# Patient Record
Sex: MALE | Race: WHITE | NOT HISPANIC OR LATINO | Employment: OTHER
[De-identification: names, ages, dates, MRNs, and addresses within clinical notes are randomized per-mention and may not be internally consistent; named-entity substitution may affect disease eponyms.]

---

## 2017-03-20 ENCOUNTER — PRIOR ORIGINAL RECORDS (OUTPATIENT)
Dept: OTHER | Age: 82
End: 2017-03-20

## 2017-03-27 ENCOUNTER — PRIOR ORIGINAL RECORDS (OUTPATIENT)
Dept: OTHER | Age: 82
End: 2017-03-27

## 2017-03-27 ENCOUNTER — HOSPITAL ENCOUNTER (OUTPATIENT)
Dept: LAB | Facility: HOSPITAL | Age: 82
Discharge: HOME | End: 2017-03-27
Attending: INTERNAL MEDICINE
Payer: MEDICARE

## 2017-03-27 LAB
CHOLEST SMN-MCNC: 117 MG/DL (ref ?–200)
HDLC SERPL-MCNC: 50 MG/DL (ref 45–?)
HDLC SERPL: 2.34 {RATIO} (ref ?–4.97)
LDLC SERPL CALC-MCNC: 53 MG/DL (ref ?–130)
NONHDLC SERPL-MCNC: 67 MG/DL (ref ?–130)
TRIGLYCERIDES: 69 MG/DL (ref ?–150)
VLDL: 14 MG/DL (ref 5–40)

## 2017-03-27 PROCEDURE — 80061 LIPID PANEL: CPT | Performed by: INTERNAL MEDICINE

## 2017-03-27 PROCEDURE — 36415 COLL VENOUS BLD VENIPUNCTURE: CPT | Performed by: INTERNAL MEDICINE

## 2017-03-31 LAB
CHOLESTEROL, TOTAL: 117 MG/DL
HDL CHOLESTEROL: 50 MG/DL
LDL CHOLESTEROL: 53 MG/DL
TRIGLYCERIDES: 69 MG/DL

## 2017-09-13 ENCOUNTER — HOSPITAL ENCOUNTER (OUTPATIENT)
Dept: CV DIAGNOSTICS | Facility: HOSPITAL | Age: 82
Discharge: HOME | End: 2017-09-13
Attending: INTERNAL MEDICINE
Payer: MEDICARE

## 2017-09-13 DIAGNOSIS — I25.10 CAD (CORONARY ARTERY DISEASE): ICD-10-CM

## 2017-09-13 PROCEDURE — 78452 HT MUSCLE IMAGE SPECT MULT: CPT | Performed by: INTERNAL MEDICINE

## 2017-09-13 PROCEDURE — 93018 CV STRESS TEST I&R ONLY: CPT | Performed by: INTERNAL MEDICINE

## 2017-09-13 PROCEDURE — 93017 CV STRESS TEST TRACING ONLY: CPT | Performed by: INTERNAL MEDICINE

## 2017-09-18 ENCOUNTER — PRIOR ORIGINAL RECORDS (OUTPATIENT)
Dept: OTHER | Age: 82
End: 2017-09-18

## 2017-10-02 ENCOUNTER — HOSPITAL ENCOUNTER (OUTPATIENT)
Dept: CARDIOLOGY CLINIC | Facility: HOSPITAL | Age: 82
Discharge: HOME | End: 2017-10-02
Attending: INTERNAL MEDICINE

## 2017-10-02 DIAGNOSIS — I71.4 ABDOMINAL AORTIC ANEURYSM WITHOUT RUPTURE (HCC): ICD-10-CM

## 2017-10-02 DIAGNOSIS — I65.23 BILATERAL CAROTID ARTERY STENOSIS: ICD-10-CM

## 2017-10-11 ENCOUNTER — PRIOR ORIGINAL RECORDS (OUTPATIENT)
Dept: OTHER | Age: 82
End: 2017-10-11

## 2018-03-19 ENCOUNTER — PRIOR ORIGINAL RECORDS (OUTPATIENT)
Dept: OTHER | Age: 83
End: 2018-03-19

## 2018-03-19 ENCOUNTER — LAB ENCOUNTER (OUTPATIENT)
Dept: LAB | Facility: HOSPITAL | Age: 83
End: 2018-03-19
Attending: INTERNAL MEDICINE
Payer: MEDICARE

## 2018-03-19 DIAGNOSIS — E78.00 HYPERCHOLESTEROLEMIA: Primary | ICD-10-CM

## 2018-03-19 DIAGNOSIS — I25.10 CAD (CORONARY ARTERY DISEASE): ICD-10-CM

## 2018-03-19 LAB
ALT SERPL-CCNC: 22 U/L (ref 17–63)
AST SERPL-CCNC: 20 U/L (ref 15–41)
BUN BLD-MCNC: 25 MG/DL (ref 8–20)
CALCIUM BLD-MCNC: 9.2 MG/DL (ref 8.3–10.3)
CHLORIDE: 109 MMOL/L (ref 101–111)
CHOLEST SMN-MCNC: 105 MG/DL (ref ?–200)
CO2: 27 MMOL/L (ref 22–32)
CREAT BLD-MCNC: 1.1 MG/DL (ref 0.7–1.3)
GLUCOSE BLD-MCNC: 100 MG/DL (ref 70–99)
HDLC SERPL-MCNC: 40 MG/DL (ref 45–?)
HDLC SERPL: 2.63 {RATIO} (ref ?–4.97)
LDLC SERPL CALC-MCNC: 49 MG/DL (ref ?–130)
NONHDLC SERPL-MCNC: 65 MG/DL (ref ?–130)
POTASSIUM SERPL-SCNC: 4.2 MMOL/L (ref 3.6–5.1)
SODIUM SERPL-SCNC: 141 MMOL/L (ref 136–144)
TRIGL SERPL-MCNC: 81 MG/DL (ref ?–150)
VLDLC SERPL CALC-MCNC: 16 MG/DL (ref 5–40)

## 2018-03-19 PROCEDURE — 84460 ALANINE AMINO (ALT) (SGPT): CPT

## 2018-03-19 PROCEDURE — 84450 TRANSFERASE (AST) (SGOT): CPT

## 2018-03-19 PROCEDURE — 80061 LIPID PANEL: CPT

## 2018-03-19 PROCEDURE — 36415 COLL VENOUS BLD VENIPUNCTURE: CPT

## 2018-03-19 PROCEDURE — 80048 BASIC METABOLIC PNL TOTAL CA: CPT

## 2018-03-20 LAB
BUN: 25 MG/DL
CALCIUM: 9.2 MG/DL
CHLORIDE: 109 MEQ/L
CHOLESTEROL, TOTAL: 105 MG/DL
CREATININE, SERUM: 1.1 MG/DL
GLUCOSE: 100 MG/DL
HDL CHOLESTEROL: 40 MG/DL
LDL CHOLESTEROL: 49 MG/DL
POTASSIUM, SERUM: 4.2 MEQ/L
SGOT (AST): 20 IU/L
SGPT (ALT): 22 IU/L
SODIUM: 141 MEQ/L
TRIGLYCERIDES: 81 MG/DL

## 2018-03-26 ENCOUNTER — PRIOR ORIGINAL RECORDS (OUTPATIENT)
Dept: OTHER | Age: 83
End: 2018-03-26

## 2018-09-24 ENCOUNTER — PRIOR ORIGINAL RECORDS (OUTPATIENT)
Dept: OTHER | Age: 83
End: 2018-09-24

## 2018-09-24 ENCOUNTER — MYAURORA ACCOUNT LINK (OUTPATIENT)
Dept: OTHER | Age: 83
End: 2018-09-24

## 2018-09-28 ENCOUNTER — HOSPITAL ENCOUNTER (OUTPATIENT)
Dept: CARDIOLOGY CLINIC | Facility: HOSPITAL | Age: 83
Discharge: HOME | End: 2018-09-28
Attending: INTERNAL MEDICINE

## 2018-09-28 ENCOUNTER — MYAURORA ACCOUNT LINK (OUTPATIENT)
Dept: OTHER | Age: 83
End: 2018-09-28

## 2018-09-28 DIAGNOSIS — Z95.1 HX OF CABG: ICD-10-CM

## 2018-09-28 DIAGNOSIS — I25.10 CORONARY ARTERIOSCLEROSIS: ICD-10-CM

## 2018-09-28 DIAGNOSIS — I65.23 BILATERAL CAROTID ARTERY STENOSIS: ICD-10-CM

## 2018-09-28 DIAGNOSIS — I71.4 ABDOMINAL ANEURYSM (HCC): ICD-10-CM

## 2018-10-17 ENCOUNTER — PRIOR ORIGINAL RECORDS (OUTPATIENT)
Dept: OTHER | Age: 83
End: 2018-10-17

## 2019-01-31 ENCOUNTER — HOSPITAL ENCOUNTER (OUTPATIENT)
Dept: LAB | Facility: HOSPITAL | Age: 84
Discharge: HOME | End: 2019-01-31
Attending: INTERNAL MEDICINE
Payer: MEDICARE

## 2019-01-31 ENCOUNTER — PRIOR ORIGINAL RECORDS (OUTPATIENT)
Dept: OTHER | Age: 84
End: 2019-01-31

## 2019-01-31 LAB
ALT SERPL-CCNC: 80 U/L (ref 17–63)
ANION GAP SERPL CALC-SCNC: 7 MMOL/L (ref 0–18)
AST SERPL-CCNC: 59 U/L (ref 15–41)
BUN BLD-MCNC: 27 MG/DL (ref 8–20)
BUN/CREAT SERPL: 20.6 (ref 10–20)
CALCIUM BLD-MCNC: 9.2 MG/DL (ref 8.3–10.3)
CHLORIDE SERPL-SCNC: 111 MMOL/L (ref 101–111)
CHOLEST SMN-MCNC: 118 MG/DL (ref ?–200)
CO2 SERPL-SCNC: 27 MMOL/L (ref 22–32)
CREAT BLD-MCNC: 1.31 MG/DL (ref 0.7–1.3)
GLUCOSE BLD-MCNC: 96 MG/DL (ref 70–99)
HDLC SERPL-MCNC: 44 MG/DL (ref 40–59)
LDLC SERPL CALC-MCNC: 56 MG/DL (ref ?–100)
NONHDLC SERPL-MCNC: 74 MG/DL (ref ?–130)
OSMOLALITY SERPL CALC.SUM OF ELEC: 305 MOSM/KG (ref 275–295)
POTASSIUM SERPL-SCNC: 4.2 MMOL/L (ref 3.6–5.1)
SODIUM SERPL-SCNC: 145 MMOL/L (ref 136–144)
TRIGL SERPL-MCNC: 88 MG/DL (ref 30–149)
VLDLC SERPL CALC-MCNC: 18 MG/DL (ref 0–30)

## 2019-01-31 PROCEDURE — 84450 TRANSFERASE (AST) (SGOT): CPT | Performed by: INTERNAL MEDICINE

## 2019-01-31 PROCEDURE — 84460 ALANINE AMINO (ALT) (SGPT): CPT | Performed by: INTERNAL MEDICINE

## 2019-01-31 PROCEDURE — 80048 BASIC METABOLIC PNL TOTAL CA: CPT | Performed by: INTERNAL MEDICINE

## 2019-01-31 PROCEDURE — 80061 LIPID PANEL: CPT | Performed by: INTERNAL MEDICINE

## 2019-01-31 PROCEDURE — 36415 COLL VENOUS BLD VENIPUNCTURE: CPT | Performed by: INTERNAL MEDICINE

## 2019-02-01 LAB
BUN: 27 MG/DL
CALCIUM: 9.2 MG/DL
CHLORIDE: 111 MEQ/L
CHOLESTEROL, TOTAL: 118 MG/DL
CREATININE, SERUM: 1.31 MG/DL
GLUCOSE: 96 MG/DL
HDL CHOLESTEROL: 44 MG/DL
LDL CHOLESTEROL: 56 MG/DL
POTASSIUM, SERUM: 4.2 MEQ/L
SGOT (AST): 59 IU/L
SGPT (ALT): 80 IU/L
SODIUM: 145 MEQ/L
TRIGLYCERIDES: 88 MG/DL

## 2019-02-04 ENCOUNTER — PRIOR ORIGINAL RECORDS (OUTPATIENT)
Dept: OTHER | Age: 84
End: 2019-02-04

## 2019-02-28 VITALS
DIASTOLIC BLOOD PRESSURE: 50 MMHG | BODY MASS INDEX: 28.56 KG/M2 | HEART RATE: 77 BPM | WEIGHT: 204 LBS | SYSTOLIC BLOOD PRESSURE: 122 MMHG | HEIGHT: 71 IN

## 2019-02-28 VITALS
WEIGHT: 201 LBS | SYSTOLIC BLOOD PRESSURE: 98 MMHG | BODY MASS INDEX: 28.14 KG/M2 | HEIGHT: 71 IN | HEART RATE: 64 BPM | DIASTOLIC BLOOD PRESSURE: 58 MMHG

## 2019-02-28 VITALS
HEART RATE: 60 BPM | BODY MASS INDEX: 27.86 KG/M2 | WEIGHT: 199 LBS | DIASTOLIC BLOOD PRESSURE: 64 MMHG | HEIGHT: 71 IN | SYSTOLIC BLOOD PRESSURE: 124 MMHG

## 2019-03-01 VITALS
SYSTOLIC BLOOD PRESSURE: 120 MMHG | WEIGHT: 195 LBS | DIASTOLIC BLOOD PRESSURE: 62 MMHG | HEIGHT: 71 IN | BODY MASS INDEX: 27.3 KG/M2 | HEART RATE: 68 BPM

## 2019-03-08 ENCOUNTER — HOSPITAL ENCOUNTER (OUTPATIENT)
Dept: LAB | Facility: HOSPITAL | Age: 84
Discharge: HOME | End: 2019-03-08
Attending: INTERNAL MEDICINE
Payer: MEDICARE

## 2019-03-08 LAB
ALBUMIN SERPL-MCNC: 4 G/DL (ref 3.4–5)
ALBUMIN/GLOB SERPL: 1 {RATIO} (ref 1–2)
ALP LIVER SERPL-CCNC: 130 U/L (ref 45–117)
ALT SERPL-CCNC: 73 U/L (ref 16–61)
ANION GAP SERPL CALC-SCNC: 5 MMOL/L (ref 0–18)
AST SERPL-CCNC: 44 U/L (ref 15–37)
BILIRUB SERPL-MCNC: 1 MG/DL (ref 0.1–2)
BUN BLD-MCNC: 29 MG/DL (ref 7–18)
BUN/CREAT SERPL: 23.6 (ref 10–20)
CALCIUM BLD-MCNC: 9.5 MG/DL (ref 8.5–10.1)
CHLORIDE SERPL-SCNC: 106 MMOL/L (ref 98–107)
CO2 SERPL-SCNC: 27 MMOL/L (ref 21–32)
CREAT BLD-MCNC: 1.23 MG/DL (ref 0.7–1.3)
GLOBULIN PLAS-MCNC: 4.2 G/DL (ref 2.8–4.4)
GLUCOSE BLD-MCNC: 107 MG/DL (ref 70–99)
M PROTEIN MFR SERPL ELPH: 8.2 G/DL (ref 6.4–8.2)
OSMOLALITY SERPL CALC.SUM OF ELEC: 292 MOSM/KG (ref 275–295)
POTASSIUM SERPL-SCNC: 4.5 MMOL/L (ref 3.5–5.1)
SODIUM SERPL-SCNC: 138 MMOL/L (ref 136–145)

## 2019-03-08 PROCEDURE — 80053 COMPREHEN METABOLIC PANEL: CPT | Performed by: INTERNAL MEDICINE

## 2019-03-08 PROCEDURE — 36415 COLL VENOUS BLD VENIPUNCTURE: CPT | Performed by: INTERNAL MEDICINE

## 2019-03-14 RX ORDER — METOPROLOL SUCCINATE 100 MG/1
TABLET, EXTENDED RELEASE ORAL
COMMUNITY
Start: 2018-09-04 | End: 2019-04-08 | Stop reason: SDUPTHER

## 2019-03-14 RX ORDER — OMEPRAZOLE 40 MG/1
1 CAPSULE, DELAYED RELEASE ORAL DAILY
COMMUNITY
Start: 2017-02-10

## 2019-03-14 RX ORDER — LISINOPRIL 10 MG/1
1 TABLET ORAL DAILY
COMMUNITY
Start: 2018-07-23 | End: 2019-04-08 | Stop reason: SDUPTHER

## 2019-03-14 RX ORDER — ATORVASTATIN CALCIUM 20 MG/1
1 TABLET, FILM COATED ORAL AT BEDTIME
COMMUNITY
Start: 2018-05-01 | End: 2019-04-08 | Stop reason: SDUPTHER

## 2019-03-22 ENCOUNTER — TELEPHONE (OUTPATIENT)
Dept: CARDIOLOGY | Age: 84
End: 2019-03-22

## 2019-03-22 DIAGNOSIS — R94.5 NONSPECIFIC ABNORMAL RESULTS OF LIVER FUNCTION STUDY: Primary | ICD-10-CM

## 2019-03-22 DIAGNOSIS — I71.40 ABDOMINAL AORTIC ANEURYSM WITHOUT RUPTURE (CMD): ICD-10-CM

## 2019-03-26 ENCOUNTER — TELEPHONE (OUTPATIENT)
Dept: CARDIOLOGY | Age: 84
End: 2019-03-26

## 2019-03-26 ENCOUNTER — EXTERNAL RECORD (OUTPATIENT)
Dept: HEALTH INFORMATION MANAGEMENT | Facility: OTHER | Age: 84
End: 2019-03-26

## 2019-03-26 DIAGNOSIS — R94.5 NONSPECIFIC ABNORMAL RESULTS OF LIVER FUNCTION STUDY: Primary | ICD-10-CM

## 2019-03-26 DIAGNOSIS — I71.40 ABDOMINAL AORTIC ANEURYSM WITHOUT RUPTURE (CMD): ICD-10-CM

## 2019-03-27 ENCOUNTER — HOSPITAL ENCOUNTER (OUTPATIENT)
Dept: CT IMAGING | Facility: HOSPITAL | Age: 84
Discharge: HOME | End: 2019-03-27
Attending: INTERNAL MEDICINE
Payer: MEDICARE

## 2019-03-27 DIAGNOSIS — I71.4 ABDOMINAL AORTIC ANEURYSM WITHOUT RUPTURE (HCC): ICD-10-CM

## 2019-03-27 DIAGNOSIS — R79.89 ABNORMAL LFTS (LIVER FUNCTION TESTS): ICD-10-CM

## 2019-03-27 LAB — CREAT SERPL-MCNC: 1.1 MG/DL (ref 0.7–1.3)

## 2019-03-27 PROCEDURE — 82565 ASSAY OF CREATININE: CPT

## 2019-03-27 PROCEDURE — 74178 CT ABD&PLV WO CNTR FLWD CNTR: CPT | Performed by: INTERNAL MEDICINE

## 2019-04-08 ENCOUNTER — OFFICE VISIT (OUTPATIENT)
Dept: CARDIOLOGY | Age: 84
End: 2019-04-08

## 2019-04-08 VITALS
DIASTOLIC BLOOD PRESSURE: 60 MMHG | SYSTOLIC BLOOD PRESSURE: 108 MMHG | BODY MASS INDEX: 30.31 KG/M2 | WEIGHT: 200 LBS | HEART RATE: 62 BPM | HEIGHT: 68 IN

## 2019-04-08 DIAGNOSIS — I25.5 ISCHEMIC CARDIOMYOPATHY: ICD-10-CM

## 2019-04-08 DIAGNOSIS — Z95.1 HX OF CABG: ICD-10-CM

## 2019-04-08 DIAGNOSIS — E78.00 PURE HYPERCHOLESTEROLEMIA: ICD-10-CM

## 2019-04-08 DIAGNOSIS — I49.3 PVCS (PREMATURE VENTRICULAR CONTRACTIONS): ICD-10-CM

## 2019-04-08 DIAGNOSIS — I65.23 ASYMPTOMATIC CAROTID ARTERY STENOSIS, BILATERAL: ICD-10-CM

## 2019-04-08 DIAGNOSIS — I77.811 ECTATIC ABDOMINAL AORTA (CMD): Primary | ICD-10-CM

## 2019-04-08 DIAGNOSIS — I25.10 CORONARY ARTERY DISEASE INVOLVING NATIVE CORONARY ARTERY OF NATIVE HEART WITHOUT ANGINA PECTORIS: ICD-10-CM

## 2019-04-08 PROCEDURE — 99214 OFFICE O/P EST MOD 30 MIN: CPT | Performed by: INTERNAL MEDICINE

## 2019-04-08 RX ORDER — ATORVASTATIN CALCIUM 20 MG/1
20 TABLET, FILM COATED ORAL AT BEDTIME
Qty: 90 TABLET | Refills: 3 | Status: SHIPPED | OUTPATIENT
Start: 2019-04-08 | End: 2020-02-01 | Stop reason: DRUGHIGH

## 2019-04-08 RX ORDER — LISINOPRIL 10 MG/1
10 TABLET ORAL DAILY
Qty: 90 TABLET | Refills: 3 | Status: SHIPPED | OUTPATIENT
Start: 2019-04-08 | End: 2020-02-01 | Stop reason: DRUGHIGH

## 2019-04-08 RX ORDER — METOPROLOL SUCCINATE 100 MG/1
150 TABLET, EXTENDED RELEASE ORAL DAILY
Qty: 145 TABLET | Refills: 3 | Status: SHIPPED | OUTPATIENT
Start: 2019-04-08 | End: 2020-02-01 | Stop reason: DRUGHIGH

## 2019-05-08 ENCOUNTER — HOSPITAL ENCOUNTER (OUTPATIENT)
Dept: LAB | Facility: HOSPITAL | Age: 84
Discharge: HOME | End: 2019-05-08
Attending: INTERNAL MEDICINE
Payer: MEDICARE

## 2019-05-08 PROCEDURE — 36415 COLL VENOUS BLD VENIPUNCTURE: CPT | Performed by: INTERNAL MEDICINE

## 2019-05-08 PROCEDURE — 80053 COMPREHEN METABOLIC PANEL: CPT | Performed by: INTERNAL MEDICINE

## 2019-05-15 ENCOUNTER — TELEPHONE (OUTPATIENT)
Dept: CARDIOLOGY | Age: 84
End: 2019-05-15

## 2019-07-11 ENCOUNTER — APPOINTMENT (OUTPATIENT)
Dept: GENERAL RADIOLOGY | Age: 84
End: 2019-07-11
Attending: FAMILY MEDICINE
Payer: MEDICARE

## 2019-07-11 ENCOUNTER — HOSPITAL ENCOUNTER (OUTPATIENT)
Age: 84
Discharge: HOME | End: 2019-07-11
Attending: FAMILY MEDICINE
Payer: MEDICARE

## 2019-07-11 VITALS
DIASTOLIC BLOOD PRESSURE: 50 MMHG | HEART RATE: 62 BPM | TEMPERATURE: 98 F | BODY MASS INDEX: 30 KG/M2 | SYSTOLIC BLOOD PRESSURE: 110 MMHG | HEIGHT: 68 IN | RESPIRATION RATE: 18 BRPM | OXYGEN SATURATION: 96 %

## 2019-07-11 DIAGNOSIS — M10.9 ACUTE GOUT INVOLVING TOE OF LEFT FOOT, UNSPECIFIED CAUSE: ICD-10-CM

## 2019-07-11 DIAGNOSIS — S93.402A SPRAIN OF LEFT ANKLE, UNSPECIFIED LIGAMENT, INITIAL ENCOUNTER: Primary | ICD-10-CM

## 2019-07-11 PROCEDURE — 73630 X-RAY EXAM OF FOOT: CPT | Performed by: FAMILY MEDICINE

## 2019-07-11 PROCEDURE — 99204 OFFICE O/P NEW MOD 45 MIN: CPT

## 2019-07-11 PROCEDURE — 99213 OFFICE O/P EST LOW 20 MIN: CPT

## 2019-07-11 PROCEDURE — 73610 X-RAY EXAM OF ANKLE: CPT | Performed by: FAMILY MEDICINE

## 2019-07-11 RX ORDER — METOPROLOL SUCCINATE 25 MG/1
25 TABLET, EXTENDED RELEASE ORAL DAILY
COMMUNITY

## 2019-07-11 RX ORDER — PREDNISONE 50 MG/1
50 TABLET ORAL DAILY
Qty: 5 TABLET | Refills: 0 | Status: SHIPPED | OUTPATIENT
Start: 2019-07-11 | End: 2019-07-16

## 2019-07-11 NOTE — ED INITIAL ASSESSMENT (HPI)
Pt had a fall x 4 days ago after walking fell onto sidewalk and landed on his hands and knees. Did not hit head or have LOC. Pt states his left ankle pain and left foot pain since with swelling also. Pt has been using walker since he fell.

## 2019-07-11 NOTE — ED PROVIDER NOTES
Patient Seen in: [facility]    History   Patient presents with:  Fall (musculoskeletal, neurologic)    Stated Complaint: left foot pain x3 days    HPI    77-year-old male presents for left foot pain and swelling.   Patient w 97.8 °F (36.6 °C) (Temporal)   Resp 16   Ht 172.7 cm (5' 8\")   SpO2 98%   BMI 29.80 kg/m²         Physical Exam   Constitutional: He is oriented to person, place, and time. He appears well-developed and well-nourished.    Cardiovascular: Normal rate, regul

## 2019-09-09 ENCOUNTER — OFFICE VISIT (OUTPATIENT)
Dept: CARDIOLOGY | Age: 84
End: 2019-09-09

## 2019-09-09 VITALS
HEIGHT: 68 IN | WEIGHT: 200 LBS | BODY MASS INDEX: 30.31 KG/M2 | HEART RATE: 64 BPM | SYSTOLIC BLOOD PRESSURE: 138 MMHG | DIASTOLIC BLOOD PRESSURE: 70 MMHG

## 2019-09-09 DIAGNOSIS — I77.811 ECTATIC ABDOMINAL AORTA (CMD): ICD-10-CM

## 2019-09-09 DIAGNOSIS — I65.23 ASYMPTOMATIC CAROTID ARTERY STENOSIS, BILATERAL: Primary | ICD-10-CM

## 2019-09-09 DIAGNOSIS — I25.10 CORONARY ARTERY DISEASE INVOLVING NATIVE CORONARY ARTERY OF NATIVE HEART WITHOUT ANGINA PECTORIS: ICD-10-CM

## 2019-09-09 DIAGNOSIS — Z95.1 HX OF CABG: ICD-10-CM

## 2019-09-09 DIAGNOSIS — I25.5 ISCHEMIC CARDIOMYOPATHY: ICD-10-CM

## 2019-09-09 DIAGNOSIS — I49.3 PVCS (PREMATURE VENTRICULAR CONTRACTIONS): ICD-10-CM

## 2019-09-09 DIAGNOSIS — E78.00 PURE HYPERCHOLESTEROLEMIA: ICD-10-CM

## 2019-09-09 PROCEDURE — 99214 OFFICE O/P EST MOD 30 MIN: CPT | Performed by: INTERNAL MEDICINE

## 2019-09-20 ENCOUNTER — HOSPITAL ENCOUNTER (OUTPATIENT)
Dept: CARDIOLOGY CLINIC | Facility: HOSPITAL | Age: 84
Discharge: HOME | End: 2019-09-20
Attending: INTERNAL MEDICINE
Payer: MEDICARE

## 2019-09-20 ENCOUNTER — DOCUMENTATION (OUTPATIENT)
Dept: CARDIOLOGY | Age: 84
End: 2019-09-20

## 2019-09-20 DIAGNOSIS — I65.23 ASYMPTOMATIC CAROTID ARTERY STENOSIS, BILATERAL: ICD-10-CM

## 2019-09-20 PROCEDURE — 93880 EXTRACRANIAL BILAT STUDY: CPT | Performed by: INTERNAL MEDICINE

## 2019-09-23 ENCOUNTER — TELEPHONE (OUTPATIENT)
Dept: CARDIOLOGY | Age: 84
End: 2019-09-23

## 2019-09-23 DIAGNOSIS — I65.23 ASYMPTOMATIC CAROTID ARTERY STENOSIS, BILATERAL: Primary | ICD-10-CM

## 2019-09-23 DIAGNOSIS — I65.23 ASYMPTOMATIC CAROTID ARTERY STENOSIS, BILATERAL: ICD-10-CM

## 2019-09-27 ENCOUNTER — HOSPITAL ENCOUNTER (OUTPATIENT)
Dept: CV DIAGNOSTICS | Facility: HOSPITAL | Age: 84
Discharge: HOME | End: 2019-09-27
Attending: INTERNAL MEDICINE
Payer: MEDICARE

## 2019-09-27 DIAGNOSIS — I25.10 CORONARY ARTERY DISEASE INVOLVING NATIVE CORONARY ARTERY OF NATIVE HEART WITHOUT ANGINA PECTORIS: ICD-10-CM

## 2019-09-27 PROCEDURE — 93017 CV STRESS TEST TRACING ONLY: CPT | Performed by: INTERNAL MEDICINE

## 2019-09-27 PROCEDURE — 93018 CV STRESS TEST I&R ONLY: CPT | Performed by: INTERNAL MEDICINE

## 2019-09-27 PROCEDURE — 78452 HT MUSCLE IMAGE SPECT MULT: CPT | Performed by: INTERNAL MEDICINE

## 2019-10-02 ENCOUNTER — TELEPHONE (OUTPATIENT)
Dept: CARDIOLOGY | Age: 84
End: 2019-10-02

## 2019-10-03 DIAGNOSIS — Z95.1 HX OF CABG: ICD-10-CM

## 2019-10-03 DIAGNOSIS — I25.10 CORONARY ARTERY DISEASE INVOLVING NATIVE CORONARY ARTERY OF NATIVE HEART WITHOUT ANGINA PECTORIS: ICD-10-CM

## 2019-11-04 ENCOUNTER — DOCUMENTATION (OUTPATIENT)
Dept: CARDIOLOGY | Age: 84
End: 2019-11-04

## 2019-11-05 ENCOUNTER — DOCUMENTATION (OUTPATIENT)
Dept: CARDIOLOGY | Age: 84
End: 2019-11-05

## 2020-03-09 ENCOUNTER — OFFICE VISIT (OUTPATIENT)
Dept: CARDIOLOGY | Age: 85
End: 2020-03-09

## 2020-03-09 VITALS
BODY MASS INDEX: 31.07 KG/M2 | WEIGHT: 205 LBS | DIASTOLIC BLOOD PRESSURE: 76 MMHG | HEART RATE: 76 BPM | SYSTOLIC BLOOD PRESSURE: 132 MMHG | HEIGHT: 68 IN

## 2020-03-09 DIAGNOSIS — E78.00 PURE HYPERCHOLESTEROLEMIA: ICD-10-CM

## 2020-03-09 DIAGNOSIS — Z95.1 HX OF CABG: ICD-10-CM

## 2020-03-09 DIAGNOSIS — I65.23 ASYMPTOMATIC CAROTID ARTERY STENOSIS, BILATERAL: ICD-10-CM

## 2020-03-09 DIAGNOSIS — I77.811 ECTATIC ABDOMINAL AORTA (CMD): ICD-10-CM

## 2020-03-09 DIAGNOSIS — I25.10 CORONARY ARTERY DISEASE INVOLVING NATIVE CORONARY ARTERY OF NATIVE HEART WITHOUT ANGINA PECTORIS: Primary | ICD-10-CM

## 2020-03-09 DIAGNOSIS — I25.5 ISCHEMIC CARDIOMYOPATHY: ICD-10-CM

## 2020-03-09 PROCEDURE — 99214 OFFICE O/P EST MOD 30 MIN: CPT | Performed by: INTERNAL MEDICINE

## 2020-03-09 RX ORDER — ATORVASTATIN CALCIUM 10 MG/1
10 TABLET, FILM COATED ORAL DAILY
COMMUNITY
End: 2020-09-10 | Stop reason: SDUPTHER

## 2020-03-09 RX ORDER — LISINOPRIL 5 MG/1
5 TABLET ORAL DAILY
COMMUNITY
End: 2020-09-10 | Stop reason: DRUGHIGH

## 2020-03-09 RX ORDER — METOPROLOL SUCCINATE 25 MG/1
25 TABLET, EXTENDED RELEASE ORAL DAILY
COMMUNITY
End: 2020-09-10 | Stop reason: SDUPTHER

## 2020-04-02 RX ORDER — LISINOPRIL 10 MG/1
TABLET ORAL
Qty: 90 TABLET | Refills: 3 | OUTPATIENT
Start: 2020-04-02

## 2020-04-03 ENCOUNTER — DOCUMENTATION (OUTPATIENT)
Dept: CARDIOLOGY | Age: 85
End: 2020-04-03

## 2020-05-06 RX ORDER — METOPROLOL SUCCINATE 100 MG/1
TABLET, EXTENDED RELEASE ORAL
Qty: 145 TABLET | Refills: 3 | OUTPATIENT
Start: 2020-05-06

## 2020-06-12 ENCOUNTER — TELEPHONE (OUTPATIENT)
Dept: CARDIOLOGY | Age: 85
End: 2020-06-12

## 2020-06-16 ENCOUNTER — APPOINTMENT (OUTPATIENT)
Dept: LAB | Facility: HOSPITAL | Age: 85
End: 2020-06-16
Attending: ORTHOPAEDIC SURGERY
Payer: MEDICARE

## 2020-06-16 ENCOUNTER — ANESTHESIA EVENT (OUTPATIENT)
Dept: SURGERY | Facility: HOSPITAL | Age: 85
End: 2020-06-16
Payer: MEDICARE

## 2020-06-16 DIAGNOSIS — M25.512 LEFT SHOULDER PAIN, UNSPECIFIED CHRONICITY: ICD-10-CM

## 2020-06-16 DIAGNOSIS — M67.40 GANGLION CYST: ICD-10-CM

## 2020-06-16 PROCEDURE — 93005 ELECTROCARDIOGRAM TRACING: CPT

## 2020-06-16 PROCEDURE — 80048 BASIC METABOLIC PNL TOTAL CA: CPT

## 2020-06-16 PROCEDURE — 93010 ELECTROCARDIOGRAM REPORT: CPT | Performed by: INTERNAL MEDICINE

## 2020-06-16 PROCEDURE — 36415 COLL VENOUS BLD VENIPUNCTURE: CPT

## 2020-06-18 NOTE — ANESTHESIA PREPROCEDURE EVALUATION
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 Pulmonary    Negative pulmonary ROS. Neuro/Psych    Negative neuro/psych ROS.                                 Past Surgical History:   Procedure Laterality Date   • ANGIOGRAM     • Plan/risks discussed with: patient and child/children                Present on Admission:  **None**

## 2020-06-19 ENCOUNTER — HOSPITAL ENCOUNTER (OUTPATIENT)
Facility: HOSPITAL | Age: 85
Setting detail: HOSPITAL OUTPATIENT SURGERY
Discharge: HOME | End: 2020-06-19
Attending: ORTHOPAEDIC SURGERY | Admitting: ORTHOPAEDIC SURGERY
Payer: MEDICARE

## 2020-06-19 ENCOUNTER — ANESTHESIA (OUTPATIENT)
Dept: SURGERY | Facility: HOSPITAL | Age: 85
End: 2020-06-19
Payer: MEDICARE

## 2020-06-19 VITALS
DIASTOLIC BLOOD PRESSURE: 74 MMHG | HEIGHT: 68 IN | WEIGHT: 194.88 LBS | HEART RATE: 83 BPM | TEMPERATURE: 97 F | BODY MASS INDEX: 29.54 KG/M2 | RESPIRATION RATE: 16 BRPM | SYSTOLIC BLOOD PRESSURE: 132 MMHG | OXYGEN SATURATION: 96 %

## 2020-06-19 DIAGNOSIS — M25.512 LEFT SHOULDER PAIN, UNSPECIFIED CHRONICITY: Primary | ICD-10-CM

## 2020-06-19 DIAGNOSIS — M19.012 ARTHRITIS OF LEFT ACROMIOCLAVICULAR JOINT: ICD-10-CM

## 2020-06-19 DIAGNOSIS — M67.40 GANGLION CYST: ICD-10-CM

## 2020-06-19 PROCEDURE — 0PBB0ZZ EXCISION OF LEFT CLAVICLE, OPEN APPROACH: ICD-10-PCS | Performed by: ORTHOPAEDIC SURGERY

## 2020-06-19 PROCEDURE — 76942 ECHO GUIDE FOR BIOPSY: CPT | Performed by: ANESTHESIOLOGY

## 2020-06-19 PROCEDURE — 88305 TISSUE EXAM BY PATHOLOGIST: CPT | Performed by: ORTHOPAEDIC SURGERY

## 2020-06-19 PROCEDURE — 0LB20ZZ EXCISION OF LEFT SHOULDER TENDON, OPEN APPROACH: ICD-10-PCS | Performed by: ORTHOPAEDIC SURGERY

## 2020-06-19 PROCEDURE — 88311 DECALCIFY TISSUE: CPT | Performed by: ORTHOPAEDIC SURGERY

## 2020-06-19 PROCEDURE — 88304 TISSUE EXAM BY PATHOLOGIST: CPT | Performed by: ORTHOPAEDIC SURGERY

## 2020-06-19 RX ORDER — EPHEDRINE SULFATE 50 MG/ML
INJECTION, SOLUTION INTRAVENOUS AS NEEDED
Status: DISCONTINUED | OUTPATIENT
Start: 2020-06-19 | End: 2020-06-19 | Stop reason: SURG

## 2020-06-19 RX ORDER — HYDROCODONE BITARTRATE AND ACETAMINOPHEN 5; 325 MG/1; MG/1
1 TABLET ORAL AS NEEDED
Status: DISCONTINUED | OUTPATIENT
Start: 2020-06-19 | End: 2020-06-19

## 2020-06-19 RX ORDER — ACETAMINOPHEN 500 MG
1000 TABLET ORAL ONCE
Status: DISCONTINUED | OUTPATIENT
Start: 2020-06-19 | End: 2020-06-19 | Stop reason: HOSPADM

## 2020-06-19 RX ORDER — MIDAZOLAM HYDROCHLORIDE 1 MG/ML
INJECTION INTRAMUSCULAR; INTRAVENOUS AS NEEDED
Status: DISCONTINUED | OUTPATIENT
Start: 2020-06-19 | End: 2020-06-19 | Stop reason: SURG

## 2020-06-19 RX ORDER — SODIUM CHLORIDE, SODIUM LACTATE, POTASSIUM CHLORIDE, CALCIUM CHLORIDE 600; 310; 30; 20 MG/100ML; MG/100ML; MG/100ML; MG/100ML
INJECTION, SOLUTION INTRAVENOUS CONTINUOUS
Status: DISCONTINUED | OUTPATIENT
Start: 2020-06-19 | End: 2020-06-19

## 2020-06-19 RX ORDER — ACETAMINOPHEN 500 MG
1000 TABLET ORAL EVERY 8 HOURS
Qty: 21 TABLET | Refills: 0 | Status: SHIPPED | OUTPATIENT
Start: 2020-06-19

## 2020-06-19 RX ORDER — ROCURONIUM BROMIDE 10 MG/ML
INJECTION, SOLUTION INTRAVENOUS AS NEEDED
Status: DISCONTINUED | OUTPATIENT
Start: 2020-06-19 | End: 2020-06-19 | Stop reason: SURG

## 2020-06-19 RX ORDER — NALOXONE HYDROCHLORIDE 0.4 MG/ML
80 INJECTION, SOLUTION INTRAMUSCULAR; INTRAVENOUS; SUBCUTANEOUS AS NEEDED
Status: DISCONTINUED | OUTPATIENT
Start: 2020-06-19 | End: 2020-06-19

## 2020-06-19 RX ORDER — TRAMADOL HYDROCHLORIDE 50 MG/1
TABLET ORAL
Qty: 15 TABLET | Refills: 0 | Status: SHIPPED | OUTPATIENT
Start: 2020-06-19

## 2020-06-19 RX ORDER — HYDROMORPHONE HYDROCHLORIDE 1 MG/ML
0.4 INJECTION, SOLUTION INTRAMUSCULAR; INTRAVENOUS; SUBCUTANEOUS EVERY 5 MIN PRN
Status: DISCONTINUED | OUTPATIENT
Start: 2020-06-19 | End: 2020-06-19

## 2020-06-19 RX ORDER — DEXAMETHASONE SODIUM PHOSPHATE 4 MG/ML
VIAL (ML) INJECTION AS NEEDED
Status: DISCONTINUED | OUTPATIENT
Start: 2020-06-19 | End: 2020-06-19 | Stop reason: SURG

## 2020-06-19 RX ORDER — METOPROLOL SUCCINATE 25 MG/1
25 TABLET, EXTENDED RELEASE ORAL ONCE
Status: COMPLETED | OUTPATIENT
Start: 2020-06-19 | End: 2020-06-19

## 2020-06-19 RX ORDER — CEFAZOLIN SODIUM/WATER 2 G/20 ML
2 SYRINGE (ML) INTRAVENOUS ONCE
Status: COMPLETED | OUTPATIENT
Start: 2020-06-19 | End: 2020-06-19

## 2020-06-19 RX ORDER — BUPIVACAINE HYDROCHLORIDE 2.5 MG/ML
INJECTION, SOLUTION EPIDURAL; INFILTRATION; INTRACAUDAL AS NEEDED
Status: DISCONTINUED | OUTPATIENT
Start: 2020-06-19 | End: 2020-06-19 | Stop reason: SURG

## 2020-06-19 RX ORDER — ACETAMINOPHEN 500 MG
1000 TABLET ORAL EVERY 6 HOURS PRN
Status: ON HOLD | COMMUNITY
End: 2020-06-19

## 2020-06-19 RX ORDER — HYDROCODONE BITARTRATE AND ACETAMINOPHEN 5; 325 MG/1; MG/1
2 TABLET ORAL AS NEEDED
Status: DISCONTINUED | OUTPATIENT
Start: 2020-06-19 | End: 2020-06-19

## 2020-06-19 RX ORDER — METOPROLOL SUCCINATE 25 MG/1
TABLET, EXTENDED RELEASE ORAL
Status: DISCONTINUED
Start: 2020-06-19 | End: 2020-06-19

## 2020-06-19 RX ORDER — DEXAMETHASONE SODIUM PHOSPHATE 10 MG/ML
INJECTION, SOLUTION INTRAMUSCULAR; INTRAVENOUS AS NEEDED
Status: DISCONTINUED | OUTPATIENT
Start: 2020-06-19 | End: 2020-06-19 | Stop reason: SURG

## 2020-06-19 RX ORDER — ONDANSETRON 2 MG/ML
4 INJECTION INTRAMUSCULAR; INTRAVENOUS AS NEEDED
Status: DISCONTINUED | OUTPATIENT
Start: 2020-06-19 | End: 2020-06-19

## 2020-06-19 RX ORDER — ONDANSETRON 2 MG/ML
INJECTION INTRAMUSCULAR; INTRAVENOUS AS NEEDED
Status: DISCONTINUED | OUTPATIENT
Start: 2020-06-19 | End: 2020-06-19 | Stop reason: SURG

## 2020-06-19 RX ADMIN — CEFAZOLIN SODIUM/WATER 2 G: 2 G/20 ML SYRINGE (ML) INTRAVENOUS at 14:18:00

## 2020-06-19 RX ADMIN — ONDANSETRON 4 MG: 2 INJECTION INTRAMUSCULAR; INTRAVENOUS at 15:18:00

## 2020-06-19 RX ADMIN — MIDAZOLAM HYDROCHLORIDE 1 MG: 1 INJECTION INTRAMUSCULAR; INTRAVENOUS at 14:08:00

## 2020-06-19 RX ADMIN — BUPIVACAINE HYDROCHLORIDE 20 ML: 2.5 INJECTION, SOLUTION EPIDURAL; INFILTRATION; INTRACAUDAL at 14:14:00

## 2020-06-19 RX ADMIN — ROCURONIUM BROMIDE 30 MG: 10 INJECTION, SOLUTION INTRAVENOUS at 14:18:00

## 2020-06-19 RX ADMIN — DEXAMETHASONE SODIUM PHOSPHATE 2 MG: 10 INJECTION, SOLUTION INTRAMUSCULAR; INTRAVENOUS at 14:14:00

## 2020-06-19 RX ADMIN — SODIUM CHLORIDE, SODIUM LACTATE, POTASSIUM CHLORIDE, CALCIUM CHLORIDE: 600; 310; 30; 20 INJECTION, SOLUTION INTRAVENOUS at 15:30:00

## 2020-06-19 RX ADMIN — DEXAMETHASONE SODIUM PHOSPHATE 4 MG: 4 MG/ML VIAL (ML) INJECTION at 14:31:00

## 2020-06-19 RX ADMIN — EPHEDRINE SULFATE 20 MG: 50 INJECTION, SOLUTION INTRAVENOUS at 14:27:00

## 2020-06-19 NOTE — H&P
[facility]    PATIENT'S NAME: Johanna Ruiz   ATTENDING PHYSICIAN: Jorge Mayes M.D.    PATIENT ACCOUNT#:   [de-identified]    LOCATION:    MEDICAL RECORD #:   [id number]       YOB: 1935  ADMISSION DATE:       06/19/2020    HISTORY AND PH mother and father. Father had cancer. Brother is alive. SOCIAL HISTORY:  Patient lives in [address] [place] [address]. He has never smoked. He does not use alcohol or recreational drugs. REVIEW OF SYSTEMS:  Negative.       PHYSICAL EXAMINATION:    VITAL SIGNS:  Pa

## 2020-06-19 NOTE — ANESTHESIA PROCEDURE NOTES
[facility]  Performed by: Dhruv Saleem MD  Authorized by: Dhruv Saleem MD       General Information and Staff    Start Time:  6/19/2020 2:10 PM  End Time:  6/19/2020 2:14 PM  Anesthesiologist:  Dhruv Saleem MD  Performed by:   Anesthesiologi

## 2020-06-19 NOTE — BRIEF OP NOTE
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

## 2020-06-19 NOTE — ANESTHESIA PROCEDURE NOTES
Airway  Date/Time: 6/19/2020 2:20 PM  Urgency: elective    Airway not difficult    General Information and Staff    Patient location during procedure: OR  Anesthesiologist: Shefali Szymanski MD  Performed: anesthesiologist     Indications and Patient Jose Hall

## 2020-06-19 NOTE — INTERVAL H&P NOTE
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

## 2020-06-20 NOTE — OPERATIVE REPORT
[address]    PATIENT'S NAME: Vanita Gutierrez   ATTENDING PHYSICIAN: Elvis Jeter M.D. OPERATING PHYSICIAN: Elvis Jeter M.D.    PATIENT ACCOUNT#:   [de-identified]    LOCATION:  [address] [place] [address] 10  MEDICAL RECORD #:   [id number]       DATE overlying dermis and circumferential dissection was performed. There was cystic fluid present within the mass that leaked out on several occasions. The entire mass was removed and submitted to Pathology.   The distal clavicle was unstable and severely art

## 2020-09-10 ENCOUNTER — OFFICE VISIT (OUTPATIENT)
Dept: CARDIOLOGY | Age: 85
End: 2020-09-10

## 2020-09-10 VITALS
HEART RATE: 86 BPM | SYSTOLIC BLOOD PRESSURE: 134 MMHG | BODY MASS INDEX: 30.31 KG/M2 | HEIGHT: 68 IN | WEIGHT: 200 LBS | DIASTOLIC BLOOD PRESSURE: 78 MMHG

## 2020-09-10 DIAGNOSIS — I25.5 ISCHEMIC CARDIOMYOPATHY: ICD-10-CM

## 2020-09-10 DIAGNOSIS — I77.811 ECTATIC ABDOMINAL AORTA (CMD): Primary | ICD-10-CM

## 2020-09-10 DIAGNOSIS — I49.3 PVCS (PREMATURE VENTRICULAR CONTRACTIONS): ICD-10-CM

## 2020-09-10 DIAGNOSIS — I65.23 ASYMPTOMATIC CAROTID ARTERY STENOSIS, BILATERAL: ICD-10-CM

## 2020-09-10 DIAGNOSIS — E78.00 PURE HYPERCHOLESTEROLEMIA: ICD-10-CM

## 2020-09-10 DIAGNOSIS — I25.10 CORONARY ARTERY DISEASE INVOLVING NATIVE CORONARY ARTERY OF NATIVE HEART WITHOUT ANGINA PECTORIS: ICD-10-CM

## 2020-09-10 DIAGNOSIS — Z95.1 HX OF CABG: ICD-10-CM

## 2020-09-10 PROCEDURE — 99214 OFFICE O/P EST MOD 30 MIN: CPT | Performed by: INTERNAL MEDICINE

## 2020-09-10 RX ORDER — METOPROLOL SUCCINATE 25 MG/1
25 TABLET, EXTENDED RELEASE ORAL DAILY
Qty: 90 TABLET | Refills: 3 | Status: SHIPPED | OUTPATIENT
Start: 2020-09-10 | End: 2021-08-18

## 2020-09-10 RX ORDER — ATORVASTATIN CALCIUM 10 MG/1
10 TABLET, FILM COATED ORAL DAILY
Qty: 90 TABLET | Refills: 3 | Status: SHIPPED | OUTPATIENT
Start: 2020-09-10 | End: 2021-08-18

## 2020-09-10 RX ORDER — LISINOPRIL 2.5 MG/1
2.5 TABLET ORAL DAILY
COMMUNITY
End: 2020-09-10 | Stop reason: SDUPTHER

## 2020-09-10 RX ORDER — LISINOPRIL 2.5 MG/1
2.5 TABLET ORAL DAILY
Qty: 90 TABLET | Refills: 3 | Status: SHIPPED | OUTPATIENT
Start: 2020-09-10 | End: 2021-10-14 | Stop reason: DRUGHIGH

## 2020-09-10 SDOH — HEALTH STABILITY: PHYSICAL HEALTH: ON AVERAGE, HOW MANY DAYS PER WEEK DO YOU ENGAGE IN MODERATE TO STRENUOUS EXERCISE (LIKE A BRISK WALK)?: 0 DAYS

## 2020-09-10 SDOH — HEALTH STABILITY: PHYSICAL HEALTH: ON AVERAGE, HOW MANY MINUTES DO YOU ENGAGE IN EXERCISE AT THIS LEVEL?: 0 MIN

## 2020-09-10 ASSESSMENT — PATIENT HEALTH QUESTIONNAIRE - PHQ9
SUM OF ALL RESPONSES TO PHQ9 QUESTIONS 1 AND 2: 0
SUM OF ALL RESPONSES TO PHQ9 QUESTIONS 1 AND 2: 0
CLINICAL INTERPRETATION OF PHQ9 SCORE: NO FURTHER SCREENING NEEDED
CLINICAL INTERPRETATION OF PHQ2 SCORE: NO FURTHER SCREENING NEEDED
2. FEELING DOWN, DEPRESSED OR HOPELESS: NOT AT ALL
1. LITTLE INTEREST OR PLEASURE IN DOING THINGS: NOT AT ALL

## 2020-09-10 ASSESSMENT — ENCOUNTER SYMPTOMS
CHILLS: 0
SUSPICIOUS LESIONS: 0
WEIGHT LOSS: 0
COUGH: 0
FEVER: 0
ALLERGIC/IMMUNOLOGIC COMMENTS: NO NEW FOOD ALLERGIES
HEMOPTYSIS: 0
HEMATOCHEZIA: 0
WEIGHT GAIN: 0
BRUISES/BLEEDS EASILY: 0

## 2020-09-21 ENCOUNTER — TELEPHONE (OUTPATIENT)
Dept: CARDIOLOGY | Age: 85
End: 2020-09-21

## 2020-09-21 ENCOUNTER — HOSPITAL ENCOUNTER (OUTPATIENT)
Dept: LAB | Facility: HOSPITAL | Age: 85
Discharge: HOME | End: 2020-09-21
Attending: INTERNAL MEDICINE
Payer: MEDICARE

## 2020-09-21 ENCOUNTER — HOSPITAL ENCOUNTER (OUTPATIENT)
Dept: CARDIOLOGY CLINIC | Facility: HOSPITAL | Age: 85
Discharge: HOME | End: 2020-09-21
Attending: INTERNAL MEDICINE
Payer: MEDICARE

## 2020-09-21 ENCOUNTER — HOSPITAL ENCOUNTER (OUTPATIENT)
Dept: CV DIAGNOSTICS | Facility: HOSPITAL | Age: 85
Discharge: HOME | End: 2020-09-21
Attending: INTERNAL MEDICINE
Payer: MEDICARE

## 2020-09-21 DIAGNOSIS — I25.5 ISCHEMIC CARDIOMYOPATHY: ICD-10-CM

## 2020-09-21 DIAGNOSIS — I65.23 ASYMPTOMATIC CAROTID ARTERY STENOSIS, BILATERAL: ICD-10-CM

## 2020-09-21 DIAGNOSIS — I49.3 PVC'S (PREMATURE VENTRICULAR CONTRACTIONS): ICD-10-CM

## 2020-09-21 DIAGNOSIS — I65.23 ASYMPTOMATIC CAROTID ARTERY STENOSIS, BILATERAL: Primary | ICD-10-CM

## 2020-09-21 DIAGNOSIS — E78.00 PURE HYPERCHOLESTEROLEMIA: ICD-10-CM

## 2020-09-21 DIAGNOSIS — Z95.1 HX OF CABG: ICD-10-CM

## 2020-09-21 DIAGNOSIS — I77.811 ECTATIC ABDOMINAL AORTA (HCC): ICD-10-CM

## 2020-09-21 DIAGNOSIS — I25.10 CORONARY ARTERY DISEASE INVOLVING NATIVE CORONARY ARTERY OF NATIVE HEART WITHOUT ANGINA PECTORIS: ICD-10-CM

## 2020-09-21 LAB
ALBUMIN SERPL-MCNC: 3.4 G/DL (ref 3.4–5)
ALBUMIN/GLOB SERPL: 0.7 {RATIO} (ref 1–2)
ALP LIVER SERPL-CCNC: 97 U/L
ALT SERPL-CCNC: 29 U/L
ANION GAP SERPL CALC-SCNC: 4 MMOL/L (ref 0–18)
AST SERPL-CCNC: 24 U/L (ref 15–37)
BILIRUB SERPL-MCNC: 0.5 MG/DL (ref 0.1–2)
BUN BLD-MCNC: 25 MG/DL (ref 7–18)
BUN/CREAT SERPL: 23.1 (ref 10–20)
CALCIUM BLD-MCNC: 9.6 MG/DL (ref 8.5–10.1)
CHLORIDE SERPL-SCNC: 107 MMOL/L (ref 98–112)
CHOLEST SMN-MCNC: 135 MG/DL (ref ?–200)
CO2 SERPL-SCNC: 28 MMOL/L (ref 21–32)
CREAT BLD-MCNC: 1.08 MG/DL
GLOBULIN PLAS-MCNC: 4.7 G/DL (ref 2.8–4.4)
GLUCOSE BLD-MCNC: 94 MG/DL (ref 70–99)
HDLC SERPL-MCNC: 49 MG/DL (ref 40–59)
LDLC SERPL CALC-MCNC: 68 MG/DL (ref ?–100)
M PROTEIN MFR SERPL ELPH: 8.1 G/DL (ref 6.4–8.2)
NONHDLC SERPL-MCNC: 86 MG/DL (ref ?–130)
OSMOLALITY SERPL CALC.SUM OF ELEC: 292 MOSM/KG (ref 275–295)
PATIENT FASTING Y/N/NP: YES
PATIENT FASTING Y/N/NP: YES
POTASSIUM SERPL-SCNC: 5.1 MMOL/L (ref 3.5–5.1)
SODIUM SERPL-SCNC: 139 MMOL/L (ref 136–145)
TRIGL SERPL-MCNC: 91 MG/DL (ref 30–149)
VLDLC SERPL CALC-MCNC: 18 MG/DL (ref 0–30)

## 2020-09-21 PROCEDURE — 93880 EXTRACRANIAL BILAT STUDY: CPT | Performed by: INTERNAL MEDICINE

## 2020-09-21 PROCEDURE — 93306 TTE W/DOPPLER COMPLETE: CPT | Performed by: INTERNAL MEDICINE

## 2020-09-21 PROCEDURE — 36415 COLL VENOUS BLD VENIPUNCTURE: CPT | Performed by: INTERNAL MEDICINE

## 2020-09-21 PROCEDURE — 80053 COMPREHEN METABOLIC PANEL: CPT | Performed by: INTERNAL MEDICINE

## 2020-09-21 PROCEDURE — 80061 LIPID PANEL: CPT | Performed by: INTERNAL MEDICINE

## 2020-09-22 ENCOUNTER — TELEPHONE (OUTPATIENT)
Dept: CARDIOLOGY | Age: 85
End: 2020-09-22

## 2020-09-22 DIAGNOSIS — I65.23 ASYMPTOMATIC CAROTID ARTERY STENOSIS, BILATERAL: Primary | ICD-10-CM

## 2021-03-01 ENCOUNTER — IMMUNIZATION (OUTPATIENT)
Dept: LAB | Age: 86
End: 2021-03-01

## 2021-03-01 DIAGNOSIS — Z23 NEED FOR VACCINATION: Primary | ICD-10-CM

## 2021-03-01 PROCEDURE — 0001A COVID 19 PFIZER-BIONTECH: CPT | Performed by: NURSE PRACTITIONER

## 2021-03-01 PROCEDURE — 91300 COVID 19 PFIZER-BIONTECH: CPT | Performed by: NURSE PRACTITIONER

## 2021-03-23 ENCOUNTER — IMMUNIZATION (OUTPATIENT)
Dept: LAB | Age: 86
End: 2021-03-23
Attending: NURSE PRACTITIONER

## 2021-03-23 DIAGNOSIS — Z23 NEED FOR VACCINATION: Primary | ICD-10-CM

## 2021-03-23 PROCEDURE — 91300 COVID 19 PFIZER-BIONTECH: CPT

## 2021-03-23 PROCEDURE — 0002A COVID 19 PFIZER-BIONTECH: CPT

## 2021-04-08 ENCOUNTER — OFFICE VISIT (OUTPATIENT)
Dept: CARDIOLOGY | Age: 86
End: 2021-04-08

## 2021-04-08 VITALS
BODY MASS INDEX: 31.63 KG/M2 | WEIGHT: 208 LBS | DIASTOLIC BLOOD PRESSURE: 90 MMHG | SYSTOLIC BLOOD PRESSURE: 165 MMHG | HEART RATE: 78 BPM

## 2021-04-08 DIAGNOSIS — I25.10 CORONARY ARTERY DISEASE INVOLVING NATIVE CORONARY ARTERY OF NATIVE HEART WITHOUT ANGINA PECTORIS: ICD-10-CM

## 2021-04-08 DIAGNOSIS — E78.00 PURE HYPERCHOLESTEROLEMIA: ICD-10-CM

## 2021-04-08 DIAGNOSIS — Z95.1 HX OF CABG: Primary | ICD-10-CM

## 2021-04-08 DIAGNOSIS — I65.23 ASYMPTOMATIC CAROTID ARTERY STENOSIS, BILATERAL: ICD-10-CM

## 2021-04-08 DIAGNOSIS — I77.811 ECTATIC ABDOMINAL AORTA (CMD): ICD-10-CM

## 2021-04-08 DIAGNOSIS — I25.5 ISCHEMIC CARDIOMYOPATHY: ICD-10-CM

## 2021-04-08 DIAGNOSIS — I49.3 PVCS (PREMATURE VENTRICULAR CONTRACTIONS): ICD-10-CM

## 2021-04-08 PROCEDURE — 99214 OFFICE O/P EST MOD 30 MIN: CPT | Performed by: INTERNAL MEDICINE

## 2021-04-08 RX ORDER — MIRTAZAPINE 15 MG/1
TABLET, FILM COATED ORAL
COMMUNITY
Start: 2021-03-03 | End: 2022-05-28 | Stop reason: ALTCHOICE

## 2021-04-08 ASSESSMENT — PATIENT HEALTH QUESTIONNAIRE - PHQ9
2. FEELING DOWN, DEPRESSED OR HOPELESS: NOT AT ALL
CLINICAL INTERPRETATION OF PHQ2 SCORE: NO FURTHER SCREENING NEEDED
SUM OF ALL RESPONSES TO PHQ9 QUESTIONS 1 AND 2: 0
CLINICAL INTERPRETATION OF PHQ9 SCORE: NO FURTHER SCREENING NEEDED
SUM OF ALL RESPONSES TO PHQ9 QUESTIONS 1 AND 2: 0
1. LITTLE INTEREST OR PLEASURE IN DOING THINGS: NOT AT ALL

## 2021-08-03 ENCOUNTER — ANCILLARY PROCEDURE (OUTPATIENT)
Dept: CARDIOLOGY | Age: 86
End: 2021-08-03
Attending: INTERNAL MEDICINE

## 2021-08-03 DIAGNOSIS — I65.23 ASYMPTOMATIC CAROTID ARTERY STENOSIS, BILATERAL: ICD-10-CM

## 2021-08-03 PROCEDURE — 93880 EXTRACRANIAL BILAT STUDY: CPT | Performed by: INTERNAL MEDICINE

## 2021-08-04 ENCOUNTER — TELEPHONE (OUTPATIENT)
Dept: CARDIOLOGY | Age: 86
End: 2021-08-04

## 2021-08-18 ENCOUNTER — TELEPHONE (OUTPATIENT)
Dept: CARDIOLOGY | Age: 86
End: 2021-08-18

## 2021-08-18 DIAGNOSIS — I65.23 ASYMPTOMATIC CAROTID ARTERY STENOSIS, BILATERAL: Primary | ICD-10-CM

## 2021-08-18 DIAGNOSIS — I25.10 CORONARY ARTERY DISEASE INVOLVING NATIVE CORONARY ARTERY OF NATIVE HEART WITHOUT ANGINA PECTORIS: ICD-10-CM

## 2021-08-18 DIAGNOSIS — E78.00 PURE HYPERCHOLESTEROLEMIA: ICD-10-CM

## 2021-08-18 RX ORDER — ATORVASTATIN CALCIUM 10 MG/1
10 TABLET, FILM COATED ORAL DAILY
Qty: 90 TABLET | Refills: 0 | Status: SHIPPED | OUTPATIENT
Start: 2021-08-18 | End: 2021-08-26 | Stop reason: SDUPTHER

## 2021-08-18 RX ORDER — METOPROLOL SUCCINATE 25 MG/1
25 TABLET, EXTENDED RELEASE ORAL DAILY
Qty: 90 TABLET | Refills: 0 | Status: SHIPPED | OUTPATIENT
Start: 2021-08-18 | End: 2021-08-26 | Stop reason: SDUPTHER

## 2021-08-25 ENCOUNTER — LAB SERVICES (OUTPATIENT)
Dept: LAB | Age: 86
End: 2021-08-25

## 2021-08-25 DIAGNOSIS — I65.23 ASYMPTOMATIC CAROTID ARTERY STENOSIS, BILATERAL: ICD-10-CM

## 2021-08-25 DIAGNOSIS — E78.00 PURE HYPERCHOLESTEROLEMIA: ICD-10-CM

## 2021-08-25 DIAGNOSIS — I25.10 CORONARY ARTERY DISEASE INVOLVING NATIVE CORONARY ARTERY OF NATIVE HEART WITHOUT ANGINA PECTORIS: ICD-10-CM

## 2021-08-25 PROCEDURE — 80053 COMPREHEN METABOLIC PANEL: CPT | Performed by: CLINICAL MEDICAL LABORATORY

## 2021-08-25 PROCEDURE — 80061 LIPID PANEL: CPT | Performed by: CLINICAL MEDICAL LABORATORY

## 2021-08-25 PROCEDURE — 36415 COLL VENOUS BLD VENIPUNCTURE: CPT

## 2021-08-26 LAB
ALBUMIN SERPL-MCNC: 3.7 G/DL (ref 3.6–5.1)
ALBUMIN/GLOB SERPL: 0.9 {RATIO} (ref 1–2.4)
ALP SERPL-CCNC: 97 UNITS/L (ref 45–117)
ALT SERPL-CCNC: 25 UNITS/L
ANION GAP SERPL CALC-SCNC: 8 MMOL/L (ref 10–20)
AST SERPL-CCNC: 19 UNITS/L
BILIRUB SERPL-MCNC: 0.5 MG/DL (ref 0.2–1)
BUN SERPL-MCNC: 19 MG/DL (ref 6–20)
BUN/CREAT SERPL: 15 (ref 7–25)
CALCIUM SERPL-MCNC: 9.1 MG/DL (ref 8.4–10.2)
CHLORIDE SERPL-SCNC: 108 MMOL/L (ref 98–107)
CHOLEST SERPL-MCNC: 126 MG/DL
CHOLEST/HDLC SERPL: 2.6 {RATIO}
CO2 SERPL-SCNC: 28 MMOL/L (ref 21–32)
CREAT SERPL-MCNC: 1.26 MG/DL (ref 0.67–1.17)
FASTING DURATION TIME PATIENT: 12 HOURS (ref 0–999)
FASTING DURATION TIME PATIENT: 12 HOURS (ref 0–999)
GFR SERPLBLD BASED ON 1.73 SQ M-ARVRAT: 51 ML/MIN
GLOBULIN SER-MCNC: 4 G/DL (ref 2–4)
GLUCOSE SERPL-MCNC: 97 MG/DL (ref 65–99)
HDLC SERPL-MCNC: 48 MG/DL
LDLC SERPL CALC-MCNC: 57 MG/DL
NONHDLC SERPL-MCNC: 78 MG/DL
POTASSIUM SERPL-SCNC: 4.4 MMOL/L (ref 3.4–5.1)
PROT SERPL-MCNC: 7.7 G/DL (ref 6.4–8.2)
SODIUM SERPL-SCNC: 140 MMOL/L (ref 135–145)
TRIGL SERPL-MCNC: 107 MG/DL

## 2021-08-26 RX ORDER — ATORVASTATIN CALCIUM 10 MG/1
10 TABLET, FILM COATED ORAL DAILY
Qty: 90 TABLET | Refills: 3 | Status: SHIPPED | OUTPATIENT
Start: 2021-08-26 | End: 2022-10-21

## 2021-08-26 RX ORDER — METOPROLOL SUCCINATE 25 MG/1
25 TABLET, EXTENDED RELEASE ORAL DAILY
Qty: 90 TABLET | Refills: 3 | Status: SHIPPED | OUTPATIENT
Start: 2021-08-26 | End: 2022-10-21

## 2021-10-14 ENCOUNTER — OFFICE VISIT (OUTPATIENT)
Dept: CARDIOLOGY | Age: 86
End: 2021-10-14

## 2021-10-14 VITALS
DIASTOLIC BLOOD PRESSURE: 81 MMHG | SYSTOLIC BLOOD PRESSURE: 144 MMHG | HEIGHT: 68 IN | WEIGHT: 198 LBS | HEART RATE: 68 BPM | BODY MASS INDEX: 30.01 KG/M2

## 2021-10-14 DIAGNOSIS — Z95.1 HX OF CABG: ICD-10-CM

## 2021-10-14 DIAGNOSIS — I25.10 CORONARY ARTERY DISEASE INVOLVING NATIVE CORONARY ARTERY OF NATIVE HEART WITHOUT ANGINA PECTORIS: ICD-10-CM

## 2021-10-14 DIAGNOSIS — I25.5 ISCHEMIC CARDIOMYOPATHY: ICD-10-CM

## 2021-10-14 DIAGNOSIS — I77.811 ECTATIC ABDOMINAL AORTA (CMD): Primary | ICD-10-CM

## 2021-10-14 DIAGNOSIS — E78.00 PURE HYPERCHOLESTEROLEMIA: ICD-10-CM

## 2021-10-14 DIAGNOSIS — I65.23 ASYMPTOMATIC CAROTID ARTERY STENOSIS, BILATERAL: ICD-10-CM

## 2021-10-14 DIAGNOSIS — I49.3 PVCS (PREMATURE VENTRICULAR CONTRACTIONS): ICD-10-CM

## 2021-10-14 PROCEDURE — 99214 OFFICE O/P EST MOD 30 MIN: CPT | Performed by: INTERNAL MEDICINE

## 2021-10-14 RX ORDER — LISINOPRIL 5 MG/1
5 TABLET ORAL DAILY
Qty: 30 TABLET | Refills: 11 | Status: SHIPPED | OUTPATIENT
Start: 2021-10-14 | End: 2022-05-28 | Stop reason: ALTCHOICE

## 2022-04-13 ENCOUNTER — OFFICE VISIT (OUTPATIENT)
Dept: CARDIOLOGY | Age: 87
End: 2022-04-13

## 2022-04-13 VITALS
SYSTOLIC BLOOD PRESSURE: 134 MMHG | DIASTOLIC BLOOD PRESSURE: 79 MMHG | BODY MASS INDEX: 28.74 KG/M2 | WEIGHT: 189 LBS | HEART RATE: 73 BPM

## 2022-04-13 DIAGNOSIS — E78.00 PURE HYPERCHOLESTEROLEMIA: ICD-10-CM

## 2022-04-13 DIAGNOSIS — Z95.1 HX OF CABG: ICD-10-CM

## 2022-04-13 DIAGNOSIS — I25.10 CORONARY ARTERY DISEASE INVOLVING NATIVE CORONARY ARTERY OF NATIVE HEART WITHOUT ANGINA PECTORIS: ICD-10-CM

## 2022-04-13 DIAGNOSIS — I65.23 ASYMPTOMATIC CAROTID ARTERY STENOSIS, BILATERAL: ICD-10-CM

## 2022-04-13 DIAGNOSIS — I77.811 ECTATIC ABDOMINAL AORTA (CMD): Primary | ICD-10-CM

## 2022-04-13 DIAGNOSIS — I25.5 ISCHEMIC CARDIOMYOPATHY: ICD-10-CM

## 2022-04-13 DIAGNOSIS — I49.3 PVCS (PREMATURE VENTRICULAR CONTRACTIONS): ICD-10-CM

## 2022-04-13 PROCEDURE — 99214 OFFICE O/P EST MOD 30 MIN: CPT | Performed by: INTERNAL MEDICINE

## 2022-04-13 SDOH — HEALTH STABILITY: PHYSICAL HEALTH: ON AVERAGE, HOW MANY DAYS PER WEEK DO YOU ENGAGE IN MODERATE TO STRENUOUS EXERCISE (LIKE A BRISK WALK)?: 0 DAYS

## 2022-04-13 ASSESSMENT — PATIENT HEALTH QUESTIONNAIRE - PHQ9
SUM OF ALL RESPONSES TO PHQ9 QUESTIONS 1 AND 2: 0
CLINICAL INTERPRETATION OF PHQ2 SCORE: NO FURTHER SCREENING NEEDED
SUM OF ALL RESPONSES TO PHQ9 QUESTIONS 1 AND 2: 0
2. FEELING DOWN, DEPRESSED OR HOPELESS: NOT AT ALL
1. LITTLE INTEREST OR PLEASURE IN DOING THINGS: NOT AT ALL

## 2022-06-16 ENCOUNTER — HOSPITAL ENCOUNTER (EMERGENCY)
Facility: HOSPITAL | Age: 87
Discharge: HOME | End: 2022-06-16
Attending: EMERGENCY MEDICINE
Payer: MEDICARE

## 2022-06-16 VITALS
RESPIRATION RATE: 18 BRPM | BODY MASS INDEX: 28.79 KG/M2 | HEIGHT: 68 IN | DIASTOLIC BLOOD PRESSURE: 68 MMHG | HEART RATE: 85 BPM | WEIGHT: 190 LBS | OXYGEN SATURATION: 98 % | TEMPERATURE: 99 F | SYSTOLIC BLOOD PRESSURE: 113 MMHG

## 2022-06-16 DIAGNOSIS — R53.1 WEAKNESS GENERALIZED: Primary | ICD-10-CM

## 2022-06-16 DIAGNOSIS — I50.813 ACUTE ON CHRONIC RIGHT-SIDED CONGESTIVE HEART FAILURE (HCC): ICD-10-CM

## 2022-06-16 LAB
ALBUMIN SERPL-MCNC: 2.6 G/DL (ref 3.4–5)
ALBUMIN/GLOB SERPL: 0.5 {RATIO} (ref 1–2)
ALP LIVER SERPL-CCNC: 99 U/L
ALT SERPL-CCNC: 23 U/L
ANION GAP SERPL CALC-SCNC: 3 MMOL/L (ref 0–18)
AST SERPL-CCNC: 24 U/L (ref 15–37)
ATRIAL RATE: 89 BPM
BASOPHILS # BLD AUTO: 0.06 X10(3) UL (ref 0–0.2)
BASOPHILS NFR BLD AUTO: 0.7 %
BILIRUB SERPL-MCNC: 0.8 MG/DL (ref 0.1–2)
BILIRUB UR QL CFM: NEGATIVE
BUN BLD-MCNC: 27 MG/DL (ref 7–18)
CALCIUM BLD-MCNC: 9.2 MG/DL (ref 8.5–10.1)
CHLORIDE SERPL-SCNC: 104 MMOL/L (ref 98–112)
CLARITY UR REFRACT.AUTO: CLEAR
CO2 SERPL-SCNC: 26 MMOL/L (ref 21–32)
COLOR UR AUTO: YELLOW
CREAT BLD-MCNC: 1.14 MG/DL
EOSINOPHIL # BLD AUTO: 0.13 X10(3) UL (ref 0–0.7)
EOSINOPHIL NFR BLD AUTO: 1.4 %
ERYTHROCYTE [DISTWIDTH] IN BLOOD BY AUTOMATED COUNT: 14.7 %
GLOBULIN PLAS-MCNC: 5.6 G/DL (ref 2.8–4.4)
GLUCOSE BLD-MCNC: 102 MG/DL (ref 70–99)
GLUCOSE UR STRIP.AUTO-MCNC: NEGATIVE MG/DL
HCT VFR BLD AUTO: 31.7 %
HGB BLD-MCNC: 9.8 G/DL
IMM GRANULOCYTES # BLD AUTO: 0.04 X10(3) UL (ref 0–1)
IMM GRANULOCYTES NFR BLD: 0.4 %
KETONES UR STRIP.AUTO-MCNC: NEGATIVE MG/DL
LEUKOCYTE ESTERASE UR QL STRIP.AUTO: NEGATIVE
LYMPHOCYTES # BLD AUTO: 1.02 X10(3) UL (ref 1–4)
LYMPHOCYTES NFR BLD AUTO: 11.1 %
MAGNESIUM SERPL-MCNC: 2.4 MG/DL (ref 1.6–2.6)
MCH RBC QN AUTO: 25.1 PG (ref 26–34)
MCHC RBC AUTO-ENTMCNC: 30.9 G/DL (ref 31–37)
MCV RBC AUTO: 81.1 FL
MONOCYTES # BLD AUTO: 1.16 X10(3) UL (ref 0.1–1)
MONOCYTES NFR BLD AUTO: 12.6 %
NEUTROPHILS # BLD AUTO: 6.76 X10 (3) UL (ref 1.5–7.7)
NEUTROPHILS # BLD AUTO: 6.76 X10(3) UL (ref 1.5–7.7)
NEUTROPHILS NFR BLD AUTO: 73.8 %
NITRITE UR QL STRIP.AUTO: NEGATIVE
OSMOLALITY SERPL CALC.SUM OF ELEC: 281 MOSM/KG (ref 275–295)
P AXIS: 75 DEGREES
P-R INTERVAL: 202 MS
PH UR STRIP.AUTO: 6 [PH] (ref 5–8)
PHOSPHATE SERPL-MCNC: 3.7 MG/DL (ref 2.5–4.9)
PLATELET # BLD AUTO: 521 10(3)UL (ref 150–450)
POTASSIUM SERPL-SCNC: 4.3 MMOL/L (ref 3.5–5.1)
PROT SERPL-MCNC: 8.2 G/DL (ref 6.4–8.2)
Q-T INTERVAL: 366 MS
QRS DURATION: 108 MS
QTC CALCULATION (BEZET): 445 MS
R AXIS: -17 DEGREES
RBC # BLD AUTO: 3.91 X10(6)UL
SARS-COV-2 RNA RESP QL NAA+PROBE: NOT DETECTED
SODIUM SERPL-SCNC: 133 MMOL/L (ref 136–145)
SP GR UR STRIP.AUTO: 1.02 (ref 1–1.03)
T AXIS: 16 DEGREES
TROPONIN I HIGH SENSITIVITY: 7 NG/L
TSI SER-ACNC: 1.59 MIU/ML (ref 0.36–3.74)
UROBILINOGEN UR STRIP.AUTO-MCNC: 2 MG/DL
VENTRICULAR RATE: 89 BPM
WBC # BLD AUTO: 9.2 X10(3) UL (ref 4–11)

## 2022-06-16 PROCEDURE — 96374 THER/PROPH/DIAG INJ IV PUSH: CPT

## 2022-06-16 PROCEDURE — 80053 COMPREHEN METABOLIC PANEL: CPT | Performed by: EMERGENCY MEDICINE

## 2022-06-16 PROCEDURE — 99285 EMERGENCY DEPT VISIT HI MDM: CPT

## 2022-06-16 PROCEDURE — 93005 ELECTROCARDIOGRAM TRACING: CPT

## 2022-06-16 PROCEDURE — 84443 ASSAY THYROID STIM HORMONE: CPT | Performed by: EMERGENCY MEDICINE

## 2022-06-16 PROCEDURE — 93010 ELECTROCARDIOGRAM REPORT: CPT

## 2022-06-16 PROCEDURE — 85025 COMPLETE CBC W/AUTO DIFF WBC: CPT | Performed by: EMERGENCY MEDICINE

## 2022-06-16 PROCEDURE — 83735 ASSAY OF MAGNESIUM: CPT | Performed by: EMERGENCY MEDICINE

## 2022-06-16 PROCEDURE — 96361 HYDRATE IV INFUSION ADD-ON: CPT

## 2022-06-16 PROCEDURE — 84100 ASSAY OF PHOSPHORUS: CPT | Performed by: EMERGENCY MEDICINE

## 2022-06-16 PROCEDURE — 81001 URINALYSIS AUTO W/SCOPE: CPT | Performed by: EMERGENCY MEDICINE

## 2022-06-16 PROCEDURE — 84484 ASSAY OF TROPONIN QUANT: CPT | Performed by: EMERGENCY MEDICINE

## 2022-06-16 RX ORDER — FUROSEMIDE 20 MG/1
20 TABLET ORAL 2 TIMES DAILY
Qty: 14 TABLET | Refills: 0 | Status: SHIPPED | OUTPATIENT
Start: 2022-06-16 | End: 2022-06-16

## 2022-06-16 RX ORDER — FUROSEMIDE 10 MG/ML
20 INJECTION INTRAMUSCULAR; INTRAVENOUS ONCE
Status: COMPLETED | OUTPATIENT
Start: 2022-06-16 | End: 2022-06-16

## 2022-06-16 RX ORDER — POTASSIUM CHLORIDE 20 MEQ/1
20 TABLET, EXTENDED RELEASE ORAL DAILY
Qty: 14 TABLET | Refills: 0 | Status: SHIPPED | OUTPATIENT
Start: 2022-06-16 | End: 2022-06-16

## 2022-06-16 RX ORDER — POTASSIUM CHLORIDE 20 MEQ/1
20 TABLET, EXTENDED RELEASE ORAL DAILY
Qty: 14 TABLET | Refills: 0 | Status: SHIPPED | OUTPATIENT
Start: 2022-06-16 | End: 2022-06-30

## 2022-06-16 RX ORDER — FUROSEMIDE 20 MG/1
20 TABLET ORAL 2 TIMES DAILY
Qty: 14 TABLET | Refills: 0 | Status: SHIPPED | OUTPATIENT
Start: 2022-06-16

## 2022-06-16 RX ORDER — FOLIC ACID 1 MG/1
1 TABLET ORAL DAILY
COMMUNITY

## 2022-06-16 RX ORDER — SODIUM CHLORIDE 9 MG/ML
1000 INJECTION, SOLUTION INTRAVENOUS CONTINUOUS
Status: DISCONTINUED | OUTPATIENT
Start: 2022-06-16 | End: 2022-06-16

## 2022-06-16 NOTE — ED INITIAL ASSESSMENT (HPI)
Pt to er with adult family  They report increase in weakness past weeks  Switched from cane to walker 3 wks ago   Luther Goldberg while getting onto bed 3 wks ago    Family reports weakness and swelling to extremities   Always cold   Always tired   Daughter Sergio Salazar at   Seen by Nirav Saul md on Friday for doppler/bilat legs & labs drawn

## 2022-06-17 NOTE — ED QUICK NOTES
Attempted to call report to [facility], unable to reach RN receiving pt.  On hold for 15 min being transferred to different floors, cpod number given to aparna at the [facility] to give to rn receiving pt to call me back

## 2022-06-17 NOTE — CM/SW NOTE
Spoke to family at the bedside that initially wanted to take the patient home and not place him into a rehab. Pt has been progressively weaker and suffered a fall. Family at the bedside state that the patient has not been at a rehab in years. Federica Resendez at the bedside is the POA. I then told them about potentially going into the rehab from the ER due to the waiver offered due to the pandemic and family and patient were agreeable. I mentioned that the [facility] does rehab 7 days a week and they are pleased to hear that. They would like for him to get the most rehab possible. Pt has been to [facility] in the past but would be willing to go to the [facility] if they accept him tonight. Pt would benefit from rehab given his recent falls and progressive weakness. Informed MD and RN.

## 2022-06-17 NOTE — CM/SW NOTE
[facility]. Informed daughter Garth Hopes and granddaughter at the bedside. Informed MD and RN as well.

## 2022-06-28 RX ORDER — LISINOPRIL 2.5 MG/1
TABLET ORAL
COMMUNITY
Start: 2022-06-26

## 2022-07-01 ENCOUNTER — OFFICE VISIT (OUTPATIENT)
Dept: CARDIOLOGY | Age: 87
End: 2022-07-01

## 2022-07-01 ENCOUNTER — LAB SERVICES (OUTPATIENT)
Dept: LAB | Age: 87
End: 2022-07-01

## 2022-07-01 VITALS
SYSTOLIC BLOOD PRESSURE: 114 MMHG | HEART RATE: 86 BPM | DIASTOLIC BLOOD PRESSURE: 69 MMHG | HEIGHT: 68 IN | WEIGHT: 180.5 LBS | BODY MASS INDEX: 27.35 KG/M2

## 2022-07-01 DIAGNOSIS — E78.00 PURE HYPERCHOLESTEROLEMIA: ICD-10-CM

## 2022-07-01 DIAGNOSIS — I25.10 CORONARY ARTERY DISEASE INVOLVING NATIVE CORONARY ARTERY OF NATIVE HEART WITHOUT ANGINA PECTORIS: ICD-10-CM

## 2022-07-01 DIAGNOSIS — Z95.1 HX OF CABG: ICD-10-CM

## 2022-07-01 DIAGNOSIS — I49.3 PVCS (PREMATURE VENTRICULAR CONTRACTIONS): ICD-10-CM

## 2022-07-01 DIAGNOSIS — I65.23 ASYMPTOMATIC CAROTID ARTERY STENOSIS, BILATERAL: ICD-10-CM

## 2022-07-01 DIAGNOSIS — I25.5 ISCHEMIC CARDIOMYOPATHY: ICD-10-CM

## 2022-07-01 DIAGNOSIS — I77.811 ECTATIC ABDOMINAL AORTA (CMD): ICD-10-CM

## 2022-07-01 DIAGNOSIS — I77.811 ECTATIC ABDOMINAL AORTA (CMD): Primary | ICD-10-CM

## 2022-07-01 PROCEDURE — 99215 OFFICE O/P EST HI 40 MIN: CPT | Performed by: INTERNAL MEDICINE

## 2022-07-01 PROCEDURE — 36415 COLL VENOUS BLD VENIPUNCTURE: CPT | Performed by: INTERNAL MEDICINE

## 2022-07-01 PROCEDURE — 80048 BASIC METABOLIC PNL TOTAL CA: CPT | Performed by: CLINICAL MEDICAL LABORATORY

## 2022-07-01 RX ORDER — FOLIC ACID 1 MG/1
1000 TABLET ORAL DAILY
COMMUNITY
Start: 2022-06-14

## 2022-07-01 RX ORDER — FUROSEMIDE 20 MG/1
TABLET ORAL 2 TIMES DAILY
COMMUNITY
Start: 2022-06-27 | End: 2022-08-12 | Stop reason: SDUPTHER

## 2022-07-01 RX ORDER — POTASSIUM CHLORIDE 20 MEQ/1
TABLET, EXTENDED RELEASE ORAL
COMMUNITY
Start: 2022-06-19 | End: 2022-07-09 | Stop reason: ALTCHOICE

## 2022-07-02 LAB
ANION GAP SERPL CALC-SCNC: 14 MMOL/L (ref 7–19)
BUN SERPL-MCNC: 29 MG/DL (ref 6–20)
BUN/CREAT SERPL: 32 (ref 7–25)
CALCIUM SERPL-MCNC: 8.9 MG/DL (ref 8.4–10.2)
CHLORIDE SERPL-SCNC: 103 MMOL/L (ref 97–110)
CO2 SERPL-SCNC: 25 MMOL/L (ref 21–32)
CREAT SERPL-MCNC: 0.92 MG/DL (ref 0.67–1.17)
FASTING DURATION TIME PATIENT: ABNORMAL H
GFR SERPLBLD BASED ON 1.73 SQ M-ARVRAT: 81 ML/MIN
GLUCOSE SERPL-MCNC: 109 MG/DL (ref 70–99)
POTASSIUM SERPL-SCNC: 4.7 MMOL/L (ref 3.4–5.1)
SODIUM SERPL-SCNC: 137 MMOL/L (ref 135–145)

## 2022-07-07 ENCOUNTER — ANCILLARY PROCEDURE (OUTPATIENT)
Dept: CARDIOLOGY | Age: 87
End: 2022-07-07
Attending: INTERNAL MEDICINE

## 2022-07-07 DIAGNOSIS — I77.811 ECTATIC ABDOMINAL AORTA (CMD): ICD-10-CM

## 2022-07-07 DIAGNOSIS — Z95.1 HX OF CABG: ICD-10-CM

## 2022-07-07 DIAGNOSIS — I49.3 PVCS (PREMATURE VENTRICULAR CONTRACTIONS): ICD-10-CM

## 2022-07-07 DIAGNOSIS — I25.5 ISCHEMIC CARDIOMYOPATHY: ICD-10-CM

## 2022-07-07 DIAGNOSIS — I65.23 ASYMPTOMATIC CAROTID ARTERY STENOSIS, BILATERAL: ICD-10-CM

## 2022-07-07 DIAGNOSIS — I25.10 CORONARY ARTERY DISEASE INVOLVING NATIVE CORONARY ARTERY OF NATIVE HEART WITHOUT ANGINA PECTORIS: ICD-10-CM

## 2022-07-07 DIAGNOSIS — E78.00 PURE HYPERCHOLESTEROLEMIA: ICD-10-CM

## 2022-07-07 LAB
AORTIC VALVE AREA: 1.58
AV MEAN GRADIENT (AVMG): 8
AV MEAN VELOCITY (AVMV): 1.28
AV PEAK GRADIENT (AVPG): 19
AV PEAK VELOCITY (AVPV): 2.15
AV STENOSIS SEVERITY TEXT: NORMAL
DOP CALC LVOT PEAK VEL (LVOTPV): 0.69
E WAVE DECELARATION TIME (MDT): 114
EST RIGHT VENT SYSTOLIC PRESSURE BY TRICUSPID REGURGITATION JET (RVSP): 26.81
LEFT INTERNAL DIMENSION IN SYSTOLE (LVSD): 4.18
LEFT VENTRICULAR INTERNAL DIMENSION IN DIASTOLE (LVDD): 5.22
LV EF: NORMAL %
LVOT 2D (LVOTD): 2.5
LVOT VTI (LVOTVTI): 16.2
MV E TISSUE VEL LAT (MELV): 8.76
MV E TISSUE VEL MED (MESV): 7.89
MV E WAVE VEL/E TISSUE VEL MED(MSR): 8.58
PV PEAK VELOCITY (PVPV): 1.15
TRICUSPID VALVE ANNULAR PEAK VELOCITY (TVAPV): 7.71

## 2022-07-07 PROCEDURE — 93306 TTE W/DOPPLER COMPLETE: CPT | Performed by: INTERNAL MEDICINE

## 2022-08-12 ENCOUNTER — OFFICE VISIT (OUTPATIENT)
Dept: CARDIOLOGY | Age: 87
End: 2022-08-12

## 2022-08-12 VITALS
SYSTOLIC BLOOD PRESSURE: 131 MMHG | WEIGHT: 186 LBS | HEART RATE: 106 BPM | BODY MASS INDEX: 28.19 KG/M2 | DIASTOLIC BLOOD PRESSURE: 81 MMHG | HEIGHT: 68 IN

## 2022-08-12 DIAGNOSIS — E78.00 PURE HYPERCHOLESTEROLEMIA: ICD-10-CM

## 2022-08-12 DIAGNOSIS — I77.811 ECTATIC ABDOMINAL AORTA (CMD): Primary | ICD-10-CM

## 2022-08-12 DIAGNOSIS — I25.10 CORONARY ARTERY DISEASE INVOLVING NATIVE CORONARY ARTERY OF NATIVE HEART WITHOUT ANGINA PECTORIS: ICD-10-CM

## 2022-08-12 DIAGNOSIS — I65.23 ASYMPTOMATIC CAROTID ARTERY STENOSIS, BILATERAL: ICD-10-CM

## 2022-08-12 DIAGNOSIS — Z95.1 HX OF CABG: ICD-10-CM

## 2022-08-12 DIAGNOSIS — I25.5 ISCHEMIC CARDIOMYOPATHY: ICD-10-CM

## 2022-08-12 DIAGNOSIS — I49.3 PVCS (PREMATURE VENTRICULAR CONTRACTIONS): ICD-10-CM

## 2022-08-12 PROCEDURE — 93000 ELECTROCARDIOGRAM COMPLETE: CPT | Performed by: INTERNAL MEDICINE

## 2022-08-12 PROCEDURE — 99215 OFFICE O/P EST HI 40 MIN: CPT | Performed by: INTERNAL MEDICINE

## 2022-08-12 RX ORDER — MIRTAZAPINE 15 MG/1
TABLET, FILM COATED ORAL DAILY
COMMUNITY
Start: 2022-07-13 | End: 2022-12-16 | Stop reason: ALTCHOICE

## 2022-08-12 RX ORDER — FUROSEMIDE 40 MG/1
40 TABLET ORAL 2 TIMES DAILY
Qty: 60 TABLET | Refills: 3 | Status: SHIPPED | OUTPATIENT
Start: 2022-08-12 | End: 2022-09-22 | Stop reason: SDUPTHER

## 2022-08-19 ENCOUNTER — LAB SERVICES (OUTPATIENT)
Dept: LAB | Age: 87
End: 2022-08-19

## 2022-08-19 DIAGNOSIS — I65.23 ASYMPTOMATIC CAROTID ARTERY STENOSIS, BILATERAL: ICD-10-CM

## 2022-08-19 DIAGNOSIS — I77.811 ECTATIC ABDOMINAL AORTA (CMD): ICD-10-CM

## 2022-08-19 DIAGNOSIS — E78.00 PURE HYPERCHOLESTEROLEMIA: ICD-10-CM

## 2022-08-19 DIAGNOSIS — I49.3 PVCS (PREMATURE VENTRICULAR CONTRACTIONS): ICD-10-CM

## 2022-08-19 DIAGNOSIS — I25.10 CORONARY ARTERY DISEASE INVOLVING NATIVE CORONARY ARTERY OF NATIVE HEART WITHOUT ANGINA PECTORIS: ICD-10-CM

## 2022-08-19 DIAGNOSIS — Z95.1 HX OF CABG: ICD-10-CM

## 2022-08-19 DIAGNOSIS — I25.5 ISCHEMIC CARDIOMYOPATHY: ICD-10-CM

## 2022-08-19 PROCEDURE — 36415 COLL VENOUS BLD VENIPUNCTURE: CPT | Performed by: INTERNAL MEDICINE

## 2022-08-19 PROCEDURE — 80048 BASIC METABOLIC PNL TOTAL CA: CPT | Performed by: CLINICAL MEDICAL LABORATORY

## 2022-08-20 LAB
ANION GAP SERPL CALC-SCNC: 15 MMOL/L (ref 7–19)
BUN SERPL-MCNC: 25 MG/DL (ref 6–20)
BUN/CREAT SERPL: 24 (ref 7–25)
CALCIUM SERPL-MCNC: 9.1 MG/DL (ref 8.4–10.2)
CHLORIDE SERPL-SCNC: 103 MMOL/L (ref 97–110)
CO2 SERPL-SCNC: 26 MMOL/L (ref 21–32)
CREAT SERPL-MCNC: 1.03 MG/DL (ref 0.67–1.17)
FASTING DURATION TIME PATIENT: ABNORMAL H
GFR SERPLBLD BASED ON 1.73 SQ M-ARVRAT: 70 ML/MIN
GLUCOSE SERPL-MCNC: 72 MG/DL (ref 70–99)
POTASSIUM SERPL-SCNC: 4.9 MMOL/L (ref 3.4–5.1)
SODIUM SERPL-SCNC: 139 MMOL/L (ref 135–145)

## 2022-08-22 ENCOUNTER — TELEPHONE (OUTPATIENT)
Dept: CARDIOLOGY | Age: 87
End: 2022-08-22

## 2022-08-24 ENCOUNTER — TELEPHONE (OUTPATIENT)
Dept: CARDIOLOGY | Age: 87
End: 2022-08-24

## 2022-08-25 ENCOUNTER — TELEPHONE (OUTPATIENT)
Dept: CARDIOLOGY | Age: 87
End: 2022-08-25

## 2022-08-25 DIAGNOSIS — I65.23 ASYMPTOMATIC CAROTID ARTERY STENOSIS, BILATERAL: Primary | ICD-10-CM

## 2022-08-25 DIAGNOSIS — I25.10 CORONARY ARTERY DISEASE INVOLVING NATIVE CORONARY ARTERY OF NATIVE HEART WITHOUT ANGINA PECTORIS: ICD-10-CM

## 2022-08-25 DIAGNOSIS — I25.5 ISCHEMIC CARDIOMYOPATHY: ICD-10-CM

## 2022-09-13 ENCOUNTER — APPOINTMENT (OUTPATIENT)
Dept: CARDIOLOGY | Age: 87
End: 2022-09-13

## 2022-09-22 ENCOUNTER — TELEPHONE (OUTPATIENT)
Dept: CARDIOLOGY | Age: 87
End: 2022-09-22

## 2022-09-22 RX ORDER — FUROSEMIDE 40 MG/1
40 TABLET ORAL 2 TIMES DAILY
Qty: 60 TABLET | Refills: 0 | Status: SHIPPED | OUTPATIENT
Start: 2022-09-22 | End: 2022-10-14 | Stop reason: ALTCHOICE

## 2022-09-23 ENCOUNTER — LAB SERVICES (OUTPATIENT)
Dept: LAB | Age: 87
End: 2022-09-23

## 2022-09-23 DIAGNOSIS — I25.10 CORONARY ARTERY DISEASE INVOLVING NATIVE CORONARY ARTERY OF NATIVE HEART WITHOUT ANGINA PECTORIS: ICD-10-CM

## 2022-09-23 DIAGNOSIS — I65.23 ASYMPTOMATIC CAROTID ARTERY STENOSIS, BILATERAL: ICD-10-CM

## 2022-09-23 DIAGNOSIS — I25.5 ISCHEMIC CARDIOMYOPATHY: ICD-10-CM

## 2022-09-23 PROCEDURE — 36415 COLL VENOUS BLD VENIPUNCTURE: CPT | Performed by: INTERNAL MEDICINE

## 2022-09-23 PROCEDURE — 80048 BASIC METABOLIC PNL TOTAL CA: CPT | Performed by: CLINICAL MEDICAL LABORATORY

## 2022-09-24 LAB
ANION GAP SERPL CALC-SCNC: 13 MMOL/L (ref 7–19)
BUN SERPL-MCNC: 38 MG/DL (ref 6–20)
BUN/CREAT SERPL: 36 (ref 7–25)
CALCIUM SERPL-MCNC: 9.4 MG/DL (ref 8.4–10.2)
CHLORIDE SERPL-SCNC: 99 MMOL/L (ref 97–110)
CO2 SERPL-SCNC: 30 MMOL/L (ref 21–32)
CREAT SERPL-MCNC: 1.06 MG/DL (ref 0.67–1.17)
FASTING DURATION TIME PATIENT: ABNORMAL H
GFR SERPLBLD BASED ON 1.73 SQ M-ARVRAT: 68 ML/MIN
GLUCOSE SERPL-MCNC: 92 MG/DL (ref 70–99)
POTASSIUM SERPL-SCNC: 4.5 MMOL/L (ref 3.4–5.1)
SODIUM SERPL-SCNC: 137 MMOL/L (ref 135–145)

## 2022-09-30 ENCOUNTER — OFFICE VISIT (OUTPATIENT)
Dept: CARDIOLOGY | Age: 87
End: 2022-09-30

## 2022-09-30 VITALS
WEIGHT: 175.31 LBS | BODY MASS INDEX: 26.57 KG/M2 | HEART RATE: 75 BPM | DIASTOLIC BLOOD PRESSURE: 77 MMHG | HEIGHT: 68 IN | SYSTOLIC BLOOD PRESSURE: 131 MMHG

## 2022-09-30 DIAGNOSIS — I77.811 ECTATIC ABDOMINAL AORTA (CMD): Primary | ICD-10-CM

## 2022-09-30 DIAGNOSIS — I49.3 PVCS (PREMATURE VENTRICULAR CONTRACTIONS): ICD-10-CM

## 2022-09-30 DIAGNOSIS — I25.5 ISCHEMIC CARDIOMYOPATHY: ICD-10-CM

## 2022-09-30 DIAGNOSIS — Z95.1 HX OF CABG: ICD-10-CM

## 2022-09-30 DIAGNOSIS — I65.23 ASYMPTOMATIC CAROTID ARTERY STENOSIS, BILATERAL: ICD-10-CM

## 2022-09-30 DIAGNOSIS — I25.10 CORONARY ARTERY DISEASE INVOLVING NATIVE CORONARY ARTERY OF NATIVE HEART WITHOUT ANGINA PECTORIS: ICD-10-CM

## 2022-09-30 DIAGNOSIS — E78.00 PURE HYPERCHOLESTEROLEMIA: ICD-10-CM

## 2022-09-30 PROCEDURE — 99214 OFFICE O/P EST MOD 30 MIN: CPT | Performed by: INTERNAL MEDICINE

## 2022-09-30 RX ORDER — PREDNISONE 20 MG/1
20 TABLET ORAL DAILY
COMMUNITY
Start: 2022-09-09 | End: 2022-11-16 | Stop reason: DRUGHIGH

## 2022-10-04 ENCOUNTER — ANCILLARY PROCEDURE (OUTPATIENT)
Dept: CARDIOLOGY | Age: 87
End: 2022-10-04
Attending: INTERNAL MEDICINE

## 2022-10-04 DIAGNOSIS — I65.23 ASYMPTOMATIC CAROTID ARTERY STENOSIS, BILATERAL: ICD-10-CM

## 2022-10-04 DIAGNOSIS — I25.10 CORONARY ARTERY DISEASE INVOLVING NATIVE CORONARY ARTERY OF NATIVE HEART WITHOUT ANGINA PECTORIS: ICD-10-CM

## 2022-10-04 DIAGNOSIS — I25.5 ISCHEMIC CARDIOMYOPATHY: ICD-10-CM

## 2022-10-04 DIAGNOSIS — E78.00 PURE HYPERCHOLESTEROLEMIA: ICD-10-CM

## 2022-10-04 DIAGNOSIS — Z95.1 HX OF CABG: ICD-10-CM

## 2022-10-04 DIAGNOSIS — I77.811 ECTATIC ABDOMINAL AORTA (CMD): ICD-10-CM

## 2022-10-04 DIAGNOSIS — I49.3 PVCS (PREMATURE VENTRICULAR CONTRACTIONS): ICD-10-CM

## 2022-10-04 PROCEDURE — 93880 EXTRACRANIAL BILAT STUDY: CPT | Performed by: INTERNAL MEDICINE

## 2022-10-14 ENCOUNTER — TELEPHONE (OUTPATIENT)
Dept: CARDIOLOGY | Age: 87
End: 2022-10-14

## 2022-10-14 RX ORDER — FUROSEMIDE 20 MG/1
20 TABLET ORAL EVERY OTHER DAY
COMMUNITY
Start: 2022-10-14 | End: 2022-10-21 | Stop reason: DRUGHIGH

## 2022-10-18 DIAGNOSIS — E78.00 PURE HYPERCHOLESTEROLEMIA: ICD-10-CM

## 2022-10-18 DIAGNOSIS — I25.10 CORONARY ARTERY DISEASE INVOLVING NATIVE CORONARY ARTERY OF NATIVE HEART WITHOUT ANGINA PECTORIS: ICD-10-CM

## 2022-10-18 DIAGNOSIS — I65.23 ASYMPTOMATIC CAROTID ARTERY STENOSIS, BILATERAL: ICD-10-CM

## 2022-10-20 ENCOUNTER — TELEPHONE (OUTPATIENT)
Dept: CARDIOLOGY | Age: 87
End: 2022-10-20

## 2022-10-21 RX ORDER — ATORVASTATIN CALCIUM 10 MG/1
TABLET, FILM COATED ORAL
Qty: 90 TABLET | Refills: 3 | Status: SHIPPED | OUTPATIENT
Start: 2022-10-21 | End: 2023-04-19 | Stop reason: DRUGHIGH

## 2022-10-21 RX ORDER — FUROSEMIDE 20 MG/1
20 TABLET ORAL DAILY
Qty: 90 TABLET | Refills: 2 | Status: SHIPPED | OUTPATIENT
Start: 2022-10-21 | End: 2022-11-10 | Stop reason: DRUGHIGH

## 2022-10-21 RX ORDER — METOPROLOL SUCCINATE 25 MG/1
TABLET, EXTENDED RELEASE ORAL
Qty: 90 TABLET | Refills: 3 | Status: SHIPPED | OUTPATIENT
Start: 2022-10-21 | End: 2023-10-23

## 2022-11-10 ENCOUNTER — TELEPHONE (OUTPATIENT)
Dept: CARDIOLOGY | Age: 87
End: 2022-11-10

## 2022-11-10 DIAGNOSIS — I25.5 ISCHEMIC CARDIOMYOPATHY: Primary | ICD-10-CM

## 2022-11-10 RX ORDER — FUROSEMIDE 40 MG/1
40 TABLET ORAL DAILY
Qty: 90 TABLET | Refills: 0 | Status: SHIPPED | OUTPATIENT
Start: 2022-11-10 | End: 2022-12-20 | Stop reason: SDUPTHER

## 2022-11-21 ENCOUNTER — TELEPHONE (OUTPATIENT)
Dept: OTHER | Age: 87
End: 2022-11-21

## 2022-12-12 ENCOUNTER — LAB SERVICES (OUTPATIENT)
Dept: LAB | Age: 87
End: 2022-12-12

## 2022-12-12 DIAGNOSIS — I25.5 ISCHEMIC CARDIOMYOPATHY: ICD-10-CM

## 2022-12-12 PROCEDURE — 36415 COLL VENOUS BLD VENIPUNCTURE: CPT | Performed by: NURSE PRACTITIONER

## 2022-12-12 PROCEDURE — 83880 ASSAY OF NATRIURETIC PEPTIDE: CPT | Performed by: CLINICAL MEDICAL LABORATORY

## 2022-12-12 PROCEDURE — 80048 BASIC METABOLIC PNL TOTAL CA: CPT | Performed by: CLINICAL MEDICAL LABORATORY

## 2022-12-13 LAB
ANION GAP SERPL CALC-SCNC: 11 MMOL/L (ref 7–19)
BUN SERPL-MCNC: 28 MG/DL (ref 6–20)
BUN/CREAT SERPL: 27 (ref 7–25)
CALCIUM SERPL-MCNC: 9.5 MG/DL (ref 8.4–10.2)
CHLORIDE SERPL-SCNC: 104 MMOL/L (ref 97–110)
CO2 SERPL-SCNC: 30 MMOL/L (ref 21–32)
CREAT SERPL-MCNC: 1.05 MG/DL (ref 0.67–1.17)
FASTING DURATION TIME PATIENT: ABNORMAL H
GFR SERPLBLD BASED ON 1.73 SQ M-ARVRAT: 69 ML/MIN
GLUCOSE SERPL-MCNC: 90 MG/DL (ref 70–99)
NT-PROBNP SERPL-MCNC: 1016 PG/ML
POTASSIUM SERPL-SCNC: 4.3 MMOL/L (ref 3.4–5.1)
SODIUM SERPL-SCNC: 141 MMOL/L (ref 135–145)

## 2022-12-16 ENCOUNTER — OFFICE VISIT (OUTPATIENT)
Dept: CARDIOLOGY | Age: 87
End: 2022-12-16

## 2022-12-16 VITALS
HEART RATE: 80 BPM | HEIGHT: 68 IN | BODY MASS INDEX: 27.52 KG/M2 | SYSTOLIC BLOOD PRESSURE: 124 MMHG | DIASTOLIC BLOOD PRESSURE: 73 MMHG | WEIGHT: 181.56 LBS

## 2022-12-16 DIAGNOSIS — I77.811 ECTATIC ABDOMINAL AORTA (CMD): Primary | ICD-10-CM

## 2022-12-16 DIAGNOSIS — I25.5 ISCHEMIC CARDIOMYOPATHY: ICD-10-CM

## 2022-12-16 DIAGNOSIS — I65.23 ASYMPTOMATIC CAROTID ARTERY STENOSIS, BILATERAL: ICD-10-CM

## 2022-12-16 DIAGNOSIS — E78.00 PURE HYPERCHOLESTEROLEMIA: ICD-10-CM

## 2022-12-16 DIAGNOSIS — Z95.1 HX OF CABG: ICD-10-CM

## 2022-12-16 DIAGNOSIS — I49.3 PVCS (PREMATURE VENTRICULAR CONTRACTIONS): ICD-10-CM

## 2022-12-16 DIAGNOSIS — I25.10 CORONARY ARTERY DISEASE INVOLVING NATIVE CORONARY ARTERY OF NATIVE HEART WITHOUT ANGINA PECTORIS: ICD-10-CM

## 2022-12-16 PROCEDURE — 99214 OFFICE O/P EST MOD 30 MIN: CPT | Performed by: INTERNAL MEDICINE

## 2022-12-16 RX ORDER — PREDNISONE 5 MG/1
15 TABLET ORAL DAILY
COMMUNITY
Start: 2022-11-21

## 2022-12-21 RX ORDER — FUROSEMIDE 40 MG/1
40 TABLET ORAL DAILY
Qty: 90 TABLET | Refills: 3 | Status: SHIPPED | OUTPATIENT
Start: 2022-12-21 | End: 2023-05-23 | Stop reason: SDUPTHER

## 2023-01-23 ENCOUNTER — CASE MANAGEMENT (OUTPATIENT)
Dept: CARE COORDINATION | Age: 88
End: 2023-01-23

## 2023-01-24 ENCOUNTER — CASE MANAGEMENT (OUTPATIENT)
Dept: CARE COORDINATION | Age: 88
End: 2023-01-24

## 2023-01-31 ENCOUNTER — CASE MANAGEMENT (OUTPATIENT)
Dept: CARE COORDINATION | Age: 88
End: 2023-01-31

## 2023-04-26 ENCOUNTER — APPOINTMENT (OUTPATIENT)
Dept: ULTRASOUND IMAGING | Facility: HOSPITAL | Age: 88
End: 2023-04-26
Attending: EMERGENCY MEDICINE
Payer: MEDICARE

## 2023-04-26 ENCOUNTER — HOSPITAL ENCOUNTER (INPATIENT)
Facility: HOSPITAL | Age: 88
LOS: 6 days | Discharge: HOME | End: 2023-05-02
Attending: EMERGENCY MEDICINE | Admitting: HOSPITALIST
Payer: MEDICARE

## 2023-04-26 ENCOUNTER — APPOINTMENT (OUTPATIENT)
Dept: CT IMAGING | Facility: HOSPITAL | Age: 88
End: 2023-04-26
Attending: EMERGENCY MEDICINE
Payer: MEDICARE

## 2023-04-26 DIAGNOSIS — I96 GANGRENE (HCC): Primary | ICD-10-CM

## 2023-04-26 DIAGNOSIS — I73.9 PVD (PERIPHERAL VASCULAR DISEASE) (HCC): ICD-10-CM

## 2023-04-26 LAB
ALBUMIN SERPL-MCNC: 3.5 G/DL (ref 3.4–5)
ALBUMIN/GLOB SERPL: 0.9 {RATIO} (ref 1–2)
ALP LIVER SERPL-CCNC: 79 U/L
ALT SERPL-CCNC: 33 U/L
ANION GAP SERPL CALC-SCNC: 5 MMOL/L (ref 0–18)
APTT PPP: 28 SECONDS (ref 23.3–35.6)
AST SERPL-CCNC: 21 U/L (ref 15–37)
BASOPHILS # BLD AUTO: 0.07 X10(3) UL (ref 0–0.2)
BASOPHILS NFR BLD AUTO: 0.6 %
BILIRUB SERPL-MCNC: 0.4 MG/DL (ref 0.1–2)
BILIRUB UR QL STRIP.AUTO: NEGATIVE
BUN BLD-MCNC: 28 MG/DL (ref 7–18)
CALCIUM BLD-MCNC: 9.2 MG/DL (ref 8.5–10.1)
CHLORIDE SERPL-SCNC: 104 MMOL/L (ref 98–112)
CLARITY UR REFRACT.AUTO: CLEAR
CO2 SERPL-SCNC: 30 MMOL/L (ref 21–32)
COLOR UR AUTO: YELLOW
COMPLEXED PSA SERPL-MCNC: 9.49 NG/ML (ref ?–4)
CREAT BLD-MCNC: 1.4 MG/DL
CRP SERPL-MCNC: 1.37 MG/DL (ref ?–0.3)
EOSINOPHIL # BLD AUTO: 0.06 X10(3) UL (ref 0–0.7)
EOSINOPHIL NFR BLD AUTO: 0.5 %
ERYTHROCYTE [DISTWIDTH] IN BLOOD BY AUTOMATED COUNT: 13.9 %
ERYTHROCYTE [SEDIMENTATION RATE] IN BLOOD: 27 MM/HR
EST. AVERAGE GLUCOSE BLD GHB EST-MCNC: 137 MG/DL (ref 68–126)
GFR SERPLBLD BASED ON 1.73 SQ M-ARVRAT: 48 ML/MIN/1.73M2 (ref 60–?)
GLOBULIN PLAS-MCNC: 4.1 G/DL (ref 2.8–4.4)
GLUCOSE BLD-MCNC: 136 MG/DL (ref 70–99)
GLUCOSE UR STRIP.AUTO-MCNC: NEGATIVE MG/DL
HBA1C MFR BLD: 6.4 % (ref ?–5.7)
HCT VFR BLD AUTO: 43.6 %
HGB BLD-MCNC: 14 G/DL
IMM GRANULOCYTES # BLD AUTO: 0.12 X10(3) UL (ref 0–1)
IMM GRANULOCYTES NFR BLD: 1 %
INR BLD: 1.03 (ref 0.85–1.16)
KETONES UR STRIP.AUTO-MCNC: NEGATIVE MG/DL
LEUKOCYTE ESTERASE UR QL STRIP.AUTO: NEGATIVE
LYMPHOCYTES # BLD AUTO: 0.85 X10(3) UL (ref 1–4)
LYMPHOCYTES NFR BLD AUTO: 7 %
MCH RBC QN AUTO: 28.7 PG (ref 26–34)
MCHC RBC AUTO-ENTMCNC: 32.1 G/DL (ref 31–37)
MCV RBC AUTO: 89.5 FL
MONOCYTES # BLD AUTO: 0.64 X10(3) UL (ref 0.1–1)
MONOCYTES NFR BLD AUTO: 5.3 %
NEUTROPHILS # BLD AUTO: 10.35 X10 (3) UL (ref 1.5–7.7)
NEUTROPHILS # BLD AUTO: 10.35 X10(3) UL (ref 1.5–7.7)
NEUTROPHILS NFR BLD AUTO: 85.6 %
NITRITE UR QL STRIP.AUTO: NEGATIVE
OSMOLALITY SERPL CALC.SUM OF ELEC: 296 MOSM/KG (ref 275–295)
PH UR STRIP.AUTO: 6 [PH] (ref 5–8)
PLATELET # BLD AUTO: 366 10(3)UL (ref 150–450)
POTASSIUM SERPL-SCNC: 4 MMOL/L (ref 3.5–5.1)
PROT SERPL-MCNC: 7.6 G/DL (ref 6.4–8.2)
PROT UR STRIP.AUTO-MCNC: NEGATIVE MG/DL
PROTHROMBIN TIME: 13.5 SECONDS (ref 11.6–14.8)
RBC # BLD AUTO: 4.87 X10(6)UL
SODIUM SERPL-SCNC: 139 MMOL/L (ref 136–145)
SP GR UR STRIP.AUTO: 1.03 (ref 1–1.03)
UROBILINOGEN UR STRIP.AUTO-MCNC: 4 MG/DL
WBC # BLD AUTO: 12.1 X10(3) UL (ref 4–11)

## 2023-04-26 PROCEDURE — 75635 CT ANGIO ABDOMINAL ARTERIES: CPT | Performed by: EMERGENCY MEDICINE

## 2023-04-26 PROCEDURE — 99222 1ST HOSP IP/OBS MODERATE 55: CPT | Performed by: STUDENT IN AN ORGANIZED HEALTH CARE EDUCATION/TRAINING PROGRAM

## 2023-04-26 PROCEDURE — 99223 1ST HOSP IP/OBS HIGH 75: CPT | Performed by: INTERNAL MEDICINE

## 2023-04-26 PROCEDURE — 93971 EXTREMITY STUDY: CPT | Performed by: EMERGENCY MEDICINE

## 2023-04-26 RX ORDER — LISINOPRIL 2.5 MG/1
2.5 TABLET ORAL DAILY
Status: DISCONTINUED | OUTPATIENT
Start: 2023-04-27 | End: 2023-05-02

## 2023-04-26 RX ORDER — SODIUM CHLORIDE 9 MG/ML
INJECTION, SOLUTION INTRAVENOUS CONTINUOUS
Status: ACTIVE | OUTPATIENT
Start: 2023-04-26 | End: 2023-04-27

## 2023-04-26 RX ORDER — MELATONIN
3 NIGHTLY PRN
Status: DISCONTINUED | OUTPATIENT
Start: 2023-04-26 | End: 2023-05-02

## 2023-04-26 RX ORDER — ONDANSETRON 2 MG/ML
4 INJECTION INTRAMUSCULAR; INTRAVENOUS EVERY 6 HOURS PRN
Status: DISCONTINUED | OUTPATIENT
Start: 2023-04-26 | End: 2023-05-02

## 2023-04-26 RX ORDER — ACETAMINOPHEN 325 MG/1
650 TABLET ORAL EVERY 4 HOURS PRN
Status: DISCONTINUED | OUTPATIENT
Start: 2023-04-26 | End: 2023-05-02

## 2023-04-26 RX ORDER — SODIUM PHOSPHATE, DIBASIC AND SODIUM PHOSPHATE, MONOBASIC 7; 19 G/133ML; G/133ML
1 ENEMA RECTAL ONCE AS NEEDED
Status: DISCONTINUED | OUTPATIENT
Start: 2023-04-26 | End: 2023-05-02

## 2023-04-26 RX ORDER — PANTOPRAZOLE SODIUM 40 MG/1
40 TABLET, DELAYED RELEASE ORAL
Status: DISCONTINUED | OUTPATIENT
Start: 2023-04-27 | End: 2023-05-02

## 2023-04-26 RX ORDER — FOLIC ACID 1 MG/1
1 TABLET ORAL DAILY
Status: DISCONTINUED | OUTPATIENT
Start: 2023-04-27 | End: 2023-05-02

## 2023-04-26 RX ORDER — ATORVASTATIN CALCIUM 10 MG/1
10 TABLET, FILM COATED ORAL DAILY
Status: DISCONTINUED | OUTPATIENT
Start: 2023-04-27 | End: 2023-04-27

## 2023-04-26 RX ORDER — HYDRALAZINE HYDROCHLORIDE 20 MG/ML
10 INJECTION INTRAMUSCULAR; INTRAVENOUS EVERY 6 HOURS PRN
Status: DISCONTINUED | OUTPATIENT
Start: 2023-04-26 | End: 2023-05-02

## 2023-04-26 RX ORDER — DEXTROSE MONOHYDRATE 100 MG/ML
1000 INJECTION, SOLUTION INTRAVENOUS ONCE
Status: DISCONTINUED | OUTPATIENT
Start: 2023-04-26 | End: 2023-04-26

## 2023-04-26 RX ORDER — HYDROCODONE BITARTRATE AND ACETAMINOPHEN 5; 325 MG/1; MG/1
2 TABLET ORAL EVERY 4 HOURS PRN
Status: DISCONTINUED | OUTPATIENT
Start: 2023-04-26 | End: 2023-05-02

## 2023-04-26 RX ORDER — BISACODYL 10 MG
10 SUPPOSITORY, RECTAL RECTAL
Status: DISCONTINUED | OUTPATIENT
Start: 2023-04-26 | End: 2023-05-02

## 2023-04-26 RX ORDER — POLYETHYLENE GLYCOL 3350 17 G/17G
17 POWDER, FOR SOLUTION ORAL DAILY PRN
Status: DISCONTINUED | OUTPATIENT
Start: 2023-04-26 | End: 2023-05-02

## 2023-04-26 RX ORDER — METOPROLOL SUCCINATE 25 MG/1
25 TABLET, EXTENDED RELEASE ORAL
Status: DISCONTINUED | OUTPATIENT
Start: 2023-04-27 | End: 2023-05-02

## 2023-04-26 RX ORDER — SENNOSIDES 8.6 MG
17.2 TABLET ORAL NIGHTLY PRN
Status: DISCONTINUED | OUTPATIENT
Start: 2023-04-26 | End: 2023-05-02

## 2023-04-26 RX ORDER — ECHINACEA PURPUREA EXTRACT 125 MG
1 TABLET ORAL
Status: DISCONTINUED | OUTPATIENT
Start: 2023-04-26 | End: 2023-05-02

## 2023-04-26 RX ORDER — HYDROCODONE BITARTRATE AND ACETAMINOPHEN 5; 325 MG/1; MG/1
1 TABLET ORAL EVERY 4 HOURS PRN
Status: DISCONTINUED | OUTPATIENT
Start: 2023-04-26 | End: 2023-05-02

## 2023-04-26 RX ORDER — METOCLOPRAMIDE HYDROCHLORIDE 5 MG/ML
10 INJECTION INTRAMUSCULAR; INTRAVENOUS EVERY 8 HOURS PRN
Status: DISCONTINUED | OUTPATIENT
Start: 2023-04-26 | End: 2023-05-02

## 2023-04-26 RX ORDER — PREDNISONE 5 MG/1
15 TABLET ORAL DAILY
COMMUNITY

## 2023-04-26 RX ORDER — CEFAZOLIN SODIUM/WATER 2 G/20 ML
2 SYRINGE (ML) INTRAVENOUS EVERY 8 HOURS
Status: DISCONTINUED | OUTPATIENT
Start: 2023-04-26 | End: 2023-04-30

## 2023-04-26 RX ORDER — BENZONATATE 100 MG/1
200 CAPSULE ORAL 3 TIMES DAILY PRN
Status: DISCONTINUED | OUTPATIENT
Start: 2023-04-26 | End: 2023-05-02

## 2023-04-26 NOTE — ED QUICK NOTES
Orders for admission, patient is aware of plan and ready to go upstairs. Any questions, please call ED RN Jabier Gauthier at extension #[phone].      Patient Covid vaccination status: Fully vaccinated     COVID Test Ordered in ED: None    COVID Suspicion at Admission: N/A    Running Infusions:  None    Mental Status/LOC at time of transport: A&OX4    Other pertinent information:   CIWA score: N/A   NIH score:  N/A

## 2023-04-26 NOTE — ED INITIAL ASSESSMENT (HPI)
Patient presents with right foot swelling, redness, discoloration (black) 3rd digit and open wound to great toe. Patient seen by PCP today and advised to come into ER for assessment, per patient they were unable to find pulse in foot. Patient pulse found with doppler.

## 2023-04-26 NOTE — PROGRESS NOTES
Vascular surgery note    Consult received. Full note to follow. Patient with gangrenous changes in the right foot. Will be admitted to hospitalist with IV antibiotics and podiatry on consult. Will get CTA with runoff. Patient to be evaluated for angiogram tentatively Friday.

## 2023-04-26 NOTE — PROGRESS NOTES
[facility] Pharmacy Note:  Renal Adjustment for cefazolin (ANCEF)    Alan Christensen is a 80year old patient who has been prescribed cefazolin (ANCEF) 1000 mg every 8 hrs. The CrCl cannot be calculated (Unknown ideal weight.). The dose has been adjusted to cefazolin (ANCEF) 2000 mg every 8 hrs per hospital renal dose adjustment protocol for treatment of cellulitis. Pharmacy will follow and adjust dose as warranted for additional renal function changes.     Thank you,    Adela Looney, PharmD  4/26/2023  6:51 PM

## 2023-04-27 ENCOUNTER — APPOINTMENT (OUTPATIENT)
Dept: GENERAL RADIOLOGY | Facility: HOSPITAL | Age: 88
End: 2023-04-27
Attending: STUDENT IN AN ORGANIZED HEALTH CARE EDUCATION/TRAINING PROGRAM
Payer: MEDICARE

## 2023-04-27 LAB
ANION GAP SERPL CALC-SCNC: 1 MMOL/L (ref 0–18)
BASOPHILS # BLD AUTO: 0.07 X10(3) UL (ref 0–0.2)
BASOPHILS NFR BLD AUTO: 0.8 %
BUN BLD-MCNC: 24 MG/DL (ref 7–18)
CALCIUM BLD-MCNC: 8.7 MG/DL (ref 8.5–10.1)
CHLORIDE SERPL-SCNC: 108 MMOL/L (ref 98–112)
CO2 SERPL-SCNC: 29 MMOL/L (ref 21–32)
CREAT BLD-MCNC: 1.19 MG/DL
EOSINOPHIL # BLD AUTO: 0.17 X10(3) UL (ref 0–0.7)
EOSINOPHIL NFR BLD AUTO: 1.8 %
ERYTHROCYTE [DISTWIDTH] IN BLOOD BY AUTOMATED COUNT: 14 %
GFR SERPLBLD BASED ON 1.73 SQ M-ARVRAT: 59 ML/MIN/1.73M2 (ref 60–?)
GLUCOSE BLD-MCNC: 87 MG/DL (ref 70–99)
HCT VFR BLD AUTO: 40.9 %
HGB BLD-MCNC: 13.3 G/DL
IMM GRANULOCYTES # BLD AUTO: 0.15 X10(3) UL (ref 0–1)
IMM GRANULOCYTES NFR BLD: 1.6 %
LYMPHOCYTES # BLD AUTO: 1.7 X10(3) UL (ref 1–4)
LYMPHOCYTES NFR BLD AUTO: 18.3 %
MCH RBC QN AUTO: 29 PG (ref 26–34)
MCHC RBC AUTO-ENTMCNC: 32.5 G/DL (ref 31–37)
MCV RBC AUTO: 89.3 FL
MONOCYTES # BLD AUTO: 1.05 X10(3) UL (ref 0.1–1)
MONOCYTES NFR BLD AUTO: 11.3 %
NEUTROPHILS # BLD AUTO: 6.17 X10 (3) UL (ref 1.5–7.7)
NEUTROPHILS # BLD AUTO: 6.17 X10(3) UL (ref 1.5–7.7)
NEUTROPHILS NFR BLD AUTO: 66.2 %
OSMOLALITY SERPL CALC.SUM OF ELEC: 289 MOSM/KG (ref 275–295)
PLATELET # BLD AUTO: 312 10(3)UL (ref 150–450)
POTASSIUM SERPL-SCNC: 4.3 MMOL/L (ref 3.5–5.1)
RBC # BLD AUTO: 4.58 X10(6)UL
SODIUM SERPL-SCNC: 138 MMOL/L (ref 136–145)
WBC # BLD AUTO: 9.3 X10(3) UL (ref 4–11)

## 2023-04-27 PROCEDURE — 99232 SBSQ HOSP IP/OBS MODERATE 35: CPT | Performed by: STUDENT IN AN ORGANIZED HEALTH CARE EDUCATION/TRAINING PROGRAM

## 2023-04-27 PROCEDURE — 73630 X-RAY EXAM OF FOOT: CPT | Performed by: STUDENT IN AN ORGANIZED HEALTH CARE EDUCATION/TRAINING PROGRAM

## 2023-04-27 RX ORDER — ATORVASTATIN CALCIUM 40 MG/1
40 TABLET, FILM COATED ORAL DAILY
Status: DISCONTINUED | OUTPATIENT
Start: 2023-04-28 | End: 2023-05-02

## 2023-04-27 RX ORDER — ASPIRIN 81 MG/1
81 TABLET ORAL DAILY
Status: DISCONTINUED | OUTPATIENT
Start: 2023-04-27 | End: 2023-05-02

## 2023-04-27 RX ORDER — FINASTERIDE 5 MG/1
5 TABLET, FILM COATED ORAL DAILY
Status: DISCONTINUED | OUTPATIENT
Start: 2023-04-27 | End: 2023-05-02

## 2023-04-27 RX ORDER — TAMSULOSIN HYDROCHLORIDE 0.4 MG/1
0.4 CAPSULE ORAL
Status: DISCONTINUED | OUTPATIENT
Start: 2023-04-27 | End: 2023-05-02

## 2023-04-27 NOTE — PROGRESS NOTES
Pt A&Ox4 Room Air  Resting in bed  Denies pain at this time  Meds Given per Mar  NSR on Tele, BP elevated  IV Abx  Right Great Toe. 3rd toe some eschar Noted. Podiatry following Order right foot Xray. Safety precaution maintained  Will continue to Monitor    Block in place, with good output.

## 2023-04-27 NOTE — PROGRESS NOTES
Assume care @ 0730  AO X4, Verbally responsive, RA  NSR on Tele. Denies pain  Continent. Block intact urine output documented. Up with walker X1 assist.Good appetite meals. X-ray of foot done. Vascular surgery and urology to see today. Pt /OT to see. Fall and safety precautions in place

## 2023-04-27 NOTE — PROGRESS NOTES
Vascular surgery progress note    Consult received. Patient seen and examined. Full note to follow. Patient with rest pain and ulceration on the great toe as well as the discoloration on the fourth toe. I reviewed the CTA. He is ambulatory albeit limited with a walker. Given his wound burden, age and functional status I would recommend right leg angiogram with plan for endovascular recanalization. I do not think he is a very  good candidate for open bypass surgery. I discussed the risk and benefits not limited to the risk of bleeding, infection, limb loss. They affirmed understanding. We will plan for tentative intervention tomorrow. Please make n.p.o. at midnight.

## 2023-04-28 ENCOUNTER — APPOINTMENT (OUTPATIENT)
Dept: INTERVENTIONAL RADIOLOGY/VASCULAR | Facility: HOSPITAL | Age: 88
End: 2023-04-28
Attending: SURGERY
Payer: MEDICARE

## 2023-04-28 LAB
ANION GAP SERPL CALC-SCNC: 2 MMOL/L (ref 0–18)
ANTIBODY SCREEN: NEGATIVE
BASOPHILS # BLD AUTO: 0.11 X10(3) UL (ref 0–0.2)
BASOPHILS NFR BLD AUTO: 1 %
BUN BLD-MCNC: 21 MG/DL (ref 7–18)
CALCIUM BLD-MCNC: 8.9 MG/DL (ref 8.5–10.1)
CHLORIDE SERPL-SCNC: 105 MMOL/L (ref 98–112)
CO2 SERPL-SCNC: 32 MMOL/L (ref 21–32)
CREAT BLD-MCNC: 1.16 MG/DL
EOSINOPHIL # BLD AUTO: 0.15 X10(3) UL (ref 0–0.7)
EOSINOPHIL NFR BLD AUTO: 1.4 %
ERYTHROCYTE [DISTWIDTH] IN BLOOD BY AUTOMATED COUNT: 13.9 %
GFR SERPLBLD BASED ON 1.73 SQ M-ARVRAT: 61 ML/MIN/1.73M2 (ref 60–?)
GLUCOSE BLD-MCNC: 93 MG/DL (ref 70–99)
HBV SURFACE AG SER-ACNC: <0.1 [IU]/L
HBV SURFACE AG SERPL QL IA: NONREACTIVE
HCT VFR BLD AUTO: 40.1 %
HCV AB SERPL QL IA: NONREACTIVE
HGB BLD-MCNC: 12.9 G/DL
HIV 1+2 AB+HIV1 P24 AG SERPL QL IA: NONREACTIVE
IMM GRANULOCYTES # BLD AUTO: 0.11 X10(3) UL (ref 0–1)
IMM GRANULOCYTES NFR BLD: 1 %
LYMPHOCYTES # BLD AUTO: 1.6 X10(3) UL (ref 1–4)
LYMPHOCYTES NFR BLD AUTO: 14.7 %
MAGNESIUM SERPL-MCNC: 2.1 MG/DL (ref 1.6–2.6)
MCH RBC QN AUTO: 28.9 PG (ref 26–34)
MCHC RBC AUTO-ENTMCNC: 32.2 G/DL (ref 31–37)
MCV RBC AUTO: 89.9 FL
MONOCYTES # BLD AUTO: 1.35 X10(3) UL (ref 0.1–1)
MONOCYTES NFR BLD AUTO: 12.4 %
NEUTROPHILS # BLD AUTO: 7.57 X10 (3) UL (ref 1.5–7.7)
NEUTROPHILS # BLD AUTO: 7.57 X10(3) UL (ref 1.5–7.7)
NEUTROPHILS NFR BLD AUTO: 69.5 %
OSMOLALITY SERPL CALC.SUM OF ELEC: 291 MOSM/KG (ref 275–295)
PHOSPHATE SERPL-MCNC: 2.7 MG/DL (ref 2.5–4.9)
PLATELET # BLD AUTO: 300 10(3)UL (ref 150–450)
POTASSIUM SERPL-SCNC: 4.3 MMOL/L (ref 3.5–5.1)
RBC # BLD AUTO: 4.46 X10(6)UL
RH BLOOD TYPE: POSITIVE
SODIUM SERPL-SCNC: 139 MMOL/L (ref 136–145)
WBC # BLD AUTO: 10.9 X10(3) UL (ref 4–11)

## 2023-04-28 PROCEDURE — 047R3ZZ DILATION OF RIGHT POSTERIOR TIBIAL ARTERY, PERCUTANEOUS APPROACH: ICD-10-PCS | Performed by: SURGERY

## 2023-04-28 PROCEDURE — 047K34Z DILATION OF RIGHT FEMORAL ARTERY WITH DRUG-ELUTING INTRALUMINAL DEVICE, PERCUTANEOUS APPROACH: ICD-10-PCS | Performed by: SURGERY

## 2023-04-28 PROCEDURE — 047V3ZZ DILATION OF RIGHT FOOT ARTERY, PERCUTANEOUS APPROACH: ICD-10-PCS | Performed by: SURGERY

## 2023-04-28 PROCEDURE — B41FYZZ FLUOROSCOPY OF RIGHT LOWER EXTREMITY ARTERIES USING OTHER CONTRAST: ICD-10-PCS | Performed by: SURGERY

## 2023-04-28 PROCEDURE — 047T3ZZ DILATION OF RIGHT PERONEAL ARTERY, PERCUTANEOUS APPROACH: ICD-10-PCS | Performed by: SURGERY

## 2023-04-28 PROCEDURE — 04CK3ZZ EXTIRPATION OF MATTER FROM RIGHT FEMORAL ARTERY, PERCUTANEOUS APPROACH: ICD-10-PCS | Performed by: SURGERY

## 2023-04-28 PROCEDURE — 047P3ZZ DILATION OF RIGHT ANTERIOR TIBIAL ARTERY, PERCUTANEOUS APPROACH: ICD-10-PCS | Performed by: SURGERY

## 2023-04-28 PROCEDURE — 99232 SBSQ HOSP IP/OBS MODERATE 35: CPT | Performed by: STUDENT IN AN ORGANIZED HEALTH CARE EDUCATION/TRAINING PROGRAM

## 2023-04-28 PROCEDURE — 047H3DZ DILATION OF RIGHT EXTERNAL ILIAC ARTERY WITH INTRALUMINAL DEVICE, PERCUTANEOUS APPROACH: ICD-10-PCS | Performed by: SURGERY

## 2023-04-28 PROCEDURE — 04CM3ZZ EXTIRPATION OF MATTER FROM RIGHT POPLITEAL ARTERY, PERCUTANEOUS APPROACH: ICD-10-PCS | Performed by: SURGERY

## 2023-04-28 RX ORDER — MIDAZOLAM HYDROCHLORIDE 1 MG/ML
INJECTION INTRAMUSCULAR; INTRAVENOUS
Status: COMPLETED
Start: 2023-04-28 | End: 2023-04-28

## 2023-04-28 RX ORDER — CLOPIDOGREL BISULFATE 75 MG/1
75 TABLET ORAL DAILY
Status: DISCONTINUED | OUTPATIENT
Start: 2023-04-29 | End: 2023-05-02

## 2023-04-28 RX ORDER — CLOPIDOGREL BISULFATE 75 MG/1
TABLET ORAL
Status: COMPLETED
Start: 2023-04-28 | End: 2023-04-28

## 2023-04-28 RX ORDER — LIDOCAINE HYDROCHLORIDE 10 MG/ML
INJECTION, SOLUTION EPIDURAL; INFILTRATION; INTRACAUDAL; PERINEURAL
Status: COMPLETED
Start: 2023-04-28 | End: 2023-04-28

## 2023-04-28 RX ORDER — IODIXANOL 320 MG/ML
100 INJECTION, SOLUTION INTRAVASCULAR
Status: COMPLETED | OUTPATIENT
Start: 2023-04-28 | End: 2023-04-28

## 2023-04-28 RX ORDER — BIVALIRUDIN 250 MG/5ML
INJECTION, POWDER, LYOPHILIZED, FOR SOLUTION INTRAVENOUS
Status: COMPLETED
Start: 2023-04-28 | End: 2023-04-28

## 2023-04-28 RX ORDER — NITROGLYCERIN 20 MG/100ML
INJECTION INTRAVENOUS
Status: COMPLETED
Start: 2023-04-28 | End: 2023-04-28

## 2023-04-28 NOTE — PLAN OF CARE
Assumed care at 0730  NPO for procedure  Pt sent for procedure at 1100  Left unit in stable condition

## 2023-04-28 NOTE — OPERATIVE REPORT
Vascular Surgery Op Note  Patients Name:  Florentino Munoz    Operating Physician: Yanick Diaz MD                                                     Location:  OR                                               YOB: 1935     Operation Date:  04/28/2023         Pre-Operative Diagnosis:   1. 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 Ultrasound-guided access of the left common femoral artery  2. Selective catheterization third order branch of the right lower extremity  3. Right leg angiogram with radiologic supervision and interpretation  4. Ultrasound-guided access of the right posterior tibial artery  5. Atherectomy of the right SFA, popliteal and TP trunk with 6 [place] Delbert catheter  6. Angioplasty and stenting of the right SFA and popliteal with placement of 6 mm Zilver PTX stent postdilated with 5 mm balloon  7. Angioplasty of the right posterior tibial artery with 3 mm balloon  8. Angioplasty of the right medial plantar with 3 mm balloon  9. Angioplasty of the right peroneal with 3 mm balloon  10. Ultrasound-guided access of the right anterior tibial artery  11. Angioplasty of the right anterior tibial artery with 3 mm balloon  12. Angioplasty of the right dorsalis pedis with 3 mm balloon  13. Angioplasty and stenting of the right external iliac artery with placement of 9 mm EV 3 stent postdilated with 7 mm balloon  14. Closure of  left groin with Pro-glide Perclose suture (6Fr). Surgeon:  Yanick Diaz MD      Anesthesia:   An independent observer was present for the physiological monitoring and administration medication throughout the duration of the procedure under my direct supervision.     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 Occlusion of the right SFA and popliteal with reconstitution via collaterals of the below-knee popliteal.  There is occlusion of the AT throughout its entirety. There is occlusion of the peroneal with reconstitution of the distal segment. There is reconstitution of the PT in the mid to distal segment with flow to the foot intervention performed as described above. At completion triphasic PT and biphasic DP. Patient maximally revascularized. He will continue aspirin, Plavix, and low-dose Xarelto indefinitely. Remains at high risk for limb loss. Indications for procedure: This is a 69-year-old male who presented with atherosclerosis with gangrene of the right lower extremity as well as severe rest pain. On evaluation of his CTA, his aortoiliac segment was patent except for high-grade right external iliac stenosis and then significant disease distally. As such, he had inadequate flow for wound healing and as such I recommended him for a right leg angiogram with the possibility for intervention. I discussed the risk and benefits of the procedure. Informed consent was directly obtained. On the morning of 04/28 the patient was brought to the Cath Lab and placed in supine position. Conscious sedation was initiated without any issues. The patient was subsequently prepped and draped in the usual sterile fashion. Timeout was performed. With the use the ultrasound the left common femoral was identified proximal to its bifurcation at the level of the femoral head. The skin and soft tissues were anesthetized and then a micropuncture needle was used to access the left common femoral with advancement of a microwire and exchanged for a micro sheath. Glidewire advantage was then put in place followed by placement of a 5 [place] sheath. Sheath shot angiogram deemed appropriate location of the access. The patient was systemically heparinized.   A rim catheter was advanced up and over the bifurcation into the right  common femoral.  A Right leg angiogram was performed with the findings as listed above. As such I opted to proceed with intervention. A Glidewire advantage was then put in place and then exchanged for a 6 [place] sheath that was advanced up and over the bifurcation into the SFA. I then utilized a [place] cross catheter and Glidewire advantage to traverse the the occlusion and enter into the distal popliteal which was reconstituted. However I was unable to select the tibial vasculature and due to fear for entering subintimal and dissection I opted to proceed with retrograde access. I utilized the ultrasound and micropuncture to access the distal right PT with advancement of a V18 wire and exchanged for a 5 [place] slender sheath. A Noel cross catheter and Glidewire advantage 018 was used to traverse the occluded PT and with 7 epilation through and through access was obtained. I then exchanged for a 3 mm balloon for protection from below and then performed atherectomy of the distal SFA and popliteal with numerous passes with significant debris being removed. A channel was established. A 6 mm Zilver PTX stent was then used to perform angioplasty and stenting of the entirety of the diseased proximal popliteal and SFA and postdilated with 5 mm balloon proximally and distally. Repeat angiogram confirmed significant improvement of this. A 3 mm balloon was then used to perform angioplasty of the PT throughout its entirety and then extending to the medial plantar as well. Intra-arterial nitroglycerin was administered. Upon completion the sheath was removed and angioplasty was performed passes in the medial plantar. Angiogram revealed dramatic improvement of flow within this. There was a dissection in the proximal segment that resolved with prolonged balloon insufflation.   I then redirected the wire and catheter into the peroneal and then performed angioplasty of this throughout with a 3 mm balloon. There was reconstitution of the dorsalis pedis with collaterals and as such I opted to proceed with retrograde access as I was unable to engage the proximal segment of this. I utilized a micropuncture and ultrasound to access the right dorsalis pedis and advanced a V18 wire and exchanged for a 5 [place] slender sheath. A Noel cross catheter and 018 glide advantage were used to traverse the occlusion and thereby obtaining through and through access once again. Angioplasty of the anterior tibial was performed throughout with a 3 mm balloon and then the access was removed and angioplasty was performed past this with a 2.5 mm balloon. Repeat angiogram revealed some spasm however the AT was patent. The entirety of the SFA popliteal with three-vessel runoff was performed. The PT was the dominant flow to the foot with improvement of collateral flow in the foot. The sheath was removed and there was high-grade stenosis of the external iliac and thus angioplasty and stenting was performed with a 9 mm balloon which was then postdilated with a 7 mm balloon. Repeat angiogram revealed complete resolution and wide patency. At this point I was satisfied and opted to terminate the procedure. All catheters were withdrawn. I then deployed a Pro-glide Perclose suture which was then cinched locked and cut thereby closing the arteriotomy site. Manual pressure was held. Upon release there was evidence of excellent hemostasis and a sterile dressing was applied. The patient awoke from sedation and was taken to recovery in stable condition. All counts were correct at the end of the case.     Selvin Arroyo MD

## 2023-04-28 NOTE — PROGRESS NOTES
Assumed care at [address]  A& O x 3  On R/A  NSR on tele   Denies pain  Kept NPO at midnight for Angio. R-foot. On IV Abx Ancef and tolerating well  Indwelling pena in place-draining clear, yellow. Urine.    Up with 1-person assist-walker/GB  No acute events overnight/

## 2023-04-28 NOTE — PHYSICAL THERAPY NOTE
Chart reviewed. Pt JAYNE for right leg angiogram. Will hold and follow as appropriate.      Jefferson Rodney, PT  04/28/23

## 2023-04-29 PROBLEM — I73.9 PAD (PERIPHERAL ARTERY DISEASE) (HCC): Status: ACTIVE | Noted: 2023-04-26

## 2023-04-29 PROBLEM — I73.9 PAD (PERIPHERAL ARTERY DISEASE): Status: ACTIVE | Noted: 2023-04-26

## 2023-04-29 LAB
CK SERPL-CCNC: 45 U/L
CK SERPL-CCNC: 830 U/L

## 2023-04-29 PROCEDURE — 99232 SBSQ HOSP IP/OBS MODERATE 35: CPT | Performed by: STUDENT IN AN ORGANIZED HEALTH CARE EDUCATION/TRAINING PROGRAM

## 2023-04-29 RX ORDER — GABAPENTIN 300 MG/1
300 CAPSULE ORAL 3 TIMES DAILY
Status: DISCONTINUED | OUTPATIENT
Start: 2023-04-29 | End: 2023-05-02

## 2023-04-29 NOTE — PLAN OF CARE
Received report on, and this Pt from cath lab to room 2606 at 1600. Pt awake, A&Ox4, pleasant and cooperative, [place], Pt's family at bedside. Pt is in SR on Tele monitor, sats greater than 90% on RA. PRN Norco administered per Pt request for right foot pain, effective. Bridgett hugger on right foot per order. Pt's right foot was very pale when he first arrived, warmed up and a little more pink after about an hour. Right posterior tibial pulse per doppler, Dr. Yosvany Singleton came up and saw Pt and checked pulses in the room. Left groin is soft, dressing is C/D/I, no s/s of bleeding or hematoma noted, CMS to LLE intact. POC discussed with Pt and his questions answered. Call light is in reach. Bed alarm is on for safety. Bedside report given to night nurse at this time.

## 2023-04-29 NOTE — PLAN OF CARE
Patient received in bed with daughter at bedside. Alert and oriented. complains of pain to right foot. Norco given as needed with relieve. Bridgett hugger to right foot set at 38 per Dr Eulalia Alvarez. Patient able to lift right foot, minimal wiggle of toe (history of polio), feet pink and warm to touch. 2 puncture site to right foot with minimal serosanguinous drainage. Dressing intact. Pulses by doppler via posterior tibia. Right groin incision soft, no bleeding, no hematoma. Bedrest for tonight. Kept clean and dry. Block draining to gravity. No complains from patient. Will monitor. 0730 doppler pulses to posterior tibia verified with AM nurse via bedside report    Problem: CARDIOVASCULAR - ADULT  Goal: Maintains optimal cardiac output and hemodynamic stability  Description: INTERVENTIONS:  - Monitor vital signs, rhythm, and trends  - Monitor for bleeding, hypotension and signs of decreased cardiac output  - Evaluate effectiveness of vasoactive medications to optimize hemodynamic stability  - Monitor arterial and/or venous puncture sites for bleeding and/or hematoma  - Assess quality of pulses, skin color and temperature  - Assess for signs of decreased coronary artery perfusion - ex. Angina  - Evaluate fluid balance, assess for edema, trend weights  Outcome: Progressing  Goal: Absence of cardiac arrhythmias or at baseline  Description: INTERVENTIONS:  - Continuous cardiac monitoring, monitor vital signs, obtain 12 lead EKG if indicated  - Evaluate effectiveness of antiarrhythmic and heart rate control medications as ordered  - Initiate emergency measures for life threatening arrhythmias  - Monitor electrolytes and administer replacement therapy as ordered  Outcome: Progressing     Problem: SAFETY ADULT - FALL  Goal: Free from fall injury  Description: INTERVENTIONS:  - Assess pt frequently for physical needs  - Identify cognitive and physical deficits and behaviors that affect risk of falls.   - [place] fall precautions as indicated by assessment.  - Educate pt/family on patient safety including physical limitations  - Instruct pt to call for assistance with activity based on assessment  - Modify environment to reduce risk of injury  - Provide assistive devices as appropriate  - Consider OT/PT consult to assist with strengthening/mobility  - Encourage toileting schedule  Outcome: Progressing     Problem: Discharge Barriers  Goal: Patient's discharge needs are met  Description: Collaborate with interdisciplinary team and initiate plans and interventions as needed.    Outcome: Progressing     Problem: PAIN - ADULT  Goal: Verbalizes/displays adequate comfort level or patient's stated pain goal  Description: INTERVENTIONS:  - Encourage pt to monitor pain and request assistance  - Assess pain using appropriate pain scale  - Administer analgesics based on type and severity of pain and evaluate response  - Implement non-pharmacological measures as appropriate and evaluate response  - Consider cultural and social influences on pain and pain management  - Manage/alleviate anxiety  - Utilize distraction and/or relaxation techniques  - Monitor for opioid side effects  - Notify MD/LIP if interventions unsuccessful or patient reports new pain  - Anticipate increased pain with activity and pre-medicate as appropriate  Outcome: Progressing

## 2023-04-29 NOTE — PLAN OF CARE
NURSING NOTES:  0800: Pt received up in the chair. R foot post. Tibial pulse per doppler. L groin dsg D/I, soft to touch. SR w/ PVC. Room air. LAC saline lock. Block. 1000: Dr. Rajiv Tyson at bedside. L toe betadine. Pt had BM. 1130: Pt back to bed, resting. Norco for R foot pain. Bridgett hugger(warmer) to R foot, Pink color of R leg but pale color of the toes. Pt's daughter at bedside. 1600: Notified Dr. Eileen Ellison with CK result. Pt's family at bedside visiting. Problem: CARDIOVASCULAR - ADULT  Goal: Maintains optimal cardiac output and hemodynamic stability  Description: INTERVENTIONS:  - Monitor vital signs, rhythm, and trends  - Monitor for bleeding, hypotension and signs of decreased cardiac output  - Evaluate effectiveness of vasoactive medications to optimize hemodynamic stability  - Monitor arterial and/or venous puncture sites for bleeding and/or hematoma  - Assess quality of pulses, skin color and temperature  - Assess for signs of decreased coronary artery perfusion - ex.  Angina  - Evaluate fluid balance, assess for edema, trend weights  Outcome: Progressing  Goal: Absence of cardiac arrhythmias or at baseline  Description: INTERVENTIONS:  - Continuous cardiac monitoring, monitor vital signs, obtain 12 lead EKG if indicated  - Evaluate effectiveness of antiarrhythmic and heart rate control medications as ordered  - Initiate emergency measures for life threatening arrhythmias  - Monitor electrolytes and administer replacement therapy as ordered  Outcome: Progressing     Problem: PAIN - ADULT  Goal: Verbalizes/displays adequate comfort level or patient's stated pain goal  Description: INTERVENTIONS:  - Encourage pt to monitor pain and request assistance  - Assess pain using appropriate pain scale  - Administer analgesics based on type and severity of pain and evaluate response  - Implement non-pharmacological measures as appropriate and evaluate response  - Consider cultural and social influences on pain and pain management  - Manage/alleviate anxiety  - Utilize distraction and/or relaxation techniques  - Monitor for opioid side effects  - Notify MD/LIP if interventions unsuccessful or patient reports new pain  - Anticipate increased pain with activity and pre-medicate as appropriate  Outcome: Progressing

## 2023-04-29 NOTE — CM/SW NOTE
Order noted for Pol[facility]. MSW spoke to [facility] who has a copy on file. MSW asked it be sent. MSW will send to RN when it arrives via email.

## 2023-04-30 LAB
CK SERPL-CCNC: 354 U/L
MAGNESIUM SERPL-MCNC: 2.1 MG/DL (ref 1.6–2.6)

## 2023-04-30 PROCEDURE — 99232 SBSQ HOSP IP/OBS MODERATE 35: CPT | Performed by: STUDENT IN AN ORGANIZED HEALTH CARE EDUCATION/TRAINING PROGRAM

## 2023-04-30 NOTE — PLAN OF CARE
Patient alert and oriented. Pain at goal. BP on soft side. 1L IVF given with positive effects. Right leg unchanged. appears pink on foot, pale to mottled on toes, black on 3rd and 1st toe. Pulses via posterior tibia via doppler. Decrease sensation, pt baseline. desats with sleep, ?? Sleep apnea. 2L oxygen overnight. Kept clean and dry. Assisted with ADL's. Problem: CARDIOVASCULAR - ADULT  Goal: Maintains optimal cardiac output and hemodynamic stability  Description: INTERVENTIONS:  - Monitor vital signs, rhythm, and trends  - Monitor for bleeding, hypotension and signs of decreased cardiac output  - Evaluate effectiveness of vasoactive medications to optimize hemodynamic stability  - Monitor arterial and/or venous puncture sites for bleeding and/or hematoma  - Assess quality of pulses, skin color and temperature  - Assess for signs of decreased coronary artery perfusion - ex. Angina  - Evaluate fluid balance, assess for edema, trend weights  Outcome: Progressing  Goal: Absence of cardiac arrhythmias or at baseline  Description: INTERVENTIONS:  - Continuous cardiac monitoring, monitor vital signs, obtain 12 lead EKG if indicated  - Evaluate effectiveness of antiarrhythmic and heart rate control medications as ordered  - Initiate emergency measures for life threatening arrhythmias  - Monitor electrolytes and administer replacement therapy as ordered  Outcome: Progressing     Problem: SAFETY ADULT - FALL  Goal: Free from fall injury  Description: INTERVENTIONS:  - Assess pt frequently for physical needs  - Identify cognitive and physical deficits and behaviors that affect risk of falls.   - [place] fall precautions as indicated by assessment.  - Educate pt/family on patient safety including physical limitations  - Instruct pt to call for assistance with activity based on assessment  - Modify environment to reduce risk of injury  - Provide assistive devices as appropriate  - Consider OT/PT consult to assist with strengthening/mobility  - Encourage toileting schedule  Outcome: Progressing     Problem: Discharge Barriers  Goal: Patient's discharge needs are met  Description: Collaborate with interdisciplinary team and initiate plans and interventions as needed.    Outcome: Progressing     Problem: PAIN - ADULT  Goal: Verbalizes/displays adequate comfort level or patient's stated pain goal  Description: INTERVENTIONS:  - Encourage pt to monitor pain and request assistance  - Assess pain using appropriate pain scale  - Administer analgesics based on type and severity of pain and evaluate response  - Implement non-pharmacological measures as appropriate and evaluate response  - Consider cultural and social influences on pain and pain management  - Manage/alleviate anxiety  - Utilize distraction and/or relaxation techniques  - Monitor for opioid side effects  - Notify MD/LIP if interventions unsuccessful or patient reports new pain  - Anticipate increased pain with activity and pre-medicate as appropriate  Outcome: Progressing

## 2023-04-30 NOTE — PLAN OF CARE
Pt is A+Ox4. VSS on RA- required 2L at noc. NSR on tele. No cardiac complaints. RLE edema. Absent Pedal pulse still, but with doppler post tib pulse intact. Block, draining clear/yellow urine. BM yesterday. Pt c.o slight R leg pain-PRN medications given per MAR. Pt and family updated on current plan. All needs met at this time. Problem: CARDIOVASCULAR - ADULT  Goal: Maintains optimal cardiac output and hemodynamic stability  Description: INTERVENTIONS:  - Monitor vital signs, rhythm, and trends  - Monitor for bleeding, hypotension and signs of decreased cardiac output  - Evaluate effectiveness of vasoactive medications to optimize hemodynamic stability  - Monitor arterial and/or venous puncture sites for bleeding and/or hematoma  - Assess quality of pulses, skin color and temperature  - Assess for signs of decreased coronary artery perfusion - ex. Angina  - Evaluate fluid balance, assess for edema, trend weights  Outcome: Progressing  Goal: Absence of cardiac arrhythmias or at baseline  Description: INTERVENTIONS:  - Continuous cardiac monitoring, monitor vital signs, obtain 12 lead EKG if indicated  - Evaluate effectiveness of antiarrhythmic and heart rate control medications as ordered  - Initiate emergency measures for life threatening arrhythmias  - Monitor electrolytes and administer replacement therapy as ordered  Outcome: Progressing     Problem: SAFETY ADULT - FALL  Goal: Free from fall injury  Description: INTERVENTIONS:  - Assess pt frequently for physical needs  - Identify cognitive and physical deficits and behaviors that affect risk of falls.   - [place] fall precautions as indicated by assessment.  - Educate pt/family on patient safety including physical limitations  - Instruct pt to call for assistance with activity based on assessment  - Modify environment to reduce risk of injury  - Provide assistive devices as appropriate  - Consider OT/PT consult to assist with strengthening/mobility  - Encourage toileting schedule  Outcome: Progressing     Problem: Discharge Barriers  Goal: Patient's discharge needs are met  Description: Collaborate with interdisciplinary team and initiate plans and interventions as needed.    Outcome: Progressing     Problem: PAIN - ADULT  Goal: Verbalizes/displays adequate comfort level or patient's stated pain goal  Description: INTERVENTIONS:  - Encourage pt to monitor pain and request assistance  - Assess pain using appropriate pain scale  - Administer analgesics based on type and severity of pain and evaluate response  - Implement non-pharmacological measures as appropriate and evaluate response  - Consider cultural and social influences on pain and pain management  - Manage/alleviate anxiety  - Utilize distraction and/or relaxation techniques  - Monitor for opioid side effects  - Notify MD/LIP if interventions unsuccessful or patient reports new pain  - Anticipate increased pain with activity and pre-medicate as appropriate  Outcome: Progressing

## 2023-05-01 ENCOUNTER — APPOINTMENT (OUTPATIENT)
Dept: CT IMAGING | Facility: HOSPITAL | Age: 88
End: 2023-05-01
Payer: MEDICARE

## 2023-05-01 LAB
ANION GAP SERPL CALC-SCNC: 1 MMOL/L (ref 0–18)
BASOPHILS # BLD AUTO: 0.03 X10(3) UL (ref 0–0.2)
BASOPHILS NFR BLD AUTO: 0.2 %
BUN BLD-MCNC: 27 MG/DL (ref 7–18)
CALCIUM BLD-MCNC: 8.5 MG/DL (ref 8.5–10.1)
CHLORIDE SERPL-SCNC: 106 MMOL/L (ref 98–112)
CO2 SERPL-SCNC: 27 MMOL/L (ref 21–32)
CREAT BLD-MCNC: 1.12 MG/DL
EOSINOPHIL # BLD AUTO: 0.15 X10(3) UL (ref 0–0.7)
EOSINOPHIL NFR BLD AUTO: 1.2 %
ERYTHROCYTE [DISTWIDTH] IN BLOOD BY AUTOMATED COUNT: 13.6 %
GFR SERPLBLD BASED ON 1.73 SQ M-ARVRAT: 63 ML/MIN/1.73M2 (ref 60–?)
GLUCOSE BLD-MCNC: 121 MG/DL (ref 70–99)
HCT VFR BLD AUTO: 33.2 %
HGB BLD-MCNC: 10.8 G/DL
IMM GRANULOCYTES # BLD AUTO: 0.13 X10(3) UL (ref 0–1)
IMM GRANULOCYTES NFR BLD: 1 %
LYMPHOCYTES # BLD AUTO: 0.98 X10(3) UL (ref 1–4)
LYMPHOCYTES NFR BLD AUTO: 7.9 %
MCH RBC QN AUTO: 28.8 PG (ref 26–34)
MCHC RBC AUTO-ENTMCNC: 32.5 G/DL (ref 31–37)
MCV RBC AUTO: 88.5 FL
MONOCYTES # BLD AUTO: 1.36 X10(3) UL (ref 0.1–1)
MONOCYTES NFR BLD AUTO: 11 %
NEUTROPHILS # BLD AUTO: 9.74 X10 (3) UL (ref 1.5–7.7)
NEUTROPHILS # BLD AUTO: 9.74 X10(3) UL (ref 1.5–7.7)
NEUTROPHILS NFR BLD AUTO: 78.7 %
OSMOLALITY SERPL CALC.SUM OF ELEC: 284 MOSM/KG (ref 275–295)
PLATELET # BLD AUTO: 230 10(3)UL (ref 150–450)
POTASSIUM SERPL-SCNC: 4.1 MMOL/L (ref 3.5–5.1)
RBC # BLD AUTO: 3.75 X10(6)UL
SODIUM SERPL-SCNC: 134 MMOL/L (ref 136–145)
WBC # BLD AUTO: 12.4 X10(3) UL (ref 4–11)

## 2023-05-01 PROCEDURE — 99232 SBSQ HOSP IP/OBS MODERATE 35: CPT | Performed by: STUDENT IN AN ORGANIZED HEALTH CARE EDUCATION/TRAINING PROGRAM

## 2023-05-01 PROCEDURE — 74178 CT ABD&PLV WO CNTR FLWD CNTR: CPT

## 2023-05-01 PROCEDURE — 99231 SBSQ HOSP IP/OBS SF/LOW 25: CPT | Performed by: STUDENT IN AN ORGANIZED HEALTH CARE EDUCATION/TRAINING PROGRAM

## 2023-05-01 PROCEDURE — 76377 3D RENDER W/INTRP POSTPROCES: CPT

## 2023-05-01 NOTE — PLAN OF CARE
Assumed care of patient 0730 sitting up in bed. AO x4. NSR on tele monitor. O2 sat adequate on RA. Denies chest pain and shortness of breath. Plan of care updated. Bed locked, in lowest position, with bed alarm on. Call light and personal items in reach. All needs met. 1600: post op shoe delivered to room. Instructed patient and family that he is to wear it when ambulating. All acknowledged understanding. Problem: CARDIOVASCULAR - ADULT  Goal: Maintains optimal cardiac output and hemodynamic stability  Description: INTERVENTIONS:  - Monitor vital signs, rhythm, and trends  - Monitor for bleeding, hypotension and signs of decreased cardiac output  - Evaluate effectiveness of vasoactive medications to optimize hemodynamic stability  - Monitor arterial and/or venous puncture sites for bleeding and/or hematoma  - Assess quality of pulses, skin color and temperature  - Assess for signs of decreased coronary artery perfusion - ex. Angina  - Evaluate fluid balance, assess for edema, trend weights  Outcome: Progressing  Goal: Absence of cardiac arrhythmias or at baseline  Description: INTERVENTIONS:  - Continuous cardiac monitoring, monitor vital signs, obtain 12 lead EKG if indicated  - Evaluate effectiveness of antiarrhythmic and heart rate control medications as ordered  - Initiate emergency measures for life threatening arrhythmias  - Monitor electrolytes and administer replacement therapy as ordered  Outcome: Progressing     Problem: SAFETY ADULT - FALL  Goal: Free from fall injury  Description: INTERVENTIONS:  - Assess pt frequently for physical needs  - Identify cognitive and physical deficits and behaviors that affect risk of falls.   - [place] fall precautions as indicated by assessment.  - Educate pt/family on patient safety including physical limitations  - Instruct pt to call for assistance with activity based on assessment  - Modify environment to reduce risk of injury  - Provide assistive devices as appropriate  - Consider OT/PT consult to assist with strengthening/mobility  - Encourage toileting schedule  Outcome: Progressing     Problem: Discharge Barriers  Goal: Patient's discharge needs are met  Description: Collaborate with interdisciplinary team and initiate plans and interventions as needed.    Outcome: Progressing     Problem: PAIN - ADULT  Goal: Verbalizes/displays adequate comfort level or patient's stated pain goal  Description: INTERVENTIONS:  - Encourage pt to monitor pain and request assistance  - Assess pain using appropriate pain scale  - Administer analgesics based on type and severity of pain and evaluate response  - Implement non-pharmacological measures as appropriate and evaluate response  - Consider cultural and social influences on pain and pain management  - Manage/alleviate anxiety  - Utilize distraction and/or relaxation techniques  - Monitor for opioid side effects  - Notify MD/LIP if interventions unsuccessful or patient reports new pain  - Anticipate increased pain with activity and pre-medicate as appropriate  Outcome: Progressing

## 2023-05-01 NOTE — PLAN OF CARE
Assumed care of patient at [address]. Alert and oriented x4. On room air-2L at night. C/o dry cough, PRN tessalon given. NSR on tele. Block in place draining clear, yellow urine. Prn norco given for foot pain. Needs met at this time. Bed locked and in lowest position, call light within reach. Bed alarm on. Plan: CT urogram    Problem: CARDIOVASCULAR - ADULT  Goal: Maintains optimal cardiac output and hemodynamic stability  Description: INTERVENTIONS:  - Monitor vital signs, rhythm, and trends  - Monitor for bleeding, hypotension and signs of decreased cardiac output  - Evaluate effectiveness of vasoactive medications to optimize hemodynamic stability  - Monitor arterial and/or venous puncture sites for bleeding and/or hematoma  - Assess quality of pulses, skin color and temperature  - Assess for signs of decreased coronary artery perfusion - ex. Angina  - Evaluate fluid balance, assess for edema, trend weights  Outcome: Progressing  Goal: Absence of cardiac arrhythmias or at baseline  Description: INTERVENTIONS:  - Continuous cardiac monitoring, monitor vital signs, obtain 12 lead EKG if indicated  - Evaluate effectiveness of antiarrhythmic and heart rate control medications as ordered  - Initiate emergency measures for life threatening arrhythmias  - Monitor electrolytes and administer replacement therapy as ordered  Outcome: Progressing     Problem: SAFETY ADULT - FALL  Goal: Free from fall injury  Description: INTERVENTIONS:  - Assess pt frequently for physical needs  - Identify cognitive and physical deficits and behaviors that affect risk of falls.   - [place] fall precautions as indicated by assessment.  - Educate pt/family on patient safety including physical limitations  - Instruct pt to call for assistance with activity based on assessment  - Modify environment to reduce risk of injury  - Provide assistive devices as appropriate  - Consider OT/PT consult to assist with strengthening/mobility  - Encourage toileting schedule  Outcome: Progressing     Problem: Discharge Barriers  Goal: Patient's discharge needs are met  Description: Collaborate with interdisciplinary team and initiate plans and interventions as needed.    Outcome: Progressing     Problem: PAIN - ADULT  Goal: Verbalizes/displays adequate comfort level or patient's stated pain goal  Description: INTERVENTIONS:  - Encourage pt to monitor pain and request assistance  - Assess pain using appropriate pain scale  - Administer analgesics based on type and severity of pain and evaluate response  - Implement non-pharmacological measures as appropriate and evaluate response  - Consider cultural and social influences on pain and pain management  - Manage/alleviate anxiety  - Utilize distraction and/or relaxation techniques  - Monitor for opioid side effects  - Notify MD/LIP if interventions unsuccessful or patient reports new pain  - Anticipate increased pain with activity and pre-medicate as appropriate  Outcome: Progressing

## 2023-05-02 VITALS
DIASTOLIC BLOOD PRESSURE: 66 MMHG | HEART RATE: 91 BPM | SYSTOLIC BLOOD PRESSURE: 113 MMHG | TEMPERATURE: 98 F | BODY MASS INDEX: 29 KG/M2 | OXYGEN SATURATION: 96 % | RESPIRATION RATE: 16 BRPM | WEIGHT: 193 LBS

## 2023-05-02 LAB
BASOPHILS # BLD AUTO: 0.05 X10(3) UL (ref 0–0.2)
BASOPHILS NFR BLD AUTO: 0.4 %
EOSINOPHIL # BLD AUTO: 0.2 X10(3) UL (ref 0–0.7)
EOSINOPHIL NFR BLD AUTO: 1.4 %
ERYTHROCYTE [DISTWIDTH] IN BLOOD BY AUTOMATED COUNT: 13.7 %
HCT VFR BLD AUTO: 32.7 %
HGB BLD-MCNC: 10.4 G/DL
IMM GRANULOCYTES # BLD AUTO: 0.15 X10(3) UL (ref 0–1)
IMM GRANULOCYTES NFR BLD: 1.1 %
LYMPHOCYTES # BLD AUTO: 1.28 X10(3) UL (ref 1–4)
LYMPHOCYTES NFR BLD AUTO: 9.2 %
MCH RBC QN AUTO: 28.6 PG (ref 26–34)
MCHC RBC AUTO-ENTMCNC: 31.8 G/DL (ref 31–37)
MCV RBC AUTO: 89.8 FL
MONOCYTES # BLD AUTO: 1.59 X10(3) UL (ref 0.1–1)
MONOCYTES NFR BLD AUTO: 11.5 %
NEUTROPHILS # BLD AUTO: 10.57 X10 (3) UL (ref 1.5–7.7)
NEUTROPHILS # BLD AUTO: 10.57 X10(3) UL (ref 1.5–7.7)
NEUTROPHILS NFR BLD AUTO: 76.4 %
PLATELET # BLD AUTO: 264 10(3)UL (ref 150–450)
RBC # BLD AUTO: 3.64 X10(6)UL
WBC # BLD AUTO: 13.8 X10(3) UL (ref 4–11)

## 2023-05-02 PROCEDURE — 99239 HOSP IP/OBS DSCHRG MGMT >30: CPT | Performed by: STUDENT IN AN ORGANIZED HEALTH CARE EDUCATION/TRAINING PROGRAM

## 2023-05-02 RX ORDER — TAMSULOSIN HYDROCHLORIDE 0.4 MG/1
0.4 CAPSULE ORAL DAILY
Qty: 30 CAPSULE | Refills: 0 | Status: SHIPPED | OUTPATIENT
Start: 2023-05-02 | End: 2023-06-01

## 2023-05-02 RX ORDER — FUROSEMIDE 20 MG/1
20 TABLET ORAL DAILY
Status: SHIPPED | COMMUNITY
Start: 2023-05-02

## 2023-05-02 RX ORDER — GABAPENTIN 300 MG/1
300 CAPSULE ORAL 3 TIMES DAILY
Qty: 90 CAPSULE | Refills: 0 | Status: SHIPPED | OUTPATIENT
Start: 2023-05-02

## 2023-05-02 RX ORDER — ATORVASTATIN CALCIUM 40 MG/1
40 TABLET, FILM COATED ORAL DAILY
Qty: 30 TABLET | Refills: 0 | Status: SHIPPED | OUTPATIENT
Start: 2023-05-02

## 2023-05-02 RX ORDER — CLOPIDOGREL BISULFATE 75 MG/1
75 TABLET ORAL DAILY
Qty: 30 TABLET | Refills: 0 | Status: SHIPPED | OUTPATIENT
Start: 2023-05-02

## 2023-05-02 RX ORDER — ACETAMINOPHEN 325 MG/1
650 TABLET ORAL EVERY 4 HOURS PRN
Refills: 0 | Status: SHIPPED | COMMUNITY
Start: 2023-05-02

## 2023-05-02 RX ORDER — FINASTERIDE 5 MG/1
5 TABLET, FILM COATED ORAL DAILY
Qty: 30 TABLET | Refills: 0 | Status: SHIPPED | OUTPATIENT
Start: 2023-05-02

## 2023-05-02 NOTE — PLAN OF CARE
Assumed care of patient 0730 sitting in chair. AO x4. NSR on tele monitor. O2 sat adequate on RA. Denies chest pain and shortness of breath. Ambulates with post op shoe and rolling walker, with standby assist. Plan of care updated. Chair locked, with alarm in place. Call light and personal items in reach. All needs met.

## 2023-05-02 NOTE — DISCHARGE PLANNING
Discharge instructions reviewed with patient and family in great detail. Family educated on pena management. New pena bag and securement device placed. All acknowledged understanding. Leg bag and urinal provided to patient. IV and tele box removed. Urology called for follow up appointment, which was added to AVS. Patient transported to [facility], via wheelchair, with all paperwork, pena supplies and belongings.

## 2023-05-02 NOTE — PLAN OF CARE
Pt. Alert. NSR on tele. On RA. Pt. Denies pain or shortness of breath. [place]        Problem: CARDIOVASCULAR - ADULT  Goal: Maintains optimal cardiac output and hemodynamic stability  Description: INTERVENTIONS:  - Monitor vital signs, rhythm, and trends  - Monitor for bleeding, hypotension and signs of decreased cardiac output  - Evaluate effectiveness of vasoactive medications to optimize hemodynamic stability  - Monitor arterial and/or venous puncture sites for bleeding and/or hematoma  - Assess quality of pulses, skin color and temperature  - Assess for signs of decreased coronary artery perfusion - ex. Angina  - Evaluate fluid balance, assess for edema, trend weights  Outcome: Progressing  Goal: Absence of cardiac arrhythmias or at baseline  Description: INTERVENTIONS:  - Continuous cardiac monitoring, monitor vital signs, obtain 12 lead EKG if indicated  - Evaluate effectiveness of antiarrhythmic and heart rate control medications as ordered  - Initiate emergency measures for life threatening arrhythmias  - Monitor electrolytes and administer replacement therapy as ordered  Outcome: Progressing     Problem: SAFETY ADULT - FALL  Goal: Free from fall injury  Description: INTERVENTIONS:  - Assess pt frequently for physical needs  - Identify cognitive and physical deficits and behaviors that affect risk of falls. - [place] fall precautions as indicated by assessment.  - Educate pt/family on patient safety including physical limitations  - Instruct pt to call for assistance with activity based on assessment  - Modify environment to reduce risk of injury  - Provide assistive devices as appropriate  - Consider OT/PT consult to assist with strengthening/mobility  - Encourage toileting schedule  Outcome: Progressing     Problem: Discharge Barriers  Goal: Patient's discharge needs are met  Description: Collaborate with interdisciplinary team and initiate plans and interventions as needed.    Outcome: Progressing Problem: PAIN - ADULT  Goal: Verbalizes/displays adequate comfort level or patient's stated pain goal  Description: INTERVENTIONS:  - Encourage pt to monitor pain and request assistance  - Assess pain using appropriate pain scale  - Administer analgesics based on type and severity of pain and evaluate response  - Implement non-pharmacological measures as appropriate and evaluate response  - Consider cultural and social influences on pain and pain management  - Manage/alleviate anxiety  - Utilize distraction and/or relaxation techniques  - Monitor for opioid side effects  - Notify MD/LIP if interventions unsuccessful or patient reports new pain  - Anticipate increased pain with activity and pre-medicate as appropriate  Outcome: Progressing

## 2023-05-03 ENCOUNTER — TELEPHONE (OUTPATIENT)
Dept: CARDIOLOGY | Age: 88
End: 2023-05-03

## 2023-05-03 ENCOUNTER — TELEPHONE (OUTPATIENT)
Dept: PODIATRY CLINIC | Facility: CLINIC | Age: 88
End: 2023-05-03

## 2023-05-03 NOTE — TELEPHONE ENCOUNTER
Patient admitted to hospital 4/26/23 discharged 5/2/23 for gangrenous changes to right foot. Per AVS patient to follow up in 1-2 weeks with Dr. Matilda Adames. Dr. Matilda Adames please advise on follow up appt next available 6/1/23. Please advise on when you would like patient added on to schedule?

## 2023-05-03 NOTE — TELEPHONE ENCOUNTER
Per daughter pt needs to be seen within 1-2 weeks per discharge paperwork nothing available until 6/1 possible toes amputations please advise

## 2023-05-04 ENCOUNTER — TELEPHONE (OUTPATIENT)
Dept: CARDIOLOGY | Age: 88
End: 2023-05-04

## 2023-05-04 DIAGNOSIS — Z95.1 HX OF CABG: Primary | ICD-10-CM

## 2023-05-04 DIAGNOSIS — I77.811 ECTATIC ABDOMINAL AORTA (CMD): ICD-10-CM

## 2023-05-04 DIAGNOSIS — I25.10 CORONARY ARTERY DISEASE INVOLVING NATIVE CORONARY ARTERY OF NATIVE HEART WITHOUT ANGINA PECTORIS: ICD-10-CM

## 2023-05-04 DIAGNOSIS — I65.23 ASYMPTOMATIC CAROTID ARTERY STENOSIS, BILATERAL: ICD-10-CM

## 2023-05-05 ENCOUNTER — PATIENT MESSAGE (OUTPATIENT)
Dept: PODIATRY CLINIC | Facility: CLINIC | Age: 88
End: 2023-05-05

## 2023-05-05 ENCOUNTER — TELEPHONE (OUTPATIENT)
Dept: CARDIOLOGY | Age: 88
End: 2023-05-05

## 2023-05-05 DIAGNOSIS — E78.00 PURE HYPERCHOLESTEROLEMIA: ICD-10-CM

## 2023-05-05 DIAGNOSIS — I25.5 ISCHEMIC CARDIOMYOPATHY: ICD-10-CM

## 2023-05-05 DIAGNOSIS — I65.23 ASYMPTOMATIC CAROTID ARTERY STENOSIS, BILATERAL: ICD-10-CM

## 2023-05-05 DIAGNOSIS — Z95.1 HX OF CABG: ICD-10-CM

## 2023-05-05 DIAGNOSIS — I49.3 PVCS (PREMATURE VENTRICULAR CONTRACTIONS): ICD-10-CM

## 2023-05-05 DIAGNOSIS — I25.10 CORONARY ARTERY DISEASE INVOLVING NATIVE CORONARY ARTERY OF NATIVE HEART WITHOUT ANGINA PECTORIS: ICD-10-CM

## 2023-05-05 DIAGNOSIS — I77.811 ECTATIC ABDOMINAL AORTA (CMD): Primary | ICD-10-CM

## 2023-05-05 NOTE — TELEPHONE ENCOUNTER
From: Antonella Horner  To: Millicent Lo DPM  Sent: 5/5/2023 11:27 AM CDT  Subject: Pictures    Attached are the photos of the four from today, 5/5. There is significantly more blackened areas on the bottoms of several toes.

## 2023-05-07 ENCOUNTER — PATIENT MESSAGE (OUTPATIENT)
Dept: PODIATRY CLINIC | Facility: CLINIC | Age: 88
End: 2023-05-07

## 2023-05-09 NOTE — TELEPHONE ENCOUNTER
Noted, thank you. No, as long as site is dry and stable, we are letting gangrene demarcate on outpatient basis prior to eventual TMA (hopefully). May require higher up amputation if gangrene continues to progress.

## 2023-05-14 ENCOUNTER — PATIENT MESSAGE (OUTPATIENT)
Dept: PODIATRY CLINIC | Facility: CLINIC | Age: 88
End: 2023-05-14

## 2023-05-16 ENCOUNTER — OFFICE VISIT (OUTPATIENT)
Dept: PODIATRY CLINIC | Facility: CLINIC | Age: 88
End: 2023-05-16

## 2023-05-16 ENCOUNTER — TELEPHONE (OUTPATIENT)
Dept: PODIATRY CLINIC | Facility: CLINIC | Age: 88
End: 2023-05-16

## 2023-05-16 DIAGNOSIS — I96 GANGRENE OF RIGHT FOOT (HCC): Primary | ICD-10-CM

## 2023-05-16 DIAGNOSIS — I73.9 PAD (PERIPHERAL ARTERY DISEASE) (HCC): ICD-10-CM

## 2023-05-18 ENCOUNTER — TELEPHONE (OUTPATIENT)
Dept: PODIATRY CLINIC | Facility: CLINIC | Age: 88
End: 2023-05-18

## 2023-05-18 DIAGNOSIS — I96 GANGRENE (HCC): Primary | ICD-10-CM

## 2023-05-18 NOTE — TELEPHONE ENCOUNTER
Called patient's son Ariel Calabrese this date at [phone]. He states his father wants to have surgery 6/1/23. Discussed various dates with son and Dr. Gema Walter and at this time, [facility] had no openings that fit our schedule. Blocks for 6/1/23 will drop on 5/25/23 and I will call first thing in the morning and see if we can get scheduled for 6/1/23. Dr. Gema Walter in agreement with plan. Patient's son was warned that if any changes occurred or his condition worsened, they will take him to the ER. He also relates that he is seeing his cardiologist, Dr. Charly Santoyo tomorrow and requests that I send information to their office for clearance which was done this date. Message also sent to patient's PCP, Letty Fernandez, for clearance who he is seeing on 5/23/23. Patient's son told I would call him on 5/25/23 to proceed with scheduling.

## 2023-05-18 NOTE — TELEPHONE ENCOUNTER
Procedure: Right foot TMA  CPT code: 72930  Length of Surgery: 60 minutes  Any Instruments: podiatry tray, small power, mini fluoro  Call patient: ASAP  Anesthesia: Gen  Location: [address] [place] [address] Road: none  Pacemaker: No  Anticoagulants: Yes  Ok to continue plavix  [place] Allergy: No  Latex Allergy: No  Diagnosis/ICD Code:   (K42) Gangrene ([address]  (primary encounter diagnosis)      Plan to admit after surgery for 2-3 days

## 2023-05-19 ENCOUNTER — EXTERNAL RECORD (OUTPATIENT)
Dept: HEALTH INFORMATION MANAGEMENT | Facility: OTHER | Age: 88
End: 2023-05-19

## 2023-05-19 ENCOUNTER — OFFICE VISIT (OUTPATIENT)
Dept: CARDIOLOGY | Age: 88
End: 2023-05-19

## 2023-05-19 VITALS
HEART RATE: 99 BPM | BODY MASS INDEX: 30.31 KG/M2 | WEIGHT: 200 LBS | DIASTOLIC BLOOD PRESSURE: 65 MMHG | HEIGHT: 68 IN | SYSTOLIC BLOOD PRESSURE: 100 MMHG

## 2023-05-19 DIAGNOSIS — I25.10 CORONARY ARTERY DISEASE INVOLVING NATIVE CORONARY ARTERY OF NATIVE HEART WITHOUT ANGINA PECTORIS: ICD-10-CM

## 2023-05-19 DIAGNOSIS — E78.00 PURE HYPERCHOLESTEROLEMIA: ICD-10-CM

## 2023-05-19 DIAGNOSIS — Z95.1 HX OF CABG: Primary | ICD-10-CM

## 2023-05-19 DIAGNOSIS — I25.5 ISCHEMIC CARDIOMYOPATHY: ICD-10-CM

## 2023-05-19 DIAGNOSIS — I77.811 ECTATIC ABDOMINAL AORTA (CMD): ICD-10-CM

## 2023-05-19 PROCEDURE — 99214 OFFICE O/P EST MOD 30 MIN: CPT | Performed by: INTERNAL MEDICINE

## 2023-05-19 RX ORDER — ATORVASTATIN CALCIUM 40 MG/1
40 TABLET, FILM COATED ORAL DAILY
COMMUNITY
Start: 2023-05-02

## 2023-05-19 RX ORDER — GABAPENTIN 300 MG/1
300 CAPSULE ORAL 3 TIMES DAILY
COMMUNITY
Start: 2023-05-02

## 2023-05-19 RX ORDER — CLOPIDOGREL BISULFATE 75 MG/1
75 TABLET ORAL DAILY
COMMUNITY
Start: 2023-05-02

## 2023-05-19 RX ORDER — TAMSULOSIN HYDROCHLORIDE 0.4 MG/1
CAPSULE ORAL
COMMUNITY
Start: 2023-05-04

## 2023-05-19 RX ORDER — FINASTERIDE 5 MG/1
TABLET, FILM COATED ORAL DAILY
COMMUNITY
Start: 2023-05-04

## 2023-05-22 ENCOUNTER — TELEPHONE (OUTPATIENT)
Dept: CARDIOLOGY | Age: 88
End: 2023-05-22

## 2023-05-23 ENCOUNTER — PATIENT MESSAGE (OUTPATIENT)
Dept: PODIATRY CLINIC | Facility: CLINIC | Age: 88
End: 2023-05-23

## 2023-05-23 ENCOUNTER — TELEPHONE (OUTPATIENT)
Dept: CARDIOLOGY | Age: 88
End: 2023-05-23

## 2023-05-23 RX ORDER — FUROSEMIDE 40 MG/1
40 TABLET ORAL DAILY
Qty: 90 TABLET | Refills: 3 | Status: SHIPPED | OUTPATIENT
Start: 2023-05-23

## 2023-05-23 NOTE — TELEPHONE ENCOUNTER
From: Rosemary Landin  To: Dell Conway DPM  Sent: 5/23/2023 1:24 PM CDT  Subject: Updated Photos    These are pictures from today, 5/23. One is of the blister that has popped and is drying on the heel; the others are off the toes. Thank you.

## 2023-05-25 ENCOUNTER — TELEPHONE (OUTPATIENT)
Dept: PODIATRY CLINIC | Facility: CLINIC | Age: 88
End: 2023-05-25

## 2023-05-25 NOTE — TELEPHONE ENCOUNTER
Per son pt's pcp Dr. Ernestine Laura needs pre-op clearance forms/orders.  Please advise    Fax: [phone]

## 2023-05-25 NOTE — TELEPHONE ENCOUNTER
This was previously faxed on 5/18/23. Called Dr. Miley Garcia office this date and left a message requesting that the doctor just sends his H&P note of clearance to [facility] at [phone] and to our office at [phone].   If they have questions, they should contact me at [phone]

## 2023-05-26 ENCOUNTER — OFFICE VISIT (OUTPATIENT)
Dept: CARDIOLOGY | Age: 88
End: 2023-05-26
Attending: INTERNAL MEDICINE

## 2023-05-26 ENCOUNTER — TELEPHONE (OUTPATIENT)
Dept: CARDIOLOGY | Age: 88
End: 2023-05-26

## 2023-05-26 DIAGNOSIS — Z01.818 PREOP TESTING: Primary | ICD-10-CM

## 2023-05-26 DIAGNOSIS — Z01.818 PREOPERATIVE CLEARANCE: ICD-10-CM

## 2023-05-26 DIAGNOSIS — Z01.818 PREOP TESTING: ICD-10-CM

## 2023-05-26 PROCEDURE — 93000 ELECTROCARDIOGRAM COMPLETE: CPT | Performed by: INTERNAL MEDICINE

## 2023-05-30 ENCOUNTER — E-ADVICE (OUTPATIENT)
Dept: CARDIOLOGY | Age: 88
End: 2023-05-30

## 2023-05-31 NOTE — PAT NURSING NOTE
Called Maria Guadalupe Gilmore ,pcp office and requested 24 hour update for procedure 6/1/2023 I spoke to Kaycee and she transferred me to direct office line which went to voicemail. left message on nurse line.

## 2023-06-01 ENCOUNTER — HOSPITAL ENCOUNTER (INPATIENT)
Facility: HOSPITAL | Age: 88
LOS: 4 days | Discharge: HOME HEALTH | End: 2023-06-05
Attending: STUDENT IN AN ORGANIZED HEALTH CARE EDUCATION/TRAINING PROGRAM | Admitting: STUDENT IN AN ORGANIZED HEALTH CARE EDUCATION/TRAINING PROGRAM
Payer: MEDICARE

## 2023-06-01 DIAGNOSIS — I96 GANGRENE (HCC): ICD-10-CM

## 2023-06-01 PROCEDURE — 99223 1ST HOSP IP/OBS HIGH 75: CPT | Performed by: INTERNAL MEDICINE

## 2023-06-01 RX ORDER — BISACODYL 10 MG
10 SUPPOSITORY, RECTAL RECTAL
Status: DISCONTINUED | OUTPATIENT
Start: 2023-06-01 | End: 2023-06-05

## 2023-06-01 RX ORDER — ONDANSETRON 2 MG/ML
4 INJECTION INTRAMUSCULAR; INTRAVENOUS EVERY 6 HOURS PRN
Status: DISCONTINUED | OUTPATIENT
Start: 2023-06-01 | End: 2023-06-05

## 2023-06-01 RX ORDER — TAMSULOSIN HYDROCHLORIDE 0.4 MG/1
0.4 CAPSULE ORAL DAILY
Status: DISCONTINUED | OUTPATIENT
Start: 2023-06-02 | End: 2023-06-05

## 2023-06-01 RX ORDER — ASPIRIN 81 MG/1
81 TABLET ORAL DAILY
Status: DISCONTINUED | OUTPATIENT
Start: 2023-06-01 | End: 2023-06-05

## 2023-06-01 RX ORDER — FOLIC ACID 1 MG/1
1 TABLET ORAL DAILY
Status: DISCONTINUED | OUTPATIENT
Start: 2023-06-02 | End: 2023-06-05

## 2023-06-01 RX ORDER — MELATONIN
3 NIGHTLY PRN
Status: DISCONTINUED | OUTPATIENT
Start: 2023-06-01 | End: 2023-06-05

## 2023-06-01 RX ORDER — FUROSEMIDE 40 MG/1
40 TABLET ORAL DAILY
Status: DISCONTINUED | OUTPATIENT
Start: 2023-06-01 | End: 2023-06-05

## 2023-06-01 RX ORDER — ATORVASTATIN CALCIUM 40 MG/1
40 TABLET, FILM COATED ORAL DAILY
Status: DISCONTINUED | OUTPATIENT
Start: 2023-06-02 | End: 2023-06-05

## 2023-06-01 RX ORDER — ACETAMINOPHEN 500 MG
1000 TABLET ORAL EVERY 8 HOURS
Status: DISCONTINUED | OUTPATIENT
Start: 2023-06-01 | End: 2023-06-05

## 2023-06-01 RX ORDER — GABAPENTIN 300 MG/1
300 CAPSULE ORAL 3 TIMES DAILY
Status: DISCONTINUED | OUTPATIENT
Start: 2023-06-01 | End: 2023-06-05

## 2023-06-01 RX ORDER — ACETAMINOPHEN 500 MG
500 TABLET ORAL EVERY 4 HOURS PRN
Status: DISCONTINUED | OUTPATIENT
Start: 2023-06-01 | End: 2023-06-05

## 2023-06-01 RX ORDER — LISINOPRIL 2.5 MG/1
2.5 TABLET ORAL DAILY
Status: DISCONTINUED | OUTPATIENT
Start: 2023-06-01 | End: 2023-06-05

## 2023-06-01 RX ORDER — ACETAMINOPHEN 500 MG
1000 TABLET ORAL ONCE
Status: DISCONTINUED | OUTPATIENT
Start: 2023-06-01 | End: 2023-06-01

## 2023-06-01 RX ORDER — FINASTERIDE 5 MG/1
5 TABLET, FILM COATED ORAL DAILY
Status: DISCONTINUED | OUTPATIENT
Start: 2023-06-02 | End: 2023-06-05

## 2023-06-01 RX ORDER — CEFAZOLIN SODIUM/WATER 2 G/20 ML
2 SYRINGE (ML) INTRAVENOUS ONCE
Status: DISCONTINUED | OUTPATIENT
Start: 2023-06-01 | End: 2023-06-02 | Stop reason: ALTCHOICE

## 2023-06-01 RX ORDER — CLOPIDOGREL BISULFATE 75 MG/1
75 TABLET ORAL DAILY
Status: DISCONTINUED | OUTPATIENT
Start: 2023-06-02 | End: 2023-06-05

## 2023-06-01 RX ORDER — ENEMA 19; 7 G/133ML; G/133ML
1 ENEMA RECTAL ONCE AS NEEDED
Status: DISCONTINUED | OUTPATIENT
Start: 2023-06-01 | End: 2023-06-05

## 2023-06-01 RX ORDER — PREDNISONE 5 MG/1
15 TABLET ORAL DAILY
Status: DISCONTINUED | OUTPATIENT
Start: 2023-06-02 | End: 2023-06-05

## 2023-06-01 RX ORDER — METOPROLOL SUCCINATE 25 MG/1
25 TABLET, EXTENDED RELEASE ORAL DAILY
Status: DISCONTINUED | OUTPATIENT
Start: 2023-06-02 | End: 2023-06-05

## 2023-06-01 RX ORDER — SODIUM CHLORIDE, SODIUM LACTATE, POTASSIUM CHLORIDE, CALCIUM CHLORIDE 600; 310; 30; 20 MG/100ML; MG/100ML; MG/100ML; MG/100ML
INJECTION, SOLUTION INTRAVENOUS CONTINUOUS
Status: DISCONTINUED | OUTPATIENT
Start: 2023-06-01 | End: 2023-06-05

## 2023-06-01 RX ORDER — PANTOPRAZOLE SODIUM 40 MG/1
40 TABLET, DELAYED RELEASE ORAL
Status: DISCONTINUED | OUTPATIENT
Start: 2023-06-02 | End: 2023-06-05

## 2023-06-01 RX ORDER — POLYETHYLENE GLYCOL 3350 17 G/17G
17 POWDER, FOR SOLUTION ORAL DAILY PRN
Status: DISCONTINUED | OUTPATIENT
Start: 2023-06-01 | End: 2023-06-05

## 2023-06-01 RX ORDER — SENNOSIDES 8.6 MG
17.2 TABLET ORAL NIGHTLY PRN
Status: DISCONTINUED | OUTPATIENT
Start: 2023-06-01 | End: 2023-06-05

## 2023-06-01 NOTE — PROGRESS NOTES
Patient was supposed to undergo TMA for gangrene to right foot today, but procedure had to be cancelled as he took his xarelto and plavix this morning. I am OK with patient taking plavix but not xarelto. Was planning to admit patient after surgery. Instead, will admit patient today (given worsening gangrene, do not feel comfortable with patient going home/rescheduling) and plan for surgery on Saturday AM. Xarelto should be held for surgery. OK to continue plavix. Discussed case with Dr. Binh Marley. Will admit as planned and plan for Surgery Saturday.  Surgery scheduled for Saturday AM.

## 2023-06-01 NOTE — PLAN OF CARE
Pt A&Ox4, VSS on RA, . Patient denies pain at this time. Patient reminded to inform RN if any pain or discomfort arises. Wounds to right foot covered in dried betadine. MD paged for dressing and wound care orders if needed; no new orders at this time. RLE elevated on pillows. Pt tolerating diet, voiding freely, LBM 5/31. Plan for surgery on Saturday. Plan of care reviewed with patient. Patient demonstrates understanding.

## 2023-06-02 ENCOUNTER — ANESTHESIA EVENT (OUTPATIENT)
Dept: SURGERY | Facility: HOSPITAL | Age: 88
End: 2023-06-02
Payer: MEDICARE

## 2023-06-02 LAB
ANION GAP SERPL CALC-SCNC: 4 MMOL/L (ref 0–18)
BASOPHILS # BLD AUTO: 0.07 X10(3) UL (ref 0–0.2)
BASOPHILS NFR BLD AUTO: 0.9 %
BUN BLD-MCNC: 28 MG/DL (ref 7–18)
CALCIUM BLD-MCNC: 8.7 MG/DL (ref 8.5–10.1)
CHLORIDE SERPL-SCNC: 108 MMOL/L (ref 98–112)
CO2 SERPL-SCNC: 28 MMOL/L (ref 21–32)
CREAT BLD-MCNC: 1.09 MG/DL
EOSINOPHIL # BLD AUTO: 0.27 X10(3) UL (ref 0–0.7)
EOSINOPHIL NFR BLD AUTO: 3.3 %
ERYTHROCYTE [DISTWIDTH] IN BLOOD BY AUTOMATED COUNT: 13.9 %
GFR SERPLBLD BASED ON 1.73 SQ M-ARVRAT: 65 ML/MIN/1.73M2 (ref 60–?)
GLUCOSE BLD-MCNC: 103 MG/DL (ref 70–99)
HCT VFR BLD AUTO: 36.6 %
HGB BLD-MCNC: 11.3 G/DL
IMM GRANULOCYTES # BLD AUTO: 0.11 X10(3) UL (ref 0–1)
IMM GRANULOCYTES NFR BLD: 1.3 %
LYMPHOCYTES # BLD AUTO: 1.35 X10(3) UL (ref 1–4)
LYMPHOCYTES NFR BLD AUTO: 16.5 %
MCH RBC QN AUTO: 28 PG (ref 26–34)
MCHC RBC AUTO-ENTMCNC: 30.9 G/DL (ref 31–37)
MCV RBC AUTO: 90.8 FL
MONOCYTES # BLD AUTO: 0.85 X10(3) UL (ref 0.1–1)
MONOCYTES NFR BLD AUTO: 10.4 %
NEUTROPHILS # BLD AUTO: 5.53 X10 (3) UL (ref 1.5–7.7)
NEUTROPHILS # BLD AUTO: 5.53 X10(3) UL (ref 1.5–7.7)
NEUTROPHILS NFR BLD AUTO: 67.6 %
OSMOLALITY SERPL CALC.SUM OF ELEC: 296 MOSM/KG (ref 275–295)
PLATELET # BLD AUTO: 267 10(3)UL (ref 150–450)
POTASSIUM SERPL-SCNC: 4 MMOL/L (ref 3.5–5.1)
RBC # BLD AUTO: 4.03 X10(6)UL
SODIUM SERPL-SCNC: 140 MMOL/L (ref 136–145)
WBC # BLD AUTO: 8.2 X10(3) UL (ref 4–11)

## 2023-06-02 PROCEDURE — 99232 SBSQ HOSP IP/OBS MODERATE 35: CPT | Performed by: INTERNAL MEDICINE

## 2023-06-02 NOTE — PLAN OF CARE
A&Ox4. VSS. On room air. . Tolerating diet. Last BM 5/31. Voiding. Denies pain at this time. Right foot wound open to air, with dried betadine. Ambulating with assist. Plan for surgery on Saturday. Patient updated on plan of care. Safety precautions maintained. Call light within reach.

## 2023-06-02 NOTE — DISCHARGE INSTRUCTIONS
Sometimes managing your health at home requires assistance. The [facility] team has recognized your preference to use [facility]. They can be reached by phone at [phone]. The fax number for your reference is [phone]. A representative from the home health agency will contact you or your family to schedule your first visit.     -Can leave dressing in place until podiatry follow up visit next week. -Elevate RLE. -Patient to be NWB to RLE.

## 2023-06-02 NOTE — OCCUPATIONAL THERAPY NOTE
Pt will need new OT orders s/p surgery.      Thank you for allowing me to care for this patient,   Daniel Mesa, OTR/L   Occupational Therapist   [facility]

## 2023-06-02 NOTE — HOME CARE LIAISON
Received referral via [facility] for Home Health services. Spoke w/ patient and daughter at the bedside and provided with list of [facility] providers from [facility], choice is [facility] . Agency reserved in [facility] and contact information placed on AVS. Financial interest disclosure provided.  Notified Jeet Lopez

## 2023-06-02 NOTE — PHYSICAL THERAPY NOTE
PT order received and patient chart reviewed. Please re-consult with surgeon with updated weight bearing and activity restrictions required post surgery.

## 2023-06-02 NOTE — PLAN OF CARE
Patient alert and oriented x4. [place]. VSS on RA. Denies pain at this time. Wounds to right foot covered in dried betadine. SCDs in place. Pt up x1 with the walker. Surgical shoe at bedside. Voiding freely in bathroom. Tolerating diet, no c/o n/v. Pt updated on plan of care.      Plan: plan for surgery on Saturday 6/3

## 2023-06-02 NOTE — PROGRESS NOTES
Patient seen at bedside this evening. H+P unchanged. Plan for right foot TMA tomorrow AM. All questions answered to satisfaction. Please have NPO at midnight and hold [facility] other than plavix for tomorrow.

## 2023-06-03 ENCOUNTER — ANESTHESIA (OUTPATIENT)
Dept: SURGERY | Facility: HOSPITAL | Age: 88
End: 2023-06-03
Payer: MEDICARE

## 2023-06-03 ENCOUNTER — APPOINTMENT (OUTPATIENT)
Dept: GENERAL RADIOLOGY | Facility: HOSPITAL | Age: 88
End: 2023-06-03
Attending: STUDENT IN AN ORGANIZED HEALTH CARE EDUCATION/TRAINING PROGRAM
Payer: MEDICARE

## 2023-06-03 PROCEDURE — 99232 SBSQ HOSP IP/OBS MODERATE 35: CPT | Performed by: INTERNAL MEDICINE

## 2023-06-03 PROCEDURE — 0Y6M0ZF DETACHMENT AT RIGHT FOOT, PARTIAL 5TH RAY, OPEN APPROACH: ICD-10-PCS | Performed by: STUDENT IN AN ORGANIZED HEALTH CARE EDUCATION/TRAINING PROGRAM

## 2023-06-03 PROCEDURE — 28805 AMPUTATION THRU METATARSAL: CPT | Performed by: STUDENT IN AN ORGANIZED HEALTH CARE EDUCATION/TRAINING PROGRAM

## 2023-06-03 PROCEDURE — 0Y6M0ZB DETACHMENT AT RIGHT FOOT, PARTIAL 2ND RAY, OPEN APPROACH: ICD-10-PCS | Performed by: STUDENT IN AN ORGANIZED HEALTH CARE EDUCATION/TRAINING PROGRAM

## 2023-06-03 PROCEDURE — 0Y6M0ZC DETACHMENT AT RIGHT FOOT, PARTIAL 3RD RAY, OPEN APPROACH: ICD-10-PCS | Performed by: STUDENT IN AN ORGANIZED HEALTH CARE EDUCATION/TRAINING PROGRAM

## 2023-06-03 PROCEDURE — 0Y6M0Z9 DETACHMENT AT RIGHT FOOT, PARTIAL 1ST RAY, OPEN APPROACH: ICD-10-PCS | Performed by: STUDENT IN AN ORGANIZED HEALTH CARE EDUCATION/TRAINING PROGRAM

## 2023-06-03 PROCEDURE — 0Y6M0ZD DETACHMENT AT RIGHT FOOT, PARTIAL 4TH RAY, OPEN APPROACH: ICD-10-PCS | Performed by: STUDENT IN AN ORGANIZED HEALTH CARE EDUCATION/TRAINING PROGRAM

## 2023-06-03 RX ORDER — NALOXONE HYDROCHLORIDE 0.4 MG/ML
80 INJECTION, SOLUTION INTRAMUSCULAR; INTRAVENOUS; SUBCUTANEOUS AS NEEDED
Status: DISCONTINUED | OUTPATIENT
Start: 2023-06-03 | End: 2023-06-03 | Stop reason: HOSPADM

## 2023-06-03 RX ORDER — PHENYLEPHRINE HCL 10 MG/ML
VIAL (ML) INJECTION AS NEEDED
Status: DISCONTINUED | OUTPATIENT
Start: 2023-06-03 | End: 2023-06-03 | Stop reason: SURG

## 2023-06-03 RX ORDER — ACETAMINOPHEN 500 MG
1000 TABLET ORAL ONCE AS NEEDED
Status: DISCONTINUED | OUTPATIENT
Start: 2023-06-03 | End: 2023-06-03 | Stop reason: HOSPADM

## 2023-06-03 RX ORDER — HYDROCODONE BITARTRATE AND ACETAMINOPHEN 10; 325 MG/1; MG/1
1 TABLET ORAL ONCE AS NEEDED
Status: DISCONTINUED | OUTPATIENT
Start: 2023-06-03 | End: 2023-06-03 | Stop reason: HOSPADM

## 2023-06-03 RX ORDER — BUPIVACAINE HYDROCHLORIDE 5 MG/ML
INJECTION, SOLUTION EPIDURAL; INTRACAUDAL AS NEEDED
Status: DISCONTINUED | OUTPATIENT
Start: 2023-06-03 | End: 2023-06-03 | Stop reason: HOSPADM

## 2023-06-03 RX ORDER — CEFAZOLIN SODIUM/WATER 2 G/20 ML
SYRINGE (ML) INTRAVENOUS AS NEEDED
Status: DISCONTINUED | OUTPATIENT
Start: 2023-06-03 | End: 2023-06-03 | Stop reason: SURG

## 2023-06-03 RX ORDER — DEXAMETHASONE SODIUM PHOSPHATE 4 MG/ML
VIAL (ML) INJECTION AS NEEDED
Status: DISCONTINUED | OUTPATIENT
Start: 2023-06-03 | End: 2023-06-03 | Stop reason: SURG

## 2023-06-03 RX ORDER — MEPERIDINE HYDROCHLORIDE 25 MG/ML
12.5 INJECTION INTRAMUSCULAR; INTRAVENOUS; SUBCUTANEOUS AS NEEDED
Status: DISCONTINUED | OUTPATIENT
Start: 2023-06-03 | End: 2023-06-03 | Stop reason: HOSPADM

## 2023-06-03 RX ORDER — LABETALOL HYDROCHLORIDE 5 MG/ML
5 INJECTION, SOLUTION INTRAVENOUS EVERY 5 MIN PRN
Status: DISCONTINUED | OUTPATIENT
Start: 2023-06-03 | End: 2023-06-03 | Stop reason: HOSPADM

## 2023-06-03 RX ORDER — MIDAZOLAM HYDROCHLORIDE 1 MG/ML
1 INJECTION INTRAMUSCULAR; INTRAVENOUS EVERY 5 MIN PRN
Status: DISCONTINUED | OUTPATIENT
Start: 2023-06-03 | End: 2023-06-03 | Stop reason: HOSPADM

## 2023-06-03 RX ORDER — LIDOCAINE HYDROCHLORIDE 10 MG/ML
INJECTION, SOLUTION INFILTRATION; PERINEURAL AS NEEDED
Status: DISCONTINUED | OUTPATIENT
Start: 2023-06-03 | End: 2023-06-03 | Stop reason: HOSPADM

## 2023-06-03 RX ORDER — SODIUM CHLORIDE, SODIUM LACTATE, POTASSIUM CHLORIDE, CALCIUM CHLORIDE 600; 310; 30; 20 MG/100ML; MG/100ML; MG/100ML; MG/100ML
INJECTION, SOLUTION INTRAVENOUS CONTINUOUS
Status: DISCONTINUED | OUTPATIENT
Start: 2023-06-03 | End: 2023-06-03 | Stop reason: HOSPADM

## 2023-06-03 RX ORDER — HYDROCODONE BITARTRATE AND ACETAMINOPHEN 10; 325 MG/1; MG/1
2 TABLET ORAL ONCE AS NEEDED
Status: DISCONTINUED | OUTPATIENT
Start: 2023-06-03 | End: 2023-06-03 | Stop reason: HOSPADM

## 2023-06-03 RX ORDER — ONDANSETRON 2 MG/ML
4 INJECTION INTRAMUSCULAR; INTRAVENOUS EVERY 6 HOURS PRN
Status: DISCONTINUED | OUTPATIENT
Start: 2023-06-03 | End: 2023-06-03 | Stop reason: HOSPADM

## 2023-06-03 RX ORDER — METOCLOPRAMIDE HYDROCHLORIDE 5 MG/ML
10 INJECTION INTRAMUSCULAR; INTRAVENOUS EVERY 8 HOURS PRN
Status: DISCONTINUED | OUTPATIENT
Start: 2023-06-03 | End: 2023-06-03 | Stop reason: HOSPADM

## 2023-06-03 RX ADMIN — PHENYLEPHRINE HCL 100 MCG: 10 MG/ML VIAL (ML) INJECTION at 12:58:00

## 2023-06-03 RX ADMIN — DEXAMETHASONE SODIUM PHOSPHATE 8 MG: 4 MG/ML VIAL (ML) INJECTION at 12:51:00

## 2023-06-03 RX ADMIN — SODIUM CHLORIDE: 9 INJECTION, SOLUTION INTRAVENOUS at 13:49:00

## 2023-06-03 RX ADMIN — CEFAZOLIN SODIUM/WATER 2 G: 2 G/20 ML SYRINGE (ML) INTRAVENOUS at 12:46:00

## 2023-06-03 NOTE — ANESTHESIA PROCEDURE NOTES
Airway  Date/Time: 6/3/2023 12:51 PM  Urgency: elective      General Information and Staff    Patient location during procedure: OR  Anesthesiologist: Rafal Colon MD  Performed: anesthesiologist   Performed by: Rafal Colon MD  Authorized by: Rafal Colon MD      Indications and Patient Condition  Indications for airway management: anesthesia  Sedation level: deep  Preoxygenated: yes  Patient position: sniffing  Mask difficulty assessment: 1 - vent by mask    Final Airway Details  Final airway type: supraglottic airway      Successful airway: classic  Size 4       Number of attempts at approach: 1

## 2023-06-03 NOTE — BRIEF OP NOTE
Pre-Operative Diagnosis:   Gangrene, right forefoot     Post-Operative Diagnosis:   Same     Procedure Performed:   Right foot transmetatarsal amputation    Surgeon(s) and Role:     * Teagan Moreno DPM - Primary    Assistant(s):   none     Surgical Findings: consistent with pre op diagnosis, appeared to have adequate blood flow for healing     Specimen:   Right forefoot--shared to micro and path  Metatarsal heads--pathology     Estimated Blood Loss: Blood Output: 100 mL (6/3/2023  1:37 PM)      Dictation Number:  See Sha López DPM  6/3/2023  2:04 PM

## 2023-06-03 NOTE — PLAN OF CARE
Pt a/ox4, very [place], can be impulsive at times. S/p transmetatarsal amputation of right foot. Denies pain. 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 NWB to RLE per Dr Adela Acuña. RLE elevated off bed to aid with swelling. O2 2L as needed to keep sats >90%. Encourage IS. Monitoring urinary output. PT/OT to eval in am.   Plan pending patient progress.

## 2023-06-03 NOTE — PLAN OF CARE
A&Ox 4. VSS. On room. . IS encouraged. Telemetry monitoring. SCDs on BLE. Ankle pumps encouraged. NPO diet. Last BM 6/2. Voiding. Denies pain. Rt foot wound open to air, covered in betadine. Ambulating with SBA. Surgery Saturday 6/3. Patient updated on plan of care. Safety precautions maintained. Call light within reach.

## 2023-06-04 PROCEDURE — 99232 SBSQ HOSP IP/OBS MODERATE 35: CPT | Performed by: INTERNAL MEDICINE

## 2023-06-04 RX ORDER — HYDROCODONE BITARTRATE AND ACETAMINOPHEN 5; 325 MG/1; MG/1
1 TABLET ORAL EVERY 4 HOURS PRN
Status: DISCONTINUED | OUTPATIENT
Start: 2023-06-04 | End: 2023-06-05

## 2023-06-04 RX ORDER — HYDROCODONE BITARTRATE AND ACETAMINOPHEN 5; 325 MG/1; MG/1
2 TABLET ORAL EVERY 4 HOURS PRN
Status: DISCONTINUED | OUTPATIENT
Start: 2023-06-04 | End: 2023-06-05

## 2023-06-04 NOTE — PLAN OF CARE
A&Ox4 Vital signs stable on RA. POD# 0. IS encouraged, tolerating regular diet, SCD to LLE. Last BM 6/2/23. Voided adequate amount using rolling commode. C/o mild pain to right foot, managed with PO medication. PT/OT to see. POC discussed, patient verbalized understanding. No further questions at this time. Call light within reach.

## 2023-06-04 NOTE — PLAN OF CARE
POD 1 Rt foot transmetatarsal amputation, Pt is AAOX4, VSS, room air, voiding to rolling commode, up NWB to Rt foot w/ RW, can stand pivot, BM today, monitor urinary output and retention, recent cysto 5/30, dressing changed by DPM, remains CDI, PO meds for pain control, Pt doing well, all needs met, all safety measures in place, call light within reach, will CTM.

## 2023-06-05 ENCOUNTER — TELEPHONE (OUTPATIENT)
Dept: PODIATRY CLINIC | Facility: CLINIC | Age: 88
End: 2023-06-05

## 2023-06-05 VITALS
WEIGHT: 193 LBS | SYSTOLIC BLOOD PRESSURE: 116 MMHG | OXYGEN SATURATION: 96 % | HEART RATE: 82 BPM | BODY MASS INDEX: 29.25 KG/M2 | DIASTOLIC BLOOD PRESSURE: 73 MMHG | HEIGHT: 68 IN | TEMPERATURE: 98 F | RESPIRATION RATE: 16 BRPM

## 2023-06-05 PROCEDURE — 99232 SBSQ HOSP IP/OBS MODERATE 35: CPT | Performed by: INTERNAL MEDICINE

## 2023-06-05 RX ORDER — TAMSULOSIN HYDROCHLORIDE 0.4 MG/1
0.4 CAPSULE ORAL DAILY
Qty: 30 CAPSULE | Refills: 0 | Status: SHIPPED | OUTPATIENT
Start: 2023-06-05 | End: 2023-07-05

## 2023-06-05 NOTE — PLAN OF CARE
NURSING DISCHARGE NOTE    Discharged Home via Wheelchair. Accompanied by Family member  Belongings Taken by patient/family. AVS printed and discussed, IV removed, Rx  E-scribed. Pt ready to discharge home with [facility].

## 2023-06-05 NOTE — PHYSICAL THERAPY NOTE
PHYSICAL THERAPY TREATMENT NOTE - INPATIENT    Room Number: 365/365-A     Session: 1     Number of Visits to Meet Established Goals: 3    Operative report dated 6/3/23:   Right foot transmetatarsal amputation    Presenting Problem: gangrene of foot  Co-Morbidities : B carotid disease, PAD, chronic HFrEF, HTN, CAD s/p CABG, BPH  ASSESSMENT     Pt presents with impaired strength, impaired balance and decreased endurance below PLOF and will continue to benefit from ongoing skilled therapy to maximize functional independence. Pt demonstrates compliance c NWB RLE and has supportive family for support at d/c. Pt and family educated on safe mobility, t/f , activity recs , fall prevention and NWB status of RLE . DISCHARGE RECOMMENDATIONS  PT Discharge Recommendations: Home with home health PT;24 hour care/supervision     PLAN  PT Treatment Plan: Bed mobility; Body mechanics; Endurance; Energy conservation;Patient education; Family education;Gait training;Strengthening;Stair training;Transfer training;Balance training  Rehab Potential : Good  Frequency (Obs): Daily    CURRENT GOALS        Goal #1 Patient is able to demonstrate supine - sit EOB @ level: modified independent      Goal #2 Patient is able to demonstrate transfers Sit to/from Stand at assistance level: modified independent      Goal #3 Patient is able to ambulate 25 feet with assist device: walker - rolling at assistance level: supervision      Goal #4     Goal #5     Goal #6     Goal Comments: Goals established on 2023 all goals progressing        SUBJECTIVE  \"I'll be driving my golf cart by the end of the month\"   Pt pleasant and motivated to participate     OBJECTIVE  Precautions: Limb alert - right;Bed/chair alarm;Hard of hearing    WEIGHT BEARING RESTRICTION  Weight Bearing Restriction: R lower extremity        R Lower Extremity: Non-Weight Bearing       PAIN ASSESSMENT   Ratin  Location: pt denies pain  Management Techniques: Relaxation;Repositioning    BALANCE                                                                                                                       Static Sitting: Good  Dynamic Sitting: Good           Static Standing: Fair -  Dynamic Standing: Poor +    ACTIVITY TOLERANCE                         O2 WALK         AM-PAC '6-Clicks' INPATIENT SHORT FORM - BASIC MOBILITY  How much difficulty does the patient currently have. .. Patient Difficulty: Turning over in bed (including adjusting bedclothes, sheets and blankets)?: None   Patient Difficulty: Sitting down on and standing up from a chair with arms (e.g., wheelchair, bedside commode, etc.): A Little   Patient Difficulty: Moving from lying on back to sitting on the side of the bed?: None   How much help from another person does the patient currently need. .. Help from Another: Moving to and from a bed to a chair (including a wheelchair)?: A Little   Help from Another: Need to walk in hospital room?: A Little   Help from Another: Climbing 3-5 steps with a railing?: Total       AM-PAC Score:  Raw Score: 18   Approx Degree of Impairment: 46.58%   Standardized Score (AM-PAC Scale): 43.63   CMS Modifier (G-Code): CK    FUNCTIONAL ABILITY STATUS  Gait Assessment   Functional Mobility/Gait Assessment  Gait Assistance: Contact guard assist;Minimum assistance; Moderate assistance  Distance (ft): 210,2,5,3  Assistive Device: Rolling walker  Pattern:  (NWB R LE)  Stairs: Car transfer  Car transfer: supervision    Skilled Therapy Provided  Pt presents in semi sup, son and DIL present. Pt educated on NWB RLE. Max A to don R sx shoe. Pt and family encouraged to have shoe on L foot for mobility. Pt gait trained in room c RW , toilet t/f CGA to elevated toilet. W/c t/f c RW min A, pt demos good compliance c NWB RLE. Pt ind c mio care s/p BM. Pt requires mod A for lower body clothing management in stance c RW while maintaining NWB R foot.    Pt's family states set up for pt to remain on main level of home and has grab bars in BR for pt. Pt wheeled to gym . Pt educated on car/tub t/f. Pt required increasing assist as pt fatigues from min to mod A . Pt t/f w/c to BS chair . All questions and concerns addressed . Bed Mobility:  Rolling: NT   Supine<>Sit: mod I    Sit<>Supine: NT      Transfer Mobility:  Sit<>Stand: min to mod A    Stand<>Sit: min A for eccentric control and cues to reach back    Gait: min A progressed to mod A by end of session    Therapist's Comments: gait belt issued to pt and family           Patient End of Session: Up in chair;Needs met;Call light within reach;RN aware of session/findings; All patient questions and concerns addressed; Family present    PT Session Time: 55 minutes  Gait Trainin minutes  Therapeutic Activity: 30 minutes  Therapeutic Exercise:  minutes   Neuromuscular Re-education:  minutes

## 2023-06-05 NOTE — PLAN OF CARE
A&Ox 4. VSS. On room air. . IS encouraged. SCDs on LLE. Tolerating diet. Last BM 6/4. Voiding, stand pivot to commode. Pain controlled with PO meds. Dressing to RLE C/D/I. NWB to right foot. Safety precautions maintained. Call light within reach.

## 2023-06-05 NOTE — CM/SW NOTE
06/05/23 1100   Discharge disposition   Expected discharge disposition Home-Health   Post Acute Care Provider Residential   Discharge transportation Private car     Noted discharge order entered. Plan is home with [facility]. Notified Dalton alvarez, that discharge order entered    / to remain available for support and/or discharge planning.      Rosalia Barker MBA MSN, RN CTL/  [id number]

## 2023-06-05 NOTE — PLAN OF CARE
Phone call made to Urology office of Dr. Maria E Nam that Pt had seen on 5/30 regarding pena placement. Office staff to isaias MONTALVO     10:00 phone call received from urology team. Elba Coy to place indwelling pena.  Plan for pena to stay in place for 4 weeks until Pt follows up with Urologist.

## 2023-06-05 NOTE — PLAN OF CARE
POD 2 Rt foot transmetatarsal amputation, Pt is AAOX4, VSS, room air, voiding to rolling commode, up NWB to Rt foot w/ RW, can stand pivot, BM today, pena to be placed prior to discharge per urology and will FLUP as outpt in 4 weeks, dressing changed by DPM, remains CDI, PO meds for pain control, Pt doing well, all needs met, all safety measures in place, call light within reach, will CTM.

## 2023-06-06 ENCOUNTER — TELEPHONE (OUTPATIENT)
Dept: PODIATRY CLINIC | Facility: CLINIC | Age: 88
End: 2023-06-06

## 2023-06-06 DIAGNOSIS — Z47.89 ORTHOPEDIC AFTERCARE: ICD-10-CM

## 2023-06-06 DIAGNOSIS — I96 GANGRENE (HCC): Primary | ICD-10-CM

## 2023-06-06 RX ORDER — HYDROCODONE BITARTRATE AND ACETAMINOPHEN 5; 325 MG/1; MG/1
1 TABLET ORAL EVERY 4 HOURS PRN
Qty: 20 TABLET | Refills: 0 | Status: SHIPPED | OUTPATIENT
Start: 2023-06-06

## 2023-06-06 NOTE — TELEPHONE ENCOUNTER
Please advise on norco request. Patient is s/p right foot transmetatarsal amputation on 6/3/23. For other concerns should be defer to PCP?

## 2023-06-06 NOTE — TELEPHONE ENCOUNTER
[facility] and informed her that we order norco to pharmacy. I also informed her that all of the questions regarding medication should be directed to PCP. She verbalized understanding. I then spoke with Jose Ricky, son of patient, and I informed him that we sent in rx. He said that he was going to pick it up.

## 2023-06-06 NOTE — DISCHARGE SUMMARY
Patient is a pleasant 80-year-old male with PAD who was admitted to the hospital for worsening gangrene on 6/1/2023. He was supposed to have transmetatarsal amputation on this day but it was unable to be performed as he took his Xarelto the morning of surgery. Instead, he was admitted because gangrene was worsening. Surgery was rescheduled and performed on 6/3/2023 where transmetatarsal amputation was performed. During admission he was evaluated by infectious disease and primary team services. He was discharged home on 6/5/2023 and noted to be in good condition.

## 2023-06-06 NOTE — TELEPHONE ENCOUNTER
All medications about blood pressure should be addressed by primary care physician. I will provide refill for [place].  Thank you.

## 2023-06-06 NOTE — TELEPHONE ENCOUNTER
Nurse Jakob Hines wants Dr Bi Rogers to know that she has seen the patient and the patient is having a lot of pain between 8-9 pain score, and she also spoke to the patients son, and they would like to know if the doctor can prescribe Norco since the Tylenol is wearing within 5 hours. Nurse also states that the patient blood pressure is 112/62 and is currently on Metoprolol and Lisinopril was held, so she wants to know if the patient should continue to be off of the Lisinopril, until patient is seen for follow up with Dr Bi Rogers? Nurse wants to know if she should add Dr Ryley Hester, since the patient has a catheter? Patients son wants to know if the patient should take Lasix?

## 2023-06-08 NOTE — PHYSICAL THERAPY NOTE
IP PT attempted, per RN, pt is getting washed up currently. Will re-attempt as schedule permits. Hydroxyzine Pregnancy And Lactation Text: This medication is not safe during pregnancy and should not be taken. It is also excreted in breast milk and breast feeding isn't recommended.

## 2023-06-15 ENCOUNTER — OFFICE VISIT (OUTPATIENT)
Dept: PODIATRY CLINIC | Facility: CLINIC | Age: 88
End: 2023-06-15

## 2023-06-15 DIAGNOSIS — I73.9 PAD (PERIPHERAL ARTERY DISEASE) (HCC): ICD-10-CM

## 2023-06-15 DIAGNOSIS — Z89.439 HISTORY OF TRANSMETATARSAL AMPUTATION OF FOOT (HCC): ICD-10-CM

## 2023-06-15 DIAGNOSIS — Z47.89 ORTHOPEDIC AFTERCARE: ICD-10-CM

## 2023-06-15 DIAGNOSIS — I96 GANGRENE (HCC): Primary | ICD-10-CM

## 2023-06-15 PROCEDURE — 99024 POSTOP FOLLOW-UP VISIT: CPT | Performed by: STUDENT IN AN ORGANIZED HEALTH CARE EDUCATION/TRAINING PROGRAM

## 2023-06-15 RX ORDER — AMOXICILLIN AND CLAVULANATE POTASSIUM 875; 125 MG/1; MG/1
1 TABLET, FILM COATED ORAL 2 TIMES DAILY
Qty: 14 TABLET | Refills: 0 | Status: SHIPPED | OUTPATIENT
Start: 2023-06-15

## 2023-06-15 NOTE — PATIENT INSTRUCTIONS
Take Augmentin as prescribed. Leave dressings clean, dry, intact. Remain nonweightbearing to right lower extremity. Follow-up in 1 week or sooner if other concerns arise.

## 2023-06-23 ENCOUNTER — OFFICE VISIT (OUTPATIENT)
Dept: PODIATRY CLINIC | Facility: CLINIC | Age: 88
End: 2023-06-23

## 2023-06-23 DIAGNOSIS — L97.512 ULCER OF RIGHT FOOT WITH FAT LAYER EXPOSED (HCC): ICD-10-CM

## 2023-06-23 DIAGNOSIS — I96 GANGRENE (HCC): ICD-10-CM

## 2023-06-23 DIAGNOSIS — I73.9 PAD (PERIPHERAL ARTERY DISEASE) (HCC): ICD-10-CM

## 2023-06-23 DIAGNOSIS — Z47.89 ORTHOPEDIC AFTERCARE: Primary | ICD-10-CM

## 2023-06-23 DIAGNOSIS — Z89.439 HISTORY OF TRANSMETATARSAL AMPUTATION OF FOOT (HCC): ICD-10-CM

## 2023-06-23 RX ORDER — AMOXICILLIN AND CLAVULANATE POTASSIUM 875; 125 MG/1; MG/1
1 TABLET, FILM COATED ORAL 2 TIMES DAILY
Qty: 14 TABLET | Refills: 0 | Status: SHIPPED | OUTPATIENT
Start: 2023-06-23

## 2023-06-25 ENCOUNTER — PATIENT MESSAGE (OUTPATIENT)
Dept: PODIATRY CLINIC | Facility: CLINIC | Age: 88
End: 2023-06-25

## 2023-06-25 NOTE — PATIENT INSTRUCTIONS
Remain nonweightbearing to right lower extremity. Elevate foot regularly. Take Augmentin as prescribed. Follow-up in 1 week or sooner if other concerns arise.

## 2023-06-26 NOTE — TELEPHONE ENCOUNTER
From: Danette Marroquin  To: Elvis Vaughn DPM  Sent: 6/25/2023 5:15 PM CDT  Subject: Culture Results    Hi Dr. Narayan Justice,   We were wondering if there were any results from the culture of the foot you did last week? You can call James Newman at [phone] if you need to speak to someone about them. Thank you.
Please advise on culture results
Spoke with son on telephone. Culture still pending.
n/a

## 2023-06-28 ENCOUNTER — PATIENT MESSAGE (OUTPATIENT)
Dept: PODIATRY CLINIC | Facility: CLINIC | Age: 88
End: 2023-06-28

## 2023-06-28 ENCOUNTER — OFFICE VISIT (OUTPATIENT)
Dept: PODIATRY CLINIC | Facility: CLINIC | Age: 88
End: 2023-06-28

## 2023-06-28 DIAGNOSIS — L97.512 ULCER OF RIGHT FOOT WITH FAT LAYER EXPOSED (HCC): ICD-10-CM

## 2023-06-28 DIAGNOSIS — Z47.89 ORTHOPEDIC AFTERCARE: Primary | ICD-10-CM

## 2023-06-28 DIAGNOSIS — I96 GANGRENE (HCC): ICD-10-CM

## 2023-06-28 DIAGNOSIS — Z47.89 ORTHOPEDIC AFTERCARE: ICD-10-CM

## 2023-06-28 DIAGNOSIS — I73.9 PAD (PERIPHERAL ARTERY DISEASE) (HCC): ICD-10-CM

## 2023-06-28 DIAGNOSIS — Z89.439 HISTORY OF TRANSMETATARSAL AMPUTATION OF FOOT (HCC): Primary | ICD-10-CM

## 2023-06-28 PROCEDURE — 99024 POSTOP FOLLOW-UP VISIT: CPT | Performed by: STUDENT IN AN ORGANIZED HEALTH CARE EDUCATION/TRAINING PROGRAM

## 2023-06-30 RX ORDER — HYDROCODONE BITARTRATE AND ACETAMINOPHEN 5; 325 MG/1; MG/1
1 TABLET ORAL EVERY 6 HOURS PRN
Qty: 20 TABLET | Refills: 0 | Status: SHIPPED | OUTPATIENT
Start: 2023-06-30

## 2023-07-01 ENCOUNTER — PATIENT MESSAGE (OUTPATIENT)
Dept: PODIATRY CLINIC | Facility: CLINIC | Age: 88
End: 2023-07-01

## 2023-07-03 ENCOUNTER — PATIENT MESSAGE (OUTPATIENT)
Dept: PODIATRY CLINIC | Facility: CLINIC | Age: 88
End: 2023-07-03

## 2023-07-07 ENCOUNTER — OFFICE VISIT (OUTPATIENT)
Dept: PODIATRY CLINIC | Facility: CLINIC | Age: 88
End: 2023-07-07

## 2023-07-07 DIAGNOSIS — I96 GANGRENE (HCC): ICD-10-CM

## 2023-07-07 DIAGNOSIS — Z89.439 HISTORY OF TRANSMETATARSAL AMPUTATION OF FOOT (HCC): ICD-10-CM

## 2023-07-07 DIAGNOSIS — I73.9 PAD (PERIPHERAL ARTERY DISEASE) (HCC): ICD-10-CM

## 2023-07-07 DIAGNOSIS — Z47.89 ORTHOPEDIC AFTERCARE: Primary | ICD-10-CM

## 2023-07-07 PROCEDURE — 99024 POSTOP FOLLOW-UP VISIT: CPT | Performed by: STUDENT IN AN ORGANIZED HEALTH CARE EDUCATION/TRAINING PROGRAM

## 2023-07-08 NOTE — PATIENT INSTRUCTIONS
Dress site with Betadine and dry dressing every 1 to 2 days. Elevate regularly and remain nonweightbearing to right lower extremity. Follow-up with Dr. Will Espinoza as scheduled.

## 2023-07-08 NOTE — PROGRESS NOTES
[facility] Podiatry  Progress Note    Triny Leigh is a 80year old male. Patient presents with:  Post-Op: S/p right foot sx 06/03/23- patient denies pain at this time- no fever- no chills. HPI:     Patient is a pleasant 80-year-old male with past medical history of PAD who presents to clinic for postop visit status post right foot TMA. He has dressings clean, dry, intact. He has been trying to stay off foot more than last visit but does admit to putting some weight on it at times. His family has been performing dressing changes with Betadine and dry dressing every 1 to 2 days and site is greatly improving. He has finished Augmentin as prescribed. He is scheduled to follow-up with Dr. Gabriel Peterson next week. No other complaints are mentioned. Denies nausea, vomiting, fever, chills. Allergies: Heparin and Hydromorphone   Current Outpatient Medications   Medication Sig Dispense Refill    HYDROcodone-acetaminophen (NORCO) 5-325 MG Oral Tab Take 1 tablet by mouth every 6 (six) hours as needed for Pain. 20 tablet 0    amoxicillin clavulanate 875-125 MG Oral Tab Take 1 tablet by mouth 2 (two) times daily. 14 tablet 0    amoxicillin clavulanate 875-125 MG Oral Tab Take 1 tablet by mouth 2 (two) times daily. 14 tablet 0    HYDROcodone-acetaminophen (NORCO) 5-325 MG Oral Tab Take 1 tablet by mouth every 4 (four) hours as needed for Pain. 20 tablet 0    atorvastatin 40 MG Oral Tab Take 1 tablet (40 mg total) by mouth daily. 30 tablet 0    finasteride 5 MG Oral Tab Take 1 tablet (5 mg total) by mouth daily. 30 tablet 0    clopidogrel 75 MG Oral Tab Take 1 tablet (75 mg total) by mouth daily. 30 tablet 0    rivaroxaban 2.5 MG Oral Tab Take 1 tablet (2.5 mg total) by mouth 2 (two) times daily with meals. 60 tablet 0    furosemide 40 MG Oral Tab Take 1 tablet (40 mg total) by mouth daily. predniSONE 5 MG Oral Tab Take 3 tablets (15 mg total) by mouth daily.       folic acid 1 MG Oral Tab Take 1 tablet (1 mg total) by mouth daily. acetaminophen 500 MG Oral Tab Take 2 tablets (1,000 mg total) by mouth every 8 (eight) hours. 21 tablet 0    metoprolol succinate ER 25 MG Oral Tablet 24 Hr Take 1 tablet (25 mg total) by mouth daily. aspirin EC 81 MG Oral Tab EC Take 1 tablet (81 mg total) by mouth daily. Omeprazole 40 MG Oral Capsule Delayed Release Take 1 capsule (40 mg total) by mouth daily. gabapentin 300 MG Oral Cap Take 1 capsule (300 mg total) by mouth 3 (three) times daily. 90 capsule 0    lisinopril 2.5 MG Oral Tab Take 1 tablet (2.5 mg total) by mouth daily. Past Medical History:   Diagnosis Date    Arrhythmia     Cardiomy[facility] ([facility] [address]     Esophageal reflux     Hearing impairment     Heart attack ([facility] [address]     High blood pressure     High cholesterol     History of MI (myocardial infarction)     Other and unspecified hyperlipidemia     Unspecified essential hypertension     Visual impairment       Past Surgical History:   Procedure Laterality Date    ANGIOGRAM      ANGIOPLASTY (CORONARY)      CABG      FRACTURE SURGERY Left     left hip pinning    OTHER SURGICAL HISTORY Left 01/01/1977    knee surgery    OTHER SURGICAL HISTORY  03/25/2016    Left hip ORIF pinning      Family History   Problem Relation Age of Onset    Cancer Father       Social History    Socioeconomic History      Marital status:      Tobacco Use      Smoking status: Never      Smokeless tobacco: Never    Vaping Use      Vaping Use: Never used    Substance and Sexual Activity      Alcohol use: No      Drug use: No          REVIEW OF SYSTEMS:     Today reviewed systems as documented below  GENERAL HEALTH: feels well otherwise  SKIN: denies any unusual skin lesions or rashes  RESPIRATORY: denies shortness of breath with exertion  CARDIOVASCULAR: denies chest pain on exertion  GI: denies abdominal pain and denies heartburn  NEURO: denies headaches  MUSCULO: History of arthritis      EXAM:   There were no vitals taken for this visit. GENERAL: well developed, well nourished, in no apparent distress  EXTREMITIES:    Photos from family:        Incision has once again stabilized but does have some gangrene present. There are some ischemic changes noted a portion of the flap but it does appear fairly dry and stable at this time. Minimal sanguinous drainage noted. No purulence, erythema, or other concerns appreciated. ASSESSMENT AND PLAN:   Diagnoses and all orders for this visit:    Orthopedic aftercare    Gangrene ([facility] [address]    History of transmetatarsal amputation of foot ([facility] [address]    PAD (peripheral artery disease) ([facility]        Plan:     -Status post right lower extremity TMA. -Site inspected--noted to have slightly improved appearance from last week but still has concerns of ischemia/gangrene along incision.  -Prior cultures show 1 colony of staph species, not aureus. No need for further antibiotics at this time.  -Once again discussed with patient and family that I believe site deteriorated from patient walking around and not elevating his foot regularly. He does seem to be improving now that he is staying off of it more. He is still at risk for dehiscence and need for proximal amputation.  -Site was dressed with Betadine and dry dressing. Family to perform similar dressing changes every 1 to 2 days.  -The most important thing for patient to heal is elevating foot and staying off of it at this point.  -Status post vascular intervention with Dr. Angelo Gonzalez. Continue following with Dr. Angelo Gonzalez.  -Educated patient acute signs of infection advised patient to seek immediate medical attention if any other concerns arise. The patient indicates understanding of these issues and agrees to the plan. Return in about 2 weeks (around 7/21/2023) for post op.     Sammuel Heimlich, DPM

## 2023-07-11 ENCOUNTER — PATIENT MESSAGE (OUTPATIENT)
Dept: PODIATRY CLINIC | Facility: CLINIC | Age: 88
End: 2023-07-11

## 2023-07-13 ENCOUNTER — PATIENT MESSAGE (OUTPATIENT)
Dept: PODIATRY CLINIC | Facility: CLINIC | Age: 88
End: 2023-07-13

## 2023-07-15 ENCOUNTER — PATIENT MESSAGE (OUTPATIENT)
Dept: PODIATRY CLINIC | Facility: CLINIC | Age: 88
End: 2023-07-15

## 2023-07-17 ENCOUNTER — PATIENT MESSAGE (OUTPATIENT)
Dept: PODIATRY CLINIC | Facility: CLINIC | Age: 88
End: 2023-07-17

## 2023-07-17 NOTE — TELEPHONE ENCOUNTER
From: Karina Schroeder  To: Penelope Gowers, DPM  Sent: 7/15/2023 9:07 PM CDT  Subject: Photos    Attached are photos from today, Saturday 7/15. Thank you.    1/2    Nathaniel Peralta

## 2023-07-18 ENCOUNTER — OFFICE VISIT (OUTPATIENT)
Dept: PODIATRY CLINIC | Facility: CLINIC | Age: 88
End: 2023-07-18

## 2023-07-18 DIAGNOSIS — I73.9 PAD (PERIPHERAL ARTERY DISEASE) (HCC): ICD-10-CM

## 2023-07-18 DIAGNOSIS — I96 GANGRENE (HCC): ICD-10-CM

## 2023-07-18 DIAGNOSIS — Z89.439 HISTORY OF TRANSMETATARSAL AMPUTATION OF FOOT (HCC): ICD-10-CM

## 2023-07-18 DIAGNOSIS — Z47.89 ORTHOPEDIC AFTERCARE: Primary | ICD-10-CM

## 2023-07-18 PROCEDURE — L4387 NON-PNEUM WALK BOOT PRE OTS: HCPCS | Performed by: STUDENT IN AN ORGANIZED HEALTH CARE EDUCATION/TRAINING PROGRAM

## 2023-07-18 NOTE — PROGRESS NOTES
[facility] Podiatry  Progress Note    Debbie Joseph is a 80year old male. Patient presents with:  Post-Op: Post op right foot. Patient denies any pain. HPI:     Patient is a pleasant 51-year-old male with past medical history of PAD who presents to clinic for postop visit status post right foot TMA. He has dressings clean, dry, intact. He has been trying to stay off foot. His family has been performing dressing changes with Betadine and dry dressing every 1 to 2 days and site is greatly improving. He continues to follow with Dr. Annabelle Orellana. No other complaints are mentioned. Denies nausea, vomiting, fever, chills. Allergies: Heparin and Hydromorphone   Current Outpatient Medications   Medication Sig Dispense Refill    HYDROcodone-acetaminophen (NORCO) 5-325 MG Oral Tab Take 1 tablet by mouth every 6 (six) hours as needed for Pain. 20 tablet 0    amoxicillin clavulanate 875-125 MG Oral Tab Take 1 tablet by mouth 2 (two) times daily. 14 tablet 0    amoxicillin clavulanate 875-125 MG Oral Tab Take 1 tablet by mouth 2 (two) times daily. 14 tablet 0    HYDROcodone-acetaminophen (NORCO) 5-325 MG Oral Tab Take 1 tablet by mouth every 4 (four) hours as needed for Pain. 20 tablet 0    atorvastatin 40 MG Oral Tab Take 1 tablet (40 mg total) by mouth daily. 30 tablet 0    finasteride 5 MG Oral Tab Take 1 tablet (5 mg total) by mouth daily. 30 tablet 0    clopidogrel 75 MG Oral Tab Take 1 tablet (75 mg total) by mouth daily. 30 tablet 0    rivaroxaban 2.5 MG Oral Tab Take 1 tablet (2.5 mg total) by mouth 2 (two) times daily with meals. 60 tablet 0    gabapentin 300 MG Oral Cap Take 1 capsule (300 mg total) by mouth 3 (three) times daily. 90 capsule 0    furosemide 40 MG Oral Tab Take 1 tablet (40 mg total) by mouth daily. predniSONE 5 MG Oral Tab Take 3 tablets (15 mg total) by mouth daily. folic acid 1 MG Oral Tab Take 1 tablet (1 mg total) by mouth daily.       acetaminophen 500 MG Oral Tab Take 2 tablets (1,000 mg total) by mouth every 8 (eight) hours. 21 tablet 0    metoprolol succinate ER 25 MG Oral Tablet 24 Hr Take 1 tablet (25 mg total) by mouth daily. aspirin EC 81 MG Oral Tab EC Take 1 tablet (81 mg total) by mouth daily. Omeprazole 40 MG Oral Capsule Delayed Release Take 1 capsule (40 mg total) by mouth daily. lisinopril 2.5 MG Oral Tab Take 1 tablet (2.5 mg total) by mouth daily. Past Medical History:   Diagnosis Date    Arrhythmia     Cardiomyopathy ([facility] [address]     Esophageal reflux     Hearing impairment     Heart attack ([facility] [address]     High blood pressure     High cholesterol     History of MI (myocardial infarction)     Other and unspecified hyperlipidemia     Unspecified essential hypertension     Visual impairment       Past Surgical History:   Procedure Laterality Date    ANGIOGRAM      ANGIOPLASTY (CORONARY)      CABG      FRACTURE SURGERY Left     left hip pinning    OTHER SURGICAL HISTORY Left 01/01/1977    knee surgery    OTHER SURGICAL HISTORY  03/25/2016    Left hip ORIF pinning      Family History   Problem Relation Age of Onset    Cancer Father       Social History    Socioeconomic History      Marital status:     Tobacco Use      Smoking status: Never      Smokeless tobacco: Never    Vaping Use      Vaping Use: Never used    Substance and Sexual Activity      Alcohol use: No      Drug use: No          REVIEW OF SYSTEMS:     Today reviewed systems as documented below  GENERAL HEALTH: feels well otherwise  SKIN: denies any unusual skin lesions or rashes  RESPIRATORY: denies shortness of breath with exertion  CARDIOVASCULAR: denies chest pain on exertion  GI: denies abdominal pain and denies heartburn  NEURO: denies headaches  MUSCULO: History of arthritis      EXAM:   There were no vitals taken for this visit.   GENERAL: well developed, well nourished, in no apparent distress  EXTREMITIES:    Photos from family:        Dry gangrene present to the large portion of incision. There are some ischemic changes noted a portion of the flap but it does appear fairly dry and stable at this time. Minimal sanguinous drainage noted. No purulence, erythema, or other concerns appreciated. See images above. ASSESSMENT AND PLAN:   Diagnoses and all orders for this visit:    Orthopedic aftercare    Gangrene ([facility] [address]    History of transmetatarsal amputation of foot ([facility] [address]    PAD (peripheral artery disease) ([facility]        Plan:     -Status post right lower extremity TMA. -Site inspected--noted to have improved appearance from last visit but still has concerns of ischemia/gangrene along incision. -A portion of loose and gangrene removed to reveal healthy underlying skin. Remainder of gangrene left intact to flake off on its own when ready.  -Site dressed with Betadine and dry dressing. Patient should receive similar dressing changes every other day. -Okay for patient to start weightbearing in cam boot for no more than 10 steps at a time. He should otherwise be offloaded and elevate it regularly.  -Patient to continue following with Dr. Koffi Rodriguez. -Patient and family understand he remains at risk for approximately potation.  -Educated patient acute signs of infection advised patient to seek immediate medical attention if any other concerns arise. The patient indicates understanding of these issues and agrees to the plan. Return in about 2 weeks (around 8/1/2023) for Postop.     Piotr Iniguez DPM

## 2023-07-20 ENCOUNTER — PATIENT MESSAGE (OUTPATIENT)
Dept: PODIATRY CLINIC | Facility: CLINIC | Age: 88
End: 2023-07-20

## 2023-07-21 NOTE — TELEPHONE ENCOUNTER
From: Alma Gonzalez  To: Tiesha Stiles DPM  Sent: 7/20/2023 7:43 PM CDT  Subject: Photos    Pictures from 7/20. 2 of 2

## 2023-07-22 NOTE — PATIENT INSTRUCTIONS
Dress site with Betadine and dry dressing every other day. Okay to use cam boot for short trips, no more than 10 steps. Should otherwise have foot elevated and stay off foot is much as possible. Follow-up in 2 weeks for reevaluation or sooner if other concerns arise.

## 2023-07-23 ENCOUNTER — PATIENT MESSAGE (OUTPATIENT)
Dept: PODIATRY CLINIC | Facility: CLINIC | Age: 88
End: 2023-07-23

## 2023-07-25 ENCOUNTER — PATIENT MESSAGE (OUTPATIENT)
Dept: PODIATRY CLINIC | Facility: CLINIC | Age: 88
End: 2023-07-25

## 2023-07-25 NOTE — TELEPHONE ENCOUNTER
From: Siddharth Adames  To: Barbara Clarke DPM  Sent: 7/25/2023 2:45 PM CDT  Subject: Photos    Attached are images from today, Tuesday 7/25. Thank you.

## 2023-07-26 ENCOUNTER — PATIENT MESSAGE (OUTPATIENT)
Dept: PODIATRY CLINIC | Facility: CLINIC | Age: 88
End: 2023-07-26

## 2023-07-27 NOTE — TELEPHONE ENCOUNTER
From: France Olmos  To: Reese Frost DPM  Sent: 7/26/2023 8:37 PM CDT  Subject: Photos    Images from 7/26.   2 of 2

## 2023-07-28 ENCOUNTER — PATIENT MESSAGE (OUTPATIENT)
Dept: PODIATRY CLINIC | Facility: CLINIC | Age: 88
End: 2023-07-28

## 2023-07-30 ENCOUNTER — PATIENT MESSAGE (OUTPATIENT)
Dept: PODIATRY CLINIC | Facility: CLINIC | Age: 88
End: 2023-07-30

## 2023-07-31 NOTE — TELEPHONE ENCOUNTER
From: Den El  To: Clement Denis DPM  Sent: 7/30/2023 7:42 PM CDT  Subject: Photos    Attached are images from Sunday, 7/30. Thank you.

## 2023-08-01 ENCOUNTER — OFFICE VISIT (OUTPATIENT)
Dept: PODIATRY CLINIC | Facility: CLINIC | Age: 88
End: 2023-08-01

## 2023-08-01 DIAGNOSIS — L97.512 ULCER OF RIGHT FOOT WITH FAT LAYER EXPOSED (HCC): ICD-10-CM

## 2023-08-01 DIAGNOSIS — Z89.439 HISTORY OF TRANSMETATARSAL AMPUTATION OF FOOT (HCC): ICD-10-CM

## 2023-08-01 DIAGNOSIS — I73.9 PAD (PERIPHERAL ARTERY DISEASE) (HCC): ICD-10-CM

## 2023-08-01 DIAGNOSIS — I96 GANGRENE OF RIGHT FOOT (HCC): ICD-10-CM

## 2023-08-01 DIAGNOSIS — I96 GANGRENE (HCC): ICD-10-CM

## 2023-08-01 DIAGNOSIS — Z47.89 ORTHOPEDIC AFTERCARE: Primary | ICD-10-CM

## 2023-08-01 PROCEDURE — 99024 POSTOP FOLLOW-UP VISIT: CPT | Performed by: STUDENT IN AN ORGANIZED HEALTH CARE EDUCATION/TRAINING PROGRAM

## 2023-08-01 NOTE — TELEPHONE ENCOUNTER
From: Den El  To: Clement Denis DPM  Sent: 7/28/2023 6:23 PM CDT  Subject: Photos    Attached are images from Friday, 7/28. Thank you.

## 2023-08-02 NOTE — PROGRESS NOTES
[facility] Podiatry  Progress Note    Delbert Acuña is a 80year old male. Patient presents with:  Post-Op: Right foot post op. Patient denies any pain in office. HPI:     Patient is a pleasant 80-year-old male with past medical history of PAD who presents to clinic for postop visit status post right foot TMA. He has dressings clean, dry, intact. He has been trying to stay off foot but has been walking a little bit more. His family has been performing dressing changes with Betadine and dry dressing every 1 to 2 days and site is greatly improving. He continues to follow with Dr. Steph Sanford. No other complaints are mentioned. Denies nausea, vomiting, fever, chills. Allergies: Heparin and Hydromorphone   Current Outpatient Medications   Medication Sig Dispense Refill    HYDROcodone-acetaminophen (NORCO) 5-325 MG Oral Tab Take 1 tablet by mouth every 6 (six) hours as needed for Pain. 20 tablet 0    amoxicillin clavulanate 875-125 MG Oral Tab Take 1 tablet by mouth 2 (two) times daily. 14 tablet 0    amoxicillin clavulanate 875-125 MG Oral Tab Take 1 tablet by mouth 2 (two) times daily. 14 tablet 0    HYDROcodone-acetaminophen (NORCO) 5-325 MG Oral Tab Take 1 tablet by mouth every 4 (four) hours as needed for Pain. 20 tablet 0    atorvastatin 40 MG Oral Tab Take 1 tablet (40 mg total) by mouth daily. 30 tablet 0    finasteride 5 MG Oral Tab Take 1 tablet (5 mg total) by mouth daily. 30 tablet 0    clopidogrel 75 MG Oral Tab Take 1 tablet (75 mg total) by mouth daily. 30 tablet 0    rivaroxaban 2.5 MG Oral Tab Take 1 tablet (2.5 mg total) by mouth 2 (two) times daily with meals. 60 tablet 0    gabapentin 300 MG Oral Cap Take 1 capsule (300 mg total) by mouth 3 (three) times daily. 90 capsule 0    furosemide 40 MG Oral Tab Take 1 tablet (40 mg total) by mouth daily. predniSONE 5 MG Oral Tab Take 3 tablets (15 mg total) by mouth daily.       folic acid 1 MG Oral Tab Take 1 tablet (1 mg total) by mouth daily. acetaminophen 500 MG Oral Tab Take 2 tablets (1,000 mg total) by mouth every 8 (eight) hours. 21 tablet 0    metoprolol succinate ER 25 MG Oral Tablet 24 Hr Take 1 tablet (25 mg total) by mouth daily. aspirin EC 81 MG Oral Tab EC Take 1 tablet (81 mg total) by mouth daily. Omeprazole 40 MG Oral Capsule Delayed Release Take 1 capsule (40 mg total) by mouth daily. lisinopril 2.5 MG Oral Tab Take 1 tablet (2.5 mg total) by mouth daily. Past Medical History:   Diagnosis Date    Arrhythmia     Cardiomyopathy ([facility] [address]     Esophageal reflux     Hearing impairment     Heart attack ([facility] [address]     High blood pressure     High cholesterol     History of MI (myocardial infarction)     Other and unspecified hyperlipidemia     Unspecified essential hypertension     Visual impairment       Past Surgical History:   Procedure Laterality Date    ANGIOGRAM      ANGIOPLASTY (CORONARY)      CABG      FRACTURE SURGERY Left     left hip pinning    OTHER SURGICAL HISTORY Left 01/01/1977    knee surgery    OTHER SURGICAL HISTORY  03/25/2016    Left hip ORIF pinning      Family History   Problem Relation Age of Onset    Cancer Father       Social History    Socioeconomic History      Marital status:     Tobacco Use      Smoking status: Never      Smokeless tobacco: Never    Vaping Use      Vaping Use: Never used    Substance and Sexual Activity      Alcohol use: No      Drug use: No          REVIEW OF SYSTEMS:     Today reviewed systems as documented below  GENERAL HEALTH: feels well otherwise  SKIN: denies any unusual skin lesions or rashes  RESPIRATORY: denies shortness of breath with exertion  CARDIOVASCULAR: denies chest pain on exertion  GI: denies abdominal pain and denies heartburn  NEURO: denies headaches  MUSCULO: History of arthritis      EXAM:   There were no vitals taken for this visit.   GENERAL: well developed, well nourished, in no apparent distress  EXTREMITIES:            Dry gangrene present to the large portion of incision. Several areas lifted off to reveal healthy underlying skin. There is still are 2 areas of fibrotic/necrotic wounds anteriorly as can be seen in images above. There are some ischemic changes noted a portion of the flap but it does appear fairly dry and stable at this time. Minimal sanguinous drainage noted. No purulence, erythema, or other concerns appreciated. 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 -Site inspected--noted to have improved appearance from last visit but still has concerns of ischemia/gangrene along incision. -A portion of loose and gangrene removed to reveal healthy underlying skin. Remainder of gangrene left intact to flake off on its own when ready.  -Site dressed with Betadine and dry dressing. Patient should receive similar dressing changes every other day. -Okay for patient to continue weightbearing in cam boot for no more than 10 steps at a time. He should otherwise be offloaded and elevate it regularly.  -Patient to continue following with Dr. Daniel Flor. -Patient and family understand he remains at risk for proximal amputation.  -Educated patient acute signs of infection advised patient to seek immediate medical attention if any other concerns arise.  -We will plan to follow-up in the wound clinic in 2 weeks or sooner if other concerns arise. The patient indicates understanding of these issues and agrees to the plan. Return in about 2 weeks (around 8/15/2023) for Ulcer follow-up.     Dimitrios Jaramillo DPM

## 2023-08-07 ENCOUNTER — PATIENT MESSAGE (OUTPATIENT)
Dept: WOUND CARE | Facility: HOSPITAL | Age: 88
End: 2023-08-07

## 2023-08-07 NOTE — TELEPHONE ENCOUNTER
From: Rubina Jameson  To: Magy Gaines DPM  Sent: 8/7/2023 5:00 PM CDT  Subject: Photos    Attached are images from Monday, 8/7. Thank you.

## 2023-08-08 ENCOUNTER — PATIENT MESSAGE (OUTPATIENT)
Dept: WOUND CARE | Facility: HOSPITAL | Age: 88
End: 2023-08-08

## 2023-08-08 NOTE — TELEPHONE ENCOUNTER
From: Alma Gonzalez  To: Tiesha Stiles DPM  Sent: 8/8/2023 5:23 PM CDT  Subject: Photos    Attached are images from 8/7. Thank you.

## 2023-08-10 ENCOUNTER — PATIENT MESSAGE (OUTPATIENT)
Dept: WOUND CARE | Facility: HOSPITAL | Age: 88
End: 2023-08-10

## 2023-08-11 ENCOUNTER — PATIENT MESSAGE (OUTPATIENT)
Dept: WOUND CARE | Facility: HOSPITAL | Age: 88
End: 2023-08-11

## 2023-08-11 NOTE — TELEPHONE ENCOUNTER
From: Ilana Parks  To: Georgie Larose DPM  Sent: 8/10/2023 3:30 PM CDT  Subject: Photos    Attached are images from Thursday, 8/10. Thank you.

## 2023-08-14 ENCOUNTER — TELEPHONE (OUTPATIENT)
Dept: PODIATRY CLINIC | Facility: CLINIC | Age: 88
End: 2023-08-14

## 2023-08-14 ENCOUNTER — TELEPHONE (OUTPATIENT)
Dept: WOUND CARE | Facility: HOSPITAL | Age: 88
End: 2023-08-14

## 2023-08-14 ENCOUNTER — OFFICE VISIT (OUTPATIENT)
Dept: WOUND CARE | Facility: HOSPITAL | Age: 88
End: 2023-08-14
Attending: STUDENT IN AN ORGANIZED HEALTH CARE EDUCATION/TRAINING PROGRAM
Payer: MEDICARE

## 2023-08-14 VITALS
TEMPERATURE: 98 F | HEART RATE: 98 BPM | SYSTOLIC BLOOD PRESSURE: 128 MMHG | DIASTOLIC BLOOD PRESSURE: 83 MMHG | WEIGHT: 200 LBS | HEIGHT: 67 IN | RESPIRATION RATE: 17 BRPM | BODY MASS INDEX: 31.39 KG/M2

## 2023-08-14 DIAGNOSIS — Z89.439 HISTORY OF TRANSMETATARSAL AMPUTATION OF FOOT (HCC): ICD-10-CM

## 2023-08-14 DIAGNOSIS — I73.9 PAD (PERIPHERAL ARTERY DISEASE) (HCC): ICD-10-CM

## 2023-08-14 DIAGNOSIS — L97.512 FOOT ULCER, RIGHT, WITH FAT LAYER EXPOSED (HCC): Primary | ICD-10-CM

## 2023-08-14 PROCEDURE — 11042 DBRDMT SUBQ TIS 1ST 20SQCM/<: CPT | Performed by: STUDENT IN AN ORGANIZED HEALTH CARE EDUCATION/TRAINING PROGRAM

## 2023-08-14 PROCEDURE — 99213 OFFICE O/P EST LOW 20 MIN: CPT

## 2023-08-14 RX ORDER — COLLAGENASE SANTYL 250 [ARB'U]/G
OINTMENT TOPICAL
Qty: 30 G | Refills: 2 | Status: SHIPPED | OUTPATIENT
Start: 2023-08-14

## 2023-08-14 NOTE — PROGRESS NOTES
Weekly Wound Education Note    Teaching Provided To: Patient  Training Topics: Discharge instructions;Cleasing and general instructions;Dressing;Off-loading  Training Method: Demonstration;Explain/Verbal;Written  Training Response: Patient responds and understands        Notes: Cleanse right foot wound with saline or wound cleanser, applied Betadine and dry dressing today. Santyl ointment ordered for daily use once received, Apply santyl marifer thick, cover with gauze and dry dressing changing daily. Continue weightbearing in cam boot for no more than 10 steps at a time. Reviewed acute signs of infection advised patient to seek immediate medical attention if any other concerns arise.

## 2023-08-14 NOTE — PROGRESS NOTES
Patient ID: Markus Samuel is a 80year old male. Debridement   Wound 08/14/23 #1- right foot Old surgical Foot Right    Consent obtained? verbal  Consent given by: patient  Risks discussed? procedural risks discussed  Time out called at 8/14/2023 1:28 PM  Immediately prior to the procedure a time out was called  Performed by: provider  Debridement type: surgical  Level of debridement: subcutaneous tissue  Pain control: lidocaine 4%  Pre-debridement measurements  Length (cm): 2.6  Width (cm): 7  Depth (cm): 1.3  Surface Area (cm^2): 18.2  Volume (cm^3): 23.7    Post-debridement measurements  Length (cm): 2.7  Width (cm): 7  Depth (cm): 1.3  Percent debrided: 75%  Surface Area (cm^2): 18.9  Area debrided (cm^2): 14.2  Volume (cm^3): 24.6  Tissue and other material debrided: subcutaneous tissue  Devitalized tissue debrided: biofilm, callus and necrotic debris  Instrument(s) utilized: nippers  Bleeding: medium  Hemostasis obtained with: pressure  Procedural pain (0-10): 0  Post-procedural pain: 0   Response to treatment: procedure was tolerated well    Debridement   Wound 08/14/23 #2- right medial foot Dorsal;Right    Consent obtained? verbal  Consent given by: patient  Risks discussed?  procedural risks discussed  Time out called at 8/14/2023 1:29 PM  Immediately prior to the procedure a time out was called  Performed by: provider  Debridement type: surgical  Level of debridement: subcutaneous tissue    Pre-debridement measurements  Length (cm): 0.9  Width (cm): 1.4  Surface Area (cm^2): 1.3    Post-debridement measurements  Length (cm): 1  Width (cm): 1.4  Depth (cm): 0.1  Percent debrided: 100%  Surface Area (cm^2): 1.4  Area debrided (cm^2): 1.4  Volume (cm^3): 0.1  Tissue and other material debrided: subcutaneous tissue  Devitalized tissue debrided: biofilm, callus, fibrin and necrotic debris  Instrument(s) utilized: nippers  Bleeding: small  Hemostasis obtained with: not applicable  Procedural pain (0-10): 0  Post-procedural pain: 0   Response to treatment: procedure was tolerated well

## 2023-08-14 NOTE — PATIENT INSTRUCTIONS
Dress site with [place] daily. Remain minimally weightbearing to foot with postop shoe. Follow-up in 2 weeks or sooner if other concerns arise.

## 2023-08-14 NOTE — TELEPHONE ENCOUNTER
Patients daughter called stating that the Santyl that was ordered at today's appointment needs pre authorization with the insurance company. Returned her call and obtained the phone number for the insurance which is [phone]. All questions answered at this time. Relayed information to Dr. Ame Mancilla.

## 2023-08-17 ENCOUNTER — PATIENT MESSAGE (OUTPATIENT)
Dept: WOUND CARE | Facility: HOSPITAL | Age: 88
End: 2023-08-17

## 2023-08-18 NOTE — TELEPHONE ENCOUNTER
From: Antonio Jones  To: Eveline Moore DPM  Sent: 8/17/2023 8:43 PM CDT  Subject: Photos    Attached are images from Thursday, 8/17. Thank you.

## 2023-08-20 ENCOUNTER — PATIENT MESSAGE (OUTPATIENT)
Dept: WOUND CARE | Facility: HOSPITAL | Age: 88
End: 2023-08-20

## 2023-08-21 NOTE — TELEPHONE ENCOUNTER
From: Den El  To: Clement Denis DPM  Sent: 8/20/2023 11:30 AM CDT  Subject: Photos    Attached are images from Saturday, 8/19. Thank you.

## 2023-08-22 ENCOUNTER — PATIENT MESSAGE (OUTPATIENT)
Dept: WOUND CARE | Facility: HOSPITAL | Age: 88
End: 2023-08-22

## 2023-08-22 NOTE — TELEPHONE ENCOUNTER
From: Darcus Burkitt  To: Long Euceda DPM  Sent: 8/22/2023 3:45 PM CDT  Subject: Photos    Attached are images from Tuesday, 8/22. Thank you.

## 2023-08-23 ENCOUNTER — PATIENT MESSAGE (OUTPATIENT)
Dept: WOUND CARE | Facility: HOSPITAL | Age: 88
End: 2023-08-23

## 2023-08-23 NOTE — TELEPHONE ENCOUNTER
From: Ilana Parks  To: Georgie Larose DPM  Sent: 8/23/2023 4:08 PM CDT  Subject: Photos    Attached are images from Wednesday, 8/23. Thank you.

## 2023-08-24 ENCOUNTER — PATIENT MESSAGE (OUTPATIENT)
Dept: WOUND CARE | Facility: HOSPITAL | Age: 88
End: 2023-08-24

## 2023-08-28 ENCOUNTER — OFFICE VISIT (OUTPATIENT)
Dept: WOUND CARE | Facility: HOSPITAL | Age: 88
End: 2023-08-28
Attending: STUDENT IN AN ORGANIZED HEALTH CARE EDUCATION/TRAINING PROGRAM
Payer: MEDICARE

## 2023-08-28 VITALS
HEART RATE: 109 BPM | RESPIRATION RATE: 14 BRPM | TEMPERATURE: 98 F | SYSTOLIC BLOOD PRESSURE: 135 MMHG | DIASTOLIC BLOOD PRESSURE: 89 MMHG

## 2023-08-28 DIAGNOSIS — I73.9 PAD (PERIPHERAL ARTERY DISEASE) (HCC): ICD-10-CM

## 2023-08-28 DIAGNOSIS — Z89.439 HISTORY OF TRANSMETATARSAL AMPUTATION OF FOOT (HCC): ICD-10-CM

## 2023-08-28 DIAGNOSIS — L97.512 FOOT ULCER, RIGHT, WITH FAT LAYER EXPOSED (HCC): Primary | ICD-10-CM

## 2023-08-28 PROCEDURE — 11042 DBRDMT SUBQ TIS 1ST 20SQCM/<: CPT | Performed by: STUDENT IN AN ORGANIZED HEALTH CARE EDUCATION/TRAINING PROGRAM

## 2023-08-28 NOTE — PROGRESS NOTES
Weekly Wound Education Note    Teaching Provided To: Patient  Training Topics: Discharge instructions;Cleasing and general instructions;Edema control  Training Method: Demonstration;Explain/Verbal;Written  Training Response: Patient responds and understands        Notes: Right foot: nati and dry dressing applied, leave in place for 2 days then continue to cleanse with saline or wound cleanser and apply santyl ointment a marifer thick with dry dressing changing daily.  Discussed limiting walking and to elevate foot several times daily

## 2023-08-28 NOTE — PATIENT INSTRUCTIONS
Dress sites with [facility] daily. Ambulate with postop shoe but keep weightbearing to minimum. Follow-up in 2 weeks or sooner if other concerns arise.

## 2023-08-28 NOTE — PROGRESS NOTES
Patient ID: Shad Meyers is a 80year old male. Debridement   Wound 08/14/23 #1- right foot Old surgical Foot Right    Consent obtained? verbal  Consent given by: patient  Risks discussed? procedural risks discussed  Time out called at 8/28/2023 1:19 PM  Immediately prior to the procedure a time out was called  Performed by: provider  Debridement type: surgical  Level of debridement: subcutaneous tissue  Pain control: lidocaine 4%  Pre-debridement measurements  Length (cm): 1.5  Width (cm): 6.5  Depth (cm): 1  Surface Area (cm^2): 9.8  Volume (cm^3): 9.8    Post-debridement measurements  Length (cm): 1.6  Width (cm): 6.5  Depth (cm): 0.8  Percent debrided: 100%  Surface Area (cm^2): 10.4  Area debrided (cm^2): 10.4  Volume (cm^3): 8.3  Tissue and other material debrided: subcutaneous tissue  Devitalized tissue debrided: biofilm, callus, necrotic debris and slough  Instrument(s) utilized: nippers  Bleeding: small  Hemostasis obtained with: not applicable  Procedural pain (0-10): 0  Post-procedural pain: 0     Debridement   Wound 08/14/23 #2- right medial foot Dorsal;Right    Consent obtained? verbal  Consent given by: patient  Risks discussed?  procedural risks discussed  Time out called at 8/28/2023 1:21 PM  Immediately prior to the procedure a time out was called  Performed by: provider  Debridement type: surgical  Level of debridement: subcutaneous tissue  Pain control: lidocaine 4%  Pre-debridement measurements  Length (cm): 1.5  Width (cm): 1.5  Depth (cm): 0.1  Surface Area (cm^2): 2.3  Volume (cm^3): 0.2    Post-debridement measurements  Length (cm): 1.6  Width (cm): 1.5  Depth (cm): 0.1  Percent debrided: 100%  Surface Area (cm^2): 2.4  Area debrided (cm^2): 2.4  Volume (cm^3): 0.2  Tissue and other material debrided: subcutaneous tissue  Devitalized tissue debrided: biofilm, fibrin, necrotic debris and slough  Instrument(s) utilized: nippers  Bleeding: small  Hemostasis obtained with: not applicable  Procedural pain (0-10): 0  Post-procedural pain: 0   Response to treatment: procedure was tolerated well

## 2023-08-30 ENCOUNTER — PATIENT MESSAGE (OUTPATIENT)
Dept: WOUND CARE | Facility: HOSPITAL | Age: 88
End: 2023-08-30

## 2023-08-31 ENCOUNTER — PATIENT MESSAGE (OUTPATIENT)
Dept: WOUND CARE | Facility: HOSPITAL | Age: 88
End: 2023-08-31

## 2023-09-03 ENCOUNTER — PATIENT MESSAGE (OUTPATIENT)
Dept: WOUND CARE | Facility: HOSPITAL | Age: 88
End: 2023-09-03

## 2023-09-05 ENCOUNTER — PATIENT MESSAGE (OUTPATIENT)
Dept: WOUND CARE | Facility: HOSPITAL | Age: 88
End: 2023-09-05

## 2023-09-06 ENCOUNTER — PATIENT MESSAGE (OUTPATIENT)
Dept: WOUND CARE | Facility: HOSPITAL | Age: 88
End: 2023-09-06

## 2023-09-07 ENCOUNTER — PATIENT MESSAGE (OUTPATIENT)
Dept: WOUND CARE | Facility: HOSPITAL | Age: 88
End: 2023-09-07

## 2023-09-07 NOTE — TELEPHONE ENCOUNTER
From: Triny Leigh  To: Negin Holcomb DPM  Sent: 9/6/2023 6:11 PM CDT  Subject: Photos    Attached are images from Wednesday, 9/6. Thank you.

## 2023-09-08 ENCOUNTER — PATIENT MESSAGE (OUTPATIENT)
Dept: WOUND CARE | Facility: HOSPITAL | Age: 88
End: 2023-09-08

## 2023-09-08 NOTE — TELEPHONE ENCOUNTER
From: Rubina Jameson  To: Magy Gaines DPM  Sent: 9/7/2023 6:48 PM CDT  Subject: Photos    Attached are images from Thursday, 9/7. Thank you.
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
## 2023-09-10 ENCOUNTER — PATIENT MESSAGE (OUTPATIENT)
Dept: WOUND CARE | Facility: HOSPITAL | Age: 88
End: 2023-09-10

## 2023-09-11 ENCOUNTER — OFFICE VISIT (OUTPATIENT)
Dept: WOUND CARE | Facility: HOSPITAL | Age: 88
End: 2023-09-11
Attending: STUDENT IN AN ORGANIZED HEALTH CARE EDUCATION/TRAINING PROGRAM
Payer: MEDICARE

## 2023-09-11 VITALS
TEMPERATURE: 98 F | HEART RATE: 99 BPM | DIASTOLIC BLOOD PRESSURE: 73 MMHG | RESPIRATION RATE: 16 BRPM | SYSTOLIC BLOOD PRESSURE: 124 MMHG

## 2023-09-11 DIAGNOSIS — I73.9 PAD (PERIPHERAL ARTERY DISEASE) (HCC): ICD-10-CM

## 2023-09-11 DIAGNOSIS — L97.512 FOOT ULCER, RIGHT, WITH FAT LAYER EXPOSED (HCC): Primary | ICD-10-CM

## 2023-09-11 DIAGNOSIS — Z89.439 HISTORY OF TRANSMETATARSAL AMPUTATION OF FOOT (HCC): ICD-10-CM

## 2023-09-11 PROCEDURE — 11042 DBRDMT SUBQ TIS 1ST 20SQCM/<: CPT | Performed by: STUDENT IN AN ORGANIZED HEALTH CARE EDUCATION/TRAINING PROGRAM

## 2023-09-11 NOTE — PROGRESS NOTES
Patient ID: Alan Christensen is a 80year old male. Debridement   Wound 08/14/23 #1- right foot Old surgical Foot Right    Consent obtained? verbal  Consent given by: patient  Risks discussed?  procedural risks discussed  Time out called at 9/11/2023 1:49 PM  Immediately prior to the procedure a time out was called  Performed by: provider  Debridement type: surgical  Level of debridement: subcutaneous tissue    Pre-debridement measurements  Length (cm): 1.8  Width (cm): 6.7  Depth (cm): 1.1  Surface Area (cm^2): 12.1  Volume (cm^3): 13.3    Post-debridement measurements  Length (cm): 1.8  Width (cm): 6.8  Depth (cm): 1.1  Percent debrided: 100%  Surface Area (cm^2): 12.2  Area debrided (cm^2): 12.2  Volume (cm^3): 13.5  Tissue and other material debrided: subcutaneous tissue  Devitalized tissue debrided: biofilm, fibrin and slough  Instrument(s) utilized: nippers  Bleeding: small  Hemostasis obtained with: not applicable  Procedural pain (0-10): 0  Post-procedural pain: 0   Response to treatment: procedure was tolerated well

## 2023-09-11 NOTE — PROGRESS NOTES
Weekly Wound Education Note    Teaching Provided To: Patient; Family  Training Topics: Discharge instructions;Dressing;Cleasing and general instructions  Training Method: Demonstration;Explain/Verbal;Written  Training Response: Patient responds and understands        Notes: Cleanse right foot wounds with saline or wound cleanser, apply Josi and Hydrofera Transfer, abd and kerlix, change every other day.   Continue minimal weightbearing with postop shoe

## 2023-09-12 RX ORDER — HYDROCODONE BITARTRATE AND ACETAMINOPHEN 5; 325 MG/1; MG/1
1 TABLET ORAL EVERY 4 HOURS PRN
Qty: 20 TABLET | Refills: 0 | OUTPATIENT
Start: 2023-09-12

## 2023-09-13 ENCOUNTER — PATIENT MESSAGE (OUTPATIENT)
Dept: WOUND CARE | Facility: HOSPITAL | Age: 88
End: 2023-09-13

## 2023-09-15 ENCOUNTER — PATIENT MESSAGE (OUTPATIENT)
Dept: WOUND CARE | Facility: HOSPITAL | Age: 88
End: 2023-09-15

## 2023-09-18 ENCOUNTER — PATIENT MESSAGE (OUTPATIENT)
Dept: WOUND CARE | Facility: HOSPITAL | Age: 88
End: 2023-09-18

## 2023-09-18 NOTE — TELEPHONE ENCOUNTER
From: Elena Mtz  To: [facility] Roseanna Moreno  Sent: 9/18/2023 4:02 PM CDT  Subject: Photos    Attached are images from Monday, 9/18. Thank you.

## 2023-09-25 ENCOUNTER — OFFICE VISIT (OUTPATIENT)
Dept: WOUND CARE | Facility: HOSPITAL | Age: 88
End: 2023-09-25
Attending: STUDENT IN AN ORGANIZED HEALTH CARE EDUCATION/TRAINING PROGRAM
Payer: MEDICARE

## 2023-09-25 VITALS
HEART RATE: 88 BPM | TEMPERATURE: 99 F | RESPIRATION RATE: 16 BRPM | DIASTOLIC BLOOD PRESSURE: 75 MMHG | SYSTOLIC BLOOD PRESSURE: 115 MMHG

## 2023-09-25 DIAGNOSIS — I73.9 PAD (PERIPHERAL ARTERY DISEASE) (HCC): ICD-10-CM

## 2023-09-25 DIAGNOSIS — Z89.439 HISTORY OF TRANSMETATARSAL AMPUTATION OF FOOT (HCC): ICD-10-CM

## 2023-09-25 DIAGNOSIS — L97.512 FOOT ULCER, RIGHT, WITH FAT LAYER EXPOSED (HCC): Primary | ICD-10-CM

## 2023-09-25 PROCEDURE — 11042 DBRDMT SUBQ TIS 1ST 20SQCM/<: CPT | Performed by: PODIATRIST

## 2023-09-25 NOTE — PROGRESS NOTES
Patient ID: Triny Leigh is a 80year old male. Debridement Dorsal;Right   Wound 08/14/23 #2- right medial foot Dorsal;Right    Performed by: Jorge العلي DPM  Authorized by: Jorge العلي DPM      Consent   Consent obtained? verbal  Consent given by: patient  Risks discussed? procedural risks not discussed  Time out called at 9/25/2023 1:30 PM  Immediately prior to the procedure a time out was called and the performing provider verified the correct patient, procedure, equipment, support staff, and site/side marked as required. Debridement Details  Performed by: physician  Debridement type: surgical  Level of debridement: subcutaneous tissue  Pain control: none    Pre-debridement measurements  Length (cm): 1.5  Width (cm): 0.8  Depth (cm): 0.3  Surface Area (cm^2): 1.2    Post-debridement measurements  Length (cm): 1.6  Width (cm): 0.9  Depth (cm): 0.3  Percent debrided: 100%  Surface Area (cm^2): 1.44  Area Debrided (cm^2): 1.44  Volume (cm^3): 0.43    Tissue and other material debrided: subcutaneous tissue  Devitalized tissue debrided: biofilm, fibrin and slough  Instrument(s) utilized: nippers  Bleeding: none  Hemostasis obtained with: not applicable  Procedural pain (0-10): 0  Post-procedural pain: 0   Response to treatment: procedure was tolerated well    Debridement Old surgical Right Foot   Wound 08/14/23 #1- right foot Old surgical Foot Right    Performed by: Jorge العلي DPM  Authorized by: Jorge العلي DPM      Consent   Consent obtained? verbal  Consent given by: patient  Risks discussed? procedural risks discussed  Time out called at 9/25/2023 1:36 PM  Immediately prior to the procedure a time out was called and the performing provider verified the correct patient, procedure, equipment, support staff, and site/side marked as required.     Debridement Details  Performed by: physician  Debridement type: surgical  Level of debridement: subcutaneous tissue  Pain control: none    Pre-debridement measurements  Length (cm): 1.2  Width (cm): 5.6  Depth (cm): 1.1  Surface Area (cm^2): 6.72    Post-debridement measurements  Length (cm): 1.3  Width (cm): 5.7  Depth (cm): 1.1  Percent debrided: 50%  Surface Area (cm^2): 7.41  Area Debrided (cm^2): 3.71  Volume (cm^3): 8.15    Tissue and other material debrided: subcutaneous tissue  Devitalized tissue debrided: biofilm, fibrin and slough  Instrument(s) utilized: nippers  Bleeding: none  Hemostasis obtained with: not applicable  Procedural pain (0-10): 0  Post-procedural pain: 0   Response to treatment: procedure was tolerated well

## 2023-09-27 ENCOUNTER — HOSPITAL ENCOUNTER (OUTPATIENT)
Dept: INTERVENTIONAL RADIOLOGY/VASCULAR | Facility: HOSPITAL | Age: 88
Setting detail: OBSERVATION
Discharge: HOME | End: 2023-09-27
Attending: SURGERY | Admitting: SURGERY
Payer: MEDICARE

## 2023-09-27 ENCOUNTER — TELEPHONE (OUTPATIENT)
Dept: PODIATRY CLINIC | Facility: CLINIC | Age: 88
End: 2023-09-27

## 2023-09-27 VITALS
HEIGHT: 68 IN | OXYGEN SATURATION: 96 % | BODY MASS INDEX: 30.31 KG/M2 | RESPIRATION RATE: 20 BRPM | DIASTOLIC BLOOD PRESSURE: 86 MMHG | TEMPERATURE: 98 F | SYSTOLIC BLOOD PRESSURE: 112 MMHG | HEART RATE: 89 BPM | WEIGHT: 200 LBS

## 2023-09-27 DIAGNOSIS — I70.269 ATHEROSCLEROSIS OF ARTERY OF EXTREMITY WITH GANGRENE (HCC): ICD-10-CM

## 2023-09-27 PROCEDURE — 99152 MOD SED SAME PHYS/QHP 5/>YRS: CPT | Performed by: SURGERY

## 2023-09-27 PROCEDURE — 37228 HC TRANSLUMINAL ANGIO TIBIAL PERONEAL UNILAT INIT: CPT | Performed by: SURGERY

## 2023-09-27 PROCEDURE — 37224 HC TRANSLUMINAL ANGIOPLASTY FEM POP UNILATERAL: CPT | Performed by: SURGERY

## 2023-09-27 PROCEDURE — 99153 MOD SED SAME PHYS/QHP EA: CPT | Performed by: SURGERY

## 2023-09-27 PROCEDURE — 047R3ZZ DILATION OF RIGHT POSTERIOR TIBIAL ARTERY, PERCUTANEOUS APPROACH: ICD-10-PCS | Performed by: SURGERY

## 2023-09-27 PROCEDURE — B41F1ZZ FLUOROSCOPY OF RIGHT LOWER EXTREMITY ARTERIES USING LOW OSMOLAR CONTRAST: ICD-10-PCS | Performed by: SURGERY

## 2023-09-27 PROCEDURE — 047M3ZZ DILATION OF RIGHT POPLITEAL ARTERY, PERCUTANEOUS APPROACH: ICD-10-PCS | Performed by: SURGERY

## 2023-09-27 PROCEDURE — 37232 HC TRANSL ANGIOPLASTY TIBIAL PERONEAL UNIL EA ADDL: CPT | Performed by: SURGERY

## 2023-09-27 PROCEDURE — 75710 ARTERY X-RAYS ARM/LEG: CPT | Performed by: SURGERY

## 2023-09-27 PROCEDURE — 047T3ZZ DILATION OF RIGHT PERONEAL ARTERY, PERCUTANEOUS APPROACH: ICD-10-PCS | Performed by: SURGERY

## 2023-09-27 RX ORDER — SODIUM CHLORIDE 0.9 % (FLUSH) 0.9 %
10 SYRINGE (ML) INJECTION AS NEEDED
Status: CANCELLED | OUTPATIENT
Start: 2023-09-27

## 2023-09-27 RX ORDER — NITROGLYCERIN 20 MG/100ML
INJECTION INTRAVENOUS
Status: COMPLETED
Start: 2023-09-27 | End: 2023-09-27

## 2023-09-27 RX ORDER — SODIUM CHLORIDE 9 MG/ML
INJECTION, SOLUTION INTRAVENOUS CONTINUOUS
Status: DISCONTINUED | OUTPATIENT
Start: 2023-09-27 | End: 2023-09-27

## 2023-09-27 RX ORDER — LIDOCAINE HYDROCHLORIDE 20 MG/ML
INJECTION, SOLUTION EPIDURAL; INFILTRATION; INTRACAUDAL; PERINEURAL
Status: COMPLETED
Start: 2023-09-27 | End: 2023-09-27

## 2023-09-27 RX ORDER — BIVALIRUDIN 250 MG/5ML
INJECTION, POWDER, LYOPHILIZED, FOR SOLUTION INTRAVENOUS
Status: COMPLETED
Start: 2023-09-27 | End: 2023-09-27

## 2023-09-27 RX ORDER — CLOPIDOGREL BISULFATE 75 MG/1
TABLET ORAL
Status: COMPLETED
Start: 2023-09-27 | End: 2023-09-27

## 2023-09-27 RX ORDER — MIDAZOLAM HYDROCHLORIDE 1 MG/ML
INJECTION INTRAMUSCULAR; INTRAVENOUS
Status: COMPLETED
Start: 2023-09-27 | End: 2023-09-27

## 2023-09-27 RX ADMIN — SODIUM CHLORIDE: 9 INJECTION, SOLUTION INTRAVENOUS at 13:30:00

## 2023-09-27 NOTE — OPERATIVE REPORT
Vascular Surgery Op Note  Patients Name:  Yohan Rosen    Operating Physician: Arpit Powell MD                                                     Location:  OR                                               YOB: 1935      Operation Date:  09/27/2023           Pre-Operative Diagnosis:   1. 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 Ultrasound-guided access of the left common femoral artery  2. Selective catheterization third order branch of the right lower extremity  3. Right leg angiogram with radiologic supervision and interpretation  4. Ultrasound-guided access of the right posterior tibial artery  5. Angioplasty of the right posterior tibial artery with 3 mm balloon  6. Angioplasty of the right medial plantar with 3 mm balloon  7. Angioplasty of the right peroneal with 3 mm balloon  8. Angioplasty of the right popliteal with 3.5mm balloon. 9. Angioplasty of the right TP trunk with 3.5mm balloon. 10. Closure of  left groin with Pro-glide Perclose suture (6Fr). Surgeon:  Arpit Powell MD        Anesthesia:   An independent observer was present for the physiological monitoring and administration medication throughout the duration of the procedure under my direct supervision. Versed 1 mg  Fentanyl 75 mcg  Sedation time 90 minutes        EBL: 37RR     Complications: None     Drains: None     Findings: Widely patent aortoiliac vasculature. Common femoral, SFA, profunda widely patent. Popliteal, TP trunk and peroneal with high-grade stenosis. AT occluded. PT occluded with reconstitution of the medial plantar via collaterals from the peroneal.  Angioplasty performed with wide patency of the peroneal and PT to the foot with minimal residual stenosis in the proximal PT. Triphasic PT and peroneal at completion.   Patient maximally revascularized. Indications for procedure: This is a 60-year-old male who presented with atherosclerosis with gangrene withhe underwent previous intervention. He underwent TMA. Significant improvement of his wound however it had stalled. He underwent repeat ultrasound that revealed inadequate flow for wound healing and as such I recommended him for a right leg angiogram with the possibility for intervention. I discussed the risk and benefits of the procedure. Informed consent was directly obtained. On the morning of 09/27 the patient was brought to the Cath Lab and placed in supine position. Conscious sedation was initiated without any issues. The patient was subsequently prepped and draped in the usual sterile fashion. Timeout was performed. With the use the ultrasound the left common femoral was identified proximal to its bifurcation at the level of the femoral head. The skin and soft tissues were anesthetized and then a micropuncture needle was used to access the left common femoral with advancement of a microwire and exchanged for a micro sheath. Glidewire advantage was then put in place followed by placement of a 5 [place] sheath. Sheath shot angiogram deemed appropriate location of the access. The patient was systemically heparinized. A rim catheter was advanced up and over the bifurcation into the right  common femoral.  A Right leg angiogram was performed with the findings as listed above. As such I opted to proceed with intervention. A Glidewire advantage was then put in place and then exchanged for a 6 [place]ien sheath that was advanced up and over the bifurcation into the SFA. I then utilized a trailblazers catheter and Glidewire advantage to traverse the  distal popliteal into the peroneal.  Angioplasty was performed with 3.5 mm balloon from the popliteal, TP trunk into the peroneal.  Repeat angiogram revealed dramatic improvement with no further stenosis throughout the segment. However the PT was occluded with reconstitution of the medial plantar that was now readily visible. As such I opted to proceed with retrograde access. I utilized the ultrasound and micropuncture to access the distal right PT with advancement of a V18 wire and exchanged for a 5 [place] slender sheath. A trailblazer catheter and Glidewire advantage 018 was used to traverse the occluded PT and entered into the reconstituted segment. I then exchanged for a 3 mm balloon and performed 3 mm balloon angioplasty of the PT. I then redirected the wire from above into the medial plantar and performed angioplasty of the medial plantar and PT throughout its entirety with prolonged insufflation. Repeat angiogram revealed with wide patency except for minimal residual stenosis of the proximal PT. I opted to forego stenting. The flow in the foot was brisk with preferential flow now through the PT. Cici Alvarado At this point I was satisfied and opted to terminate the procedure. All catheters were withdrawn. I then deployed a Pro-glide Perclose suture which was then cinched locked and cut thereby closing the arteriotomy site. Manual pressure was held. Upon release there was evidence of excellent hemostasis and a sterile dressing was applied. The patient awoke from sedation and was taken to recovery in stable condition. All counts were correct at the end of the case.      Rosario Healy MD

## 2023-09-27 NOTE — TELEPHONE ENCOUNTER
Patient daughter stating Dr Clancy Leaks requesting for Patient to f/u with Dr Zoë Mcgrath urgently. No openings available. Please advise

## 2023-09-27 NOTE — PROGRESS NOTES
Pt came up from the cath lab. Site is clean, dry, and intact. Vital signs and neuro assessment checked per protocol. Plan is for discharge after cath recovery.

## 2023-09-27 NOTE — H&P
The above referenced H&P was reviewed by Nathan Mooney MD on 9/27/2023, the patient was examined and no significant changes have occurred in the patient's condition since the H&P was performed. Risks and benefits were discussed, proceed with procedure as planned.       Nathan Mooney MD  [address]  Vascular Surgery

## 2023-09-27 NOTE — TELEPHONE ENCOUNTER
Please see message and advise- It appears that patient is admitted to [facility] this evening around 5:30

## 2023-09-27 NOTE — IVS NOTE
Nathanyady Ruthylenin  [id number]  9/27/2023    Post procedure/ recovery hand-off report given to chandler WOODY. Patient's vital signs stable, access site dry and intact, no signs and symptoms of bleeding/ hematoma.  Discharge instructions given to care giver , his son Ajay Austin and him    Olga Humphreys, RN, RN

## 2023-09-27 NOTE — DISCHARGE INSTRUCTIONS
You may shower with soap and water starting on 09/28/2023 . No soaking in bath or pool for 2 weeks. You should resume all home medications as previously. Take tylenol as needed for pain. You will need to take aspirin, plavix and xarelto. No heavy lifting or straining for 2 weeks. You may then resume to normal activity. Drink plenty of fluids to stay well hydrated. You will need to followup with Dr. Annabelle Orellana in the vascular clinic in 4 weeks with ultrasound. Please make appt to see Dr. Gordon Rutledge as soon as possible and let him know this procedure was performed. If you have any fevers, chills, chest pain, shortness of breath, drainage, swelling or bleeding, please call the office in order to return to clinic sooner for evaluation.

## 2023-09-28 ENCOUNTER — OFFICE VISIT (OUTPATIENT)
Dept: PODIATRY CLINIC | Facility: CLINIC | Age: 88
End: 2023-09-28

## 2023-09-28 DIAGNOSIS — I96 GANGRENE OF RIGHT FOOT (HCC): ICD-10-CM

## 2023-09-28 DIAGNOSIS — Z47.89 ORTHOPEDIC AFTERCARE: ICD-10-CM

## 2023-09-28 DIAGNOSIS — L97.512 ULCER OF RIGHT FOOT WITH FAT LAYER EXPOSED (HCC): Primary | ICD-10-CM

## 2023-09-28 DIAGNOSIS — Z89.439 HISTORY OF TRANSMETATARSAL AMPUTATION OF FOOT (HCC): ICD-10-CM

## 2023-09-28 DIAGNOSIS — I96 GANGRENE (HCC): ICD-10-CM

## 2023-09-28 DIAGNOSIS — I73.9 PAD (PERIPHERAL ARTERY DISEASE) (HCC): ICD-10-CM

## 2023-09-28 DIAGNOSIS — M79.671 RIGHT FOOT PAIN: ICD-10-CM

## 2023-09-28 PROCEDURE — 99213 OFFICE O/P EST LOW 20 MIN: CPT | Performed by: STUDENT IN AN ORGANIZED HEALTH CARE EDUCATION/TRAINING PROGRAM

## 2023-09-28 NOTE — PROGRESS NOTES
Claude Horseman is a 80year old male. No chief complaint on file. HPI    Patient is a pleasant 77-year-old male with past medical history of PAD who presents to clinic for postop visit status post right foot TMA. He has dressings clean, dry, intact. He has been receiving dressing changes every other day with Josi and Hydrofera Blue. Patient is status post angioplasty with Dr. Brittany Carcamo yesterday. He recently started following with my partner  Dr. Angela Pride in the wound care center for wound care. He presents today for reevaluation. Review of Systems  Denies nausea, vomiting, fever, chills, shortness of breath, chest pain, and calf pain. Objective    Physical Exam          Photos from wound care. Full-thickness ulcerations x2 noted to the TMA incision site. Base is fibrotic. Sites to probe slightly but no bone is exposed. No purulence erythema, or other concerns. CFT intact to TMA flap. Vital Signs  There were no vitals filed for this visit.         Allergies    Heparin                 ANAPHYLAXIS  Hydromorphone           HALLUCINATION      Assessment    Encounter Diagnosis  Ulcer of right foot with fat layer exposed ([address]  (primary encounter diagnosis)  Right foot pain  Orthopedic aftercare  Gangrene ([address]  History of transmetatarsal amputation of foot ([address]  PAD (peripheral artery disease) ([address]  Gangrene of right foot ([address]    Problem List  Patient Active Problem List:     Closed minimally displaced zone I fracture of sacrum ([address]     At risk for falling     CAD (coronary artery disease)     Ischemic cardiomyopathy     Azotemia     Arrhythmia     Esophageal reflux     History of MI (myocardial infarction)     Mixed hyperlipidemia     Primary hypertension     Dizziness     Medication side effect     Closed left hip fracture ([address]  Global 6/22/2016     Status post hip surgery     Left shoulder distal clavical resection and excision of ganglion cyst of soft tissue mass   Global 09/17/2020     Orthopedic aftercare for healing traumatic hip fracture, left, closed     Closed left hip fracture, with routine healing, subsequent encounter     Osteoarthrosis, localized, primary, knee, left     Osteoarthrosis, localized, primary, knee, right     Gangrene ([facility]     PAD (peripheral artery disease) ([facility]     Benign prostatic hyperplasia with incomplete bladder emptying     Gangrene of foot ([facility]     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) ([facility]      Plan  Orders  Orders Placed This Encounter      [facility] AMB POD XR - RT FOOT 2 VIEWS(AP, LATERAL)WT BEARING    -Status post right lower extremity TMA. -Site inspected--noted to have improved appearance from last visit but still has concerns of ischemia/gangrene along incision. These areas are getting smaller and wound are without acute signs of infection.  -Patient is now status post an angioplasty with Dr. Will Espinoza performed yesterday. Discussed with patient that extra blood flow will hopefully help wounds heal more quickly. Discussed options including continuing wound care versus revisional transmetatarsal amputation with patient and family. They would like to avoid further surgery if possible at this time. -X-rays were obtained and reviewed. Transmetatarsal amputation site is stable and no erosions or other concerns are noted to remaining metatarsals.  -Recommend continuing dressing changes with Josi and Hydrofera Blue every other day. Patient will continue to follow with the wound care center. If symptoms worsen or fail to resolve we will consider additional surgical intervention.  -Sharply debrided nails x5 to left foot without incident.  -Patient will continue to follow closely with Dr. Samuel Nguyen at wound care and he and I will be in contact with each other regarding patient's plan.         Follow-Up  10/9 with Dr. Leanna Deras, DPM

## 2023-09-30 ENCOUNTER — PATIENT MESSAGE (OUTPATIENT)
Dept: PODIATRY CLINIC | Facility: CLINIC | Age: 88
End: 2023-09-30

## 2023-10-02 ENCOUNTER — PATIENT MESSAGE (OUTPATIENT)
Dept: PODIATRY CLINIC | Facility: CLINIC | Age: 88
End: 2023-10-02

## 2023-10-02 NOTE — TELEPHONE ENCOUNTER
From: Minh Ashton  To: Jenny Thomas Block  Sent: 9/30/2023 6:18 PM CDT  Subject: Photos    Attached are images from Saturday, 9/30. Thank you.

## 2023-10-04 ENCOUNTER — PATIENT MESSAGE (OUTPATIENT)
Dept: PODIATRY CLINIC | Facility: CLINIC | Age: 88
End: 2023-10-04

## 2023-10-09 ENCOUNTER — OFFICE VISIT (OUTPATIENT)
Dept: WOUND CARE | Facility: HOSPITAL | Age: 88
End: 2023-10-09
Attending: STUDENT IN AN ORGANIZED HEALTH CARE EDUCATION/TRAINING PROGRAM
Payer: MEDICARE

## 2023-10-09 VITALS
RESPIRATION RATE: 18 BRPM | HEART RATE: 108 BPM | SYSTOLIC BLOOD PRESSURE: 117 MMHG | TEMPERATURE: 98 F | DIASTOLIC BLOOD PRESSURE: 77 MMHG

## 2023-10-09 DIAGNOSIS — Z89.439 HISTORY OF TRANSMETATARSAL AMPUTATION OF FOOT (HCC): ICD-10-CM

## 2023-10-09 DIAGNOSIS — I73.9 PAD (PERIPHERAL ARTERY DISEASE) (HCC): ICD-10-CM

## 2023-10-09 DIAGNOSIS — L97.512 FOOT ULCER, RIGHT, WITH FAT LAYER EXPOSED (HCC): Primary | ICD-10-CM

## 2023-10-09 PROCEDURE — 11042 DBRDMT SUBQ TIS 1ST 20SQCM/<: CPT | Performed by: PODIATRIST

## 2023-10-09 NOTE — PATIENT INSTRUCTIONS
For Darcus Burkitt, DOB 1935    Date of Service 10/9/2023    -Change dressings every day with Santyl  -Remain nonweightbearing to right lower extremity in offloading shoe  -Contact our office with any questions/concerns  -If wound worsens and there are signs of infection, seek immediate medical attention    Follow up in 1 week with Dr. Sakina Altman

## 2023-10-09 NOTE — PROGRESS NOTES
Weekly Wound Education Note    Teaching Provided To: Patient; Family  Training Topics: Discharge instructions; Off-loading;Cleasing and general instructions;Dressing  Training Method: Demonstration;Explain/Verbal;Written  Training Response: Patient responds and understands        Notes: Cleanse right foot wound with saline or wound cleanser, apply honey gel today (Santyl at home) daily cover with gauze and kerlix, F Spanda . NPWT ordered today, please bring to next visit with canister and dressing.

## 2023-10-09 NOTE — PROGRESS NOTES
Patient ID: Isai Chu is a 80year old male. Debridement Old surgical Right Foot   Wound 08/14/23 #1- right foot Old surgical Foot Right    Performed by: Zulma Sparks DPM  Authorized by: Zulma Sparks DPM      Consent   Consent obtained? verbal  Consent given by: patient  Risks discussed? procedural risks discussed  Time out called at 10/9/2023 1:18 PM  Immediately prior to the procedure a time out was called and the performing provider verified the correct patient, procedure, equipment, support staff, and site/side marked as required. Debridement Details  Performed by: physician  Debridement type: surgical  Level of debridement: subcutaneous tissue  Pain control: lidocaine 4%  Pain control administration type: topical    Pre-debridement measurements  Length (cm): 1.6  Width (cm): 6  Depth (cm): 0.6  Surface Area (cm^2): 9.6    Post-debridement measurements  Length (cm): 1.7  Width (cm): 6.1  Depth (cm): 0.7  Percent debrided: 80%  Surface Area (cm^2): 10.37  Area Debrided (cm^2): 8.3  Volume (cm^3): 7.26    Tissue and other material debrided: subcutaneous tissue  Devitalized tissue debrided: biofilm, fibrin and slough  Instrument(s) utilized: curette and nippers  Bleeding: small  Hemostasis obtained with: not applicable  Procedural pain (0-10): 0  Post-procedural pain: 0   Response to treatment: procedure was tolerated well    Debridement Dorsal;Right   Wound 08/14/23 #2- right medial foot Dorsal;Right    Performed by: Zulma Sparks DPM  Authorized by: Zulma Sparks DPM      Consent   Consent obtained? verbal  Consent given by: patient  Risks discussed?  procedural risks discussed  Time out called at 10/9/2023 1:22 PM    Debridement Details  Performed by: physician  Debridement type: surgical  Level of debridement: subcutaneous tissue  Pain control administration type: topical    Pre-debridement measurements  Length (cm): 1.2  Width (cm): 0.7  Depth (cm): 0.8  Surface Area (cm^2): 0.84    Post-debridement measurements  Length (cm): 1.3  Width (cm): 1  Depth (cm): 1  Percent debrided: 100%  Surface Area (cm^2): 1.3  Area Debrided (cm^2): 1.3  Volume (cm^3): 1.3    Tissue and other material debrided: subcutaneous tissue  Devitalized tissue debrided: biofilm, fibrin and slough  Instrument(s) utilized: nippers and curette  Bleeding: small  Hemostasis obtained with: not applicable  Procedural pain (0-10): 0  Post-procedural pain: 0   Response to treatment: procedure was tolerated well

## 2023-10-11 ENCOUNTER — PATIENT MESSAGE (OUTPATIENT)
Dept: PODIATRY CLINIC | Facility: CLINIC | Age: 88
End: 2023-10-11

## 2023-10-11 DIAGNOSIS — L97.512 FOOT ULCER, RIGHT, WITH FAT LAYER EXPOSED (HCC): ICD-10-CM

## 2023-10-12 ENCOUNTER — PATIENT MESSAGE (OUTPATIENT)
Dept: PODIATRY CLINIC | Facility: CLINIC | Age: 88
End: 2023-10-12

## 2023-10-13 ENCOUNTER — PATIENT MESSAGE (OUTPATIENT)
Dept: PODIATRY CLINIC | Facility: CLINIC | Age: 88
End: 2023-10-13

## 2023-10-15 ENCOUNTER — PATIENT MESSAGE (OUTPATIENT)
Dept: PODIATRY CLINIC | Facility: CLINIC | Age: 88
End: 2023-10-15

## 2023-10-16 ENCOUNTER — OFFICE VISIT (OUTPATIENT)
Dept: WOUND CARE | Facility: HOSPITAL | Age: 88
End: 2023-10-16
Attending: STUDENT IN AN ORGANIZED HEALTH CARE EDUCATION/TRAINING PROGRAM
Payer: MEDICARE

## 2023-10-16 VITALS
TEMPERATURE: 98 F | SYSTOLIC BLOOD PRESSURE: 123 MMHG | DIASTOLIC BLOOD PRESSURE: 86 MMHG | RESPIRATION RATE: 17 BRPM | HEART RATE: 83 BPM

## 2023-10-16 DIAGNOSIS — L97.512 FOOT ULCER, RIGHT, WITH FAT LAYER EXPOSED (HCC): Primary | ICD-10-CM

## 2023-10-16 DIAGNOSIS — I73.9 PAD (PERIPHERAL ARTERY DISEASE) (HCC): ICD-10-CM

## 2023-10-16 DIAGNOSIS — Z89.439 HISTORY OF TRANSMETATARSAL AMPUTATION OF FOOT (HCC): ICD-10-CM

## 2023-10-16 PROCEDURE — 11042 DBRDMT SUBQ TIS 1ST 20SQCM/<: CPT | Performed by: PODIATRIST

## 2023-10-16 NOTE — PROGRESS NOTES
Patient ID: Deonte Harkins is a 80year old male. Debridement Old surgical Right Foot   Wound 08/14/23 #1- right foot Old surgical Foot Right    Performed by: Collette Trejo DPM  Authorized by: Collette Trejo DPM      Consent   Consent obtained? verbal  Consent given by: patient  Risks discussed? procedural risks discussed  Time out called at 10/16/2023 9:09 AM  Immediately prior to the procedure a time out was called and the performing provider verified the correct patient, procedure, equipment, support staff, and site/side marked as required. Debridement Details  Performed by: physician  Debridement type: surgical  Level of debridement: subcutaneous tissue  Pain control: lidocaine 4%    Pre-debridement measurements  Length (cm): 1.5  Width (cm): 5.3  Depth (cm): 1  Surface Area (cm^2): 7.95    Post-debridement measurements  Length (cm): 1.6  Width (cm): 5.4  Depth (cm): 1  Percent debrided: 100%  Surface Area (cm^2): 8.64  Area Debrided (cm^2): 8.64  Volume (cm^3): 8.64    Tissue and other material debrided: subcutaneous tissue  Devitalized tissue debrided: biofilm, necrotic debris and slough  Instrument(s) utilized: nippers  Bleeding: small  Hemostasis obtained with: not applicable  Procedural pain (0-10): 0  Post-procedural pain: 0   Response to treatment: procedure was tolerated well    Debridement Dorsal;Right   Wound 08/14/23 #2- right medial foot Dorsal;Right    Performed by: Collette Trejo DPM  Authorized by: Collette Trejo DPM      Consent   Consent obtained? verbal  Consent given by: patient  Risks discussed? procedural risks discussed  Time out called at 10/16/2023 9:11 AM  Immediately prior to the procedure a time out was called and the performing provider verified the correct patient, procedure, equipment, support staff, and site/side marked as required.     Debridement Details  Performed by: physician  Debridement type: surgical  Level of debridement: subcutaneous tissue  Pain control: lidocaine 4%    Pre-debridement measurements  Length (cm): 1.1 (slight angle)  Width (cm): 0.4  Depth (cm): 0.8  Surface Area (cm^2): 0.44    Post-debridement measurements  Length (cm): 1.2  Width (cm): 0.5  Depth (cm): 0.8  Percent debrided: 100%  Surface Area (cm^2): 0.6  Area Debrided (cm^2): 0.6  Volume (cm^3): 0.48    Tissue and other material debrided: subcutaneous tissue  Devitalized tissue debrided: fibrin and slough  Instrument(s) utilized: nippers  Bleeding: small  Hemostasis obtained with: not applicable  Procedural pain (0-10): 0  Post-procedural pain: 0   Response to treatment: procedure was tolerated well

## 2023-10-16 NOTE — PROGRESS NOTES
Weekly Wound Education Note    Teaching Provided To: Patient; Family  Training Topics: Discharge instructions;Dressing;Negative presssure therapy;Signs and symptoms of infection  Training Method: Demonstration;Explain/Verbal;Written  Training Response: Patient responds and understands; Reinforcement needed        Notes: Endoform to wound bed, protect periwound with vac drape. Negative pressure wound VAC 125mm/hg continuously, changed 3 times per week. Home health RN to change NPWT dressing changes. Reviewed s/s infection with patient/family. Reviewed NPWT /Alarm troubleshooting.

## 2023-10-16 NOTE — PATIENT INSTRUCTIONS
For KAYLA Tong 1935    Date of Service 10/16/2023    -Home care to change wound VAC Wednesday and Friday  -Remain minimally weightbearing in offloading surgical shoe  -Contact our office with any questions/concerns  -If wound worsens and there are signs of infection, seek immediate medical attention    Follow up in 1 week with Dr. Vy Villalobos

## 2023-10-18 ENCOUNTER — APPOINTMENT (OUTPATIENT)
Dept: WOUND CARE | Facility: HOSPITAL | Age: 88
End: 2023-10-18
Attending: PODIATRIST
Payer: MEDICARE

## 2023-10-18 ENCOUNTER — TELEPHONE (OUTPATIENT)
Dept: WOUND CARE | Facility: HOSPITAL | Age: 88
End: 2023-10-18

## 2023-10-18 NOTE — TELEPHONE ENCOUNTER
Spoke to the daughter today to cancel his dad's nurse visit today. Home health nurse will be there at 2:00 to change his dressing. patient schedule with Dr. Maryanne De La Cruz on Monday at 8:30.

## 2023-10-19 NOTE — TELEPHONE ENCOUNTER
Rx request for Santyl, please review and sign off if appropriate. Thank you.      Last seen: 10/16/23  Last refill: 8/14/23 # 30g with 2 refills

## 2023-10-20 RX ORDER — COLLAGENASE SANTYL 250 [ARB'U]/G
OINTMENT TOPICAL
Qty: 30 G | Refills: 0 | Status: SHIPPED | OUTPATIENT
Start: 2023-10-20

## 2023-10-23 ENCOUNTER — OFFICE VISIT (OUTPATIENT)
Dept: WOUND CARE | Facility: HOSPITAL | Age: 88
End: 2023-10-23
Attending: STUDENT IN AN ORGANIZED HEALTH CARE EDUCATION/TRAINING PROGRAM
Payer: MEDICARE

## 2023-10-23 VITALS — HEART RATE: 91 BPM | SYSTOLIC BLOOD PRESSURE: 130 MMHG | DIASTOLIC BLOOD PRESSURE: 88 MMHG | TEMPERATURE: 98 F

## 2023-10-23 DIAGNOSIS — I25.10 CORONARY ARTERY DISEASE INVOLVING NATIVE CORONARY ARTERY OF NATIVE HEART WITHOUT ANGINA PECTORIS: ICD-10-CM

## 2023-10-23 DIAGNOSIS — L97.512 FOOT ULCER, RIGHT, WITH FAT LAYER EXPOSED (HCC): Primary | ICD-10-CM

## 2023-10-23 DIAGNOSIS — I73.9 PAD (PERIPHERAL ARTERY DISEASE) (HCC): ICD-10-CM

## 2023-10-23 DIAGNOSIS — Z89.439 HISTORY OF TRANSMETATARSAL AMPUTATION OF FOOT (HCC): ICD-10-CM

## 2023-10-23 DIAGNOSIS — I65.23 ASYMPTOMATIC CAROTID ARTERY STENOSIS, BILATERAL: ICD-10-CM

## 2023-10-23 DIAGNOSIS — E78.00 PURE HYPERCHOLESTEROLEMIA: ICD-10-CM

## 2023-10-23 PROCEDURE — 11042 DBRDMT SUBQ TIS 1ST 20SQCM/<: CPT | Performed by: PODIATRIST

## 2023-10-23 RX ORDER — METOPROLOL SUCCINATE 25 MG/1
TABLET, EXTENDED RELEASE ORAL
Qty: 90 TABLET | Refills: 0 | Status: SHIPPED | OUTPATIENT
Start: 2023-10-23

## 2023-10-23 NOTE — PATIENT INSTRUCTIONS
For Jg Carbajal, KAYLA 1935    Date of Service 10/23/2023    -Change dressings every other day with Betadine to periwound and honey.   We will plan to restart wound VAC next Monday  -Remain nonweightbearing in offloading surgical shoe  -Contact our office with any questions/concerns  -If wound worsens and there are signs of infection, seek immediate medical attention    Follow up in 1 week with Dr. Rachel Steele

## 2023-10-23 NOTE — PROGRESS NOTES
Patient ID: Jeff Pettit is a 80year old male. Debridement Old surgical Right Foot   Wound 08/14/23 #1- right foot Old surgical Foot Right    Performed by: Alcides Merino DPM  Authorized by: Alcides Merino DPM      Consent   Consent obtained? verbal  Consent given by: patient  Risks discussed? procedural risks not discussed  Time out called at 10/23/2023 8:52 AM  Immediately prior to the procedure a time out was called and the performing provider verified the correct patient, procedure, equipment, support staff, and site/side marked as required. Debridement Details  Performed by: physician  Debridement type: surgical  Level of debridement: subcutaneous tissue    Pre-debridement measurements  Length (cm): 1.4  Width (cm): 4.9  Depth (cm): 1  Surface Area (cm^2): 6.86    Post-debridement measurements  Length (cm): 1.5  Width (cm): 4.9  Depth (cm): 1  Percent debrided: 100%  Surface Area (cm^2): 7.35  Area Debrided (cm^2): 7.35  Volume (cm^3): 7.35    Tissue and other material debrided: subcutaneous tissue  Devitalized tissue debrided: biofilm, fibrin and slough  Instrument(s) utilized: nippers  Bleeding: small  Hemostasis obtained with: not applicable  Procedural pain (0-10): 0  Post-procedural pain: 0   Response to treatment: procedure was tolerated well    Debridement Dorsal;Right   Wound 08/14/23 #2- right medial foot Dorsal;Right    Performed by: Alcides Merino DPM  Authorized by: Alcides Merino DPM      Consent   Consent obtained? verbal  Consent given by: patient  Risks discussed? procedural risks discussed  Time out called at 10/23/2023 8:56 AM  Immediately prior to the procedure a time out was called and the performing provider verified the correct patient, procedure, equipment, support staff, and site/side marked as required.     Debridement Details  Performed by: physician  Debridement type: surgical  Level of debridement: subcutaneous tissue  Pain control: lidocaine 4%    Pre-debridement measurements  Length (cm): 0.6  Width (cm): 0.5  Depth (cm): 0.9  Surface Area (cm^2): 0.3    Post-debridement measurements  Length (cm): 0.7  Width (cm): 0.6  Depth (cm): 1  Percent debrided: 100%  Surface Area (cm^2): 0.42  Area Debrided (cm^2): 0.42  Volume (cm^3): 0.42    Tissue and other material debrided: subcutaneous tissue  Devitalized tissue debrided: biofilm, fibrin and slough  Instrument(s) utilized: nippers  Bleeding: small  Hemostasis obtained with: not applicable  Procedural pain (0-10): 0  Post-procedural pain: 0   Response to treatment: procedure was tolerated well

## 2023-10-30 ENCOUNTER — OFFICE VISIT (OUTPATIENT)
Dept: WOUND CARE | Facility: HOSPITAL | Age: 88
End: 2023-10-30
Attending: PODIATRIST
Payer: MEDICARE

## 2023-10-30 VITALS
HEART RATE: 91 BPM | TEMPERATURE: 98 F | SYSTOLIC BLOOD PRESSURE: 143 MMHG | RESPIRATION RATE: 18 BRPM | DIASTOLIC BLOOD PRESSURE: 93 MMHG

## 2023-10-30 DIAGNOSIS — L97.512 FOOT ULCER, RIGHT, WITH FAT LAYER EXPOSED (HCC): Primary | ICD-10-CM

## 2023-10-30 DIAGNOSIS — Z89.439 HISTORY OF TRANSMETATARSAL AMPUTATION OF FOOT (HCC): ICD-10-CM

## 2023-10-30 DIAGNOSIS — I73.9 PAD (PERIPHERAL ARTERY DISEASE) (HCC): ICD-10-CM

## 2023-10-30 PROCEDURE — 11042 DBRDMT SUBQ TIS 1ST 20SQCM/<: CPT | Performed by: PODIATRIST

## 2023-10-30 RX ORDER — COLLAGENASE SANTYL 250 [ARB'U]/G
1 OINTMENT TOPICAL DAILY
Qty: 30 G | Refills: 2 | Status: SHIPPED | OUTPATIENT
Start: 2023-10-30

## 2023-10-30 NOTE — PROGRESS NOTES
Patient ID: Antonio Jones is a 80year old male. Debridement Old surgical Right Foot   Wound 08/14/23 #1- right foot Old surgical Foot Right    Performed by: Trevon Coates DPM  Authorized by: Trevon Coates DPM      Consent   Consent obtained? verbal  Consent given by: patient  Risks discussed? procedural risks discussed  Time out called at 10/30/2023 9:16 AM  Immediately prior to the procedure a time out was called and the performing provider verified the correct patient, procedure, equipment, support staff, and site/side marked as required. Debridement Details  Performed by: physician  Debridement type: surgical  Level of debridement: subcutaneous tissue  Pain control: lidocaine 4%  Pain control administration type: topical    Pre-debridement measurements  Length (cm): 1.5  Width (cm): 4.9  Depth (cm): 1  Surface Area (cm^2): 7.35    Post-debridement measurements  Length (cm): 1.6  Width (cm): 4.9  Depth (cm): 1  Percent debrided: 100%  Surface Area (cm^2): 7.84  Area Debrided (cm^2): 7.84  Volume (cm^3): 7.84    Tissue and other material debrided: subcutaneous tissue  Devitalized tissue debrided: biofilm, necrotic debris and slough  Instrument(s) utilized: curette  Bleeding: small  Hemostasis obtained with: not applicable  Procedural pain (0-10): 0  Post-procedural pain: 0   Response to treatment: procedure was tolerated well    Debridement Dorsal;Right   Wound 08/14/23 #2- right medial foot Dorsal;Right    Performed by: Trevon Coates DPM  Authorized by: Trevon Coates DPM      Consent   Consent obtained? verbal  Consent given by: patient  Risks discussed? procedural risks discussed  Time out called at 10/30/2023 9:17 AM  Immediately prior to the procedure a time out was called and the performing provider verified the correct patient, procedure, equipment, support staff, and site/side marked as required.     Debridement Details  Performed by: physician  Debridement type: surgical  Level of debridement: subcutaneous tissue  Pain control: lidocaine 4%  Pain control administration type: topical    Pre-debridement measurements  Length (cm): 0.4  Width (cm): 0.8 (angled)  Depth (cm): 1  Surface Area (cm^2): 0.32    Post-debridement measurements  Length (cm): 0.5  Width (cm): 0.8  Depth (cm): 1  Percent debrided: 100%  Surface Area (cm^2): 0.4  Area Debrided (cm^2): 0.4  Volume (cm^3): 0.4    Tissue and other material debrided: subcutaneous tissue  Devitalized tissue debrided: exudate  Instrument(s) utilized: curette and nippers  Bleeding: small  Hemostasis obtained with: not applicable  Procedural pain (0-10): 0  Post-procedural pain: 0   Response to treatment: procedure was tolerated well

## 2023-10-30 NOTE — PROGRESS NOTES
Weekly Wound Education Note    Teaching Provided To: Patient; Family  Training Topics: Negative presssure therapy; Off-loading; Discharge instructions  Training Method: Demonstration;Explain/Verbal;Written  Training Response: Patient responds and understands        Notes: Cleanse right foot wound with saline or wound cleanser, apply endoform to open area. picture frame intact skin with vac drape to protect. NPWT at 125 mmHg continuouslt. Change Vac dressing 3 times weekly. Reviewed s/s acute infection and to seek immediate medical attention if concerns arise.

## 2023-11-03 ENCOUNTER — PATIENT MESSAGE (OUTPATIENT)
Dept: PODIATRY CLINIC | Facility: CLINIC | Age: 88
End: 2023-11-03

## 2023-11-06 ENCOUNTER — OFFICE VISIT (OUTPATIENT)
Dept: WOUND CARE | Facility: HOSPITAL | Age: 88
End: 2023-11-06
Attending: PODIATRIST
Payer: MEDICARE

## 2023-11-06 ENCOUNTER — CASE MANAGEMENT (OUTPATIENT)
Dept: CARE COORDINATION | Age: 88
End: 2023-11-06

## 2023-11-06 VITALS
SYSTOLIC BLOOD PRESSURE: 114 MMHG | RESPIRATION RATE: 19 BRPM | DIASTOLIC BLOOD PRESSURE: 75 MMHG | TEMPERATURE: 98 F | HEART RATE: 88 BPM

## 2023-11-06 DIAGNOSIS — I73.9 PAD (PERIPHERAL ARTERY DISEASE) (HCC): ICD-10-CM

## 2023-11-06 DIAGNOSIS — L97.512 FOOT ULCER, RIGHT, WITH FAT LAYER EXPOSED (HCC): Primary | ICD-10-CM

## 2023-11-06 DIAGNOSIS — Z89.439 HISTORY OF TRANSMETATARSAL AMPUTATION OF FOOT (HCC): ICD-10-CM

## 2023-11-06 PROCEDURE — 11042 DBRDMT SUBQ TIS 1ST 20SQCM/<: CPT | Performed by: PODIATRIST

## 2023-11-06 NOTE — PROGRESS NOTES
Patient ID: Jeff Pettit is a 80year old male. Debridement Old surgical Right Foot   Wound 08/14/23 #1- right foot Old surgical Foot Right    Performed by: Alcides Merino DPM  Authorized by: Alcides Merino DPM      Consent   Consent obtained? verbal  Consent given by: patient  Risks discussed? procedural risks discussed  Time out called at 11/6/2023 3:39 PM  Immediately prior to the procedure a time out was called and the performing provider verified the correct patient, procedure, equipment, support staff, and site/side marked as required. Debridement Details  Performed by: physician  Debridement type: surgical  Level of debridement: subcutaneous tissue  Pain control: lidocaine 4%  Pain control administration type: topical    Pre-debridement measurements  Length (cm): 1.4  Width (cm): 4.5  Depth (cm): 0.9  Surface Area (cm^2): 6.3    Post-debridement measurements  Length (cm): 1.5  Width (cm): 4.5  Depth (cm): 0.9  Percent debrided: 100%  Surface Area (cm^2): 6.75  Area Debrided (cm^2): 6.75  Volume (cm^3): 6.08    Tissue and other material debrided: subcutaneous tissue  Devitalized tissue debrided: biofilm, fibrin and slough  Instrument(s) utilized: nippers  Bleeding: small  Hemostasis obtained with: not applicable  Procedural pain (0-10): 0  Post-procedural pain: 0   Response to treatment: procedure was tolerated well    Debridement Dorsal;Right   Wound 08/14/23 #2- right medial foot Dorsal;Right    Performed by: Alcides Merino DPM  Authorized by: Alcides Merion DPM      Consent   Consent obtained? verbal  Consent given by: patient  Risks discussed? procedural risks discussed  Time out called at 11/6/2023 3:41 PM  Immediately prior to the procedure a time out was called and the performing provider verified the correct patient, procedure, equipment, support staff, and site/side marked as required.     Debridement Details  Performed by: physician  Debridement type: surgical  Level of debridement: subcutaneous tissue  Pain control: lidocaine 4%  Pain control administration type: topical    Pre-debridement measurements  Length (cm): 0.8  Width (cm): 0.3  Depth (cm): 0.9  Surface Area (cm^2): 0.24    Post-debridement measurements  Length (cm): 0.9  Width (cm): 0.4  Depth (cm): 0.9  Percent debrided: 100%  Surface Area (cm^2): 0.36  Area Debrided (cm^2): 0.36  Volume (cm^3): 0.32    Tissue and other material debrided: subcutaneous tissue  Devitalized tissue debrided: biofilm, fibrin and slough  Instrument(s) utilized: nippers  Bleeding: small  Hemostasis obtained with: not applicable  Procedural pain (0-10): 0  Post-procedural pain: 0   Response to treatment: procedure was tolerated well

## 2023-11-07 ENCOUNTER — CASE MANAGEMENT (OUTPATIENT)
Dept: CARE COORDINATION | Age: 88
End: 2023-11-07

## 2023-11-07 ENCOUNTER — E-ADVICE (OUTPATIENT)
Dept: CARDIOLOGY | Age: 88
End: 2023-11-07

## 2023-11-07 SDOH — SOCIAL STABILITY: SOCIAL NETWORK
HOW OFTEN DO YOU SEE OR TALK TO PEOPLE THAT YOU CARE ABOUT AND FEEL CLOSE TO? (FOR EXAMPLE: TALKING TO FRIENDS ON THE PHONE, VISITING FRIENDS OR FAMILY, GOING TO CHURCH OR CLUB MEETINGS): 5 OR MORE TIMES A WEEK

## 2023-11-07 SDOH — ECONOMIC STABILITY: GENERAL

## 2023-11-07 SDOH — ECONOMIC STABILITY: HOUSING INSECURITY: ARE YOU WORRIED ABOUT LOSING YOUR HOUSING?: NO

## 2023-11-07 SDOH — ECONOMIC STABILITY: HOUSING INSECURITY: DO YOU HAVE STABLE HOUSING?: STABLE

## 2023-11-07 ASSESSMENT — PATIENT HEALTH QUESTIONNAIRE - PHQ9
CLINICAL INTERPRETATION OF PHQ2 SCORE: NO FURTHER SCREENING NEEDED
2. FEELING DOWN, DEPRESSED OR HOPELESS: NOT AT ALL
SUM OF ALL RESPONSES TO PHQ9 QUESTIONS 1 AND 2: 0
SUM OF ALL RESPONSES TO PHQ9 QUESTIONS 1 AND 2: 0
1. LITTLE INTEREST OR PLEASURE IN DOING THINGS: NOT AT ALL

## 2023-11-07 ASSESSMENT — ACTIVITIES OF DAILY LIVING (ADL)
EATING: INDEPENDENT
DRESSING: INDEPENDENT
FUNCTIONAL_EVALUATION: PERFORMS ADLS WITH ASSISTANCE
TOILETING: INDEPENDENT
BATHING: INDEPENDENT
MOBILITY: GAIT IMPAIRMENT
GROOMING: INDEPENDENT
AMBULATION: WITH DEVICE

## 2023-11-07 ASSESSMENT — SLEEP AND FATIGUE QUESTIONNAIRES
HOURS OF UNINTERRUPTED SLEEP: -5
SLEEP DESCRIPTORS: WDL (ABSENCE OF SYMPTOMS)

## 2023-11-07 NOTE — PROGRESS NOTES
Weekly Wound Education Note    Teaching Provided To: Patient; Family  Training Topics: Discharge instructions;Signs and symptoms of infection;Cleasing and general instructions;Dressing  Training Method: Demonstration;Explain/Verbal;Written  Training Response: Patient responds and understands        Notes: Cleanse right foot wounds with saline or wound cleanser, apply nati into depth of wounds, wound held together with steri strips  covered with hydrofera transfer, abd, kerlix and surgical shoe. Change every other day and as needed. NPWT on hold this week.

## 2023-11-08 ENCOUNTER — CASE MANAGEMENT (OUTPATIENT)
Dept: CARE COORDINATION | Age: 88
End: 2023-11-08

## 2023-11-08 ENCOUNTER — TELEPHONE (OUTPATIENT)
Dept: PODIATRY CLINIC | Facility: CLINIC | Age: 88
End: 2023-11-08

## 2023-11-08 NOTE — TELEPHONE ENCOUNTER
Pt has an issue with foot healing after amputation - PCP wants him to have x ray done - son asking if pt can come into office soon to have it done instead of going to hospital

## 2023-11-09 ENCOUNTER — OFFICE VISIT (OUTPATIENT)
Dept: PODIATRY CLINIC | Facility: CLINIC | Age: 88
End: 2023-11-09
Payer: MEDICARE

## 2023-11-09 DIAGNOSIS — I73.9 PAD (PERIPHERAL ARTERY DISEASE) (HCC): ICD-10-CM

## 2023-11-09 DIAGNOSIS — L97.512 FOOT ULCER, RIGHT, WITH FAT LAYER EXPOSED (HCC): Primary | ICD-10-CM

## 2023-11-09 DIAGNOSIS — Z89.439 HISTORY OF TRANSMETATARSAL AMPUTATION OF FOOT (HCC): ICD-10-CM

## 2023-11-09 DIAGNOSIS — I96 GANGRENE (HCC): ICD-10-CM

## 2023-11-09 DIAGNOSIS — M79.671 RIGHT FOOT PAIN: ICD-10-CM

## 2023-11-09 DIAGNOSIS — Z47.89 ORTHOPEDIC AFTERCARE: ICD-10-CM

## 2023-11-09 DIAGNOSIS — L97.512 ULCER OF RIGHT FOOT WITH FAT LAYER EXPOSED (HCC): ICD-10-CM

## 2023-11-09 PROCEDURE — 99213 OFFICE O/P EST LOW 20 MIN: CPT | Performed by: STUDENT IN AN ORGANIZED HEALTH CARE EDUCATION/TRAINING PROGRAM

## 2023-11-09 NOTE — PROGRESS NOTES
[address] [facility] Podiatry  Progress Note    Markus Samuel is a 80year old male. Chief Complaint   Patient presents with    Follow - Up     Follow up right foot. Patient denies any pain. HPI:      Markus Samuel is a pleasant 80year old male with past medical history of PAD who is returning to clinic today for recheck of right foot. He is accompanied by his son and daughter-in-law today. Patient was recently using negative pressure wound therapy for his right foot wound. This was discontinued as site became macerated. They are now applying Josi and Hydrofera Blue. He is following with Dr. Sameer Cobb in the wound clinic and making good progress. He presents today for x-rays as he cannot obtain these in the wound clinic. No other complaints are mentioned. Allergies: Heparin and Hydromorphone   Current Outpatient Medications   Medication Sig Dispense Refill    collagenase (SANTYL) 250 UNIT/GM External Ointment Apply 1 g topically daily. 30 g 2    collagenase (SANTYL) 250 UNIT/GM External Ointment Apply topically to ulcer site daily 30 g 0    atorvastatin 40 MG Oral Tab Take 1 tablet (40 mg total) by mouth daily. 30 tablet 0    finasteride 5 MG Oral Tab Take 1 tablet (5 mg total) by mouth daily. 30 tablet 0    clopidogrel 75 MG Oral Tab Take 1 tablet (75 mg total) by mouth daily. 30 tablet 0    rivaroxaban 2.5 MG Oral Tab Take 1 tablet (2.5 mg total) by mouth 2 (two) times daily with meals. 60 tablet 0    gabapentin 300 MG Oral Cap Take 1 capsule (300 mg total) by mouth 3 (three) times daily. 90 capsule 0    furosemide 40 MG Oral Tab Take 1 tablet (40 mg total) by mouth daily. predniSONE 5 MG Oral Tab Take 3 tablets (15 mg total) by mouth daily. folic acid 1 MG Oral Tab Take 1 tablet (1 mg total) by mouth daily. acetaminophen 500 MG Oral Tab Take 2 tablets (1,000 mg total) by mouth every 8 (eight) hours.  21 tablet 0    metoprolol succinate ER 25 MG Oral Tablet 24 Hr Take 1 tablet (25 mg total) by mouth daily. aspirin EC 81 MG Oral Tab EC Take 1 tablet (81 mg total) by mouth daily. Omeprazole 40 MG Oral Capsule Delayed Release Take 1 capsule (40 mg total) by mouth daily. HYDROcodone-acetaminophen (NORCO) 5-325 MG Oral Tab Take 1 tablet by mouth every 6 (six) hours as needed for Pain. 20 tablet 0    HYDROcodone-acetaminophen (NORCO) 5-325 MG Oral Tab Take 1 tablet by mouth every 4 (four) hours as needed for Pain. 20 tablet 0    lisinopril 2.5 MG Oral Tab Take 1 tablet (2.5 mg total) by mouth daily. (Patient not taking: Reported on 9/26/2023)        Past Medical History:   Diagnosis Date    Arrhythmia     Cardiomyopathy ([facility] [address]     Esophageal reflux     Hearing impairment     Heart attack ([facility] [address]     High blood pressure     High cholesterol     History of MI (myocardial infarction)     Other and unspecified hyperlipidemia     Unspecified essential hypertension     Visual impairment       Past Surgical History:   Procedure Laterality Date    ANGIOGRAM      ANGIOPLASTY (CORONARY)      CABG      FRACTURE SURGERY Left     left hip pinning    OTHER SURGICAL HISTORY Left 01/01/1977    knee surgery    OTHER SURGICAL HISTORY  03/25/2016    Left hip ORIF pinning      Family History   Problem Relation Age of Onset    Cancer Father       Social History     Socioeconomic History    Marital status:     Tobacco Use    Smoking status: Never    Smokeless tobacco: Never   Vaping Use    Vaping Use: Never used   Substance and Sexual Activity    Alcohol use: No    Drug use: No           REVIEW OF SYSTEMS:     Today reviewed systems as documented below  GENERAL HEALTH: feels well otherwise  SKIN: denies any unusual skin lesions or rashes  RESPIRATORY: denies shortness of breath with exertion  CARDIOVASCULAR: denies chest pain on exertion  GI: denies abdominal pain and denies heartburn  NEURO: denies headaches  MUSCULO: history of arthritis       EXAM:   There were no vitals taken for this visit.  GENERAL: well developed, well nourished, in no apparent distress  EXTREMITIES:     1. Integument: Normal skin temperature and turgor. Wound measures 4.5 x 1.5 x 0.5 cm. Base is fibrogranular. No exposed bone. Small opening medially that does probe deeply. No purulence or acute signs of infection appreciated. Mild irritation/redness to the skin but no warmth or acute signs of infection appreciated. 2. Vascular: Unable to palpate pedal pulses. 3. Musculoskeletal: Status post midfoot amputation. Strength testing deferred. 4. Neurological: Decreased protective sensation noted to lower extremities. ASSESSMENT AND PLAN:   Diagnoses and all orders for this visit:    Foot ulcer, right, with fat layer exposed ([address]  -     [facility] AMB POD XR - RT FOOT 3 VIEWS(AP,LAT,OBLIQUE)WT BEARING    Right foot pain    Orthopedic aftercare    Gangrene ([address]    History of transmetatarsal amputation of foot ([address]    Ulcer of right foot with fat layer exposed ([address]    PAD (peripheral artery disease) ([address]        Plan:    -Patient examined, chart history reviewed.  -Wound inspected--main surgical wound to right foot remains fibrogranular. There is tunneling noted to the medial foot wound but no definitive probing to bone. Would try not to probe site excessively so it can hopefully heal.  We will need to closely monitor site as he is at risk for developing bone infection. X-rays were obtained and reviewed and no erosions or other concerns were noted.   Will discuss with Dr. Karo Jensen.  -No debridement performed today as there was debridement on Monday.  -Today I dressed with bacitracin and dry dressing but patient should continue dressing changes outlined by Dr. Karo Jensen.  -Dr. Karo Jensen dressed wound sites with Josi and covered with Hydrofera Blue and a dry dressing.  -Home health to change dressings every other day in the same fashion  -Continue following with Dr. Beto Barnes.  -Educated on signs of infection and encouraged patient to seek immediate medical attention if noticing any of these signs. The patient indicates understanding of these issues and agrees to the plan.       Ne Arango DPM

## 2023-11-12 ENCOUNTER — PATIENT MESSAGE (OUTPATIENT)
Dept: PODIATRY CLINIC | Facility: CLINIC | Age: 88
End: 2023-11-12

## 2023-11-13 ENCOUNTER — OFFICE VISIT (OUTPATIENT)
Dept: WOUND CARE | Facility: HOSPITAL | Age: 88
End: 2023-11-13
Attending: PODIATRIST
Payer: MEDICARE

## 2023-11-13 VITALS
RESPIRATION RATE: 16 BRPM | HEART RATE: 84 BPM | DIASTOLIC BLOOD PRESSURE: 74 MMHG | TEMPERATURE: 98 F | SYSTOLIC BLOOD PRESSURE: 118 MMHG

## 2023-11-13 DIAGNOSIS — L97.512 FOOT ULCER, RIGHT, WITH FAT LAYER EXPOSED (HCC): Primary | ICD-10-CM

## 2023-11-13 DIAGNOSIS — Z89.439 HISTORY OF TRANSMETATARSAL AMPUTATION OF FOOT (HCC): ICD-10-CM

## 2023-11-13 DIAGNOSIS — I73.9 PAD (PERIPHERAL ARTERY DISEASE) (HCC): ICD-10-CM

## 2023-11-13 PROCEDURE — 11042 DBRDMT SUBQ TIS 1ST 20SQCM/<: CPT | Performed by: PODIATRIST

## 2023-11-13 NOTE — PROGRESS NOTES
Patient ID: Terrie Ward is a 80year old male. Debridement Old surgical Right Foot   Wound 08/14/23 #1- right foot Old surgical Foot Right    Performed by: Audrey Umaña DPM  Authorized by: Audrey Umaña DPM      Consent   Consent obtained? verbal  Consent given by: patient  Risks discussed? procedural risks discussed  Time out called at 11/13/2023 4:36 PM  Immediately prior to the procedure a time out was called and the performing provider verified the correct patient, procedure, equipment, support staff, and site/side marked as required.     Debridement Details  Performed by: physician  Debridement type: surgical  Level of debridement: subcutaneous tissue  Pain control: lidocaine 4%  Pain control administration type: topical    Pre-debridement measurements  Length (cm): 0.9  Width (cm): 5.4  Depth (cm): 0.9  Surface Area (cm^2): 4.86    Post-debridement measurements  Length (cm): 0.9  Width (cm): 5.4  Depth (cm): 0.9  Percent debrided: 100%  Surface Area (cm^2): 4.86  Area Debrided (cm^2): 4.86  Volume (cm^3): 4.37    Tissue and other material debrided: subcutaneous tissue  Devitalized tissue debrided: biofilm, fibrin and slough  Instrument(s) utilized: curette  Bleeding: small  Hemostasis obtained with: not applicable  Procedural pain (0-10): 0  Post-procedural pain: 0   Response to treatment: procedure was tolerated well

## 2023-11-13 NOTE — PATIENT INSTRUCTIONS
For Ilana Parks,  1935    Date of Service 2023    -Change dressings every 2-3 days with Josi Hydrofera Blue and dry dressings  -Remain minimally weightbearing to right lower extremity in offloading surgical shoe  -Contact our office with any questions/concerns  -If wound worsens and there are signs of infection, seek immediate medical attention    Follow up in 1 week with Dr. Karo Jensen

## 2023-11-14 NOTE — PROGRESS NOTES
Weekly Wound Education Note    Teaching Provided To: Patient  Training Topics: Cleasing and general instructions; Discharge instructions;Signs and symptoms of infection  Training Method: Demonstration;Explain/Verbal;Written  Training Response: Patient responds and understands        Notes: Cleanse right foot wound with saline or wound cleanser apply Josi to wound bed, cover with hydrofera transfer, abd, kerlix . Change three times weekly.  Reviewed s/s acute infection and to seek immediate medical help if concerns arise,

## 2023-11-15 ENCOUNTER — PATIENT MESSAGE (OUTPATIENT)
Dept: PODIATRY CLINIC | Facility: CLINIC | Age: 88
End: 2023-11-15

## 2023-11-20 ENCOUNTER — OFFICE VISIT (OUTPATIENT)
Dept: WOUND CARE | Facility: HOSPITAL | Age: 88
End: 2023-11-20
Attending: PODIATRIST
Payer: MEDICARE

## 2023-11-20 VITALS
DIASTOLIC BLOOD PRESSURE: 68 MMHG | TEMPERATURE: 98 F | RESPIRATION RATE: 18 BRPM | HEART RATE: 104 BPM | SYSTOLIC BLOOD PRESSURE: 118 MMHG

## 2023-11-20 DIAGNOSIS — I73.9 PAD (PERIPHERAL ARTERY DISEASE) (HCC): ICD-10-CM

## 2023-11-20 DIAGNOSIS — L97.512 FOOT ULCER, RIGHT, WITH FAT LAYER EXPOSED (HCC): Primary | ICD-10-CM

## 2023-11-20 DIAGNOSIS — Z89.439 HISTORY OF TRANSMETATARSAL AMPUTATION OF FOOT (HCC): ICD-10-CM

## 2023-11-20 PROCEDURE — 11042 DBRDMT SUBQ TIS 1ST 20SQCM/<: CPT | Performed by: PODIATRIST

## 2023-11-20 NOTE — PROGRESS NOTES
Patient ID: Kandace Gandhi is a 80year old male. Debridement Old surgical Right Foot   Wound 08/14/23 #1- right foot Old surgical Foot Right    Performed by: Tana Ervin DPM  Authorized by: Tana Ervin DPM      Consent   Consent obtained? verbal  Consent given by: patient  Risks discussed? procedural risks discussed    Debridement Details  Performed by: physician  Debridement type: surgical  Level of debridement: subcutaneous tissue    Pre-debridement measurements  Length (cm): 0.8  Width (cm): 5  Depth (cm): 1  Surface Area (cm^2): 4    Post-debridement measurements  Length (cm): 0.8  Width (cm): 5.1  Depth (cm): 1.1  Percent debrided: 100%  Surface Area (cm^2): 4.08  Area Debrided (cm^2): 4.08  Volume (cm^3): 4.49    Tissue and other material debrided: subcutaneous tissue  Devitalized tissue debrided: biofilm, fibrin and slough  Instrument(s) utilized: curette  Bleeding: medium  Hemostasis obtained with: pressure  Procedural pain (0-10): 0  Post-procedural pain: 0   Response to treatment: procedure was tolerated well    Debridement Dorsal;Right   Wound 08/14/23 #2- right medial foot Dorsal;Right    Performed by: Tana Ervin DPM  Authorized by: Tana Ervin DPM      Consent   Consent obtained? verbal  Consent given by: patient  Risks discussed?  procedural risks discussed    Debridement Details  Performed by: physician  Debridement type: surgical  Level of debridement: subcutaneous tissue    Pre-debridement measurements  Length (cm): 0.5  Width (cm): 0.5  Depth (cm): 0.5  Surface Area (cm^2): 0.25    Post-debridement measurements  Length (cm): 0.5  Width (cm): 0.6  Depth (cm): 0.6  Percent debrided: 100%  Surface Area (cm^2): 0.3  Area Debrided (cm^2): 0.3  Volume (cm^3): 0.18    Tissue and other material debrided: subcutaneous tissue  Devitalized tissue debrided: biofilm, fibrin and slough  Instrument(s) utilized: curette  Bleeding: small  Hemostasis obtained with: pressure  Procedural pain (0-10): 0  Post-procedural pain: 0   Response to treatment: procedure was tolerated well

## 2023-11-22 ENCOUNTER — PATIENT MESSAGE (OUTPATIENT)
Dept: PODIATRY CLINIC | Facility: CLINIC | Age: 88
End: 2023-11-22

## 2023-11-22 NOTE — TELEPHONE ENCOUNTER
From: France Olmos  To: Fernando Moreno  Sent: 11/22/2023 3:50 PM CST  Subject: Photos    Attached are images from Wednesday, 11/22. Thank you.

## 2023-11-26 ENCOUNTER — PATIENT MESSAGE (OUTPATIENT)
Dept: PODIATRY CLINIC | Facility: CLINIC | Age: 88
End: 2023-11-26

## 2023-11-27 ENCOUNTER — OFFICE VISIT (OUTPATIENT)
Dept: WOUND CARE | Facility: HOSPITAL | Age: 88
End: 2023-11-27
Attending: PODIATRIST
Payer: MEDICARE

## 2023-11-27 VITALS
DIASTOLIC BLOOD PRESSURE: 74 MMHG | TEMPERATURE: 98 F | RESPIRATION RATE: 18 BRPM | SYSTOLIC BLOOD PRESSURE: 121 MMHG | HEART RATE: 52 BPM

## 2023-11-27 DIAGNOSIS — Z89.439 HISTORY OF TRANSMETATARSAL AMPUTATION OF FOOT (HCC): ICD-10-CM

## 2023-11-27 DIAGNOSIS — L97.512 FOOT ULCER, RIGHT, WITH FAT LAYER EXPOSED (HCC): Primary | ICD-10-CM

## 2023-11-27 DIAGNOSIS — I73.9 PAD (PERIPHERAL ARTERY DISEASE) (HCC): ICD-10-CM

## 2023-11-27 PROCEDURE — 11042 DBRDMT SUBQ TIS 1ST 20SQCM/<: CPT | Performed by: PODIATRIST

## 2023-11-27 PROCEDURE — 99214 OFFICE O/P EST MOD 30 MIN: CPT | Performed by: PODIATRIST

## 2023-11-27 NOTE — PROGRESS NOTES
Patient ID: Siddharth Adames is a 80year old male. Debridement Old surgical Right Foot   Wound 08/14/23 #1- right foot Old surgical Foot Right    Performed by: Natali Child DPM  Authorized by: Natali Child DPM      Consent   Consent obtained? verbal  Consent given by: patient  Risks discussed? procedural risks discussed  Time out called at 11/27/2023 10:19 AM  Immediately prior to the procedure a time out was called and the performing provider verified the correct patient, procedure, equipment, support staff, and site/side marked as required. Debridement Details  Performed by: physician  Debridement type: surgical  Level of debridement: subcutaneous tissue  Pain control: lidocaine 4%  Pain control administration type: topical    Pre-debridement measurements  Length (cm): 0.7  Width (cm): 4.3  Depth (cm): 0.7  Surface Area (cm^2): 3.01    Post-debridement measurements  Length (cm): 0.8  Width (cm): 4.4  Depth (cm): 0.8  Percent debrided: 100%  Surface Area (cm^2): 3.52  Area Debrided (cm^2): 3.52  Volume (cm^3): 2.82    Tissue and other material debrided: subcutaneous tissue  Devitalized tissue debrided: biofilm, fibrin and necrotic debris  Instrument(s) utilized: curette  Bleeding: small  Hemostasis obtained with: not applicable  Procedural pain (0-10): 0  Post-procedural pain: 0   Response to treatment: procedure was tolerated well    Debridement Dorsal;Right   Wound 08/14/23 #2- right medial foot Dorsal;Right    Performed by: Natali Child DPM  Authorized by: Natali Child DPM      Consent   Consent obtained? verbal  Consent given by: patient  Risks discussed? procedural risks discussed  Time out called at 11/27/2023 10:19 AM  Immediately prior to the procedure a time out was called and the performing provider verified the correct patient, procedure, equipment, support staff, and site/side marked as required.     Debridement Details  Performed by: physician  Debridement type: surgical  Level of debridement: subcutaneous tissue  Pain control: lidocaine 4%  Pain control administration type: topical    Pre-debridement measurements  Length (cm): 0.5  Width (cm): 0.5  Depth (cm): 0.7  Surface Area (cm^2): 0.25    Post-debridement measurements  Length (cm): 0.6  Width (cm): 0.6  Depth (cm): 0.7  Percent debrided: 100%  Surface Area (cm^2): 0.36  Area Debrided (cm^2): 0.36  Volume (cm^3): 0.25    Tissue and other material debrided: subcutaneous tissue  Devitalized tissue debrided: biofilm, fibrin and necrotic debris  Instrument(s) utilized: curette  Bleeding: small  Hemostasis obtained with: not applicable  Procedural pain (0-10): 0  Post-procedural pain: 0   Response to treatment: procedure was tolerated well

## 2023-11-27 NOTE — PATIENT INSTRUCTIONS
For Alma Foot,  1935    Date of Service 2023    -Change dressings daily with Betadine soaked gauze and dry dressing  -Remain nonweightbearing to right lower extremity in offloading shoe  -Contact Dr. Efren Villalobos office after discussing surgical options with patient and family.   Dr. Briana Ovalle will call you back and plan to move forward with surgery pending the discussion of you have with your family.  -If wound worsens and there are signs of infection, seek immediate medical attention    Follow up in 1 week with Dr. Briana Ovalle

## 2023-11-27 NOTE — PROGRESS NOTES
Weekly Wound Education Note    Teaching Provided To: Patient; Family  Training Topics: Test/procedures;Dressing;Signs and symptoms of infection; Discharge instructions  Training Method: Demonstration;Explain/Verbal;Written  Training Response: Patient responds and understands; Reinforcement needed        Notes: Betadine soaked gauze and dry dressing , change daily, continue nonweightbearing to right lower extremity in offloading shoe.  Contact Dr. Olinda Oates office after discussing if your wish to continue with surgical procedure

## 2023-11-28 ENCOUNTER — CASE MANAGEMENT (OUTPATIENT)
Dept: CARE COORDINATION | Age: 88
End: 2023-11-28

## 2023-11-30 ENCOUNTER — CASE MANAGEMENT (OUTPATIENT)
Dept: CARE COORDINATION | Age: 88
End: 2023-11-30

## 2023-11-30 ASSESSMENT — PATIENT HEALTH QUESTIONNAIRE - PHQ9
SUM OF ALL RESPONSES TO PHQ9 QUESTIONS 1 AND 2: 0
1. LITTLE INTEREST OR PLEASURE IN DOING THINGS: NOT AT ALL
SUM OF ALL RESPONSES TO PHQ9 QUESTIONS 1 AND 2: 0
2. FEELING DOWN, DEPRESSED OR HOPELESS: NOT AT ALL
CLINICAL INTERPRETATION OF PHQ2 SCORE: NO FURTHER SCREENING NEEDED

## 2023-12-01 ENCOUNTER — APPOINTMENT (OUTPATIENT)
Dept: ULTRASOUND IMAGING | Facility: HOSPITAL | Age: 88
DRG: 853 | End: 2023-12-01
Attending: NURSE PRACTITIONER
Payer: MEDICARE

## 2023-12-01 ENCOUNTER — APPOINTMENT (OUTPATIENT)
Dept: GENERAL RADIOLOGY | Facility: HOSPITAL | Age: 88
DRG: 853 | End: 2023-12-01
Attending: EMERGENCY MEDICINE
Payer: MEDICARE

## 2023-12-01 ENCOUNTER — APPOINTMENT (OUTPATIENT)
Dept: ULTRASOUND IMAGING | Facility: HOSPITAL | Age: 88
End: 2023-12-01
Attending: NURSE PRACTITIONER
Payer: MEDICARE

## 2023-12-01 ENCOUNTER — APPOINTMENT (OUTPATIENT)
Dept: GENERAL RADIOLOGY | Facility: HOSPITAL | Age: 88
End: 2023-12-01
Attending: EMERGENCY MEDICINE
Payer: MEDICARE

## 2023-12-01 ENCOUNTER — HOSPITAL ENCOUNTER (INPATIENT)
Facility: HOSPITAL | Age: 88
LOS: 5 days | Discharge: HOME HEALTH | DRG: 853 | End: 2023-12-06
Attending: EMERGENCY MEDICINE | Admitting: INTERNAL MEDICINE
Payer: MEDICARE

## 2023-12-01 ENCOUNTER — APPOINTMENT (OUTPATIENT)
Dept: GENERAL RADIOLOGY | Facility: HOSPITAL | Age: 88
End: 2023-12-01
Attending: NURSE PRACTITIONER
Payer: MEDICARE

## 2023-12-01 ENCOUNTER — APPOINTMENT (OUTPATIENT)
Dept: GENERAL RADIOLOGY | Facility: HOSPITAL | Age: 88
DRG: 853 | End: 2023-12-01
Attending: NURSE PRACTITIONER
Payer: MEDICARE

## 2023-12-01 ENCOUNTER — HOSPITAL ENCOUNTER (INPATIENT)
Facility: HOSPITAL | Age: 88
LOS: 5 days | Discharge: HOME HEALTH | End: 2023-12-06
Attending: EMERGENCY MEDICINE | Admitting: INTERNAL MEDICINE
Payer: MEDICARE

## 2023-12-01 ENCOUNTER — HOSPITAL ENCOUNTER (INPATIENT)
Facility: HOSPITAL | Age: 88
LOS: 5 days | Discharge: HOME | End: 2023-12-06
Attending: EMERGENCY MEDICINE | Admitting: INTERNAL MEDICINE
Payer: MEDICARE

## 2023-12-01 DIAGNOSIS — A41.9 SEPSIS DUE TO PNEUMONIA (HCC): ICD-10-CM

## 2023-12-01 DIAGNOSIS — J18.9 COMMUNITY ACQUIRED PNEUMONIA OF RIGHT LUNG, UNSPECIFIED PART OF LUNG: ICD-10-CM

## 2023-12-01 DIAGNOSIS — I48.91 ATRIAL FIBRILLATION WITH RVR (HCC): Primary | ICD-10-CM

## 2023-12-01 DIAGNOSIS — J18.9 SEPSIS DUE TO PNEUMONIA (HCC): ICD-10-CM

## 2023-12-01 DIAGNOSIS — J96.01 ACUTE RESPIRATORY FAILURE WITH HYPOXIA (HCC): ICD-10-CM

## 2023-12-01 DIAGNOSIS — E87.6 HYPOKALEMIA: ICD-10-CM

## 2023-12-01 DIAGNOSIS — I95.9 HYPOTENSION, UNSPECIFIED HYPOTENSION TYPE: ICD-10-CM

## 2023-12-01 PROBLEM — N17.9 ACUTE KIDNEY INJURY (HCC): Status: ACTIVE | Noted: 2023-12-01

## 2023-12-01 PROBLEM — R73.9 HYPERGLYCEMIA: Status: ACTIVE | Noted: 2023-12-01

## 2023-12-01 PROBLEM — D72.829 LEUKOCYTOSIS: Status: ACTIVE | Noted: 2023-12-01

## 2023-12-01 PROBLEM — N17.9 ACUTE KIDNEY INJURY: Status: ACTIVE | Noted: 2023-12-01

## 2023-12-01 LAB
ALBUMIN SERPL-MCNC: 2.9 G/DL (ref 3.4–5)
ALBUMIN/GLOB SERPL: 0.8 {RATIO} (ref 1–2)
ALP LIVER SERPL-CCNC: 72 U/L
ALT SERPL-CCNC: 28 U/L
ANION GAP SERPL CALC-SCNC: 8 MMOL/L (ref 0–18)
APTT PPP: 28.4 SECONDS (ref 23.3–35.6)
AST SERPL-CCNC: 15 U/L (ref 15–37)
BASOPHILS # BLD AUTO: 0.05 X10(3) UL (ref 0–0.2)
BASOPHILS NFR BLD AUTO: 0.3 %
BILIRUB SERPL-MCNC: 0.7 MG/DL (ref 0.1–2)
BUN BLD-MCNC: 27 MG/DL (ref 9–23)
CALCIUM BLD-MCNC: 8.2 MG/DL (ref 8.5–10.1)
CHLORIDE SERPL-SCNC: 106 MMOL/L (ref 98–112)
CO2 SERPL-SCNC: 25 MMOL/L (ref 21–32)
CREAT BLD-MCNC: 1.67 MG/DL
EGFRCR SERPLBLD CKD-EPI 2021: 39 ML/MIN/1.73M2 (ref 60–?)
EOSINOPHIL # BLD AUTO: 0.02 X10(3) UL (ref 0–0.7)
EOSINOPHIL NFR BLD AUTO: 0.1 %
ERYTHROCYTE [DISTWIDTH] IN BLOOD BY AUTOMATED COUNT: 18.4 %
FLUAV + FLUBV RNA SPEC NAA+PROBE: NEGATIVE
FLUAV + FLUBV RNA SPEC NAA+PROBE: NEGATIVE
GLOBULIN PLAS-MCNC: 3.7 G/DL (ref 2.8–4.4)
GLUCOSE BLD-MCNC: 103 MG/DL (ref 70–99)
HCT VFR BLD AUTO: 42.8 %
HGB BLD-MCNC: 13.5 G/DL
IMM GRANULOCYTES # BLD AUTO: 0.21 X10(3) UL (ref 0–1)
IMM GRANULOCYTES NFR BLD: 1.3 %
INR BLD: 1.39 (ref 0.8–1.2)
LACTATE SERPL-SCNC: 4.3 MMOL/L (ref 0.4–2)
LACTATE SERPL-SCNC: 4.6 MMOL/L (ref 0.4–2)
LACTATE SERPL-SCNC: 4.8 MMOL/L (ref 0.4–2)
LACTATE SERPL-SCNC: 5.1 MMOL/L (ref 0.4–2)
LACTATE SERPL-SCNC: 5.1 MMOL/L (ref 0.4–2)
LYMPHOCYTES # BLD AUTO: 1.11 X10(3) UL (ref 1–4)
LYMPHOCYTES NFR BLD AUTO: 6.7 %
MAGNESIUM SERPL-MCNC: 1.8 MG/DL (ref 1.6–2.6)
MAGNESIUM SERPL-MCNC: 1.8 MG/DL (ref 1.6–2.6)
MCH RBC QN AUTO: 25.5 PG (ref 26–34)
MCHC RBC AUTO-ENTMCNC: 31.5 G/DL (ref 31–37)
MCV RBC AUTO: 80.8 FL
MONOCYTES # BLD AUTO: 1.1 X10(3) UL (ref 0.1–1)
MONOCYTES NFR BLD AUTO: 6.6 %
MRSA DNA SPEC QL NAA+PROBE: NEGATIVE
NEUTROPHILS # BLD AUTO: 14.12 X10 (3) UL (ref 1.5–7.7)
NEUTROPHILS # BLD AUTO: 14.12 X10(3) UL (ref 1.5–7.7)
NEUTROPHILS NFR BLD AUTO: 85 %
OSMOLALITY SERPL CALC.SUM OF ELEC: 293 MOSM/KG (ref 275–295)
PHOSPHATE SERPL-MCNC: 2.3 MG/DL (ref 2.5–4.9)
PLATELET # BLD AUTO: 301 10(3)UL (ref 150–450)
POTASSIUM SERPL-SCNC: 2.9 MMOL/L (ref 3.5–5.1)
POTASSIUM SERPL-SCNC: 3.5 MMOL/L (ref 3.5–5.1)
PROCALCITONIN SERPL-MCNC: 38.7 NG/ML (ref ?–0.16)
PROT SERPL-MCNC: 6.6 G/DL (ref 6.4–8.2)
PROTHROMBIN TIME: 17.1 SECONDS (ref 11.6–14.8)
Q-T INTERVAL: 326 MS
QRS DURATION: 104 MS
QTC CALCULATION (BEZET): 481 MS
R AXIS: -13 DEGREES
RBC # BLD AUTO: 5.3 X10(6)UL
RSV RNA SPEC NAA+PROBE: NEGATIVE
SARS-COV-2 RNA RESP QL NAA+PROBE: NOT DETECTED
SODIUM SERPL-SCNC: 139 MMOL/L (ref 136–145)
T AXIS: -78 DEGREES
TROPONIN I SERPL HS-MCNC: 47 NG/L
VENTRICULAR RATE: 131 BPM
WBC # BLD AUTO: 16.6 X10(3) UL (ref 4–11)

## 2023-12-01 PROCEDURE — 93971 EXTREMITY STUDY: CPT | Performed by: NURSE PRACTITIONER

## 2023-12-01 PROCEDURE — 3E033XZ INTRODUCTION OF VASOPRESSOR INTO PERIPHERAL VEIN, PERCUTANEOUS APPROACH: ICD-10-PCS | Performed by: STUDENT IN AN ORGANIZED HEALTH CARE EDUCATION/TRAINING PROGRAM

## 2023-12-01 PROCEDURE — 99291 CRITICAL CARE FIRST HOUR: CPT | Performed by: INTERNAL MEDICINE

## 2023-12-01 PROCEDURE — 93926 LOWER EXTREMITY STUDY: CPT | Performed by: NURSE PRACTITIONER

## 2023-12-01 PROCEDURE — 73630 X-RAY EXAM OF FOOT: CPT | Performed by: NURSE PRACTITIONER

## 2023-12-01 PROCEDURE — 99223 1ST HOSP IP/OBS HIGH 75: CPT | Performed by: STUDENT IN AN ORGANIZED HEALTH CARE EDUCATION/TRAINING PROGRAM

## 2023-12-01 PROCEDURE — 71045 X-RAY EXAM CHEST 1 VIEW: CPT | Performed by: EMERGENCY MEDICINE

## 2023-12-01 RX ORDER — POTASSIUM CHLORIDE 20 MEQ/1
40 TABLET, EXTENDED RELEASE ORAL ONCE
Status: COMPLETED | OUTPATIENT
Start: 2023-12-01 | End: 2023-12-01

## 2023-12-01 RX ORDER — METOCLOPRAMIDE HYDROCHLORIDE 5 MG/ML
5 INJECTION INTRAMUSCULAR; INTRAVENOUS EVERY 8 HOURS PRN
Status: DISCONTINUED | OUTPATIENT
Start: 2023-12-01 | End: 2023-12-06

## 2023-12-01 RX ORDER — SENNOSIDES 8.6 MG
17.2 TABLET ORAL NIGHTLY PRN
Status: DISCONTINUED | OUTPATIENT
Start: 2023-12-01 | End: 2023-12-06

## 2023-12-01 RX ORDER — POLYETHYLENE GLYCOL 3350 17 G/17G
17 POWDER, FOR SOLUTION ORAL DAILY PRN
Status: DISCONTINUED | OUTPATIENT
Start: 2023-12-01 | End: 2023-12-06

## 2023-12-01 RX ORDER — ECHINACEA PURPUREA EXTRACT 125 MG
1 TABLET ORAL
Status: DISCONTINUED | OUTPATIENT
Start: 2023-12-01 | End: 2023-12-06

## 2023-12-01 RX ORDER — ATORVASTATIN CALCIUM 40 MG/1
40 TABLET, FILM COATED ORAL NIGHTLY
Status: DISCONTINUED | OUTPATIENT
Start: 2023-12-01 | End: 2023-12-06

## 2023-12-01 RX ORDER — CLOPIDOGREL BISULFATE 75 MG/1
75 TABLET ORAL DAILY
Status: DISCONTINUED | OUTPATIENT
Start: 2023-12-01 | End: 2023-12-06

## 2023-12-01 RX ORDER — MAGNESIUM OXIDE 400 MG/1
400 TABLET ORAL ONCE
Status: COMPLETED | OUTPATIENT
Start: 2023-12-01 | End: 2023-12-01

## 2023-12-01 RX ORDER — SODIUM CHLORIDE 9 MG/ML
1000 INJECTION, SOLUTION INTRAVENOUS ONCE
Status: COMPLETED | OUTPATIENT
Start: 2023-12-01 | End: 2023-12-01

## 2023-12-01 RX ORDER — FINASTERIDE 5 MG/1
5 TABLET, FILM COATED ORAL DAILY
Status: DISCONTINUED | OUTPATIENT
Start: 2023-12-01 | End: 2023-12-06

## 2023-12-01 RX ORDER — SODIUM CHLORIDE, SODIUM LACTATE, POTASSIUM CHLORIDE, CALCIUM CHLORIDE 600; 310; 30; 20 MG/100ML; MG/100ML; MG/100ML; MG/100ML
INJECTION, SOLUTION INTRAVENOUS CONTINUOUS
Status: DISCONTINUED | OUTPATIENT
Start: 2023-12-01 | End: 2023-12-03

## 2023-12-01 RX ORDER — BENZONATATE 200 MG/1
200 CAPSULE ORAL 3 TIMES DAILY PRN
Status: DISCONTINUED | OUTPATIENT
Start: 2023-12-01 | End: 2023-12-06

## 2023-12-01 RX ORDER — PANTOPRAZOLE SODIUM 40 MG/1
40 TABLET, DELAYED RELEASE ORAL
Status: DISCONTINUED | OUTPATIENT
Start: 2023-12-02 | End: 2023-12-06

## 2023-12-01 RX ORDER — VANCOMYCIN HYDROCHLORIDE
15 ONCE
Qty: 250 ML | Refills: 0 | Status: COMPLETED | OUTPATIENT
Start: 2023-12-01 | End: 2023-12-01

## 2023-12-01 RX ORDER — ACETAMINOPHEN 500 MG
500 TABLET ORAL EVERY 4 HOURS PRN
Status: DISCONTINUED | OUTPATIENT
Start: 2023-12-01 | End: 2023-12-06

## 2023-12-01 RX ORDER — VANCOMYCIN HYDROCHLORIDE
15 EVERY 24 HOURS
Status: DISCONTINUED | OUTPATIENT
Start: 2023-12-02 | End: 2023-12-01

## 2023-12-01 RX ORDER — POTASSIUM CHLORIDE 14.9 MG/ML
20 INJECTION INTRAVENOUS ONCE
Status: COMPLETED | OUTPATIENT
Start: 2023-12-01 | End: 2023-12-01

## 2023-12-01 RX ORDER — MELATONIN
3 NIGHTLY PRN
Status: DISCONTINUED | OUTPATIENT
Start: 2023-12-01 | End: 2023-12-06

## 2023-12-01 RX ORDER — GUAIFENESIN 600 MG/1
600 TABLET, EXTENDED RELEASE ORAL 2 TIMES DAILY PRN
Status: DISCONTINUED | OUTPATIENT
Start: 2023-12-01 | End: 2023-12-06

## 2023-12-01 RX ORDER — ENOXAPARIN SODIUM 100 MG/ML
1 INJECTION SUBCUTANEOUS DAILY
Status: DISCONTINUED | OUTPATIENT
Start: 2023-12-02 | End: 2023-12-03

## 2023-12-01 RX ORDER — ASPIRIN 81 MG/1
81 TABLET ORAL DAILY
Status: DISCONTINUED | OUTPATIENT
Start: 2023-12-01 | End: 2023-12-06

## 2023-12-01 RX ORDER — ONDANSETRON 2 MG/ML
4 INJECTION INTRAMUSCULAR; INTRAVENOUS EVERY 6 HOURS PRN
Status: DISCONTINUED | OUTPATIENT
Start: 2023-12-01 | End: 2023-12-06

## 2023-12-01 RX ORDER — DIGOXIN 0.25 MG/ML
250 INJECTION INTRAMUSCULAR; INTRAVENOUS ONCE
Status: COMPLETED | OUTPATIENT
Start: 2023-12-01 | End: 2023-12-01

## 2023-12-01 RX ORDER — ENOXAPARIN SODIUM 100 MG/ML
1 INJECTION SUBCUTANEOUS ONCE
Status: COMPLETED | OUTPATIENT
Start: 2023-12-01 | End: 2023-12-01

## 2023-12-01 RX ORDER — BISACODYL 10 MG
10 SUPPOSITORY, RECTAL RECTAL
Status: DISCONTINUED | OUTPATIENT
Start: 2023-12-01 | End: 2023-12-06

## 2023-12-01 NOTE — PROGRESS NOTES
Pharmacy Dosing Service  Initial Pharmacokinetic Consult for Vancomycin Dosing          Antonella Horner is a 80year old male who is being treated for sepsis & PNA. The initial treatment and monitoring approach will be non-AUC strategy. Pharmacy has been asked to dose vancomycin by Dr. Zandra Lo. Other current anti-infective medication(s):    Zosyn (12/1-  Azithromycin (12/1-12/3)    Est CrCl: ~30 mL/min (ERON - baseline SCr ~1.0)    Renal dosing considerations: ERON/ARF    Pharmacokinetic target: Trough/random 10-15 mg/L        A/P:  1.  Pt received vancomycin 1500mg x1 in ED. Will continue vancomycin 1500mg (~15mg/kg) q24hr based upon actual BW and estimated renal function. 2.  Plan for vancomycin trough to be obtained before the 3rd dose to assess for accumulation with ERON. 3.  Will monitor SCr daily while on vancomycin to assess renal function. Pharmacy will continue to follow him and adjust dosing as appropriate.     ADDENDUM:  MRSA PCR is neg - discontinued vancomycin per Dr Yisel Fragoso, PharmD, BCPS  12/1/2023  12:38 PM  [address] [place] [address]: [phone]

## 2023-12-01 NOTE — ED QUICK NOTES
Bedside patient report given to Kwaku Soto RN in ICU. Needs repeat Lactic at 1200; levo, vanco, 2 bags of NS, and K+ going; 2AC Ivs and 1 18g in wrist; AOX4; DNR; family at bedside; right foot previous amputation-getting wound care outpt; currently just doing bedadine and wrap per family; was scheduled for debridement per family this week.

## 2023-12-01 NOTE — ED QUICK NOTES
Bedside report received from Radha Burk, RN. Patient is hypotensive at this time. Plan of care reviewed with patient.  Dc'd diltiazem; currently on zosyn, needs vanco, zithro, digoxin, lovenex

## 2023-12-01 NOTE — ED QUICK NOTES
Pt on bag #3 (1000ml) of NS 0.9% ; pt has only completed bag #1; bag #2 buffering K+rider on LAC; this one w/antibiotic RAC this is bag #3; pt has only completed bag #1; bag #2 buffering K+rider on LAC; this one w/antibiotic RAC     TOTAL COMPLETE 1000ML 0.9% NS thus far is 1 or 3; 2nd and 3rd bag still going w/other [employer]

## 2023-12-01 NOTE — ED INITIAL ASSESSMENT (HPI)
Family reports chronic cough and shortness of breath at started at 430 AM. Denies chest pain. Hx of CHF.

## 2023-12-01 NOTE — PLAN OF CARE
Assumed care of patient after RN report. Patient alert and oriented x4. On NC, weaned as able. A fib on monitor. Levo titrated see MAR. Abdomen rounded, soft. Nontender. Denies pain. RLE dopplers done. Vascular consulted. R foot partial amputation. Cold extremity. No palpable pulse.

## 2023-12-02 LAB
ALBUMIN SERPL-MCNC: 2.3 G/DL (ref 3.4–5)
ALBUMIN/GLOB SERPL: 0.7 {RATIO} (ref 1–2)
ALP LIVER SERPL-CCNC: 60 U/L
ALT SERPL-CCNC: 21 U/L
ANION GAP SERPL CALC-SCNC: 5 MMOL/L (ref 0–18)
AST SERPL-CCNC: 15 U/L (ref 15–37)
BASOPHILS # BLD AUTO: 0.08 X10(3) UL (ref 0–0.2)
BASOPHILS NFR BLD AUTO: 0.4 %
BILIRUB SERPL-MCNC: 1 MG/DL (ref 0.1–2)
BILIRUB UR QL STRIP.AUTO: NEGATIVE
BUN BLD-MCNC: 21 MG/DL (ref 9–23)
CALCIUM BLD-MCNC: 7.9 MG/DL (ref 8.5–10.1)
CHLORIDE SERPL-SCNC: 112 MMOL/L (ref 98–112)
CLARITY UR REFRACT.AUTO: CLEAR
CO2 SERPL-SCNC: 25 MMOL/L (ref 21–32)
COLOR UR AUTO: YELLOW
CREAT BLD-MCNC: 1.1 MG/DL
EGFRCR SERPLBLD CKD-EPI 2021: 65 ML/MIN/1.73M2 (ref 60–?)
EOSINOPHIL # BLD AUTO: 0.16 X10(3) UL (ref 0–0.7)
EOSINOPHIL NFR BLD AUTO: 0.8 %
ERYTHROCYTE [DISTWIDTH] IN BLOOD BY AUTOMATED COUNT: 18 %
GLOBULIN PLAS-MCNC: 3.4 G/DL (ref 2.8–4.4)
GLUCOSE BLD-MCNC: 104 MG/DL (ref 70–99)
GLUCOSE UR STRIP.AUTO-MCNC: NORMAL MG/DL
HCT VFR BLD AUTO: 36.3 %
HGB BLD-MCNC: 11.3 G/DL
IMM GRANULOCYTES # BLD AUTO: 0.21 X10(3) UL (ref 0–1)
IMM GRANULOCYTES NFR BLD: 1 %
KETONES UR STRIP.AUTO-MCNC: NEGATIVE MG/DL
LACTATE SERPL-SCNC: 1.4 MMOL/L (ref 0.4–2)
LEUKOCYTE ESTERASE UR QL STRIP.AUTO: NEGATIVE
LYMPHOCYTES # BLD AUTO: 1.13 X10(3) UL (ref 1–4)
LYMPHOCYTES NFR BLD AUTO: 5.4 %
MAGNESIUM SERPL-MCNC: 1.7 MG/DL (ref 1.6–2.6)
MCH RBC QN AUTO: 25.6 PG (ref 26–34)
MCHC RBC AUTO-ENTMCNC: 31.1 G/DL (ref 31–37)
MCV RBC AUTO: 82.1 FL
MONOCYTES # BLD AUTO: 1.54 X10(3) UL (ref 0.1–1)
MONOCYTES NFR BLD AUTO: 7.4 %
NEUTROPHILS # BLD AUTO: 17.83 X10 (3) UL (ref 1.5–7.7)
NEUTROPHILS # BLD AUTO: 17.83 X10(3) UL (ref 1.5–7.7)
NEUTROPHILS NFR BLD AUTO: 85 %
NITRITE UR QL STRIP.AUTO: NEGATIVE
OSMOLALITY SERPL CALC.SUM OF ELEC: 297 MOSM/KG (ref 275–295)
PH UR STRIP.AUTO: 5 [PH] (ref 5–8)
PHOSPHATE SERPL-MCNC: 2.6 MG/DL (ref 2.5–4.9)
PLATELET # BLD AUTO: 242 10(3)UL (ref 150–450)
POTASSIUM SERPL-SCNC: 3.6 MMOL/L (ref 3.5–5.1)
POTASSIUM SERPL-SCNC: 3.6 MMOL/L (ref 3.5–5.1)
POTASSIUM SERPL-SCNC: 4.8 MMOL/L (ref 3.5–5.1)
PROT SERPL-MCNC: 5.7 G/DL (ref 6.4–8.2)
PROT UR STRIP.AUTO-MCNC: NEGATIVE MG/DL
RBC # BLD AUTO: 4.42 X10(6)UL
RBC UR QL AUTO: NEGATIVE
SODIUM SERPL-SCNC: 142 MMOL/L (ref 136–145)
SP GR UR STRIP.AUTO: 1.02 (ref 1–1.03)
UROBILINOGEN UR STRIP.AUTO-MCNC: NORMAL MG/DL
WBC # BLD AUTO: 21 X10(3) UL (ref 4–11)

## 2023-12-02 PROCEDURE — 99291 CRITICAL CARE FIRST HOUR: CPT | Performed by: INTERNAL MEDICINE

## 2023-12-02 PROCEDURE — 99232 SBSQ HOSP IP/OBS MODERATE 35: CPT | Performed by: STUDENT IN AN ORGANIZED HEALTH CARE EDUCATION/TRAINING PROGRAM

## 2023-12-02 RX ORDER — POTASSIUM CHLORIDE 1.5 G/1.58G
40 POWDER, FOR SOLUTION ORAL EVERY 4 HOURS
Status: COMPLETED | OUTPATIENT
Start: 2023-12-02 | End: 2023-12-02

## 2023-12-02 RX ORDER — MAGNESIUM OXIDE 400 MG/1
400 TABLET ORAL ONCE
Status: COMPLETED | OUTPATIENT
Start: 2023-12-02 | End: 2023-12-02

## 2023-12-02 NOTE — PLAN OF CARE
Received pt AOX4, on 4L oxygen via nasal cannula. Pt on levophed unable to titrated off. Pt able to void independently, bowel movement x1. Pt denied pain or discomfort at the moment. Appears to be resting comfortably. Afebrile, VSS. Will continue to monitor and assess.          Problem: Diabetes/Glucose Control  Goal: Glucose maintained within prescribed range  Description: INTERVENTIONS:  - Monitor Blood Glucose as ordered  - Assess for signs and symptoms of hyperglycemia and hypoglycemia  - Administer ordered medications to maintain glucose within target range  - Assess barriers to adequate nutritional intake and initiate nutrition consult as needed  - Instruct patient on self management of diabetes  Outcome: Not Progressing     Problem: PAIN - ADULT  Goal: Verbalizes/displays adequate comfort level or patient's stated pain goal  Description: INTERVENTIONS:  - Encourage pt to monitor pain and request assistance  - Assess pain using appropriate pain scale  - Administer analgesics based on type and severity of pain and evaluate response  - Implement non-pharmacological measures as appropriate and evaluate response  - Consider cultural and social influences on pain and pain management  - Manage/alleviate anxiety  - Utilize distraction and/or relaxation techniques  - Monitor for opioid side effects  - Notify MD/LIP if interventions unsuccessful or patient reports new pain  - Anticipate increased pain with activity and pre-medicate as appropriate  Outcome: Not Progressing     Problem: RISK FOR INFECTION - ADULT  Goal: Absence of fever/infection during anticipated neutropenic period  Description: INTERVENTIONS  - Monitor WBC  - Administer growth factors as ordered  - Implement neutropenic guidelines  Outcome: Not Progressing     Problem: SAFETY ADULT - FALL  Goal: Free from fall injury  Description: INTERVENTIONS:  - Assess pt frequently for physical needs  - Identify cognitive and physical deficits and behaviors that affect risk of falls.   - [place] fall precautions as indicated by assessment.  - Educate pt/family on patient safety including physical limitations  - Instruct pt to call for assistance with activity based on assessment  - Modify environment to reduce risk of injury  - Provide assistive devices as appropriate  - Consider OT/PT consult to assist with strengthening/mobility  - Encourage toileting schedule  Outcome: Not Progressing     Problem: DISCHARGE PLANNING  Goal: Discharge to home or other facility with appropriate resources  Description: INTERVENTIONS:  - Identify barriers to discharge w/pt and caregiver  - Include patient/family/discharge partner in discharge planning  - Arrange for needed discharge resources and transportation as appropriate  - Identify discharge learning needs (meds, wound care, etc)  - Arrange for interpreters to assist at discharge as needed  - Consider post-discharge preferences of patient/family/discharge partner  - Complete POLST form as appropriate  - Assess patient's ability to be responsible for managing their own health  - Refer to Case Management Department for coordinating discharge planning if the patient needs post-hospital services based on physician/LIP order or complex needs related to functional status, cognitive ability or social support system  Outcome: Not Progressing

## 2023-12-02 NOTE — PLAN OF CARE
Pt in bed, aox4. Follows commands. Denies pain. Weaning O2. Levo off. See mar. Using urinal at bedside. Up to chair with 2 assist, stand and pivot with walker. Family at bed side.

## 2023-12-02 NOTE — PLAN OF CARE
Pt awake in bed on 4L nc. Denies pain. Aox4, follows commands. Bladder scanned, recommended straight cath. Pt eager to try and urinate on his own. PVR checked. Pt using urinal at bed side. Levo infusing see mar. Afebrile. See flowsheets.

## 2023-12-03 ENCOUNTER — APPOINTMENT (OUTPATIENT)
Dept: GENERAL RADIOLOGY | Facility: HOSPITAL | Age: 88
End: 2023-12-03
Attending: INTERNAL MEDICINE
Payer: MEDICARE

## 2023-12-03 ENCOUNTER — APPOINTMENT (OUTPATIENT)
Dept: GENERAL RADIOLOGY | Facility: HOSPITAL | Age: 88
DRG: 853 | End: 2023-12-03
Attending: INTERNAL MEDICINE
Payer: MEDICARE

## 2023-12-03 LAB
ALBUMIN SERPL-MCNC: 2.4 G/DL (ref 3.4–5)
ALP LIVER SERPL-CCNC: 62 U/L
ALT SERPL-CCNC: 19 U/L
ANION GAP SERPL CALC-SCNC: 5 MMOL/L (ref 0–18)
ANION GAP SERPL CALC-SCNC: 6 MMOL/L (ref 0–18)
APTT PPP: 43.5 SECONDS (ref 23.3–35.6)
APTT PPP: 61.1 SECONDS (ref 23.3–35.6)
APTT PPP: 69.7 SECONDS (ref 23.3–35.6)
AST SERPL-CCNC: 12 U/L (ref 15–37)
BASOPHILS # BLD AUTO: 0.05 X10(3) UL (ref 0–0.2)
BASOPHILS NFR BLD AUTO: 0.4 %
BILIRUB DIRECT SERPL-MCNC: 0.5 MG/DL (ref 0–0.2)
BILIRUB SERPL-MCNC: 1.2 MG/DL (ref 0.1–2)
BUN BLD-MCNC: 13 MG/DL (ref 9–23)
BUN BLD-MCNC: 14 MG/DL (ref 9–23)
CALCIUM BLD-MCNC: 8.4 MG/DL (ref 8.5–10.1)
CALCIUM BLD-MCNC: 8.6 MG/DL (ref 8.5–10.1)
CHLORIDE SERPL-SCNC: 111 MMOL/L (ref 98–112)
CHLORIDE SERPL-SCNC: 112 MMOL/L (ref 98–112)
CO2 SERPL-SCNC: 22 MMOL/L (ref 21–32)
CO2 SERPL-SCNC: 23 MMOL/L (ref 21–32)
CREAT BLD-MCNC: 0.89 MG/DL
CREAT BLD-MCNC: 0.91 MG/DL
CREAT BLD-MCNC: 0.91 MG/DL
EGFRCR SERPLBLD CKD-EPI 2021: 81 ML/MIN/1.73M2 (ref 60–?)
EGFRCR SERPLBLD CKD-EPI 2021: 81 ML/MIN/1.73M2 (ref 60–?)
EGFRCR SERPLBLD CKD-EPI 2021: 82 ML/MIN/1.73M2 (ref 60–?)
EOSINOPHIL # BLD AUTO: 0.13 X10(3) UL (ref 0–0.7)
EOSINOPHIL NFR BLD AUTO: 1.1 %
ERYTHROCYTE [DISTWIDTH] IN BLOOD BY AUTOMATED COUNT: 17.9 %
ERYTHROCYTE [DISTWIDTH] IN BLOOD BY AUTOMATED COUNT: 17.9 %
GLUCOSE BLD-MCNC: 107 MG/DL (ref 70–99)
GLUCOSE BLD-MCNC: 84 MG/DL (ref 70–99)
HCT VFR BLD AUTO: 34.6 %
HCT VFR BLD AUTO: 35.9 %
HGB BLD-MCNC: 11.1 G/DL
HGB BLD-MCNC: 11.2 G/DL
IMM GRANULOCYTES # BLD AUTO: 0.08 X10(3) UL (ref 0–1)
IMM GRANULOCYTES NFR BLD: 0.7 %
LYMPHOCYTES # BLD AUTO: 0.83 X10(3) UL (ref 1–4)
LYMPHOCYTES NFR BLD AUTO: 6.9 %
MAGNESIUM SERPL-MCNC: 1.9 MG/DL (ref 1.6–2.6)
MCH RBC QN AUTO: 25.5 PG (ref 26–34)
MCH RBC QN AUTO: 25.8 PG (ref 26–34)
MCHC RBC AUTO-ENTMCNC: 30.9 G/DL (ref 31–37)
MCHC RBC AUTO-ENTMCNC: 32.4 G/DL (ref 31–37)
MCV RBC AUTO: 79.7 FL
MCV RBC AUTO: 82.3 FL
MONOCYTES # BLD AUTO: 0.72 X10(3) UL (ref 0.1–1)
MONOCYTES NFR BLD AUTO: 6 %
NEUTROPHILS # BLD AUTO: 10.23 X10 (3) UL (ref 1.5–7.7)
NEUTROPHILS # BLD AUTO: 10.23 X10(3) UL (ref 1.5–7.7)
NEUTROPHILS NFR BLD AUTO: 84.9 %
OSMOLALITY SERPL CALC.SUM OF ELEC: 289 MOSM/KG (ref 275–295)
OSMOLALITY SERPL CALC.SUM OF ELEC: 290 MOSM/KG (ref 275–295)
PLATELET # BLD AUTO: 204 10(3)UL (ref 150–450)
PLATELET # BLD AUTO: 221 10(3)UL (ref 150–450)
POTASSIUM SERPL-SCNC: 3.8 MMOL/L (ref 3.5–5.1)
POTASSIUM SERPL-SCNC: 3.9 MMOL/L (ref 3.5–5.1)
PROT SERPL-MCNC: 6.1 G/DL (ref 6.4–8.2)
RBC # BLD AUTO: 4.34 X10(6)UL
RBC # BLD AUTO: 4.36 X10(6)UL
SODIUM SERPL-SCNC: 139 MMOL/L (ref 136–145)
SODIUM SERPL-SCNC: 140 MMOL/L (ref 136–145)
WBC # BLD AUTO: 11 X10(3) UL (ref 4–11)
WBC # BLD AUTO: 12 X10(3) UL (ref 4–11)

## 2023-12-03 PROCEDURE — 71045 X-RAY EXAM CHEST 1 VIEW: CPT | Performed by: INTERNAL MEDICINE

## 2023-12-03 PROCEDURE — 99232 SBSQ HOSP IP/OBS MODERATE 35: CPT | Performed by: STUDENT IN AN ORGANIZED HEALTH CARE EDUCATION/TRAINING PROGRAM

## 2023-12-03 RX ORDER — FOLIC ACID 1 MG/1
1 TABLET ORAL DAILY
Status: DISCONTINUED | OUTPATIENT
Start: 2023-12-03 | End: 2023-12-06

## 2023-12-03 RX ORDER — HEPARIN SODIUM AND DEXTROSE 10000; 5 [USP'U]/100ML; G/100ML
INJECTION INTRAVENOUS CONTINUOUS
Status: DISCONTINUED | OUTPATIENT
Start: 2023-12-03 | End: 2023-12-03

## 2023-12-03 RX ORDER — HEPARIN SODIUM 1000 [USP'U]/ML
5000 INJECTION, SOLUTION INTRAVENOUS; SUBCUTANEOUS ONCE
Status: DISCONTINUED | OUTPATIENT
Start: 2023-12-03 | End: 2023-12-03

## 2023-12-03 RX ORDER — ARGATROBAN 1 MG/ML
0.5 INJECTION, SOLUTION INTRAVENOUS CONTINUOUS
Status: DISCONTINUED | OUTPATIENT
Start: 2023-12-03 | End: 2023-12-04

## 2023-12-03 RX ORDER — SODIUM CHLORIDE 9 MG/ML
INJECTION, SOLUTION INTRAVENOUS CONTINUOUS
Status: DISCONTINUED | OUTPATIENT
Start: 2023-12-03 | End: 2023-12-06

## 2023-12-03 RX ORDER — HEPARIN SODIUM AND DEXTROSE 10000; 5 [USP'U]/100ML; G/100ML
1000 INJECTION INTRAVENOUS ONCE
Status: DISCONTINUED | OUTPATIENT
Start: 2023-12-03 | End: 2023-12-03

## 2023-12-03 NOTE — CONSULTS
[facility] Dosing Service  Argatroban Initial Dosing    Magaly Lozoya is a 80year old patient to be started on argatroban for Dr. Giovani Blandon. Pharmacy to manage per protocol for a goal PTT range of 46 to 93 (1.5-3 times the control PTT). Argatrogan Dosing Criteria  HIPA:   pending  Ascites:  not present  Encephalopathy:  negative  Child Galvan Score: 7, Class B  Heart Failure: History of heart failure  Patient Location: medical/tele bed  Estimated Creatinine Clearance: 53.6 mL/min (based on SCr of 0.89 mg/dL). Multi-organ failure: no    Albumin:   Recent Labs   Lab 12/01/23  0818 12/02/23 0459   ALB 2.9* 2.3*     Total Bilirubin:   Recent Labs   Lab 12/01/23  0818 12/02/23 0459   BILT 0.7 1.0      INR:   Recent Labs   Lab 12/01/23 0818   INR 1.39*     Plan:    1. Initiate argatroban at 0.5 mcg/kg/min based on risk factors of: Child Galvan Score 7-9.    2. Will obtain a PTT 2 hours after the initiation of therapy and adjust based on the result for a goal PTT of 46-93.    3. We will continue to follow and appreciate the opportunity to assist in the care of this patient.     Thank you,  Bright Holley, PharmD  Clinical Pharmacist  12/3/2023 2:03 PM

## 2023-12-03 NOTE — PHYSICAL THERAPY NOTE
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 ASSESSMENT   In this PT evaluation, the patient presents with the following impairments 1) Able to transfer to bedside chair with RW and SBA. Pts baseline is mod I with RW , has been  \"minimally ambulatory\" 2/2 RLE wound per pod rec. These impairments and comorbidities manifest themselves as functional limitations in independent bed mobility, transfers, and gait. Functional outcome measures completed include AMPAC. The AM-PAC '6-Clicks' Inpatient Basic Mobility Short Form was completed and this patient is demonstrating a Approx Degree of Impairment: 20.91%  degree of impairment in mobility. Research supports that patients with this level of impairment may benefit from home. Will need new orders pending vascular work up and possible amputation. DISCHARGE RECOMMENDATIONS  PT Discharge Recommendations: Home (pending vascular work up and WB status)    PLAN  Patient has met all skilled IPPT goals at this time. Patient will be discharged from Physical Therapy services. Please re-order if a new functional limitation presents during this admission. HOME SITUATION  Type of Home: House   Home Layout: One level                Lives With: Alone  Drives: No  Patient Owned Equipment: Rolling walker; Wheelchair;Hospital bed (commode)  Patient Regularly Uses: Reading glasses    Prior Level of [place]: pt has been minimally ambulatory at home 2/2 LE wound, utilizes RW but has additional equipment as noted above    SUBJECTIVE  \" There's so many tubes\" - pt expressing frustration with multiple Ivs and stating he wants to go home       OBJECTIVE  Precautions: Bed/chair alarm;Hard of hearing  Fall Risk: Standard fall risk    WEIGHT BEARING RESTRICTION  Weight Bearing Restriction: None                PAIN ASSESSMENT  Ratin          COGNITION  Overall Cognitive Status:  WFL - within functional limits    RANGE OF MOTION AND STRENGTH ASSESSMENT  Upper extremity ROM and strength are within functional limits     Lower extremity ROM is within functional limits     Lower extremity strength is within functional limits       BALANCE  Static Sitting: Good  Dynamic Sitting: Good  Static Standing: Fair -  Dynamic Standing: Fair -    ADDITIONAL TESTS                                    ACTIVITY TOLERANCE                         O2 WALK       NEUROLOGICAL FINDINGS                        AM-PAC '6-Clicks' INPATIENT SHORT FORM - BASIC MOBILITY  How much difficulty does the patient currently have. .. Patient Difficulty: Turning over in bed (including adjusting bedclothes, sheets and blankets)?: None   Patient Difficulty: Sitting down on and standing up from a chair with arms (e.g., wheelchair, bedside commode, etc.): None   Patient Difficulty: Moving from lying on back to sitting on the side of the bed?: None   How much help from another person does the patient currently need. ..    Help from Another: Moving to and from a bed to a chair (including a wheelchair)?: None   Help from Another: Need to walk in hospital room?: A Little   Help from Another: Climbing 3-5 steps with a railing?: A Little       AM-PAC Score:  Raw Score: 22   Approx Degree of Impairment: 20.91%   Standardized Score (AM-PAC Scale): 53.28   CMS Modifier (G-Code): ALEKSANDR    FUNCTIONAL ABILITY STATUS  Gait Assessment Functional Mobility/Gait Assessment  Gait Assistance: Supervision  Distance (ft): 3  Assistive Device: Rolling walker  Pattern: Shuffle    Skilled Therapy Provided     Bed Mobility:  Rolling: mod I   Supine to sit: mod I       Transfer Mobility:  Sit to stand: SBA    Stand to sit: SBA Pt received verbal and tactile cuing on placement of UE and LEs for optimal force generation and safe STS transfer. Gait = SBA with RW, educated on rec for OOBTC, and cont minimal ambulation       Exercise/Education Provided:  Functional activity tolerated    Patient End of Session: Call light within reach; Needs met; With [address] [place] [address] staff;Up in chair;RN aware of session/findings; All patient questions and concerns addressed; Alarm set; Family present      Patient Evaluation Complexity Level:  History Moderate - 1 or 2 personal factors and/or co-morbidities   Examination of body systems Moderate - addressing a total of 3 or more elements   Clinical Presentation Moderate - Evolving   Clinical Decision Making Moderate - Evolving       PT Session Time: 20 minutes  Gait Training:  minutes  Therapeutic Activity: 10 minutes  Neuromuscular Re-education:  minutes  Therapeutic Exercise:  minutes

## 2023-12-03 NOTE — PROGRESS NOTES
Assumed care of 81 y/o male @5, transferred from ICU  Acx4, Afib-Tele  Cardiac diet  Urianal; bm 12/2  Up x2 assist   Denies  pain  DARWIN-infusing LR @75 ml/hr  R wrist PIV-SL  Safety prec maintained and all needs met

## 2023-12-03 NOTE — PLAN OF CARE
Received pt AOX4, RUSS, on room air with excellent saturations. Wound care performed on right leg, cleaned wound with betadine, apply absorbent, pink mepilex and covered with kerlix. Wound appear dry, not approximated and no exudate present. Pt tolerated wound care well. Denied pain or discomfort. Pt with transfer orders in place, pending room availability. Received request to transfer pt to [place] [address]. Nurse to nurse report given to [facility].         Problem: Diabetes/Glucose Control  Goal: Glucose maintained within prescribed range  Description: INTERVENTIONS:  - Monitor Blood Glucose as ordered  - Assess for signs and symptoms of hyperglycemia and hypoglycemia  - Administer ordered medications to maintain glucose within target range  - Assess barriers to adequate nutritional intake and initiate nutrition consult as needed  - Instruct patient on self management of diabetes  Outcome: Progressing     Problem: PAIN - ADULT  Goal: Verbalizes/displays adequate comfort level or patient's stated pain goal  Description: INTERVENTIONS:  - Encourage pt to monitor pain and request assistance  - Assess pain using appropriate pain scale  - Administer analgesics based on type and severity of pain and evaluate response  - Implement non-pharmacological measures as appropriate and evaluate response  - Consider cultural and social influences on pain and pain management  - Manage/alleviate anxiety  - Utilize distraction and/or relaxation techniques  - Monitor for opioid side effects  - Notify MD/LIP if interventions unsuccessful or patient reports new pain  - Anticipate increased pain with activity and pre-medicate as appropriate  Outcome: Progressing     Problem: RISK FOR INFECTION - ADULT  Goal: Absence of fever/infection during anticipated neutropenic period  Description: INTERVENTIONS  - Monitor WBC  - Administer growth factors as ordered  - Implement neutropenic guidelines  Outcome: Progressing     Problem: SAFETY ADULT - FALL  Goal: Free from fall injury  Description: INTERVENTIONS:  - Assess pt frequently for physical needs  - Identify cognitive and physical deficits and behaviors that affect risk of falls.   - [place] fall precautions as indicated by assessment.  - Educate pt/family on patient safety including physical limitations  - Instruct pt to call for assistance with activity based on assessment  - Modify environment to reduce risk of injury  - Provide assistive devices as appropriate  - Consider OT/PT consult to assist with strengthening/mobility  - Encourage toileting schedule  Outcome: Progressing     Problem: DISCHARGE PLANNING  Goal: Discharge to home or other facility with appropriate resources  Description: INTERVENTIONS:  - Identify barriers to discharge w/pt and caregiver  - Include patient/family/discharge partner in discharge planning  - Arrange for needed discharge resources and transportation as appropriate  - Identify discharge learning needs (meds, wound care, etc)  - Arrange for interpreters to assist at discharge as needed  - Consider post-discharge preferences of patient/family/discharge partner  - Complete POLST form as appropriate  - Assess patient's ability to be responsible for managing their own health  - Refer to Case Management Department for coordinating discharge planning if the patient needs post-hospital services based on physician/LIP order or complex needs related to functional status, cognitive ability or social support system  Outcome: Progressing

## 2023-12-03 NOTE — PLAN OF CARE
Assumed care at . Pt. A&O x4, on RA, afib on tele. VSS. Up with assistance. No c/o pain. Dressing intact. Argatroban started per MAR, PTT draw 2 hours post start. Fall and safety precautions in place. Pt. Will be NPO for angiogram tomorrow. Plan of care ongoing. Pt. With 7 beats of v-tach at 1846, asymptomatic. Cardiology paged. No new orders. MD page regarding fluid, no fluids hanging upon assessment. MD changed fluids to 0.9 at 75.     Problem: Diabetes/Glucose Control  Goal: Glucose maintained within prescribed range  Description: INTERVENTIONS:  - Monitor Blood Glucose as ordered  - Assess for signs and symptoms of hyperglycemia and hypoglycemia  - Administer ordered medications to maintain glucose within target range  - Assess barriers to adequate nutritional intake and initiate nutrition consult as needed  - Instruct patient on self management of diabetes  Outcome: Progressing     Problem: PAIN - ADULT  Goal: Verbalizes/displays adequate comfort level or patient's stated pain goal  Description: INTERVENTIONS:  - Encourage pt to monitor pain and request assistance  - Assess pain using appropriate pain scale  - Administer analgesics based on type and severity of pain and evaluate response  - Implement non-pharmacological measures as appropriate and evaluate response  - Consider cultural and social influences on pain and pain management  - Manage/alleviate anxiety  - Utilize distraction and/or relaxation techniques  - Monitor for opioid side effects  - Notify MD/LIP if interventions unsuccessful or patient reports new pain  - Anticipate increased pain with activity and pre-medicate as appropriate  Outcome: Progressing     Problem: RISK FOR INFECTION - ADULT  Goal: Absence of fever/infection during anticipated neutropenic period  Description: INTERVENTIONS  - Monitor WBC  - Administer growth factors as ordered  - Implement neutropenic guidelines  Outcome: Progressing     Problem: SAFETY ADULT - FALL  Goal: Free from fall injury  Description: INTERVENTIONS:  - Assess pt frequently for physical needs  - Identify cognitive and physical deficits and behaviors that affect risk of falls.   - [place] fall precautions as indicated by assessment.  - Educate pt/family on patient safety including physical limitations  - Instruct pt to call for assistance with activity based on assessment  - Modify environment to reduce risk of injury  - Provide assistive devices as appropriate  - Consider OT/PT consult to assist with strengthening/mobility  - Encourage toileting schedule  Outcome: Progressing     Problem: DISCHARGE PLANNING  Goal: Discharge to home or other facility with appropriate resources  Description: INTERVENTIONS:  - Identify barriers to discharge w/pt and caregiver  - Include patient/family/discharge partner in discharge planning  - Arrange for needed discharge resources and transportation as appropriate  - Identify discharge learning needs (meds, wound care, etc)  - Arrange for interpreters to assist at discharge as needed  - Consider post-discharge preferences of patient/family/discharge partner  - Complete POLST form as appropriate  - Assess patient's ability to be responsible for managing their own health  - Refer to Case Management Department for coordinating discharge planning if the patient needs post-hospital services based on physician/LIP order or complex needs related to functional status, cognitive ability or social support system  Outcome: Progressing

## 2023-12-04 ENCOUNTER — APPOINTMENT (OUTPATIENT)
Dept: INTERVENTIONAL RADIOLOGY/VASCULAR | Facility: HOSPITAL | Age: 88
End: 2023-12-04
Attending: SURGERY
Payer: MEDICARE

## 2023-12-04 ENCOUNTER — APPOINTMENT (OUTPATIENT)
Dept: WOUND CARE | Facility: HOSPITAL | Age: 88
End: 2023-12-04
Attending: PODIATRIST
Payer: MEDICARE

## 2023-12-04 ENCOUNTER — APPOINTMENT (OUTPATIENT)
Dept: INTERVENTIONAL RADIOLOGY/VASCULAR | Facility: HOSPITAL | Age: 88
DRG: 853 | End: 2023-12-04
Attending: SURGERY
Payer: MEDICARE

## 2023-12-04 LAB
APTT PPP: 75 SECONDS (ref 23.3–35.6)
CREAT BLD-MCNC: 0.96 MG/DL
EGFRCR SERPLBLD CKD-EPI 2021: 76 ML/MIN/1.73M2 (ref 60–?)
ERYTHROCYTE [DISTWIDTH] IN BLOOD BY AUTOMATED COUNT: 17.8 %
GLUCOSE BLD-MCNC: 133 MG/DL (ref 70–99)
HCT VFR BLD AUTO: 34.6 %
HEPARIN AB PPP QL PL AGG: NEGATIVE
HGB BLD-MCNC: 11 G/DL
MCH RBC QN AUTO: 25.7 PG (ref 26–34)
MCHC RBC AUTO-ENTMCNC: 31.8 G/DL (ref 31–37)
MCV RBC AUTO: 80.8 FL
PLATELET # BLD AUTO: 217 10(3)UL (ref 150–450)
RBC # BLD AUTO: 4.28 X10(6)UL
WBC # BLD AUTO: 8.7 X10(3) UL (ref 4–11)

## 2023-12-04 PROCEDURE — 047M3ZZ DILATION OF RIGHT POPLITEAL ARTERY, PERCUTANEOUS APPROACH: ICD-10-PCS | Performed by: SURGERY

## 2023-12-04 PROCEDURE — 04CM3ZZ EXTIRPATION OF MATTER FROM RIGHT POPLITEAL ARTERY, PERCUTANEOUS APPROACH: ICD-10-PCS | Performed by: SURGERY

## 2023-12-04 PROCEDURE — 99232 SBSQ HOSP IP/OBS MODERATE 35: CPT | Performed by: STUDENT IN AN ORGANIZED HEALTH CARE EDUCATION/TRAINING PROGRAM

## 2023-12-04 PROCEDURE — 3E053PZ INTRODUCTION OF PLATELET INHIBITOR INTO PERIPHERAL ARTERY, PERCUTANEOUS APPROACH: ICD-10-PCS | Performed by: SURGERY

## 2023-12-04 PROCEDURE — 047R3ZZ DILATION OF RIGHT POSTERIOR TIBIAL ARTERY, PERCUTANEOUS APPROACH: ICD-10-PCS | Performed by: SURGERY

## 2023-12-04 PROCEDURE — 047K3ZZ DILATION OF RIGHT FEMORAL ARTERY, PERCUTANEOUS APPROACH: ICD-10-PCS | Performed by: SURGERY

## 2023-12-04 PROCEDURE — B41FYZZ FLUOROSCOPY OF RIGHT LOWER EXTREMITY ARTERIES USING OTHER CONTRAST: ICD-10-PCS | Performed by: SURGERY

## 2023-12-04 PROCEDURE — 047T3ZZ DILATION OF RIGHT PERONEAL ARTERY, PERCUTANEOUS APPROACH: ICD-10-PCS | Performed by: SURGERY

## 2023-12-04 RX ORDER — BIVALIRUDIN 250 MG/5ML
INJECTION, POWDER, LYOPHILIZED, FOR SOLUTION INTRAVENOUS
Status: COMPLETED
Start: 2023-12-04 | End: 2023-12-04

## 2023-12-04 RX ORDER — ARGATROBAN 1 MG/ML
0.5 INJECTION, SOLUTION INTRAVENOUS CONTINUOUS
Status: DISCONTINUED | OUTPATIENT
Start: 2023-12-04 | End: 2023-12-06

## 2023-12-04 RX ORDER — MIDAZOLAM HYDROCHLORIDE 1 MG/ML
INJECTION INTRAMUSCULAR; INTRAVENOUS
Status: COMPLETED
Start: 2023-12-04 | End: 2023-12-04

## 2023-12-04 RX ORDER — IODIXANOL 320 MG/ML
100 INJECTION, SOLUTION INTRAVASCULAR
Status: COMPLETED | OUTPATIENT
Start: 2023-12-04 | End: 2023-12-04

## 2023-12-04 RX ORDER — NITROGLYCERIN 20 MG/100ML
INJECTION INTRAVENOUS
Status: DISCONTINUED
Start: 2023-12-04 | End: 2023-12-04 | Stop reason: WASHOUT

## 2023-12-04 RX ORDER — LIDOCAINE HYDROCHLORIDE 10 MG/ML
INJECTION, SOLUTION EPIDURAL; INFILTRATION; INTRACAUDAL; PERINEURAL
Status: COMPLETED
Start: 2023-12-04 | End: 2023-12-04

## 2023-12-04 RX ORDER — HEPARIN SODIUM 5000 [USP'U]/ML
INJECTION, SOLUTION INTRAVENOUS; SUBCUTANEOUS
Status: COMPLETED
Start: 2023-12-04 | End: 2023-12-04

## 2023-12-04 NOTE — PROGRESS NOTES
[facility] Dosing Service  Argatroban Subsequent Dosing         Pat Sam is a 80year old patient on argatroban per Dr. Elida Olmos. Goal PTT is 46-93     PTT   Date Value Ref Range Status   12/03/2023 69.7 (H) 23.3 - 35.6 seconds Final     Comment:     The aPTT Heparin Therapeutic Range is approximately 65- 104 seconds. The therapeutic range has been validated against 0.3-0.7 heparin anti-Xa units/mL. Elevations of the aPTT in patients not receiving anticoagulant therapy (Heparin, etc.), may be seen in Factor deficiency, vitamin K deficiency,   factor inhibitors, liver disease, etc.   Clinical correlation is recommended. INR   Date Value Ref Range Status   12/01/2023 1.39 (H) 0.80 - 1.20 Final     Comment:     Only the INR (not the PT value) should be utilized for   the monitoring of oral anticoagulant therapy. Recommended therapeutic ranges for anticoagulant therapy are   as follows:   2.0 - 3.0 All indications except for mechanical prosthetic   cardiac valves. 2.5 - 3.5 Mechanical prosthetic cardiac valves. Current dose is 0.5 mcg/kg/min. Based on PTT no dose change required. Will recheck PTT with AM labs and adjust based on result for goal PTT of 46-93. We will continue to follow and appreciate the opportunity to assist in the care of this patient.     Thank you,    Marianne Jones, PharmD  12/3/2023 10:47 PM

## 2023-12-04 NOTE — CONSULTS
[facility]  Inpatient Wound Care Contact Note    Kandace Gandhi Patient Status:  Inpatient    1935 MRN [id number]   [facility] 3NE-A Attending Gely Crawley, DO   Hosp Day # 3 PCP Robe Richardson MD     Consult received, planned Angiogram and possible amputation per notes from Dr. Sakshi Rodriguez. Will await plans per Vascular. RN aware. Will check status on 23. Thank you,    Zoie Alvarenga.  Cristóbal Chauhan, PT, MPT  [address] [facility]  [address]  [place], [address]  [phone]

## 2023-12-04 NOTE — PLAN OF CARE
Assumed care at 1930  A/Ox4, on room air, Afib on tele  Cardiac diet, NPO at 0500  No complaints of pain   Dressing to foot changed, c/d/I  Left arm IV infusing 0.9 nacl at 75 ml/hr   Left arm IV infusing argatroban at 2.8 ml/hr  IVPB zosyn q8 hours  Patient updated with plan of care  All needs met at this time    Problem: PAIN - ADULT  Goal: Verbalizes/displays adequate comfort level or patient's stated pain goal  Description: INTERVENTIONS:  - Encourage pt to monitor pain and request assistance  - Assess pain using appropriate pain scale  - Administer analgesics based on type and severity of pain and evaluate response  - Implement non-pharmacological measures as appropriate and evaluate response  - Consider cultural and social influences on pain and pain management  - Manage/alleviate anxiety  - Utilize distraction and/or relaxation techniques  - Monitor for opioid side effects  - Notify MD/LIP if interventions unsuccessful or patient reports new pain  - Anticipate increased pain with activity and pre-medicate as appropriate  Outcome: Progressing     Problem: RISK FOR INFECTION - ADULT  Goal: Absence of fever/infection during anticipated neutropenic period  Description: INTERVENTIONS  - Monitor WBC  - Administer growth factors as ordered  - Implement neutropenic guidelines  Outcome: Progressing     Problem: SAFETY ADULT - FALL  Goal: Free from fall injury  Description: INTERVENTIONS:  - Assess pt frequently for physical needs  - Identify cognitive and physical deficits and behaviors that affect risk of falls.   - [place] fall precautions as indicated by assessment.  - Educate pt/family on patient safety including physical limitations  - Instruct pt to call for assistance with activity based on assessment  - Modify environment to reduce risk of injury  - Provide assistive devices as appropriate  - Consider OT/PT consult to assist with strengthening/mobility  - Encourage toileting schedule  Outcome: Progressing Problem: DISCHARGE PLANNING  Goal: Discharge to home or other facility with appropriate resources  Description: INTERVENTIONS:  - Identify barriers to discharge w/pt and caregiver  - Include patient/family/discharge partner in discharge planning  - Arrange for needed discharge resources and transportation as appropriate  - Identify discharge learning needs (meds, wound care, etc)  - Arrange for interpreters to assist at discharge as needed  - Consider post-discharge preferences of patient/family/discharge partner  - Complete POLST form as appropriate  - Assess patient's ability to be responsible for managing their own health  - Refer to Case Management Department for coordinating discharge planning if the patient needs post-hospital services based on physician/LIP order or complex needs related to functional status, cognitive ability or social support system  Outcome: Progressing

## 2023-12-04 NOTE — PROGRESS NOTES
[facility] Dosing Service  Argatroban Subsequent Dosing       Jg Carbajal is a 80year old patient on argatroban per Dr. Cherylene Messier. Goal PTT is 46-93     PTT   Date Value Ref Range Status   12/03/2023 61.1 (H) 23.3 - 35.6 seconds Final     Comment:     The aPTT Heparin Therapeutic Range is approximately 65- 104 seconds. The therapeutic range has been validated against 0.3-0.7 heparin anti-Xa units/mL. Elevations of the aPTT in patients not receiving anticoagulant therapy (Heparin, etc.), may be seen in Factor deficiency, vitamin K deficiency,   factor inhibitors, liver disease, etc.   Clinical correlation is recommended. INR   Date Value Ref Range Status   12/01/2023 1.39 (H) 0.80 - 1.20 Final     Comment:     Only the INR (not the PT value) should be utilized for   the monitoring of oral anticoagulant therapy. Recommended therapeutic ranges for anticoagulant therapy are   as follows:   2.0 - 3.0 All indications except for mechanical prosthetic   cardiac valves. 2.5 - 3.5 Mechanical prosthetic cardiac valves. Current dose is 0.5 mcg/kg/min    Based on PTT no dose change required     Will recheck PTT  in 2 hours  and adjust based on result for goal PTT of 46-93. We will continue to follow and appreciate the opportunity to assist in the care of this patient.     Thank you,    Madeleine Lo, PharmD  12/3/2023 7:23 PM

## 2023-12-04 NOTE — PLAN OF CARE
Assumed care at 0730  A/O x4. [place]. Rambling at times. RA  Afib on tele. On argatroban gtt @ 2.8 ml / hr. Managed by pharmacy. Also received aspirin / plavix. NPO. Meds given this AM.  Zosyn q8  0.9 @ 75 ml/hr  Cardiac EP - no needs  Plan for angiogram today. Pt and pts family updated. Will continue with plan of care. 1348: pt with 2.18 second pause on tele. Cardiology and hospitalist notified. No new orders. Report given to Stephenie Schulz at 6943. Pt to transfer to [address] [place] [address] after cath angio.      Problem: Diabetes/Glucose Control  Goal: Glucose maintained within prescribed range  Description: INTERVENTIONS:  - Monitor Blood Glucose as ordered  - Assess for signs and symptoms of hyperglycemia and hypoglycemia  - Administer ordered medications to maintain glucose within target range  - Assess barriers to adequate nutritional intake and initiate nutrition consult as needed  - Instruct patient on self management of diabetes  Outcome: Progressing     Problem: PAIN - ADULT  Goal: Verbalizes/displays adequate comfort level or patient's stated pain goal  Description: INTERVENTIONS:  - Encourage pt to monitor pain and request assistance  - Assess pain using appropriate pain scale  - Administer analgesics based on type and severity of pain and evaluate response  - Implement non-pharmacological measures as appropriate and evaluate response  - Consider cultural and social influences on pain and pain management  - Manage/alleviate anxiety  - Utilize distraction and/or relaxation techniques  - Monitor for opioid side effects  - Notify MD/LIP if interventions unsuccessful or patient reports new pain  - Anticipate increased pain with activity and pre-medicate as appropriate  Outcome: Progressing     Problem: RISK FOR INFECTION - ADULT  Goal: Absence of fever/infection during anticipated neutropenic period  Description: INTERVENTIONS  - Monitor WBC  - Administer growth factors as ordered  - Implement neutropenic guidelines  Outcome: Progressing     Problem: SAFETY ADULT - FALL  Goal: Free from fall injury  Description: INTERVENTIONS:  - Assess pt frequently for physical needs  - Identify cognitive and physical deficits and behaviors that affect risk of falls.   - [place] fall precautions as indicated by assessment.  - Educate pt/family on patient safety including physical limitations  - Instruct pt to call for assistance with activity based on assessment  - Modify environment to reduce risk of injury  - Provide assistive devices as appropriate  - Consider OT/PT consult to assist with strengthening/mobility  - Encourage toileting schedule  Outcome: Progressing     Problem: DISCHARGE PLANNING  Goal: Discharge to home or other facility with appropriate resources  Description: INTERVENTIONS:  - Identify barriers to discharge w/pt and caregiver  - Include patient/family/discharge partner in discharge planning  - Arrange for needed discharge resources and transportation as appropriate  - Identify discharge learning needs (meds, wound care, etc)  - Arrange for interpreters to assist at discharge as needed  - Consider post-discharge preferences of patient/family/discharge partner  - Complete POLST form as appropriate  - Assess patient's ability to be responsible for managing their own health  - Refer to Case Management Department for coordinating discharge planning if the patient needs post-hospital services based on physician/LIP order or complex needs related to functional status, cognitive ability or social support system  Outcome: Progressing

## 2023-12-04 NOTE — OCCUPATIONAL THERAPY NOTE
OT orders received and patient chart reviewed. Pt with angiogram pending for today. Will continue to follow and re-attempt pending results.

## 2023-12-04 NOTE — BRIEF OP NOTE
Pre-Operative Diagnosis: Atherosclerosis with ulcer, stent occlusion     Post-Operative Diagnosis: same     Procedure Performed:   US percutaneous access left common femoral artery   Selection of right common femoral artery and angiogram  Rotarex thrombectomy/atherectomy for popliteal stent and distal popliteal occlusion  Balloon angioplasty of SFA popliteal stents with 5x220 balloon  Ballon angioplasty of below knee popliteal and TP trunk with 4x80  Balloon angioplasty of posterior tibial artery with 2.5x220    Anes 2 V 48 F start 7955, finish 1741    [facility]     Surgical Findings:   Significant in-stent restenosis mid stents and chronic occlusion of stents   Restoration of flow through posterior tibial artery     Specimen: none     Estimated Blood Loss: 100 mL     Daisy Traore MD  12/4/2023  5:48 PM

## 2023-12-04 NOTE — PROGRESS NOTES
[address] [facility] Dosing Service  Argatroban Subsequent Dosing       Antonio Jones is a 80year old patient on argatroban per Dr. Emil Pardo  . Goal PTT is 46-93     PTT   Date Value Ref Range Status   12/04/2023 75.0 (H) 23.3 - 35.6 seconds Final     Comment:     The aPTT Heparin Therapeutic Range is approximately 65- 104 seconds. The therapeutic range has been validated against 0.3-0.7 heparin anti-Xa units/mL. Elevations of the aPTT in patients not receiving anticoagulant therapy (Heparin, etc.), may be seen in Factor deficiency, vitamin K deficiency,   factor inhibitors, liver disease, etc.   Clinical correlation is recommended. INR   Date Value Ref Range Status   12/01/2023 1.39 (H) 0.80 - 1.20 Final     Comment:     Only the INR (not the PT value) should be utilized for   the monitoring of oral anticoagulant therapy. Recommended therapeutic ranges for anticoagulant therapy are   as follows:   2.0 - 3.0 All indications except for mechanical prosthetic   cardiac valves. 2.5 - 3.5 Mechanical prosthetic cardiac valves. Current dose is 0.5 mcg/kg/min    Based on PTT no dose change required     Will recheck PTT with AM labs and adjust based on result for goal PTT of 46-93. We will continue to follow and appreciate the opportunity to assist in the care of this patient.     Thank you,    Abraham Womack, PharmD  12/4/2023 7:40 AM

## 2023-12-04 NOTE — PLAN OF CARE
[place] Patient Status:  Inpatient    1935 MRN [id number]   [facility] 3NE-A Attending Fausto Carroll, [address] Day [address] 2 PCP Miller Frost MD     Cardiology Nocturnal APN Plan of Care     Page Received: Bedside RN     NSVT on tele, asymptomatic. Review of lytes stable.         Assessment/Plan:   - Continue telemetry, no changes         Laurie Prabhakar, [address]  12/3/2023  7:38 PM

## 2023-12-05 ENCOUNTER — APPOINTMENT (OUTPATIENT)
Dept: CV DIAGNOSTICS | Facility: HOSPITAL | Age: 88
End: 2023-12-05
Attending: SURGERY
Payer: MEDICARE

## 2023-12-05 ENCOUNTER — APPOINTMENT (OUTPATIENT)
Dept: CV DIAGNOSTICS | Facility: HOSPITAL | Age: 88
DRG: 853 | End: 2023-12-05
Attending: SURGERY
Payer: MEDICARE

## 2023-12-05 PROBLEM — L97.512 FOOT ULCER WITH FAT LAYER EXPOSED, RIGHT (HCC): Status: ACTIVE | Noted: 2023-12-05

## 2023-12-05 LAB
APTT PPP: 56.7 SECONDS (ref 23.3–35.6)
CREAT BLD-MCNC: 0.97 MG/DL
EGFRCR SERPLBLD CKD-EPI 2021: 75 ML/MIN/1.73M2 (ref 60–?)
ERYTHROCYTE [DISTWIDTH] IN BLOOD BY AUTOMATED COUNT: 17.7 %
HCT VFR BLD AUTO: 34.4 %
HGB BLD-MCNC: 10.5 G/DL
MCH RBC QN AUTO: 25.3 PG (ref 26–34)
MCHC RBC AUTO-ENTMCNC: 30.5 G/DL (ref 31–37)
MCV RBC AUTO: 82.9 FL
PLATELET # BLD AUTO: 222 10(3)UL (ref 150–450)
RBC # BLD AUTO: 4.15 X10(6)UL
WBC # BLD AUTO: 7.4 X10(3) UL (ref 4–11)

## 2023-12-05 PROCEDURE — 93306 TTE W/DOPPLER COMPLETE: CPT | Performed by: SURGERY

## 2023-12-05 PROCEDURE — 99222 1ST HOSP IP/OBS MODERATE 55: CPT | Performed by: STUDENT IN AN ORGANIZED HEALTH CARE EDUCATION/TRAINING PROGRAM

## 2023-12-05 PROCEDURE — 0JDQ3ZZ EXTRACTION OF RIGHT FOOT SUBCUTANEOUS TISSUE AND FASCIA, PERCUTANEOUS APPROACH: ICD-10-PCS | Performed by: STUDENT IN AN ORGANIZED HEALTH CARE EDUCATION/TRAINING PROGRAM

## 2023-12-05 PROCEDURE — 99232 SBSQ HOSP IP/OBS MODERATE 35: CPT | Performed by: STUDENT IN AN ORGANIZED HEALTH CARE EDUCATION/TRAINING PROGRAM

## 2023-12-05 NOTE — CM/SW NOTE
Received order to price check Elliquis. SW called pt's pharmacy. Cost for Elliquis is $100 per month. Pt can use a free 30 day coupon card. RN updated.      SHREE Pradhan, [facility]  Discharge [address] [facility].

## 2023-12-05 NOTE — PLAN OF CARE
Received patient at 0730. Patient alert and oriented x4. [place]. Tele Rhythm A-Fib. O2 sats on RA. Lungs clear. Bed is locked and low position. Call light & personal belongings within reach. Denies pain at this time. Patient voiding WNL. Patient tolerating ambulation w/ 1 and walker. Skin dry - see wound flowsheet for right foot. Reviewed plan of care with patient and verbalized understanding. POC: Cards and vascular following, had angio of right foot through left groin access- site looks soft, CDI. On Argatroban drip, TBD AC for DC. IVF, Zosyn - PNA.      Problem: Diabetes/Glucose Control  Goal: Glucose maintained within prescribed range  Description: INTERVENTIONS:  - Monitor Blood Glucose as ordered  - Assess for signs and symptoms of hyperglycemia and hypoglycemia  - Administer ordered medications to maintain glucose within target range  - Assess barriers to adequate nutritional intake and initiate nutrition consult as needed  - Instruct patient on self management of diabetes  Outcome: Progressing     Problem: PAIN - ADULT  Goal: Verbalizes/displays adequate comfort level or patient's stated pain goal  Description: INTERVENTIONS:  - Encourage pt to monitor pain and request assistance  - Assess pain using appropriate pain scale  - Administer analgesics based on type and severity of pain and evaluate response  - Implement non-pharmacological measures as appropriate and evaluate response  - Consider cultural and social influences on pain and pain management  - Manage/alleviate anxiety  - Utilize distraction and/or relaxation techniques  - Monitor for opioid side effects  - Notify MD/LIP if interventions unsuccessful or patient reports new pain  - Anticipate increased pain with activity and pre-medicate as appropriate  Outcome: Progressing     Problem: RISK FOR INFECTION - ADULT  Goal: Absence of fever/infection during anticipated neutropenic period  Description: INTERVENTIONS  - Monitor WBC  - Administer growth factors as ordered  - Implement neutropenic guidelines  Outcome: Progressing     Problem: SAFETY ADULT - FALL  Goal: Free from fall injury  Description: INTERVENTIONS:  - Assess pt frequently for physical needs  - Identify cognitive and physical deficits and behaviors that affect risk of falls.   - [place] fall precautions as indicated by assessment.  - Educate pt/family on patient safety including physical limitations  - Instruct pt to call for assistance with activity based on assessment  - Modify environment to reduce risk of injury  - Provide assistive devices as appropriate  - Consider OT/PT consult to assist with strengthening/mobility  - Encourage toileting schedule  Outcome: Progressing     Problem: DISCHARGE PLANNING  Goal: Discharge to home or other facility with appropriate resources  Description: INTERVENTIONS:  - Identify barriers to discharge w/pt and caregiver  - Include patient/family/discharge partner in discharge planning  - Arrange for needed discharge resources and transportation as appropriate  - Identify discharge learning needs (meds, wound care, etc)  - Arrange for interpreters to assist at discharge as needed  - Consider post-discharge preferences of patient/family/discharge partner  - Complete POLST form as appropriate  - Assess patient's ability to be responsible for managing their own health  - Refer to Case Management Department for coordinating discharge planning if the patient needs post-hospital services based on physician/LIP order or complex needs related to functional status, cognitive ability or social support system  Outcome: Progressing

## 2023-12-05 NOTE — PHYSICAL THERAPY NOTE
PHYSICAL THERAPY EVALUATION - INPATIENT     Room Number: 4949  Evaluation Date: 12/5/2023  Type of Evaluation: Re-evaluation  Physician Order: PT Eval and Treat    Presenting Problem: s/p R LE thrombectomy/atherectomy 12/4/23  Co-Morbidities : CAD s/p CABG, HTN, HLD, PAD, s/p R TMA 6/2023  Reason for Therapy: Mobility Dysfunction and Discharge Planning    History related to current admission: Patient is a 80year old male admitted on 12/1/2023: Presented with URI dx Afib RVR, hypotension, acute hypoxic resp failure with PNA - admit to ICU with pressor support, vascular consulted for non healing amp and occlusion of arteries in RLE - plan for angio 12/4. Pt s/p R angiogram with thrombectomy/atherectomy for popliteal stent and distal popliteal occlusion 12/4/23. ASSESSMENT   Pt is a 80 y.o. male admitted 12/1/23 for URI and Afib. Pt now s/p R LE thrombectomy/atherectomy 12/4/23. Pt typically performs bed mobility and transfers with supervision level. Pt to remain minimally weightbearing per podiatrist. Pt currently able to perform transfers to bedside chair with CGA and RW. Reviewed activity recommendations and there-ex. Pt currently present with limited endurance, strength deficits, and decreased activity tolerance. Pt would continue to benefit from skilled therapy. Will plan to assess safety with rollator ambulation next session. Functional outcome measures completed include AMPAC. The AM-PAC '6-Clicks' Inpatient Basic Mobility Short Form was completed and this patient is demonstrating a Approx Degree of Impairment: 50.57%  degree of impairment in mobility. Research supports that patients with this level of impairment may benefit from home. Will need new orders pending vascular work up and possible amputation.      CURRENT GOALS    Goal #1 Patient is able to demonstrate supine - sit EOB @ level: supervision- MET     Goal #2 Patient is able to demonstrate transfers EOB to/from [facility] at assistance level: supervision     Goal #3 Patient is able to ambulate 10 feet with assist device: rollator at assistance level: supervision     Goal #4    Goal #5    Goal #6    Goal Comments: Goals established on 2023        HOME SITUATION  Type of Home: [address]: One level                Lives With: Alone  Drives: No  Patient Owned Equipment: Rolling walker; Wheelchair;Hospital bed (commode)  Patient Regularly Uses: Reading glasses    Prior Level of [place]: Pt lives alone in single story home. Pts family visits daily. Pt minimally ambulatory per Podiatry recommendation. Pt performs transfers and short distance ambulation with RW and supervision. Pt has hospital bed and sit-stand chair. SUBJECTIVE  \"It's so cold in here! \"       OBJECTIVE  Precautions: Bed/chair alarm;Hard of hearing  Fall Risk: Standard fall risk    WEIGHT BEARING RESTRICTION  Weight Bearing Restriction: R lower extremity        R Lower Extremity: Weight Bearing as Tolerated (with post-op shoe, limit ambulation)       PAIN ASSESSMENT  Ratin          COGNITION  Overall Cognitive Status:  WFL - within functional limits    RANGE OF MOTION AND STRENGTH ASSESSMENT  Upper extremity ROM and strength are within functional limits     Lower extremity ROM is within functional limits     Lower extremity strength is within functional limits       BALANCE  Static Sitting: Good  Dynamic Sitting: Good  Static Standing: Fair -  Dynamic Standing: Poor +    ADDITIONAL TESTS                                    ACTIVITY TOLERANCE                         O2 WALK       NEUROLOGICAL FINDINGS                        AM-PAC '6-Clicks' INPATIENT SHORT FORM - BASIC MOBILITY  How much difficulty does the patient currently have. ..   Patient Difficulty: Turning over in bed (including adjusting bedclothes, sheets and blankets)?: A Little   Patient Difficulty: Sitting down on and standing up from a chair with arms (e.g., wheelchair, bedside commode, etc.): A Little Patient Difficulty: Moving from lying on back to sitting on the side of the bed?: A Little   How much help from another person does the patient currently need. .. Help from Another: Moving to and from a bed to a chair (including a wheelchair)?: A Little   Help from Another: Need to walk in hospital room?: A Little   Help from Another: Climbing 3-5 steps with a railing?: A Lot       AM-PAC Score:  Raw Score: 17   Approx Degree of Impairment: 50.57%   Standardized Score (AM-PAC Scale): 42.13   CMS Modifier (G-Code): CK    FUNCTIONAL ABILITY STATUS  Gait Assessment   Functional Mobility/Gait Assessment  Gait Assistance: Contact guard assist  Distance (ft): 3  Assistive Device: Rolling walker  Pattern: Shuffle    Skilled Therapy Provided     Bed Mobility:  Rolling: mod I   Supine to sit: supervision      Transfer Mobility:  Sit to stand: CGA   Stand to sit: CGA- VC for UE placement with transfer  Gait: CGA with RW- VC for posture and sequencing    Therapists comments:   Pt assisted with donning post op shoe. Pt demonstrates good pacing and safety with transfer. Pt tolerated seated there-ex with rest breaks. Pt performed 8 reps-sit stand for practice with set up and LE strengthening. Pt progressing from [id number] to supervision with sit-stand. VC for eccentric control with noted improvement. Reviewed activity recommendations. Exercise/Education Provided:  Energy conservation  Functional activity tolerated  Gait training  Strengthening  Lower therapeutic exercise: Alternating marching  Ankle pumps  LAQ  Transfer training  Sit-stand x 8 reps     Patient End of Session: Up in chair;Needs met;Call light within reach;RN aware of session/findings; All patient questions and concerns addressed; Alarm set; Family present      Patient Evaluation Complexity Level:  History Moderate - 1 or 2 personal factors and/or co-morbidities   Examination of body systems Moderate - addressing a total of 3 or more elements   Clinical Presentation Moderate - Evolving   Clinical Decision Making Moderate - Evolving       PT Session Time: 30 minutes  Therapeutic Activity: 15 minutes  Therapeutic Exercise:  10 minutes

## 2023-12-05 NOTE — CM/SW NOTE
12/05/23 1500   CM/SW Referral Data   Referral Source Social Work (self-referral)   Reason for Referral Discharge planning   Informant Patient       SW met with pt and additional family member at bedside to discuss discharge planning. PT is recommending HHC at discharge. Pt agreeable. Pt has history with [facility]. SW sent [facility] referrals in [place]. Orders entered. Await options to provide choice list.      &  to remain available and supportive for discharge planning needs.     SHREE Jalloh, [facility]  Discharge [address] [facility]

## 2023-12-05 NOTE — PROGRESS NOTES
Received pt from the IVS post peripheral angiogram.  Patient/family oriented to room and post cath precautions    Left groin site: soft, no drainage. Dressing c/d/I. Femstop in place. sensation intact. Left pedal pulse palpable. Patient laying in bed flat, argatroban restarted as ordered, due to have head of bed elevated after voiding.

## 2023-12-05 NOTE — PROGRESS NOTES
[facility] Dosing Service  Argatroban Subsequent Dosing       Darcus Burkitt is a 80year old patient on argatroban for Dr. Harsh Denton . Goal PTT is 46-93     PTT   Date Value Ref Range Status   12/05/2023 56.7 (H) 23.3 - 35.6 seconds Final     Comment:     The aPTT Heparin Therapeutic Range is approximately 65- 104 seconds. The therapeutic range has been validated against 0.3-0.7 heparin anti-Xa units/mL. Elevations of the aPTT in patients not receiving anticoagulant therapy (Heparin, etc.), may be seen in Factor deficiency, vitamin K deficiency,   factor inhibitors, liver disease, etc.   Clinical correlation is recommended. INR   Date Value Ref Range Status   12/01/2023 1.39 (H) 0.80 - 1.20 Final     Comment:     Only the INR (not the PT value) should be utilized for   the monitoring of oral anticoagulant therapy. Recommended therapeutic ranges for anticoagulant therapy are   as follows:   2.0 - 3.0 All indications except for mechanical prosthetic   cardiac valves. 2.5 - 3.5 Mechanical prosthetic cardiac valves. Heparin Induced Plt Antibody   Date Value Ref Range Status   12/03/2023 Negative Negative Final     Current dose is 0.5 mcg/kg/min    Based on PTT no dose change required     Will recheck PTT with AM labs and adjust based on result for goal PTT of 46-93. We will continue to follow and appreciate the opportunity to assist in the care of this patient.     Thank you,  Nicole Sanchez, PharmD  12/5/2023 7:22 AM

## 2023-12-05 NOTE — CM/SW NOTE
SW spoke with pt and family member at bedside. Pt is current with [facility]. Agreeable to resuming care with them at discharge. Reserved in [address]. They expressed appreciation of update.     [facility]  P:[phone]  [facility], [facility], [facility]  Discharge [address] [facility].

## 2023-12-05 NOTE — PLAN OF CARE
Pt. Alert and o x 3 , forgetful. S/P R leg PTCA/thrombectomy /atherectomy. Left groin soft , dressing C/D/I. Denies any pain or discomfort at this time , Pt. voided . Tele shows Afib on the monitor , on Argatroban gtt at 2.8 ml. Cont. monitor per tel/labs/v/s. Meds as ordered. Fall/safety precautions instructed , placed call light w/in reach. Problem: PAIN - ADULT  Goal: Verbalizes/displays adequate comfort level or patient's stated pain goal  Description: INTERVENTIONS:  - Encourage pt to monitor pain and request assistance  - Assess pain using appropriate pain scale  - Administer analgesics based on type and severity of pain and evaluate response  - Implement non-pharmacological measures as appropriate and evaluate response  - Consider cultural and social influences on pain and pain management  - Manage/alleviate anxiety  - Utilize distraction and/or relaxation techniques  - Monitor for opioid side effects  - Notify MD/LIP if interventions unsuccessful or patient reports new pain  - Anticipate increased pain with activity and pre-medicate as appropriate  Outcome: Progressing     Problem: RISK FOR INFECTION - ADULT  Goal: Absence of fever/infection during anticipated neutropenic period  Description: INTERVENTIONS  - Monitor WBC  - Administer growth factors as ordered  - Implement neutropenic guidelines  Outcome: Progressing     Problem: SAFETY ADULT - FALL  Goal: Free from fall injury  Description: INTERVENTIONS:  - Assess pt frequently for physical needs  - Identify cognitive and physical deficits and behaviors that affect risk of falls.   - [place] fall precautions as indicated by assessment.  - Educate pt/family on patient safety including physical limitations  - Instruct pt to call for assistance with activity based on assessment  - Modify environment to reduce risk of injury  - Provide assistive devices as appropriate  - Consider OT/PT consult to assist with strengthening/mobility  - Encourage toileting schedule  Outcome: Progressing

## 2023-12-05 NOTE — PROGRESS NOTES
Doing well  No groin hematoma  Doppler right post tib     Will need long term anticoagulation with axel  Discussed with Dr. Maria Antonia Buchanan from Podiatry and recommend conservative approach / local wound care for now     Follow up in Discharge  Vascular will sign off

## 2023-12-06 VITALS
HEIGHT: 67 IN | RESPIRATION RATE: 19 BRPM | BODY MASS INDEX: 32.6 KG/M2 | SYSTOLIC BLOOD PRESSURE: 113 MMHG | DIASTOLIC BLOOD PRESSURE: 71 MMHG | WEIGHT: 207.69 LBS | HEART RATE: 97 BPM | TEMPERATURE: 97 F | OXYGEN SATURATION: 94 %

## 2023-12-06 LAB
ANION GAP SERPL CALC-SCNC: 6 MMOL/L (ref 0–18)
APTT PPP: 48.7 SECONDS (ref 23.3–35.6)
BUN BLD-MCNC: 8 MG/DL (ref 9–23)
CALCIUM BLD-MCNC: 8.4 MG/DL (ref 8.5–10.1)
CHLORIDE SERPL-SCNC: 114 MMOL/L (ref 98–112)
CO2 SERPL-SCNC: 24 MMOL/L (ref 21–32)
CREAT BLD-MCNC: 0.95 MG/DL
CREAT BLD-MCNC: 0.95 MG/DL
EGFRCR SERPLBLD CKD-EPI 2021: 77 ML/MIN/1.73M2 (ref 60–?)
EGFRCR SERPLBLD CKD-EPI 2021: 77 ML/MIN/1.73M2 (ref 60–?)
ERYTHROCYTE [DISTWIDTH] IN BLOOD BY AUTOMATED COUNT: 17.8 %
GLUCOSE BLD-MCNC: 94 MG/DL (ref 70–99)
HCT VFR BLD AUTO: 33.6 %
HGB BLD-MCNC: 10.6 G/DL
MCH RBC QN AUTO: 25.4 PG (ref 26–34)
MCHC RBC AUTO-ENTMCNC: 31.5 G/DL (ref 31–37)
MCV RBC AUTO: 80.6 FL
OSMOLALITY SERPL CALC.SUM OF ELEC: 296 MOSM/KG (ref 275–295)
PLATELET # BLD AUTO: 259 10(3)UL (ref 150–450)
POTASSIUM SERPL-SCNC: 3.4 MMOL/L (ref 3.5–5.1)
RBC # BLD AUTO: 4.17 X10(6)UL
SODIUM SERPL-SCNC: 144 MMOL/L (ref 136–145)
WBC # BLD AUTO: 5.8 X10(3) UL (ref 4–11)

## 2023-12-06 PROCEDURE — 99239 HOSP IP/OBS DSCHRG MGMT >30: CPT | Performed by: STUDENT IN AN ORGANIZED HEALTH CARE EDUCATION/TRAINING PROGRAM

## 2023-12-06 RX ORDER — AMOXICILLIN AND CLAVULANATE POTASSIUM 875; 125 MG/1; MG/1
1 TABLET, FILM COATED ORAL 2 TIMES DAILY
Qty: 12 TABLET | Refills: 0 | Status: ON HOLD | OUTPATIENT
Start: 2023-12-06 | End: 2023-12-27

## 2023-12-06 RX ORDER — POTASSIUM CHLORIDE 20 MEQ/1
40 TABLET, EXTENDED RELEASE ORAL EVERY 4 HOURS
Status: COMPLETED | OUTPATIENT
Start: 2023-12-06 | End: 2023-12-06

## 2023-12-06 RX ORDER — TAMSULOSIN HYDROCHLORIDE 0.4 MG/1
0.4 CAPSULE ORAL DAILY
COMMUNITY

## 2023-12-06 NOTE — PLAN OF CARE
Received patient at 0730. Patient alert and oriented x4. [place]. Tele Rhythm A-Fib. O2 sats on RA, desats when sleep , MD aware. Lungs clear. Bed is locked and low position. Call light & personal belongings within reach. Denies pain at this time. Patient voiding WNL. Patient tolerating ambulation w/ 1 and walker. Skin dry - see wound flowsheet for right foot- drsg changed today by wound care. Reviewed plan of care with patient and verbalized understanding. POC: Cards and vascular, podiatry  [facility] ist cleared for DC. Argatroban drip stopped , first dose of Elaquis. Potassium replaced.      Problem: Diabetes/Glucose Control  Goal: Glucose maintained within prescribed range  Description: INTERVENTIONS:  - Monitor Blood Glucose as ordered  - Assess for signs and symptoms of hyperglycemia and hypoglycemia  - Administer ordered medications to maintain glucose within target range  - Assess barriers to adequate nutritional intake and initiate nutrition consult as needed  - Instruct patient on self management of diabetes  Outcome: Progressing     Problem: PAIN - ADULT  Goal: Verbalizes/displays adequate comfort level or patient's stated pain goal  Description: INTERVENTIONS:  - Encourage pt to monitor pain and request assistance  - Assess pain using appropriate pain scale  - Administer analgesics based on type and severity of pain and evaluate response  - Implement non-pharmacological measures as appropriate and evaluate response  - Consider cultural and social influences on pain and pain management  - Manage/alleviate anxiety  - Utilize distraction and/or relaxation techniques  - Monitor for opioid side effects  - Notify MD/LIP if interventions unsuccessful or patient reports new pain  - Anticipate increased pain with activity and pre-medicate as appropriate  Outcome: Progressing     Problem: RISK FOR INFECTION - ADULT  Goal: Absence of fever/infection during anticipated neutropenic period  Description: INTERVENTIONS  - Monitor WBC  - Administer growth factors as ordered  - Implement neutropenic guidelines  Outcome: Progressing     Problem: SAFETY ADULT - FALL  Goal: Free from fall injury  Description: INTERVENTIONS:  - Assess pt frequently for physical needs  - Identify cognitive and physical deficits and behaviors that affect risk of falls.   - [place] fall precautions as indicated by assessment.  - Educate pt/family on patient safety including physical limitations  - Instruct pt to call for assistance with activity based on assessment  - Modify environment to reduce risk of injury  - Provide assistive devices as appropriate  - Consider OT/PT consult to assist with strengthening/mobility  - Encourage toileting schedule  Outcome: Progressing     Problem: DISCHARGE PLANNING  Goal: Discharge to home or other facility with appropriate resources  Description: INTERVENTIONS:  - Identify barriers to discharge w/pt and caregiver  - Include patient/family/discharge partner in discharge planning  - Arrange for needed discharge resources and transportation as appropriate  - Identify discharge learning needs (meds, wound care, etc)  - Arrange for interpreters to assist at discharge as needed  - Consider post-discharge preferences of patient/family/discharge partner  - Complete POLST form as appropriate  - Assess patient's ability to be responsible for managing their own health  - Refer to Case Management Department for coordinating discharge planning if the patient needs post-hospital services based on physician/LIP order or complex needs related to functional status, cognitive ability or social support system  Outcome: Progressing

## 2023-12-06 NOTE — CONSULTS
[facility]  Report of Inpatient Wound Care Consultation    France Olmos Patient Status:  Inpatient    1935 MRN [id number]   [facility] 8NE-A Attending Nicholas Irvin, [address] [facility] Day # 5 PCP Prerna Grijalva MD     Reason for Consultation:  TMA chronic wound    History of Present Illness:  France Olmos is a a(n) 80year old male. Patient with multiple comorbidities, with present on admission skin breakdown described below. SUBJECTIVE:  I would like to go home today. History:  Past Medical History:   Diagnosis Date    Arrhythmia     CAD (coronary artery disease) 2015    Cardiomyopathy ([facility]     Esophageal reflux     Hearing impairment     Heart attack ([facility] [address]     High blood pressure     High cholesterol     History of MI (myocardial infarction)     Other and unspecified hyperlipidemia     PAD (peripheral artery disease) ([facility] [address] 2023    Unspecified essential hypertension     Visual impairment      Past Surgical History:   Procedure Laterality Date    ANGIOGRAM      ANGIOPLASTY (CORONARY)      CABG      FRACTURE SURGERY Left     left hip pinning    OTHER SURGICAL HISTORY Left 1977    knee surgery    OTHER SURGICAL HISTORY  2016    Left hip ORIF pinning      reports that he has never smoked. He has never used smokeless tobacco. He reports that he does not drink alcohol and does not use drugs.       Allergies:  @ALLERGY    Laboratory Data:    Recent Labs   Lab 23  0818 23  0459 23  0549 23  1053 23  1430 23  1841 23  0633 23  1824 23  0507 23  0544   WBC 16.6* 21.0* 12.0* 11.0  --   --  8.7  --  7.4 5.8   HGB 13.5 11.3* 11.1* 11.2*  --   --  11.0*  --  10.5* 10.6*   HCT 42.8 36.3* 35.9* 34.6*  --   --  34.6*  --  34.4* 33.6*   .0 242.0 204.0 221.0  --   --  217.0  --  222.0 259.0   CREATSERUM 1.67* 1.10 0.91  0.91 0.89  --   --  0.96  --  0.97 0.95  0.95   BUN 27* 21 14 13  --   --   --   --   --  8* * 104* 84 107*  --   --   --   --   --  94   CA 8.2* 7.9* 8.4* 8.6  --   --   --   --   --  8.4*   ALB 2.9* 2.3*  --   --  2.4*  --   --   --   --   --    TP 6.6 5.7*  --   --  6.1*  --   --   --   --   --    PTT 28.4  --   --  43.5*  --    < > 75.0*  --  56.7* 48.7*   INR 1.39*  --   --   --   --   --   --   --   --   --    PGLU  --   --   --   --   --   --   --  133*  --   --     < > = values in this interval not displayed. 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 (Active)   Date First Assessed/Time First Assessed: 08/14/23 1309    Wound Number (Wound Clinic Only): #1- right foot  Primary Wound Type: Old surgical  Location: Foot  Wound Location Orientation: Right  Wound Description (Comments): Amputation site      Assessments 12/6/2023  9:11 AM   Wound Image     Wound Length (cm) 0.3 cm   Wound Width (cm) 8.6 cm   Wound Surface Area (cm^2) 2.58 cm^2   Wound Depth (cm) 0.2 cm   Wound Volume (cm^3) 0.516 cm^3   Wound Healing % 98   Margins Well-defined edges   Non-staged Wound Description Full thickness   Peggy-wound Assessment Blanchable erythema;Clean        Wound Cleaning and Dressings:  Showering directions: May shower and/or cleanse wound with mild soap and water  Wound cleansing:  Cleanse with normal saline or wound cleanser  Wound cleaning frequency: Every 48 hours  Wound product: Josi collagen and Bordered foam  Dressing change frequency:  Change dressing every other day and/or as needed  Enzymatic agent:  Not applicable    Care Summary:  Care Summary: Discussed Plan of Care at beside with patient. Patient verbally acknowledges understanding of all instructions and all questions were answered. Additional Notes:  Patient agreeable with the plan of care. Plan for conservative management with dressings, all in agreeance. Thank you for this consultation and for allowing me to participate in the care of your patient.   Please page me at #[phone] if you have any questions about this consultation and plan of care. Time Spent 45 Minutes. Thank you,  Mathieu Vo.  Isidra Scruggs, PT, MPT  Wound Care Clinician  [address] [place] [address] Team  12/6/2023

## 2023-12-06 NOTE — PLAN OF CARE
Pt okay to discharge per primary and consults. Patient ambulating and tolerating well with wheelchair. Discharge instructions and education went over with patient & son and family and verbalized understanding. Patient went home with [facility]. Patient transport by wheelchair.

## 2023-12-06 NOTE — PLAN OF CARE
Alert and Oriented x4. [place]. Can be forgetful at times. Pt on tele in Afib, no cardiac symptoms. Pt on room air. Pt denies any c/o of pain. Continent of bowel and bladder. Argatroban running per orders. IV Zosyn and fluids running per orders. R foot wound without complication. L groin wound without complications. Bed in lowest position and call light within reach.        Problem: Diabetes/Glucose Control  Goal: Glucose maintained within prescribed range  Description: INTERVENTIONS:  - Monitor Blood Glucose as ordered  - Assess for signs and symptoms of hyperglycemia and hypoglycemia  - Administer ordered medications to maintain glucose within target range  - Assess barriers to adequate nutritional intake and initiate nutrition consult as needed  - Instruct patient on self management of diabetes  Outcome: Progressing     Problem: PAIN - ADULT  Goal: Verbalizes/displays adequate comfort level or patient's stated pain goal  Description: INTERVENTIONS:  - Encourage pt to monitor pain and request assistance  - Assess pain using appropriate pain scale  - Administer analgesics based on type and severity of pain and evaluate response  - Implement non-pharmacological measures as appropriate and evaluate response  - Consider cultural and social influences on pain and pain management  - Manage/alleviate anxiety  - Utilize distraction and/or relaxation techniques  - Monitor for opioid side effects  - Notify MD/LIP if interventions unsuccessful or patient reports new pain  - Anticipate increased pain with activity and pre-medicate as appropriate  Outcome: Progressing     Problem: RISK FOR INFECTION - ADULT  Goal: Absence of fever/infection during anticipated neutropenic period  Description: INTERVENTIONS  - Monitor WBC  - Administer growth factors as ordered  - Implement neutropenic guidelines  Outcome: Progressing     Problem: SAFETY ADULT - FALL  Goal: Free from fall injury  Description: INTERVENTIONS:  - Assess pt frequently for physical needs  - Identify cognitive and physical deficits and behaviors that affect risk of falls.   - [place] fall precautions as indicated by assessment.  - Educate pt/family on patient safety including physical limitations  - Instruct pt to call for assistance with activity based on assessment  - Modify environment to reduce risk of injury  - Provide assistive devices as appropriate  - Consider OT/PT consult to assist with strengthening/mobility  - Encourage toileting schedule  Outcome: Progressing     Problem: DISCHARGE PLANNING  Goal: Discharge to home or other facility with appropriate resources  Description: INTERVENTIONS:  - Identify barriers to discharge w/pt and caregiver  - Include patient/family/discharge partner in discharge planning  - Arrange for needed discharge resources and transportation as appropriate  - Identify discharge learning needs (meds, wound care, etc)  - Arrange for interpreters to assist at discharge as needed  - Consider post-discharge preferences of patient/family/discharge partner  - Complete POLST form as appropriate  - Assess patient's ability to be responsible for managing their own health  - Refer to Case Management Department for coordinating discharge planning if the patient needs post-hospital services based on physician/LIP order or complex needs related to functional status, cognitive ability or social support system  Outcome: Progressing

## 2023-12-06 NOTE — PHYSICAL THERAPY NOTE
PHYSICAL THERAPY TREATMENT NOTE - INPATIENT    Room Number: 9779/7098-W     Session: 1 and 2 (am and pm session)     Number of Visits to Meet Established Goals: 3    Presenting Problem: s/p R LE thrombectomy/atherectomy 12/4/23  Co-Morbidities : CAD s/p CABG, HTN, HLD, PAD, s/p R TMA 6/2023    History related to current admission: Patient is a 80year old male admitted on 12/1/2023: Presented with URI dx Afib RVR, hypotension, acute hypoxic resp failure with PNA - admit to ICU with pressor support, vascular consulted for non healing amp and occlusion of arteries in RLE - plan for angio 12/4. Pt s/p R angiogram with thrombectomy/atherectomy for popliteal stent and distal popliteal occlusion 12/4/23. ASSESSMENT   Pt progressing well with functional mobility. Pt at supervision level for bed mobility, transfers, ambulation, and W/C navigation. Pt has met goals and does not require further skilled IP PT at this time. Recommend HHPT follow up for endurance training within his home environment. DISCHARGE RECOMMENDATIONS  PT Discharge Recommendations: Home with home health PT     PLAN  PT Treatment Plan: Endurance; Energy conservation;Patient education;Gait training;Strengthening;Transfer training;Balance training  Rehab Potential : Good  Frequency (Obs): 3-5x/week    CURRENT GOALS     Goal #1 Patient is able to demonstrate supine - sit EOB @ level: supervision- MET      Goal #2 Patient is able to demonstrate transfers EOB to/from [facility] at assistance level: supervision      Goal #3 Patient is able to ambulate 10 feet with assist device: rollator at assistance level: supervision      Goal #4     Goal #5     Goal #6     Goal Comments: Goals established on 12/5/2023 12/6/2023 all goals achieved       SUBJECTIVE  \"I have been doing this since my surgery in June. I will be fine. \"     OBJECTIVE  Precautions: Bed/chair alarm;Hard of hearing    WEIGHT BEARING RESTRICTION  Weight Bearing Restriction: R lower extremity        R Lower Extremity: Weight Bearing as Tolerated (with post-op shoe, limit ambulation)       PAIN ASSESSMENT   Ratin          BALANCE                                                                                                                       Static Sitting: Good  Dynamic Sitting: Good           Static Standing: Fair  Dynamic Standing: Fair -    ACTIVITY TOLERANCE                         O2 WALK         AM-PAC '6-Clicks' INPATIENT SHORT FORM - BASIC MOBILITY  How much difficulty does the patient currently have. .. Patient Difficulty: Turning over in bed (including adjusting bedclothes, sheets and blankets)?: A Little   Patient Difficulty: Sitting down on and standing up from a chair with arms (e.g., wheelchair, bedside commode, etc.): A Little   Patient Difficulty: Moving from lying on back to sitting on the side of the bed?: A Little   How much help from another person does the patient currently need. ..    Help from Another: Moving to and from a bed to a chair (including a wheelchair)?: A Little   Help from Another: Need to walk in hospital room?: A Little   Help from Another: Climbing 3-5 steps with a railing?: A Little       AM-PAC Score:  Raw Score: 18   Approx Degree of Impairment: 46.58%   Standardized Score (AM-PAC Scale): 43.63   CMS Modifier (G-Code): CK    FUNCTIONAL ABILITY STATUS  Gait Assessment   Functional Mobility/Gait Assessment  Gait Assistance: Supervision  Distance (ft): 5, 5  Assistive Device: Rolling walker  Pattern: Shuffle    Skilled Therapy Provided    Bed Mobility:  Rolling: supervision   Supine<>Sit: supervision   Sit<>Supine: NT     Transfer Mobility:  Sit<>Stand: supervision- demos safe consistent hand placement   Stand<>Sit: supervision- demos safe consistent hand placement   Gait: supervision (10ft x 2, 5ft x 2)- slow evans, excessive kyphosis, flat foot contact  W/C propulsion- MOD I- performed 200ft over hardwood and carpet    Therapist's Comments: Pt seen for am and pm session. Pt able to ambulate to bathroom with rollator during morning session. Reviewed safe rollator use since it is new to him. Pt performed toilet transfer and doffing/donning brief with supervision. Pt able to turn into rollator seat safely. Pt tolerated session well. Family requesting therapy follow up this afternoon. Family voiced concerns about patient being able to propel W/C in his home 75-100ft. Pt able to transfer to W/C safely with RW and supervision. Pt propelled ~ 200ft slowly with MOD I. Pt distracted throughout W/C navigation but denies dyspnea and tolerated well. Pt able to navigate turns, backward, and over carpeting safely with good tolerance. Pt returned to sitting up in bedside chair. Patient End of Session: Up in chair;Needs met;Call light within reach;RN aware of session/findings; All patient questions and concerns addressed; Family present; Discussed recommendations with /    PT Session Time: 60 (30am/30pm) minutes  Gait Training: 15 minutes  Therapeutic Activity: 40 minutes

## 2023-12-06 NOTE — DISCHARGE SUMMARY
[facility] HOSPITALIST  DISCHARGE SUMMARY     Pat Sam Patient Status:  Inpatient    1935 MRN [id number]   [facility] 8NE-A Attending No att. 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 Patient has had productive cough for the past 2 days. He developed dyspnea yesterday which worsened this morning. He was unable to lay flat. He denies any nausea, vomiting, fevers, chills, headaches, dizziness. Brief Synopsis: Cardiology was consulted. Patient was given Digoxin and Therapeutic lovenox. Patient was on Levophed for hypotension secondary to diltiazem drip. Pulm was consulted. Patient was given antibiotics for pneumonia. Patient underwent right thrombectomy popliteal stent for non healing amputation occlusion of right leg arteries with vascular surgery. Podiatry was consulted. PT OT recommended discharge home with home health. Lace+ Score: 79  59-90 High Risk  29-58 Medium Risk  0-28   Low Risk       TCM Follow-Up Recommendation:  LACE > 58:  High Risk of readmission after discharge from the hospital.      Procedures during hospitalization:   S/p right thrombectomy, popliteal stent and distal popliteal occlusion      Incidental or significant findings and recommendations (brief descriptions):  See brief synopsis above     Lab/Test results pending at Discharge:   None    Consultants:  [facility]  Podiatry    Discharge Medication List: Discharge Medications        START taking these medications        Instructions Prescription details   amoxicillin clavulanate 875-125 MG Tabs  Commonly known as: Augmentin      Take 1 tablet by mouth 2 (two) times daily for 6 days. Stop taking on: December 12, 2023  Quantity: 12 tablet  Refills: 0     apixaban 5 MG Tabs  Commonly known as: Eliquis      Take 1 tablet (5 mg total) by mouth 2 (two) times daily. Quantity: 60 tablet  Refills: 3     lisinopril 2.5 MG Tabs  Commonly known as: Prinivil; Zestril      Take 1 tablet (2.5 mg total) by mouth daily. Refills: 0            CONTINUE taking these medications        Instructions Prescription details   acetaminophen 500 MG Tabs  Commonly known as: Tylenol Extra Strength      Take 2 tablets (1,000 mg total) by mouth every 8 (eight) hours. Quantity: 21 tablet  Refills: 0     atorvastatin 40 MG Tabs  Commonly known as: Lipitor      Take 1 tablet (40 mg total) by mouth daily. Quantity: 30 tablet  Refills: 0     clopidogrel 75 MG Tabs  Commonly known as: Plavix      Take 1 tablet (75 mg total) by mouth daily. Quantity: 30 tablet  Refills: 0     finasteride 5 MG Tabs  Commonly known as: Proscar      Take 1 tablet (5 mg total) by mouth daily. Quantity: 30 tablet  Refills: 0     folic acid 1 MG Tabs  Commonly known as: Folvite      Take 1 tablet (1 mg total) by mouth daily. Refills: 0     furosemide 40 MG Tabs  Commonly known as: Lasix      Take 1 tablet (40 mg total) by mouth daily. Refills: 0     gabapentin 300 MG Caps  Commonly known as: Neurontin      Take 1 capsule (300 mg total) by mouth 3 (three) times daily. Quantity: 90 capsule  Refills: 0     metoprolol succinate ER 25 MG Tb24  Commonly known as: Toprol XL      Take 1 tablet (25 mg total) by mouth daily. Refills: 0     Omeprazole 40 MG Cpdr      Take 1 capsule (40 mg total) by mouth daily.    Refills: 0     predniSONE 5 MG Tabs  Commonly known as: Deltasone      Take 3 tablets (15 mg total) by mouth daily. Refills: 0     Santyl 250 UNIT/GM Oint  Generic drug: collagenase      Apply topically to ulcer site daily   Quantity: 30 g  Refills: 0     Santyl 250 UNIT/GM Oint  Generic drug: collagenase      Apply 1 g topically daily. Quantity: 30 g  Refills: 2     tamsulosin 0.4 MG Caps  Commonly known as: Flomax      Take 1 capsule (0.4 mg total) by mouth daily. Refills: 0            STOP taking these medications      aspirin 81 MG Tbec        HYDROcodone-acetaminophen 5-325 MG Tabs  Commonly known as: Norco        rivaroxaban 2.5 MG Tabs  Commonly known as: Xarelto                  Where to Get Your Medications        These medications were sent to [address], [address] [facility] [place], [phone]  [address] [place] [address]      Phone: [phone]   amoxicillin clavulanate 875-125 MG Tabs  apixaban 5 MG Tabs         [place]     Follow-up appointment:   Juanito Landin, [address] [facility] [address] [facility]  [phone]    Follow up in 2 week(s)      Domingo Shahid MD  [address] [place] [address]  [phone]    Follow up in 2 week(s)      Ender Bass [phone]    Follow up in 1 week(s)      Block, Teagan Frames, [place]  Oswaldo Calixto [address] [address]  [phone]    Schedule an appointment as soon as possible for a visit      [facility]  [address] [address]  [phone]  Follow up      Appointments for Next 30 Days 12/7/2023 - 1/6/2024        Date Arrival Time Visit Type Length Department Provider     12/11/2023  4:15 PM  [facility] FOLLOW UP [address] 15 min [place], [place]    Patient Instructions:         Location Instructions: Your appointment is scheduled at [facility]. The address is&nbsp; [address] [facility]. One Srikanth Suarez.  To reach Registration, heath adame the [employer]. Go through the entrance doors located on the ground floor. Aaliyah Cerrato left past the Information Desk and proceed to the [address]. Masks are optional for all patients and visitors, unless otherwise indicated. Vital signs:       Physical Exam:    General: No acute distress   Lungs: clear to auscultation  Cardiovascular: S1, S2  Abdomen: Soft      -----------------------------------------------------------------------------------------------  PATIENT DISCHARGE INSTRUCTIONS: See electronic chart    Taye Campos DO    Total time spent on discharge plannin minutes     The [facility] makes medical notes like these available to patients in the interest of transparency. Please be advised this is a medical document. Medical documents are intended to carry relevant information, facts as evident, and the clinical opinion of the practitioner. The medical note is intended as peer to peer communication and may appear blunt or direct. It is written in medical language and may contain abbreviations or verbiage that are unfamiliar.

## 2023-12-06 NOTE — PROGRESS NOTES
[facility] Dosing Service  Argatroban Subsequent Dosing       Darcus Burkitt is a 80year old patient on argatroban for Dr. Harsh Denton. Goal PTT is 46-93     PTT   Date Value Ref Range Status   12/06/2023 48.7 (H) 23.3 - 35.6 seconds Final     Comment:     The aPTT Heparin Therapeutic Range is approximately 65- 104 seconds. The therapeutic range has been validated against 0.3-0.7 heparin anti-Xa units/mL. Elevations of the aPTT in patients not receiving anticoagulant therapy (Heparin, etc.), may be seen in Factor deficiency, vitamin K deficiency,   factor inhibitors, liver disease, etc.   Clinical correlation is recommended. INR   Date Value Ref Range Status   12/01/2023 1.39 (H) 0.80 - 1.20 Final     Comment:     Only the INR (not the PT value) should be utilized for   the monitoring of oral anticoagulant therapy. Recommended therapeutic ranges for anticoagulant therapy are   as follows:   2.0 - 3.0 All indications except for mechanical prosthetic   cardiac valves. 2.5 - 3.5 Mechanical prosthetic cardiac valves. Heparin Induced Plt Antibody   Date Value Ref Range Status   12/03/2023 Negative Negative Final       Current dose is 0.5 mcg/kg/min. Based on PTT no dose change required. Will recheck PTT with AM labs and adjust based on result for goal PTT of 46-93. We will continue to follow and appreciate the opportunity to assist in the care of this patient.     Thank you,    Jw Marroquin PharmD  12/6/2023 6:34 AM

## 2023-12-06 NOTE — PLAN OF CARE
Received patient at 0730. Patient alert and oriented x4. [place]. Tele Rhythm A-Fib. O2 sats on RA, desats when sleeping, MD aware. Lungs clear. Bed is locked and low position. Call light & personal belongings within reach. Denies pain at this time. Patient voiding WNL. Patient tolerating ambulation w/ 1 and walker. Skin dry - see wound flowsheet for right foot- drsg changed today. Reviewed plan of care with patient and verbalized understanding. POC: Cards, podiatry and vascular signed off. On Argatroban drip, Jenelle AC for DC. IVF, Zosyn - PNA completed. Potassium replacement.      Problem: Diabetes/Glucose Control  Goal: Glucose maintained within prescribed range  Description: INTERVENTIONS:  - Monitor Blood Glucose as ordered  - Assess for signs and symptoms of hyperglycemia and hypoglycemia  - Administer ordered medications to maintain glucose within target range  - Assess barriers to adequate nutritional intake and initiate nutrition consult as needed  - Instruct patient on self management of diabetes  Outcome: Progressing     Problem: PAIN - ADULT  Goal: Verbalizes/displays adequate comfort level or patient's stated pain goal  Description: INTERVENTIONS:  - Encourage pt to monitor pain and request assistance  - Assess pain using appropriate pain scale  - Administer analgesics based on type and severity of pain and evaluate response  - Implement non-pharmacological measures as appropriate and evaluate response  - Consider cultural and social influences on pain and pain management  - Manage/alleviate anxiety  - Utilize distraction and/or relaxation techniques  - Monitor for opioid side effects  - Notify MD/LIP if interventions unsuccessful or patient reports new pain  - Anticipate increased pain with activity and pre-medicate as appropriate  Outcome: Progressing     Problem: RISK FOR INFECTION - ADULT  Goal: Absence of fever/infection during anticipated neutropenic period  Description: INTERVENTIONS  - Monitor WBC  - Administer growth factors as ordered  - Implement neutropenic guidelines  Outcome: Progressing     Problem: SAFETY ADULT - FALL  Goal: Free from fall injury  Description: INTERVENTIONS:  - Assess pt frequently for physical needs  - Identify cognitive and physical deficits and behaviors that affect risk of falls.   - [place] fall precautions as indicated by assessment.  - Educate pt/family on patient safety including physical limitations  - Instruct pt to call for assistance with activity based on assessment  - Modify environment to reduce risk of injury  - Provide assistive devices as appropriate  - Consider OT/PT consult to assist with strengthening/mobility  - Encourage toileting schedule  Outcome: Progressing     Problem: DISCHARGE PLANNING  Goal: Discharge to home or other facility with appropriate resources  Description: INTERVENTIONS:  - Identify barriers to discharge w/pt and caregiver  - Include patient/family/discharge partner in discharge planning  - Arrange for needed discharge resources and transportation as appropriate  - Identify discharge learning needs (meds, wound care, etc)  - Arrange for interpreters to assist at discharge as needed  - Consider post-discharge preferences of patient/family/discharge partner  - Complete POLST form as appropriate  - Assess patient's ability to be responsible for managing their own health  - Refer to Case Management Department for coordinating discharge planning if the patient needs post-hospital services based on physician/LIP order or complex needs related to functional status, cognitive ability or social support system  Outcome: Progressing

## 2023-12-06 NOTE — CONSULTS
[facility]    Report of Consultation    Den El Patient Status:  Observation    1935 MRN [id number]   [facility] ([place], [place]) 8NE-A Attending Fausto Carroll, [address] Day # 3 PCP Miller Frost MD     Date of Admission:  2023  Date of Consult:  2023    Reason for Consultation:  Right foot wound    History of Present Illness:  Den El is a a(n) 80year old male with past medical history of PAD was seen at bedside for evaluation of right foot wound. Patient is well-known to me as I is performed to me from the past.  He has had challenges healing due to his PAD. He has been following wound care center is making good progress with wound. He is now status post repeat angioplasty with Dr. Jenny Bell. He is resting in bed with dressings intact. No other complaints are mentioned. History:  Past Medical History:   Diagnosis Date    Arrhythmia     CAD (coronary artery disease) 2015    Cardiomyopathy ([address]     Esophageal reflux     Hearing impairment     Heart attack ([address]     High blood pressure     High cholesterol     History of MI (myocardial infarction)     Other and unspecified hyperlipidemia     PAD (peripheral artery disease) ([address] 2023    Unspecified essential hypertension     Visual impairment      Past Surgical History:   Procedure Laterality Date    ANGIOGRAM      ANGIOPLASTY (CORONARY)      CABG      FRACTURE SURGERY Left     left hip pinning    OTHER SURGICAL HISTORY Left 1977    knee surgery    OTHER SURGICAL HISTORY  2016    Left hip ORIF pinning     Family History   Problem Relation Age of Onset    Cancer Father       reports that he has never smoked. He has never used smokeless tobacco. He reports that he does not drink alcohol and does not use drugs. Allergies:   Allergies   Allergen Reactions    Heparin ANAPHYLAXIS    Hydromorphone HALLUCINATION       Medications:    Current Facility-Administered Medications:     metoprolol tartrate (Lopressor) partial tab 12.5 mg, 12.5 mg, Oral, 2x Daily(Beta Blocker)    argatroban 50 mg/50mL infusion premix, 0.5 mcg/kg/min, Intravenous, Continuous    folic acid (Folvite) tab 1 mg, 1 mg, Oral, Daily    predniSONE (Deltasone) tab 15 mg, 15 mg, Oral, Daily    sodium chloride 0.9% infusion, , Intravenous, Continuous    acetaminophen (Tylenol Extra Strength) tab 500 mg, 500 mg, Oral, Q4H PRN    melatonin tab 3 mg, 3 mg, Oral, Nightly PRN    polyethylene glycol (PEG 3350) (Miralax) 17 g oral packet 17 g, 17 g, Oral, Daily PRN    sennosides (Senokot) tab 17.2 mg, 17.2 mg, Oral, Nightly PRN    bisacodyl (Dulcolax) 10 MG rectal suppository 10 mg, 10 mg, Rectal, Daily PRN    guaiFENesin ER (Mucinex) 12 hr tab 600 mg, 600 mg, Oral, BID PRN    benzonatate (Tessalon) cap 200 mg, 200 mg, Oral, TID PRN    glycerin-hypromellose- (Artifical Tears) 0.2-0.2-1 % ophthalmic solution 1 drop, 1 drop, Both Eyes, QID PRN    sodium chloride (Saline Mist) 0.65 % nasal solution 1 spray, 1 spray, Each Nare, Q3H PRN    ondansetron (Zofran) 4 MG/2ML injection 4 mg, 4 mg, Intravenous, Q6H PRN    metoclopramide (Reglan) 5 mg/mL injection 5 mg, 5 mg, Intravenous, Q8H PRN    aspirin DR tab 81 mg, 81 mg, Oral, Daily    atorvastatin (Lipitor) tab 40 mg, 40 mg, Oral, Nightly    clopidogrel (Plavix) tab 75 mg, 75 mg, Oral, Daily    finasteride (Proscar) tab 5 mg, 5 mg, Oral, Daily    pantoprazole (Protonix) DR tab 40 mg, 40 mg, Oral, QAM AC    piperacillin-tazobactam (Zosyn) 3.375 g in dextrose 5% 100 mL IVPB-ADDV, 3.375 g, Intravenous, Q8H [address]    Review of Systems: Denies n/v/f/c. Physical Exam:            1. Integument: Normal skin temperature and turgor. Wound measures 2.5 x 1.0 x 0.3 cm. Base is fibrogranular. No exposed bone. Small opening medially that is now mostly healed. 2. Vascular: Unable to palpate pedal pulses. 3. Musculoskeletal: Status post midfoot amputation. Strength testing deferred.                4. Neurological: Decreased protective sensation noted to lower extremities. Imaging:  XR CHEST AP PORTABLE  (CPT=71045)    Result Date: 12/3/2023  CONCLUSION:  See above. LOCATION:  [facility]      Dictated by (CST): aCrlos Christy MD on 12/03/2023 at 8:55 AM     Finalized by (CST): Carlos Christy MD on 12/03/2023 at 8:57 AM         Laboratory Data:  Recent Labs   Lab 12/03/23  0549 12/03/23  1053 12/05/23  0507   RBC 4.36   < > 4.15   HGB 11.1*   < > 10.5*   HCT 35.9*   < > 34.4*   MCV 82.3   < > 82.9   MCH 25.5*   < > 25.3*   MCHC 30.9*   < > 30.5*   RDW 17.9   < > 17.7   NEPRELIM 10.23*  --   --    WBC 12.0*   < > 7.4   .0   < > 222.0    < > = values in this interval not displayed.        Impression and Plan:  Patient Active Problem List   Diagnosis    Closed minimally displaced zone I fracture of sacrum ([address]    At risk for falling    CAD (coronary artery disease)    Ischemic cardiomyopathy    Azotemia    Arrhythmia    Esophageal reflux    History of MI (myocardial infarction)    Mixed hyperlipidemia    Primary hypertension    Dizziness    Medication side effect    Closed left hip fracture ([address]  Global 6/22/2016    Status post hip surgery    Left shoulder distal clavical resection and excision of ganglion cyst of soft tissue mass   Global  09/17/2020    Orthopedic aftercare for healing traumatic hip fracture, left, closed    Closed left hip fracture, with routine healing, subsequent encounter    Osteoarthrosis, localized, primary, knee, left    Osteoarthrosis, localized, primary, knee, right    Gangrene ([address]    PAD (peripheral artery disease) ([address]    Benign prostatic hyperplasia with incomplete bladder emptying    Gangrene of foot (HCC)    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) (HCC)    Leukocytosis    Atrial fibrillation with RVR ([address]    Hypokalemia    Acute kidney injury ([address]    Hyperglycemia    Community acquired pneumonia of right lung, unspecified part of lung    Acute respiratory failure with hypoxia (HCC)    Hypotension, unspecified hypotension type    Sepsis due to pneumonia [facility])       -Patient examined, chart history reviewed. -Patient is now status post repeat angiogram with Dr. Jeff Jon. Blood flow once again optimized,  -Patient's wound has made very significant progress since I saw him a month ago. His medial foot wound is now mostly healed and his central foot wound is greatly improved. -In light of his recent other medical conditions and improvement of wound, would recommend more conservative approach and continue local wound care at this time. Discussed with Dr. Jeff Jon who is also recommending conservative approach. Also discussed with Dr. Naina Hooks who is agreement.  -Today wound was lightly debrided with #15 blade to healthy bleeding base.  -Site dressed with Josi and dry dressing.  -Can receive similar dressing changes every other day. -Should stay off foot and elevate is much as possible.  -Can continue to follow in wound care center upon debridement as patient has made excellent progress with Dr. Naina Hooks.     Edgard Ansari DPM

## 2023-12-06 NOTE — CM/SW NOTE
Pt worked with PT again today with wheelchair. Family had questions for SW regarding recommendations and if DONOVAN is needed for pt. ALEXANDER discussed with son, Anastasiia Wilcox, that PT is keeping the recommendation of [facility]. SW provided son with [facility] caregiver information. Explained that [facility] will continue to work with pt at home. Family expressed appreciation. ALEXANDER notified [facility] liaison of anticipated discharge today.      Residential home healthcare  P:[phone]  [facility]  Discharge [address] [facility].

## 2023-12-07 ENCOUNTER — CASE MANAGEMENT (OUTPATIENT)
Dept: CARE COORDINATION | Age: 88
End: 2023-12-07

## 2023-12-07 ASSESSMENT — SLEEP AND FATIGUE QUESTIONNAIRES: SLEEP DESCRIPTORS: WDL (ABSENCE OF SYMPTOMS)

## 2023-12-11 ENCOUNTER — OFFICE VISIT (OUTPATIENT)
Dept: WOUND CARE | Facility: HOSPITAL | Age: 88
End: 2023-12-11
Attending: STUDENT IN AN ORGANIZED HEALTH CARE EDUCATION/TRAINING PROGRAM
Payer: MEDICARE

## 2023-12-11 VITALS
DIASTOLIC BLOOD PRESSURE: 78 MMHG | HEART RATE: 103 BPM | SYSTOLIC BLOOD PRESSURE: 115 MMHG | RESPIRATION RATE: 17 BRPM | TEMPERATURE: 98 F

## 2023-12-11 DIAGNOSIS — L97.512 FOOT ULCER, RIGHT, WITH FAT LAYER EXPOSED (HCC): Primary | ICD-10-CM

## 2023-12-11 DIAGNOSIS — Z89.439 HISTORY OF TRANSMETATARSAL AMPUTATION OF FOOT (HCC): ICD-10-CM

## 2023-12-11 DIAGNOSIS — I73.9 PAD (PERIPHERAL ARTERY DISEASE) (HCC): ICD-10-CM

## 2023-12-11 PROCEDURE — 11042 DBRDMT SUBQ TIS 1ST 20SQCM/<: CPT | Performed by: PODIATRIST

## 2023-12-11 NOTE — PROGRESS NOTES
Patient ID: Shad Meyers is a 80year old male. Debridement Old surgical Right Foot   Wound 08/14/23 #1- right foot Old surgical Foot Right    Performed by: Shelly Acosta DPM  Authorized by: Shelly Acosta DPM      Consent   Consent obtained? verbal  Consent given by: patient  Risks discussed? procedural risks discussed  Time out called at 12/11/2023 4:18 PM  Immediately prior to the procedure a time out was called and the performing provider verified the correct patient, procedure, equipment, support staff, and site/side marked as required. Debridement Details  Performed by: physician  Debridement type: surgical  Level of debridement: subcutaneous tissue  Pain control: lidocaine 4%  Pain control administration type: topical    Pre-debridement measurements  Length (cm): 1  Width (cm): 4.8  Depth (cm): 0.8  Surface Area (cm^2): 4.8    Post-debridement measurements  Length (cm): 1.1  Width (cm): 4.8  Depth (cm): 0.8  Percent debrided: 100%  Surface Area (cm^2): 5.28  Area Debrided (cm^2): 5.28  Volume (cm^3): 4.22    Tissue and other material debrided: subcutaneous tissue  Devitalized tissue debrided: biofilm, fibrin and slough  Instrument(s) utilized: curette  Bleeding: small  Hemostasis obtained with: not applicable  Procedural pain (0-10): 0  Post-procedural pain: 0   Response to treatment: procedure was tolerated well    Debridement Dorsal;Right   Wound 08/14/23 #2- right medial foot Dorsal;Right    Performed by: Shelly Acosta DPM  Authorized by: Shelly Acosta DPM      Consent   Consent obtained? verbal  Consent given by: patient  Risks discussed? procedural risks discussed  Time out called at 12/11/2023 4:25 PM  Immediately prior to the procedure a time out was called and the performing provider verified the correct patient, procedure, equipment, support staff, and site/side marked as required.     Debridement Details  Performed by: physician  Debridement type: surgical  Level of debridement: subcutaneous tissue  Pain control: lidocaine 4%  Pain control administration type: topical    Pre-debridement measurements  Length (cm): 0.3  Width (cm): 0.4  Depth (cm): 0.5  Surface Area (cm^2): 0.12    Post-debridement measurements  Length (cm): 0.4  Width (cm): 0.4  Depth (cm): 0.5  Percent debrided: 100%  Surface Area (cm^2): 0.16  Area Debrided (cm^2): 0.16  Volume (cm^3): 0.08    Tissue and other material debrided: subcutaneous tissue  Devitalized tissue debrided: biofilm, callus and fibrin  Instrument(s) utilized: nippers and blade  Bleeding: small  Hemostasis obtained with: not applicable  Procedural pain (0-10): 0  Post-procedural pain: 0   Response to treatment: procedure was tolerated well

## 2023-12-12 ENCOUNTER — TELEPHONE (OUTPATIENT)
Dept: CARDIOLOGY | Age: 88
End: 2023-12-12

## 2023-12-12 RX ORDER — LISINOPRIL 2.5 MG/1
2.5 TABLET ORAL AT BEDTIME
Status: SHIPPED | COMMUNITY
Start: 2023-12-12

## 2023-12-13 ENCOUNTER — PATIENT MESSAGE (OUTPATIENT)
Dept: PODIATRY CLINIC | Facility: CLINIC | Age: 88
End: 2023-12-13

## 2023-12-14 RX ORDER — COLLAGENASE SANTYL 250 [ARB'U]/G
OINTMENT TOPICAL
COMMUNITY
Start: 2023-10-30

## 2023-12-14 NOTE — TELEPHONE ENCOUNTER
From: Deonte Harkins  To: Sukhjinder Moreno  Sent: 12/13/2023 11:47 PM CST  Subject: Photos    Attached are images from Monday, 12/11 (top) and Wednesday, 12/13 (bottom). Thank you.

## 2023-12-14 NOTE — TELEPHONE ENCOUNTER
Please see patient photos from 12/11 and 12/13 Alert-The patient is alert, awake and responds to voice. The patient is oriented to time, place, and person. The triage nurse is able to obtain subjective information.

## 2023-12-15 ENCOUNTER — APPOINTMENT (OUTPATIENT)
Dept: CARDIOLOGY | Age: 88
End: 2023-12-15

## 2023-12-15 VITALS
HEIGHT: 68 IN | DIASTOLIC BLOOD PRESSURE: 70 MMHG | SYSTOLIC BLOOD PRESSURE: 105 MMHG | BODY MASS INDEX: 30.16 KG/M2 | WEIGHT: 199 LBS | HEART RATE: 103 BPM

## 2023-12-15 DIAGNOSIS — I25.5 ISCHEMIC CARDIOMYOPATHY: ICD-10-CM

## 2023-12-15 DIAGNOSIS — I25.10 CORONARY ARTERY DISEASE INVOLVING NATIVE CORONARY ARTERY OF NATIVE HEART WITHOUT ANGINA PECTORIS: ICD-10-CM

## 2023-12-15 DIAGNOSIS — Z95.1 HX OF CABG: ICD-10-CM

## 2023-12-15 DIAGNOSIS — I65.23 ASYMPTOMATIC CAROTID ARTERY STENOSIS, BILATERAL: Primary | ICD-10-CM

## 2023-12-15 DIAGNOSIS — E78.00 PURE HYPERCHOLESTEROLEMIA: ICD-10-CM

## 2023-12-15 PROCEDURE — 99214 OFFICE O/P EST MOD 30 MIN: CPT | Performed by: INTERNAL MEDICINE

## 2023-12-15 RX ORDER — METOPROLOL SUCCINATE 50 MG/1
50 TABLET, EXTENDED RELEASE ORAL DAILY
Qty: 90 TABLET | Refills: 3 | Status: SHIPPED | OUTPATIENT
Start: 2023-12-15

## 2023-12-15 SDOH — HEALTH STABILITY: PHYSICAL HEALTH: ON AVERAGE, HOW MANY MINUTES DO YOU ENGAGE IN EXERCISE AT THIS LEVEL?: 0 MIN

## 2023-12-15 SDOH — HEALTH STABILITY: PHYSICAL HEALTH: ON AVERAGE, HOW MANY DAYS PER WEEK DO YOU ENGAGE IN MODERATE TO STRENUOUS EXERCISE (LIKE A BRISK WALK)?: 0 DAYS

## 2023-12-15 ASSESSMENT — PATIENT HEALTH QUESTIONNAIRE - PHQ9
1. LITTLE INTEREST OR PLEASURE IN DOING THINGS: NOT AT ALL
CLINICAL INTERPRETATION OF PHQ2 SCORE: NO FURTHER SCREENING NEEDED
SUM OF ALL RESPONSES TO PHQ9 QUESTIONS 1 AND 2: 0
2. FEELING DOWN, DEPRESSED OR HOPELESS: NOT AT ALL
SUM OF ALL RESPONSES TO PHQ9 QUESTIONS 1 AND 2: 0

## 2023-12-18 ENCOUNTER — OFFICE VISIT (OUTPATIENT)
Dept: WOUND CARE | Facility: HOSPITAL | Age: 88
End: 2023-12-18
Attending: STUDENT IN AN ORGANIZED HEALTH CARE EDUCATION/TRAINING PROGRAM
Payer: MEDICARE

## 2023-12-18 ENCOUNTER — TELEPHONE (OUTPATIENT)
Dept: WOUND CARE | Facility: HOSPITAL | Age: 88
End: 2023-12-18

## 2023-12-18 VITALS
SYSTOLIC BLOOD PRESSURE: 125 MMHG | TEMPERATURE: 97 F | RESPIRATION RATE: 18 BRPM | DIASTOLIC BLOOD PRESSURE: 80 MMHG | HEART RATE: 96 BPM

## 2023-12-18 DIAGNOSIS — Z89.439 HISTORY OF TRANSMETATARSAL AMPUTATION OF FOOT (HCC): ICD-10-CM

## 2023-12-18 DIAGNOSIS — L97.512 FOOT ULCER, RIGHT, WITH FAT LAYER EXPOSED (HCC): Primary | ICD-10-CM

## 2023-12-18 DIAGNOSIS — I73.9 PAD (PERIPHERAL ARTERY DISEASE) (HCC): ICD-10-CM

## 2023-12-18 PROCEDURE — 11042 DBRDMT SUBQ TIS 1ST 20SQCM/<: CPT | Performed by: PODIATRIST

## 2023-12-18 NOTE — PROGRESS NOTES
Patient ID: Rubina Jameson is a 80year old male. Debridement Old surgical Right Foot   Wound 08/14/23 #1- right foot Old surgical Foot Right    Performed by: Elaine Guzman DPM  Authorized by: Elaine Guzman DPM      Consent   Consent obtained? verbal  Consent given by: patient  Risks discussed? procedural risks discussed  Time out called at 12/18/2023 2:49 PM  Immediately prior to the procedure a time out was called and the performing provider verified the correct patient, procedure, equipment, support staff, and site/side marked as required. Debridement Details  Performed by: physician  Debridement type: surgical  Level of debridement: subcutaneous tissue  Pain control: lidocaine 4%  Pain control administration type: topical    Pre-debridement measurements  Length (cm): 0.7  Width (cm): 4.3  Depth (cm): 0.8  Surface Area (cm^2): 3.01    Post-debridement measurements  Length (cm): 0.8  Width (cm): 4.3  Depth (cm): 0.8  Percent debrided: 100%  Surface Area (cm^2): 3.44  Area Debrided (cm^2): 3.44  Volume (cm^3): 2.75    Tissue and other material debrided: subcutaneous tissue  Devitalized tissue debrided: biofilm, exudate and fibrin  Instrument(s) utilized: curette  Bleeding: small  Hemostasis obtained with: not applicable  Procedural pain (0-10): 0  Post-procedural pain: 0   Response to treatment: procedure was tolerated well    Debridement Dorsal;Right   Wound 08/14/23 #2- right medial foot Dorsal;Right    Performed by: Elaine Guzman DPM  Authorized by: Elaine Guzman DPM      Consent   Consent obtained? verbal  Consent given by: patient  Risks discussed? procedural risks discussed  Time out called at 12/18/2023 2:53 PM  Immediately prior to the procedure a time out was called and the performing provider verified the correct patient, procedure, equipment, support staff, and site/side marked as required.     Debridement Details  Performed by: physician  Debridement type: surgical  Level of debridement: subcutaneous tissue  Pain control: lidocaine 4%  Pain control administration type: topical    Pre-debridement measurements  Length (cm): 0.5  Width (cm): 0.5  Depth (cm): 0.6  Surface Area (cm^2): 0.25    Post-debridement measurements  Length (cm): 0.6  Width (cm): 0.6  Depth (cm): 0.6  Percent debrided: 100%  Surface Area (cm^2): 0.36  Area Debrided (cm^2): 0.36  Volume (cm^3): 0.22    Tissue and other material debrided: subcutaneous tissue  Devitalized tissue debrided: biofilm, fibrin and slough  Instrument(s) utilized: curette  Bleeding: small  Hemostasis obtained with: not applicable  Procedural pain (0-10): 0  Post-procedural pain: 0   Response to treatment: procedure was tolerated well

## 2023-12-19 ENCOUNTER — CASE MANAGEMENT (OUTPATIENT)
Dept: CARE COORDINATION | Age: 88
End: 2023-12-19

## 2023-12-22 ENCOUNTER — TELEPHONE (OUTPATIENT)
Dept: WOUND CARE | Facility: HOSPITAL | Age: 88
End: 2023-12-22

## 2023-12-22 NOTE — PROCEDURES
[address]    PATIENT'S NAME: DARELL SIMS   ATTENDING PHYSICIAN: Taye Velez DO   OPERATING PHYSICIAN: Aristides Stallworth M.D. PATIENT ACCOUNT#:   [de-identified]    LOCATION:  [address] [place] [address]  MEDICAL RECORD #:   [id number]       YOB: 1935  ADMISSION DATE:       12/01/2023      OPERATION DATE:  12/04/2023    CARDIAC PROCEDURE TRANSCRIPTION      PERIPHERAL ANGIOGRAPHY/PERCUTANEOUS PERIPHERAL INTERVENTION    PREOPERATIVE DIAGNOSIS:  Atherosclerosis with ulcer/peripheral artery stent occlusion. POSTOPERATIVE DIAGNOSIS:  Atherosclerosis with ulcer/peripheral artery stent occlusion. PROCEDURE PERFORMED:    1. Ultrasound-guided percutaneous access, left common femoral artery. 2.   Selection of right common femoral artery and angiogram.  3.   Rotarex thrombectomy and atherectomy for popliteal stent distal popliteal occlusion. 4.   Balloon angioplasty of superficial femoral artery stent with 5 x 220 balloon. 5.   Balloon angioplasty of the below knee and tibioperoneal trunk with a 4 x 80 balloon. 6.   Balloon angioplasty of posterior tibial artery with a 2.5 x 220 balloon. ANESTHESIA:  Moderate conscious sedation with 2 mg of Versed and 50 mcg of fentanyl. Start time was [phone], finish was 4831. ASSISTANT:  Catheterization lab staff. SURGICAL FINDINGS:    1. Significant in-stent restenosis mid stents and chronic occlusion stents. 2.   Restoration of flow through the posterior tibial artery into the foot. SPECIMEN:  None. ESTIMATED BLOOD LOSS:  100 mL. 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 He is quite ill. He was hospitalized with urinary tract infection and sepsis and has recovered, and we are proceeding with angiogram for occluded stent as described below. DETAILS OF PROCEDURE:  Patient was taken to the catheterization lab and prepped and draped in usual sterile fashion.   Moderate conscious sedation was initiated and monitored by a qualified nurse under my supervision. Using ultrasound, I percutaneously accessed the left common femoral artery with a micropuncture kit. I then advanced a Glidewire Advantage into the aorta and then exchanged for a 5-Tongan sheath. I then advanced a Crossover catheter into the aorta, hooked the aortic bifurcation. Did an angiogram of the right common iliac, external iliac, both of which were widely patent. I then advanced the Glidewire Advantage in the Crossover catheter into the common femoral artery and did an angiogram of the right lower extremity. The right common femoral and profunda were patent. The right SFA had a series of stents that were now occluded through the popliteal artery and distal popliteal artery, and there was reconstitution through collaterals of the very diseased posterior tibial artery. I then advanced a Glidewire Advantage into the superficial femoral artery and then exchanged the 5-Tongan sheath for a 7-Tongan crossover sheath. Sheath went up and over the aortic bifurcation, and the tip of the sheath was positioned in the distal common femoral artery on the right. The patient was systemically heparinized. I immediately used a Navicross and a Glidewire Advantage and engaged the chronic total occlusion of the stents. I initially was concerned there may be some thrombus and a Penumbra device was opened and using the 7 Lightning, I attempted to engage it but the Lightning would not cross. Therefore, confirmed this is a complete chronic total occlusion. I then returned to the [address] [place] [address] and was able to cross the complete occlusion with the Navicross and the Glidewire Advantage and then exchanged for a Runthrough wire. The Rotarex 6-Tongan was opened and prepped, and I then performed a Rotarex atherectomy/thrombectomy of the distal SFA/popliteal stents to treat the popliteal artery occlusion.   I did a series of passes and repeat angiogram showed an excellent technical result. I then performed balloon angioplasty of the stents with a 5 x 220 balloon. This resulted in excellent technical result with restoration of flow. There was still significant disease in the distal popliteal and TP trunk, and this was managed with a 4 x 80 balloon. This resulted in excellent technical result. There was still some residual stenosis, and I was able to exchange for a 0.014 Runthrough wire and was able to cross the entire length of the posterior tibial artery. I then balloon angioplastied the entire length of the posterior tibial artery with a 2.5 x 220 balloon. Repeat angiogram showed an excellent technical result with restoration of flow from the common femoral artery into the posterior tibial artery, and we then removed all devices. A Perclose device was placed, and the patient was taken to the ICU in stable condition.     Dictated By Tfifany Quiros M.D.  d: 12/21/2023 17:10:11  t: 12/21/2023 18:53:07  [facility] [id number]  [place]/

## 2023-12-22 NOTE — TELEPHONE ENCOUNTER
Rcvd call from pts son reporting wound is doing well, steristrips over tegaderm is staying in place and wound is looking  good, He reports they are managing dressings an do not need home care.

## 2023-12-25 ENCOUNTER — HOSPITAL ENCOUNTER (INPATIENT)
Facility: HOSPITAL | Age: 88
LOS: 3 days | Discharge: HOME | End: 2023-12-28
Attending: EMERGENCY MEDICINE | Admitting: INTERNAL MEDICINE
Payer: MEDICARE

## 2023-12-25 ENCOUNTER — APPOINTMENT (OUTPATIENT)
Dept: GENERAL RADIOLOGY | Facility: HOSPITAL | Age: 88
End: 2023-12-25
Payer: MEDICARE

## 2023-12-25 DIAGNOSIS — I95.9 HYPOTENSION, UNSPECIFIED HYPOTENSION TYPE: ICD-10-CM

## 2023-12-25 DIAGNOSIS — R55 SYNCOPE, UNSPECIFIED SYNCOPE TYPE: Primary | ICD-10-CM

## 2023-12-25 DIAGNOSIS — A41.9 SEPSIS, DUE TO UNSPECIFIED ORGANISM, UNSPECIFIED WHETHER ACUTE ORGAN DYSFUNCTION PRESENT (HCC): ICD-10-CM

## 2023-12-25 PROBLEM — R65.21 SEPTIC SHOCK (HCC): Status: ACTIVE | Noted: 2023-12-25

## 2023-12-25 LAB
ALBUMIN SERPL-MCNC: 2.6 G/DL (ref 3.4–5)
ALBUMIN SERPL-MCNC: 2.7 G/DL (ref 3.4–5)
ALBUMIN/GLOB SERPL: 0.8 {RATIO} (ref 1–2)
ALBUMIN/GLOB SERPL: 0.8 {RATIO} (ref 1–2)
ALP LIVER SERPL-CCNC: 60 U/L
ALP LIVER SERPL-CCNC: 61 U/L
ALT SERPL-CCNC: 20 U/L
ALT SERPL-CCNC: 21 U/L
ANION GAP SERPL CALC-SCNC: 7 MMOL/L (ref 0–18)
ANION GAP SERPL CALC-SCNC: 8 MMOL/L (ref 0–18)
APTT PPP: 30.8 SECONDS (ref 23.3–35.6)
ARTERIAL PATENCY WRIST A: POSITIVE
AST SERPL-CCNC: 14 U/L (ref 15–37)
AST SERPL-CCNC: 21 U/L (ref 15–37)
BASE EXCESS BLDA CALC-SCNC: -0.7 MMOL/L (ref ?–2)
BASOPHILS # BLD: 0 X10(3) UL (ref 0–0.2)
BASOPHILS NFR BLD: 0 %
BILIRUB SERPL-MCNC: 0.8 MG/DL (ref 0.1–2)
BILIRUB SERPL-MCNC: 1 MG/DL (ref 0.1–2)
BODY TEMPERATURE: 98.6 F
BUN BLD-MCNC: 27 MG/DL (ref 9–23)
BUN BLD-MCNC: 29 MG/DL (ref 9–23)
CA-I BLD-SCNC: 1.13 MMOL/L (ref 0.95–1.32)
CALCIUM BLD-MCNC: 8.3 MG/DL (ref 8.5–10.1)
CALCIUM BLD-MCNC: 8.5 MG/DL (ref 8.5–10.1)
CHLORIDE SERPL-SCNC: 106 MMOL/L (ref 98–112)
CHLORIDE SERPL-SCNC: 109 MMOL/L (ref 98–112)
CO2 SERPL-SCNC: 24 MMOL/L (ref 21–32)
CO2 SERPL-SCNC: 24 MMOL/L (ref 21–32)
COHGB MFR BLD: 1.2 % SAT (ref 0–3)
CREAT BLD-MCNC: 1.64 MG/DL
CREAT BLD-MCNC: 1.85 MG/DL
EGFRCR SERPLBLD CKD-EPI 2021: 35 ML/MIN/1.73M2 (ref 60–?)
EGFRCR SERPLBLD CKD-EPI 2021: 40 ML/MIN/1.73M2 (ref 60–?)
EOSINOPHIL # BLD: 0 X10(3) UL (ref 0–0.7)
EOSINOPHIL NFR BLD: 0 %
ERYTHROCYTE [DISTWIDTH] IN BLOOD BY AUTOMATED COUNT: 17.2 %
FIO2: 50 %
FLUAV + FLUBV RNA SPEC NAA+PROBE: NEGATIVE
FLUAV + FLUBV RNA SPEC NAA+PROBE: NEGATIVE
GLOBULIN PLAS-MCNC: 3.4 G/DL (ref 2.8–4.4)
GLOBULIN PLAS-MCNC: 3.4 G/DL (ref 2.8–4.4)
GLUCOSE BLD-MCNC: 129 MG/DL (ref 70–99)
GLUCOSE BLD-MCNC: 89 MG/DL (ref 70–99)
HCO3 BLDA-SCNC: 24.2 MEQ/L (ref 21–27)
HCT VFR BLD AUTO: 39.4 %
HGB BLD-MCNC: 12 G/DL
HGB BLD-MCNC: 12.5 G/DL
INR BLD: 1.48 (ref 0.8–1.2)
INSPIRATION SETTING TIME VENT: 0.9 %
IPAP MAX: 35 CM H2O
IPAP MIN: 15 CM H2O
LACTATE BLD-SCNC: 2.3 MMOL/L (ref 0.5–2)
LACTATE SERPL-SCNC: 3.7 MMOL/L (ref 0.4–2)
LACTATE SERPL-SCNC: 3.7 MMOL/L (ref 0.4–2)
LACTATE SERPL-SCNC: 4.5 MMOL/L (ref 0.4–2)
LACTATE SERPL-SCNC: 4.7 MMOL/L (ref 0.4–2)
LACTATE SERPL-SCNC: 5.1 MMOL/L (ref 0.4–2)
LYMPHOCYTES NFR BLD: 1.66 X10(3) UL (ref 1–4)
LYMPHOCYTES NFR BLD: 13 %
MCH RBC QN AUTO: 25.4 PG (ref 26–34)
MCHC RBC AUTO-ENTMCNC: 30.5 G/DL (ref 31–37)
MCV RBC AUTO: 83.5 FL
METHGB MFR BLD: 0 % SAT (ref 0.4–1.5)
MONOCYTES # BLD: 1.41 X10(3) UL (ref 0.1–1)
MONOCYTES NFR BLD: 11 %
NEUTROPHILS # BLD AUTO: 10.69 X10 (3) UL (ref 1.5–7.7)
NEUTROPHILS NFR BLD: 52 %
NEUTS BAND NFR BLD: 24 %
NEUTS HYPERSEG # BLD: 9.73 X10(3) UL (ref 1.5–7.7)
NT-PROBNP SERPL-MCNC: 4030 PG/ML (ref ?–450)
OSMOLALITY SERPL CALC.SUM OF ELEC: 292 MOSM/KG (ref 275–295)
OSMOLALITY SERPL CALC.SUM OF ELEC: 297 MOSM/KG (ref 275–295)
OXYHGB MFR BLDA: 91.8 % (ref 92–100)
P/F RATIO: 130 MMHG
PCO2 BLDA: 36 MM HG (ref 35–45)
PEEP: 10 CM H2O
PH BLDA: 7.42 [PH] (ref 7.35–7.45)
PLATELET # BLD AUTO: 357 10(3)UL (ref 150–450)
PLATELET MORPHOLOGY: NORMAL
PO2 BLDA: 65 MM HG (ref 80–100)
POTASSIUM BLD-SCNC: 3.1 MMOL/L (ref 3.6–5.1)
POTASSIUM SERPL-SCNC: 3.1 MMOL/L (ref 3.5–5.1)
POTASSIUM SERPL-SCNC: 3.5 MMOL/L (ref 3.5–5.1)
PROCALCITONIN SERPL-MCNC: 38.78 NG/ML (ref ?–0.16)
PROT SERPL-MCNC: 6 G/DL (ref 6.4–8.2)
PROT SERPL-MCNC: 6.1 G/DL (ref 6.4–8.2)
PROTHROMBIN TIME: 18 SECONDS (ref 11.6–14.8)
RBC # BLD AUTO: 4.72 X10(6)UL
RSV RNA SPEC NAA+PROBE: NEGATIVE
SARS-COV-2 RNA RESP QL NAA+PROBE: NOT DETECTED
SODIUM BLD-SCNC: 136 MMOL/L (ref 135–145)
SODIUM SERPL-SCNC: 137 MMOL/L (ref 136–145)
SODIUM SERPL-SCNC: 141 MMOL/L (ref 136–145)
TIDAL VOLUME: 500 ML
TOTAL CELLS COUNTED BLD: 100
TROPONIN I SERPL HS-MCNC: 76 NG/L
VENT RATE: 18 /MIN
WBC # BLD AUTO: 12.8 X10(3) UL (ref 4–11)

## 2023-12-25 PROCEDURE — 3E033XZ INTRODUCTION OF VASOPRESSOR INTO PERIPHERAL VEIN, PERCUTANEOUS APPROACH: ICD-10-PCS | Performed by: HOSPITALIST

## 2023-12-25 PROCEDURE — 99291 CRITICAL CARE FIRST HOUR: CPT | Performed by: INTERNAL MEDICINE

## 2023-12-25 PROCEDURE — 5A09357 ASSISTANCE WITH RESPIRATORY VENTILATION, LESS THAN 24 CONSECUTIVE HOURS, CONTINUOUS POSITIVE AIRWAY PRESSURE: ICD-10-PCS | Performed by: HOSPITALIST

## 2023-12-25 PROCEDURE — 71045 X-RAY EXAM CHEST 1 VIEW: CPT

## 2023-12-25 RX ORDER — MILRINONE LACTATE 0.2 MG/ML
INJECTION, SOLUTION INTRAVENOUS CONTINUOUS
Status: DISCONTINUED | OUTPATIENT
Start: 2023-12-25 | End: 2023-12-25

## 2023-12-25 RX ORDER — FINASTERIDE 5 MG/1
5 TABLET, FILM COATED ORAL DAILY
Status: DISCONTINUED | OUTPATIENT
Start: 2023-12-25 | End: 2023-12-28

## 2023-12-25 RX ORDER — ATORVASTATIN CALCIUM 40 MG/1
40 TABLET, FILM COATED ORAL DAILY
Status: DISCONTINUED | OUTPATIENT
Start: 2023-12-25 | End: 2023-12-28

## 2023-12-25 RX ORDER — DOBUTAMINE HYDROCHLORIDE 200 MG/100ML
INJECTION INTRAVENOUS CONTINUOUS
Status: DISCONTINUED | OUTPATIENT
Start: 2023-12-25 | End: 2023-12-25

## 2023-12-25 RX ORDER — SENNOSIDES 8.6 MG
17.2 TABLET ORAL NIGHTLY PRN
Status: DISCONTINUED | OUTPATIENT
Start: 2023-12-25 | End: 2023-12-28

## 2023-12-25 RX ORDER — METOCLOPRAMIDE HYDROCHLORIDE 5 MG/ML
5 INJECTION INTRAMUSCULAR; INTRAVENOUS EVERY 8 HOURS PRN
Status: DISCONTINUED | OUTPATIENT
Start: 2023-12-25 | End: 2023-12-28

## 2023-12-25 RX ORDER — CLOPIDOGREL BISULFATE 75 MG/1
75 TABLET ORAL DAILY
Status: DISCONTINUED | OUTPATIENT
Start: 2023-12-25 | End: 2023-12-28

## 2023-12-25 RX ORDER — FUROSEMIDE 10 MG/ML
40 INJECTION INTRAMUSCULAR; INTRAVENOUS ONCE
Status: DISCONTINUED | OUTPATIENT
Start: 2023-12-25 | End: 2023-12-25

## 2023-12-25 RX ORDER — MELATONIN
3 NIGHTLY PRN
Status: DISCONTINUED | OUTPATIENT
Start: 2023-12-25 | End: 2023-12-28

## 2023-12-25 RX ORDER — GABAPENTIN 300 MG/1
300 CAPSULE ORAL 3 TIMES DAILY
Status: DISCONTINUED | OUTPATIENT
Start: 2023-12-25 | End: 2023-12-28

## 2023-12-25 RX ORDER — POLYETHYLENE GLYCOL 3350 17 G/17G
17 POWDER, FOR SOLUTION ORAL DAILY PRN
Status: DISCONTINUED | OUTPATIENT
Start: 2023-12-25 | End: 2023-12-28

## 2023-12-25 RX ORDER — ACETAMINOPHEN 500 MG
1000 TABLET ORAL EVERY 8 HOURS PRN
Status: DISCONTINUED | OUTPATIENT
Start: 2023-12-25 | End: 2023-12-28

## 2023-12-25 RX ORDER — ONDANSETRON 2 MG/ML
4 INJECTION INTRAMUSCULAR; INTRAVENOUS EVERY 6 HOURS PRN
Status: DISCONTINUED | OUTPATIENT
Start: 2023-12-25 | End: 2023-12-28

## 2023-12-25 RX ORDER — FOLIC ACID 1 MG/1
1 TABLET ORAL DAILY
Status: DISCONTINUED | OUTPATIENT
Start: 2023-12-25 | End: 2023-12-28

## 2023-12-25 RX ORDER — BISACODYL 10 MG
10 SUPPOSITORY, RECTAL RECTAL
Status: DISCONTINUED | OUTPATIENT
Start: 2023-12-25 | End: 2023-12-28

## 2023-12-25 RX ORDER — SODIUM CHLORIDE 9 MG/ML
INJECTION, SOLUTION INTRAVENOUS CONTINUOUS
Status: DISCONTINUED | OUTPATIENT
Start: 2023-12-25 | End: 2023-12-26

## 2023-12-25 RX ORDER — POTASSIUM CHLORIDE 14.9 MG/ML
20 INJECTION INTRAVENOUS ONCE
Status: COMPLETED | OUTPATIENT
Start: 2023-12-25 | End: 2023-12-25

## 2023-12-25 NOTE — ED INITIAL ASSESSMENT (HPI)
Here with PNA earlier in december.  Found this am at home confused, possible seizure 10 secs, hypotensive

## 2023-12-26 ENCOUNTER — APPOINTMENT (OUTPATIENT)
Dept: CV DIAGNOSTICS | Facility: HOSPITAL | Age: 88
End: 2023-12-26
Attending: STUDENT IN AN ORGANIZED HEALTH CARE EDUCATION/TRAINING PROGRAM
Payer: MEDICARE

## 2023-12-26 PROBLEM — J96.01 ACUTE HYPOXIC RESPIRATORY FAILURE (HCC): Status: ACTIVE | Noted: 2023-12-26

## 2023-12-26 LAB
ANION GAP SERPL CALC-SCNC: 2 MMOL/L (ref 0–18)
BASOPHILS # BLD: 0.3 X10(3) UL (ref 0–0.2)
BASOPHILS NFR BLD: 1 %
BUN BLD-MCNC: 21 MG/DL (ref 9–23)
CALCIUM BLD-MCNC: 8.2 MG/DL (ref 8.5–10.1)
CHLORIDE SERPL-SCNC: 109 MMOL/L (ref 98–112)
CO2 SERPL-SCNC: 26 MMOL/L (ref 21–32)
CREAT BLD-MCNC: 1.14 MG/DL
EGFRCR SERPLBLD CKD-EPI 2021: 62 ML/MIN/1.73M2 (ref 60–?)
EOSINOPHIL # BLD: 0 X10(3) UL (ref 0–0.7)
EOSINOPHIL NFR BLD: 0 %
ERYTHROCYTE [DISTWIDTH] IN BLOOD BY AUTOMATED COUNT: 17.4 %
GLUCOSE BLD-MCNC: 130 MG/DL (ref 70–99)
HCT VFR BLD AUTO: 35.5 %
HGB BLD-MCNC: 11 G/DL
LACTATE SERPL-SCNC: 1.9 MMOL/L (ref 0.4–2)
LYMPHOCYTES NFR BLD: 2.07 X10(3) UL (ref 1–4)
LYMPHOCYTES NFR BLD: 7 %
MCH RBC QN AUTO: 25.3 PG (ref 26–34)
MCHC RBC AUTO-ENTMCNC: 31 G/DL (ref 31–37)
MCV RBC AUTO: 81.8 FL
MONOCYTES # BLD: 1.77 X10(3) UL (ref 0.1–1)
MONOCYTES NFR BLD: 6 %
NEUTROPHILS # BLD AUTO: 25.27 X10 (3) UL (ref 1.5–7.7)
NEUTROPHILS NFR BLD: 46 %
NEUTS BAND NFR BLD: 40 %
NEUTS HYPERSEG # BLD: 25.37 X10(3) UL (ref 1.5–7.7)
OSMOLALITY SERPL CALC.SUM OF ELEC: 289 MOSM/KG (ref 275–295)
PLATELET # BLD AUTO: 387 10(3)UL (ref 150–450)
POTASSIUM SERPL-SCNC: 4 MMOL/L (ref 3.5–5.1)
POTASSIUM SERPL-SCNC: 4.7 MMOL/L (ref 3.5–5.1)
PROCALCITONIN SERPL-MCNC: 37.3 NG/ML (ref ?–0.16)
Q-T INTERVAL: 318 MS
QRS DURATION: 96 MS
QTC CALCULATION (BEZET): 462 MS
R AXIS: 4 DEGREES
RBC # BLD AUTO: 4.34 X10(6)UL
SODIUM SERPL-SCNC: 137 MMOL/L (ref 136–145)
T AXIS: 262 DEGREES
TOTAL CELLS COUNTED BLD: 100
VENTRICULAR RATE: 127 BPM
WBC # BLD AUTO: 29.5 X10(3) UL (ref 4–11)

## 2023-12-26 PROCEDURE — 93306 TTE W/DOPPLER COMPLETE: CPT | Performed by: STUDENT IN AN ORGANIZED HEALTH CARE EDUCATION/TRAINING PROGRAM

## 2023-12-26 PROCEDURE — 99233 SBSQ HOSP IP/OBS HIGH 50: CPT | Performed by: INTERNAL MEDICINE

## 2023-12-26 RX ORDER — TAMSULOSIN HYDROCHLORIDE 0.4 MG/1
0.4 CAPSULE ORAL DAILY
Status: DISCONTINUED | OUTPATIENT
Start: 2023-12-26 | End: 2023-12-28

## 2023-12-26 NOTE — PHYSICAL THERAPY NOTE
PHYSICAL THERAPY EVALUATION - INPATIENT     Room Number: 7900/7565-M  Evaluation Date: 12/26/2023  Type of Evaluation: Initial  Physician Order: PT Eval and Treat    Presenting Problem: Syncope, Hypotension, Septic Shock, Acute Hypoxic Respiratory Failure  Co-Morbidities : PAD with h/o gangrene of L foot s/p partial amputation, CAD s/p CABG, HTN, A-fib, Carotid Stenosis  Reason for Therapy: Mobility Dysfunction and Discharge Planning    History related to current admission: Patient is a 80year old male who presented to the ED via EMS on 12/25/2023 from home for confusion and an episode of staring off with lip smacking. Pt diagnosed with syncope, hypotension, septic shock and acute hypoxic respiratory failure. Recent Admit:  Admitted on 12/1/2023: Presented with URI dx Afib RVR, hypotension, acute hypoxic resp failure with PNA - admit to ICU with pressor support, vascular consulted for non healing amp and occlusion of arteries in RLE. Pt s/p R angiogram with thrombectomy/atherectomy for popliteal stent and distal popliteal occlusion 12/4/23. ASSESSMENT   In this PT evaluation, the patient presents with the following impairments: 1) impaired integumentary integrity--R foot, 2) impaired static and dynamic sitting and standing balance. These impairments and comorbidities manifest themselves as functional limitations in independent bed mobility, transfers, and gait. The patient is below baseline and would benefit from skilled inpatient PT to address the above deficits to assist patient in returning to prior to level of function. Functional outcome measures completed include AM-PAC. The AM-PAC '6-Clicks' Inpatient Basic Mobility Short Form was completed and this patient is demonstrating a Approx Degree of Impairment: 46.58%  degree of impairment in mobility.  Research supports that patients with this level of impairment may benefit from [address]  PT Discharge Recommendations: Home with home health PT; Intermittent Supervision    PLAN  PT Treatment Plan: Bed mobility; Endurance; Patient education; Family education;Gait training;Strengthening;Transfer training;Balance training  Rehab Potential : Good  Frequency (Obs): 3-5x/week  Number of Visits to Meet Established Goals: 5      CURRENT GOALS    Goal #1 Patient is able to demonstrate supine - sit EOB @ level: supervision     Goal #2 Patient is able to demonstrate transfers Sit to/from Stand at assistance level: supervision     Goal #3 Patient is able to ambulate 3 feet with assist device: walker - rolling at assistance level: supervision     Goal #4    Goal #5    Goal #6    Goal Comments: Goals established on 2023    HOME SITUATION  Type of Home: House   Home Layout: Two level; Able to live on main level        Stairs to Bedroom: 14 (crawls up the stairs to avoid putting weight on R foot, bumps down stairs sitting on bottom)       Lives With: Alone  Drives: No  Patient Owned Equipment:  (wheelchair, rolling walker)       Prior Level of [place]: The pt is primarily wheelchair bound secondary to RLE wound. Pt will ambulate short distance with the use of a rolling walker. SUBJECTIVE  \"I need to get out of here. I'm going to another room tomorrow and then I'm going home Thursday. \"    Pt's family voices concern about pt being able to wheelchair and toilet himself prior to discharge home.       OBJECTIVE  Precautions:  (R post-op shoe)  Fall Risk: High fall risk    WEIGHT BEARING RESTRICTION  Weight Bearing Restriction: R lower extremity        R Lower Extremity:  (Per Epic Chat on 23 with Dr. Imani Sullivan, DPM: Minimal weight bearing, work on WB through heel with post-op shoe)       PAIN ASSESSMENT  Ratin          COGNITION  Overall Cognitive Status:  WFL - within functional limits    RANGE OF MOTION AND STRENGTH ASSESSMENT  Upper extremity ROM and strength are within functional limits     Lower extremity ROM is within functional limits     Lower extremity strength is within functional limits       BALANCE  Static Sitting: Fair +  Dynamic Sitting: Fair -  Static Standing: Poor +  Dynamic Standing: Poor +        ACTIVITY TOLERANCE  Pulse: 107  Heart Rate Source: Monitor  Resp: 14  BP: 117/74  BP Location: Left arm  BP Method: Automatic  Patient Position: Sitting    O2 WALK  Oxygen Therapy  SPO2% on Room Air at Rest: 93        AM-PAC '6-Clicks' INPATIENT SHORT FORM - BASIC MOBILITY  How much difficulty does the patient currently have. .. Patient Difficulty: Turning over in bed (including adjusting bedclothes, sheets and blankets)?: A Little   Patient Difficulty: Sitting down on and standing up from a chair with arms (e.g., wheelchair, bedside commode, etc.): A Little   Patient Difficulty: Moving from lying on back to sitting on the side of the bed?: A Little   How much help from another person does the patient currently need. ..    Help from Another: Moving to and from a bed to a chair (including a wheelchair)?: A Little   Help from Another: Need to walk in hospital room?: A Little   Help from Another: Climbing 3-5 steps with a railing?: A Little       AM-PAC Score:  Raw Score: 18   Approx Degree of Impairment: 46.58%   Standardized Score (AM-PAC Scale): 43.63   CMS Modifier (G-Code): CK    FUNCTIONAL ABILITY STATUS  Gait Assessment   Functional Mobility/Gait Assessment  Gait Assistance: Contact guard assist  Distance (ft): 2  Assistive Device: Rolling walker  Pattern:  (stand pivot transfer, decreased LLE weight bearing)    Skilled Therapy Provided     Bed Mobility:  Rolling: Min A  Supine to sit: Min A   Sit to supine: NT     Transfer Mobility:  Sit to stand: CGA   Stand to sit: CGA  Gait = CGA    Therapist's Comments: Pt's son present at bedside throughout session    Exercise/Education Provided:  Bed mobility  Functional activity tolerated  Transfer training    Patient End of Session: Up in chair;Needs met;Call light within reach;RN aware of session/findings; All patient questions and concerns addressed; Alarm set; Family present      Patient Evaluation Complexity Level:  History Moderate - 1 or 2 personal factors and/or co-morbidities   Examination of body systems Moderate - addressing a total of 3 or more elements   Clinical Presentation Moderate - Evolving   Clinical Decision Making Moderate - Evolving       PT Session Time: 30 minutes  Therapeutic Activity: 10 minutes

## 2023-12-26 NOTE — PLAN OF CARE
Received pt from ED @ 1430, neuro intact, bipap in place at 100% Fi02, weaned to high flow NC with pulm at bedside. Started sepsis protocol, see flowsheet for further assessment. K+ replaced per protocol, Lactate redrawn with results given to MD. 2nd liter infused  of fluids, titrate Levo to keep MAP => 65. Family at bedside, updated with plan of care. Bladder scanned pt at 1800, 225 in bladder. States he doesn't have to urinate that often at home either. NPO until further orders. Pt with nonproductive cough.

## 2023-12-26 NOTE — PLAN OF CARE
Assumed care of the pt at 0730, neuro intact, NC to 2L, not able to wean off. Levo weaned to off. MD aware of ProCal result, PO prednisone ordered by hospitalist, stopped solucortef. Wound consult placed for newly amputated R foot, pt had appt to see the here tomorrow. New skin tear on Left hand when turning in bed after having bowel movement. Pt stated he barely hit his hand on the bed. 2 large BMs for pt today. Family at bedside, external cath in place and working . PT/OT to see pt, surgical shoe given to pt from CD, pt with min weight bearing to that foot per podiatry and PT. Pt lives by himself, getting around in wheelchair at home. May need home health aid to check in on him once home. Will place social service consult. Problem: Patient/Family Goals  Goal: Patient/Family Long Term Goal  Description: Patient's Long Term Goal: Home with better nutrition and to stay hydrated    Interventions:  - follow plan of care  - See additional Care Plan goals for specific interventions  Outcome: Progressing  Goal: Patient/Family Short Term Goal  Description: Patient's Short Term Goal: Go Home    Interventions:   - Follow plan of care  - See additional Care Plan goals for specific interventions  Outcome: Progressing     Problem: CARDIOVASCULAR - ADULT  Goal: Maintains optimal cardiac output and hemodynamic stability  Description: INTERVENTIONS:  - Monitor vital signs, rhythm, and trends  - Monitor for bleeding, hypotension and signs of decreased cardiac output  - Evaluate effectiveness of vasoactive medications to optimize hemodynamic stability  - Monitor arterial and/or venous puncture sites for bleeding and/or hematoma  - Assess quality of pulses, skin color and temperature  - Assess for signs of decreased coronary artery perfusion - ex.  Angina  - Evaluate fluid balance, assess for edema, trend weights  Outcome: Progressing  Goal: Absence of cardiac arrhythmias or at baseline  Description: INTERVENTIONS:  - Continuous cardiac monitoring, monitor vital signs, obtain 12 lead EKG if indicated  - Evaluate effectiveness of antiarrhythmic and heart rate control medications as ordered  - Initiate emergency measures for life threatening arrhythmias  - Monitor electrolytes and administer replacement therapy as ordered  Outcome: Progressing

## 2023-12-26 NOTE — PLAN OF CARE
Assumed care at [facility]. No complaints overnight. Levo and O2 weaned as tolerated. 1 BM overnight. Good UOP with external catheter. Plan for ECHO and PT eval today. Problem: CARDIOVASCULAR - ADULT  Goal: Maintains optimal cardiac output and hemodynamic stability  Description: INTERVENTIONS:  - Monitor vital signs, rhythm, and trends  - Monitor for bleeding, hypotension and signs of decreased cardiac output  - Evaluate effectiveness of vasoactive medications to optimize hemodynamic stability  - Monitor arterial and/or venous puncture sites for bleeding and/or hematoma  - Assess quality of pulses, skin color and temperature  - Assess for signs of decreased coronary artery perfusion - ex.  Angina  - Evaluate fluid balance, assess for edema, trend weights  Outcome: Progressing  Goal: Absence of cardiac arrhythmias or at baseline  Description: INTERVENTIONS:  - Continuous cardiac monitoring, monitor vital signs, obtain 12 lead EKG if indicated  - Evaluate effectiveness of antiarrhythmic and heart rate control medications as ordered  - Initiate emergency measures for life threatening arrhythmias  - Monitor electrolytes and administer replacement therapy as ordered  Outcome: Progressing

## 2023-12-27 ENCOUNTER — APPOINTMENT (OUTPATIENT)
Dept: WOUND CARE | Facility: HOSPITAL | Age: 88
End: 2023-12-27
Attending: STUDENT IN AN ORGANIZED HEALTH CARE EDUCATION/TRAINING PROGRAM
Payer: MEDICARE

## 2023-12-27 LAB
ALBUMIN SERPL-MCNC: 2.3 G/DL (ref 3.4–5)
ALBUMIN/GLOB SERPL: 0.7 {RATIO} (ref 1–2)
ALP LIVER SERPL-CCNC: 69 U/L
ALT SERPL-CCNC: 21 U/L
ANION GAP SERPL CALC-SCNC: 4 MMOL/L (ref 0–18)
AST SERPL-CCNC: 15 U/L (ref 15–37)
BASOPHILS # BLD AUTO: 0.03 X10(3) UL (ref 0–0.2)
BASOPHILS NFR BLD AUTO: 0.2 %
BILIRUB SERPL-MCNC: 0.8 MG/DL (ref 0.1–2)
BUN BLD-MCNC: 17 MG/DL (ref 9–23)
CALCIUM BLD-MCNC: 8.7 MG/DL (ref 8.5–10.1)
CHLORIDE SERPL-SCNC: 110 MMOL/L (ref 98–112)
CO2 SERPL-SCNC: 27 MMOL/L (ref 21–32)
CREAT BLD-MCNC: 0.89 MG/DL
EGFRCR SERPLBLD CKD-EPI 2021: 82 ML/MIN/1.73M2 (ref 60–?)
EOSINOPHIL # BLD AUTO: 0.19 X10(3) UL (ref 0–0.7)
EOSINOPHIL NFR BLD AUTO: 1.5 %
ERYTHROCYTE [DISTWIDTH] IN BLOOD BY AUTOMATED COUNT: 17.4 %
GLOBULIN PLAS-MCNC: 3.3 G/DL (ref 2.8–4.4)
GLUCOSE BLD-MCNC: 89 MG/DL (ref 70–99)
HCT VFR BLD AUTO: 32.2 %
HGB BLD-MCNC: 9.9 G/DL
IMM GRANULOCYTES # BLD AUTO: 0.1 X10(3) UL (ref 0–1)
IMM GRANULOCYTES NFR BLD: 0.8 %
LYMPHOCYTES # BLD AUTO: 0.91 X10(3) UL (ref 1–4)
LYMPHOCYTES NFR BLD AUTO: 6.9 %
MCH RBC QN AUTO: 25.6 PG (ref 26–34)
MCHC RBC AUTO-ENTMCNC: 30.7 G/DL (ref 31–37)
MCV RBC AUTO: 83.2 FL
MONOCYTES # BLD AUTO: 0.88 X10(3) UL (ref 0.1–1)
MONOCYTES NFR BLD AUTO: 6.7 %
NEUTROPHILS # BLD AUTO: 10.99 X10 (3) UL (ref 1.5–7.7)
NEUTROPHILS # BLD AUTO: 10.99 X10(3) UL (ref 1.5–7.7)
NEUTROPHILS NFR BLD AUTO: 83.9 %
OSMOLALITY SERPL CALC.SUM OF ELEC: 293 MOSM/KG (ref 275–295)
PLATELET # BLD AUTO: 263 10(3)UL (ref 150–450)
POTASSIUM SERPL-SCNC: 3.9 MMOL/L (ref 3.5–5.1)
PROCALCITONIN SERPL-MCNC: 19.85 NG/ML (ref ?–0.16)
PROT SERPL-MCNC: 5.6 G/DL (ref 6.4–8.2)
RBC # BLD AUTO: 3.87 X10(6)UL
SODIUM SERPL-SCNC: 141 MMOL/L (ref 136–145)
WBC # BLD AUTO: 13.1 X10(3) UL (ref 4–11)

## 2023-12-27 PROCEDURE — 99232 SBSQ HOSP IP/OBS MODERATE 35: CPT | Performed by: INTERNAL MEDICINE

## 2023-12-27 RX ORDER — AMOXICILLIN AND CLAVULANATE POTASSIUM 875; 125 MG/1; MG/1
1 TABLET, FILM COATED ORAL 2 TIMES DAILY
Qty: 12 TABLET | Refills: 0 | Status: SHIPPED | OUTPATIENT
Start: 2023-12-27 | End: 2024-01-02

## 2023-12-27 RX ORDER — METOPROLOL SUCCINATE 25 MG/1
25 TABLET, EXTENDED RELEASE ORAL
Status: DISCONTINUED | OUTPATIENT
Start: 2023-12-28 | End: 2023-12-27

## 2023-12-27 RX ORDER — AMOXICILLIN AND CLAVULANATE POTASSIUM 875; 125 MG/1; MG/1
875 TABLET, FILM COATED ORAL EVERY 12 HOURS SCHEDULED
Status: DISCONTINUED | OUTPATIENT
Start: 2023-12-27 | End: 2023-12-28

## 2023-12-27 RX ORDER — AZITHROMYCIN 250 MG/1
500 TABLET, FILM COATED ORAL ONCE
Status: COMPLETED | OUTPATIENT
Start: 2023-12-27 | End: 2023-12-27

## 2023-12-27 RX ORDER — METOPROLOL SUCCINATE 25 MG/1
25 TABLET, EXTENDED RELEASE ORAL
Status: DISCONTINUED | OUTPATIENT
Start: 2023-12-27 | End: 2023-12-28

## 2023-12-27 NOTE — CONSULTS
[facility]  Inpatient Wound Care Contact Note    Kandace Gandhi Patient Status:  Inpatient    1935 MRN [id number]   [facility] 2NE-A Attending Eliana Patton,    Hosp Day # 2 PCP Robe Richardson MD     Consult received, RN and team aware patient will be seen in am, if still in-house. Possible discharge to home today per RN, continue plan of care as established as OP at [facility], which is the following that patient/family were doing prior to admission:  Wound care orders to be changed every 2-3 days:  Remove dressing, cleanse with saline or Dermal Wound Cleasner. To the wound place Josi and Hydrofera Blue, gauze, ABD pad, kerlix. Tape. RN aware if patient is NOT disharged, we will see in am.      Thank you,    Zoie Alvarenga.  Cristóbal Chauhan, PT, MPT  [address] [place] [address]  [address] [address]  [place], [address]  [phone]

## 2023-12-27 NOTE — PLAN OF CARE
Assumed care at [address]. No acute changes overnight. Still on 2L NC. Patient slept comfortably all night. 4615- report called for floor nurse Analyn. Patient aware and agreeable to transfer. Problem: Patient/Family Goals  Goal: Patient/Family Long Term Goal  Description: Patient's Long Term Goal: Home with better nutrition and to stay hydrated    Interventions:  - follow plan of care  - See additional Care Plan goals for specific interventions  Outcome: Progressing  Goal: Patient/Family Short Term Goal  Description: Patient's Short Term Goal: Go Home    Interventions:   - Follow plan of care  - See additional Care Plan goals for specific interventions  Outcome: Progressing     Problem: CARDIOVASCULAR - ADULT  Goal: Maintains optimal cardiac output and hemodynamic stability  Description: INTERVENTIONS:  - Monitor vital signs, rhythm, and trends  - Monitor for bleeding, hypotension and signs of decreased cardiac output  - Evaluate effectiveness of vasoactive medications to optimize hemodynamic stability  - Monitor arterial and/or venous puncture sites for bleeding and/or hematoma  - Assess quality of pulses, skin color and temperature  - Assess for signs of decreased coronary artery perfusion - ex.  Angina  - Evaluate fluid balance, assess for edema, trend weights  Outcome: Progressing  Goal: Absence of cardiac arrhythmias or at baseline  Description: INTERVENTIONS:  - Continuous cardiac monitoring, monitor vital signs, obtain 12 lead EKG if indicated  - Evaluate effectiveness of antiarrhythmic and heart rate control medications as ordered  - Initiate emergency measures for life threatening arrhythmias  - Monitor electrolytes and administer replacement therapy as ordered  Outcome: Progressing

## 2023-12-27 NOTE — PROGRESS NOTES
[facility]  Report of Inpatient [address] Patient Status:  Inpatient    1935 MRN [id number]   [facility] 2NE-A 6813/9921-Y Attending Gudelia Ortega, DO   Hosp Day # 2 PCP Lucina Lechuga MD     Jose: Jose Scale Score: 18    Laboratory Data:  Recent Labs   Lab 23  1549 23  0421 23  1208 23  0513   WBC  --  29.5*  --  13.1*   *  --  130* 89   PLT  --  387.0  --  263.0   ALB 2.6*  --   --  2.3*       Patient seen in room 2608, multiple family members present. Pt's right foot wounds assessed with Dr. Son Fernando. Wounds cleansed, measured (see provider's notes/LDA). Slough noted in both woundbeds and honey gel was recommended. Wounds dressed with honey gel, gauze, kerlix, tape to be change daily. Son educated on dressing and familiar with it - used honey gel/ santyl in the past. Per family patient is scheduled to see podiatry - Dr. Jaylen Juarez on 24 and Flakita Baltazar 24 in wound clinic. All questions answered at this time. Patient/family advised to call with any questions or concerns. Thank you for this consultation and for allowing me to participate in the care of your patient. Please call me at the Inpatient Wound Care pager at #[phone] if you have any questions about this consultation and plan of care. Time Spent 30 Minutes.   Thank you,    Manda Simpson RN  [address] [place], [place] [address] [address] [place] [address]  [place]  [phone]  Pager #[phone]

## 2023-12-27 NOTE — PROGRESS NOTES
12/27/23 1105   Mobility   O2 walk? Yes   SPO2% on Room Air at Rest 92   SPO2% Ambulation on Room Air 90     Desatted to 88% on room air, was able to bring self back up to 90% on room air while taking a break.

## 2023-12-27 NOTE — DISCHARGE INSTRUCTIONS
Wound care to the R foot to be done daily:  Cleanse with saline  Apply honey gel to the wound  Cover with gauze  Cover with ABD pad, kerlix, tape. Change daily  Follow up as scheduled at [facility].

## 2023-12-27 NOTE — CM/SW NOTE
Met with patient surrounded by family to discuss discharge planning.   Patient/family declined need for [facility] @ discharge--they prefer to follow up with wound clinic as prior to admission

## 2023-12-27 NOTE — PLAN OF CARE
Received pt at approx 0730. Pt is A&Ox4, no complaints of pain. Pt is on room air, lungs are diminished with crackles in bases, no coughing. He is in A fib, no complaints of chest pain. He has a male primofit on, abdomen non-tender, last BM 12-26. BLE edema present. Pt updated with plan of care. Approx 0800- skin check performed, skin tears present on R&L hands- pictures obtained- cleansed and mepilex's applied. Generalized bruising present. R foot wound- dressing in place, wound care RN to see. Approx 1600- Dr Betsy Lares at bedside, R foot dressing changed. Approx 1625- checked in with cards after restarting metoprolol, HR's going from 100's-120's, monitor overnight. POC  - ambulate in hallway  - rounds with MD's  - discharge today? Problem: Patient/Family Goals  Goal: Patient/Family Long Term Goal  Description: Patient's Long Term Goal: Home with better nutrition and to stay hydrated  Stay out of the hospital 12-27    Interventions:  - follow plan of care  - med compliance, follow ups    - See additional Care Plan goals for specific interventions  Outcome: Progressing  Goal: Patient/Family Short Term Goal  Description: Patient's Short Term Goal: Go Home  No pain 12-27    Interventions:   - Follow plan of care  - PRN meds, hot/cold packs, tell nurse if in pain    - See additional Care Plan goals for specific interventions  Outcome: Progressing     Problem: CARDIOVASCULAR - ADULT  Goal: Maintains optimal cardiac output and hemodynamic stability  Description: INTERVENTIONS:  - Monitor vital signs, rhythm, and trends  - Monitor for bleeding, hypotension and signs of decreased cardiac output  - Evaluate effectiveness of vasoactive medications to optimize hemodynamic stability  - Monitor arterial and/or venous puncture sites for bleeding and/or hematoma  - Assess quality of pulses, skin color and temperature  - Assess for signs of decreased coronary artery perfusion - ex.  Angina  - Evaluate fluid balance, assess for edema, trend weights  Outcome: Progressing  Goal: Absence of cardiac arrhythmias or at baseline  Description: INTERVENTIONS:  - Continuous cardiac monitoring, monitor vital signs, obtain 12 lead EKG if indicated  - Evaluate effectiveness of antiarrhythmic and heart rate control medications as ordered  - Initiate emergency measures for life threatening arrhythmias  - Monitor electrolytes and administer replacement therapy as ordered  Outcome: Progressing

## 2023-12-28 VITALS
HEART RATE: 86 BPM | BODY MASS INDEX: 33.91 KG/M2 | HEIGHT: 67 IN | OXYGEN SATURATION: 96 % | SYSTOLIC BLOOD PRESSURE: 105 MMHG | DIASTOLIC BLOOD PRESSURE: 72 MMHG | RESPIRATION RATE: 18 BRPM | WEIGHT: 216.06 LBS | TEMPERATURE: 97 F

## 2023-12-28 PROBLEM — J15.9 PNEUMONIA, BACTERIAL: Status: ACTIVE | Noted: 2023-12-28

## 2023-12-28 PROCEDURE — 99232 SBSQ HOSP IP/OBS MODERATE 35: CPT | Performed by: INTERNAL MEDICINE

## 2023-12-28 PROCEDURE — 99239 HOSP IP/OBS DSCHRG MGMT >30: CPT | Performed by: HOSPITALIST

## 2023-12-28 RX ORDER — LISINOPRIL 2.5 MG/1
2.5 TABLET ORAL DAILY
Status: DISCONTINUED | OUTPATIENT
Start: 2023-12-28 | End: 2023-12-28

## 2023-12-28 RX ORDER — FUROSEMIDE 20 MG/1
20 TABLET ORAL DAILY
Status: DISCONTINUED | OUTPATIENT
Start: 2023-12-28 | End: 2023-12-28

## 2023-12-28 NOTE — PLAN OF CARE
Pt is ok to discharge per primary and consults. Discharge instructions including meds & follow ups given. Patient verbalizes understanding & questions answered. IV removed, tele monitor discontinued, all belongings taken with patient. Pt transported off unit via wheelchair to [facility] with family.

## 2023-12-28 NOTE — PLAN OF CARE
Received pt at 0700. Pt is A&Ox4, on/off complaints of back pain, no pain currently- PRN tylenol available. Pt is on room air, lungs are diminished with scant crackles, no coughing. Pt is in A fib, no complaints of chest pain. He has a male purewick in place, active bowel sounds, abdomen non-tender, last BM 12-7. BLE edema present. Pt updated with plan of care. Approx 1030- R foot wound dressing changed, continue daily dressing changes at home per Dr Kimo Agustin. Approx 1415- reviewed BP trends for today after restarting lisinopril/lasix this AM with cards- ok for discharge. POC  - monitor HR  - round with MD's  - R foot dressing change  - discharge? Problem: Patient/Family Goals  Goal: Patient/Family Long Term Goal  Description: Patient's Long Term Goal: Home with better nutrition and to stay hydrated  Stay out of the hospital 12-27    Interventions:  - follow plan of care  - med compliance, follow ups    - See additional Care Plan goals for specific interventions  Outcome: Progressing  Goal: Patient/Family Short Term Goal  Description: Patient's Short Term Goal: Go Home  No pain 12-27  Go home today 12-28    Interventions:   - Follow plan of care  - PRN meds, hot/cold packs, tell nurse if in pain  - HR control, wound care, round with MD's      - See additional Care Plan goals for specific interventions  Outcome: Progressing     Problem: CARDIOVASCULAR - ADULT  Goal: Maintains optimal cardiac output and hemodynamic stability  Description: INTERVENTIONS:  - Monitor vital signs, rhythm, and trends  - Monitor for bleeding, hypotension and signs of decreased cardiac output  - Evaluate effectiveness of vasoactive medications to optimize hemodynamic stability  - Monitor arterial and/or venous puncture sites for bleeding and/or hematoma  - Assess quality of pulses, skin color and temperature  - Assess for signs of decreased coronary artery perfusion - ex.  Angina  - Evaluate fluid balance, assess for edema, trend weights  Outcome: Progressing  Goal: Absence of cardiac arrhythmias or at baseline  Description: INTERVENTIONS:  - Continuous cardiac monitoring, monitor vital signs, obtain 12 lead EKG if indicated  - Evaluate effectiveness of antiarrhythmic and heart rate control medications as ordered  - Initiate emergency measures for life threatening arrhythmias  - Monitor electrolytes and administer replacement therapy as ordered  Outcome: Progressing

## 2023-12-28 NOTE — PLAN OF CARE
Pt is Alert and oriented x4. Tele rhythm AFIB (eliquis) O2 saturation 95% on RA Breath sounds clear. Bed is locked and in low position. Call light and personal items within reach. No C/O chest pain or shortness of breath. Pt ambulated in hallway W/O issue. Skin dry/Intact. Reviewed plan of care and patient verbalizes understanding.      POC: Monitor HR, PO ABX          Problem: Patient/Family Goals  Goal: Patient/Family Long Term Goal  Description: Patient's Long Term Goal: Home with better nutrition and to stay hydrated  Stay out of the hospital 12-27    Interventions:  - follow plan of care  - med compliance, follow ups    - See additional Care Plan goals for specific interventions  Outcome: Progressing  Goal: Patient/Family Short Term Goal  Description: Patient's Short Term Goal: Go Home  No pain 12-27    Interventions:   - Follow plan of care  - PRN meds, hot/cold packs, tell nurse if in pain    - See additional Care Plan goals for specific interventions  Outcome: Progressing

## 2023-12-30 ENCOUNTER — PATIENT MESSAGE (OUTPATIENT)
Dept: PODIATRY CLINIC | Facility: CLINIC | Age: 88
End: 2023-12-30

## 2024-01-02 ENCOUNTER — OFFICE VISIT (OUTPATIENT)
Dept: PODIATRY CLINIC | Facility: CLINIC | Age: 89
End: 2024-01-02
Payer: MEDICARE

## 2024-01-02 DIAGNOSIS — I73.9 PAD (PERIPHERAL ARTERY DISEASE) (HCC): ICD-10-CM

## 2024-01-02 DIAGNOSIS — Z89.439 HISTORY OF TRANSMETATARSAL AMPUTATION OF FOOT (HCC): ICD-10-CM

## 2024-01-02 DIAGNOSIS — Z47.89 ORTHOPEDIC AFTERCARE: ICD-10-CM

## 2024-01-02 DIAGNOSIS — L97.512 FOOT ULCER, RIGHT, WITH FAT LAYER EXPOSED (HCC): Primary | ICD-10-CM

## 2024-01-02 NOTE — TELEPHONE ENCOUNTER
From: Alejandro Guardado  To: Brian Moreno  Sent: 12/30/2023 7:29 PM CST  Subject: Photos    Attached are images from Saturday, 12/30. Thank you.

## 2024-01-04 ENCOUNTER — APPOINTMENT (OUTPATIENT)
Dept: CARDIOLOGY | Age: 89
End: 2024-01-04
Attending: INTERNAL MEDICINE

## 2024-01-04 DIAGNOSIS — Z95.1 HX OF CABG: ICD-10-CM

## 2024-01-04 DIAGNOSIS — I25.10 CORONARY ARTERY DISEASE INVOLVING NATIVE CORONARY ARTERY OF NATIVE HEART WITHOUT ANGINA PECTORIS: ICD-10-CM

## 2024-01-04 DIAGNOSIS — E78.00 PURE HYPERCHOLESTEROLEMIA: ICD-10-CM

## 2024-01-04 DIAGNOSIS — I25.5 ISCHEMIC CARDIOMYOPATHY: ICD-10-CM

## 2024-01-04 DIAGNOSIS — I65.23 ASYMPTOMATIC CAROTID ARTERY STENOSIS, BILATERAL: ICD-10-CM

## 2024-01-05 ENCOUNTER — TELEPHONE (OUTPATIENT)
Dept: CARDIOLOGY | Age: 89
End: 2024-01-05

## 2024-01-05 ENCOUNTER — EXTERNAL RECORD (OUTPATIENT)
Dept: HEALTH INFORMATION MANAGEMENT | Facility: OTHER | Age: 89
End: 2024-01-05

## 2024-01-05 DIAGNOSIS — E78.00 PURE HYPERCHOLESTEROLEMIA: ICD-10-CM

## 2024-01-05 DIAGNOSIS — I65.23 ASYMPTOMATIC CAROTID ARTERY STENOSIS, BILATERAL: ICD-10-CM

## 2024-01-05 DIAGNOSIS — I25.10 CORONARY ARTERY DISEASE INVOLVING NATIVE CORONARY ARTERY OF NATIVE HEART WITHOUT ANGINA PECTORIS: ICD-10-CM

## 2024-01-05 RX ORDER — METOPROLOL SUCCINATE 50 MG/1
50 TABLET, EXTENDED RELEASE ORAL DAILY
Qty: 90 TABLET | Refills: 3 | Status: SHIPPED | OUTPATIENT
Start: 2024-01-05

## 2024-01-06 NOTE — PROGRESS NOTES
[facility]  Progress Note    Alejandro Guardado is a 89 year old male.   No chief complaint on file.        HPI:      Alejandro Guardado is a pleasant 89 year old male with past medical history of PAD who is returning to clinic today for recheck of right foot.  He is accompanied by his son and daughter-in-law today.  He has been receiving dressing changes with Santyl.    No other complaints are mentioned.      Allergies: Heparin and Hydromorphone   Current Outpatient Medications   Medication Sig Dispense Refill    tamsulosin 0.4 MG Oral Cap Take 1 capsule (0.4 mg total) by mouth daily.      apixaban 5 MG Oral Tab Take 1 tablet (5 mg total) by mouth 2 (two) times daily. 60 tablet 3    collagenase (SANTYL) 250 UNIT/GM External Ointment Apply 1 g topically daily. 30 g 2    collagenase (SANTYL) 250 UNIT/GM External Ointment Apply topically to ulcer site daily 30 g 0    atorvastatin 40 MG Oral Tab Take 1 tablet (40 mg total) by mouth daily. 30 tablet 0    finasteride 5 MG Oral Tab Take 1 tablet (5 mg total) by mouth daily. 30 tablet 0    clopidogrel 75 MG Oral Tab Take 1 tablet (75 mg total) by mouth daily. 30 tablet 0    gabapentin 300 MG Oral Cap Take 1 capsule (300 mg total) by mouth 3 (three) times daily. 90 capsule 0    furosemide 40 MG Oral Tab Take 1.5 tablets (60 mg total) by mouth daily.      predniSONE 5 MG Oral Tab Take 3 tablets (15 mg total) by mouth daily.      folic acid 1 MG Oral Tab Take 1 tablet (1 mg total) by mouth daily.      acetaminophen 500 MG Oral Tab Take 2 tablets (1,000 mg total) by mouth every 8 (eight) hours. 21 tablet 0    metoprolol succinate ER 25 MG Oral Tablet 24 Hr Take 2 tablets (50 mg total) by mouth daily.      lisinopril 2.5 MG Oral Tab Take 1 tablet (2.5 mg total) by mouth daily.      Omeprazole 40 MG Oral Capsule Delayed Release Take 1 capsule (40 mg total) by mouth daily.        Past Medical History:   Diagnosis Date    Arrhythmia     CAD (coronary artery disease)  09/28/2015    Cardiomyopathy (HCC)     Esophageal reflux     Hearing impairment     Heart attack (HCC)     High blood pressure     High cholesterol     History of MI (myocardial infarction)     Other and unspecified hyperlipidemia     PAD (peripheral artery disease) (HCC) 04/26/2023    Pneumonia, bacterial 12/28/2023    Unspecified essential hypertension     Visual impairment       Past Surgical History:   Procedure Laterality Date    ANGIOGRAM      ANGIOPLASTY (CORONARY)      CABG      FRACTURE SURGERY Left     left hip pinning    OTHER SURGICAL HISTORY Left 01/01/1977    knee surgery    OTHER SURGICAL HISTORY  03/25/2016    Left hip ORIF pinning      Family History   Problem Relation Age of Onset    Cancer Father       Social History     Socioeconomic History    Marital status:    Tobacco Use    Smoking status: Never    Smokeless tobacco: Never   Vaping Use    Vaping Use: Never used   Substance and Sexual Activity    Alcohol use: No    Drug use: No           REVIEW OF SYSTEMS:     Today reviewed systems as documented below  GENERAL HEALTH: feels well otherwise  SKIN: denies any unusual skin lesions or rashes  RESPIRATORY: denies shortness of breath with exertion  CARDIOVASCULAR: denies chest pain on exertion  GI: denies abdominal pain and denies heartburn  NEURO: denies headaches  MUSCULO: history of arthritis       EXAM:   There were no vitals taken for this visit.  GENERAL: well developed, well nourished, in no apparent distress  EXTREMITIES:             1. Integument: Normal skin temperature and turgor.  Wound measures 4.0 x 0.4 x 0.2 cm.  Base is fibrogranular.  No exposed bone.  Small opening medially.  No purulence or acute signs of infection appreciated.  Mild irritation/redness to the skin but no warmth or acute signs of infection appreciated.  2. Vascular: Unable to palpate pedal pulses.   3. Musculoskeletal: Status post midfoot amputation.  Strength testing deferred.   4. Neurological: Decreased  protective sensation noted to lower extremities.        ASSESSMENT AND PLAN:   Diagnoses and all orders for this visit:    Foot ulcer, right, with fat layer exposed (HCC)    Orthopedic aftercare    History of transmetatarsal amputation of foot (HCC)    PAD (peripheral artery disease) ([facility])        Plan:    -Patient examined, chart history reviewed.  -Wound inspected--main surgical wound to right foot remains fibrogranular.  Site has greatly improved in appearance and appears to be healing nicely.  Excisional debridement performed site with #15 blade into and through subcutaneous tissue to healthy bleeding base without incident.  -Today I dressed site with Betadine and dry dressing.  Recommend similar dressing changes daily.  -Continue following with Dr. Alston.  -Try minimize activity to foot with postop shoe.  -Educated on signs of infection and encouraged patient to seek immediate medical attention if noticing any of these signs.  -Patient will follow-up in clinic with Dr. Poon on Monday.    The patient indicates understanding of these issues and agrees to the plan.      Brian Moreno DPM

## 2024-01-07 ENCOUNTER — PATIENT MESSAGE (OUTPATIENT)
Dept: PODIATRY CLINIC | Facility: CLINIC | Age: 89
End: 2024-01-07

## 2024-01-08 ENCOUNTER — OFFICE VISIT (OUTPATIENT)
Dept: WOUND CARE | Facility: HOSPITAL | Age: 89
End: 2024-01-08
Attending: STUDENT IN AN ORGANIZED HEALTH CARE EDUCATION/TRAINING PROGRAM
Payer: MEDICARE

## 2024-01-08 ENCOUNTER — TELEPHONE (OUTPATIENT)
Dept: CARDIOLOGY | Age: 89
End: 2024-01-08

## 2024-01-08 ENCOUNTER — EXTERNAL RECORD (OUTPATIENT)
Dept: HEALTH INFORMATION MANAGEMENT | Facility: OTHER | Age: 89
End: 2024-01-08

## 2024-01-08 VITALS
DIASTOLIC BLOOD PRESSURE: 68 MMHG | TEMPERATURE: 98 F | RESPIRATION RATE: 16 BRPM | HEART RATE: 103 BPM | SYSTOLIC BLOOD PRESSURE: 111 MMHG

## 2024-01-08 DIAGNOSIS — L97.512 FOOT ULCER, RIGHT, WITH FAT LAYER EXPOSED (HCC): Primary | ICD-10-CM

## 2024-01-08 DIAGNOSIS — Z89.439 HISTORY OF TRANSMETATARSAL AMPUTATION OF FOOT (HCC): ICD-10-CM

## 2024-01-08 DIAGNOSIS — I73.9 PAD (PERIPHERAL ARTERY DISEASE) (HCC): ICD-10-CM

## 2024-01-08 PROCEDURE — 11042 DBRDMT SUBQ TIS 1ST 20SQCM/<: CPT | Performed by: PODIATRIST

## 2024-01-08 NOTE — PROGRESS NOTES
Patient ID: Alejandro Guardado is a 89 year old male.          Debridement Old surgical Right Foot   Wound 08/14/23 #1- right foot Old surgical Foot Right    Performed by: Quincy Poon DPM  Authorized by: Quincy Poon DPM      Consent   Consent obtained? verbal  Consent given by: patient  Risks discussed? procedural risks discussed  Time out called at 1/8/2024 4:22 PM  Immediately prior to the procedure a time out was called and the performing provider verified the correct patient, procedure, equipment, support staff, and site/side marked as required.    Debridement Details  Performed by: physician  Debridement type: surgical  Level of debridement: subcutaneous tissue  Pain control: lidocaine 4%  Pain control administration type: topical    Pre-debridement measurements  Length (cm): 0.3  Width (cm): 3.7  Depth (cm): 0.7  Surface Area (cm^2): 1.11    Post-debridement measurements  Length (cm): 0.3  Width (cm): 3.8  Depth (cm): 0.7  Percent debrided: 100%  Surface Area (cm^2): 1.14  Area Debrided (cm^2): 1.14  Volume (cm^3): 0.8    Tissue and other material debrided: subcutaneous tissue  Devitalized tissue debrided: biofilm, fibrin and necrotic debris  Instrument(s) utilized: curette  Bleeding: small  Hemostasis obtained with: not applicable  Procedural pain (0-10): 0  Post-procedural pain: 0   Response to treatment: procedure was tolerated well    Debridement Dorsal;Right   Wound 08/14/23 #2- right medial foot Dorsal;Right    Performed by: Quincy Poon DPM  Authorized by: Quincy Poon DPM      Consent   Consent obtained? verbal  Consent given by: patient  Risks discussed? procedural risks discussed  Time out called at 1/8/2024 4:23 PM  Immediately prior to the procedure a time out was called and the performing provider verified the correct patient, procedure, equipment, support staff, and site/side marked as required.    Debridement Details  Performed by: physician  Debridement type: surgical  Level of  debridement: subcutaneous tissue  Pain control: lidocaine 4%  Pain control administration type: topical    Pre-debridement measurements  Length (cm): 0.1  Width (cm): 0.4  Depth (cm): 0.4  Surface Area (cm^2): 0.04    Post-debridement measurements  Length (cm): 0.2  Width (cm): 0.4  Depth (cm): 0.4  Percent debrided: 100%  Surface Area (cm^2): 0.08  Area Debrided (cm^2): 0.08  Volume (cm^3): 0.03    Tissue and other material debrided: subcutaneous tissue  Devitalized tissue debrided: biofilm, fibrin and necrotic debris  Instrument(s) utilized: curette  Bleeding: small  Hemostasis obtained with: not applicable  Procedural pain (0-10): 0  Post-procedural pain: 0

## 2024-01-09 ENCOUNTER — OFFICE VISIT (OUTPATIENT)
Facility: CLINIC | Age: 89
End: 2024-01-09
Payer: MEDICARE

## 2024-01-09 ENCOUNTER — HOSPITAL ENCOUNTER (OUTPATIENT)
Dept: GENERAL RADIOLOGY | Facility: HOSPITAL | Age: 89
Discharge: HOME | End: 2024-01-09
Attending: INTERNAL MEDICINE
Payer: MEDICARE

## 2024-01-09 VITALS
BODY MASS INDEX: 33.9 KG/M2 | SYSTOLIC BLOOD PRESSURE: 100 MMHG | HEIGHT: 67 IN | HEART RATE: 94 BPM | OXYGEN SATURATION: 95 % | RESPIRATION RATE: 16 BRPM | WEIGHT: 216 LBS | DIASTOLIC BLOOD PRESSURE: 62 MMHG

## 2024-01-09 DIAGNOSIS — J18.9 SEPSIS DUE TO PNEUMONIA (HCC): ICD-10-CM

## 2024-01-09 DIAGNOSIS — J18.9 SEPSIS DUE TO PNEUMONIA (HCC): Primary | ICD-10-CM

## 2024-01-09 DIAGNOSIS — A41.9 SEPSIS DUE TO PNEUMONIA (HCC): ICD-10-CM

## 2024-01-09 DIAGNOSIS — A41.9 SEPSIS DUE TO PNEUMONIA (HCC): Primary | ICD-10-CM

## 2024-01-09 PROCEDURE — 1160F RVW MEDS BY RX/DR IN RCRD: CPT | Performed by: INTERNAL MEDICINE

## 2024-01-09 PROCEDURE — 3074F SYST BP LT 130 MM HG: CPT | Performed by: INTERNAL MEDICINE

## 2024-01-09 PROCEDURE — 1159F MED LIST DOCD IN RCRD: CPT | Performed by: INTERNAL MEDICINE

## 2024-01-09 PROCEDURE — 3078F DIAST BP <80 MM HG: CPT | Performed by: INTERNAL MEDICINE

## 2024-01-09 PROCEDURE — 71046 X-RAY EXAM CHEST 2 VIEWS: CPT | Performed by: INTERNAL MEDICINE

## 2024-01-09 PROCEDURE — 99214 OFFICE O/P EST MOD 30 MIN: CPT | Performed by: INTERNAL MEDICINE

## 2024-01-09 PROCEDURE — 3008F BODY MASS INDEX DOCD: CPT | Performed by: INTERNAL MEDICINE

## 2024-01-09 NOTE — PROGRESS NOTES
Weekly Wound Education Note    Teaching Provided To: Patient;Family  Training Topics: Off-loading;Discharge instructions;Cleasing and general instructions  Training Method: Demonstration;Explain/Verbal;Written  Training Response: Patient responds and understands;Reinforcement needed        Notes: Cleanse Right foot wounds with saline or wound cleanser apply betadine and gauze, abd and kerlix, Change daily

## 2024-01-09 NOTE — PROGRESS NOTES
[facility] General Pulmonary Consult Note    Chief Complaint:  Chief Complaint   Patient presents with    New Patient     Hospital f/u pt states he is feeling better        History of Present Illness:  Alejandro Guardado is a 89 year old male who presents today for evaluation of hospital follow up.  Patient hospitalized for septic shock 2/2 pneumonia.  He improved on broad spectrum abx and septic shock treatment.  Overall patient doing better froma  breathing standpoint.  No cough.  No more fevers.  Not confused.      Past Medical History:   Past Medical History:   Diagnosis Date    Arrhythmia     CAD (coronary artery disease) 09/28/2015    Cardiomyopathy (HCC)     Congestive heart disease (HCC) 2022    Esophageal reflux     Hearing impairment     Heart attack (HCC)     High blood pressure     High cholesterol     History of MI (myocardial infarction)     Other and unspecified hyperlipidemia     PAD (peripheral artery disease) ([facility]) 04/26/2023    Pneumonia due to organism 12/2023    Pneumonia, bacterial 12/28/2023    Unspecified essential hypertension     Visual impairment         Past Surgical History:   Past Surgical History:   Procedure Laterality Date    ANGIOGRAM      ANGIOPLASTY (CORONARY)      CABG      CATARACT  04/2023    FRACTURE SURGERY Left     left hip pinning    OTHER SURGICAL HISTORY Left 01/01/1977    knee surgery    OTHER SURGICAL HISTORY  03/25/2016    Left hip ORIF pinning    TONSILLECTOMY         Family Medical History:   Family History   Problem Relation Age of Onset    Cancer Father         Social History:   Social History     Socioeconomic History    Marital status:      Spouse name: Not on file    Number of children: Not on file    Years of education: Not on file    Highest education level: Not on file   Occupational History    Not on file   Tobacco Use    Smoking status: Never    Smokeless tobacco: Never   Vaping Use    Vaping Use: Never used   Substance and Sexual Activity    Alcohol use: Never     Drug use: Never    Sexual activity: Not on file   Other Topics Concern    Not on file   Social History Narrative    Not on file     Social Determinants of Health     Financial Resource Strain: Not on file   Food Insecurity: No Food Insecurity (12/25/2023)    Food Insecurity     Food Insecurity: Never true   Transportation Needs: No Transportation Needs (12/25/2023)    Transportation Needs     Lack of Transportation: No   Physical Activity: Not on file   Stress: Not on file   Social Connections: Not on file   Housing Stability: Low Risk  (12/25/2023)    Housing Stability     Housing Instability: No     Housing Instability Emergency: Not on file        Allergies: Heparin and Hydromorphone     Medications:   Current Outpatient Medications   Medication Sig Dispense Refill    tamsulosin 0.4 MG Oral Cap Take 1 capsule (0.4 mg total) by mouth daily.      apixaban 5 MG Oral Tab Take 1 tablet (5 mg total) by mouth 2 (two) times daily. 60 tablet 3    collagenase (SANTYL) 250 UNIT/GM External Ointment Apply 1 g topically daily. 30 g 2    collagenase (SANTYL) 250 UNIT/GM External Ointment Apply topically to ulcer site daily 30 g 0    atorvastatin 40 MG Oral Tab Take 1 tablet (40 mg total) by mouth daily. 30 tablet 0    finasteride 5 MG Oral Tab Take 1 tablet (5 mg total) by mouth daily. 30 tablet 0    clopidogrel 75 MG Oral Tab Take 1 tablet (75 mg total) by mouth daily. 30 tablet 0    gabapentin 300 MG Oral Cap Take 1 capsule (300 mg total) by mouth 3 (three) times daily. 90 capsule 0    furosemide 40 MG Oral Tab Take 1.5 tablets (60 mg total) by mouth daily.      predniSONE 5 MG Oral Tab Take 3 tablets (15 mg total) by mouth daily.      folic acid 1 MG Oral Tab Take 1 tablet (1 mg total) by mouth daily.      acetaminophen 500 MG Oral Tab Take 2 tablets (1,000 mg total) by mouth every 8 (eight) hours. 21 tablet 0    metoprolol succinate ER 25 MG Oral Tablet 24 Hr Take 2 tablets (50 mg total) by mouth daily.      lisinopril  2.5 MG Oral Tab Take 1 tablet (2.5 mg total) by mouth daily.      Omeprazole 40 MG Oral Capsule Delayed Release Take 1 capsule (40 mg total) by mouth daily.         Review of Systems: Review of Systems    Physical Exam:  /62 (BP Location: Right arm, Patient Position: Sitting, Cuff Size: adult)   Pulse 94   Resp 16   Ht 5' 7\" (1.702 m)   Wt 216 lb (98 kg)   SpO2 95%   BMI 33.83 kg/m²      Constitutional: alert, cooperative. No acute distress.  HEENT: Head NC/AT. Nasal turbinates appear normal.  Mallimpati 1+  Cardio: Regular rate and rhythm. Normal S1 and S2. No murmurs.   Respiratory: Thorax symmetrical with no labored breathing. Clear to ausculation bilaterally with symmetrical breath sounds. No wheezing, rhonchi, rales, or crackles.   GI: NABS. Abd soft, non-tender.  Extremities: No clubbing or cyanosis. No BLE edema.    Neurologic: A&Ox3. No gross motor deficits.  Skin: Warm, dry  Psych: Calm, cooperative. Pleasant affect.    Results:  Personally reviewed  WBC: 13.1, done on 12/27/2023.  HGB: 9.9, done on 12/27/2023.  PLT: 263, done on 12/27/2023.     Glucose: 89, done on 12/27/2023.  Cr: 0.89, done on 12/27/2023.  GFR(AA): 66, done on 6/16/2022.  GFR (non-AA): 58, done on 6/16/2022.  CA: 8.7, done on 12/27/2023.  Na: 141, done on 12/27/2023.  K: 3.9, done on 12/27/2023.  Cl: 110, done on 12/27/2023.  CO2: 27, done on 12/27/2023.  Last ALB was 2.3% done on 12/27/2023.     XR CHEST AP PORTABLE  (CPT=71045)    Result Date: 12/25/2023  CONCLUSION:  There are patchy airspace opacities again noted diffusely throughout the right lung and similar, more subtle questionable opacities within the left lower lobe.  Of note, the appearance of these opacities has increased from prior exam of 12/3/2023 and more similar to the older exam of 12/1/2023, suggesting a waxing and waning pneumonic process.   LOCATION:  [facility]      Dictated by (CST): Miguelangel Worley DO on 12/25/2023 at 12:49 PM     Finalized by (CST): Meir  DO Miguelangel on 12/25/2023 at 12:51 PM       XR CHEST AP PORTABLE  (CPT=71045)    Result Date: 12/3/2023  CONCLUSION:  See above.   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  This includes occlusion of stent traversing the distal SFA extending into the popliteal artery with no demonstrable arterial inflow of the tibioperoneal trunk or calf arteries.  Findings discussed with ANNALISE Gillis at 3:15 p.m. Central standard time on 12/01/2023   LOCATION:  [id number]    Dictated by (CST): Luisito Faria MD on 12/01/2023 at 3:11 PM     Finalized by (CST): Luisito Faria MD on 12/01/2023 at 3:18 PM       US VENOUS DOPPLER LEG RIGHT - DIAG IMG (CPT=93971)    Result Date: 12/1/2023  CONCLUSION:   Negative for deep venous thrombosis of the right lower extremity.   LOCATION:  [id number]    Dictated by (CST): Luisito Faria MD on 12/01/2023 at 3:10 PM     Finalized by (CST): Luisito Faria MD on 12/01/2023 at 3:11 PM       XR FOOT, COMPLETE (MIN 3 VIEWS), RIGHT (CPT=73630)    Result Date: 12/1/2023  CONCLUSION:  See above.   LOCATION:  [facility]   Dictated by (CST): Gregg Fields MD on 12/01/2023 at 1:18 PM     Finalized by (CST): Gregg Fields MD on 12/01/2023 at 1:18 PM       XR CHEST AP PORTABLE  (CPT=71045)    Result Date: 12/1/2023  CONCLUSION:  See above.   LOCATION:  [facility]      Dictated by (CST): Gregg Fields MD on 12/01/2023 at 8:49 AM     Finalized by (CST): Gregg Fields MD on 12/01/2023 at 8:50 AM         Assessment/Plan:  #1. Septic Shock 2/2 pneumonia, culture negative    Completed abx  Overall better  Will check follow up XR  Reports doing well from a breathing standpoint  but very little exertion  Once cleared by vascular, asked son to push cardiovascular endurance and then we can  see any additional treatments    More than 45 minutes was spent performing chart review, labs, imaging, and details of hospitalization as well as face to face encounter.      Return in about 6 weeks (around 2/20/2024).    Cait Gaming MD  1/9/2024

## 2024-01-10 ENCOUNTER — PATIENT MESSAGE (OUTPATIENT)
Dept: PODIATRY CLINIC | Facility: CLINIC | Age: 89
End: 2024-01-10

## 2024-01-10 NOTE — PROGRESS NOTES
Subjective   Alejandro Guardado is a 89 year old male.    Chief Complaint   Patient presents with    Wound Care     Patient is here for a wound care follow up. He denies any pain to the wound.        HPI  Alejandro Guardado is a 88 year old male with past medi patient is accompanied today by his son and daughter-in-law, who is returning to clinic today for recheck of right foot.  Patient and his family state that they believe the wound continues to remain stable and has been improving.  Patient was revascularized during his last admission, and since then has noticed ongoing improvements of his wound sites.  Patient has been following up with Dr. Moreno the last 2 weeks as I have not been in wound care center due to the holidays.  They deny noticing any recent signs of infection.  No new concerns at this time.    Review of Systems  Denies nausea, vomiting, fever, chills, shortness of breath, chest pain, and calf pain.    Objective    Physical Exam:    Derm:  Wound Assessment  Wound 08/14/23 #1- right foot Old surgical Foot Right (Active)   Wound Image    01/08/24 1606   Drainage Amount Scant 01/08/24 1606   Drainage Description Serous;Yellow 01/08/24 1606   Treatments Wound Vac - Neg Pressure 10/16/23 0853   Wound Vac Brand KCI 10/30/23 0907   Wound Length (cm) 0.3 cm 01/08/24 1607   Wound Width (cm) 3.8 cm 01/08/24 1607   Wound Surface Area (cm^2) 1.14 cm^2 01/08/24 1607   Wound Depth (cm) 0.7 cm 01/08/24 1607   Wound Volume (cm^3) 0.798 cm^3 01/08/24 1607   Wound Healing % 97 01/08/24 1607   Margins Well-defined edges;Epibole (Rolled edges) 01/08/24 1606   Non-staged Wound Description Full thickness 01/08/24 1606   Peggy-wound Assessment Edema 01/08/24 1606   Wound Granulation Tissue Firm;Pink 01/08/24 1606   Wound Bed Granulation (%) 80 % 01/08/24 1606   Wound Bed Epithelium (%) 5 % 10/09/23 1307   Wound Bed Slough (%) 20 % 01/08/24 1606   Wound Bed Eschar (%) 40 % 08/14/23 1315   Wound Odor None 01/08/24 1606   Shape  Bridged 10/09/23 1307   Tunneling? No 01/08/24 1606   Undermining? No 01/08/24 1606   Sinus Tracts? No 01/08/24 1606       Wound 08/14/23 #2- right medial foot Dorsal;Right (Active)   Wound Image   01/08/24 1625   Drainage Amount Scant 01/08/24 1608   Drainage Description Serous;Yellow 01/08/24 1608   Treatments Wound Vac - Neg Pressure 10/23/23 0832   Wound Vac Brand KCI 10/30/23 0904   Wound Length (cm) 0.2 cm 01/08/24 1609   Wound Width (cm) 0.4 cm 01/08/24 1609   Wound Surface Area (cm^2) 0.08 cm^2 01/08/24 1609   Wound Depth (cm) 0.4 cm 01/08/24 1609   Wound Volume (cm^3) 0.032 cm^3 01/08/24 1609   Margins Well-defined edges 01/08/24 1608   Non-staged Wound Description Full thickness 01/08/24 1608   Peggy-wound Assessment Edema;Dry 01/08/24 1608   Wound Granulation Tissue Firm;Pale Stanley;[place] 01/08/24 1608   Wound Bed Granulation (%) 100 % 01/08/24 1608   Wound Bed Slough (%) 100 % 12/18/23 1439   Wound Bed Eschar (%) 100 % 08/14/23 1320   Wound Odor None 01/08/24 1608   Shape irritated skin dorsal side of foot 10/23/23 0832   Tunneling? Yes 01/08/24 1608   Number of Tunnels 1 01/08/24 1608   Tunnel 1 Location 6 01/08/24 1608   Tunnel 1 Depth 0.5 01/08/24 1608   Undermining circumferencial undermining- deepest at 5 o'clock at 0.8 cm 12/18/23 1439   Undermining? No 01/08/24 1608   Sinus Tracts? No 01/08/24 1608       Wound 12/04/23 Groin Left (Active)       Wound 12/27/23 Hand Posterior;Right (Active)       Wound 12/27/23 Hand Left;Posterior (Active)       Vascular: DP and PT pulse faintly palpable.  Improvements to pitting edema noted to right lower extremity.  Brisk refill noted to stump site with warmth to the entire right foot noted compared to previous visits.  Musculoskeletal: no acute deformities noted.  Muscle strength test deferred   Neurological: Gross sensation intact via light touch.  Protective sensation diminished via Ipswitch test.    Vital Signs  Vital Signs    01/08/24 1604   BP: 111/68   Pulse:  103   Resp: 16   Temp: 98.4 °F (36.9 °C)   PainSc: 0 - (None)         Allergies  Allergies   Allergen Reactions    Heparin ANAPHYLAXIS    Hydromorphone HALLUCINATION       Assessment    Encounter Diagnosis  1. Foot ulcer, right, with fat layer exposed ([facility])    2. PAD (peripheral artery disease) ([facility])    3. History of transmetatarsal amputation of foot ([facility])        Problem List  Patient Active Problem List   Diagnosis    Closed minimally displaced zone I fracture of sacrum ([facility])    At risk for falling    CAD (coronary artery disease)    Ischemic cardiomyopathy    Azotemia    Arrhythmia    Esophageal reflux    History of MI (myocardial infarction)    Mixed hyperlipidemia    Primary hypertension    Dizziness    Medication side effect    Closed left hip fracture ([facility])  Global 6/22/2016    Status post hip surgery    Left shoulder distal clavical resection and excision of ganglion cyst of soft tissue mass   Global  09/17/2020    Orthopedic aftercare for healing traumatic hip fracture, left, closed    Closed left hip fracture, with routine healing, subsequent encounter    Osteoarthrosis, localized, primary, knee, left    Osteoarthrosis, localized, primary, knee, right    Gangrene ([facility])    PAD (peripheral artery disease) ([facility])    Benign prostatic hyperplasia with incomplete bladder emptying    Gangrene of foot ([facility])    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) ([facility])    Leukocytosis    Atrial fibrillation with RVR ([facility])    Hypokalemia    Acute kidney injury ([facility])    Hyperglycemia    Community acquired pneumonia of right lung, unspecified part of lung    Acute respiratory failure with hypoxia ([facility])    Hypotension, unspecified hypotension type    Sepsis due to pneumonia ([facility])    Foot ulcer with fat layer exposed, right ([facility])    Syncope, unspecified syncope type    Sepsis, due to unspecified organism, unspecified whether acute organ dysfunction present ([facility])    Septic shock ([facility])    Acute hypoxic respiratory  failure (HCC)    Pneumonia, bacterial       Plan  Orders:  Orders Placed This Encounter   Procedures    Debridement Old surgical Right Foot    Debridement Dorsal;Right         -Patient examined, chart history reviewed.    -Wound inspected today--overall continued improvements noted to patient's wounds today.  There continues to be no tunneling from between the 2 wounds.  There is fibrogranular wound bed to previous surgical site and medial wound does have some small amounts of fibrous slough within it.  No current purulence, surrounding erythema, or other signs of infection.  Negative probe to bone.     -Excisional debridement performed to the right foot wounds utilizing dermal curette down to and including subcutaneous tissue.  A more granular, bleeding wound bed was present upon debridement today.  No tunneling/connecting noted to the 2 wounds     -Wound site painted with Betadine and covered with a dry dressing consisting of ABD pad and Kerlix.  Will have patient change dressings every day in similar fashion.    -Educated on signs of infection and encouraged patient to seek immediate medical attention if noticing any of these signs.    Follow-Up  1 week    Pavan Poon DPM    1/9/2024    Dragon speech recognition software was used to prepare this note.  Errors in word recognition may occur.  Please contact me with any questions/concerns with this note.

## 2024-01-11 ENCOUNTER — LAB SERVICES (OUTPATIENT)
Dept: LAB | Age: 89
End: 2024-01-11

## 2024-01-11 DIAGNOSIS — I49.3 PVCS (PREMATURE VENTRICULAR CONTRACTIONS): ICD-10-CM

## 2024-01-11 DIAGNOSIS — I65.23 ASYMPTOMATIC CAROTID ARTERY STENOSIS, BILATERAL: ICD-10-CM

## 2024-01-11 DIAGNOSIS — I25.5 ISCHEMIC CARDIOMYOPATHY: ICD-10-CM

## 2024-01-11 DIAGNOSIS — I25.10 CORONARY ARTERY DISEASE INVOLVING NATIVE CORONARY ARTERY OF NATIVE HEART WITHOUT ANGINA PECTORIS: ICD-10-CM

## 2024-01-11 PROCEDURE — 36415 COLL VENOUS BLD VENIPUNCTURE: CPT | Performed by: INTERNAL MEDICINE

## 2024-01-12 ENCOUNTER — PATIENT MESSAGE (OUTPATIENT)
Dept: PODIATRY CLINIC | Facility: CLINIC | Age: 89
End: 2024-01-12

## 2024-01-12 ENCOUNTER — NURSE TRIAGE (OUTPATIENT)
Dept: TELEHEALTH | Age: 89
End: 2024-01-12

## 2024-01-14 ENCOUNTER — EXTERNAL RECORD (OUTPATIENT)
Dept: HEALTH INFORMATION MANAGEMENT | Facility: OTHER | Age: 89
End: 2024-01-14

## 2024-01-15 ENCOUNTER — OFFICE VISIT (OUTPATIENT)
Dept: WOUND CARE | Facility: HOSPITAL | Age: 89
End: 2024-01-15
Attending: STUDENT IN AN ORGANIZED HEALTH CARE EDUCATION/TRAINING PROGRAM
Payer: MEDICARE

## 2024-01-15 ENCOUNTER — TELEPHONE (OUTPATIENT)
Dept: CARDIOLOGY | Age: 89
End: 2024-01-15

## 2024-01-15 VITALS
TEMPERATURE: 98 F | HEART RATE: 100 BPM | DIASTOLIC BLOOD PRESSURE: 73 MMHG | SYSTOLIC BLOOD PRESSURE: 115 MMHG | RESPIRATION RATE: 16 BRPM

## 2024-01-15 DIAGNOSIS — Z89.439 HISTORY OF TRANSMETATARSAL AMPUTATION OF FOOT (HCC): ICD-10-CM

## 2024-01-15 DIAGNOSIS — I73.9 PAD (PERIPHERAL ARTERY DISEASE) (HCC): ICD-10-CM

## 2024-01-15 DIAGNOSIS — L97.512 FOOT ULCER, RIGHT, WITH FAT LAYER EXPOSED (HCC): Primary | ICD-10-CM

## 2024-01-15 PROCEDURE — 11042 DBRDMT SUBQ TIS 1ST 20SQCM/<: CPT | Performed by: PODIATRIST

## 2024-01-15 NOTE — PROGRESS NOTES
Patient ID: Alejandro Guardado is a 89 year old male.    Debridement Old surgical Right Foot   Wound 08/14/23 #1- right foot Old surgical Foot Right    Performed by: Quincy Poon DPM  Authorized by: Quincy Poon DPM      Consent   Consent obtained? verbal  Consent given by: patient  Risks discussed? procedural risks discussed  Time out called at 1/15/2024 3:12 PM  Immediately prior to the procedure a time out was called and the performing provider verified the correct patient, procedure, equipment, support staff, and site/side marked as required.    Debridement Details  Performed by: physician  Debridement type: surgical  Level of debridement: subcutaneous tissue  Pain control: lidocaine 4%  Pain control administration type: topical    Pre-debridement measurements  Length (cm): 0.2  Width (cm): 3.5  Depth (cm): 0.7  Surface Area (cm^2): 0.7    Post-debridement measurements  Length (cm): 0.3  Width (cm): 3.5  Depth (cm): 0.7  Percent debrided: 100%  Surface Area (cm^2): 1.05  Area Debrided (cm^2): 1.05  Volume (cm^3): 0.74    Tissue and other material debrided: subcutaneous tissue  Devitalized tissue debrided: biofilm and fibrin  Instrument(s) utilized: curette  Bleeding: small  Hemostasis obtained with: not applicable  Procedural pain (0-10): 0  Post-procedural pain: 0   Response to treatment: procedure was tolerated well

## 2024-01-15 NOTE — PROGRESS NOTES
Weekly Wound Education Note    Teaching Provided To: Patient;Family  Training Topics: Dressing;Discharge instructions;Cleasing and general instructions;Off-loading  Training Method: Demonstration;Explain/Verbal;Written  Training Response: Patient responds and understands        Notes: Cleanse right foot wound with saline or wound cleanser apply Betadine and dry dressing, Change daily. May take a few steps but do not over do it.

## 2024-01-15 NOTE — PROGRESS NOTES
Subjective   Alejandro Guardado is a 89 year old male.    Chief Complaint   Patient presents with    Wound Care     Pt here for follow up wound care visit to right medial foot and right foot wounds. Pt denies any concerns or issues. Pt denies any pain.        HPI  Alejandro Guardado is a 88 year old male with past medi patient is accompanied today by his son and daughter-in-law, who is returning to clinic today for recheck on right foot.  Patient's son states that he believes the inside wound is closed.  He has been helping his to change his dressings daily with Betadine and a soft dressing.  He feels like both wounds overall have improved.  Patient continues to be minimally weightbearing to his right lower extremity.  Denies noticing any recent signs of infection.  No other concerns at this time.    Review of Systems  Denies nausea, vomiting, fever, chills, shortness of breath, chest pain, and calf pain.    Objective    Physical Exam:    Derm:  Wound Assessment  Wound 08/14/23 #1- right foot Old surgical Foot Right (Active)   Wound Image    01/15/24 1435   Drainage Amount Small 01/15/24 1435   Drainage Description Serosanguineous 01/15/24 1435   Treatments Wound Vac - Neg Pressure 10/16/23 0853   Wound Vac Brand KCI 10/30/23 0907   Wound Length (cm) 0.3 cm 01/15/24 1436   Wound Width (cm) 3.5 cm 01/15/24 1436   Wound Surface Area (cm^2) 1.05 cm^2 01/15/24 1436   Wound Depth (cm) 0.7 cm 01/15/24 1436   Wound Volume (cm^3) 0.735 cm^3 01/15/24 1436   Wound Healing % 97 01/15/24 1436   Margins Well-defined edges;Epibole (Rolled edges) 01/15/24 1435   Non-staged Wound Description Full thickness 01/15/24 1435   Peggy-wound Assessment Edema 01/15/24 1435   Wound Granulation Tissue Pale Grey;Pink;Firm 01/15/24 1435   Wound Bed Granulation (%) 90 % 01/15/24 1435   Wound Bed Epithelium (%) 5 % 10/09/23 1307   Wound Bed Slough (%) 10 % 01/15/24 1435   Wound Bed Eschar (%) 40 % 08/14/23 1315   Wound Odor None 01/15/24 1435   Shape  Bridged 10/09/23 1307   Tunneling? No 01/08/24 1606   Undermining? No 01/08/24 1606   Sinus Tracts? No 01/08/24 1606       Wound 12/04/23 Groin Left (Active)       Wound 12/27/23 Hand Posterior;Right (Active)       Wound 12/27/23 Hand Left;Posterior (Active)       Vascular: DP and PT pulse faintly palpable.  Improvements to pitting edema noted to right lower extremity.  Brisk refill noted to stump site with warmth to the entire right foot noted compared to previous visits.  Musculoskeletal: no acute deformities noted.  Muscle strength test deferred   Neurological: Gross sensation intact via light touch.  Protective sensation diminished via Ipswitch test.    Vital Signs  Vital Signs    01/15/24 1100   BP: 115/73   Pulse: 100   Resp: 16   Temp: 98.1 °F (36.7 °C)   PainSc: 0 - (None)         Allergies  Allergies   Allergen Reactions    Heparin ANAPHYLAXIS    Hydromorphone HALLUCINATION       Assessment    Encounter Diagnosis  1. Foot ulcer, right, with fat layer exposed ([facility])    2. PAD (peripheral artery disease) ([facility])    3. History of transmetatarsal amputation of foot ([facility])        Problem List  Patient Active Problem List   Diagnosis    Closed minimally displaced zone I fracture of sacrum (HCC)    At risk for falling    CAD (coronary artery disease)    Ischemic cardiomyopathy    Azotemia    Arrhythmia    Esophageal reflux    History of MI (myocardial infarction)    Mixed hyperlipidemia    Primary hypertension    Dizziness    Medication side effect    Closed left hip fracture (HCC)  Global 6/22/2016    Status post hip surgery    Left shoulder distal clavical resection and excision of ganglion cyst of soft tissue mass   Global  09/17/2020    Orthopedic aftercare for healing traumatic hip fracture, left, closed    Closed left hip fracture, with routine healing, subsequent encounter    Osteoarthrosis, localized, primary, knee, left    Osteoarthrosis, localized, primary, knee, right    Gangrene (HCC)    PAD (peripheral  artery disease) (HCC)    Benign prostatic hyperplasia with incomplete bladder emptying    Gangrene of foot (HCC)    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) ([facility])    Leukocytosis    Atrial fibrillation with RVR (HCC)    Hypokalemia    Acute kidney injury (HCC)    Hyperglycemia    Community acquired pneumonia of right lung, unspecified part of lung    Acute respiratory failure with hypoxia (HCC)    Hypotension, unspecified hypotension type    Sepsis due to pneumonia (HCC)    Foot ulcer with fat layer exposed, right (HCC)    Syncope, unspecified syncope type    Sepsis, due to unspecified organism, unspecified whether acute organ dysfunction present (HCC)    Septic shock (HCC)    Acute hypoxic respiratory failure (HCC)    Pneumonia, bacterial       Plan  Orders:  Orders Placed This Encounter   Procedures    Debridement Old surgical Right Foot         -Patient examined, chart history reviewed.    -Wound inspected today--overall continued improvements noted to patient's wounds today.  Medial foot wound appears epithelialized today.  There continues to be no tunneling.  There is fibrogranular wound bed to previous surgical site.  No current purulence, surrounding erythema, or other signs of infection.  Negative probe to bone.     -Excisional debridement performed to the previous surgical site utilizing dermal curette down to and including subcutaneous tissue.  A more granular, bleeding wound bed was present upon debridement today.  No tunneling noted.      -Wound site painted with Betadine and covered with a dry dressing consisting of ABD pad and Kerlix.  Will have patient change dressings every day in similar fashion.    -Patient okay to start weightbearing more to the right foot within his house and only small amounts at a time. He should continue with post-op shoe to the RLE.    -Educated on signs of infection and encouraged patient to seek immediate medical attention if noticing any of these  signs.    Follow-Up  2 weeks    Pavan Poon DPM    1/15/2024    Dragon speech recognition software was used to prepare this note.  Errors in word recognition may occur.  Please contact me with any questions/concerns with this note.

## 2024-01-16 ENCOUNTER — TELEPHONE (OUTPATIENT)
Dept: CARDIOLOGY | Age: 89
End: 2024-01-16

## 2024-01-22 ENCOUNTER — APPOINTMENT (OUTPATIENT)
Dept: WOUND CARE | Facility: HOSPITAL | Age: 89
End: 2024-01-22
Attending: STUDENT IN AN ORGANIZED HEALTH CARE EDUCATION/TRAINING PROGRAM
Payer: MEDICARE

## 2024-01-23 ENCOUNTER — PATIENT MESSAGE (OUTPATIENT)
Dept: PODIATRY CLINIC | Facility: CLINIC | Age: 89
End: 2024-01-23

## 2024-01-29 ENCOUNTER — OFFICE VISIT (OUTPATIENT)
Dept: WOUND CARE | Facility: HOSPITAL | Age: 89
End: 2024-01-29
Attending: STUDENT IN AN ORGANIZED HEALTH CARE EDUCATION/TRAINING PROGRAM
Payer: MEDICARE

## 2024-01-29 VITALS
RESPIRATION RATE: 16 BRPM | TEMPERATURE: 98 F | HEART RATE: 96 BPM | DIASTOLIC BLOOD PRESSURE: 77 MMHG | SYSTOLIC BLOOD PRESSURE: 117 MMHG

## 2024-01-29 DIAGNOSIS — I73.9 PAD (PERIPHERAL ARTERY DISEASE) (HCC): ICD-10-CM

## 2024-01-29 DIAGNOSIS — Z89.439 HISTORY OF TRANSMETATARSAL AMPUTATION OF FOOT (HCC): ICD-10-CM

## 2024-01-29 DIAGNOSIS — L97.512 FOOT ULCER, RIGHT, WITH FAT LAYER EXPOSED (HCC): Primary | ICD-10-CM

## 2024-01-29 PROCEDURE — 11044 DBRDMT BONE 1ST 20 SQ CM/<: CPT | Performed by: PODIATRIST

## 2024-01-29 NOTE — PROGRESS NOTES
Patient ID: Alejandro Guardado is a 89 year old male.    Debridement Old surgical Right Foot   Wound 08/14/23 #1- right foot Old surgical Foot Right    Performed by: Quincy Poon DPM  Authorized by: Quincy Poon DPM      Consent   Consent obtained? verbal  Consent given by: patient  Risks discussed? procedural risks discussed  Time out called at 1/29/2024 3:20 PM  Immediately prior to the procedure a time out was called and the performing provider verified the correct patient, procedure, equipment, support staff, and site/side marked as required.    Debridement Details  Performed by: physician  Debridement type: surgical  Level of debridement: bone  Pain control: none    Pre-debridement measurements  Length (cm): 0.9  Width (cm): 2.1  Depth (cm): 0.4  Surface Area (cm^2): 1.89    Post-debridement measurements  Length (cm): 1  Width (cm): 2.1  Depth (cm): 0.4  Percent debrided: 100%  Surface Area (cm^2): 2.1  Area Debrided (cm^2): 2.1  Volume (cm^3): 0.84    Tissue and other material debrided: bone and subcutaneous tissue  Devitalized tissue debrided: biofilm, fibrin and necrotic debris  Instrument(s) utilized: nippers and curette  Bleeding: medium  Hemostasis obtained with: not applicable  Procedural pain (0-10): 0  Post-procedural pain: 0   Response to treatment: procedure was tolerated well

## 2024-01-29 NOTE — PROGRESS NOTES
Subjective   Alejandro Guardado is a 89 year old male.    Chief Complaint   Patient presents with    Wound Care     Pt here for follow up wound care visit to right foot wound. Pt denies any concerns or issues. Pt denies any pain in wound at this time        HPI  Alejandro Guardado is a 88 year old male with PAD who is returning to clinic for recheck of right foot.  Patient is accompanied today by his son and daughter-in-law, who is returning to clinic today for recheck on right foot.  Patient is doing well overall.  His son has been assisting in dressing changes and believes he has noticed improvements.  States that the inside wound continues to be healed.  They have been putting Betadine and a dressing over the TMA site.  Patient has remained minimally weightbearing to his right lower extremity in his surgical shoe.  Denies any recent signs of infection and no new concerns today.    Review of Systems  Denies nausea, vomiting, fever, chills, shortness of breath, chest pain, and calf pain.    Objective    Physical Exam:    Derm:  Wound Assessment  Wound 08/14/23 #1- right foot Old surgical Foot Right (Active)   Wound Image   01/29/24 1445   Drainage Amount Scant 01/29/24 1445   Drainage Description Serous;Yellow 01/29/24 1445   Treatments Wound Vac - Neg Pressure 10/16/23 0853   Wound Vac Brand KCI 10/30/23 0907   Wound Length (cm) 0.9 cm 01/29/24 1445   Wound Width (cm) 2.1 cm 01/29/24 1445   Wound Surface Area (cm^2) 1.89 cm^2 01/29/24 1445   Wound Depth (cm) 0.4 cm 01/29/24 1445   Wound Volume (cm^3) 0.756 cm^3 01/29/24 1445   Wound Healing % 97 01/29/24 1445   Margins Well-defined edges;Epibole (Rolled edges) 01/29/24 1445   Non-staged Wound Description Full thickness 01/29/24 1445   Peggy-wound Assessment Edema 01/29/24 1445   Wound Granulation Tissue Pink;Pale Stanley;Firm 01/29/24 1445   Wound Bed Granulation (%) 90 % 01/29/24 1445   Wound Bed Epithelium (%) 5 % 10/09/23 1307   Wound Bed Slough (%) 10 % 01/29/24 1445    Wound Bed Eschar (%) 40 % 08/14/23 1315   Wound Odor None 01/29/24 1445   Shape Bridged 10/09/23 1307   Tunneling? No 01/08/24 1606   Undermining? No 01/08/24 1606   Sinus Tracts? No 01/08/24 1606       Wound 12/04/23 Groin Left (Active)       Vascular: DP and PT pulse faintly palpable.  Improvements to pitting edema noted to right lower extremity.  Brisk refill noted to stump site with warmth to the entire right foot noted compared to previous visits.  Musculoskeletal: no acute deformities noted.  Muscle strength test deferred   Neurological: Gross sensation intact via light touch.  Protective sensation diminished via Ipswitch test.    Vital Signs  Vital Signs    01/29/24 1442   BP: 117/77   Pulse: 96   Resp: 16   Temp: 98.2 °F (36.8 °C)   PainSc: 0 - (None)         Allergies  Allergies   Allergen Reactions    Heparin ANAPHYLAXIS    Hydromorphone HALLUCINATION       Assessment    Encounter Diagnosis  1. Foot ulcer, right, with fat layer exposed ([facility])    2. PAD (peripheral artery disease) ([facility])    3. History of transmetatarsal amputation of foot ([facility])        Problem List  Patient Active Problem List   Diagnosis    Closed minimally displaced zone I fracture of sacrum ([facility])    At risk for falling    CAD (coronary artery disease)    Ischemic cardiomyopathy    Azotemia    Arrhythmia    Esophageal reflux    History of MI (myocardial infarction)    Mixed hyperlipidemia    Primary hypertension    Dizziness    Medication side effect    Closed left hip fracture ([facility])  Global 6/22/2016    Status post hip surgery    Left shoulder distal clavical resection and excision of ganglion cyst of soft tissue mass   Global  09/17/2020    Orthopedic aftercare for healing traumatic hip fracture, left, closed    Closed left hip fracture, with routine healing, subsequent encounter    Osteoarthrosis, localized, primary, knee, left    Osteoarthrosis, localized, primary, knee, right    Gangrene (HCC)    PAD (peripheral artery disease) ([facility])     Benign prostatic hyperplasia with incomplete bladder emptying    Gangrene of foot (HCC)    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) ([facility])    Leukocytosis    Atrial fibrillation with RVR ([facility])    Hypokalemia    Acute kidney injury (HCC)    Hyperglycemia    Community acquired pneumonia of right lung, unspecified part of lung    Acute respiratory failure with hypoxia (HCC)    Hypotension, unspecified hypotension type    Sepsis due to pneumonia (HCC)    Foot ulcer with fat layer exposed, right ([facility])    Syncope, unspecified syncope type    Sepsis, due to unspecified organism, unspecified whether acute organ dysfunction present ([facility])    Septic shock ([facility])    Acute hypoxic respiratory failure (HCC)    Pneumonia, bacterial       Plan  Orders:  No orders of the defined types were placed in this encounter.        -Patient examined, chart history reviewed.    -Wound inspected today--overall continued improvements noted to patient's wound today.  Medial foot wound remains epithelialized today.  There continues to be no tunneling.  There is fibrogranular wound bed to previous surgical site which does appear to be smaller than last visit.  Within surgical site, there is a new piece of the wound that does palpate to bone.  No current purulence, surrounding erythema, or other signs of infection.       -Excisional debridement performed to the previous surgical site utilizing dermal curette and tissue nippers down to and including exposed bone.  A more granular, bleeding wound bed was present upon debridement today.  No tunneling noted.      -Wound site painted with Betadine and new area of the wound was covered with Josi and a dry dressing.  Will have patient change dressings throughout the week.  Patient to leave Josi intact for 2-3 days at a time, but new Betadine application should be done daily.     -Explained the importance of continuing to minimize weightbearing to his right lower extremity and to  stay in a surgical shoe to prevent any worsening of the wound.    -Educated on signs of infection and encouraged patient to seek immediate medical attention if noticing any of these signs.    Follow-Up  2 weeks    Pavan Poon DPM    1/29/2024    Dragon speech recognition software was used to prepare this note.  Errors in word recognition may occur.  Please contact me with any questions/concerns with this note.

## 2024-01-30 PROCEDURE — 93272 ECG/REVIEW INTERPRET ONLY: CPT | Performed by: INTERNAL MEDICINE

## 2024-01-31 ENCOUNTER — PATIENT MESSAGE (OUTPATIENT)
Dept: PODIATRY CLINIC | Facility: CLINIC | Age: 89
End: 2024-01-31

## 2024-02-01 ENCOUNTER — TELEPHONE (OUTPATIENT)
Dept: CARDIOLOGY | Age: 89
End: 2024-02-01

## 2024-02-01 DIAGNOSIS — I25.10 CORONARY ARTERY DISEASE INVOLVING NATIVE CORONARY ARTERY OF NATIVE HEART WITHOUT ANGINA PECTORIS: ICD-10-CM

## 2024-02-01 DIAGNOSIS — I65.23 ASYMPTOMATIC CAROTID ARTERY STENOSIS, BILATERAL: ICD-10-CM

## 2024-02-01 DIAGNOSIS — E78.00 PURE HYPERCHOLESTEROLEMIA: ICD-10-CM

## 2024-02-01 RX ORDER — METOPROLOL SUCCINATE 50 MG/1
75 TABLET, EXTENDED RELEASE ORAL DAILY
Qty: 180 TABLET | Refills: 3 | Status: SHIPPED | OUTPATIENT
Start: 2024-02-01

## 2024-02-04 ENCOUNTER — PATIENT MESSAGE (OUTPATIENT)
Dept: PODIATRY CLINIC | Facility: CLINIC | Age: 89
End: 2024-02-04

## 2024-02-05 NOTE — TELEPHONE ENCOUNTER
Please review.   Subjective:       Patient ID: Sukhjinder Presley is a 53 y.o. male.    Chief Complaint: Follow-up; Results; and Rectal Cancer    HPI 53-year-old male history of locally advanced rectal carcinoma receiving neoadjuvant chemotherapy.  Patient presents for review patient was seen yesterday in preparation for cycle of therapy with rectal pain patient chemo held CT chest abdomen pelvis and returns today for review    Past Medical History:   Diagnosis Date    Anemia     Cancer      Family History   Problem Relation Age of Onset    Intestinal malrotation Mother     Leukemia Father     No Known Problems Sister     Coronary artery disease Brother     Coronary artery disease Maternal Grandmother     Stomach cancer Maternal Grandfather      Social History     Socioeconomic History    Marital status: Significant Other     Spouse name: Not on file    Number of children: Not on file    Years of education: Not on file    Highest education level: Not on file   Occupational History    Not on file   Social Needs    Financial resource strain: Somewhat hard    Food insecurity:     Worry: Sometimes true     Inability: Sometimes true    Transportation needs:     Medical: No     Non-medical: No   Tobacco Use    Smoking status: Current Every Day Smoker     Packs/day: 1.00     Years: 35.00     Pack years: 35.00     Types: Cigarettes     Start date: 1/2/1984    Smokeless tobacco: Current User     Types: Chew   Substance and Sexual Activity    Alcohol use: Yes     Alcohol/week: 46.0 standard drinks     Types: 42 Cans of beer, 4 Shots of liquor per week     Frequency: 4 or more times a week     Drinks per session: 5 or 6     Binge frequency: Daily or almost daily     Comment: nancie 7, beer daily,  None 72 hours prior to surgery    Drug use: Never    Sexual activity: Yes     Partners: Female     Birth control/protection: None   Lifestyle    Physical activity:     Days per week: 1 day     Minutes per session: 60 min    Stress:  Not on file   Relationships    Social connections:     Talks on phone: More than three times a week     Gets together: More than three times a week     Attends Yazidi service: Not on file     Active member of club or organization: No     Attends meetings of clubs or organizations: Never     Relationship status: Living with partner   Other Topics Concern    Not on file   Social History Narrative    Not on file     Past Surgical History:   Procedure Laterality Date    COLONOSCOPY N/A 6/6/2019    Procedure: COLONOSCOPY;  Surgeon: Anjelica Saavedra MD;  Location: [facility] ENDO;  Service: Endoscopy;  Laterality: N/A;    ESOPHAGOGASTRODUODENOSCOPY N/A 6/6/2019    Procedure: EGD (ESOPHAGOGASTRODUODENOSCOPY);  Surgeon: Anjelica Saavedra MD;  Location: [facility];  Service: Endoscopy;  Laterality: N/A;    HERNIA REPAIR  1995    INSERTION OF TUNNELED CENTRAL VENOUS CATHETER (CVC) WITH SUBCUTANEOUS PORT N/A 10/8/2019    Procedure: HZTCLSDPS-ECSB-I-CATH;  Surgeon: Jan New MD;  Location: [facility];  Service: General;  Laterality: N/A;    TONSILLECTOMY         Labs:  Lab Results   Component Value Date    WBC 8.02 02/17/2020    HGB 11.5 (L) 02/17/2020    HCT 34.7 (L) 02/17/2020    MCV 89 02/17/2020     02/17/2020     BMP  Lab Results   Component Value Date     02/17/2020    K 4.1 02/17/2020     02/17/2020    CO2 27 02/17/2020    BUN 12 02/17/2020    CREATININE 0.8 02/17/2020    CALCIUM 9.4 02/17/2020    ANIONGAP 9 02/17/2020    ESTGFRAFRICA >60 02/17/2020    EGFRNONAA >60 02/17/2020     Lab Results   Component Value Date    ALT 8 (L) 02/17/2020    AST 12 02/17/2020    ALKPHOS 81 02/17/2020    BILITOT 0.3 02/17/2020       Lab Results   Component Value Date    IRON 59 02/03/2020    TIBC 250 02/03/2020    FERRITIN 433 (H) 02/03/2020     Lab Results   Component Value Date    UTPDLXYY24 497 09/03/2019     Lab Results   Component Value Date    FOLATE 8.3 09/03/2019     Lab Results   Component Value Date     TSH 1.607 01/15/2019         Review of Systems   Constitutional: Negative for activity change, appetite change, chills, diaphoresis, fatigue, fever and unexpected weight change.   HENT: Negative for congestion, dental problem, drooling, ear discharge, ear pain, facial swelling, hearing loss, mouth sores, nosebleeds, postnasal drip, rhinorrhea, sinus pressure, sneezing, sore throat, tinnitus, trouble swallowing and voice change.    Eyes: Negative for photophobia, pain, discharge, redness, itching and visual disturbance.   Respiratory: Negative for apnea, cough, choking, chest tightness, shortness of breath, wheezing and stridor.    Cardiovascular: Negative for chest pain, palpitations and leg swelling.   Gastrointestinal: Positive for rectal pain. Negative for abdominal distention, abdominal pain, anal bleeding, blood in stool, constipation, diarrhea, nausea and vomiting.   Endocrine: Negative for cold intolerance, heat intolerance, polydipsia, polyphagia and polyuria.   Genitourinary: Negative for decreased urine volume, difficulty urinating, discharge, dysuria, enuresis, flank pain, frequency, genital sores, hematuria, penile pain, penile swelling, scrotal swelling, testicular pain and urgency.   Musculoskeletal: Negative for arthralgias, back pain, gait problem, joint swelling, myalgias, neck pain and neck stiffness.   Skin: Negative for color change, pallor, rash and wound.   Allergic/Immunologic: Negative for environmental allergies, food allergies and immunocompromised state.   Neurological: Positive for weakness. Negative for dizziness, tremors, seizures, syncope, facial asymmetry, speech difficulty, light-headedness, numbness and headaches.   Hematological: Negative for adenopathy. Does not bruise/bleed easily.   Psychiatric/Behavioral: Positive for dysphoric mood. Negative for agitation, behavioral problems, confusion, decreased concentration, hallucinations, self-injury, sleep disturbance and suicidal  ideas. The patient is nervous/anxious. The patient is not hyperactive.        Objective:      Physical Exam   Constitutional: He is oriented to person, place, and time. He appears cachectic. He has a sickly appearance. He appears ill. He appears distressed.   HENT:   Head: Normocephalic.   Right Ear: External ear normal.   Left Ear: External ear normal.   Nose: Nose normal. Right sinus exhibits no maxillary sinus tenderness and no frontal sinus tenderness. Left sinus exhibits no maxillary sinus tenderness and no frontal sinus tenderness.   Mouth/Throat: Oropharynx is clear and moist. No oropharyngeal exudate.   Eyes: Pupils are equal, round, and reactive to light. EOM and lids are normal. Right eye exhibits no discharge. Left eye exhibits no discharge. Right conjunctiva is not injected. Right conjunctiva has no hemorrhage. Left conjunctiva is not injected. Left conjunctiva has no hemorrhage. No scleral icterus. Right eye exhibits normal extraocular motion. Left eye exhibits normal extraocular motion.   Neck: Normal range of motion. Neck supple. No JVD present. No tracheal deviation present. No thyromegaly present.   Cardiovascular: Normal rate and regular rhythm.   Pulmonary/Chest: Effort normal. No stridor. No respiratory distress.   Abdominal: Soft. He exhibits no mass. There is no hepatosplenomegaly, splenomegaly or hepatomegaly. There is no tenderness.   Musculoskeletal: Normal range of motion. He exhibits no edema or tenderness.   Lymphadenopathy:        Head (right side): No posterior auricular and no occipital adenopathy present.        Head (left side): No posterior auricular and no occipital adenopathy present.     He has no cervical adenopathy.        Right cervical: No superficial cervical, no deep cervical and no posterior cervical adenopathy present.       Left cervical: No superficial cervical, no deep cervical and no posterior cervical adenopathy present.     He has no axillary adenopathy.         Right: No supraclavicular adenopathy present.        Left: No supraclavicular adenopathy present.   Neurological: He is alert and oriented to person, place, and time. He has normal strength. No cranial nerve deficit. Coordination normal.   Skin: Skin is dry. No rash noted. He is not diaphoretic. No cyanosis or erythema. Nails show no clubbing.   Psychiatric: His behavior is normal. Judgment and thought content normal. His mood appears anxious. Cognition and memory are normal. He exhibits a depressed mood.   Vitals reviewed.          Assessment:      1. Rectal cancer    2. Chemotherapy management, encounter for    3. Cachexia    4. Smoker           Plan:     Extensive conversation with patient reviewed imaging from December and 1 from this morning demonstrated possible RECTAL RUPTURE her.  I have reviewed images with him and taking photographs demonstrating the findings.  At this particular point high also spoken to colorectal surgery will be reviewing images this morning to make determination as to what to do.  Obviously no further systemic chemotherapy at the present time.  Told patient to try to remain NPO until we can speak to colorectal surgery see whether not surgical intervention needs to be done today.  Discussed implications with him and BRAF occasions.  40 min face-to-face time coordination care greater 50% time face-to-face with patient .  1016 spoke with colorectal surgery Dr New recommend that patient be sent to the emergency room [facility] about [place] for consideration of diverting colostomy told patient's remain NPO.  Discussed implications with him DAILY joy.        Ken Cosby Jr, MD FACP

## 2024-02-06 ENCOUNTER — PATIENT MESSAGE (OUTPATIENT)
Dept: PODIATRY CLINIC | Facility: CLINIC | Age: 89
End: 2024-02-06

## 2024-02-07 NOTE — TELEPHONE ENCOUNTER
From: Alejandro Guardado  To: Brian Moreno  Sent: 2/6/2024 6:03 PM CST  Subject: Photos    Attached are images from Tuesday, 2/6. Thank you.     1 of 2

## 2024-02-07 NOTE — TELEPHONE ENCOUNTER
From: Alejandro Guardado  To: Brian Moreno  Sent: 2/6/2024 6:04 PM CST  Subject: Photos    Attached are images from Tuesday, 2/6. Thank you.     2 of 2

## 2024-02-08 ENCOUNTER — PATIENT MESSAGE (OUTPATIENT)
Dept: PODIATRY CLINIC | Facility: CLINIC | Age: 89
End: 2024-02-08

## 2024-02-10 ENCOUNTER — PATIENT MESSAGE (OUTPATIENT)
Dept: PODIATRY CLINIC | Facility: CLINIC | Age: 89
End: 2024-02-10

## 2024-02-12 ENCOUNTER — APPOINTMENT (OUTPATIENT)
Dept: GENERAL RADIOLOGY | Facility: HOSPITAL | Age: 89
End: 2024-02-12
Attending: EMERGENCY MEDICINE
Payer: MEDICARE

## 2024-02-12 ENCOUNTER — APPOINTMENT (OUTPATIENT)
Dept: WOUND CARE | Facility: HOSPITAL | Age: 89
End: 2024-02-12
Attending: STUDENT IN AN ORGANIZED HEALTH CARE EDUCATION/TRAINING PROGRAM
Payer: MEDICARE

## 2024-02-12 ENCOUNTER — HOSPITAL ENCOUNTER (INPATIENT)
Facility: HOSPITAL | Age: 89
LOS: 3 days | Discharge: HOME | End: 2024-02-15
Attending: EMERGENCY MEDICINE | Admitting: INTERNAL MEDICINE
Payer: MEDICARE

## 2024-02-12 ENCOUNTER — APPOINTMENT (OUTPATIENT)
Dept: GENERAL RADIOLOGY | Facility: HOSPITAL | Age: 89
End: 2024-02-12
Attending: INTERNAL MEDICINE
Payer: MEDICARE

## 2024-02-12 DIAGNOSIS — J18.9 COMMUNITY ACQUIRED PNEUMONIA, UNSPECIFIED LATERALITY: Primary | ICD-10-CM

## 2024-02-12 DIAGNOSIS — I50.9 ACUTE ON CHRONIC CONGESTIVE HEART FAILURE, UNSPECIFIED HEART FAILURE TYPE (HCC): ICD-10-CM

## 2024-02-12 DIAGNOSIS — A41.9 SEPSIS DUE TO UNDETERMINED ORGANISM (HCC): ICD-10-CM

## 2024-02-12 PROBLEM — E87.1 HYPONATREMIA: Status: ACTIVE | Noted: 2024-02-12

## 2024-02-12 PROBLEM — E87.3 METABOLIC ALKALOSIS: Status: ACTIVE | Noted: 2024-02-12

## 2024-02-12 LAB
ALBUMIN SERPL-MCNC: 3.1 G/DL (ref 3.4–5)
ALBUMIN/GLOB SERPL: 0.8 {RATIO} (ref 1–2)
ALP LIVER SERPL-CCNC: 68 U/L
ALT SERPL-CCNC: 27 U/L
ANION GAP SERPL CALC-SCNC: 7 MMOL/L (ref 0–18)
ANION GAP SERPL CALC-SCNC: 9 MMOL/L (ref 0–18)
APTT PPP: 26.4 SECONDS (ref 23.3–35.6)
ARTERIAL PATENCY WRIST A: POSITIVE
AST SERPL-CCNC: 18 U/L (ref 15–37)
BASE EXCESS BLDA CALC-SCNC: -0.8 MMOL/L (ref ?–2)
BASOPHILS # BLD AUTO: 0.06 X10(3) UL (ref 0–0.2)
BASOPHILS # BLD: 0 X10(3) UL (ref 0–0.2)
BASOPHILS NFR BLD AUTO: 0.4 %
BASOPHILS NFR BLD: 0 %
BILIRUB SERPL-MCNC: 0.8 MG/DL (ref 0.1–2)
BILIRUB UR QL STRIP.AUTO: NEGATIVE
BODY TEMPERATURE: 98.6 F
BUN BLD-MCNC: 38 MG/DL (ref 9–23)
BUN BLD-MCNC: 40 MG/DL (ref 9–23)
CA-I BLD-SCNC: 1.17 MMOL/L (ref 0.95–1.32)
CALCIUM BLD-MCNC: 8.4 MG/DL (ref 8.5–10.1)
CALCIUM BLD-MCNC: 8.6 MG/DL (ref 8.5–10.1)
CHLORIDE SERPL-SCNC: 102 MMOL/L (ref 98–112)
CHLORIDE SERPL-SCNC: 103 MMOL/L (ref 98–112)
CLARITY UR REFRACT.AUTO: CLEAR
CO2 SERPL-SCNC: 25 MMOL/L (ref 21–32)
CO2 SERPL-SCNC: 25 MMOL/L (ref 21–32)
COHGB MFR BLD: 1.1 % SAT (ref 0–3)
CREAT BLD-MCNC: 1.62 MG/DL
CREAT BLD-MCNC: 1.64 MG/DL
EGFRCR SERPLBLD CKD-EPI 2021: 40 ML/MIN/1.73M2 (ref 60–?)
EGFRCR SERPLBLD CKD-EPI 2021: 40 ML/MIN/1.73M2 (ref 60–?)
EOSINOPHIL # BLD AUTO: 0.06 X10(3) UL (ref 0–0.7)
EOSINOPHIL # BLD: 0 X10(3) UL (ref 0–0.7)
EOSINOPHIL NFR BLD AUTO: 0.4 %
EOSINOPHIL NFR BLD: 0 %
ERYTHROCYTE [DISTWIDTH] IN BLOOD BY AUTOMATED COUNT: 16.4 %
ERYTHROCYTE [DISTWIDTH] IN BLOOD BY AUTOMATED COUNT: 16.5 %
EXPIRATORY PRESSURE: 7 CM H2O
FIO2: 100 %
FLUAV + FLUBV RNA SPEC NAA+PROBE: NEGATIVE
FLUAV + FLUBV RNA SPEC NAA+PROBE: NEGATIVE
GLOBULIN PLAS-MCNC: 3.7 G/DL (ref 2.8–4.4)
GLUCOSE BLD-MCNC: 193 MG/DL (ref 70–99)
GLUCOSE BLD-MCNC: 84 MG/DL (ref 70–99)
GLUCOSE UR STRIP.AUTO-MCNC: NORMAL MG/DL
HCO3 BLDA-SCNC: 24.3 MEQ/L (ref 21–27)
HCT VFR BLD AUTO: 38.9 %
HCT VFR BLD AUTO: 42.9 %
HGB BLD-MCNC: 12 G/DL
HGB BLD-MCNC: 12.5 G/DL
HGB BLD-MCNC: 13.7 G/DL
HYALINE CASTS #/AREA URNS AUTO: PRESENT /LPF
IMM GRANULOCYTES # BLD AUTO: 0.1 X10(3) UL (ref 0–1)
IMM GRANULOCYTES NFR BLD: 0.7 %
INR BLD: 1.35 (ref 0.8–1.2)
INSP PRESSURE: 14 CM H2O
KETONES UR STRIP.AUTO-MCNC: NEGATIVE MG/DL
LACTATE BLD-SCNC: 1.6 MMOL/L (ref 0.5–2)
LACTATE SERPL-SCNC: 2.3 MMOL/L (ref 0.4–2)
LACTATE SERPL-SCNC: 2.5 MMOL/L (ref 0.4–2)
LACTATE SERPL-SCNC: 2.9 MMOL/L (ref 0.4–2)
LEUKOCYTE ESTERASE UR QL STRIP.AUTO: 75
LYMPHOCYTES # BLD AUTO: 1.29 X10(3) UL (ref 1–4)
LYMPHOCYTES NFR BLD AUTO: 8.5 %
LYMPHOCYTES NFR BLD: 0.53 X10(3) UL (ref 1–4)
LYMPHOCYTES NFR BLD: 2 %
MAGNESIUM SERPL-MCNC: 1.8 MG/DL (ref 1.6–2.6)
MCH RBC QN AUTO: 25.9 PG (ref 26–34)
MCH RBC QN AUTO: 25.9 PG (ref 26–34)
MCHC RBC AUTO-ENTMCNC: 31.9 G/DL (ref 31–37)
MCHC RBC AUTO-ENTMCNC: 32.1 G/DL (ref 31–37)
MCV RBC AUTO: 80.5 FL
MCV RBC AUTO: 81.1 FL
METAMYELOCYTES # BLD: 0.27 X10(3) UL
METAMYELOCYTES NFR BLD: 1 %
METHGB MFR BLD: 0 % SAT (ref 0.4–1.5)
MONOCYTES # BLD AUTO: 1.26 X10(3) UL (ref 0.1–1)
MONOCYTES # BLD: 2.14 X10(3) UL (ref 0.1–1)
MONOCYTES NFR BLD AUTO: 8.3 %
MONOCYTES NFR BLD: 8 %
MRSA DNA SPEC QL NAA+PROBE: NEGATIVE
NEUTROPHILS # BLD AUTO: 12.33 X10 (3) UL (ref 1.5–7.7)
NEUTROPHILS # BLD AUTO: 12.33 X10(3) UL (ref 1.5–7.7)
NEUTROPHILS # BLD AUTO: 22.9 X10 (3) UL (ref 1.5–7.7)
NEUTROPHILS NFR BLD AUTO: 81.7 %
NEUTROPHILS NFR BLD: 69 %
NEUTS BAND NFR BLD: 20 %
NEUTS HYPERSEG # BLD: 23.76 X10(3) UL (ref 1.5–7.7)
NITRITE UR QL STRIP.AUTO: NEGATIVE
NT-PROBNP SERPL-MCNC: 4511 PG/ML (ref ?–450)
OSMOLALITY SERPL CALC.SUM OF ELEC: 292 MOSM/KG (ref 275–295)
OSMOLALITY SERPL CALC.SUM OF ELEC: 293 MOSM/KG (ref 275–295)
OXYHGB MFR BLDA: 98.3 % (ref 92–100)
PCO2 BLDA: 36 MM HG (ref 35–45)
PH BLDA: 7.42 [PH] (ref 7.35–7.45)
PH UR STRIP.AUTO: 5 [PH] (ref 5–8)
PLATELET # BLD AUTO: 285 10(3)UL (ref 150–450)
PLATELET # BLD AUTO: 342 10(3)UL (ref 150–450)
PLATELET MORPHOLOGY: NORMAL
PO2 BLDA: 184 MM HG (ref 80–100)
POTASSIUM BLD-SCNC: 3.5 MMOL/L (ref 3.6–5.1)
POTASSIUM SERPL-SCNC: 3.6 MMOL/L (ref 3.5–5.1)
POTASSIUM SERPL-SCNC: 3.9 MMOL/L (ref 3.5–5.1)
PROCALCITONIN SERPL-MCNC: 7.74 NG/ML (ref ?–0.16)
PROT SERPL-MCNC: 6.8 G/DL (ref 6.4–8.2)
PROT UR STRIP.AUTO-MCNC: NEGATIVE MG/DL
PROTHROMBIN TIME: 16.7 SECONDS (ref 11.6–14.8)
Q-T INTERVAL: 330 MS
QRS DURATION: 98 MS
QTC CALCULATION (BEZET): 452 MS
R AXIS: -1 DEGREES
RBC # BLD AUTO: 4.83 X10(6)UL
RBC # BLD AUTO: 5.29 X10(6)UL
RBC UR QL AUTO: NEGATIVE
RSV RNA SPEC NAA+PROBE: NEGATIVE
SARS-COV-2 RNA RESP QL NAA+PROBE: NOT DETECTED
SODIUM BLD-SCNC: 133 MMOL/L (ref 135–145)
SODIUM SERPL-SCNC: 134 MMOL/L (ref 136–145)
SODIUM SERPL-SCNC: 137 MMOL/L (ref 136–145)
SP GR UR STRIP.AUTO: 1.02 (ref 1–1.03)
T AXIS: 262 DEGREES
TOTAL CELLS COUNTED BLD: 100
TROPONIN I SERPL HS-MCNC: 26 NG/L
UROBILINOGEN UR STRIP.AUTO-MCNC: NORMAL MG/DL
VENTRICULAR RATE: 113 BPM
WBC # BLD AUTO: 15.1 X10(3) UL (ref 4–11)
WBC # BLD AUTO: 26.7 X10(3) UL (ref 4–11)

## 2024-02-12 PROCEDURE — 02HV33Z INSERTION OF INFUSION DEVICE INTO SUPERIOR VENA CAVA, PERCUTANEOUS APPROACH: ICD-10-PCS | Performed by: INTERNAL MEDICINE

## 2024-02-12 PROCEDURE — 71045 X-RAY EXAM CHEST 1 VIEW: CPT | Performed by: EMERGENCY MEDICINE

## 2024-02-12 PROCEDURE — 99291 CRITICAL CARE FIRST HOUR: CPT | Performed by: INTERNAL MEDICINE

## 2024-02-12 PROCEDURE — B548ZZA ULTRASONOGRAPHY OF SUPERIOR VENA CAVA, GUIDANCE: ICD-10-PCS | Performed by: INTERNAL MEDICINE

## 2024-02-12 PROCEDURE — 99223 1ST HOSP IP/OBS HIGH 75: CPT | Performed by: INTERNAL MEDICINE

## 2024-02-12 PROCEDURE — 71045 X-RAY EXAM CHEST 1 VIEW: CPT | Performed by: INTERNAL MEDICINE

## 2024-02-12 RX ORDER — ONDANSETRON 2 MG/ML
4 INJECTION INTRAMUSCULAR; INTRAVENOUS EVERY 4 HOURS PRN
Status: DISCONTINUED | OUTPATIENT
Start: 2024-02-12 | End: 2024-02-12

## 2024-02-12 RX ORDER — ONDANSETRON 2 MG/ML
4 INJECTION INTRAMUSCULAR; INTRAVENOUS EVERY 6 HOURS PRN
Status: DISCONTINUED | OUTPATIENT
Start: 2024-02-12 | End: 2024-02-15

## 2024-02-12 RX ORDER — ACETAMINOPHEN 160 MG/5ML
650 SOLUTION ORAL EVERY 4 HOURS PRN
Status: DISCONTINUED | OUTPATIENT
Start: 2024-02-12 | End: 2024-02-15

## 2024-02-12 RX ORDER — ECHINACEA PURPUREA EXTRACT 125 MG
1 TABLET ORAL
Status: DISCONTINUED | OUTPATIENT
Start: 2024-02-12 | End: 2024-02-15

## 2024-02-12 RX ORDER — SENNOSIDES 8.6 MG
17.2 TABLET ORAL NIGHTLY PRN
Status: DISCONTINUED | OUTPATIENT
Start: 2024-02-12 | End: 2024-02-15

## 2024-02-12 RX ORDER — ACETAMINOPHEN 10 MG/ML
1000 INJECTION, SOLUTION INTRAVENOUS ONCE
Status: COMPLETED | OUTPATIENT
Start: 2024-02-12 | End: 2024-02-12

## 2024-02-12 RX ORDER — ATORVASTATIN CALCIUM 40 MG/1
40 TABLET, FILM COATED ORAL NIGHTLY
Status: DISCONTINUED | OUTPATIENT
Start: 2024-02-12 | End: 2024-02-15

## 2024-02-12 RX ORDER — GABAPENTIN 300 MG/1
300 CAPSULE ORAL 3 TIMES DAILY
Status: DISCONTINUED | OUTPATIENT
Start: 2024-02-12 | End: 2024-02-15

## 2024-02-12 RX ORDER — ACETAMINOPHEN 325 MG/1
650 TABLET ORAL EVERY 4 HOURS PRN
Status: DISCONTINUED | OUTPATIENT
Start: 2024-02-12 | End: 2024-02-15

## 2024-02-12 RX ORDER — FOLIC ACID 1 MG/1
1 TABLET ORAL DAILY
Status: DISCONTINUED | OUTPATIENT
Start: 2024-02-13 | End: 2024-02-15

## 2024-02-12 RX ORDER — PANTOPRAZOLE SODIUM 40 MG/1
40 TABLET, DELAYED RELEASE ORAL
Status: DISCONTINUED | OUTPATIENT
Start: 2024-02-13 | End: 2024-02-15

## 2024-02-12 RX ORDER — BISACODYL 10 MG
10 SUPPOSITORY, RECTAL RECTAL
Status: DISCONTINUED | OUTPATIENT
Start: 2024-02-12 | End: 2024-02-15

## 2024-02-12 RX ORDER — FINASTERIDE 5 MG/1
5 TABLET, FILM COATED ORAL DAILY
Status: DISCONTINUED | OUTPATIENT
Start: 2024-02-13 | End: 2024-02-15

## 2024-02-12 RX ORDER — ACETAMINOPHEN 650 MG/1
650 SUPPOSITORY RECTAL EVERY 4 HOURS PRN
Status: DISCONTINUED | OUTPATIENT
Start: 2024-02-12 | End: 2024-02-15

## 2024-02-12 RX ORDER — CLOPIDOGREL BISULFATE 75 MG/1
75 TABLET ORAL DAILY
Status: DISCONTINUED | OUTPATIENT
Start: 2024-02-13 | End: 2024-02-15

## 2024-02-12 RX ORDER — TAMSULOSIN HYDROCHLORIDE 0.4 MG/1
0.4 CAPSULE ORAL
Status: DISCONTINUED | OUTPATIENT
Start: 2024-02-13 | End: 2024-02-15

## 2024-02-12 RX ORDER — VANCOMYCIN HYDROCHLORIDE
15
Status: DISCONTINUED | OUTPATIENT
Start: 2024-02-14 | End: 2024-02-13

## 2024-02-12 RX ORDER — POLYETHYLENE GLYCOL 3350 17 G/17G
17 POWDER, FOR SOLUTION ORAL DAILY PRN
Status: DISCONTINUED | OUTPATIENT
Start: 2024-02-12 | End: 2024-02-15

## 2024-02-12 RX ORDER — MELATONIN
3 NIGHTLY PRN
Status: DISCONTINUED | OUTPATIENT
Start: 2024-02-12 | End: 2024-02-15

## 2024-02-12 RX ORDER — MAGNESIUM SULFATE HEPTAHYDRATE 40 MG/ML
2 INJECTION, SOLUTION INTRAVENOUS ONCE
Status: COMPLETED | OUTPATIENT
Start: 2024-02-12 | End: 2024-02-12

## 2024-02-12 RX ORDER — BENZONATATE 200 MG/1
200 CAPSULE ORAL 3 TIMES DAILY PRN
Status: DISCONTINUED | OUTPATIENT
Start: 2024-02-12 | End: 2024-02-15

## 2024-02-12 RX ORDER — METOCLOPRAMIDE HYDROCHLORIDE 5 MG/ML
5 INJECTION INTRAMUSCULAR; INTRAVENOUS EVERY 8 HOURS PRN
Status: DISCONTINUED | OUTPATIENT
Start: 2024-02-12 | End: 2024-02-15

## 2024-02-12 NOTE — ED PROVIDER NOTES
1  Patient Seen in: [facility] Emergency Department      History     Chief Complaint   Patient presents with    Hypotension    Difficulty Breathing     Stated Complaint: hypotension, ramu    Subjective:   HPI    Patient is a 89-year-old male who history of A-fib.  Patient has low ejection fraction.  Patient was with family yesterday seem to be doing okay.  Patient called this morning asking for diet Dr. Pepper and cough medicine from family.  They found him feeling very weak seemingly short of breath and called 911.  Patient denies any pain.  No headache, patient has had a cough which she states is nonproductive.  Patient denies chest pain, no abdominal pain, no fevers or chills.  Patient was found to be hypotensive and hypoxic.  Patient brought to the emerged part for further evaluation.  Remainder of review of systems negative.  Patient is DNR.    Objective:   Past Medical History:   Diagnosis Date    Arrhythmia     CAD (coronary artery disease) 09/28/2015    Cardiomyopathy (HCC)     Congestive heart disease (HCC) 2022    Esophageal reflux     Hearing impairment     Heart attack (HCC)     High blood pressure     High cholesterol     History of MI (myocardial infarction)     Other and unspecified hyperlipidemia     PAD (peripheral artery disease) (HCC) 04/26/2023    Pneumonia due to organism 12/2023    Pneumonia, bacterial 12/28/2023    Unspecified essential hypertension     Visual impairment               Past Surgical History:   Procedure Laterality Date    ANGIOGRAM      ANGIOPLASTY (CORONARY)      CABG      CATARACT  04/2023    FRACTURE SURGERY Left     left hip pinning    OTHER SURGICAL HISTORY Left 01/01/1977    knee surgery    OTHER SURGICAL HISTORY  03/25/2016    Left hip ORIF pinning    TONSILLECTOMY                  Social History     Socioeconomic History    Marital status:    Tobacco Use    Smoking status: Never    Smokeless tobacco: Never   Vaping Use    Vaping Use: Never used   Substance  and Sexual Activity    Alcohol use: Never    Drug use: Never     Social Determinants of Health     Food Insecurity: No Food Insecurity (2/12/2024)    Food Insecurity     Food Insecurity: Never true   Transportation Needs: No Transportation Needs (2/12/2024)    Transportation Needs     Lack of Transportation: No   Housing Stability: Low Risk  (2/12/2024)    Housing Stability     Housing Instability: No              Review of Systems    Positive for stated complaint: hypotension, ramu  Other systems are as noted in HPI.  Constitutional and vital signs reviewed.      All other systems reviewed and negative except as noted above.    Physical Exam     ED Triage Vitals [02/12/24 1117]   BP 93/49   Pulse 109   Resp (!) 28   Temp 99.7 °F (37.6 °C)   Temp src Temporal   SpO2 93 %   O2 Device Non-rebreather mask       Current:BP 98/48   Pulse 72   Temp 99.7 °F (37.6 °C) (Temporal)   Resp 23   Wt 95.6 kg   SpO2 100%   BMI 33.01 kg/m²         Physical Exam  GENERAL: Patient resting on the cart in no acute distress.  HEENT: Extraocular muscles intact, pupils equal round reactive to light, neck supple, no meningismus.  LUNGS: Lungs crackles bilaterally.  CARDIOVASCULAR: + S1-S2, irregular, increased rate and rhythm, no murmurs.  BACK: No CVA tenderness, no midline bony tenderness.  ABDOMEN: + Bowel sounds, soft, nontender, nondistended.  No rebound, no guarding, no hepatosplenomegaly.  EXTREMITIES: Full range of motion, no tenderness, good capillary refill.  1-2+ pretibial edema bilaterally.  SKIN: No rash, good turgor.  NEURO: Patient answers questions appropriately.  No focal deficits appreciated.  Conversant.           ED Course     Labs Reviewed   COMP METABOLIC PANEL (14) - Abnormal; Notable for the following components:       Result Value    BUN 38 (*)     Creatinine 1.62 (*)     eGFR-Cr 40 (*)     Albumin 3.1 (*)     A/G Ratio 0.8 (*)     All other components within normal limits   PRO BETA NATRIURETIC PEPTIDE -  Abnormal; Notable for the following components:    Pro-Beta Natriuretic Peptide 4,511 (*)     All other components within normal limits   PROTHROMBIN TIME (PT) - Abnormal; Notable for the following components:    PT 16.7 (*)     INR 1.35 (*)     All other components within normal limits   LACTIC ACID, PLASMA - Abnormal; Notable for the following components:    Lactic Acid 2.9 (*)     All other components within normal limits   ABG PANEL W ELECT AND LACTATE - Abnormal; Notable for the following components:    ABG pO2 184 (*)     Total Hemoglobin 12.0 (*)     Methemoglobin 0.0 (*)     Potassium Blood Gas 3.5 (*)     Sodium Blood Gas 133 (*)     All other components within normal limits   PROCALCITONIN - Abnormal; Notable for the following components:    Procalcitonin 7.74 (*)     All other components within normal limits    Narrative:     Resulted by: batch: TNIH, K, PBNP, CO2, CL, NA, MG, ALB, TBIL, ALT, AST, ALP, CA, CREA, BUN, GLUC,    CBC W/ DIFFERENTIAL - Abnormal; Notable for the following components:    WBC 15.1 (*)     MCH 25.9 (*)     Neutrophil Absolute Prelim 12.33 (*)     Neutrophil Absolute 12.33 (*)     Monocyte Absolute 1.26 (*)     All other components within normal limits   TROPONIN I HIGH SENSITIVITY - Normal   PTT, ACTIVATED - Normal   MAGNESIUM - Normal   SARS-COV-2/FLU A AND B/RSV BY PCR ([employer]) - Normal    Narrative:     This test is intended for the qualitative detection and differentiation of SARS-CoV-2, influenza A, influenza B, and respiratory syncytial virus (RSV) viral RNA in nasopharyngeal or nares swabs from individuals suspected of respiratory viral infection consistent with COVID-19 by their healthcare provider. Signs and symptoms of respiratory viral infection due to SARS-CoV-2, influenza, and RSV can be similar.    Test performed using the Xpert Xpress SARS-CoV-2/FLU/RSV (real time RT-PCR)  assay on the [employer] instrument, [employer], [place] [address].   This test is being used  under the Food and Drug Administration's Emergency Use Authorization.    The authorized Fact Sheet for Healthcare Providers for this assay is available upon request from the laboratory.   CBC WITH DIFFERENTIAL WITH PLATELET    Narrative:     The following orders were created for panel order CBC With Differential With Platelet.  Procedure                               Abnormality         Status                     ---------                               -----------         ------                     CBC W/ DIFFERENTIAL[[id number]]          Abnormal            Final result                 Please view results for these tests on the individual orders.   URINALYSIS WITH CULTURE REFLEX   LACTIC ACID REFLEX POST POSTIVE   STREPTOCOCCUS PNEUMONIAE AG, URINE   LEGIONELLA URINE AG SEROGRP 1   BASIC METABOLIC PANEL (8)   CBC WITH DIFFERENTIAL WITH PLATELET    Narrative:     The following orders were created for panel order CBC With Differential With Platelet.  Procedure                               Abnormality         Status                     ---------                               -----------         ------                     CBC W/ DIFFERENTIAL[[id number]]                                                           Please view results for these tests on the individual orders.   RAINBOW DRAW LAVENDER   RAINBOW DRAW LIGHT GREEN   RAINBOW DRAW GOLD   RAINBOW DRAW BLUE   BLOOD CULTURE   BLOOD CULTURE   ED/MRSA SCREEN BY PCR-CC   SPUTUM CULTURE   CBC W/ DIFFERENTIAL     EKG    Rate, intervals and axes as noted on EKG Report.  Rate: 113  Rhythm: Atrial Fibrillation  Reading: Atrial fibrillation, nonspecific ST changes                 Chest x-ray Considerable worsening since the prior exam, now with extensive airspace disease throughout the upper and lower right lung especially right perihilar and right upper-mid lung, but also the right lower lung.  There may be minimal much more   subtle similar infiltrates in the left upper and  lower lung.  Concern for pneumonia, with asymmetric edema also possible.  Cardiac enlargement seen with vascular congestion present.  No sizable effusion or pneumothorax.   Independent reviewed by myself, no pneumothorax         MDM      Patient initially hypotensive and hypoxic.  Patient was on 15 L nonrebreather.  Patient converted to BiPAP.  Patient initially given small fluid bolus however was started on Levophed.  Patient is DNR.  Patient does not wish to be intubated or CPR.  Family confirmed this decision.  Patient was given Zosyn.  Patient x-ray concerning for pneumonia or CHF.  Patient had elevated BNP as well as elevated lactic acid.  Patient was given broad-spectrum antibiotics.  Patient was discussed with cardiology as well as intensivist and hospitalist.  Patient be admitted to the ICU for further evaluation.  I did consider CHF, pneumonia, sepsis.  Patient is on a blood thinner already.  Cardiology did find that the patient has been on steroids 15 mg a day.  On further questioning family does recall he takes prednisone for diffuse aches and pains and was taking 15 mg.  Patient was given 100 mg hydrocortisone for possible adrenal insufficiency.  Patient will be admitted to the ICU.    A total of 50 minutes of critical care time (exclusive of billable procedures) was administered to manage the patient's unstable vital signs due to his pneumonia, CHF, sepsis.  This involved direct patient intervention, complex decision making, and/or extensive discussions with the patient, family, and clinical staff.    Patient did not receive 30ml/kg of fluids despite having: elevated Lactate. The reason for doing so is: a concern for fluid overload. 500mL of fluids were given instead (750 is the amount of fluids given).              Admission disposition: 2/12/2024 12:33 PM                                        Medical Decision Making      Disposition and Plan     Clinical Impression:  1. Community acquired pneumonia,  unspecified laterality    2. Sepsis due to undetermined organism (HCC)    3. Acute on chronic congestive heart failure, unspecified heart failure type (HCC)         Disposition:  Admit  2/12/2024 12:33 pm    Follow-up:  No follow-up provider specified.        Medications Prescribed:  Current Discharge Medication List                            Hospital Problems       Present on Admission  Date Reviewed: 1/29/2024            ICD-10-CM Noted POA    * (Principal) Community acquired pneumonia, unspecified laterality J18.9 2/12/2024 Unknown    Acute on chronic congestive heart failure, unspecified heart failure type (HCC) I50.9 2/12/2024 Unknown    Hyponatremia E87.1 2/12/2024 Yes    Metabolic alkalosis E87.3 2/12/2024 Yes    Sepsis due to undetermined organism (HCC) A41.9 2/12/2024 Unknown

## 2024-02-12 NOTE — H&P
[facility]  [facility]     Alejandro Guardado Patient Status:  Inpatient    1935 MRN [id number]   Location [facility] 4SW-A Attending Julianna Barnett MD   Hosp Day # 0 PCP Stanislav Odonnell MD     Chief Complaint: SOB, hypotension    Subjective:    History of Present Illness:   Alejandro Guardado is a 89 year old male with PMHx CAD s/p CABG and PCI, HFrEF, ischemic cardiomyopathy with EF 25-30%, atrial fibrillation to eliquis, hypertension, hyperlipidemia, severe PAD, BPH, secondary adrenal insufficiency and GERD who presents to the hospital with cough and generalized weakness.  Patient was admitted twice in December, once for pneumonia and once for septic shock related to pneumonia.  He thrombectomy and revascularization/stent in [facility] in 2023.  He has declined cath in the past.  Son sees patient most days and states he was doing well yesterday.  However this morning patient called his son due to weakness, coughing and shortness of breath.  EMS called and they found him hypoxic and hypotensive.  In the ER chest x-ray showed right-sided pneumonia.  proBNP 4500, same as 2023.  EKG showed A-fib without acute ischemic changes.  Lactic acid was 2.9.  Started on Levophed, given Zosyn and transferred to the ICU.    History/Other:    Past Medical History:  Past Medical History:   Diagnosis Date    Arrhythmia     CAD (coronary artery disease) 2015    Cardiomyopathy (HCC)     Congestive heart disease (HCC)     Esophageal reflux     Hearing impairment     Heart attack (HCC)     High blood pressure     High cholesterol     History of MI (myocardial infarction)     Other and unspecified hyperlipidemia     PAD (peripheral artery disease) (HCC) 2023    Pneumonia due to organism 2023    Pneumonia, bacterial 2023    Unspecified essential hypertension     Visual impairment      Past Surgical History:   Past Surgical History:   Procedure Laterality Date    ANGIOGRAM       ANGIOPLASTY (CORONARY)      CABG      CATARACT  04/2023    FRACTURE SURGERY Left     left hip pinning    OTHER SURGICAL HISTORY Left 01/01/1977    knee surgery    OTHER SURGICAL HISTORY  03/25/2016    Left hip ORIF pinning    TONSILLECTOMY        Family History:   Family History   Problem Relation Age of Onset    Cancer Father      Social History:    reports that he has never smoked. He has never used smokeless tobacco. He reports that he does not drink alcohol and does not use drugs.     Allergies:   Allergies   Allergen Reactions    Heparin ANAPHYLAXIS    Hydromorphone HALLUCINATION     Medications:    Current Facility-Administered Medications on File Prior to Encounter   Medication Dose Route Frequency Provider Last Rate Last Admin    [COMPLETED] azithromycin (Zithromax) tab 500 mg  500 mg Oral Once Carley Sullivan, DO   500 mg at 12/27/23 1053    [COMPLETED] Perflutren Lipid Microsphere (DEFINITY) 6.52 MG/ML injection 1.5 mL  1.5 mL Intravenous ONCE PRN Carley Sullivan, DO   1.5 mL at 12/26/23 1122    [COMPLETED] sodium chloride 0.9 % IV bolus 2,967 mL  30 mL/kg Intravenous Once Cait Gaming  mL/hr at 12/25/23 1530 2,967 mL at 12/25/23 1530    [COMPLETED] piperacillin-tazobactam (Zosyn) 3.375 g in dextrose 5% 100 mL IVPB-ADDV  3.375 g Intravenous Once Jany Graham  mL/hr at 12/25/23 1211 3.375 g at 12/25/23 1211    [COMPLETED] potassium chloride 40 mEq in 250mL sodium chloride 0.9% IVPB premix  40 mEq Intravenous Once Carley Sullivan DO 62.5 mL/hr at 12/25/23 1607 40 mEq at 12/25/23 1607    Followed by    [COMPLETED] potassium chloride 20 mEq/100mL IVPB premix 20 mEq  20 mEq Intravenous Once Carley Sullivan DO 50 mL/hr at 12/25/23 2003 20 mEq at 12/25/23 2003    [COMPLETED] potassium chloride (K-Dur) tab 40 mEq  40 mEq Oral Q4H Taye Campos, DO   40 mEq at 12/06/23 1700    [COMPLETED] Perflutren Lipid Microsphere (DEFINITY) 6.52 MG/ML injection 1.5 mL  1.5 mL Intravenous ONCE PRN Reyes,  Brant   1.5 mL at 23 1012    [COMPLETED] lidocaine PF (Xylocaine-MPF) 1 % injection             [COMPLETED] heparin (Porcine) 5000 UNIT/ML injection             [COMPLETED] midazolam (Versed) 2 MG/2ML injection             [COMPLETED] fentaNYL (Sublimaze) 50 mcg/mL injection             [COMPLETED] iodixanol (VISIPAQUE) 320 MG/ML injection 100 mL  100 mL Injection ONCE PRN Tc Steward MD   110 mL at 23 1746    [COMPLETED] Bivalirudin (Angiomax) 250 MG injection             [COMPLETED] Bivalirudin (Angiomax) 250 MG injection             [COMPLETED] magnesium oxide (Mag-Ox) tab 400 mg  400 mg Oral Once Eileen Narayan APRN   400 mg at 23 0605    [COMPLETED] potassium chloride (Klor-Con) 20 MEQ oral powder 40 mEq  40 mEq Oral Q4H Eileen Narayan APREUGENIA   40 mEq at 23 0910    [COMPLETED] sodium chloride 0.9 % IV bolus 250 mL  250 mL Intravenous Once Enoch Rush  mL/hr at 23 0914 250 mL at 23 0914    [] dilTIAZem 10 mg BOLUS FROM BAG infusion  10 mg Intravenous Q1H PRN Warren Johnston MD   10 mg at 23 0828    [COMPLETED] azithromycin (Zithromax) 500 mg in sodium chloride 0.9% 250mL IVPB premix  500 mg Intravenous Once Warren Johnston MD   Stopped at 23 1102    [COMPLETED] vancomycin (Vancocin) 1.5 g in sodium chloride 0.9% 250mL IVPB premix  15 mg/kg Intravenous Once Warren Johnston .7 mL/hr at 23 1106 1,500 mg at 23 1106    [COMPLETED] piperacillin-tazobactam (Zosyn) 3.375 g in dextrose 5% 100 mL IVPB-ADDV  3.375 g Intravenous Once Warren Johnston MD   Stopped at 23 1000    [COMPLETED] digoxin (Lanoxin) 250 MCG/ML injection 250 mcg  250 mcg Intravenous Once Warren Johnston MD   250 mcg at 23 0955    [COMPLETED] potassium chloride (K-Dur) tab 40 mEq  40 mEq Oral Once Warren Johnston MD   40 mEq at 23 0923    [COMPLETED] potassium chloride 20 mEq/100mL IVPB premix 20 mEq  20 mEq Intravenous Once Warren Johnston MD    Stopped at 12/01/23 1134    [COMPLETED] enoxaparin (Lovenox) 100 MG/ML SUBQ injection 90 mg  1 mg/kg Subcutaneous Once Warren Johnston MD   90 mg at 12/01/23 1003    [COMPLETED] azithromycin (Zithromax) 500 mg in sodium chloride 0.9% 250mL IVPB premix  500 mg Intravenous Q24H Taye Campos  mL/hr at 12/03/23 1133 500 mg at 12/03/23 1133    [COMPLETED] sodium chloride 0.9 % IV bolus 1,000 mL  1,000 mL Intravenous Once Warren Johnston MD   Stopped at 12/01/23 1010    [COMPLETED] sodium chloride 0.9% infusion 1,000 mL  1,000 mL Intravenous Once Warren Johnston  mL/hr at 12/01/23 1115 1,000 mL at 12/01/23 1115    [COMPLETED] potassium phosphate dibasic 15 mmol in sodium chloride 0.9% 250 mL IVPB  15 mmol Intravenous Once Taye Campos DO 62.5 mL/hr at 12/01/23 1444 15 mmol at 12/01/23 1444    Followed by    [COMPLETED] potassium chloride 20 mEq/100mL IVPB premix 20 mEq  20 mEq Intravenous Once Taye Campos DO 50 mL/hr at 12/01/23 2007 20 mEq at 12/01/23 2007    [COMPLETED] magnesium oxide (Mag-Ox) tab 400 mg  400 mg Oral Once Taye Campos DO   400 mg at 12/01/23 1444     Current Outpatient Medications on File Prior to Encounter   Medication Sig Dispense Refill    tamsulosin 0.4 MG Oral Cap Take 1 capsule (0.4 mg total) by mouth daily.      apixaban 5 MG Oral Tab Take 1 tablet (5 mg total) by mouth 2 (two) times daily. 60 tablet 3    atorvastatin 40 MG Oral Tab Take 1 tablet (40 mg total) by mouth daily. 30 tablet 0    finasteride 5 MG Oral Tab Take 1 tablet (5 mg total) by mouth daily. 30 tablet 0    clopidogrel 75 MG Oral Tab Take 1 tablet (75 mg total) by mouth daily. 30 tablet 0    gabapentin 300 MG Oral Cap Take 1 capsule (300 mg total) by mouth 3 (three) times daily. 90 capsule 0    furosemide 40 MG Oral Tab Take 1.5 tablets (60 mg total) by mouth daily.      predniSONE 5 MG Oral Tab Take 3 tablets (15 mg total) by mouth daily.      folic acid 1 MG Oral Tab Take 1 tablet (1 mg total) by mouth daily.       acetaminophen 500 MG Oral Tab Take 2 tablets (1,000 mg total) by mouth every 8 (eight) hours. 21 tablet 0    metoprolol succinate ER 25 MG Oral Tablet 24 Hr Take 3 tablets (75 mg total) by mouth daily.      lisinopril 2.5 MG Oral Tab Take 1 tablet (2.5 mg total) by mouth daily.      Omeprazole 40 MG Oral Capsule Delayed Release Take 1 capsule (40 mg total) by mouth daily.      collagenase (SANTYL) 250 UNIT/GM External Ointment Apply 1 g topically daily. 30 g 2    collagenase (SANTYL) 250 UNIT/GM External Ointment Apply topically to ulcer site daily 30 g 0     Review of Systems:   A comprehensive review of systems was completed.    Pertinent positives and negatives noted in the HPI.    Objective:   Physical Exam:    BP 98/48   Pulse 72   Temp 99.7 °F (37.6 °C) (Temporal)   Resp 23   Wt 210 lb 12.2 oz (95.6 kg)   SpO2 100%   BMI 33.01 kg/m²   General: No acute distress, ill-appearing, on BiPAP  Respiratory: Rales right lobe  Cardiovascular: S1, S2. IRIR  Abdomen: Soft, Non-tender, non-distended, positive bowel sounds  Neuro: No new focal deficits  Extremities: No edema    Results:    Labs:      Labs Last 24 Hours:  Recent Labs   Lab 02/12/24  1126   RBC 5.29   HGB 13.7   HCT 42.9   MCV 81.1   MCH 25.9*   MCHC 31.9   RDW 16.4   NEPRELIM 12.33*   WBC 15.1*   .0     Recent Labs   Lab 02/12/24  1126   GLU 84   BUN 38*   CREATSERUM 1.62*   EGFRCR 40*   CA 8.6   ALB 3.1*      K 3.6      CO2 25.0   ALKPHO 68   AST 18   ALT 27   BILT 0.8   TP 6.8     Lab Results   Component Value Date    INR 1.35 (H) 02/12/2024    INR 1.48 (H) 12/25/2023    INR 1.39 (H) 12/01/2023     Recent Labs   Lab 02/12/24  1126   TROPHS 26     Recent Labs   Lab 02/12/24  1126   PBNP 4,511*     No results for input(s): \"PCT\" in the last 168 hours.    Imaging: Imaging data reviewed in Epic.    Assessment & Plan:      #Septic shock due to multifocal pneumonia  -Wean pressors as tolerated  -Given fluid bolus in ER  -Follow blood  cultures  -Procalcitonin over 7  -Sputum cultures and strep/legionella urine antigens ordered  -Zosyn and vancomycin, check MRSA nares     #Secondary Adrenal insufficiency  -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  -Stress dose with IV hydrocortisone  -Has never seen a Rheumatologist for formal diagnosis, needs to see as outpatient for consideration of steroid sparing agent      #Acute Kidney Injury due to septic shock  -Monitor with improved hemodynamics, check AM BMP  -diuresis as tolerated      #Acute hypoxemic respiratory failure 2/2 PNA  -ABG reviewed, currently on BiPAP  -Wean O2 as tolerated  -Troponin negative and initial troponin without acute ischemic changes  -IS    #Chronic HFrEF, ischemic cardiomyopathy with LVEF 25-30%  -Has declined invasive procedures in the past  -Hold home BB and lisinopril     #BPH  -Finasteride  -Flomax     #Afib with RVR  -Eliquis  -Hold metoprolol     #CAD s/p CABG   -Plavix/statin     #Hyperlipidemia  -Statin     #GERD  -PPI      #PAD with hx of non-healing R foot wound s/p recent thrombectomy and revascularization/stent 12/4 by Dr. Steward  -Plavix/Statin  -Wound care     Plan of care discussed with patient, RN and Dr. Mohr.     Andre Campos, DO    Supplementary Documentation:     The 21st Century Cures Act makes medical notes like these available to patients in the interest of transparency. Please be advised this is a medical document. Medical documents are intended to carry relevant information, facts as evident, and the clinical opinion of the practitioner. The medical note is intended as peer to peer communication and may appear blunt or direct. It is written in medical language and may contain abbreviations or verbiage that are unfamiliar.           **Certification    PHYSICIAN Certification of Need for Inpatient Hospitalization - Initial Certification    Patient will require inpatient services that will reasonably be  expected to span two midnight's based on the clinical documentation in H+P.   Based on patients current state of illness, I anticipate that, after discharge, patient will require TBD.

## 2024-02-12 NOTE — BRIEF PROCEDURE NOTE
Procedure Note    Procedure: Central venous cath inserted into Right internal Jugular Vein.  Informed consent obtained.  All questions answered.  Pt prepped and draped in sterile fashion.  IJ visualized with Ultrasound Guidance.  Catheter placed over guidewire.  All three ports violet and flushed easily.  Line sutured in place.  X-ray ordered to confirm placement and r/o pneumothorax.    Surg: Dr. Mohr    Anes: Local Lidocaine    Indication: Septic shock    Result:  Good blood flow x3 ports    Complications: None, CXR pending.      Toñito Mohr  [facility]  Pulmonary and Critical Care

## 2024-02-12 NOTE — CONSULTS
Cardiology Consultation    Alejandro Guardado Patient Status:  Emergency    1935 MRN [id number]   Location [facility] EMERGENCY DEPARTMENT Attending Warren Martin MD   Hosp Day # 0 PCP Stanislav Odonnell MD     Reason for Consultation:  hypotension, hypoxia.      History of Present Illness:  Alejandro Guardado is a a(n) 89 year old male. Nice old alta, on Bipap, two children at the bedside.  Prior CABG, EF 25-30%, severe PVD, afib.  Admitted twice in Dec 2023 for sepsis.  Had percutaneous intervention on his RLE.  He has decline cardiac cath and is DNR.  Son says he see him most days and his dad was doing great yesterday.  This morning his dad called and said he could not stop coughing and was weak.  Paramedics found him very weak, hypoxic, and hypotensive.  Now on 16 mcg's of levo.  CXR showed extensive right sided pneumonia vs asymmetric CHF.  pBNP 4,500, same as 23.  EKG afib without ischemic changes.  Lactic 2.9.    History:  Past Medical History:   Diagnosis Date    Arrhythmia     CAD (coronary artery disease) 2015    Cardiomyopathy (HCC)     Congestive heart disease (HCC)     Esophageal reflux     Hearing impairment     Heart attack (HCC)     High blood pressure     High cholesterol     History of MI (myocardial infarction)     Other and unspecified hyperlipidemia     PAD (peripheral artery disease) (HCC) 2023    Pneumonia due to organism 2023    Pneumonia, bacterial 2023    Unspecified essential hypertension     Visual impairment      Past Surgical History:   Procedure Laterality Date    ANGIOGRAM      ANGIOPLASTY (CORONARY)      CABG      CATARACT  2023    FRACTURE SURGERY Left     left hip pinning    OTHER SURGICAL HISTORY Left 1977    knee surgery    OTHER SURGICAL HISTORY  2016    Left hip ORIF pinning    TONSILLECTOMY       Family History   Problem Relation Age of Onset    Cancer Father          Allergies:  Allergies   Allergen Reactions    Heparin  ANAPHYLAXIS    Hydromorphone HALLUCINATION       Medications:      Continuous Infusions:   norepinephrine 16 mcg/min (02/12/24 1328)       Social History:   reports that he has never smoked. He has never used smokeless tobacco. He reports that he does not drink alcohol and does not use drugs.    Review of Systems:  All systems were reviewed and are negative except as described above in HPI.    Physical Exam:      Temp:  [99.7 °F (37.6 °C)] 99.7 °F (37.6 °C)  Pulse:  [] 108  Resp:  [22-28] 23  BP: ()/(34-69) 101/59  SpO2:  [93 %-100 %] 100 %    Last 3 Weights   01/09/24 1436 216 lb (98 kg)   12/28/23 0421 216 lb 0.8 oz (98 kg)   12/27/23 0755 218 lb 0.6 oz (98.9 kg)   12/26/23 0939 218 lb 0.6 oz (98.9 kg)   12/25/23 1523 208 lb 5.4 oz (94.5 kg)   12/25/23 1141 205 lb (93 kg)   12/01/23 1852 207 lb 10.8 oz (94.2 kg)   12/01/23 0756 200 lb (90.7 kg)           General: on Bipap.  HEENT: No focal deficits.  Neck: supple. JVP normal  Cardiac: irregular rhythm, S1, S2 normal,   No rub or gallop.  No murmur.  Lungs: Crackles BL.  Abdomen: Soft, non-tender.   Extremities: 1+ edema  Neurologic: no focal deficits  Skin: Warm and dry.          Telemetry: fib    Laboratories and Data:  Diagnostics:    EKG, 2/12/2024:  reviewed    CXR, 2/12/2024:  reviewed    Labs:   HEM:  Recent Labs   Lab 02/12/24  1126   WBC 15.1*   HGB 13.7   .0       Chem:  Recent Labs   Lab 02/12/24  1126      K 3.6      CO2 25.0   BUN 38*   CREATSERUM 1.62*   CA 8.6   MG 1.8   GLU 84       Recent Labs   Lab 02/12/24  1126   ALT 27   AST 18   ALB 3.1*       Recent Labs   Lab 02/12/24  1126   PTP 16.7*   INR 1.35*       No results for input(s): \"TROP\", \"CK\" in the last 168 hours.      Impression:   Acute hypoxic resp failure - sudden onset this morning.  R/o aspiration/sepsis.  Possible acute heart failure though EKG w/o ischemic changes, negative initial troponin.  Ischemic cardiomyopathy, remote CABG, EF 25-30%.  Has declined  invasive procedures.  PVD with right foot partial amp, intervention on RLE Dec 2023, ongoing wound care.  Hypotension - suspect sepsis rather than cardiogenic, but not 100% sure.  Afib - RVR in setting of acute illness.  ARF  H/o adrenal insufficiency    Plan:  Hydrocortisone, saline bolus.  Maintenance fluids.  Procal pending.  Patient is DNR/DNI.  Continue levo for now, titrate sys>80.  Pulm to follow.  Continue plavix.  Continue eliquis.  Daily labs.          Duke Wasserman MD  2/12/2024  1:44 PM  Cr care 40 min.

## 2024-02-12 NOTE — ED INITIAL ASSESSMENT (HPI)
Patient brought in by EMS for SOB and hypotension. Patient felt ill this morning and family stopped by and called EMS.  70s systolic for EMS and 6 L via NC placed on patient.

## 2024-02-12 NOTE — CONSULTS
Critical Care H&P/Consult       NAME: Alejandro Guardado - ROOM: [place] [id number] - MRN: [id number] - Age: 89 year old - :  1935    Date of Admission: 2024 11:11 AM  Admission Diagnosis: No admission diagnoses are documented for this encounter.  Reason for Consult: acute resp failure and shock      History of Present Illness: 89 y/o w/ h/o A--fib, HFrEF, who was noted to be weak and have SHEEHAN by his family.  EMS was called and found him to be hypoxic and hypotensive.  He was placed on BiPAP and started on norepi in the ED.  He had a chest x-ray done that showed an extensive right sided infiltrate.  He was started on treatment for HAP w/ zosyn and steroids.  He was admitted to the ICU for further care.  Currently he feels ok and would prefer not to be in the hospital if at all possible.    Past Medical History:   Diagnosis Date    Arrhythmia     CAD (coronary artery disease) 2015    Cardiomyopathy (HCC)     Congestive heart disease (HCC)     Esophageal reflux     Hearing impairment     Heart attack (HCC)     High blood pressure     High cholesterol     History of MI (myocardial infarction)     Other and unspecified hyperlipidemia     PAD (peripheral artery disease) ([facility]) 2023    Pneumonia due to organism 2023    Pneumonia, bacterial 2023    Unspecified essential hypertension     Visual impairment       Past Surgical History:   Procedure Laterality Date    ANGIOGRAM      ANGIOPLASTY (CORONARY)      CABG      CATARACT  2023    FRACTURE SURGERY Left     left hip pinning    OTHER SURGICAL HISTORY Left 1977    knee surgery    OTHER SURGICAL HISTORY  2016    Left hip ORIF pinning    TONSILLECTOMY        (Not in a hospital admission)    Allergies   Allergen Reactions    Heparin ANAPHYLAXIS    Hydromorphone HALLUCINATION       Social History:  Social History     Socioeconomic History    Marital status:      Spouse name: Not on file    Number of children: Not on file    Years of  education: Not on file    Highest education level: Not on file   Occupational History    Not on file   Tobacco Use    Smoking status: Never    Smokeless tobacco: Never   Vaping Use    Vaping Use: Never used   Substance and Sexual Activity    Alcohol use: Never    Drug use: Never    Sexual activity: Not on file   Other Topics Concern    Not on file   Social History Narrative    Not on file     Social Determinants of Health     Financial Resource Strain: Not on file   Food Insecurity: No Food Insecurity (12/25/2023)    Food Insecurity     Food Insecurity: Never true   Transportation Needs: No Transportation Needs (12/25/2023)    Transportation Needs     Lack of Transportation: No   Physical Activity: Not on file   Stress: Not on file   Social Connections: Not on file   Housing Stability: Low Risk  (12/25/2023)    Housing Stability     Housing Instability: No     Housing Instability Emergency: Not on file        Family History:  Family History   Problem Relation Age of Onset    Cancer Father         Home Medications:  No outpatient medications have been marked as taking for the 2/12/24 encounter (Hospital Encounter).       Scheduled Medication:   sodium chloride  500 mL Intravenous Once     Continuous Infusing Medication:   norepinephrine 16 mcg/min (02/12/24 1328)     PRN Medication:     REVIEW OF SYSTEMS:   14 point ROS conducted, negative save for above     OBJECTIVE:  Vitals:    02/12/24 1325 02/12/24 1330 02/12/24 1340 02/12/24 1400   BP: 92/54 90/62 101/59 98/48   Pulse: 105 104 108 72   Resp: (!) 27 (!) 27 23 23   Temp:       TempSrc:       SpO2: 100% 100% 100% 100%       Oxygen Therapy  SpO2: 100 %  O2 Device: Bi-PAP  O2 Flow Rate (L/min): 15 L/min                            Wt Readings from Last 3 Encounters:   01/09/24 216 lb (98 kg)   12/28/23 216 lb 0.8 oz (98 kg)   12/01/23 207 lb 10.8 oz (94.2 kg)       No intake or output data in the 24 hours ending 02/12/24 1434    BP 98/48   Pulse 72   Temp 99.7 °F  (37.6 °C) (Temporal)   Resp 23   Wt 210 lb 12.2 oz (95.6 kg)   SpO2 100%   BMI 33.01 kg/m²     General Appearance:    Alert, cooperative,  appears stated age   Head:    Normocephalic, without obvious abnormality, atraumatic   Eyes:    PERRL, conjunctiva/corneas clear, EOM's intact, both eyes   Throat:   Lips, mucosa, and tongue normal; teeth and gums normal   Neck:   Supple, symmetrical, trachea midline, no adenopathy;        thyroid:  No enlargement/tenderness/nodules; no carotid    bruit or JVD   Lungs:     Clear to auscultation bilaterally, respirations unlabored   Chest wall:    No tenderness or deformity   Heart:    Regular rate and rhythm, S1 and S2 normal, no murmur, rub   or gallop   Abdomen:     Soft, non-tender, bowel sounds active all four quadrants,     no masses, no organomegaly   Extremities:   Partial amputation of right foot   Pulses:   2+ and symmetric all extremities   Skin:   Skin color, texture, turgor normal, no rashes or lesions   Neurologic:   CNII-XII intact. Normal strength, sensation and reflexes       throughout       Labs reviewed as noted below    Imaging: chest x-ray reviewed as noted above    ASSESSMENT/PLAN:  Shock  -presumed septic related to PNA  -lactic is elevated  -was given fluid bolus in ED  -cont levo, start vaso, wean pressors as tolerated  Acute Hypoxic Resp Failure  -due to PNA  -wean off BiPAP as tolerated  PNA-HAP  -?aspiration  -cont zosyn (2/12- )  -monitor cultures and adjust abx accordingly  ERON  -due to shock  -monitor UOP and renal indices w/ BP support  ?Adrenal insuf  -baseline of prednisone 15mg daily for PMR  -cont stress dose steroids and plan to taper back to home dose of prednisone  HFrEF, A-fib  -cards following  -cont eliquis  -cont plavix  PVD  -s/p partial amputation of right foot  -wound care PRN  FEN  -npo while on BiPAP  -monitor lytes, replace PRN  Proph  -eliquis  Dispo  -DNR/DNI        Medically Necessary and Clinically Appropriate Critical Care  Time: 35 minutes                 Toñito CruzHarlem Hospital Centereugenia  [facility]  Pulmonary and Critical Care

## 2024-02-13 ENCOUNTER — PATIENT MESSAGE (OUTPATIENT)
Dept: PODIATRY CLINIC | Facility: CLINIC | Age: 89
End: 2024-02-13

## 2024-02-13 ENCOUNTER — APPOINTMENT (OUTPATIENT)
Dept: GENERAL RADIOLOGY | Facility: HOSPITAL | Age: 89
End: 2024-02-13
Attending: INTERNAL MEDICINE
Payer: MEDICARE

## 2024-02-13 LAB
ALBUMIN SERPL-MCNC: 2.5 G/DL (ref 3.4–5)
ALBUMIN/GLOB SERPL: 0.7 {RATIO} (ref 1–2)
ALP LIVER SERPL-CCNC: 51 U/L
ALT SERPL-CCNC: 22 U/L
ANION GAP SERPL CALC-SCNC: 6 MMOL/L (ref 0–18)
AST SERPL-CCNC: 18 U/L (ref 15–37)
BASOPHILS # BLD AUTO: 0.02 X10(3) UL (ref 0–0.2)
BASOPHILS NFR BLD AUTO: 0.1 %
BILIRUB SERPL-MCNC: 0.8 MG/DL (ref 0.1–2)
BUN BLD-MCNC: 35 MG/DL (ref 9–23)
CALCIUM BLD-MCNC: 7.9 MG/DL (ref 8.5–10.1)
CHLORIDE SERPL-SCNC: 104 MMOL/L (ref 98–112)
CO2 SERPL-SCNC: 25 MMOL/L (ref 21–32)
CREAT BLD-MCNC: 1.2 MG/DL
CREAT BLD-MCNC: 1.2 MG/DL
EGFRCR SERPLBLD CKD-EPI 2021: 58 ML/MIN/1.73M2 (ref 60–?)
EGFRCR SERPLBLD CKD-EPI 2021: 58 ML/MIN/1.73M2 (ref 60–?)
EOSINOPHIL # BLD AUTO: 0 X10(3) UL (ref 0–0.7)
EOSINOPHIL NFR BLD AUTO: 0 %
ERYTHROCYTE [DISTWIDTH] IN BLOOD BY AUTOMATED COUNT: 16 %
GLOBULIN PLAS-MCNC: 3.4 G/DL (ref 2.8–4.4)
GLUCOSE BLD-MCNC: 216 MG/DL (ref 70–99)
HCT VFR BLD AUTO: 33.4 %
HGB BLD-MCNC: 10.5 G/DL
IMM GRANULOCYTES # BLD AUTO: 0.11 X10(3) UL (ref 0–1)
IMM GRANULOCYTES NFR BLD: 0.7 %
L PNEUMO AG UR QL: NEGATIVE
LACTATE SERPL-SCNC: 1.5 MMOL/L (ref 0.4–2)
LACTATE SERPL-SCNC: 2.1 MMOL/L (ref 0.4–2)
LACTATE SERPL-SCNC: 2.9 MMOL/L (ref 0.4–2)
LYMPHOCYTES # BLD AUTO: 0.49 X10(3) UL (ref 1–4)
LYMPHOCYTES NFR BLD AUTO: 3 %
MAGNESIUM SERPL-MCNC: 2.4 MG/DL (ref 1.6–2.6)
MCH RBC QN AUTO: 25.9 PG (ref 26–34)
MCHC RBC AUTO-ENTMCNC: 31.4 G/DL (ref 31–37)
MCV RBC AUTO: 82.5 FL
MONOCYTES # BLD AUTO: 0.88 X10(3) UL (ref 0.1–1)
MONOCYTES NFR BLD AUTO: 5.4 %
NEUTROPHILS # BLD AUTO: 14.72 X10 (3) UL (ref 1.5–7.7)
NEUTROPHILS # BLD AUTO: 14.72 X10(3) UL (ref 1.5–7.7)
NEUTROPHILS NFR BLD AUTO: 90.8 %
OSMOLALITY SERPL CALC.SUM OF ELEC: 295 MOSM/KG (ref 275–295)
PLATELET # BLD AUTO: 237 10(3)UL (ref 150–450)
POTASSIUM SERPL-SCNC: 4.2 MMOL/L (ref 3.5–5.1)
PROT SERPL-MCNC: 5.9 G/DL (ref 6.4–8.2)
RBC # BLD AUTO: 4.05 X10(6)UL
SODIUM SERPL-SCNC: 135 MMOL/L (ref 136–145)
STREP PNEUMO ANTIGEN, URINE: NEGATIVE
WBC # BLD AUTO: 16.2 X10(3) UL (ref 4–11)

## 2024-02-13 PROCEDURE — 99233 SBSQ HOSP IP/OBS HIGH 50: CPT | Performed by: INTERNAL MEDICINE

## 2024-02-13 PROCEDURE — 74230 X-RAY XM SWLNG FUNCJ C+: CPT | Performed by: INTERNAL MEDICINE

## 2024-02-13 RX ORDER — METOPROLOL SUCCINATE 25 MG/1
25 TABLET, EXTENDED RELEASE ORAL
Status: DISCONTINUED | OUTPATIENT
Start: 2024-02-13 | End: 2024-02-15

## 2024-02-13 RX ORDER — SODIUM CHLORIDE 9 MG/ML
INJECTION, SOLUTION INTRAVENOUS CONTINUOUS
Status: DISCONTINUED | OUTPATIENT
Start: 2024-02-13 | End: 2024-02-14

## 2024-02-13 NOTE — CM/SW NOTE
02/13/24 1600   CM/SW Referral Data   Referral Source Physician   Reason for Referral Discharge planning   Informant EMR   Medical Hx   Does patient have an established PCP? Yes  (Stanislav Balatzar MD)   Significant Past Medical/Mental Health Hx CAD s/p CABG and PCI, HFrEF, ischemic cardiomyopathy with EF 25-30%, atrial fibrillation to eliquis, hypertension, hyperlipidemia, severe PAD, BPH, secondary adrenal insufficiency and GERD   Patient Info   Advanced directives? Yes   Patient's Home Environment House   Number of Levels in Home 2   Patient lives with Alone   Patient Status Prior to Admission   Independent with ADLs and Mobility Yes   Services in place prior to admission Home Health Care   Home Health Provider Info [facility]   Discharge Needs   Anticipated D/C needs Home health care     PT evaluation completed today. Therapy feels pt will benefit from home health at DC. Pt previously has had [facility]. Home Health referral sent via [employer]. Will follow up w/ pt to secure services before DC.    CM/SW will remain available for DC planning and/or support.       LORETO Benton, CMSRN    [id number]

## 2024-02-13 NOTE — PROGRESS NOTES
[facility]     Hospitalist Progress Note     Alejandro Guardado Patient Status:  Inpatient    1935 MRN [id number]   Formerly [facility] 4SW-A Attending Andre Campos,    Hosp Day # 1 PCP Stanislav Odonnell MD     Chief Complaint: SOB    Subjective:     Patient feels better today. He is on 3L oxygen nasal cannula. No fever. He is off pressors.     Objective:    Review of Systems:   A comprehensive review of systems was completed; pertinent positive and negatives stated in subjective.    Vital signs:  Temp:  [97.6 °F (36.4 °C)-101.2 °F (38.4 °C)] 97.6 °F (36.4 °C)  Pulse:  [] 82  Resp:  [14-39] 22  BP: ()/(34-82) 109/67  SpO2:  [94 %-100 %] 95 %  FiO2 (%):  [100 %] 100 %    Physical Exam:    General: No acute distress, awake and alert  Respiratory: Rales right lobe  Cardiovascular: S1, S2. IRIR  Abdomen: Soft, Non-tender, non-distended, positive bowel sounds  Extremities: No edema    Diagnostic Data:    Labs:  Recent Labs   Lab 24  11224  17124  0257   WBC 15.1* 26.7* 16.2*   HGB 13.7 12.5* 10.5*   MCV 81.1 80.5 82.5   .0 342.0 237.0   BAND  --  20  --    INR 1.35*  --   --      Recent Labs   Lab 24  11224  1717 24  0257   GLU 84 193* 216*   BUN 38* 40* 35*   CREATSERUM 1.62* 1.64* 1.20  1.20   CA 8.6 8.4* 7.9*   ALB 3.1*  --  2.5*    134* 135*   K 3.6 3.9 4.2    102 104   CO2 25.0 25.0 25.0   ALKPHO 68  --  51   AST 18  --  18   ALT 27  --  22   BILT 0.8  --  0.8   TP 6.8  --  5.9*     Estimated Creatinine Clearance: 39 mL/min (based on SCr of 1.2 mg/dL).    Recent Labs   Lab 24  1126   TROPHS 26     Recent Labs   Lab 24  1126   PTP 16.7*   INR 1.35*      Microbiology  No results found for this visit on 24.    Imaging: Reviewed in Epic.    Medications:    metoprolol succinate ER  25 mg Oral Daily Beta Blocker    hydrocortisone sodium succinate  50 mg Intravenous 2 times per day    piperacillin-tazobactam  3.375 g  Intravenous Q8H    apixaban  5 mg Oral BID    atorvastatin  40 mg Oral Nightly    clopidogrel  75 mg Oral Daily    finasteride  5 mg Oral Daily    folic acid  1 mg Oral Daily    gabapentin  300 mg Oral TID    pantoprazole  40 mg Intravenous QAM AC    Or    pantoprazole  40 mg Oral QAM AC    tamsulosin  0.4 mg Oral Daily @ 0700       Assessment & Plan:      #Septic shock  -Shock resolved, off pressors  -Given fluid bolus in ER  -Follow blood cultures    #Multifocal pneumonia  -Procalcitonin over 7  -Sputum cultures if able. Follow blood cultures  -Strep/legionella urine antigens negative. MRSA nares negative  -Zosyn. 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 Discussed with larry     #Acute Kidney Injury due to septic shock  -Improved      #Acute hypoxemic respiratory failure 2/2 PNA  -ABG reviewed  -Wean O2 as tolerated  -Troponin negative and initial EKG without acute ischemic changes  -IS     #Chronic HFrEF, ischemic cardiomyopathy with LVEF 25-30%  -Has declined invasive procedures in the past  -BB  -Hold lisinopril     #BPH  -Finasteride  -Flomax     #Afib with RVR  -Eliquis  -Resume metoprolol     #CAD s/p CABG   -Plavix/statin     #Hyperlipidemia  -Statin     #GERD  -PPI      #PAD with hx of non-healing R foot wound s/p recent thrombectomy and revascularization/stent 12/4 by Dr. Steward  -Plavix/Statin  -Wound care     Discussed with patient, larry and SLP.    Anrde Campos DO    Supplementary Documentation:     Quality:  DVT Mechanical Prophylaxis:   SCDs, Early ambuation  DVT Pharmacologic Prophylaxis   Medication    apixaban (Eliquis) tab 5 mg     Code Status: DNAR/Selective Treatment  Block: External  urinary catheter in place  Central line: central venous catheter in place  DIEGO: TBD    Discharge is dependent on: Clinical status, consultant recs  At this point Mr. Guardado is expected to be discharge to: Home?    The 21st Century Cures Act makes medical notes like these available to patients in the interest of transparency. Please be advised this is a medical document. Medical documents are intended to carry relevant information, facts as evident, and the clinical opinion of the practitioner. The medical note is intended as peer to peer communication and may appear blunt or direct. It is written in medical language and may contain abbreviations or verbiage that are unfamiliar.

## 2024-02-13 NOTE — PROGRESS NOTES
[facility] Pharmacy Dosing Service      Initial Pharmacokinetic Consult for Vancomycin Dosing     Alejandro Guardado is a 89 year old male who is being initiated on vancomycin therapy for pneumonia.  Pharmacy has been asked to dose vancomycin by Dr. Andre Campos.  The initial treatment and monitoring approach will be non-AUC strategy.        Weight and Temperature:    Wt Readings from Last 1 Encounters:   24 95.6 kg (210 lb 12.2 oz)        Temp Readings from Last 1 Encounters:   24 98.3 °F (36.8 °C) (Temporal)      Labs:   Recent Labs   Lab 24  1126 24  1717   CREATSERUM 1.62* 1.64*      Estimated Creatinine Clearance: 28.5 mL/min (A) (based on SCr of 1.64 mg/dL (H)).     Recent Labs   Lab 24  1126 24  1718   WBC 15.1* 26.7*          The Pharmacokinetic Target is:    Trough/random 10-15 mg/L    Renal Dosing Considerations:    ERON/ARF     Assessment/Plan:   Initial/Loading dose: Has received 2250 mg IV (23.5 mg/kg, capped at 2250 mg) x 1 loading dose.      Maintenance dose: Pharmacy will dose vancomycin at 1500 mg IV every 36 hours    Monitorin) Plan for vancomycin trough to be obtained in approximately 72 hours    2) Pharmacy will order SCr as clinically indicated to assess renal function.    3) Pharmacy will monitor for toxicity and efficacy, adjust vancomycin dose and/or frequency, and order vancomycin levels as appropriate per the Pharmacy and Therapeutics Committee approved protocol until discontinuation of the medication.       We appreciate the opportunity to assist in the care of this patient.     Susanna Monk, PharmD  2024  8:13 PM  [facility]: [phone]

## 2024-02-13 NOTE — CONSULTS
[facility]  Report of Inpatient Wound Care Consultation    Alejandro Guardado Patient Status:  Inpatient    1935 MRN [id number]   Location [facility] 4SW-A Attending Andre Campos,    Hosp Day # 1 PCP Stanislav Odonnell MD     Reason for Consultation:  Right foot    History of Present Illness:  Alejandro Guardado is a a(n) 89 year old male. Patient seen at bedside with a fellow wound care nurse.Pt presents with an old surgical wound to right foot, hx of PAD.Family in the room.    History:  Past Medical History:   Diagnosis Date    Arrhythmia     CAD (coronary artery disease) 2015    Cardiomyopathy (HCC)     Congestive heart disease (HCC)     Esophageal reflux     Hearing impairment     Heart attack (HCC)     High blood pressure     High cholesterol     History of MI (myocardial infarction)     Other and unspecified hyperlipidemia     PAD (peripheral artery disease) ([facility]) 2023    Pneumonia due to organism 2023    Pneumonia, bacterial 2023    Unspecified essential hypertension     Visual impairment      Past Surgical History:   Procedure Laterality Date    ANGIOGRAM      ANGIOPLASTY (CORONARY)      CABG      CATARACT  2023    FRACTURE SURGERY Left     left hip pinning    OTHER SURGICAL HISTORY Left 1977    knee surgery    OTHER SURGICAL HISTORY  2016    Left hip ORIF pinning    TONSILLECTOMY        reports that he has never smoked. He has never used smokeless tobacco. He reports that he does not drink alcohol and does not use drugs.    Allergies:  @ALLERGY    Laboratory Data:  Lab Results   Component Value Date    WBC 16.2 2024    HGB 10.5 2024    HCT 33.4 2024    .0 2024    CREATSERUM 1.20 2024    CREATSERUM 1.20 2024    BUN 35 2024     2024    K 4.2 2024     2024    CO2 25.0 2024     2024    CA 7.9 2024    ALB 2.5 2024    ALKPHO 51 2024    BILT 0.8  02/13/2024    TP 5.9 02/13/2024    AST 18 02/13/2024    ALT 22 02/13/2024    MG 2.4 02/13/2024         Impression:  Wound 08/14/23 #1- right foot Old surgical Foot Right (Active)   Date First Assessed/Time First Assessed: 08/14/23 1309    Wound Number (Wound Clinic Only): #1- right foot  Primary Wound Type: Old surgical  Location: Foot  Wound Location Orientation: Right  Wound Description (Comments): Amputation site      Assessments 2/13/2024 11:18 AM   Wound Image     Drainage Amount Scant   Drainage Description Serous;Yellow   Wound Length (cm) 0.5 cm   Wound Width (cm) 1.1 cm   Wound Surface Area (cm^2) 0.55 cm^2   Wound Depth (cm) 0.3 cm   Wound Volume (cm^3) 0.165 cm^3   Wound Healing % 99   Margins Epibole (Rolled edges)   Non-staged Wound Description Full thickness   Peggy-wound Assessment Edema;Dry   Wound Granulation Tissue Pink;Firm   Wound Bed Granulation (%) 100 %   Wound Odor None   Tunneling? No   Undermining? No   Sinus Tracts? No   Local pulse: faint, dorsalis pedis  Capillary refill < 3 seconds    Wound Cleaning and Dressings:  Wound cleansing:  Cleanse with saline  Wound product: Moisten endoform collagen with saline [ supply in the room]. Cover with ABD pad and wrap with kerlix.  Dressing change frequency:  Change dressing every other day and/or as needed      Compression Therapy:   Elevate leg(s) as much as possible      Miscellaneous/Additional Orders:  Offloading: open toe surgical shoe    Miscellaneous orders: Increase dietary protein intake. Dietitian /Attending physician to evaluate amount of protein intake/supplements       Recommendations:  Continue to follow up at the outpatient wound clinic if discharged to home.    Thank you for allowing me to participate in the care of your patient.  Time Spent 30 minutes , Thank you.    Leigh Hearn RN, BSN [facility]   Wound Care pager [phone]  2/13/2024  12:35 PM

## 2024-02-13 NOTE — PLAN OF CARE
Assumed care of patient this afternoon following report from ER RN. RIJ placed by Juliano MOTNALVO, see note. Levophed/vasopressin per order. UA sent. See chart for r foot wound pictures. Wound care placed on consult. General diet. Good appetite. Family at bedside in agreement with plan of care.

## 2024-02-13 NOTE — SLP NOTE
ADULT SWALLOWING EVALUATION    ASSESSMENT    ASSESSMENT/OVERALL IMPRESSION:  Patient is an 88 y/o male admitted with SOB and hypotension. PMHx significant for CAD, afib, HTN, HLD, PAD, and GERD. SLP order received to evaluate oropharyngeal swallow d/t concern for aspiration. Patient received alert and oriented in bed. He denied history of dysphagia symptoms and reported consuming a regular diet and thi liquids at baseline. Patient is previously unknown to this service.    Patient presented with an intact oropharyngeal swallow. Bolus acceptance was adequate without evidence of anterior bolus loss. Mastication and AP bolus transit were thorough and efficient without evidence of oral residue. Pharyngeal swallow initiation appeared timely and hyolaryngeal excursion was adequate per palpation.  No overt s/s of aspiration observed and patient denied odynophagia and globus sensation across consistencies.     Recommend patient continue a regular diet and thin liquids with general safe swallowing precautions. SLP will continue to follow to monitor diet tolerance and adjust as appropriate. Education provided re: results and recommendations.  Discussed with physician following evaluation. Plan to complete VFSS given recurrent admissions with pneumonia. Further recommendations pending exam.         RECOMMENDATIONS   Diet Recommendations - Solids: Regular  Diet Recommendations - Liquids: Thin Liquids                              Medication Administration Recommendations: No restrictions  Treatment Plan/Recommendations: Aspiration precautions    HISTORY   MEDICAL HISTORY  Reason for Referral: R/O aspiration    Problem List  Principal Problem:    Community acquired pneumonia, unspecified laterality  Active Problems:    Hyponatremia    Metabolic alkalosis    Sepsis due to undetermined organism (HCC)    Acute on chronic congestive heart failure, unspecified heart failure type (HCC)      Past Medical History  Past Medical History:    Diagnosis Date    Arrhythmia     CAD (coronary artery disease) 09/28/2015    Cardiomyopathy (HCC)     Congestive heart disease (HCC) 2022    Esophageal reflux     Hearing impairment     Heart attack (HCC)     High blood pressure     High cholesterol     History of MI (myocardial infarction)     Other and unspecified hyperlipidemia     PAD (peripheral artery disease) ([facility]) 04/26/2023    Pneumonia due to organism 12/2023    Pneumonia, bacterial 12/28/2023    Unspecified essential hypertension     Visual impairment        Prior Living Situation: Home with support  Diet Prior to Admission: Regular;Thin liquids       Patient/Family Goals: none stated    SWALLOWING HISTORY  Current Diet Consistency: Regular;Thin liquids  Dysphagia History: as above  Imaging Results:   CXR 2/12/24  CONCLUSION:  Right IJ catheter tip in SVC.      Diffuse infiltrates throughout the right lung unchanged.      Worsening airspace disease and atelectasis in the left lung base.      Stable opacity left upper lobe suprahilar region.      Heart size and pulmonary vascularity appear stable.      No pneumothorax.         LOCATION:  [id number]                  Dictated by (CST): Debbie Lozada MD on 2/12/2024 at 4:32 PM       Finalized by (CST): Debbie Lozada MD on 2/12/2024 at 4:33 PM     SUBJECTIVE       OBJECTIVE   ORAL MOTOR EXAMINATION  Dentition: Functional  Symmetry: Within Functional Limits  Strength: Within Functional Limits     Range of Motion: Within Functional Limits       Voice Quality: Clear  Respiratory Status: Nasal cannula  Consistencies Trialed: Thin liquids;Puree;Hard solid  Method of Presentation: Self presentation  Patient Positioning: Upright;Midline    Oral Phase of Swallow: Within Functional Limits                      Pharyngeal Phase of Swallow: Within Functional Limits           (Please note: Silent aspiration cannot be evaluated clinically. Videofluoroscopic Swallow Study is required to rule-out silent  aspiration.)    Esophageal Phase of Swallow: No complaints consistent with possible esophageal involvement  Comments: d/w RN              GOALS  Goal #1 The patient will tolerate regular consistency and thin liquids without overt signs or symptoms of aspiration with 95 % accuracy over 1-2 session(s).  In Progress   Goal #2 The patient/family/caregiver will demonstrate understanding and implementation of aspiration precautions and swallow strategies independently over 1-2 session(s).    In Progress   Goal #3 VFSS  In Progress   Goal #4     Goal #5     Goal #6     Goal #7     Goal #8       FOLLOW UP  Treatment Plan/Recommendations: Aspiration precautions     Follow Up Needed (Documentation Required): Yes  SLP Follow-up Date: 02/14/24    Thank you for your referral.   If you have any questions, please contact MOLLY Nazario

## 2024-02-13 NOTE — PROGRESS NOTES
Cardiology Progress Note        Alejandro Guardado Patient Status:  Inpatient    1935 MRN [id number]   [facility] 4SW-A Attending Andre Campos,    Hosp Day # 1 PCP Stanislav Odonnell MD     Subjective:  Breathing comfortably on NC.  Off pressors.  Tele negative.    Medications:   hydrocortisone sodium succinate  50 mg Intravenous 4 times per day    piperacillin-tazobactam  3.375 g Intravenous Q8H    apixaban  5 mg Oral BID    atorvastatin  40 mg Oral Nightly    clopidogrel  75 mg Oral Daily    finasteride  5 mg Oral Daily    folic acid  1 mg Oral Daily    gabapentin  300 mg Oral TID    pantoprazole  40 mg Intravenous QAM AC    Or    pantoprazole  40 mg Oral QAM AC    tamsulosin  0.4 mg Oral Daily @ 0700    [START ON 2024] vancomycin  15 mg/kg Intravenous Q36H       Continuous Infusions:   sodium chloride 100 mL/hr at 24 0133    norepinephrine Stopped (24 0146)    vasopressin (Vasostrict) 20 Units in sodium chloride 0.9% 100 mL infusion for septic shock Stopped (24 0530)         Allergies:  Allergies   Allergen Reactions    Heparin ANAPHYLAXIS    Hydromorphone HALLUCINATION         Intake/Output:    Intake/Output Summary (Last 24 hours) at 2024 0650  Last data filed at 2024 0619  Gross per 24 hour   Intake 1828.7 ml   Output 1150 ml   Net 678.7 ml           Last 3 Weights   24 0000 207 lb 7.3 oz (94.1 kg)   24 1446 210 lb 12.2 oz (95.6 kg)   24 1436 216 lb (98 kg)   23 0421 216 lb 0.8 oz (98 kg)   23 0755 218 lb 0.6 oz (98.9 kg)   23 0939 218 lb 0.6 oz (98.9 kg)   23 1523 208 lb 5.4 oz (94.5 kg)   23 1141 205 lb (93 kg)            Physical Exam:    Temp:  [97.9 °F (36.6 °C)-101.2 °F (38.4 °C)] 98.7 °F (37.1 °C)  Pulse:  [] 81  Resp:  [16-39] 27  BP: ()/(34-82) 110/72  SpO2:  [93 %-100 %] 98 %  FiO2 (%):  [100 %] 100 %    General: No apparent distress  HEENT: No focal deficits.  Neck: supple. JVP  normal  Cardiac: Regular rhythm, S1, S2 normal,  rub or gallop.  No murmur.  Lungs: CTA  Abdomen: Soft, non-tender.   Extremities: No edema  Neurologic: no focal deficits  Skin: Warm and dry.     Telemetry: reviewed    EKG:      Echo:      Cardiac Cath:      Labs:  HEM:  Recent Labs   Lab 02/12/24  1126 02/12/24 1718 02/13/24  0257   WBC 15.1* 26.7* 16.2*   HGB 13.7 12.5* 10.5*   .0 342.0 237.0       Chem:  Recent Labs   Lab 02/12/24  1126 02/12/24  1717 02/13/24  0257    134* 135*   K 3.6 3.9 4.2    102 104   CO2 25.0 25.0 25.0   BUN 38* 40* 35*   CREATSERUM 1.62* 1.64* 1.20  1.20   CA 8.6 8.4* 7.9*   MG 1.8  --  2.4   GLU 84 193* 216*       Recent Labs   Lab 02/12/24  1126 02/13/24  0257   ALT 27 22   AST 18 18   ALB 3.1* 2.5*       No results for input(s): \"TROP\", \"CK\" in the last 168 hours.    Recent Labs   Lab 02/12/24 1126   PTP 16.7*   INR 1.35*                 Impression:  Acute hypoxic resp failure - sudden onset 2/12 am.  R/o aspiration/sepsis.  Possible acute heart failure though EKG w/o ischemic changes, negative initial troponin.  Ischemic cardiomyopathy, remote CABG, EF 25-30%.  Has declined invasive procedures.  PVD with right foot partial amp, intervention on RLE Dec 2023, ongoing wound care.  Lactic acidosis, elevated procal, Hypotension - suspect sepsis rather than cardiogenic.  Responded to fluids and hydrocortisone.  Afib - RVR in setting of acute illness.  ARF - resolved with hydration.  H/o adrenal insufficiency - on hydrocortisone.             Plan:  He is much improved, Rx for pneumonia, adrenal insufficiency.  Decrease fluids to 50 ml/hr, dc later today likely.  Resume toprol at 25 mg daily and uptitrate as BP allows.            Duke Wasserman MD  2/13/2024  6:50 AM  L3

## 2024-02-13 NOTE — VIDEO SWALLOW STUDY NOTE
ADULT VIDEOFLUOROSCOPIC SWALLOWING STUDY    Admission Date: 2/12/2024  Evaluation Date: 02/13/24  Radiologist: Selam    RECOMMENDATIONS   Diet Recommendations - Solids: Regular  Diet Recommendations - Liquids: Thin Liquids    Further Follow-up:  Follow Up Needed (Documentation Required): No  SLP Follow-up Date: 02/14/24        Medication Administration Recommendations: No restrictions  Treatment Plan/Recommendations: No further inpatient SLP service warranted    HISTORY   Background/Objective Information: Patient is an 89 y/o male seen this morning for clinical swallow evaluation. No overt s/s of aspiration observed at that time. However, patient with recurrent pneumonia and concern for possible silent aspiration. Therefore, VFSS recommended. See prior SLP note for full history.    Problem List  Principal Problem:    Community acquired pneumonia, unspecified laterality  Active Problems:    Hyponatremia    Metabolic alkalosis    Sepsis due to undetermined organism ([facility])    Acute on chronic congestive heart failure, unspecified heart failure type ([facility])      Past Medical History  Past Medical History:   Diagnosis Date    Arrhythmia     CAD (coronary artery disease) 09/28/2015    Cardiomyopathy ([facility])     Congestive heart disease ([facility]) 2022    Esophageal reflux     Hearing impairment     Heart attack ([facility])     High blood pressure     High cholesterol     History of MI (myocardial infarction)     Other and unspecified hyperlipidemia     PAD (peripheral artery disease) ([facility]) 04/26/2023    Pneumonia due to organism 12/2023    Pneumonia, bacterial 12/28/2023    Unspecified essential hypertension     Visual impairment        Current Diet Consistency: Regular;Thin liquids  Prior Level of Function: Independent  Prior Living Situation: Home with support  History of Recent: Difficulty breathing     Imaging results:   CXR 2/12/24  CONCLUSION:  Right IJ catheter tip in SVC.      Diffuse infiltrates throughout the right lung  unchanged.      Worsening airspace disease and atelectasis in the left lung base.      Stable opacity left upper lobe suprahilar region.      Heart size and pulmonary vascularity appear stable.      No pneumothorax.         LOCATION:  [id number]                  Dictated by (CST): Debbie Lozada MD on 2/12/2024 at 4:32 PM       Finalized by (CST): Debbie Lozada MD on 2/12/2024 at 4:33 PM     Reason for Referral: R/O aspiration    Family/Patient Goals: none stated     ASSESSMENT   DYSPHAGIA ASSESSMENT  Test completed in conjunction with Radiologist.  Patient Positioned: Upright.  Patient Viewed: Lateral.  Patient Alertness: Fully alert.  Consistencies Presented: Thin liquids;Puree;Hard solid to assess oropharyngeal swallow function and assess for compensatory strategies to improve safe swallow function.    THIN LIQUIDS  Method of Presentation: Cup  Oral Phase of Swallow (VFSS - Thin Liquids): Within Functional Limits  Triggered at: Pyriform sinuses  Laryngeal Penetration: None  Tracheal Aspiration: None        PUREE  Oral Phase of Swallow (VFSS - Puree): Within Functional Limits  Triggered at: Valleculae  Laryngeal Penetration: None  Tracheal Aspiration: None     HARD SOLID  Oral Phase of Swallow (VFSS - Hard Solid): Within Functional Limits  Triggered at: Valleculae  Laryngeal Penetration: None  Tracheal Aspiration: None  Penetration Aspiration Scale Score: Score 1: Material does not enter airway       Overall Impression:   Patient presented with an intact oropharyngeal swallow. Bolus acceptance was adequate without evidence of anterior bolus loss. Mastication and AP bolus transit were thorough and efficient without evidence of oral residue. Pharyngeal swallow initiation was largely timely. Brief pooling in the pyriforms with larger bolus of thin liquid prior to swallow. Base of tongue retraction, epiglottic inversion, and hyolaryngeal excursion WFL. No penetration or aspiration observed, No significant pharyngeal  residue appreciated. Of note, additional trial of thin liquid was captured during cine but not recorded. No penetration or aspiration noted during that trial.     Recommend patient continue a regular diet and thin liquids with general safe swallowing precautions. No further SLP services warranted at this time as no deficits identified which require skilled intervention. Education provided re: results and recommendations, as well as, aspiration precautions. RN accompanied pt to exam and was aware of results.               GOALS  Goal #1 The patient will tolerate regular consistency and thin liquids without overt signs or symptoms of aspiration with 95 % accuracy over 1-2 session(s).  Met   Goal #2 The patient/family/caregiver will demonstrate understanding and implementation of aspiration precautions and swallow strategies independently over 1-2 session(s).     Met   Goal #3 VFSS  Met   Goal #4       Goal #5       Goal #6       Goal #7       Goal #8          EDUCATION/INSTRUCTION  Reviewed results and recommendations with patient/family/caregiver.  Agreement/Understanding verbalized and all questions answered to their apparent satisfaction.    INTERDISCIPLINARY COMMUNICATION  Reviewed results with Radiologist; agreement verbalized.    Patient Experiencing Pain: No                FOLLOW UP  Treatment Plan/Recommendations: No further inpatient SLP service warranted         Thank you for your referral.   If you have any questions, please contact Jose L Navarrete SLP

## 2024-02-13 NOTE — PLAN OF CARE
Pt received at shift change A&O x4 and able to follow all commands on 3LNC. PT afebrile, VSS. Pt weaned down to 2LNC. Pt worked with PT/OT to get up to chair with walker. Pt went for video swallow and passed. Tolerating diet. Family at bedside updated on patient status. TX orders entered.  Problem: Diabetes/Glucose Control  Goal: Glucose maintained within prescribed range  Description: INTERVENTIONS:  - Monitor Blood Glucose as ordered  - Assess for signs and symptoms of hyperglycemia and hypoglycemia  - Administer ordered medications to maintain glucose within target range  - Assess barriers to adequate nutritional intake and initiate nutrition consult as needed  - Instruct patient on self management of diabetes  Outcome: Progressing     Problem: RESPIRATORY - ADULT  Goal: Achieves optimal ventilation and oxygenation  Description: INTERVENTIONS:  - Assess for changes in respiratory status  - Assess for changes in mentation and behavior  - Position to facilitate oxygenation and minimize respiratory effort  - Oxygen supplementation based on oxygen saturation or ABGs  - Provide Smoking Cessation handout, if applicable  - Encourage broncho-pulmonary hygiene including cough, deep breathe, Incentive Spirometry  - Assess the need for suctioning and perform as needed  - Assess and instruct to report SOB or any respiratory difficulty  - Respiratory Therapy support as indicated  - Manage/alleviate anxiety  - Monitor for signs/symptoms of CO2 retention  Outcome: Progressing

## 2024-02-13 NOTE — PROGRESS NOTES
Pulmonary Progress Note        NAME: Alejandro Guardado - ROOM: [address] [place], [place] [address]A - MRN: [id number] - Age: 89 year old - : 1935        Last 24hrs: Febrile overnight, BP and resp status have improved, pt denies complaints this AM    OBJECTIVE:  Vitals:    24 0500 24 0530 24 0600 24 0630   BP: 106/59 104/61 97/62 110/72   BP Location:       Pulse: 75 75 73 81   Resp: 19 (!) 39 21 (!) 27   Temp:       TempSrc:       SpO2: 98% 100% 100% 98%   Weight:           Oxygen Therapy  SpO2: 98 %  O2 Device: High flow nasal cannula  FiO2 (%): 100 %  O2 Flow Rate (L/min): 4 L/min  Pulse Oximetry Type: Continuous  Oximetry Probe Site Changed: No  Pulse Ox Probe Location: Left hand                  Intake/Output Summary (Last 24 hours) at 2024 0740  Last data filed at 2024 0619  Gross per 24 hour   Intake 1828.7 ml   Output 1150 ml   Net 678.7 ml       Scheduled Medication:   metoprolol succinate ER  25 mg Oral Daily Beta Blocker    hydrocortisone sodium succinate  50 mg Intravenous 4 times per day    piperacillin-tazobactam  3.375 g Intravenous Q8H    apixaban  5 mg Oral BID    atorvastatin  40 mg Oral Nightly    clopidogrel  75 mg Oral Daily    finasteride  5 mg Oral Daily    folic acid  1 mg Oral Daily    gabapentin  300 mg Oral TID    pantoprazole  40 mg Intravenous QAM AC    Or    pantoprazole  40 mg Oral QAM AC    tamsulosin  0.4 mg Oral Daily @ 0700    [START ON 2024] vancomycin  15 mg/kg Intravenous Q36H     Continuous Infusing Medication:   sodium chloride 50 mL/hr at 24 0656    norepinephrine Stopped (24 0146)    vasopressin (Vasostrict) 20 Units in sodium chloride 0.9% 100 mL infusion for septic shock Stopped (24 0530)       Lungs: clear to auscultation bilaterally  Heart: S1, S2 normal, no murmur, click, rub or gallop, regular rate and rhythm  Abdomen: soft, non-tender; bowel sounds normal; no masses,  no organomegaly  Extremities: extremities normal, atraumatic, no  cyanosis or edema    Labs reviewed as noted below      ASSESSMENT/PLAN:    Shock  -presumed septic related to PNA  -was given fluid bolus in ED  -improved  -weaned off pressors  Acute Hypoxic Resp Failure  -due to PNA  -improved  -wean O2 as tolerated  PNA-HAP  -?aspiration  -cont zosyn (2/12- ), vanco (2/12- 2/13)  -monitor cultures and adjust abx accordingly  ERON  -due to shock  -improved w/ BP support  ?Adrenal insuf  -baseline of prednisone 15mg daily for PMR  -taper stress dose  HFrEF, A-fib  -cards following  -cont eliquis  -cont plavix  PVD  -s/p partial amputation of right foot  -wound care PRN  FEN  -diet as tolerated  -monitor lytes, replace PRN  Proph  -eliquis  Dispo  -DNR/DNI  -possible transfer to the floor later today  -has seen both [facility] and [facility] in the last 2 mths, pulm consult per [facility] hospitalist      Toñito Mohr  [facility]  Pulmonary and Critical Care  On call Intensivist 02/13/24

## 2024-02-13 NOTE — PLAN OF CARE
I left a detailed message (consent below) informing mom. Asked to call back and let us know if she can make it to clinic at that time.     Georgette Skinner RN   [facility] - Triage      Pt A&Ox4, follows commands. Hard of hearing. Afib on the tele monitor. Levophed and vasopressin titrated off to meet SBP parameters- see eMAR. Right jugular central line intact. No complaints of pain, SOB. O2 weened as tolerated. One large BM. Voiding via primofit. Afebrile. Pt updated on the plan of care. Call light within reach. Questions answered.

## 2024-02-13 NOTE — OCCUPATIONAL THERAPY NOTE
OCCUPATIONAL THERAPY EVALUATION - INPATIENT     Room Number: 464/464-A  Evaluation Date: 2/13/2024  Type of Evaluation: Initial  Presenting Problem: sepsis, SOB, PNA, CHF, hypotension    Physician Order: IP Consult to Occupational Therapy  Reason for Therapy: ADL/IADL Dysfunction and Discharge Planning    OCCUPATIONAL THERAPY ASSESSMENT   Patient is currently functioning below baseline with toileting, bathing, upper body dressing, lower body dressing, bed mobility, and transfers. Prior to admission, patient's baseline is modified independent .  Patient is requiring minimal assist as a result of the following impairments: decreased functional strength, decreased functional reach, decreased endurance, and medical status. Occupational Therapy will continue to follow for duration of hospitalization.    Patient will benefit from continued skilled OT Services at discharge to promote functional independence in home.  Anticipate patient will return home with home health OT      History Related to Current Admission: Patient is a 89 year old male admitted on 2/12/2024 with Presenting Problem: sepsis, SOB, PNA, CHF, hypotension. 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  Patient was admitted twice in December, once for pneumonia and once for septic shock related to pneumonia.  He thrombectomy and revascularization/stent in RLE in December 2023.  He has declined cath in the past.  Son sees patient most days and states he was doing well yesterday.  However this morning patient called his son due to weakness, coughing and shortness of breath.  EMS called and they found him hypoxic and hypotensive.  In the ER chest x-ray showed right-sided pneumonia.  Started on Levophed, given Zosyn and transferred to the ICU.    Recommendations for nursing staff:   Transfers: min A with RW  Toileting location: bedside commode     EVALUATION SESSION  Patient Start of  Session: Pt was found in his bed.   FUNCTIONAL TRANSFER ASSESSMENT  Sit to Stand: Edge of Bed  Edge of Bed: Minimal Assist    BED MOBILITY  Supine to Sit : Minimal Assist    BALANCE ASSESSMENT     FUNCTIONAL ADL ASSESSMENT  LB Dressing Seated: Dependent      ACTIVITY TOLERANCE:                          O2 SATURATIONS       Cognition  WFL  Follows all commands     Upper extremity  WFL    EDUCATION PROVIDED  Patient: Role of Occupational Therapy; Discharge Recommendations; Plan of Care; Functional Transfer Techniques; Fall Prevention; Compensatory ADL Techniques  Patient's Response to Education: Verbalized Understanding      Equipment used: RW, gait belt   Demonstrates functional use, Would benefit from additional trial     Therapist comments: supine>LB dressing to don L sock>sit EOB>stand to RW>steps to L side to sit in chair>sitting in chair     Patient End of Session: Up in chair;Needs met;Call light within reach;RN aware of session/findings;All patient questions and concerns addressed;Alarm set    OCCUPATIONAL PROFILE    HOME SITUATION  Type of Home: House  Home Layout: Two level;Able to live on main level (Goes up stairs a few times/week only to shower.)  Lives With: Alone;Caregiver part-time    Toilet and Equipment: Standard height toilet;Toilet riser  Shower/Tub and Equipment: Walk-in shower;Shower chair  Other Equipment: Other (Comment);Hospital bed (RW, W/C, lift chair)    Occupation/Status: Retired     Drives: No  Patient Regularly Uses: Reading glasses    Prior Level of Function: Pt's grandson Víctor is an [employer] RN here at . Pt lives at home alone with family close distance, available to help pt as needed. Pts family visits daily. Pt minimally ambulatory per podiatry recommendation. Pt performs transfers and short distance ambulation with RW and supervision. Pt has hospital bed and sit-stand chair.  Pt's bathroom on main level of house is small per family report, and pt has had difficulty with enter/exit and  toilet transfers in the past. Pt goes up the stairs every other day to shower. Pt has a W/C upstairs that he sits in and rolls into the bathroom. Pt's son makes meals as needed.     SUBJECTIVE   \"You know they call me old 5 toes.\"     PAIN ASSESSMENT  Ratin  Location: N/A       OBJECTIVE  Precautions: Bed/chair alarm;Hard of hearing  Fall Risk: Standard fall risk    WEIGHT BEARING RESTRICTION  Weight Bearing Restriction: R lower extremity (Pt reports limited weight R LE with use of hard sole shoe at home. Encourage heel WB. Pt does not wear post op shoe as is uncomfortable.)                ASSESSMENTS    AM-PAC ‘6-Clicks’ Inpatient Daily Activity Short Form  -   Putting on and taking off regular lower body clothing?: A Little  -   Bathing (including washing, rinsing, drying)?: A Little  -   Toileting, which includes using toilet, bedpan or urinal? : A Little  -   Putting on and taking off regular upper body clothing?: A Little  -   Taking care of personal grooming such as brushing teeth?: None  -   Eating meals?: None    AM-PAC Score:  Score: 20  Approx Degree of Impairment: 38.32%  Standardized Score (AM-PAC Scale): 42.03    PLAN  OT Treatment Plan: Balance activities;Energy conservation/work simplification techniques;ADL training;Functional transfer training;Endurance training;Patient/Family education;Patient/Family training;Equipment eval/education;Compensatory technique education;Continued evaluation  Rehab Potential : Fair  Frequency: 3-5x/week  Number of Visits to Meet Established Goals: 5    ADL Goals  Patient will perform toileting with supervision and AE PRN  Patient will perform LB dressing with supervision and AE PRN    Functional Transfer Goals  Patient will perform bed mobility supine to sit with supervision  Patient will perform bed mobility sit to supine with supervision  Patient will perform toilet transfer with supervision        Patient Evaluation Complexity Level:   Occupational  Profile/Medical History HIGH - Extensive review of history including review of medical or therapy records    Specific performance deficits impacting engagement in ADL/IADL HIGH  5+ performance deficits    Client Assessment/Performance Deficits HIGH - Comorbidities and significant modifications of tasks    Clinical Decision Making HIGH - Analysis of occupational profile, comprehensive assessments, multiple treatment options    Overall Complexity HIGH     OT Session Time: 30 minutes  Therapeutic Activity: 15 minutes

## 2024-02-13 NOTE — PHYSICAL THERAPY NOTE
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  Prior to admission, patient's baseline is mod I for short distance gait and use of w/c.  Patient is requiring contact guard assist and minimal assist as a result of the following impairments: decreased functional strength, decreased endurance/aerobic capacity, impaired dynamic standing balance, and decreased muscular endurance.  Physical Therapy will continue to follow for duration of hospitalization.    Patient will benefit from continued skilled PT Services at discharge to promote functional independence in home.  Anticipate patient will return home with home health PT.    PLAN  PT Treatment Plan: Bed mobility;Endurance;Energy conservation;Patient education;Gait training;Range of motion;Strengthening;Transfer training;Balance training;Stair training  Rehab Potential : Good  Frequency (Obs): 3-5x/week  Number of Visits to Meet Established Goals: 4      CURRENT GOALS    Goal #1 Patient is able to demonstrate supine - sit EOB @ level: modified independent     Goal #2 Patient is able to demonstrate transfers EOB to/from Chair/Wheelchair at assistance level: modified independent     Goal #3 Patient is able to ambulate 15 feet with assist device: walker - rolling at assistance level: supervision     Goal #4 Pt will negotiate one flight of stairs with railing with cga.   Goal #5    Goal #6    Goal Comments: Goals established on 2/13/2024      PHYSICAL THERAPY MEDICAL/SOCIAL HISTORY  History related to current admission: Patient is a 89 year old male admitted on  2024 from home for SOB and hypotension.  Pt diagnosed with PNA, sepsis.    Recent admissions:  - with septick shock, hypotension, PNA with dc to home with HHPT  -2023 with PNA, a-fib, nonhealing wound R foot with dc to home with HHPT    HOME SITUATION  Type of Home: House   Home Layout: Two level;Able to live on main level (Goes up stairs a few times/week only to shower.)                Lives With: Alone  Drives: No  Patient Owned Equipment: Rolling walker;Wheelchair (w/c on main floor and upstairs)       Prior Level of [place]: Pt reports he lives alone and family provides supportive care when needed for chores, errands, meals. Pt reports he uses a walker for short distance gait, but mostly uses w/c in home. Pt reports he is able to negotiate one flight of stairs to access shower a few times/week. Pt reports his able to walk up stairs and not crawl at this time (previously he was crawling up stairs). Post op shoe has been recommended for R foot with instruction for limited WB primarily through heel. Pt reports he does not like post op shoe and typically wears a hard sole shoe instead. Pt denies any falls in last 6 months. Mimi Víctor present during eval (works as RN on [employer]).    SUBJECTIVE  Pt pleasant and cooperative.       OBJECTIVE  Precautions: Bed/chair alarm  Fall Risk: Standard fall risk    WEIGHT BEARING RESTRICTION  Weight Bearing Restriction: R lower extremity (Pt reports limited weight R LE with use of hard sole shoe at home. Encourage heel WB. Pt does not wear post op shoe as is uncomfortable.)                PAIN ASSESSMENT  Ratin          COGNITION  Overall Cognitive Status:  WFL - within functional limits    RANGE OF MOTION AND STRENGTH ASSESSMENT  Upper extremity ROM and strength: see OT note    Lower extremity ROM is within functional limits     Lower extremity strength is within functional limits       BALANCE  Static Sitting: Good  Dynamic Sitting: Not  tested  Static Standing: Fair -  Dynamic Standing: Fair -      ACTIVITY TOLERANCE           BP: 104/61  BP Location: Right arm  BP Method: Automatic  Patient Position: Sitting    O2 WALK  Oxygen Therapy  SPO2% on Oxygen at Rest: 92  At rest oxygen flow (liters per minute): 1        AM-PAC '6-Clicks' INPATIENT SHORT FORM - BASIC MOBILITY  How much difficulty does the patient currently have...  Patient Difficulty: Turning over in bed (including adjusting bedclothes, sheets and blankets)?: None   Patient Difficulty: Sitting down on and standing up from a chair with arms (e.g., wheelchair, bedside commode, etc.): A Little   Patient Difficulty: Moving from lying on back to sitting on the side of the bed?: A Little   How much help from another person does the patient currently need...   Help from Another: Moving to and from a bed to a chair (including a wheelchair)?: A Little   Help from Another: Need to walk in hospital room?: A Little   Help from Another: Climbing 3-5 steps with a railing?: A Little       AM-PAC Score:  Raw Score: 19   Approx Degree of Impairment: 41.77%   Standardized Score (AM-PAC Scale): 45.44   CMS Modifier (G-Code): CK    FUNCTIONAL ABILITY STATUS  Gait Assessment   Functional Mobility/Gait Assessment  Gait Assistance: Contact guard assist  Distance (ft): 2  Assistive Device: Rolling walker  Pattern: R Decreased stance time;Shuffle    Skilled Therapy Provided     Bed Mobility:  Rolling: NT   Supine to sit: min assist   Sit to supine: NT     Transfer Mobility:  Sit to stand: min assist   Stand to sit: cga  Gait = cga with heel wb on R LE, post op shoe not present.    Therapist's Comments: Pt presents semi-reclined in bed, agreeable to PT. RN approval for session noted. Pt mobility as above. Grandson bedside and reports he will ask pt's son to bring in pt's foot wear.    Exercise/Education Provided:  Bed mobility  Body mechanics  Energy conservation  Functional activity tolerated  Gait  training  Posture  Transfer training    Patient End of Session: Up in chair;With  staff;Needs met;Call light within reach;RN aware of session/findings;All patient questions and concerns addressed;Alarm set;Family present      Patient Evaluation Complexity Level:  History Moderate - 1 or 2 personal factors and/or co-morbidities   Examination of body systems Moderate - addressing a total of 3 or more elements   Clinical Presentation Low - Stable   Clinical Decision Making Low - Stable       PT Session Time: 25 minutes  Gait Trainin minutes  Therapeutic Activity: 10 minutes

## 2024-02-14 LAB
ANION GAP SERPL CALC-SCNC: 0 MMOL/L (ref 0–18)
BASOPHILS # BLD AUTO: 0.01 X10(3) UL (ref 0–0.2)
BASOPHILS NFR BLD AUTO: 0.1 %
BUN BLD-MCNC: 22 MG/DL (ref 9–23)
CALCIUM BLD-MCNC: 8.2 MG/DL (ref 8.5–10.1)
CHLORIDE SERPL-SCNC: 113 MMOL/L (ref 98–112)
CO2 SERPL-SCNC: 29 MMOL/L (ref 21–32)
CREAT BLD-MCNC: 0.88 MG/DL
EGFRCR SERPLBLD CKD-EPI 2021: 82 ML/MIN/1.73M2 (ref 60–?)
EOSINOPHIL # BLD AUTO: 0.01 X10(3) UL (ref 0–0.7)
EOSINOPHIL NFR BLD AUTO: 0.1 %
ERYTHROCYTE [DISTWIDTH] IN BLOOD BY AUTOMATED COUNT: 16.4 %
GLUCOSE BLD-MCNC: 137 MG/DL (ref 70–99)
HCT VFR BLD AUTO: 31.6 %
HGB BLD-MCNC: 10.4 G/DL
IMM GRANULOCYTES # BLD AUTO: 0.06 X10(3) UL (ref 0–1)
IMM GRANULOCYTES NFR BLD: 0.5 %
LYMPHOCYTES # BLD AUTO: 0.52 X10(3) UL (ref 1–4)
LYMPHOCYTES NFR BLD AUTO: 4.3 %
MCH RBC QN AUTO: 26.5 PG (ref 26–34)
MCHC RBC AUTO-ENTMCNC: 32.9 G/DL (ref 31–37)
MCV RBC AUTO: 80.4 FL
MONOCYTES # BLD AUTO: 0.81 X10(3) UL (ref 0.1–1)
MONOCYTES NFR BLD AUTO: 6.7 %
NEUTROPHILS # BLD AUTO: 10.68 X10 (3) UL (ref 1.5–7.7)
NEUTROPHILS # BLD AUTO: 10.68 X10(3) UL (ref 1.5–7.7)
NEUTROPHILS NFR BLD AUTO: 88.3 %
OSMOLALITY SERPL CALC.SUM OF ELEC: 299 MOSM/KG (ref 275–295)
PLATELET # BLD AUTO: 228 10(3)UL (ref 150–450)
POTASSIUM SERPL-SCNC: 4 MMOL/L (ref 3.5–5.1)
RBC # BLD AUTO: 3.93 X10(6)UL
SODIUM SERPL-SCNC: 142 MMOL/L (ref 136–145)
WBC # BLD AUTO: 12.1 X10(3) UL (ref 4–11)

## 2024-02-14 PROCEDURE — 99232 SBSQ HOSP IP/OBS MODERATE 35: CPT | Performed by: HOSPITALIST

## 2024-02-14 PROCEDURE — 99233 SBSQ HOSP IP/OBS HIGH 50: CPT | Performed by: INTERNAL MEDICINE

## 2024-02-14 RX ORDER — PREDNISONE 5 MG/1
15 TABLET ORAL
Status: DISCONTINUED | OUTPATIENT
Start: 2024-02-15 | End: 2024-02-15

## 2024-02-14 NOTE — PROGRESS NOTES
Progress Note  Alejandro Guardado Patient Status:  Inpatient    1935 MRN [id number]   [facility] 4SW-A Attending Keagan Barron MD   Hosp Day # 2 PCP Stanislav Odonnell MD     Subjective:  Mr. Guardado reports feeling well. Denies pain or dyspnea.  No events reported overnight    Objective:  Physical Exam:   /83   Pulse 75   Temp 98.3 °F (36.8 °C) (Temporal)   Resp 25   Wt 204 lb 9.4 oz (92.8 kg)   SpO2 98%   BMI 32.04 kg/m²   Temp (24hrs), Av °F (36.7 °C), Min:97.4 °F (36.3 °C), Max:98.5 °F (36.9 °C)       Intake/Output Summary (Last 24 hours) at 2024 0922  Last data filed at 2024 0633  Gross per 24 hour   Intake 1750 ml   Output 2400 ml   Net -650 ml     Wt Readings from Last 3 Encounters:   24 204 lb 9.4 oz (92.8 kg)   24 216 lb (98 kg)   23 216 lb 0.8 oz (98 kg)     Telemetry: Afib ventricular rates in the 80's  General: Alert and oriented to person, place, and date in no apparent distress.  HEENT: No focal deficits.  Neck: No JVD, carotids 2+ no bruits.  Cardiac: Irregularly irregular. S1, S2 normal, no murmur, rub or gallop.  Lungs: Clear without wheezes, rales, rhonchi or dullness.  Normal excursions and effort with nasal cannula in place 1L  Abdomen: Soft, non-tender.   Extremities: Without clubbing, cyanosis or edema.  Peripheral pulses are 2+. Dressing R foot.  Neurologic: Alert and oriented, normal affect.  Skin: Warm and dry.        Intake/Output:    Intake/Output Summary (Last 24 hours) at 2024 0856  Last data filed at 2024 0633  Gross per 24 hour   Intake 1750 ml   Output 2400 ml   Net -650 ml       Last 3 Weights   24 0631 204 lb 9.4 oz (92.8 kg)   24 0000 207 lb 7.3 oz (94.1 kg)   24 1446 210 lb 12.2 oz (95.6 kg)   24 1436 216 lb (98 kg)   23 0421 216 lb 0.8 oz (98 kg)   23 0755 218 lb 0.6 oz (98.9 kg)   23 0939 218 lb 0.6 oz (98.9 kg)   23 1523 208 lb 5.4 oz (94.5 kg)   23 1141 205  lb (93 kg)       Labs:  Recent Labs   Lab 02/12/24  1717 02/13/24  0257 02/14/24  0516   * 216* 137*   BUN 40* 35* 22   CREATSERUM 1.64* 1.20  1.20 0.88   EGFRCR 40* 58*  58* 82   CA 8.4* 7.9* 8.2*   * 135* 142   K 3.9 4.2 4.0    104 113*   CO2 25.0 25.0 29.0     Recent Labs   Lab 02/12/24  1718 02/13/24  0257 02/14/24  0516   RBC 4.83 4.05 3.93   HGB 12.5* 10.5* 10.4*   HCT 38.9* 33.4* 31.6*   MCV 80.5 82.5 80.4   MCH 25.9* 25.9* 26.5   MCHC 32.1 31.4 32.9   RDW 16.5 16.0 16.4   NEPRELIM 22.90* 14.72* 10.68*   WBC 26.7* 16.2* 12.1*   .0 237.0 228.0         Recent Labs   Lab 02/12/24  1126   TROPHS 26       Diagnostics:     ECHOCARDIOGRAM 12/26/2023:  2. Left ventricle: The cavity size was moderately increased. Wall thickness was at the upper limits of normal. Systolic function was markedly reduced. The estimated ejection fraction was 20-25%, by visual assessment. There was severe diffuse hypokinesis with distinct regional wall motion abnormalities. Unable to assess LV diastolic function due to heart rhythm.   3. Aortic valve: Cusp separation was reduced. Transvalvular velocity was increased, due to stenosis. The findings were consistent with moderate to severe stenosis. There was mild regurgitation. The peak systolic velocity was 2.57m/sec. The mean systolic gradient was 15mm Hg. The valve area (VTI) was 1.02cm^2. The valve area (VTI) index was 0.5cm^2/m^2. There is low flow, low gradient aortic valve stenosis present with severity of aortic valve stenosis likely underestimated.   4. Left atrium: The left atrial volume was markedly increased.   5. Right ventricle: The cavity size was mildly increased. Systolic function      was reduced. The tricuspid annular plane systolic excursion (TAPSE) is 1.18cm.   6. Tricuspid valve: There was mild-moderate regurgitation.   7. Pulmonary arteries: Systolic pressure was mildly increased, in the range of 35mm Hg to 40mm Hg. Estimated pulmonary artery  diastolic pressure was 13mm Hg.   Impressions:  This study is compared with previous dated 12/5/23: similar findings.         Medications:   metoprolol succinate ER  25 mg Oral Daily Beta Blocker    hydrocortisone sodium succinate  50 mg Intravenous 2 times per day    piperacillin-tazobactam  3.375 g Intravenous Q8H    apixaban  5 mg Oral BID    atorvastatin  40 mg Oral Nightly    clopidogrel  75 mg Oral Daily    finasteride  5 mg Oral Daily    folic acid  1 mg Oral Daily    gabapentin  300 mg Oral TID    pantoprazole  40 mg Intravenous QAM AC    Or    pantoprazole  40 mg Oral QAM AC    tamsulosin  0.4 mg Oral Daily @ 0700      sodium chloride 50 mL/hr at 02/13/24 2110    norepinephrine Stopped (02/13/24 0146)    vasopressin (Vasostrict) 20 Units in sodium chloride 0.9% 100 mL infusion for septic shock Stopped (02/13/24 0530)       Assessment/Plan:  Acute hypoxic respiratory failure and presumed septic shock related to pneumonia  Breathing comfortably on 1L by nasal cannula  Pulmonology following  On Zosyn  Ischemic 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 Jaeger- but historically has declined interventions   Hx of CABG  Plavix, BB, and statin  PVD w/ partial ampuation R foot  Intervention RLE 12/2023 w/ ongoing wound care  On Plavix and statin therapy  Hypotension  resolved  Afib RVR at presentation  Ventricular rates controlled  Eliquis 5 mg BID  ERON  Cr normal today  Hx of adrenal insufficiency  Takes home prednisone 15 mg daily    PLAN  Continue BB, ASA, statin  Continue Eliquis for CVA risk reduction  afib  Up titrate BB as tolerated by BP and HR  Consider reintroduction of low dose ACE for HFrEF   Monitor volume status   Declines invasive procedures- requests medical therapy for HFrEF  Abx per pulm and primary teams    Fercho Lazo PA-C  2/14/2024  8:56 AM     Patient seen and examined independently.  Note reviewed and labs reviewed.  Agree with above assessment and plan.  Rate control strategy for afib.  On Eliquis.  Has discussed with Dr. Jaeger option of DCCV which can be considered as an outpatient.  Compensated cardiac status.      Home on metoprolol succinate 25 mg daily and apixaban 5 mg daily.  Ok to resume low dose lisinopril 2.5 mg at discharge.  Continue statin and clopidogrel.  Hold diuretic until he is seen by his cardiologist at [facility]. Cardiology service will sign off.    FABIENNE Wick MD

## 2024-02-14 NOTE — PROGRESS NOTES
Patient is well known to our service, seen at bedside for right foot wound  Site is stable and without acute SOI  Site dressed with nati and dry dressings--recommend similar dressing changes every other day  Can follow up at wound care center upon DC  All questions answered to satisfaction

## 2024-02-14 NOTE — PROGRESS NOTES
Daily Pulmonary/ICU/Critical Care Progress Note          SUBJECTIVE:  On NC, no sign overnight events.   Afebrile.      ALLERGIES:  Allergies   Allergen Reactions    Heparin ANAPHYLAXIS    Hydromorphone HALLUCINATION         MEDS:  Home Medications:  Outpatient Medications Marked as Taking for the 2/12/24 encounter (Hospital Encounter)   Medication Sig Dispense Refill    tamsulosin 0.4 MG Oral Cap Take 1 capsule (0.4 mg total) by mouth daily.      apixaban 5 MG Oral Tab Take 1 tablet (5 mg total) by mouth 2 (two) times daily. 60 tablet 3    atorvastatin 40 MG Oral Tab Take 1 tablet (40 mg total) by mouth daily. 30 tablet 0    finasteride 5 MG Oral Tab Take 1 tablet (5 mg total) by mouth daily. 30 tablet 0    clopidogrel 75 MG Oral Tab Take 1 tablet (75 mg total) by mouth daily. 30 tablet 0    gabapentin 300 MG Oral Cap Take 1 capsule (300 mg total) by mouth 3 (three) times daily. 90 capsule 0    furosemide 40 MG Oral Tab Take 1.5 tablets (60 mg total) by mouth daily.      predniSONE 5 MG Oral Tab Take 3 tablets (15 mg total) by mouth daily.      folic acid 1 MG Oral Tab Take 1 tablet (1 mg total) by mouth daily.      acetaminophen 500 MG Oral Tab Take 2 tablets (1,000 mg total) by mouth every 8 (eight) hours. 21 tablet 0    metoprolol succinate ER 25 MG Oral Tablet 24 Hr Take 3 tablets (75 mg total) by mouth daily.      lisinopril 2.5 MG Oral Tab Take 1 tablet (2.5 mg total) by mouth daily.      Omeprazole 40 MG Oral Capsule Delayed Release Take 1 capsule (40 mg total) by mouth daily.       Scheduled Medication:   [START ON 2/15/2024] predniSONE  15 mg Oral Daily with breakfast    metoprolol succinate ER  25 mg Oral Daily Beta Blocker    hydrocortisone sodium succinate  50 mg Intravenous 2 times per day    piperacillin-tazobactam  3.375 g Intravenous Q8H    apixaban  5 mg Oral BID    atorvastatin  40 mg Oral Nightly    clopidogrel  75 mg Oral Daily    finasteride  5 mg Oral Daily    folic acid  1 mg Oral Daily     gabapentin  300 mg Oral TID    pantoprazole  40 mg Intravenous QAM AC    Or    pantoprazole  40 mg Oral QAM AC    tamsulosin  0.4 mg Oral Daily @ 0700     Continuous Infusing Medication:    PRN Medications:  acetaminophen **OR** acetaminophen **OR** acetaminophen, melatonin, polyethylene glycol (PEG 3350), sennosides, bisacodyl, ondansetron, metoclopramide, benzonatate, guaiFENesin, glycerin-hypromellose-, sodium chloride       PHYSICAL EXAM:  /76 (BP Location: Right arm)   Pulse 85   Temp 97.9 °F (36.6 °C) (Temporal)   Resp 14   Wt 204 lb 9.4 oz (92.8 kg)   SpO2 94%   BMI 32.04 kg/m²          CONSTITUTIONAL: alert, oriented, no apparent distress  HEENT: atraumatic normocephalic  MOUTH: mucous membranes are moist. No OP exudates  NECK/THROAT: no JVD. Trachea midline. No obvious thyromegaly  LUNG: clear b/l no wheezing, crackles. Chest symmetric with respiratory motion  HEART: regular rate and rhythm, no obvious murmers or gallops noted  ABD: soft non tender. + bowel sounds. No organomegaly noted  EXT: no clubbing, cyanosis, or edema noted. Pulses intact grossly  NEURO/MUSCULOSKELETAL: no gross deficits  SKIN: warm, dry. No obvious lesions noted  LYMPH: no obvious LAD      IMAGES:   Reviewed in EMR      LABS:  Recent Labs   Lab 02/12/24 1718 02/13/24 0257 02/14/24  0516   RBC 4.83 4.05 3.93   HGB 12.5* 10.5* 10.4*   HCT 38.9* 33.4* 31.6*   MCV 80.5 82.5 80.4   MCH 25.9* 25.9* 26.5   MCHC 32.1 31.4 32.9   RDW 16.5 16.0 16.4   NEPRELIM 22.90* 14.72* 10.68*   WBC 26.7* 16.2* 12.1*   .0 237.0 228.0       Recent Labs   Lab 02/12/24  1126 02/12/24  1717 02/13/24  0257 02/14/24  0516   GLU 84 193* 216* 137*   BUN 38* 40* 35* 22   CREATSERUM 1.62* 1.64* 1.20  1.20 0.88   EGFRCR 40* 40* 58*  58* 82   CA 8.6 8.4* 7.9* 8.2*   ALB 3.1*  --  2.5*  --     134* 135* 142   K 3.6 3.9 4.2 4.0    102 104 113*   CO2 25.0 25.0 25.0 29.0   ALKPHO 68  --  51  --    AST 18  --  18  --    ALT 27  --   22  --    BILT 0.8  --  0.8  --    TP 6.8  --  5.9*  --        ASSESSMENT/PLAN:  Shock  -presumed septic related to PNA  -was given fluid bolus in ED  -resolved and weaned off pressors  Acute Hypoxic Resp Failure  -due to PNA  -improved  -wean O2 as tolerated. Now on RA to NC  PNA-HAP  -?aspiration  -cont zosyn (2/12- ), vanco (2/12- 2/13)  -monitor cultures and adjust abx accordingly  ERON  -due to shock  -improved w/ BP support  ?Adrenal insuf  -baseline of prednisone 15mg daily for PMR  -taper stress dose-restart back on home dose tomorrow  HFrEF, A-fib  -cards following  -cont Eliquis  -cont Plavix  PVD  -s/p partial amputation of right foot  -wound care PRN  FEN  -diet as tolerated  -monitor lytes, replace PRN  Proph  -Eliquis  Dispo  -DNR/DNI  -can transfer to the floor today  -has seen both [facility] and [facility] in the last 2 mths, pulm consult per [facility] hospitalist      Thank you for the opportunity to care for Alejandro Arriaza DO, MPH  Pulmonary Critical Care Medicine

## 2024-02-14 NOTE — PLAN OF CARE
Pt A&Ox4. On 2L while sleeping. Afib on monitor. No complaints of pain. Primofit intact with good urine output. All questions answered. Call light within reach. Transfer orders in place.

## 2024-02-14 NOTE — PAYOR COMM NOTE
2/12 THRU 2/14   ADMISSION REVIEW     Payor: [employer] MEDICARE ADV PPO  Subscriber #:  [id number]  Authorization Number: [id number]    Admit date: 2/12/24  Admit time:  2:39 PM       REVIEW DOCUMENTATION:     ED Provider Notes        ED Provider Notes signed by Warren Martin MD at 2/12/2024  4:21 PM       Author: Warren Martin MD Service: -- Author Type: Physician    Filed: 2/12/2024  4:21 PM Date of Service: 2/12/2024 11:31 AM Status: Signed    : Warren Martin MD (Physician)         1  Patient Seen in: [facility] Emergency Department      History     Chief Complaint   Patient presents with    Hypotension    Difficulty Breathing     Stated Complaint: hypotension, ramu    Subjective:   HPI    Patient is a 89-year-old male who history of A-fib.  Patient has low ejection fraction.  Patient was with family yesterday seem to be doing okay.  Patient called this morning asking for diet Dr. Pepper and cough medicine from family.  They found him feeling very weak seemingly short of breath and called 911.  Patient denies any pain.  No headache, patient has had a cough which she states is nonproductive.  Patient denies chest pain, no abdominal pain, no fevers or chills.  Patient was found to be hypotensive and hypoxic.  Patient brought to the emerged part for further evaluation.  Remainder of review of systems negative.  Patient is DNR.    Objective:   Past Medical History:   Diagnosis Date    Arrhythmia     CAD (coronary artery disease) 09/28/2015    Cardiomyopathy (HCC)     Congestive heart disease (HCC) 2022    Esophageal reflux     Hearing impairment     Heart attack (HCC)     High blood pressure     High cholesterol     History of MI (myocardial infarction)     Other and unspecified hyperlipidemia     PAD (peripheral artery disease) (HCC) 04/26/2023    Pneumonia due to organism 12/2023    Pneumonia, bacterial 12/28/2023    Unspecified essential hypertension     Visual impairment               Past Surgical History:    Procedure Laterality Date    ANGIOGRAM      ANGIOPLASTY (CORONARY)      CABG      CATARACT  04/2023    FRACTURE SURGERY Left     left hip pinning    OTHER SURGICAL HISTORY Left 01/01/1977    knee surgery    OTHER SURGICAL HISTORY  03/25/2016    Left hip ORIF pinning    TONSILLECTOMY                  Social History     Socioeconomic History    Marital status:    Tobacco Use    Smoking status: Never    Smokeless tobacco: Never   Vaping Use    Vaping Use: Never used   Substance and Sexual Activity    Alcohol use: Never    Drug use: Never     Social Determinants of Health     Food Insecurity: No Food Insecurity (2/12/2024)    Food Insecurity     Food Insecurity: Never true   Transportation Needs: No Transportation Needs (2/12/2024)    Transportation Needs     Lack of Transportation: No   Housing Stability: Low Risk  (2/12/2024)    Housing Stability     Housing Instability: No              Review of Systems    Positive for stated complaint: hypotension, ramu  Other systems are as noted in HPI.  Constitutional and vital signs reviewed.      All other systems reviewed and negative except as noted above.    Physical Exam     ED Triage Vitals [02/12/24 1117]   BP 93/49   Pulse 109   Resp (!) 28   Temp 99.7 °F (37.6 °C)   Temp src Temporal   SpO2 93 %   O2 Device Non-rebreather mask       Current:BP 98/48   Pulse 72   Temp 99.7 °F (37.6 °C) (Temporal)   Resp 23   Wt 95.6 kg   SpO2 100%   BMI 33.01 kg/m²         Physical Exam  GENERAL: Patient resting on the cart in no acute distress.  HEENT: Extraocular muscles intact, pupils equal round reactive to light, neck supple, no meningismus.  LUNGS: Lungs crackles bilaterally.  CARDIOVASCULAR: + S1-S2, irregular, increased rate and rhythm, no murmurs.  BACK: No CVA tenderness, no midline bony tenderness.  ABDOMEN: + Bowel sounds, soft, nontender, nondistended.  No rebound, no guarding, no hepatosplenomegaly.  EXTREMITIES: Full range of motion, no tenderness, good  capillary refill.  1-2+ pretibial edema bilaterally.  SKIN: No rash, good turgor.  NEURO: Patient answers questions appropriately.  No focal deficits appreciated.  Conversant.           ED Course     Labs Reviewed   COMP METABOLIC PANEL (14) - Abnormal; Notable for the following components:       Result Value    BUN 38 (*)     Creatinine 1.62 (*)     eGFR-Cr 40 (*)     Albumin 3.1 (*)     A/G Ratio 0.8 (*)     All other components within normal limits   PRO BETA NATRIURETIC PEPTIDE - Abnormal; Notable for the following components:    Pro-Beta Natriuretic Peptide 4,511 (*)     All other components within normal limits   PROTHROMBIN TIME (PT) - Abnormal; Notable for the following components:    PT 16.7 (*)     INR 1.35 (*)     All other components within normal limits   LACTIC ACID, PLASMA - Abnormal; Notable for the following components:    Lactic Acid 2.9 (*)     All other components within normal limits   ABG PANEL W ELECT AND LACTATE - Abnormal; Notable for the following components:    ABG pO2 184 (*)     Total Hemoglobin 12.0 (*)     Methemoglobin 0.0 (*)     Potassium Blood Gas 3.5 (*)     Sodium Blood Gas 133 (*)     All other components within normal limits   PROCALCITONIN - Abnormal; Notable for the following components:    Procalcitonin 7.74 (*)     All other components within normal limits    Narrative:     Resulted by: batch: TNIH, K, PBNP, CO2, CL, NA, MG, ALB, TBIL, ALT, AST, ALP, CA, CREA, BUN, GLUC,    CBC W/ DIFFERENTIAL - Abnormal; Notable for the following components:    WBC 15.1 (*)     MCH 25.9 (*)     Neutrophil Absolute Prelim 12.33 (*)     Neutrophil Absolute 12.33 (*)     Monocyte Absolute 1.26 (*)     All other components within normal limits   TROPONIN I HIGH SENSITIVITY - Normal   PTT, ACTIVATED - Normal   MAGNESIUM - Normal   SARS-COV-2/FLU A AND B/RSV BY PCR (GENEXPERT) - Normal    Narrative:     This test is intended for the qualitative detection and differentiation of SARS-CoV-2,  influenza A, influenza B, and respiratory syncytial virus (RSV) viral RNA in nasopharyngeal or nares swabs from individuals suspected of respiratory viral infection consistent with COVID-19 by their healthcare provider. Signs and symptoms of respiratory viral infection due to SARS-CoV-2, influenza, and RSV can be similar.    Test performed using the Xpert Xpress SARS-CoV-2/FLU/RSV (real time RT-PCR)  assay on the GeneXpert instrument, [employer], [place] [address].   This test is being used under the Food and Drug Administration's Emergency Use Authorization.    The authorized Fact Sheet for Healthcare Providers for this assay is available upon request from the laboratory.   CBC WITH DIFFERENTIAL WITH PLATELET    Narrative:     The following orders were created for panel order CBC With Differential With Platelet.  Procedure                               Abnormality         Status                     ---------                               -----------         ------                     CBC W/ DIFFERENTIAL[[id number]]          Abnormal            Final result                 Please view results for these tests on the individual orders.   URINALYSIS WITH CULTURE REFLEX   LACTIC ACID REFLEX POST POSTIVE   STREPTOCOCCUS PNEUMONIAE AG, URINE   LEGIONELLA URINE AG SEROGRP 1   BASIC METABOLIC PANEL (8)   CBC WITH DIFFERENTIAL WITH PLATELET    Narrative:     The following orders were created for panel order CBC With Differential With Platelet.  Procedure                               Abnormality         Status                     ---------                               -----------         ------                     CBC W/ DIFFERENTIAL[[id number]]                                                           Please view results for these tests on the individual orders.   RAINBOW DRAW LAVENDER   RAINBOW DRAW LIGHT GREEN   RAINBOW DRAW GOLD   RAINBOW DRAW BLUE   BLOOD CULTURE   BLOOD CULTURE   ED/MRSA SCREEN BY PCR-CC   SPUTUM CULTURE    CBC W/ DIFFERENTIAL     EKG    Rate, intervals and axes as noted on EKG Report.  Rate: 113  Rhythm: Atrial Fibrillation  Reading: Atrial fibrillation, nonspecific ST changes                 Chest x-ray Considerable worsening since the prior exam, now with extensive airspace disease throughout the upper and lower right lung especially right perihilar and right upper-mid lung, but also the right lower lung.  There may be minimal much more   subtle similar infiltrates in the left upper and lower lung.  Concern for pneumonia, with asymmetric edema also possible.  Cardiac enlargement seen with vascular congestion present.  No sizable effusion or pneumothorax.   Independent reviewed by myself, no pneumothorax        MDM      Patient initially hypotensive and hypoxic.  Patient was on 15 L nonrebreather.  Patient converted to BiPAP.  Patient initially given small fluid bolus however was started on Levophed.  Patient is DNR.  Patient does not wish to be intubated or CPR.  Family confirmed this decision.  Patient was given Zosyn.  Patient x-ray concerning for pneumonia or CHF.  Patient had elevated BNP as well as elevated lactic acid.  Patient was given broad-spectrum antibiotics.  Patient was discussed with cardiology as well as intensivist and hospitalist.  Patient be admitted to the ICU for further evaluation.  I did consider CHF, pneumonia, sepsis.  Patient is on a blood thinner already.  Cardiology did find that the patient has been on steroids 15 mg a day.  On further questioning family does recall he takes prednisone for diffuse aches and pains and was taking 15 mg.  Patient was given 100 mg hydrocortisone for possible adrenal insufficiency.  Patient will be admitted to the ICU.    A total of 50 minutes of critical care time (exclusive of billable procedures) was administered to manage the patient's unstable vital signs due to his pneumonia, CHF, sepsis.  This involved direct patient intervention, complex decision  making, and/or extensive discussions with the patient, family, and clinical staff.    Patient did not receive 30ml/kg of fluids despite having: elevated Lactate. The reason for doing so is: a concern for fluid overload. 500mL of fluids were given instead (750 is the amount of fluids given).              Admission disposition: 2/12/2024 12:33 PM                                        Medical Decision Making      Disposition and Plan     Clinical Impression:  1. Community acquired pneumonia, unspecified laterality    2. Sepsis due to undetermined organism (HCC)    3. Acute on chronic congestive heart failure, unspecified heart failure type (HCC)         Disposition:  Admit  2/12/2024 12:33 pm    Follow-up:  No follow-up provider specified.        Medications Prescribed:  Current Discharge Medication List                            Hospital Problems       Present on Admission  Date Reviewed: 1/29/2024            ICD-10-CM Noted POA    * (Principal) Community acquired pneumonia, unspecified laterality J18.9 2/12/2024 Unknown    Acute on chronic congestive heart failure, unspecified heart failure type (HCC) I50.9 2/12/2024 Unknown    Hyponatremia E87.1 2/12/2024 Yes    Metabolic alkalosis E87.3 2/12/2024 Yes    Sepsis due to undetermined organism (HCC) A41.9 2/12/2024 Unknown                     Signed by Warren Martin MD on 2/12/2024  4:21 PM         MEDICATIONS ADMINISTERED IN LAST 1 DAY:  apixaban (Eliquis) tab 5 mg       Date Action Dose Route User    2/14/2024 0800 Given 5 mg Oral Quynh Stewart RN    2/13/2024 2111 Given 5 mg Oral Kisha Young RN          atorvastatin (Lipitor) tab 40 mg       Date Action Dose Route User    2/13/2024 2111 Given 40 mg Oral Kisha Young RN          clopidogrel (Plavix) tab 75 mg       Date Action Dose Route User    2/14/2024 0748 Given 75 mg Oral GreigQuynh Rojo RN          finasteride (Proscar) tab 5 mg       Date Action Dose Route User    2/14/2024 0748 Given 5  mg Oral Quynh Stewart RN          folic acid (Folvite) tab 1 mg       Date Action Dose Route User    2/14/2024 0748 Given 1 mg Oral Quynh Stewart RN          gabapentin (Neurontin) cap 300 mg       Date Action Dose Route User    2/14/2024 0800 Given 300 mg Oral Quynh Stewart RN    2/13/2024 2111 Given 300 mg Oral Kisha Young RN    2/13/2024 1621 Given 300 mg Oral Quynh Stewart RN          hydrocortisone Na succinate PF (Solu-CORTEF) injection 50 mg       Date Action Dose Route User    2/14/2024 0800 Given 50 mg Intravenous Quynh Stewart RN    2/13/2024 2111 Given 50 mg Intravenous Kisha Young RN          metoprolol succinate ER (Toprol XL) 24 hr tab 25 mg       Date Action Dose Route User    2/14/2024 0624 Given 25 mg Oral Kisha Young RN          pantoprazole (Protonix) DR tab 40 mg       Date Action Dose Route User    2/14/2024 0627 Given 40 mg Oral Kisha Young RN          piperacillin-tazobactam (Zosyn) 3.375 g in dextrose 5% 100 mL IVPB-ADDV       Date Action Dose Route User    2/14/2024 1145 New Bag 3.375 g Intravenous Quynh Stewart RN    2/14/2024 0339 New Bag 3.375 g Intravenous Kisha Young RN    2/13/2024 2111 New Bag 3.375 g Intravenous Kisha Young RN          sodium chloride 0.9% infusion       Date Action Dose Route User    2/13/2024 2110 New Bag (none) Intravenous Kisha Young RN          tamsulosin (Flomax) cap 0.4 mg       Date Action Dose Route User    2/14/2024 0627 Given 0.4 mg Oral Kisha Young RN            Vitals (last day)       Date/Time Temp Pulse Resp BP SpO2 Weight O2 Device O2 Flow Rate (L/min) Who    02/14/24 1300 -- 93 13 -- 96 % -- -- -- OO    02/14/24 1200 97.4 °F (36.3 °C) 82 26 144/90 100 % -- None (Room air) -- OO    02/14/24 1100 -- 87 17 -- 96 % -- -- -- OO    02/14/24 0800 97.9 °F (36.6 °C) 85 14 114/76 94 % -- None (Room air) -- OO    02/14/24 0700 -- 75 25 -- 98 % -- -- -- KD    02/14/24  0631 -- 82 21 140/83 100 % 204 lb 9.4 oz -- -- KD    02/14/24 0500 -- 77 14 -- 97 % -- -- -- KD    02/14/24 0400 98.3 °F (36.8 °C) 88 13 127/83 98 % -- Nasal cannula 2 L/min KD    02/14/24 0300 -- 83 15 -- 98 % -- -- -- KD    02/14/24 0100 -- 85 12 -- 83 % -- -- -- KD    02/14/24 0000 97.6 °F (36.4 °C) 90 12 122/80 95 % -- None (Room air) -- KD    02/13/24 2137 -- -- -- -- -- -- None (Room air) -- KD    02/13/24 2100 -- 85 22 -- 97 % -- -- -- KD    02/13/24 2000 98.2 °F (36.8 °C) 89 20 105/56 99 % -- High flow nasal cannula 2 L/min KD    02/13/24 1800 -- 98 29 126/80 94 % -- -- -- OO    02/13/24 1700 -- 85 21 -- 99 % -- -- -- OO    02/13/24 1630 -- 87 18 108/72 100 % -- -- -- OO    02/13/24 1600 98.5 °F (36.9 °C) 89 24 124/78 98 % -- High flow nasal cannula 2 L/min OO    02/13/24 1530 -- 87 21 117/65 98 % -- -- -- OO    02/13/24 1500 -- 86 18 106/63 96 % -- -- -- OO    02/13/24 1430 -- 92 18 121/94 93 % -- -- -- OO    02/13/24 1400 -- 93 20 117/72 100 % -- -- -- OO    02/13/24 1300 -- 96 17 141/73 98 % -- -- -- OO    02/13/24 1230 -- 88 16 92/60 95 % -- -- -- OO    02/13/24 1200 97.4 °F (36.3 °C) 91 14 98/48 92 % -- High flow nasal cannula 2 L/min OO    02/13/24 1102 -- -- -- 104/61 -- -- -- -- MD    02/13/24 1100 -- 89 25 109/69 97 % -- -- -- OO    02/13/24 1000 -- 82 22 109/67 95 % -- -- -- OO    02/13/24 0900 -- 75 14 105/62 98 % -- -- -- OO    02/13/24 0800 97.6 °F (36.4 °C) 76 21 96/57 98 % -- High flow nasal cannula 3 L/min OO    02/13/24 0700 -- 75 21 97/58 100 % -- -- -- OO    02/13/24 0630 -- 81 27 110/72 98 % -- -- -- KD    02/13/24 0600 -- 73 21 97/62 100 % -- -- -- KD    02/13/24 0530 -- 75 39 104/61 100 % -- -- -- KD    02/13/24 0500 -- 75 19 106/59 98 % -- -- -- KD    02/13/24 0430 -- 71 27 112/67 99 % -- -- -- KD    02/13/24 0400 98.7 °F (37.1 °C) 73 25 113/62 95 % -- High flow nasal cannula 4 L/min KD    02/13/24 0330 -- 77 26 100/63 100 % -- -- -- KD    02/13/24 0300 -- 78 19 101/62 100 % -- -- --  KD    02/13/24 0230 -- 68 23 100/58 100 % -- -- -- KD    02/13/24 0200 -- 79 16 109/61 99 % -- -- -- KD    02/13/24 0100 -- 69 18 128/73 100 % -- -- -- KD    02/13/24 0030 -- 76 17 112/66 99 % -- -- -- KD    02/13/24 0000 97.9 °F (36.6 °C) 79 25 126/59 94 % 207 lb 7.3 oz High flow nasal cannula 4 L/min KD         2/12 CARDIOLOGY CONSULT NOTE    Reason for Consultation:  hypotension, hypoxia.        History of Present Illness:  Alejandro Guardado is a a(n) 89 year old male. Nice old alta, on Bipap, two children at the bedside.  Prior CABG, EF 25-30%, severe PVD, afib.  Admitted twice in Dec 2023 for sepsis.  Had percutaneous intervention on his RLE.  He has decline cardiac cath and is DNR.  Son says he see him most days and his dad was doing great yesterday.  This morning his dad called and said he could not stop coughing and was weak.  Paramedics found him very weak, hypoxic, and hypotensive.  Now on 16 mcg's of levo.  CXR showed extensive right sided pneumonia vs asymmetric CHF.  pBNP 4,500, same as 12/25/23.  EKG afib without ischemic changes.  Lactic 2.9.         Temp:  [99.7 °F (37.6 °C)] 99.7 °F (37.6 °C)  Pulse:  [] 108  Resp:  [22-28] 23  BP: ()/(34-69) 101/59  SpO2:  [93 %-100 %] 100 %    Extremities: 1+ edema          Impression:   Acute hypoxic resp failure - sudden onset this morning.  R/o aspiration/sepsis.  Possible acute heart failure though EKG w/o ischemic changes, negative initial troponin.  Ischemic cardiomyopathy, remote CABG, EF 25-30%.  Has declined invasive procedures.  PVD with right foot partial amp, intervention on RLE Dec 2023, ongoing wound care.  Hypotension - suspect sepsis rather than cardiogenic, but not 100% sure.  Afib - RVR in setting of acute illness.  ARF  H/o adrenal insufficiency     Plan:  Hydrocortisone, saline bolus.  Maintenance fluids.  Procal pending.  Patient is DNR/DNI.  Continue levo for now, titrate sys>80.  Pulm to follow.  Continue plavix.  Continue  eliquis.  Daily labs.     2/12 PULMONARY CONSULT NOTE       History of Present Illness: 89 y/o w/ h/o A--fib, HFrEF, who was noted to be weak and have SHEEHAN by his family.  EMS was called and found him to be hypoxic and hypotensive.  He was placed on BiPAP and started on norepi in the ED.  He had a chest x-ray done that showed an extensive right sided infiltrate.  He was started on treatment for HAP w/ zosyn and steroids.  He was admitted to the ICU for further care.  Currently he feels ok and would prefer not to be in the hospital if at all possible.    tals:     02/12/24 1325 02/12/24 1330 02/12/24 1340 02/12/24 1400   BP: 92/54 90/62 101/59 98/48   Pulse: 105 104 108 72   Resp: (!) 27 (!) 27 23 23   Temp:           TempSrc:           SpO2: 100% 100% 100% 100%            Oxygen Therapy  SpO2: 100 %  O2 Device: Bi-PAP  O2 Flow Rate (L/min): 15 L/min                  ASSESSMENT/PLAN:  Shock  -presumed septic related to PNA  -lactic is elevated  -was given fluid bolus in ED  -cont levo, start vaso, wean pressors as tolerated  Acute Hypoxic Resp Failure  -due to PNA  -wean off BiPAP as tolerated  PNA-HAP  -?aspiration  -cont zosyn (2/12- )  -monitor cultures and adjust abx accordingly  ERON  -due to shock  -monitor UOP and renal indices w/ BP support  ?Adrenal insuf  -baseline of prednisone 15mg daily for PMR  -cont stress dose steroids and plan to taper back to home dose of prednisone  HFrEF, A-fib  -cards following  -cont eliquis  -cont plavix  PVD  -s/p partial amputation of right foot  -wound care PRN  FEN  -npo while on BiPAP  -monitor lytes, replace PRN  Proph  -eliquis  Dispo  -DNR/DNI    2/12 H&P  Chief Complaint: SOB, hypotension        Subjective:   History of Present Illness:   Alejandro Guardado is a 89 year old male with PMHx CAD s/p CABG and PCI, HFrEF, ischemic cardiomyopathy with EF 25-30%, atrial fibrillation to eliquis, hypertension, hyperlipidemia, severe PAD, BPH, secondary adrenal insufficiency and GERD  who presents to the hospital with cough and generalized weakness.  Patient was admitted twice in December, once for pneumonia and once for septic shock related to pneumonia.  He thrombectomy and revascularization/stent in RLE in December 2023.  He has declined cath in the past.  Son sees patient most days and states he was doing well yesterday.  However this morning patient called his son due to weakness, coughing and shortness of breath.  EMS called and they found him hypoxic and hypotensive.  In the ER chest x-ray showed right-sided pneumonia.  proBNP 4500, same as December 2023.  EKG showed A-fib without acute ischemic changes.  Lactic acid was 2.9.  Started on Levophed, given Zosyn and transferred to the ICU.         Assessment & Plan:   #Septic shock due to multifocal pneumonia  -Wean pressors as tolerated  -Given fluid bolus in ER  -Follow blood cultures  -Procalcitonin over 7  -Sputum cultures and strep/legionella urine antigens ordered  -Zosyn and vancomycin, check MRSA nares     #Secondary Adrenal insufficiency  -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  -Stress dose with IV hydrocortisone  -Has never seen a Rheumatologist for formal diagnosis, needs to see as outpatient for consideration of steroid sparing agent      #Acute Kidney Injury due to septic shock  -Monitor with improved hemodynamics, check AM BMP  -diuresis as tolerated      #Acute hypoxemic respiratory failure 2/2 PNA  -ABG reviewed, currently on BiPAP  -Wean O2 as tolerated  -Troponin negative and initial troponin without acute ischemic changes  -IS     #Chronic HFrEF, ischemic cardiomyopathy with LVEF 25-30%  -Has declined invasive procedures in the past  -Hold home BB and lisinopril     #BPH  -Finasteride  -Flomax     #Afib with RVR  -Eliquis  -Hold metoprolol     #CAD s/p CABG   -Plavix/statin     #Hyperlipidemia  -Statin     #GERD  -PPI      #PAD with hx of non-healing R foot wound s/p recent  thrombectomy and revascularization/stent 12/4 by Dr. Steward  -Plavix/Statin  -Wound care      Plan of care discussed with patient, RN and Dr. Mohr.     Procedure Note     Procedure: Central venous cath inserted into Right internal Jugular Vein.  Informed consent obtained.  All questions answered.  Pt prepped and draped in sterile fashion.  IJ visualized with Ultrasound Guidance.  Catheter placed over guidewire.  All three ports violet and flushed easily.  Line sutured in place.  X-ray ordered to confirm placement and r/o pneumothorax.     Surg: Dr. Mohr     Anes: Local Lidocaine     Indication: Septic shock     Result:  Good blood flow x3 ports     2/13 CARDIOLOGY NOTE    Subjective:  Breathing comfortably on NC.  Off pressors.  Tele negative.     Medications:   hydrocortisone sodium succinate  50 mg Intravenous 4 times per day    piperacillin-tazobactam  3.375 g Intravenous Q8H    apixaban  5 mg Oral BID    atorvastatin  40 mg Oral Nightly    clopidogrel  75 mg Oral Daily    finasteride  5 mg Oral Daily    folic acid  1 mg Oral Daily    gabapentin  300 mg Oral TID    pantoprazole  40 mg Intravenous QAM AC     Or    pantoprazole  40 mg Oral QAM AC    tamsulosin  0.4 mg Oral Daily @ 0700    [START ON 2/14/2024] vancomycin  15 mg/kg Intravenous Q36H         Continuous Infusions:   sodium chloride 100 mL/hr at 02/13/24 0133    norepinephrine Stopped (02/13/24 0146)    vasopressin (Vasostrict) 20 Units in sodium chloride 0.9% 100 mL infusion for septic shock Stopped (02/13/24 0530)             Physical Exam:     Temp:  [97.9 °F (36.6 °C)-101.2 °F (38.4 °C)] 98.7 °F (37.1 °C)  Pulse:  [] 81  Resp:  [16-39] 27  BP: ()/(34-82) 110/72  SpO2:  [93 %-100 %] 98 %  FiO2 (%):  [100 %] 100 %    Recent Labs   Lab 02/12/24  1126 02/12/24 1718 02/13/24 0257   WBC 15.1* 26.7* 16.2*   HGB 13.7 12.5* 10.5*   .0 342.0 237.0         Chem:        Recent Labs   Lab 02/12/24  1126 02/12/24 1717 02/13/24  0257     134* 135*   K 3.6 3.9 4.2    102 104   CO2 25.0 25.0 25.0   BUN 38* 40* 35*   CREATSERUM 1.62* 1.64* 1.20  1.20   CA 8.6 8.4* 7.9*   MG 1.8  --  2.4   GLU 84 193* 216*              Recent Labs   Lab 02/12/24  1126 02/13/24  0257   ALT 27 22   AST 18 18   ALB 3.1* 2.5*                Impression:  Acute hypoxic resp failure - sudden onset 2/12 am.  R/o aspiration/sepsis.  Possible acute heart failure though EKG w/o ischemic changes, negative initial troponin.  Ischemic cardiomyopathy, remote CABG, EF 25-30%.  Has declined invasive procedures.  PVD with right foot partial amp, intervention on RLE Dec 2023, ongoing wound care.  Lactic acidosis, elevated procal, Hypotension - suspect sepsis rather than cardiogenic.  Responded to fluids and hydrocortisone.  Afib - RVR in setting of acute illness.  ARF - resolved with hydration.  H/o adrenal insufficiency - on hydrocortisone.    2/13 PULMONARY NOTE       Last 24hrs: Febrile overnight, BP and resp status have improved, pt denies complaints this AM     OBJECTIVE:  Vitals          Vitals:     02/13/24 0500 02/13/24 0530 02/13/24 0600 02/13/24 0630   BP: 106/59 104/61 97/62 110/72   BP Location:           Pulse: 75 75 73 81   Resp: 19 (!) 39 21 (!) 27   Temp:           TempSrc:           SpO2: 98% 100% 100% 98%   Weight:                    Oxygen Therapy  SpO2: 98 %  O2 Device: High flow nasal cannula  FiO2 (%): 100 %  O2 Flow Rate (L/min): 4 L/min  Pulse Oximetry Type: Continuous  Oximetry Probe Site Changed: No  Pulse Ox Probe Location: Left hand                 Scheduled Medication:   metoprolol succinate ER  25 mg Oral Daily Beta Blocker    hydrocortisone sodium succinate  50 mg Intravenous 4 times per day    piperacillin-tazobactam  3.375 g Intravenous Q8H    apixaban  5 mg Oral BID    atorvastatin  40 mg Oral Nightly    clopidogrel  75 mg Oral Daily    finasteride  5 mg Oral Daily    folic acid  1 mg Oral Daily    gabapentin  300 mg Oral TID     pantoprazole  40 mg Intravenous QAM AC     Or    pantoprazole  40 mg Oral QAM AC    tamsulosin  0.4 mg Oral Daily @ 0700    [START ON 2/14/2024] vancomycin  15 mg/kg Intravenous Q36H      Continuous Infusing Medication:   sodium chloride 50 mL/hr at 02/13/24 0656    norepinephrine Stopped (02/13/24 0146)    vasopressin (Vasostrict) 20 Units in sodium chloride 0.9% 100 mL infusion for septic shock Stopped (02/13/24 0530)           ASSESSMENT/PLAN:     Shock  -presumed septic related to PNA  -was given fluid bolus in ED  -improved  -weaned off pressors  Acute Hypoxic Resp Failure  -due to PNA  -improved  -wean O2 as tolerated  PNA-HAP  -?aspiration  -cont zosyn (2/12- ), vanco (2/12- 2/13)  -monitor cultures and adjust abx accordingly  ERON  -due to shock  -improved w/ BP support  ?Adrenal insuf  -baseline of prednisone 15mg daily for PMR  -taper stress dose  HFrEF, A-fib  -cards following  -cont eliquis  -cont plavix  PVD  -s/p partial amputation of right foot  -wound care PRN  FEN  -diet as tolerated  -monitor lytes, replace PRN  Proph  -eliquis  Dispo  -DNR/DNI  -possible transfer to the floor later today  -has seen both [facility] and [facility] in the last 2 mths, pulm consult per [facility] hospitalist    2/13 HOSPITALIST NOTE    Subjective:   Patient feels better today. He is on 3L oxygen nasal cannula. No fever. He is off pressors.        Assessment & Plan:   #Septic shock  -Shock resolved, off pressors  -Given fluid bolus in ER  -Follow blood cultures     #Multifocal pneumonia  -Procalcitonin over 7  -Sputum cultures if able. Follow blood cultures  -Strep/legionella urine antigens negative. MRSA nares negative  -Zosyn. Stop vancomycin  -SLP eval, plan for video swallow     #Secondary Adrenal insufficiency  -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  -Stress dose with IV hydrocortisone weaned  -Has never seen a Rheumatologist for formal diagnosis, needs to see as outpatient for  consideration of steroid sparing agent. Discussed with larry     #Acute Kidney Injury due to septic shock  -Improved      #Acute hypoxemic respiratory failure 2/2 PNA  -ABG reviewed  -Wean O2 as tolerated  -Troponin negative and initial EKG without acute ischemic changes  -IS     #Chronic HFrEF, ischemic cardiomyopathy with LVEF 25-30%  -Has declined invasive procedures in the past  -BB  -Hold lisinopril     #BPH  -Finasteride  -Flomax     #Afib with RVR  -Eliquis  -Resume metoprolol     #CAD s/p CABG   -Plavix/statin     #Hyperlipidemia  -Statin     #GERD  -PPI      #PAD with hx of non-healing R foot wound s/p recent thrombectomy and revascularization/stent 12/4 by Dr. Steward  -Plavix/Statin  -Wound care     2/14 HOSPITALIST NOTE  Patient is feeling better today.         Vital signs:  Temp:  [97.4 °F (36.3 °C)-98.5 °F (36.9 °C)] 98.3 °F (36.8 °C)  Pulse:  [75-98] 75  Resp:  [12-29] 25  BP: ()/(48-94) 140/83  SpO2:  [83 %-100 %] 98 %    Recent Labs   Lab 02/12/24  1126 02/12/24  1718 02/13/24  0257 02/14/24  0516   WBC 15.1* 26.7* 16.2* 12.1*   HGB 13.7 12.5* 10.5* 10.4*   MCV 81.1 80.5 82.5 80.4   .0 342.0 237.0 228.0   BAND  --  20  --   --    INR 1.35*  --   --   --                 Recent Labs   Lab 02/12/24  1126 02/12/24  1717 02/13/24  0257 02/14/24  0516   GLU 84 193* 216* 137*   BUN 38* 40* 35* 22   CREATSERUM 1.62* 1.64* 1.20  1.20 0.88   CA 8.6 8.4* 7.9* 8.2*   ALB 3.1*  --  2.5*  --     134* 135* 142   K 3.6 3.9 4.2 4.0    102 104 113*   CO2 25.0 25.0 25.0 29.0          Assessment & Plan:   #Septic shock -> resolved  -Shock resolved, off pressors  -Given fluid bolus in ER  -Follow blood cultures     #Multifocal pneumonia  -Procalcitonin over 7  -Cultures no growth to date  -Strep/legionella urine antigens negative. MRSA nares negative  -Zosyn. Stop vancomycin  -SLP eval, plan for video swallow     #Secondary Adrenal insufficiency  -Chronically on prednisone for nonspecific  polyarthralgia/inflammatory arthropathy  -Stress dose with IV hydrocortisone weaned  -Has never seen a Rheumatologist for formal diagnosis, needs to see as outpatient for consideration of steroid sparing agent. Discussed with larry     #Acute Kidney Injury due to septic shock  -Improved      #Acute hypoxemic respiratory failure 2/2 PNA  -ABG reviewed  -Wean O2 as tolerated  -Troponin negative and initial EKG without acute ischemic changes  -IS     #Chronic HFrEF, ischemic cardiomyopathy with LVEF 25-30%  -Has declined invasive procedures in the past  -BB  -Hold lisinopril     #BPH  -Finasteride  -Flomax     #Afib with RVR  -Eliquis  -Resume metoprolol     #CAD s/p CABG   -Plavix/statin     #Hyperlipidemia  -Statin     #GERD  -PPI      #PAD with hx of non-healing R foot wound s/p recent thrombectomy and revascularization/stent 12/4 by Dr. Steward  -Plavix/Statin  -Wound care      2/14 PODIATRY NOTE  Patient is well known to our service, seen at bedside for right foot wound  Site is stable and without acute SOI  Site dressed with nati and dry dressings--recommend similar dressing changes every other day  Can follow up at wound care center upon DC  All questions answered to satisfaction     2/14 CARDIOLOGY NOTE  Telemetry: Afib ventricular rates in the 80's       Assessment/Plan:  Acute hypoxic respiratory failure and presumed septic shock related to pneumonia  Breathing comfortably on 1L by nasal cannula  Pulmonology following  On Zosyn  Ischemic 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 Jaeger- but  historically has declined interventions   Hx of CABG  Plavix, BB, and statin  PVD w/ partial ampuation R foot  Intervention RLE 12/2023 w/ ongoing wound care  On Plavix and statin therapy  Hypotension  resolved  Afib RVR at presentation  Ventricular rates controlled  Eliquis 5 mg BID  ERON  Cr normal today  Hx of adrenal insufficiency  Takes home prednisone 15 mg daily     PLAN  Continue BB, ASA, statin  Continue Eliquis for CVA risk reduction afib  Up titrate BB as tolerated by BP and HR  Consider reintroduction of low dose ACE for HFrEF   Monitor volume status   Declines invasive procedures- requests medical therapy for HFrEF  Abx per pulm and primary teams     2/14 PULMONARY NOTE    ASSESSMENT/PLAN:  Shock  -presumed septic related to PNA  -was given fluid bolus in ED  -resolved and weaned off pressors  Acute Hypoxic Resp Failure  -due to PNA  -improved  -wean O2 as tolerated. Now on RA to NC  PNA-HAP  -?aspiration  -cont zosyn (2/12- ), vanco (2/12- 2/13)  -monitor cultures and adjust abx accordingly  ERON  -due to shock  -improved w/ BP support  ?Adrenal insuf  -baseline of prednisone 15mg daily for PMR  -taper stress dose-restart back on home dose tomorrow  HFrEF, A-fib  -cards following  -cont Eliquis  -cont Plavix  PVD  -s/p partial amputation of right foot  -wound care PRN  FEN  -diet as tolerated  -monitor lytes, replace PRN  Proph  -Eliquis  Dispo  -DNR/DNI  -can transfer to the floor today  -has seen both [facility] and [facility] in the last 2 mths, pulm consult per [facility] hospitalist

## 2024-02-14 NOTE — PROGRESS NOTES
[facility]     Hospitalist Progress Note     Alejandro Guardado Patient Status:  Inpatient    1935 MRN [id number]   [facility] 4SW-A Attending Keagan Barron MD   Hosp Day # 2 PCP Stanislav Odonnell MD     Chief Complaint: SOB    Subjective:     Patient is feeling better today.  Wants to go home.  Denies fever, chills, n,/v.  No other acute complaints.     Objective:    Review of Systems:   A comprehensive review of systems was completed; pertinent positive and negatives stated in subjective.    Vital signs:  Temp:  [97.4 °F (36.3 °C)-98.5 °F (36.9 °C)] 98.3 °F (36.8 °C)  Pulse:  [75-98] 75  Resp:  [12-29] 25  BP: ()/(48-94) 140/83  SpO2:  [83 %-100 %] 98 %    Physical Exam:    General: No acute distress, pleasant   Respiratory: No wheezes, no rhonchi  Cardiovascular: S1, S2, regular rate and rhythm  Abdomen: Soft, Non-tender, non-distended, positive bowel sounds  Neuro: No new focal deficits.   Extremities: Right foot wound     Diagnostic Data:    Labs:  Recent Labs   Lab 24  1126 24  1718 24  0257 24  0516   WBC 15.1* 26.7* 16.2* 12.1*   HGB 13.7 12.5* 10.5* 10.4*   MCV 81.1 80.5 82.5 80.4   .0 342.0 237.0 228.0   BAND  --  20  --   --    INR 1.35*  --   --   --        Recent Labs   Lab 24  1126 24  1717 24  0257 24  0516   GLU 84 193* 216* 137*   BUN 38* 40* 35* 22   CREATSERUM 1.62* 1.64* 1.20  1.20 0.88   CA 8.6 8.4* 7.9* 8.2*   ALB 3.1*  --  2.5*  --     134* 135* 142   K 3.6 3.9 4.2 4.0    102 104 113*   CO2 25.0 25.0 25.0 29.0   ALKPHO 68  --  51  --    AST 18  --  18  --    ALT 27  --  22  --    BILT 0.8  --  0.8  --    TP 6.8  --  5.9*  --        Estimated Creatinine Clearance: 53.2 mL/min (based on SCr of 0.88 mg/dL).    Recent Labs   Lab 24  1126   TROPHS 26       Recent Labs   Lab 24  1126   PTP 16.7*   INR 1.35*                  Microbiology    Hospital Encounter on 24   1. Blood Culture      Status: None (Preliminary result)    Collection Time: 02/12/24 12:34 PM    Specimen: Blood,peripheral   Result Value Ref Range    Blood Culture Result No Growth 1 Day N/A         Imaging: Reviewed in Epic.    Medications:    metoprolol succinate ER  25 mg Oral Daily Beta Blocker    hydrocortisone sodium succinate  50 mg Intravenous 2 times per day    piperacillin-tazobactam  3.375 g Intravenous Q8H    apixaban  5 mg Oral BID    atorvastatin  40 mg Oral Nightly    clopidogrel  75 mg Oral Daily    finasteride  5 mg Oral Daily    folic acid  1 mg Oral Daily    gabapentin  300 mg Oral TID    pantoprazole  40 mg Intravenous QAM AC    Or    pantoprazole  40 mg Oral QAM AC    tamsulosin  0.4 mg Oral Daily @ 0700       Assessment & Plan:      #Septic shock -> resolved  -Shock resolved, off pressors  -Given fluid bolus in ER  -Follow blood cultures     #Multifocal pneumonia  -Procalcitonin over 7  -Cultures no growth to date  -Strep/legionella urine antigens negative. MRSA nares negative  -Zosyn. 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 Discussed with larry     #Acute Kidney Injury due to septic shock  -Improved      #Acute hypoxemic respiratory failure 2/2 PNA  -ABG reviewed  -Wean O2 as tolerated  -Troponin negative and initial EKG without acute ischemic changes  -IS     #Chronic HFrEF, ischemic cardiomyopathy with LVEF 25-30%  -Has declined invasive procedures in the past  -BB  -Hold lisinopril     #BPH  -Finasteride  -Flomax     #Afib with RVR  -Eliquis  -Resume metoprolol     #CAD s/p CABG   -Plavix/statin     #Hyperlipidemia  -Statin     #GERD  -PPI      #PAD with hx of non-healing R foot wound s/p recent thrombectomy  and revascularization/stent 12/4 by Dr. Steward  -Plavix/Statin  -Wound care      Discussed with patient, grandson and SLP.      Keagan Barron MD    Supplementary Documentation:     Quality:  DVT Mechanical Prophylaxis:   SCDs, Early ambuation  DVT Pharmacologic Prophylaxis   Medication    apixaban (Eliquis) tab 5 mg                Code Status: DNAR/Selective Treatment  Block: External urinary catheter in place  Block Duration (in days):   Central line:    DIEGO:     Discharge is dependent on: clinical recovery   At this point Mr. Guardado is expected to be discharge to: Home    The 21st Century Cures Act makes medical notes like these available to patients in the interest of transparency. Please be advised this is a medical document. Medical documents are intended to carry relevant information, facts as evident, and the clinical opinion of the practitioner. The medical note is intended as peer to peer communication and may appear blunt or direct. It is written in medical language and may contain abbreviations or verbiage that are unfamiliar.

## 2024-02-15 VITALS
TEMPERATURE: 98 F | OXYGEN SATURATION: 92 % | WEIGHT: 205 LBS | SYSTOLIC BLOOD PRESSURE: 126 MMHG | BODY MASS INDEX: 32 KG/M2 | HEART RATE: 91 BPM | DIASTOLIC BLOOD PRESSURE: 71 MMHG | RESPIRATION RATE: 16 BRPM

## 2024-02-15 LAB
ALBUMIN SERPL-MCNC: 2.6 G/DL (ref 3.4–5)
ALBUMIN/GLOB SERPL: 0.8 {RATIO} (ref 1–2)
ALP LIVER SERPL-CCNC: 52 U/L
ALT SERPL-CCNC: 30 U/L
ANION GAP SERPL CALC-SCNC: 4 MMOL/L (ref 0–18)
AST SERPL-CCNC: 16 U/L (ref 15–37)
BASOPHILS # BLD AUTO: 0.01 X10(3) UL (ref 0–0.2)
BASOPHILS NFR BLD AUTO: 0.1 %
BILIRUB SERPL-MCNC: 0.5 MG/DL (ref 0.1–2)
BUN BLD-MCNC: 22 MG/DL (ref 9–23)
CALCIUM BLD-MCNC: 8.4 MG/DL (ref 8.5–10.1)
CHLORIDE SERPL-SCNC: 112 MMOL/L (ref 98–112)
CO2 SERPL-SCNC: 27 MMOL/L (ref 21–32)
CREAT BLD-MCNC: 0.91 MG/DL
EGFRCR SERPLBLD CKD-EPI 2021: 81 ML/MIN/1.73M2 (ref 60–?)
EOSINOPHIL # BLD AUTO: 0.01 X10(3) UL (ref 0–0.7)
EOSINOPHIL NFR BLD AUTO: 0.1 %
ERYTHROCYTE [DISTWIDTH] IN BLOOD BY AUTOMATED COUNT: 16.3 %
GLOBULIN PLAS-MCNC: 3.3 G/DL (ref 2.8–4.4)
GLUCOSE BLD-MCNC: 148 MG/DL (ref 70–99)
HCT VFR BLD AUTO: 34.1 %
HGB BLD-MCNC: 10.5 G/DL
IMM GRANULOCYTES # BLD AUTO: 0.06 X10(3) UL (ref 0–1)
IMM GRANULOCYTES NFR BLD: 0.7 %
LYMPHOCYTES # BLD AUTO: 0.59 X10(3) UL (ref 1–4)
LYMPHOCYTES NFR BLD AUTO: 7 %
MCH RBC QN AUTO: 25.7 PG (ref 26–34)
MCHC RBC AUTO-ENTMCNC: 30.8 G/DL (ref 31–37)
MCV RBC AUTO: 83.4 FL
MONOCYTES # BLD AUTO: 0.45 X10(3) UL (ref 0.1–1)
MONOCYTES NFR BLD AUTO: 5.4 %
NEUTROPHILS # BLD AUTO: 7.29 X10 (3) UL (ref 1.5–7.7)
NEUTROPHILS # BLD AUTO: 7.29 X10(3) UL (ref 1.5–7.7)
NEUTROPHILS NFR BLD AUTO: 86.7 %
OSMOLALITY SERPL CALC.SUM OF ELEC: 302 MOSM/KG (ref 275–295)
PLATELET # BLD AUTO: 246 10(3)UL (ref 150–450)
POTASSIUM SERPL-SCNC: 3.9 MMOL/L (ref 3.5–5.1)
PROT SERPL-MCNC: 5.9 G/DL (ref 6.4–8.2)
RBC # BLD AUTO: 4.09 X10(6)UL
SODIUM SERPL-SCNC: 143 MMOL/L (ref 136–145)
WBC # BLD AUTO: 8.4 X10(3) UL (ref 4–11)

## 2024-02-15 PROCEDURE — 99239 HOSP IP/OBS DSCHRG MGMT >30: CPT | Performed by: HOSPITALIST

## 2024-02-15 RX ORDER — METOPROLOL SUCCINATE 25 MG/1
25 TABLET, EXTENDED RELEASE ORAL
Qty: 90 TABLET | Refills: 1 | Status: SHIPPED | OUTPATIENT
Start: 2024-02-16

## 2024-02-15 RX ORDER — AMOXICILLIN AND CLAVULANATE POTASSIUM 875; 125 MG/1; MG/1
1 TABLET, FILM COATED ORAL 2 TIMES DAILY
Qty: 10 TABLET | Refills: 0 | Status: SHIPPED | OUTPATIENT
Start: 2024-02-15 | End: 2024-02-20

## 2024-02-15 NOTE — CM/SW NOTE
02/14/24 0034 02/14/24 0035 02/14/24 0036   Vital Signs   Pulse  --   --   --    Resp  --   --   --    Oxygen Therapy   SpO2 94 % 94 % 94 %      02/14/24 0037 02/14/24 0038 02/14/24 0039   Vital Signs   Pulse  --   --   --    Resp  --   --   --    Oxygen Therapy   SpO2 98 % 94 % 97 %      02/14/24 0040 02/14/24 0041 02/14/24 0042   Vital Signs   Pulse  --   --   --    Resp  --   --   --    Oxygen Therapy   SpO2 98 % (!) 86 % (!) 86 %      02/14/24 0043 02/14/24 0044 02/14/24 0045   Vital Signs   Pulse  --   --   --    Resp  --   --   --    Oxygen Therapy   SpO2 94 % (!) 74 % 93 %      02/14/24 0046 02/14/24 0047 02/14/24 0048   Vital Signs   Pulse  --   --   --    Resp  --   --   --    Oxygen Therapy   SpO2 (!) 86 % 96 % 98 %      02/14/24 0049 02/14/24 0050 02/14/24 0051   Vital Signs   Pulse  --   --   --    Resp  --   --   --    Oxygen Therapy   SpO2 92 % 90 % 95 %      02/14/24 0052 02/14/24 0053 02/14/24 0054   Vital Signs   Pulse  --   --   --    Resp  --   --   --    Oxygen Therapy   SpO2 96 % 97 % 97 %      02/14/24 0055 02/14/24 0056 02/14/24 0057   Vital Signs   Pulse  --   --   --    Resp  --   --   --    Oxygen Therapy   SpO2 97 % 98 % 93 %      02/14/24 0058 02/14/24 0100 02/14/24 0300   Vital Signs   Pulse  --  85 83   Resp  --  12 15   Oxygen Therapy   SpO2 99 % (!) 83 % 98 %     Overnight oxygen saturation values assessed. Pt with multiple episodes of hypoxia with sleep overnight 2/14. Pt maintained oxygen saturation in the 90's with oxygen at 2L/NC.     CM/SW will remain available for DC planning and/or support.     NELLA BentonN, CMSRN    [id number]

## 2024-02-15 NOTE — PLAN OF CARE
Received pt at shift change on room air and following all commands/ Axox4. Pt denies pain and is afebrile throughout shift. VSS, pt up to chair for most of shift. Safety and comfort maintained and pt family updated on plan of care. Pt tolerating diet, male cristian maintained with good output. See mar and flowsheets for more information.  Family wants him to have home 02 for exertion on exercise. If patient to discharge tomorrow family cannot  until 3 pm.   Problem: Diabetes/Glucose Control  Goal: Glucose maintained within prescribed range  Description: INTERVENTIONS:  - Monitor Blood Glucose as ordered  - Assess for signs and symptoms of hyperglycemia and hypoglycemia  - Administer ordered medications to maintain glucose within target range  - Assess barriers to adequate nutritional intake and initiate nutrition consult as needed  - Instruct patient on self management of diabetes  Outcome: Progressing     Problem: Patient/Family Goals  Goal: Patient/Family Long Term Goal  Description: Patient's Long Term Goal: Pt will go to an outpatient sleep study for patient sleep apnea    Interventions:  - pt will wear oxygen during sleep while in the hospital to support O2 needs while sleeping.  - See additional Care Plan goals for specific interventions  Outcome: Progressing  Goal: Patient/Family Short Term Goal  Description: Patient's Short Term Goal: Family would like patient to have oxygen at home to allow for more independence at home.    Interventions:   - Pt will utilize oxygen during exertion for recovery, as well as practice good breathing techniques  - See additional Care Plan goals for specific interventions  Outcome: Progressing     Problem: RESPIRATORY - ADULT  Goal: Achieves optimal ventilation and oxygenation  Description: INTERVENTIONS:  - Assess for changes in respiratory status  - Assess for changes in mentation and behavior  - Position to facilitate oxygenation and minimize respiratory effort  -  Oxygen supplementation based on oxygen saturation or ABGs  - Provide Smoking Cessation handout, if applicable  - Encourage broncho-pulmonary hygiene including cough, deep breathe, Incentive Spirometry  - Assess the need for suctioning and perform as needed  - Assess and instruct to report SOB or any respiratory difficulty  - Respiratory Therapy support as indicated  - Manage/alleviate anxiety  - Monitor for signs/symptoms of CO2 retention  Outcome: Progressing

## 2024-02-15 NOTE — PROGRESS NOTES
[facility]     Hospitalist Progress Note     Alejandro Guardado Patient Status:  Inpatient    1935 MRN [id number]   [facility] 4SW-A Attending Keagan Barron MD   Hosp Day # 3 PCP Stanislav Odonnell MD     Chief Complaint: SOB    Subjective:     Patient feeling well today wants to go home.  Denies any fevers, no chills, no cough, no chest pain, no shortness of breath.  No other acute complaints.    Objective:    Review of Systems:   A comprehensive review of systems was completed; pertinent positive and negatives stated in subjective.    Vital signs:  Temp:  [97.4 °F (36.3 °C)-98.2 °F (36.8 °C)] 98.2 °F (36.8 °C)  Pulse:  [] 91  Resp:  [13-54] 13  BP: (120-148)/(64-97) 148/92  SpO2:  [94 %-100 %] 98 %    Physical Exam:    General: No acute distress, pleasant   Respiratory: No wheezes, no rhonchi  Cardiovascular: S1, S2, regular rate and rhythm  Abdomen: Soft, Non-tender, non-distended, positive bowel sounds  Neuro: No new focal deficits.   Extremities: Right foot wound     Diagnostic Data:    Labs:  Recent Labs   Lab 24  1126 02/12/24  1718 02/13/24  0257 02/14/24  0516 02/15/24  0435   WBC 15.1* 26.7* 16.2* 12.1* 8.4   HGB 13.7 12.5* 10.5* 10.4* 10.5*   MCV 81.1 80.5 82.5 80.4 83.4   .0 342.0 237.0 228.0 246.0   BAND  --  20  --   --   --    INR 1.35*  --   --   --   --        Recent Labs   Lab 24  1126 24  0516 02/15/24  0435   GLU 84   < > 216* 137* 148*   BUN 38*   < > 35* 22 22   CREATSERUM 1.62*   < > 1.20  1.20 0.88 0.91   CA 8.6   < > 7.9* 8.2* 8.4*   ALB 3.1*  --  2.5*  --  2.6*      < > 135* 142 143   K 3.6   < > 4.2 4.0 3.9      < > 104 113* 112   CO2 25.0   < > 25.0 29.0 27.0   ALKPHO 68  --  51  --  52   AST 18  --  18  --  16   ALT 27  --  22  --  30   BILT 0.8  --  0.8  --  0.5   TP 6.8  --  5.9*  --  5.9*    < > = values in this interval not displayed.       Estimated Creatinine Clearance: 51.5 mL/min (based on  SCr of 0.91 mg/dL).    Recent Labs   Lab 02/12/24  1126   TROPHS 26       Recent Labs   Lab 02/12/24  1126   PTP 16.7*   INR 1.35*                  Microbiology    Hospital Encounter on 02/12/24   1. Urine Culture, Routine     Status: None    Collection Time: 02/12/24  6:48 PM    Specimen: Urine, clean catch   Result Value Ref Range    Urine Culture No Growth at 18-24 hrs. N/A   2. Blood Culture     Status: None (Preliminary result)    Collection Time: 02/12/24 12:34 PM    Specimen: Blood,peripheral   Result Value Ref Range    Blood Culture Result No Growth 2 Days N/A         Imaging: Reviewed in Epic.    Medications:    predniSONE  15 mg Oral Daily with breakfast    metoprolol succinate ER  25 mg Oral Daily Beta Blocker    piperacillin-tazobactam  3.375 g Intravenous Q8H    apixaban  5 mg Oral BID    atorvastatin  40 mg Oral Nightly    clopidogrel  75 mg Oral Daily    finasteride  5 mg Oral Daily    folic acid  1 mg Oral Daily    gabapentin  300 mg Oral TID    pantoprazole  40 mg Intravenous QAM AC    Or    pantoprazole  40 mg Oral QAM AC    tamsulosin  0.4 mg Oral Daily @ 0700       Assessment & Plan:      #Septic shock -> resolved  -Shock resolved, off pressors for 2-3 days  -s/p fluid resuscitation   -Follow blood cultures - NGTD     #Multifocal pneumonia  -Procalcitonin over 7  -Cultures no growth to date  -Strep/legionella urine antigens negative. MRSA nares negative  -Zosyn. Stop vancomycin -> augmentin on discharge      #Secondary Adrenal insufficiency  -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  -Steroid dose, can resume regular home dosing     #Acute Kidney Injury due to septic shock  -Improved      #Acute hypoxemic respiratory failure 2/2 PNA  -ABG reviewed  -Wean O2 as tolerated  -Troponin negative and initial EKG without acute ischemic changes  -IS     #Chronic HFrEF, ischemic cardiomyopathy with LVEF 25-30%  -Has declined invasive procedures in the  past  -BB  -Hold lisinopril     #BPH  -Finasteride  -Flomax     #Afib with RVR  -Eliquis  -Resume metoprolol     #CAD s/p CABG   -Plavix/statin     #Hyperlipidemia  -Statin     #GERD  -PPI      #PAD with hx of non-healing R foot wound s/p recent thrombectomy and revascularization/stent 12/4 by Dr. Steward  -Plavix/Statin  -Wound care     I had a face to face visit with this patient and I evaluated the patient's O2 saturations.  The patient is mobile in his home and is in need of a portable oxygen tank.  The home oxygen will improve his medical condition and he is agreeable to using it.        Keagan Barron MD    Supplementary Documentation:     Quality:  DVT Mechanical Prophylaxis:   SCDs, Early ambuation  DVT Pharmacologic Prophylaxis   Medication    apixaban (Eliquis) tab 5 mg                Code Status: DNAR/Selective Treatment  Block: External urinary catheter in place  Block Duration (in days):   Central line:    DIEGO:     Discharge is dependent on: clinical recovery   At this point Mr. Guardado is expected to be discharge to: Home    The 21st Century Cures Act makes medical notes like these available to patients in the interest of transparency. Please be advised this is a medical document. Medical documents are intended to carry relevant information, facts as evident, and the clinical opinion of the practitioner. The medical note is intended as peer to peer communication and may appear blunt or direct. It is written in medical language and may contain abbreviations or verbiage that are unfamiliar.

## 2024-02-15 NOTE — PROGRESS NOTES
Daily Pulmonary/ICU/Critical Care Progress Note          SUBJECTIVE:  On RA, no sign overnight events.   Afebrile.      ALLERGIES:  Allergies   Allergen Reactions    Heparin ANAPHYLAXIS    Hydromorphone HALLUCINATION         MEDS:  Home Medications:  Outpatient Medications Marked as Taking for the 2/12/24 encounter (Hospital Encounter)   Medication Sig Dispense Refill    amoxicillin clavulanate 875-125 MG Oral Tab Take 1 tablet by mouth 2 (two) times daily for 5 days. 10 tablet 0    [START ON 2/16/2024] metoprolol succinate ER 25 MG Oral Tablet 24 Hr Take 1 tablet (25 mg total) by mouth Daily Beta Blocker. 90 tablet 1    tamsulosin 0.4 MG Oral Cap Take 1 capsule (0.4 mg total) by mouth daily.      apixaban 5 MG Oral Tab Take 1 tablet (5 mg total) by mouth 2 (two) times daily. 60 tablet 3    atorvastatin 40 MG Oral Tab Take 1 tablet (40 mg total) by mouth daily. 30 tablet 0    finasteride 5 MG Oral Tab Take 1 tablet (5 mg total) by mouth daily. 30 tablet 0    clopidogrel 75 MG Oral Tab Take 1 tablet (75 mg total) by mouth daily. 30 tablet 0    gabapentin 300 MG Oral Cap Take 1 capsule (300 mg total) by mouth 3 (three) times daily. 90 capsule 0    furosemide 40 MG Oral Tab Take 1.5 tablets (60 mg total) by mouth daily.      predniSONE 5 MG Oral Tab Take 3 tablets (15 mg total) by mouth daily.      folic acid 1 MG Oral Tab Take 1 tablet (1 mg total) by mouth daily.      acetaminophen 500 MG Oral Tab Take 2 tablets (1,000 mg total) by mouth every 8 (eight) hours. 21 tablet 0    metoprolol succinate ER 50 MG Oral Tablet 24 Hr Take 1 tablet (50 mg total) by mouth daily.      lisinopril 2.5 MG Oral Tab Take 1 tablet (2.5 mg total) by mouth daily.      Omeprazole 40 MG Oral Capsule Delayed Release Take 1 capsule (40 mg total) by mouth daily.       Scheduled Medication:   predniSONE  15 mg Oral Daily with breakfast    metoprolol succinate ER  25 mg Oral Daily Beta Blocker    piperacillin-tazobactam  3.375 g Intravenous Q8H     apixaban  5 mg Oral BID    atorvastatin  40 mg Oral Nightly    clopidogrel  75 mg Oral Daily    finasteride  5 mg Oral Daily    folic acid  1 mg Oral Daily    gabapentin  300 mg Oral TID    pantoprazole  40 mg Intravenous QAM AC    Or    pantoprazole  40 mg Oral QAM AC    tamsulosin  0.4 mg Oral Daily @ 0700     Continuous Infusing Medication:    PRN Medications:  acetaminophen **OR** acetaminophen **OR** acetaminophen, melatonin, polyethylene glycol (PEG 3350), sennosides, bisacodyl, ondansetron, metoclopramide, benzonatate, guaiFENesin, glycerin-hypromellose-, sodium chloride       PHYSICAL EXAM:  /71 (BP Location: Right arm)   Pulse 91   Temp 98.4 °F (36.9 °C) (Temporal)   Resp 16   Wt 205 lb 0.4 oz (93 kg)   SpO2 92%   BMI 32.11 kg/m²          CONSTITUTIONAL: alert, oriented, no apparent distress  HEENT: atraumatic normocephalic  MOUTH: mucous membranes are moist. No OP exudates  NECK/THROAT: no JVD. Trachea midline. No obvious thyromegaly  LUNG: clear b/l no wheezing, crackles. Chest symmetric with respiratory motion  HEART: regular rate and rhythm, no obvious murmers or gallops noted  ABD: soft non tender. + bowel sounds. No organomegaly noted  EXT: no clubbing, cyanosis, or edema noted. Pulses intact grossly  NEURO/MUSCULOSKELETAL: no gross deficits  SKIN: warm, dry. No obvious lesions noted  LYMPH: no obvious LAD      IMAGES:   Reviewed in EMR      LABS:  Recent Labs   Lab 02/13/24  0257 02/14/24  0516 02/15/24  0435   RBC 4.05 3.93 4.09   HGB 10.5* 10.4* 10.5*   HCT 33.4* 31.6* 34.1*   MCV 82.5 80.4 83.4   MCH 25.9* 26.5 25.7*   MCHC 31.4 32.9 30.8*   RDW 16.0 16.4 16.3   NEPRELIM 14.72* 10.68* 7.29   WBC 16.2* 12.1* 8.4   .0 228.0 246.0       Recent Labs   Lab 02/12/24  1126 02/12/24  1717 02/13/24  0257 02/14/24  0516 02/15/24  0435   GLU 84   < > 216* 137* 148*   BUN 38*   < > 35* 22 22   CREATSERUM 1.62*   < > 1.20  1.20 0.88 0.91   EGFRCR 40*   < > 58*  58* 82 81   CA 8.6    < > 7.9* 8.2* 8.4*   ALB 3.1*  --  2.5*  --  2.6*      < > 135* 142 143   K 3.6   < > 4.2 4.0 3.9      < > 104 113* 112   CO2 25.0   < > 25.0 29.0 27.0   ALKPHO 68  --  51  --  52   AST 18  --  18  --  16   ALT 27  --  22  --  30   BILT 0.8  --  0.8  --  0.5   TP 6.8  --  5.9*  --  5.9*    < > = values in this interval not displayed.       ASSESSMENT/PLAN:  Shock  -presumed septic related to PNA  -was given fluid bolus in ED  -resolved and weaned off pressors  Acute Hypoxic Resp Failure  -due to PNA  -improved  -wean O2 as tolerated. Now on RA to NC  PNA-HAP  -?aspiration  -cont zosyn (2/12- ), vanco (2/12- 2/13). Can switch zosyn to augmentin on discharge  ERON  -due to shock  -improved w/ BP support  ?Adrenal insuf  -baseline of prednisone 15mg daily for PMR  -taper stress dose-restarted back on home dose today  HFrEF, A-fib  -cards following  -cont Eliquis  -cont Plavix  PVD  -s/p partial amputation of right foot  -wound care PRN  FEN  -diet as tolerated  -monitor lytes, replace PRN  Proph  -Eliquis  Dispo  -DNR/DNI  -can transfer to the floor   -has seen both [facility] and [facility] in the last 2 mths, pulm consult per [facility] hospitalist      Thank you for the opportunity to care for Alejandro Arriaza DO, MPH  Pulmonary Critical Care Medicine

## 2024-02-15 NOTE — CM/SW NOTE
02/15/24 1015   Mobility   O2 walk? Yes   SPO2% on Room Air at Rest 94   SPO2% on Oxygen at Rest 98   At rest oxygen flow (liters per minute) 2   SPO2% Ambulation on Room Air 86   SPO2% Ambulation on Oxygen 95   Ambulation oxygen flow (liters per minute) 2     O2 walk completed by pt's RN Andrew.     Referral sent for home o2 and MD paged for oxygen orders.    CM/SW will remain available for DC planning and/or support.     NELLA BentonN, CMSRN    [id number]

## 2024-02-15 NOTE — PLAN OF CARE
Assumed care of patient this am, discharge orders received, see  note for updates. Discharge education provided to patient and family via phone. All questions answered. All personal belongings sent with patient. Patient discharged @ 5002

## 2024-02-15 NOTE — PLAN OF CARE
Pt A&Ox4. RA. Afib. No complaints of pain. Voiding via purewick/ up to the restroom. SCD on. Pt and family updated on POC. Call light within reach.

## 2024-02-15 NOTE — CM/SW NOTE
Dr Arriaza contacted for home oxygen orders. [facility] will be able to provide home oxygen. Pt does not want HHC at DC. Pt's family aware and feel they provide enough support for pt at home. [facility]HC referral cancelled and [facility] notified.    Per pt's ANNALISE Morales, family will pick pt up for DC at 3pm.    Once Home oxygen order is signed by provider, will send to [facility]. They can then provide pt with a portable tank for DC.     CM/SW will remain available for DC planning and/or support.     NELLA BentonN, CMSRN    [id number]

## 2024-02-16 ENCOUNTER — TELEPHONE (OUTPATIENT)
Dept: CARDIOLOGY | Age: 89
End: 2024-02-16

## 2024-02-16 NOTE — DISCHARGE SUMMARY
[facility]     Alejandro Guardado Patient Status:  Inpatient    1935 MRN [id number]   Location [facility] 4SW-A Attending No att. providers found   Hosp Day # 3 PCP Stanislav Odonnell MD     Date of Admission: 2024  Date of Discharge:  2/15/2024     Discharge Disposition: Home or Self Care    Discharge Diagnosis:    #Septic shock -> resolved  #Multifocal pneumonia  #Secondary Adrenal insufficiency  #Acute Kidney Injury due to septic shock  #Acute hypoxemic respiratory failure  PNA  #Chronic HFrEF, ischemic cardiomyopathy with LVEF 25-30%  #BPH  #Afib with RVR  #CAD s/p CABG   #Hyperlipidemia  #GERD  #PAD with hx of non-healing R foot wound s/p recent thrombectomy and revascularization/stent  by Dr. Steward    History of Present Illness: Alejandro Guardado is a 89 year old male with PMHx CAD s/p CABG and PCI, HFrEF, ischemic cardiomyopathy with EF 25-30%, atrial fibrillation to eliquis, hypertension, hyperlipidemia, severe PAD, BPH, secondary adrenal insufficiency and GERD who presents to the hospital with cough and generalized weakness.  Patient was admitted twice in December, once for pneumonia and once for septic shock related to pneumonia.  He thrombectomy and revascularization/stent in E in 2023.  He has declined cath in the past.  Son sees patient most days and states he was doing well yesterday.  However this morning patient called his son due to weakness, coughing and shortness of breath.  EMS called and they found him hypoxic and hypotensive.  In the ER chest x-ray showed right-sided pneumonia.  proBNP 4500, same as 2023.  EKG showed A-fib without acute ischemic changes.  Lactic acid was 2.9.  Started on Levophed, given Zosyn and transferred to the ICU.    Brief Synopsis:   Patient admitted with cough and weakness.  Found to have septic shock related to multifocal pneumonia and acute hypoxic respiratory failure.  Patient also developed A-fib with RVR.   He was seen by cardiology and pulmonology critical care.  Patient symptoms improved.  He completed a course of antibiotics and will be discharged home on Augmentin.  He will follow-up with his PCP after discharge.  All questions and concerns were addressed with patient and family prior to discharge home, patient agreeable to plan of care as stated.  He is encouraged to return to the ER if symptoms come back or worsen.    Lace+ Score: 83  59-90 High Risk  29-58 Medium Risk  0-28   Low Risk       TCM Follow-Up Recommendation:  LACE > 58: High Risk of readmission after discharge from the hospital.      Procedures during hospitalization:   None    Incidental or significant findings and recommendations (brief descriptions):  None    Lab/Test results pending at Discharge:   N/a    Consultants:  Cardiology, Pulmonology     Discharge Medication List:     Discharge Medications        START taking these medications        Instructions Prescription details   amoxicillin clavulanate 875-125 MG Tabs  Commonly known as: Augmentin      Take 1 tablet by mouth 2 (two) times daily for 5 days.   Stop taking on: February 20, 2024  Quantity: 10 tablet  Refills: 0            CHANGE how you take these medications        Instructions Prescription details   metoprolol succinate ER 25 MG Tb24  Commonly known as: Toprol XL  What changed:   medication strength  how much to take  when to take this      Take 1 tablet (25 mg total) by mouth Daily Beta Blocker.   Quantity: 90 tablet  Refills: 1            CONTINUE taking these medications        Instructions Prescription details   acetaminophen 500 MG Tabs  Commonly known as: Tylenol Extra Strength      Take 2 tablets (1,000 mg total) by mouth every 8 (eight) hours.   Quantity: 21 tablet  Refills: 0     apixaban 5 MG Tabs  Commonly known as: Eliquis      Take 1 tablet (5 mg total) by mouth 2 (two) times daily.   Quantity: 60 tablet  Refills: 3     atorvastatin 40 MG Tabs  Commonly known as:  Lipitor      Take 1 tablet (40 mg total) by mouth daily.   Quantity: 30 tablet  Refills: 0     clopidogrel 75 MG Tabs  Commonly known as: Plavix      Take 1 tablet (75 mg total) by mouth daily.   Quantity: 30 tablet  Refills: 0     finasteride 5 MG Tabs  Commonly known as: Proscar      Take 1 tablet (5 mg total) by mouth daily.   Quantity: 30 tablet  Refills: 0     folic acid 1 MG Tabs  Commonly known as: Folvite      Take 1 tablet (1 mg total) by mouth daily.   Refills: 0     gabapentin 300 MG Caps  Commonly known as: Neurontin      Take 1 capsule (300 mg total) by mouth 3 (three) times daily.   Quantity: 90 capsule  Refills: 0     lisinopril 2.5 MG Tabs  Commonly known as: Prinivil; Zestril      Take 1 tablet (2.5 mg total) by mouth daily.   Refills: 0     Omeprazole 40 MG Cpdr      Take 1 capsule (40 mg total) by mouth daily.   Refills: 0     predniSONE 5 MG Tabs  Commonly known as: Deltasone      Take 3 tablets (15 mg total) by mouth daily.   Refills: 0     Santyl 250 UNIT/GM Oint  Generic drug: collagenase      Apply topically to ulcer site daily   Quantity: 30 g  Refills: 0     Santyl 250 UNIT/GM Oint  Generic drug: collagenase      Apply 1 g topically daily.   Quantity: 30 g  Refills: 2     tamsulosin 0.4 MG Caps  Commonly known as: Flomax      Take 1 capsule (0.4 mg total) by mouth daily.   Refills: 0            STOP taking these medications      furosemide 40 MG Tabs  Commonly known as: Lasix                  Where to Get Your Medications        [facility] medications [id number] - [place], [place] - [address] [phone], [phone]  [address] [facility] [address]      Phone: [phone]   amoxicillin clavulanate 875-125 MG Tabs       Please  your prescriptions at the location directed by your doctor or nurse    Bring a paper prescription for each of these medications  metoprolol succinate ER 25 MG Tb24         [place]: [place]    Follow-up appointment:   [facility]    Follow up  Please follow with your cardiologist at [facility] 1 week from discharge    Appointments for Next 30 Days 2/16/2024 - 3/17/2024        Date Arrival Time Visit Type Length Department Provider     2/19/2024  2:30 PM  WC FOLLOW UP [8272] 15 min [facility] Quincy Poon DPM    Patient Instructions:         Location Instructions:     Your appointment is scheduled at [facility]. The address is&nbsp; [address]. To reach Registration, park in the [place] Parking Garage. Go through the entrance doors located on the ground floor. Veer left past the Information Desk and proceed to the Wound Care Center.  Masks are optional for all patients and visitors, unless otherwise indicated.                        -----------------------------------------------------------------------------------------------  PATIENT DISCHARGE INSTRUCTIONS: See electronic chart    Keagan Barron MD      The 21st Century Cures Act makes medical notes like these available to patients in the interest of transparency. Please be advised this is a medical document. Medical documents are intended to carry relevant information, facts as evident, and the clinical opinion of the practitioner. The medical note is intended as peer to peer communication and may appear blunt or direct. It is written in medical language and may contain abbreviations or verbiage that are unfamiliar.

## 2024-02-16 NOTE — PAYOR COMM NOTE
--------------  DISCHARGE REVIEW    Payor: [employer] MEDICARE ADV PPO  Subscriber #:  [id number]  Authorization Number: [id number]    Admit date: 24  Admit time:   2:39 PM  Discharge Date: 2/15/2024  4:03 PM     Admitting Physician: Andre Campos DO  Attending Physician:  No att. providers found  Primary Care Physician: Stanislav Odonnell MD          Discharge Summary Notes        Discharge Summary signed by Keagan Barron MD at 2024  7:51 AM       Author: Keagan Barron MD Specialty: HOSPITALIST Author Type: Physician    Filed: 2024  7:51 AM Date of Service: 2024  7:47 AM Status: Signed    : Keagan Barron MD (Physician)           [facility]     Alejandro Guardado Patient Status:  Inpatient    1935 MRN [id number]   Location [facility] 4SW-A Attending No att. providers found   Hosp Day # 3 PCP Stanislav Odonnell MD     Date of Admission: 2024  Date of Discharge:  2/15/2024     Discharge Disposition: Home or Self Care    Discharge Diagnosis:    #Septic shock -> resolved  #Multifocal pneumonia  #Secondary Adrenal insufficiency  #Acute Kidney Injury due to septic shock  #Acute hypoxemic respiratory failure  PNA  #Chronic HFrEF, ischemic cardiomyopathy with LVEF 25-30%  #BPH  #Afib with RVR  #CAD s/p CABG   #Hyperlipidemia  #GERD  #PAD with hx of non-healing R foot wound s/p recent thrombectomy and revascularization/stent  by Dr. Steward    History of Present Illness: Alejandro Guardado is a 89 year old male with PMHx CAD s/p CABG and PCI, HFrEF, ischemic cardiomyopathy with EF 25-30%, atrial fibrillation to eliquis, hypertension, hyperlipidemia, severe PAD, BPH, secondary adrenal insufficiency and GERD who presents to the hospital with cough and generalized weakness.  Patient was admitted twice in December, once for pneumonia and once for septic shock related to pneumonia.  He thrombectomy and revascularization/stent in E in 2023.  He has declined cath  in the past.  Son sees patient most days and states he was doing well yesterday.  However this morning patient called his son due to weakness, coughing and shortness of breath.  EMS called and they found him hypoxic and hypotensive.  In the ER chest x-ray showed right-sided pneumonia.  proBNP 4500, same as December 2023.  EKG showed A-fib without acute ischemic changes.  Lactic acid was 2.9.  Started on Levophed, given Zosyn and transferred to the ICU.    Brief Synopsis:   Patient admitted with cough and weakness.  Found to have septic shock related to multifocal pneumonia and acute hypoxic respiratory failure.  Patient also developed A-fib with RVR.  He was seen by cardiology and pulmonology critical care.  Patient symptoms improved.  He completed a course of antibiotics and will be discharged home on Augmentin.  He will follow-up with his PCP after discharge.  All questions and concerns were addressed with patient and family prior to discharge home, patient agreeable to plan of care as stated.  He is encouraged to return to the ER if symptoms come back or worsen.    Lace+ Score: 83  59-90 High Risk  29-58 Medium Risk  0-28   Low Risk       TCM Follow-Up Recommendation:  LACE > 58: High Risk of readmission after discharge from the hospital.      Procedures during hospitalization:   None    Incidental or significant findings and recommendations (brief descriptions):  None    Lab/Test results pending at Discharge:   N/a    Consultants:  Cardiology, Pulmonology     Discharge Medication List:     Discharge Medications        START taking these medications        Instructions Prescription details   amoxicillin clavulanate 875-125 MG Tabs  Commonly known as: Augmentin      Take 1 tablet by mouth 2 (two) times daily for 5 days.   Stop taking on: February 20, 2024  Quantity: 10 tablet  Refills: 0            CHANGE how you take these medications        Instructions Prescription details   metoprolol succinate ER 25 MG  Tb24  Commonly known as: Toprol XL  What changed:   medication strength  how much to take  when to take this      Take 1 tablet (25 mg total) by mouth Daily Beta Blocker.   Quantity: 90 tablet  Refills: 1            CONTINUE taking these medications        Instructions Prescription details   acetaminophen 500 MG Tabs  Commonly known as: Tylenol Extra Strength      Take 2 tablets (1,000 mg total) by mouth every 8 (eight) hours.   Quantity: 21 tablet  Refills: 0     apixaban 5 MG Tabs  Commonly known as: Eliquis      Take 1 tablet (5 mg total) by mouth 2 (two) times daily.   Quantity: 60 tablet  Refills: 3     atorvastatin 40 MG Tabs  Commonly known as: Lipitor      Take 1 tablet (40 mg total) by mouth daily.   Quantity: 30 tablet  Refills: 0     clopidogrel 75 MG Tabs  Commonly known as: Plavix      Take 1 tablet (75 mg total) by mouth daily.   Quantity: 30 tablet  Refills: 0     finasteride 5 MG Tabs  Commonly known as: Proscar      Take 1 tablet (5 mg total) by mouth daily.   Quantity: 30 tablet  Refills: 0     folic acid 1 MG Tabs  Commonly known as: Folvite      Take 1 tablet (1 mg total) by mouth daily.   Refills: 0     gabapentin 300 MG Caps  Commonly known as: Neurontin      Take 1 capsule (300 mg total) by mouth 3 (three) times daily.   Quantity: 90 capsule  Refills: 0     lisinopril 2.5 MG Tabs  Commonly known as: Prinivil; Zestril      Take 1 tablet (2.5 mg total) by mouth daily.   Refills: 0     Omeprazole 40 MG Cpdr      Take 1 capsule (40 mg total) by mouth daily.   Refills: 0     predniSONE 5 MG Tabs  Commonly known as: Deltasone      Take 3 tablets (15 mg total) by mouth daily.   Refills: 0     Santyl 250 UNIT/GM Oint  Generic drug: collagenase      Apply topically to ulcer site daily   Quantity: 30 g  Refills: 0     Santyl 250 UNIT/GM Oint  Generic drug: collagenase      Apply 1 g topically daily.   Quantity: 30 g  Refills: 2     tamsulosin 0.4 MG Caps  Commonly known as: Flomax      Take 1 capsule  (0.4 mg total) by mouth daily.   Refills: 0            STOP taking these medications      furosemide 40 MG Tabs  Commonly known as: Lasix                  Where to Get Your Medications        These medications were sent to [id number] - [place], [place] - [address] [phone], [phone]  [address], [facility] [address]      Phone: [phone]   amoxicillin clavulanate 875-125 MG Tabs       Please  your prescriptions at the location directed by your doctor or nurse    Bring a paper prescription for each of these medications  metoprolol succinate ER 25 MG Tb24         [place]: [place]    Follow-up appointment:   [facility]    Follow up  Please follow with your cardiologist at [facility] 1 week from discharge    Appointments for Next 30 Days 2/16/2024 - 3/17/2024        Date Arrival Time Visit Type Length Department Provider     2/19/2024  2:30 PM  WC FOLLOW UP [1028] 15 min [facility] Quincy Poon DPM    Patient Instructions:         Location Instructions:     Your appointment is scheduled at [facility]. The address is&nbsp; [address]. To reach Registration, park in the [place] Parking Garage. Go through the entrance doors located on the ground floor. Veer left past the Information Desk and proceed to the Wound Care Center.  Masks are optional for all patients and visitors, unless otherwise indicated.                        -----------------------------------------------------------------------------------------------  PATIENT DISCHARGE INSTRUCTIONS: See electronic chart    Keagan Barron MD      The 21st Century Cures Act makes medical notes like these available to patients in the interest of transparency. Please be advised this is a medical document. Medical documents are intended to carry relevant information, facts as evident, and the clinical opinion of the practitioner. The medical note is intended as peer to  peer communication and may appear blunt or direct. It is written in medical language and may contain abbreviations or verbiage that are unfamiliar.       Electronically signed by Keagan Barron MD on 2/16/2024  7:51 AM         REVIEWER COMMENTS

## 2024-02-17 ENCOUNTER — PATIENT MESSAGE (OUTPATIENT)
Dept: PODIATRY CLINIC | Facility: CLINIC | Age: 89
End: 2024-02-17

## 2024-02-19 ENCOUNTER — OFFICE VISIT (OUTPATIENT)
Dept: WOUND CARE | Facility: HOSPITAL | Age: 89
End: 2024-02-19
Attending: STUDENT IN AN ORGANIZED HEALTH CARE EDUCATION/TRAINING PROGRAM
Payer: MEDICARE

## 2024-02-19 ENCOUNTER — TELEPHONE (OUTPATIENT)
Dept: CARDIOLOGY | Age: 89
End: 2024-02-19

## 2024-02-19 VITALS
HEART RATE: 96 BPM | TEMPERATURE: 99 F | RESPIRATION RATE: 16 BRPM | DIASTOLIC BLOOD PRESSURE: 68 MMHG | SYSTOLIC BLOOD PRESSURE: 101 MMHG

## 2024-02-19 DIAGNOSIS — R26.9 GAIT DIFFICULTY: ICD-10-CM

## 2024-02-19 DIAGNOSIS — I73.9 PAD (PERIPHERAL ARTERY DISEASE) (HCC): ICD-10-CM

## 2024-02-19 DIAGNOSIS — L97.512 FOOT ULCER, RIGHT, WITH FAT LAYER EXPOSED (HCC): Primary | ICD-10-CM

## 2024-02-19 DIAGNOSIS — Z89.439 HISTORY OF TRANSMETATARSAL AMPUTATION OF FOOT (HCC): ICD-10-CM

## 2024-02-19 PROCEDURE — 11042 DBRDMT SUBQ TIS 1ST 20SQCM/<: CPT | Performed by: PODIATRIST

## 2024-02-19 NOTE — PROGRESS NOTES
Subjective   Alejandro Guardado is a 89 year old male.    Chief Complaint   Patient presents with    Wound Care     Pt here for follow up wound care to right foot wound. Pt denies any concerns or issues. Pt denies any pain at this time.        HPI  Alejandro Guardado is a 88 year old male with PAD who is returning to clinic for recheck of right foot.  Patient is accompanied today by his son and daughter-in-law, who is returning to clinic today for recheck on right foot.  Patient does state that he is doing well overall.  He was recently hospitalized for pneumonia for the third time in 2 months.  My partner, Dr. Moreno, did see patient while he was inpatient.  Patient's family has been assisting with dressing changes utilizing Josi.  Denies noticing recent signs of infection.  Patient has remained minimally weightbearing to his right lower extremity with use of surgical shoe.  No other concerns today.    Review of Systems  Denies nausea, vomiting, fever, chills, shortness of breath, chest pain, and calf pain.    Objective    Physical Exam:    Derm:  Wound Assessment  Wound 08/14/23 #1- right foot Old surgical Foot Right (Active)   Wound Image   02/19/24 1508   Drainage Amount Small 02/19/24 1445   Drainage Description Serous;Yellow 02/19/24 1445   Treatments Wound Vac - Neg Pressure 10/16/23 0853   Wound Vac Brand KCI 10/30/23 0907   Wound Length (cm) 0.8 cm 02/19/24 1446   Wound Width (cm) 3.5 cm 02/19/24 1446   Wound Surface Area (cm^2) 2.8 cm^2 02/19/24 1446   Wound Depth (cm) 0.4 cm 02/19/24 1446   Wound Volume (cm^3) 1.12 cm^3 02/19/24 1446   Wound Healing % 95 02/19/24 1446   Margins Well-defined edges;Epibole (Rolled edges) 02/19/24 1445   Non-staged Wound Description Full thickness 02/19/24 1445   Peggy-wound Assessment Edema 02/19/24 1445   Wound Granulation Tissue Pink;Firm 02/19/24 1445   Wound Bed Granulation (%) 80 % 02/19/24 1445   Wound Bed Epithelium (%) 20 % 02/19/24 1445   Wound Bed Slough (%) 10 % 01/29/24  1445   Wound Bed Eschar (%) 40 % 08/14/23 1315   Wound Odor None 02/19/24 1445   Shape Bridged 02/19/24 1445   Tunneling? Yes 02/19/24 1445   Undermining? No 01/08/24 1606   Sinus Tracts? No 01/08/24 1606       Vascular: DP and PT pulse faintly palpable.  Improvements to pitting edema noted to right lower extremity.  Brisk refill noted to stump site with warmth to the entire right foot noted compared to previous visits.  Musculoskeletal: no acute deformities noted.  Muscle strength test deferred   Neurological: Gross sensation intact via light touch.  Protective sensation diminished via Ipswitch test.    Vital Signs  Vital Signs    02/19/24 1444   BP: 101/68   Pulse: 96   Resp: 16   Temp: 99.2 °F (37.3 °C)   PainSc: 0 - (None)         Allergies  Allergies   Allergen Reactions    Heparin ANAPHYLAXIS    Hydromorphone HALLUCINATION       Assessment    Encounter Diagnosis  1. Foot ulcer, right, with fat layer exposed ([facility])    2. PAD (peripheral artery disease) ([facility])    3. History of transmetatarsal amputation of foot ([facility])    4. Gait difficulty        Problem List  Patient Active Problem List   Diagnosis    Closed minimally displaced zone I fracture of sacrum (HCC)    At risk for falling    CAD (coronary artery disease)    Ischemic cardiomyopathy    Azotemia    Arrhythmia    Esophageal reflux    History of MI (myocardial infarction)    Mixed hyperlipidemia    Primary hypertension    Dizziness    Medication side effect    Closed left hip fracture (HCC)  Global 6/22/2016    Status post hip surgery    Left shoulder distal clavical resection and excision of ganglion cyst of soft tissue mass   Global  09/17/2020    Orthopedic aftercare for healing traumatic hip fracture, left, closed    Closed left hip fracture, with routine healing, subsequent encounter    Osteoarthrosis, localized, primary, knee, left    Osteoarthrosis, localized, primary, knee, right    Gangrene (HCC)    PAD (peripheral artery disease) ([facility])    Benign  prostatic hyperplasia with incomplete bladder emptying    Gangrene of foot ([facility])    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) ([facility])    Leukocytosis    Atrial fibrillation with RVR ([facility])    Hypokalemia    Acute kidney injury ([facility])    Hyperglycemia    Community acquired pneumonia of right lung, unspecified part of lung    Acute respiratory failure with hypoxia ([facility])    Hypotension, unspecified hypotension type    Sepsis due to pneumonia ([facility])    Foot ulcer with fat layer exposed, right ([facility])    Syncope, unspecified syncope type    Sepsis, due to unspecified organism, unspecified whether acute organ dysfunction present ([facility])    Septic shock ([facility])    Acute hypoxic respiratory failure ([facility])    Pneumonia, bacterial    Hyponatremia    Metabolic alkalosis    Community acquired pneumonia, unspecified laterality    Sepsis due to undetermined organism ([facility])    Acute on chronic congestive heart failure, unspecified heart failure type ([facility])       Plan  Orders:  Orders Placed This Encounter   Procedures    Debridement Old surgical Right Foot         -Patient examined, chart history reviewed.    -Wound inspected today--overall continued improvements noted to patient's wound today.  Medial foot wound remains epithelialized today.  There continues to be no tunneling.  There is fibrogranular wound bed.  Wound does not probe to bone today.  No current purulence, surrounding erythema, or other signs of infection.       -Excisional debridement performed to the previous surgical site utilizing dermal curette down to and including subcutaneous tissue.  No exposed bone noted today.  A more granular, bleeding wound bed was present upon debridement today.  No tunneling noted.      -Wound site covered with Cellerate activated collagen and a dry dressing today.  Will have patient and family change dressings 2 times this week    -Discussed weightbearing status moving forward.  Myself and patient's family are concerned that he  is deconditioning due to amount of time he has remained minimally weightbearing.  At this time, since his wound has improved drastically, I do recommend patient starting to ambulate within his house as tolerated in surgical shoe.    -Referral for outpatient physical therapy placed today for patient to work on strengthening, gait training, and exercises to assist in ADLs.    -Educated on signs of infection and encouraged patient to seek immediate medical attention if noticing any of these signs.    Follow-Up  1 week    Pavan Poon DPM    2/19/2024    Dragon speech recognition software was used to prepare this note.  Errors in word recognition may occur.  Please contact me with any questions/concerns with this note.

## 2024-02-19 NOTE — PROGRESS NOTES
Patient ID: Alejandro Guardado is a 89 year old male.    Debridement Old surgical Right Foot   Wound 08/14/23 #1- right foot Old surgical Foot Right    Performed by: Quincy Poon DPM  Authorized by: Quincy Poon DPM      Consent   Consent obtained? verbal  Consent given by: patient  Risks discussed? procedural risks discussed  Time out called at 2/19/2024 3:07 PM  Immediately prior to the procedure a time out was called and the performing provider verified the correct patient, procedure, equipment, support staff, and site/side marked as required.    Debridement Details  Performed by: physician  Debridement type: surgical  Level of debridement: subcutaneous tissue    Pre-debridement measurements  Length (cm): 0.7  Width (cm): 3.5  Depth (cm): 0.4  Surface Area (cm^2): 2.45    Post-debridement measurements  Length (cm): 0.8  Width (cm): 3.5  Depth (cm): 0.4  Percent debrided: 100%  Surface Area (cm^2): 2.8  Area Debrided (cm^2): 2.8  Volume (cm^3): 1.12    Tissue and other material debrided: subcutaneous tissue  Devitalized tissue debrided: biofilm, fibrin and necrotic debris  Instrument(s) utilized: curette  Bleeding: small  Hemostasis obtained with: not applicable  Procedural pain (0-10): 0  Post-procedural pain: 0   Response to treatment: procedure was tolerated well

## 2024-02-19 NOTE — PROGRESS NOTES
Weekly Wound Education Note    Teaching Provided To: Patient;Family  Training Topics: Dressing;Cleasing and general instructions;Discharge instructions  Training Method: Demonstration;Explain/Verbal;Written  Training Response: Patient responds and understands        Notes: Cleanse Right foot wound with saline or wound cleanser, apply CellerateRX collagen on a small piece of adaptic with gauze 2X2, kerlix, tape. Change thursday and saturday or sunday.

## 2024-02-20 DIAGNOSIS — I25.10 CORONARY ARTERY DISEASE INVOLVING NATIVE CORONARY ARTERY OF NATIVE HEART WITHOUT ANGINA PECTORIS: ICD-10-CM

## 2024-02-20 DIAGNOSIS — I65.23 ASYMPTOMATIC CAROTID ARTERY STENOSIS, BILATERAL: ICD-10-CM

## 2024-02-20 DIAGNOSIS — E78.00 PURE HYPERCHOLESTEROLEMIA: ICD-10-CM

## 2024-02-23 ENCOUNTER — LAB SERVICES (OUTPATIENT)
Dept: LAB | Age: 89
End: 2024-02-23

## 2024-02-23 DIAGNOSIS — I25.10 ATHEROSCLEROTIC HEART DISEASE OF NATIVE CORONARY ARTERY WITHOUT ANGINA PECTORIS: ICD-10-CM

## 2024-02-23 DIAGNOSIS — I49.3 VENTRICULAR PREMATURE DEPOLARIZATION: ICD-10-CM

## 2024-02-23 DIAGNOSIS — I25.5 ISCHEMIC CARDIOMYOPATHY: ICD-10-CM

## 2024-02-23 DIAGNOSIS — J18.9 PNEUMONIA DUE TO INFECTIOUS ORGANISM, UNSPECIFIED LATERALITY, UNSPECIFIED PART OF LUNG: Primary | ICD-10-CM

## 2024-02-23 DIAGNOSIS — I65.23 OCCLUSION AND STENOSIS OF BILATERAL CAROTID ARTERIES: ICD-10-CM

## 2024-02-23 RX ORDER — METOPROLOL SUCCINATE 50 MG/1
75 TABLET, EXTENDED RELEASE ORAL DAILY
Qty: 180 TABLET | Refills: 3 | OUTPATIENT
Start: 2024-02-23

## 2024-02-24 LAB
ANION GAP SERPL CALC-SCNC: 10 MMOL/L (ref 7–19)
BUN SERPL-MCNC: 19 MG/DL (ref 6–20)
BUN/CREAT SERPL: 16 (ref 7–25)
CALCIUM SERPL-MCNC: 9.5 MG/DL (ref 8.4–10.2)
CHLORIDE SERPL-SCNC: 105 MMOL/L (ref 97–110)
CO2 SERPL-SCNC: 29 MMOL/L (ref 21–32)
CREAT SERPL-MCNC: 1.16 MG/DL (ref 0.67–1.17)
EGFRCR SERPLBLD CKD-EPI 2021: 60 ML/MIN/{1.73_M2}
FASTING DURATION TIME PATIENT: NORMAL H
GLUCOSE SERPL-MCNC: 91 MG/DL (ref 70–99)
MAGNESIUM SERPL-MCNC: 2.5 MG/DL (ref 1.7–2.4)
POTASSIUM SERPL-SCNC: 4.3 MMOL/L (ref 3.4–5.1)
SODIUM SERPL-SCNC: 140 MMOL/L (ref 135–145)

## 2024-02-25 ENCOUNTER — PATIENT MESSAGE (OUTPATIENT)
Dept: PODIATRY CLINIC | Facility: CLINIC | Age: 89
End: 2024-02-25

## 2024-02-26 ENCOUNTER — HOSPITAL ENCOUNTER (OUTPATIENT)
Dept: GENERAL RADIOLOGY | Facility: HOSPITAL | Age: 89
Discharge: HOME | End: 2024-02-26
Attending: STUDENT IN AN ORGANIZED HEALTH CARE EDUCATION/TRAINING PROGRAM
Payer: MEDICARE

## 2024-02-26 ENCOUNTER — OFFICE VISIT (OUTPATIENT)
Dept: WOUND CARE | Facility: HOSPITAL | Age: 89
End: 2024-02-26
Attending: STUDENT IN AN ORGANIZED HEALTH CARE EDUCATION/TRAINING PROGRAM
Payer: MEDICARE

## 2024-02-26 VITALS
DIASTOLIC BLOOD PRESSURE: 82 MMHG | HEART RATE: 108 BPM | SYSTOLIC BLOOD PRESSURE: 127 MMHG | RESPIRATION RATE: 16 BRPM | TEMPERATURE: 98 F

## 2024-02-26 DIAGNOSIS — Z89.439 HISTORY OF TRANSMETATARSAL AMPUTATION OF FOOT (HCC): ICD-10-CM

## 2024-02-26 DIAGNOSIS — R26.9 GAIT DIFFICULTY: ICD-10-CM

## 2024-02-26 DIAGNOSIS — I73.9 PAD (PERIPHERAL ARTERY DISEASE) (HCC): ICD-10-CM

## 2024-02-26 DIAGNOSIS — L97.512 FOOT ULCER, RIGHT, WITH FAT LAYER EXPOSED (HCC): Primary | ICD-10-CM

## 2024-02-26 DIAGNOSIS — J18.9 PNEUMONIA DUE TO INFECTIOUS ORGANISM, UNSPECIFIED LATERALITY, UNSPECIFIED PART OF LUNG: ICD-10-CM

## 2024-02-26 PROCEDURE — 11042 DBRDMT SUBQ TIS 1ST 20SQCM/<: CPT | Performed by: PODIATRIST

## 2024-02-26 PROCEDURE — 71046 X-RAY EXAM CHEST 2 VIEWS: CPT | Performed by: STUDENT IN AN ORGANIZED HEALTH CARE EDUCATION/TRAINING PROGRAM

## 2024-02-26 NOTE — PROGRESS NOTES
Subjective   Alejandro Guardado is a 89 year old male.    Chief Complaint   Patient presents with    Wound Care     Patient arrives for a wound care follow up appointment. Patient reports no pain and no concerns at this time.        HPI  Alejandro Guardado is a 88 year old male with PAD who is returning to clinic for recheck of right foot.  Patient is accompanied today by his son and daughter-in-law, who is returning to clinic today for recheck on right foot.   Patient is continuing to do well overall.  His son has been helping him with dressing changes utilizing Cellerate powder.  They believe they have been noticing improvements.  Denies any recent signs of infection.  Patient has been increasing his ambulation as directed.  He has also started physical therapy and had an evaluation recently.  He has been utilizing a walker for gait assistance.  He is ambulating in a surgical shoe.  Denies any other concerns.    Review of Systems  Denies nausea, vomiting, fever, chills, shortness of breath, chest pain, and calf pain.    Objective    Physical Exam:    Derm:  Wound Assessment  Wound 08/14/23 #1- right foot Old surgical Foot Right (Active)   Wound Image   02/26/24 1426   Drainage Amount Small 02/26/24 1413   Drainage Description Yellow 02/26/24 1413   Treatments Wound Vac - Neg Pressure 10/16/23 0853   Wound Vac Brand KCI 10/30/23 0907   Wound Length (cm) 0.5 cm 02/26/24 1414   Wound Width (cm) 1 cm 02/26/24 1414   Wound Surface Area (cm^2) 0.5 cm^2 02/26/24 1414   Wound Depth (cm) 0.1 cm 02/26/24 1414   Wound Volume (cm^3) 0.05 cm^3 02/26/24 1414   Wound Healing % 100 02/26/24 1414   Margins Well-defined edges;Epibole (Rolled edges) 02/26/24 1413   Non-staged Wound Description Full thickness 02/26/24 1413   Peggy-wound Assessment Edema;Moist 02/26/24 1413   Wound Granulation Tissue Pale Grey;Pink;Firm 02/26/24 1413   Wound Bed Granulation (%) 90 % 02/26/24 1413   Wound Bed Epithelium (%) 5 % 02/26/24 1413   Wound Bed Slough  (%) 10 % 01/29/24 1445   Wound Bed Eschar (%) 40 % 08/14/23 1315   Wound Odor None 02/26/24 1413   Shape Bridged 02/26/24 1413   Tunneling? No 02/26/24 1413   Undermining? No 02/26/24 1413   Sinus Tracts? No 02/26/24 1413       Vascular: DP and PT pulse faintly palpable.  Improvements to pitting edema noted to right lower extremity.  Brisk refill noted to stump site with warmth to the entire right foot noted compared to previous visits.  Musculoskeletal: no acute deformities noted.  In a wheelchair today  Neurological: Gross sensation intact via light touch.  Protective sensation diminished via Ipswitch test.    Vital Signs  Vital Signs    02/26/24 1418   BP: 127/82   Pulse: 108   Resp: 16   Temp: 97.6 °F (36.4 °C)   PainSc: 0 - (None)         Allergies  Allergies   Allergen Reactions    Heparin ANAPHYLAXIS    Hydromorphone HALLUCINATION       Assessment    Encounter Diagnosis  1. Foot ulcer, right, with fat layer exposed ([facility])    2. PAD (peripheral artery disease) ([facility])    3. History of transmetatarsal amputation of foot ([facility])    4. Gait difficulty        Problem List  Patient Active Problem List   Diagnosis    Closed minimally displaced zone I fracture of sacrum ([facility])    At risk for falling    CAD (coronary artery disease)    Ischemic cardiomyopathy    Azotemia    Arrhythmia    Esophageal reflux    History of MI (myocardial infarction)    Mixed hyperlipidemia    Primary hypertension    Dizziness    Medication side effect    Closed left hip fracture ([facility])  Global 6/22/2016    Status post hip surgery    Left shoulder distal clavical resection and excision of ganglion cyst of soft tissue mass   Global  09/17/2020    Orthopedic aftercare for healing traumatic hip fracture, left, closed    Closed left hip fracture, with routine healing, subsequent encounter    Osteoarthrosis, localized, primary, knee, left    Osteoarthrosis, localized, primary, knee, right    Gangrene (HCC)    PAD (peripheral artery disease) ([facility])     Benign prostatic hyperplasia with incomplete bladder emptying    Gangrene of foot ([facility])    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) ([facility])    Leukocytosis    Atrial fibrillation with RVR ([facility])    Hypokalemia    Acute kidney injury ([facility])    Hyperglycemia    Community acquired pneumonia of right lung, unspecified part of lung    Acute respiratory failure with hypoxia ([facility])    Hypotension, unspecified hypotension type    Sepsis due to pneumonia ([facility])    Foot ulcer with fat layer exposed, right ([facility])    Syncope, unspecified syncope type    Sepsis, due to unspecified organism, unspecified whether acute organ dysfunction present ([facility])    Septic shock ([facility])    Acute hypoxic respiratory failure ([facility])    Pneumonia, bacterial    Hyponatremia    Metabolic alkalosis    Community acquired pneumonia, unspecified laterality    Sepsis due to undetermined organism ([facility])    Acute on chronic congestive heart failure, unspecified heart failure type ([facility])       Plan  Orders:  Orders Placed This Encounter   Procedures    Debridement         -Patient examined, chart history reviewed.    -Wound inspected today--overall continued improvements noted to patient's wound today.  Improved dimensions.  Medial foot wound remains epithelialized today.  There continues to be no tunneling.  There is fibrogranular wound bed.  Wound does not probe to bone today.  No current purulence, surrounding erythema, or other signs of infection.  Some maceration noted within the surgical cicatrix today.     -Excisional debridement performed to the previous surgical site utilizing dermal curette down to and including subcutaneous tissue.  No exposed bone noted today.  A more granular, bleeding wound bed was present upon debridement today.  No tunneling noted.     -Painted Betadine to macerated tissue today.  Will have patient's family continue with Betadine to this area with every dressing change     -Wound site covered with Cellerate activated  collagen, Adaptic, and a dry dressing today.  Will have patient and family change dressings 3 times this week     -Patient will continue to increase ambulation as tolerated in surgical shoe utilizing walker for gait assistance.     -Recommend continuing physical therapy    -Educated on signs of infection and encouraged patient to seek immediate medical attention if noticing any of these signs.    Follow-Up  2 weeks    Pavan Poon DPM    2/26/2024    Dragon speech recognition software was used to prepare this note.  Errors in word recognition may occur.  Please contact me with any questions/concerns with this note.

## 2024-02-26 NOTE — TELEPHONE ENCOUNTER
From: Alejandro Guardado  To: Brian Moreno  Sent: 2/25/2024 4:49 PM CST  Subject: Photos    Attached are images from Sunday, 2/25. Thank you.

## 2024-02-26 NOTE — PROGRESS NOTES
Patient ID: Alejandro Guardado is a 89 year old male.    Debridement Old surgical Right Foot   Wound 08/14/23 #1- right foot Old surgical Foot Right    Performed by: Quincy Poon DPM  Authorized by: Quincy Poon DPM      Consent   Consent obtained? verbal  Consent given by: patient  Risks discussed? procedural risks discussed  Time out called at 2/26/2024 2:25 PM  Immediately prior to the procedure a time out was called and the performing provider verified the correct patient, procedure, equipment, support staff, and site/side marked as required.    Debridement Details  Performed by: physician  Debridement type: surgical  Level of debridement: subcutaneous tissue  Pain control: lidocaine 4%  Pain control administration type: topical    Pre-debridement measurements  Length (cm): 0.6  Width (cm): 4  Depth (cm): 0.1  Surface Area (cm^2): 2.4    Post-debridement measurements  Length (cm): 0.5  Width (cm): 1  Depth (cm): 0.1  Percent debrided: 100%  Surface Area (cm^2): 0.5  Area Debrided (cm^2): 0.5  Volume (cm^3): 0.05    Tissue and other material debrided: subcutaneous tissue  Devitalized tissue debrided: biofilm and fibrin  Instrument(s) utilized: curette  Bleeding: small  Hemostasis obtained with: not applicable  Procedural pain (0-10): 0  Post-procedural pain: 0   Response to treatment: procedure was tolerated well

## 2024-02-26 NOTE — PROGRESS NOTES
Weekly Wound Education Note    Teaching Provided To: Patient;Family  Training Topics: Dressing;Cleasing and general instructions;Discharge instructions  Training Method: Demonstration;Explain/Verbal;Written  Training Response: Patient responds and understands        Notes: Betadine to macerated tissue along healed wound area,  Cleanse right foot open area with saline or wound cleanser, apply small amt of CellerateRX collagen toAdaptic, and apply to wound, cover with dry dressing. Change dressings 3 times this week

## 2024-02-28 ENCOUNTER — APPOINTMENT (OUTPATIENT)
Dept: CARDIOLOGY | Age: 89
End: 2024-02-28

## 2024-02-28 VITALS
SYSTOLIC BLOOD PRESSURE: 112 MMHG | OXYGEN SATURATION: 98 % | WEIGHT: 210 LBS | DIASTOLIC BLOOD PRESSURE: 75 MMHG | HEART RATE: 99 BPM | BODY MASS INDEX: 31.83 KG/M2 | HEIGHT: 68 IN

## 2024-02-28 DIAGNOSIS — Z95.1 HX OF CABG: ICD-10-CM

## 2024-02-28 DIAGNOSIS — I25.10 CORONARY ARTERY DISEASE INVOLVING NATIVE CORONARY ARTERY OF NATIVE HEART WITHOUT ANGINA PECTORIS: ICD-10-CM

## 2024-02-28 DIAGNOSIS — I11.0 HYPERTENSIVE HEART DISEASE WITH HEART FAILURE (CMD): ICD-10-CM

## 2024-02-28 DIAGNOSIS — I25.5 ISCHEMIC CARDIOMYOPATHY: Primary | ICD-10-CM

## 2024-02-28 RX ORDER — METOPROLOL SUCCINATE 25 MG/1
25 TABLET, EXTENDED RELEASE ORAL
COMMUNITY
Start: 2024-02-16

## 2024-02-28 RX ORDER — FUROSEMIDE 40 MG/1
40 TABLET ORAL 2 TIMES DAILY
Qty: 90 TABLET | Refills: 3 | Status: SHIPPED | OUTPATIENT
Start: 2024-02-28

## 2024-02-28 SDOH — HEALTH STABILITY: PHYSICAL HEALTH: ON AVERAGE, HOW MANY DAYS PER WEEK DO YOU ENGAGE IN MODERATE TO STRENUOUS EXERCISE (LIKE A BRISK WALK)?: 0 DAYS

## 2024-02-28 SDOH — HEALTH STABILITY: PHYSICAL HEALTH: ON AVERAGE, HOW MANY MINUTES DO YOU ENGAGE IN EXERCISE AT THIS LEVEL?: 0 MIN

## 2024-02-28 ASSESSMENT — PATIENT HEALTH QUESTIONNAIRE - PHQ9
CLINICAL INTERPRETATION OF PHQ2 SCORE: NO FURTHER SCREENING NEEDED
2. FEELING DOWN, DEPRESSED OR HOPELESS: NOT AT ALL
SUM OF ALL RESPONSES TO PHQ9 QUESTIONS 1 AND 2: 0
1. LITTLE INTEREST OR PLEASURE IN DOING THINGS: NOT AT ALL
SUM OF ALL RESPONSES TO PHQ9 QUESTIONS 1 AND 2: 0

## 2024-02-29 ENCOUNTER — PATIENT MESSAGE (OUTPATIENT)
Dept: PODIATRY CLINIC | Facility: CLINIC | Age: 89
End: 2024-02-29

## 2024-03-11 ENCOUNTER — OFFICE VISIT (OUTPATIENT)
Dept: WOUND CARE | Facility: HOSPITAL | Age: 89
End: 2024-03-11
Attending: STUDENT IN AN ORGANIZED HEALTH CARE EDUCATION/TRAINING PROGRAM
Payer: MEDICARE

## 2024-03-11 VITALS
RESPIRATION RATE: 17 BRPM | SYSTOLIC BLOOD PRESSURE: 116 MMHG | TEMPERATURE: 98 F | DIASTOLIC BLOOD PRESSURE: 79 MMHG | HEART RATE: 70 BPM

## 2024-03-11 DIAGNOSIS — Z89.439 HISTORY OF TRANSMETATARSAL AMPUTATION OF FOOT (HCC): ICD-10-CM

## 2024-03-11 DIAGNOSIS — L97.512 FOOT ULCER, RIGHT, WITH FAT LAYER EXPOSED (HCC): Primary | ICD-10-CM

## 2024-03-11 DIAGNOSIS — I73.9 PAD (PERIPHERAL ARTERY DISEASE) (HCC): ICD-10-CM

## 2024-03-11 DIAGNOSIS — R26.9 GAIT DIFFICULTY: ICD-10-CM

## 2024-03-11 PROCEDURE — 11042 DBRDMT SUBQ TIS 1ST 20SQCM/<: CPT | Performed by: PODIATRIST

## 2024-03-11 NOTE — PROGRESS NOTES
Patient ID: Alejandro Guardado is a 89 year old male.    Debridement Old surgical Right Foot   Wound 08/14/23 #1- right foot Old surgical Foot Right    Performed by: Quincy Poon DPM  Authorized by: Quincy Poon DPM      Consent   Consent obtained? verbal  Consent given by: patient  Risks discussed? procedural risks discussed  Time out called at 3/11/2024 2:33 PM  Immediately prior to the procedure a time out was called and the performing provider verified the correct patient, procedure, equipment, support staff, and site/side marked as required.    Debridement Details  Performed by: physician  Debridement type: surgical  Level of debridement: subcutaneous tissue    Pre-debridement measurements  Length (cm): 0.1  Width (cm): 0.8  Depth (cm): 0.1  Surface Area (cm^2): 0.08    Post-debridement measurements  Length (cm): 0.4  Width (cm): 0.8  Depth (cm): 0.1  Percent debrided: 100%  Surface Area (cm^2): 0.32  Area Debrided (cm^2): 0.32  Volume (cm^3): 0.03    Tissue and other material debrided: subcutaneous tissue  Devitalized tissue debrided: biofilm and fibrin  Instrument(s) utilized: curette  Bleeding: small  Hemostasis obtained with: not applicable  Procedural pain (0-10): 0  Post-procedural pain: 0   Response to treatment: procedure was tolerated well

## 2024-03-11 NOTE — PROGRESS NOTES
Subjective   Alejandro Guardado is a 89 year old male.    Chief Complaint   Patient presents with    Wound Care     Patient arrives for a wound care follow up appointment. Patient arrives wearing a regular shoe, and Kurlex wrapped around the wound. There is mild, bloody drainage to the Kurlex that the patient does not know the origin of. Patient has no pain and no concerns.        HPI  Alejandro Guardado is a 88 year old male with PAD who is returning to clinic for recheck of right foot.  Patient is accompanied today by his son and daughter-in-law, who is returning to clinic today for recheck on right foot.  Patient continues to do well overall.  His son has been assisting him with dressing changes, utilizing the Cellerate powder as directed.  Patient has been ambulating more recently.  He does have his house slippers on today.  He has a Kerlix wrap around the wound.  Denies any other concerns    Review of Systems  Denies nausea, vomiting, fever, chills, shortness of breath, chest pain, and calf pain.    Objective    Physical Exam:    Derm:  Wound Assessment  Wound 08/14/23 #1- right foot Old surgical Foot Right (Active)   Wound Image   03/11/24 1437   Drainage Amount Small 03/11/24 1401   Drainage Description Sanguineous 03/11/24 1401   Treatments Wound Vac - Neg Pressure 10/16/23 0853   Wound Vac Brand KCI 10/30/23 0907   Wound Length (cm) 0.4 cm 03/11/24 1402   Wound Width (cm) 0.8 cm 03/11/24 1402   Wound Surface Area (cm^2) 0.32 cm^2 03/11/24 1402   Wound Depth (cm) 0.1 cm 03/11/24 1402   Wound Volume (cm^3) 0.032 cm^3 03/11/24 1402   Wound Healing % 100 03/11/24 1402   Margins Well-defined edges;Epibole (Rolled edges) 03/11/24 1401   Non-staged Wound Description Full thickness 03/11/24 1401   Peggy-wound Assessment Edema;Moist 03/11/24 1401   Wound Granulation Tissue Pale Grey;Pink;Firm 03/11/24 1401   Wound Bed Granulation (%) 100 % 03/11/24 1401   Wound Bed Epithelium (%) 5 % 02/26/24 1413   Wound Bed Slough (%) 10 %  01/29/24 1445   Wound Bed Eschar (%) 40 % 08/14/23 1315   Wound Odor None 03/11/24 1401   Shape Bridged 02/26/24 1413   Tunneling? No 03/11/24 1401   Undermining? No 03/11/24 1401   Sinus Tracts? No 03/11/24 1401       Vascular: DP and PT pulse faintly palpable.  Improvements to pitting edema noted to right lower extremity.  Brisk refill noted to stump site and cool to touch to the entire right foot noted compared to recent visits.  Pitting edema noted to bilateral lower extremities (left worse than right)  Musculoskeletal: no acute deformities noted.  In a wheelchair today  Neurological: Gross sensation intact via light touch.  Protective sensation diminished via Ipswitch test.    Vital Signs  Vital Signs    03/11/24 1100   BP: 116/79   Pulse: 70   Resp: 17   Temp: 98 °F (36.7 °C)   PainSc: 0 - (None)         Allergies  Allergies   Allergen Reactions    Heparin ANAPHYLAXIS    Hydromorphone HALLUCINATION       Assessment    Encounter Diagnosis  1. Foot ulcer, right, with fat layer exposed (HCC)    2. PAD (peripheral artery disease) (HCC)    3. History of transmetatarsal amputation of foot (HCC)    4. Gait difficulty        Problem List  Patient Active Problem List   Diagnosis    Closed minimally displaced zone I fracture of sacrum (HCC)    At risk for falling    CAD (coronary artery disease)    Ischemic cardiomyopathy    Azotemia    Arrhythmia    Esophageal reflux    History of MI (myocardial infarction)    Mixed hyperlipidemia    Primary hypertension    Dizziness    Medication side effect    Closed left hip fracture (HCC)  Global 6/22/2016    Status post hip surgery    Left shoulder distal clavical resection and excision of ganglion cyst of soft tissue mass   Global  09/17/2020    Orthopedic aftercare for healing traumatic hip fracture, left, closed    Closed left hip fracture, with routine healing, subsequent encounter    Osteoarthrosis, localized, primary, knee, left    Osteoarthrosis, localized, primary, knee,  right    Gangrene (HCC)    PAD (peripheral artery disease) (HCC)    Benign prostatic hyperplasia with incomplete bladder emptying    Gangrene of foot (HCC)    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) ([facility])    Leukocytosis    Atrial fibrillation with RVR (HCC)    Hypokalemia    Acute kidney injury (HCC)    Hyperglycemia    Community acquired pneumonia of right lung, unspecified part of lung    Acute respiratory failure with hypoxia (HCC)    Hypotension, unspecified hypotension type    Sepsis due to pneumonia ([facility])    Foot ulcer with fat layer exposed, right (HCC)    Syncope, unspecified syncope type    Sepsis, due to unspecified organism, unspecified whether acute organ dysfunction present (HCC)    Septic shock (HCC)    Acute hypoxic respiratory failure (HCC)    Pneumonia, bacterial    Hyponatremia    Metabolic alkalosis    Community acquired pneumonia, unspecified laterality    Sepsis due to undetermined organism (HCC)    Acute on chronic congestive heart failure, unspecified heart failure type ([facility])       Plan  Orders:  Orders Placed This Encounter   Procedures    Debridement Old surgical Right Foot         -Patient examined, chart history reviewed.    -Wound inspected today--overall stable wound today.  Improved dimensions.  Medial foot wound remains epithelialized today.  There continues to be no tunneling.  There is fibrogranular wound bed.  Wound does not probe to bone today.  No current purulence, surrounding erythema, or other signs of infection.  Some maceration noted within the surgical cicatrix today.     -Excisional debridement performed to the previous surgical site utilizing dermal curette down to and including subcutaneous tissue.  No exposed bone noted today.  A more granular, bleeding wound bed was present upon debridement today.  No tunneling noted.      -Wound site covered with Cellerate activated collagen, Adaptic, and a dry dressing today.  Will have patient and family change  dressings 3 times this week.  Encourage patient to utilize Betadine if noticing any increased maceration     -Patient will continue to increase ambulation as tolerated in surgical shoe utilizing walker for gait assistance.  Okay for patient to use house slipper for small amount of time while at home.  I would recommend open toed shoe to prevent any increased moisture    -Educated on signs of infection and encouraged patient to seek immediate medical attention if noticing any of these signs.    Follow-Up  2 weeks    Pavan Poon DPM    3/11/2024    Dragon speech recognition software was used to prepare this note.  Errors in word recognition may occur.  Please contact me with any questions/concerns with this note.

## 2024-03-14 ENCOUNTER — PATIENT MESSAGE (OUTPATIENT)
Dept: PODIATRY CLINIC | Facility: CLINIC | Age: 89
End: 2024-03-14

## 2024-03-15 ENCOUNTER — APPOINTMENT (OUTPATIENT)
Dept: CARDIOLOGY | Age: 89
End: 2024-03-15

## 2024-03-15 ENCOUNTER — OFFICE VISIT (OUTPATIENT)
Dept: CARDIOLOGY | Age: 89
End: 2024-03-15

## 2024-03-15 VITALS
SYSTOLIC BLOOD PRESSURE: 107 MMHG | HEART RATE: 55 BPM | BODY MASS INDEX: 31.37 KG/M2 | WEIGHT: 207 LBS | HEIGHT: 68 IN | DIASTOLIC BLOOD PRESSURE: 60 MMHG

## 2024-03-15 DIAGNOSIS — I49.3 PVCS (PREMATURE VENTRICULAR CONTRACTIONS): Primary | ICD-10-CM

## 2024-03-15 DIAGNOSIS — I48.19 PERSISTENT ATRIAL FIBRILLATION (CMD): ICD-10-CM

## 2024-03-15 LAB
ATRIAL RATE (BPM): 101
QRS-INTERVAL (MSEC): 104
QT-INTERVAL (MSEC): 348
QTC: 444
R AXIS (DEGREES): -13
REPORT TEXT: NORMAL
T AXIS (DEGREES): -41
VENTRICULAR RATE EKG/MIN (BPM): 98

## 2024-03-15 SDOH — HEALTH STABILITY: PHYSICAL HEALTH: ON AVERAGE, HOW MANY DAYS PER WEEK DO YOU ENGAGE IN MODERATE TO STRENUOUS EXERCISE (LIKE A BRISK WALK)?: 0 DAYS

## 2024-03-15 SDOH — HEALTH STABILITY: PHYSICAL HEALTH: ON AVERAGE, HOW MANY MINUTES DO YOU ENGAGE IN EXERCISE AT THIS LEVEL?: 0 MIN

## 2024-03-16 ENCOUNTER — HOSPITAL ENCOUNTER (INPATIENT)
Facility: HOSPITAL | Age: 89
LOS: 2 days | Discharge: HOME | End: 2024-03-18
Attending: EMERGENCY MEDICINE | Admitting: HOSPITALIST
Payer: MEDICARE

## 2024-03-16 ENCOUNTER — APPOINTMENT (OUTPATIENT)
Dept: GENERAL RADIOLOGY | Facility: HOSPITAL | Age: 89
End: 2024-03-16
Attending: EMERGENCY MEDICINE
Payer: MEDICARE

## 2024-03-16 DIAGNOSIS — R65.21 SEPTIC SHOCK (HCC): ICD-10-CM

## 2024-03-16 DIAGNOSIS — J96.01 ACUTE HYPOXEMIC RESPIRATORY FAILURE (HCC): ICD-10-CM

## 2024-03-16 DIAGNOSIS — J18.9 SEPSIS DUE TO PNEUMONIA (HCC): Primary | ICD-10-CM

## 2024-03-16 DIAGNOSIS — A41.9 SEPTIC SHOCK (HCC): ICD-10-CM

## 2024-03-16 DIAGNOSIS — A41.9 SEPSIS DUE TO PNEUMONIA (HCC): Primary | ICD-10-CM

## 2024-03-16 LAB
ADENOVIRUS PCR:: NOT DETECTED
ALBUMIN SERPL-MCNC: 3.3 G/DL (ref 3.4–5)
ALBUMIN/GLOB SERPL: 0.8 {RATIO} (ref 1–2)
ALP LIVER SERPL-CCNC: 69 U/L
ALT SERPL-CCNC: 33 U/L
ANION GAP SERPL CALC-SCNC: 6 MMOL/L (ref 0–18)
AST SERPL-CCNC: 21 U/L (ref 15–37)
B PARAPERT DNA SPEC QL NAA+PROBE: NOT DETECTED
B PERT DNA SPEC QL NAA+PROBE: NOT DETECTED
BASOPHILS # BLD AUTO: 0.08 X10(3) UL (ref 0–0.2)
BASOPHILS NFR BLD AUTO: 0.4 %
BILIRUB SERPL-MCNC: 0.9 MG/DL (ref 0.1–2)
BUN BLD-MCNC: 23 MG/DL (ref 9–23)
C PNEUM DNA SPEC QL NAA+PROBE: NOT DETECTED
CALCIUM BLD-MCNC: 9.2 MG/DL (ref 8.5–10.1)
CHLORIDE SERPL-SCNC: 104 MMOL/L (ref 98–112)
CO2 SERPL-SCNC: 28 MMOL/L (ref 21–32)
CORONAVIRUS 229E PCR:: NOT DETECTED
CORONAVIRUS HKU1 PCR:: NOT DETECTED
CORONAVIRUS NL63 PCR:: NOT DETECTED
CORONAVIRUS OC43 PCR:: NOT DETECTED
CREAT BLD-MCNC: 1.41 MG/DL
EGFRCR SERPLBLD CKD-EPI 2021: 48 ML/MIN/1.73M2 (ref 60–?)
EOSINOPHIL # BLD AUTO: 0.08 X10(3) UL (ref 0–0.7)
EOSINOPHIL NFR BLD AUTO: 0.4 %
ERYTHROCYTE [DISTWIDTH] IN BLOOD BY AUTOMATED COUNT: 16.3 %
FLUAV + FLUBV RNA SPEC NAA+PROBE: NEGATIVE
FLUAV + FLUBV RNA SPEC NAA+PROBE: NEGATIVE
FLUAV RNA SPEC QL NAA+PROBE: NOT DETECTED
FLUBV RNA SPEC QL NAA+PROBE: NOT DETECTED
GLOBULIN PLAS-MCNC: 4.1 G/DL (ref 2.8–4.4)
GLUCOSE BLD-MCNC: 94 MG/DL (ref 70–99)
HCT VFR BLD AUTO: 42.8 %
HGB BLD-MCNC: 13.3 G/DL
IMM GRANULOCYTES # BLD AUTO: 0.21 X10(3) UL (ref 0–1)
IMM GRANULOCYTES NFR BLD: 1 %
LACTATE SERPL-SCNC: 1.7 MMOL/L (ref 0.4–2)
LACTATE SERPL-SCNC: 2.3 MMOL/L (ref 0.4–2)
LYMPHOCYTES # BLD AUTO: 1.12 X10(3) UL (ref 1–4)
LYMPHOCYTES NFR BLD AUTO: 5.1 %
MCH RBC QN AUTO: 25.7 PG (ref 26–34)
MCHC RBC AUTO-ENTMCNC: 31.1 G/DL (ref 31–37)
MCV RBC AUTO: 82.6 FL
METAPNEUMOVIRUS PCR:: NOT DETECTED
MONOCYTES # BLD AUTO: 1.58 X10(3) UL (ref 0.1–1)
MONOCYTES NFR BLD AUTO: 7.2 %
MRSA DNA SPEC QL NAA+PROBE: NEGATIVE
MYCOPLASMA PNEUMONIA PCR:: NOT DETECTED
NEUTROPHILS # BLD AUTO: 18.8 X10 (3) UL (ref 1.5–7.7)
NEUTROPHILS # BLD AUTO: 18.8 X10(3) UL (ref 1.5–7.7)
NEUTROPHILS NFR BLD AUTO: 85.9 %
NT-PROBNP SERPL-MCNC: 3462 PG/ML (ref ?–450)
OSMOLALITY SERPL CALC.SUM OF ELEC: 289 MOSM/KG (ref 275–295)
PARAINFLUENZA 1 PCR:: NOT DETECTED
PARAINFLUENZA 2 PCR:: NOT DETECTED
PARAINFLUENZA 3 PCR:: NOT DETECTED
PARAINFLUENZA 4 PCR:: NOT DETECTED
PLATELET # BLD AUTO: 352 10(3)UL (ref 150–450)
POTASSIUM SERPL-SCNC: 3.3 MMOL/L (ref 3.5–5.1)
PROT SERPL-MCNC: 7.4 G/DL (ref 6.4–8.2)
RBC # BLD AUTO: 5.18 X10(6)UL
RHINOVIRUS/ENTERO PCR:: NOT DETECTED
RSV RNA SPEC NAA+PROBE: NEGATIVE
RSV RNA SPEC QL NAA+PROBE: NOT DETECTED
SARS-COV-2 RNA NPH QL NAA+NON-PROBE: NOT DETECTED
SARS-COV-2 RNA RESP QL NAA+PROBE: NOT DETECTED
SODIUM SERPL-SCNC: 138 MMOL/L (ref 136–145)
TROPONIN I SERPL HS-MCNC: 26 NG/L
WBC # BLD AUTO: 21.9 X10(3) UL (ref 4–11)

## 2024-03-16 PROCEDURE — 99291 CRITICAL CARE FIRST HOUR: CPT | Performed by: HOSPITALIST

## 2024-03-16 PROCEDURE — 71045 X-RAY EXAM CHEST 1 VIEW: CPT | Performed by: EMERGENCY MEDICINE

## 2024-03-16 RX ORDER — GABAPENTIN 300 MG/1
300 CAPSULE ORAL 3 TIMES DAILY
Status: DISCONTINUED | OUTPATIENT
Start: 2024-03-16 | End: 2024-03-18

## 2024-03-16 RX ORDER — METOPROLOL SUCCINATE 25 MG/1
25 TABLET, EXTENDED RELEASE ORAL
Status: DISCONTINUED | OUTPATIENT
Start: 2024-03-17 | End: 2024-03-18

## 2024-03-16 RX ORDER — BISACODYL 10 MG
10 SUPPOSITORY, RECTAL RECTAL
Status: DISCONTINUED | OUTPATIENT
Start: 2024-03-16 | End: 2024-03-18

## 2024-03-16 RX ORDER — METOCLOPRAMIDE HYDROCHLORIDE 5 MG/ML
5 INJECTION INTRAMUSCULAR; INTRAVENOUS EVERY 8 HOURS PRN
Status: DISCONTINUED | OUTPATIENT
Start: 2024-03-16 | End: 2024-03-18

## 2024-03-16 RX ORDER — TAMSULOSIN HYDROCHLORIDE 0.4 MG/1
0.4 CAPSULE ORAL DAILY
Status: DISCONTINUED | OUTPATIENT
Start: 2024-03-16 | End: 2024-03-18

## 2024-03-16 RX ORDER — MELATONIN
3 NIGHTLY PRN
Status: DISCONTINUED | OUTPATIENT
Start: 2024-03-16 | End: 2024-03-18

## 2024-03-16 RX ORDER — ACETAMINOPHEN 500 MG
500 TABLET ORAL EVERY 4 HOURS PRN
Status: DISCONTINUED | OUTPATIENT
Start: 2024-03-16 | End: 2024-03-18

## 2024-03-16 RX ORDER — ATORVASTATIN CALCIUM 40 MG/1
40 TABLET, FILM COATED ORAL NIGHTLY
Status: DISCONTINUED | OUTPATIENT
Start: 2024-03-16 | End: 2024-03-18

## 2024-03-16 RX ORDER — FOLIC ACID 1 MG/1
1 TABLET ORAL DAILY
Status: DISCONTINUED | OUTPATIENT
Start: 2024-03-16 | End: 2024-03-18

## 2024-03-16 RX ORDER — ACETAMINOPHEN 500 MG
500 TABLET ORAL EVERY 4 HOURS PRN
Status: DISCONTINUED | OUTPATIENT
Start: 2024-03-16 | End: 2024-03-16

## 2024-03-16 RX ORDER — POLYETHYLENE GLYCOL 3350 17 G/17G
17 POWDER, FOR SOLUTION ORAL DAILY PRN
Status: DISCONTINUED | OUTPATIENT
Start: 2024-03-16 | End: 2024-03-18

## 2024-03-16 RX ORDER — ONDANSETRON 2 MG/ML
4 INJECTION INTRAMUSCULAR; INTRAVENOUS EVERY 6 HOURS PRN
Status: DISCONTINUED | OUTPATIENT
Start: 2024-03-16 | End: 2024-03-18

## 2024-03-16 RX ORDER — FUROSEMIDE 80 MG
80 TABLET ORAL DAILY
COMMUNITY

## 2024-03-16 RX ORDER — ENEMA 19; 7 G/133ML; G/133ML
1 ENEMA RECTAL ONCE AS NEEDED
Status: DISCONTINUED | OUTPATIENT
Start: 2024-03-16 | End: 2024-03-18

## 2024-03-16 RX ORDER — LISINOPRIL 2.5 MG/1
2.5 TABLET ORAL DAILY
Status: DISCONTINUED | OUTPATIENT
Start: 2024-03-16 | End: 2024-03-18

## 2024-03-16 RX ORDER — PANTOPRAZOLE SODIUM 40 MG/1
40 TABLET, DELAYED RELEASE ORAL
Status: DISCONTINUED | OUTPATIENT
Start: 2024-03-17 | End: 2024-03-18

## 2024-03-16 RX ORDER — SENNOSIDES 8.6 MG
17.2 TABLET ORAL NIGHTLY PRN
Status: DISCONTINUED | OUTPATIENT
Start: 2024-03-16 | End: 2024-03-18

## 2024-03-16 RX ORDER — POTASSIUM CHLORIDE 20 MEQ/1
40 TABLET, EXTENDED RELEASE ORAL ONCE
Status: COMPLETED | OUTPATIENT
Start: 2024-03-16 | End: 2024-03-16

## 2024-03-16 RX ORDER — FINASTERIDE 5 MG/1
5 TABLET, FILM COATED ORAL DAILY
Status: DISCONTINUED | OUTPATIENT
Start: 2024-03-16 | End: 2024-03-18

## 2024-03-16 NOTE — CONSULTS
Critical Care Consult     Assessment / Plan:  Shock - cardiogenic and/or septic  - wean off norepinephrine gtt as able  - empiric antibiotics  - stress dose steroids given history of adrenal insufficiency  Possible PNA  - Zosyn and azithromycin  - RPP  - Strep and Legionella antigens  - follow-up cultures  CHF  - cardiology to see  FEN  - cardiac diet  Ppx  - DOAC  Dispo  - ICU    Critical care time: 50 minutes    Ronnell Farris MD  Pulmonary and Critical Care Medicine      History of Present Illness:   Mr. Guardado is a 89 year old with history of CHF and adrenal insufficiency who we are consulted for shock. Noted to have worsening dyspnea, cough and hypoxemia this morning. No fever, chills, chest pain. Noted to have worsening LE edema over the last few weeks.    12 point ROS negative except per HPI.    Past Medical History:   Diagnosis Date    Arrhythmia     CAD (coronary artery disease) 09/28/2015    Cardiomyopathy (HCC)     Congestive heart disease (HCC) 2022    Esophageal reflux     Hearing impairment     Heart attack (HCC)     High blood pressure     High cholesterol     History of MI (myocardial infarction)     Other and unspecified hyperlipidemia     PAD (peripheral artery disease) ([facility]) 04/26/2023    Pneumonia due to organism 12/2023    Pneumonia, bacterial 12/28/2023    Unspecified essential hypertension     Visual impairment        Past Surgical History:   Procedure Laterality Date    ANGIOGRAM      ANGIOPLASTY (CORONARY)      CABG      CATARACT  04/2023    FRACTURE SURGERY Left     left hip pinning    OTHER SURGICAL HISTORY Left 01/01/1977    knee surgery    OTHER SURGICAL HISTORY  03/25/2016    Left hip ORIF pinning    TONSILLECTOMY         (Not in a hospital admission)    No outpatient medications have been marked as taking for the 3/16/24 encounter (Hospital Encounter).      Allergies   Allergen Reactions    Heparin ANAPHYLAXIS    Hydromorphone HALLUCINATION      Social History     Socioeconomic  History    Marital status:    Tobacco Use    Smoking status: Never    Smokeless tobacco: Never   Vaping Use    Vaping Use: Never used   Substance and Sexual Activity    Alcohol use: Never    Drug use: Never     Social Determinants of Health     Food Insecurity: No Food Insecurity (2/12/2024)    Food Insecurity     Food Insecurity: Never true   Transportation Needs: No Transportation Needs (2/12/2024)    Transportation Needs     Lack of Transportation: No   Housing Stability: Low Risk  (2/12/2024)    Housing Stability     Housing Instability: No       Family History   Problem Relation Age of Onset    Cancer Father          Exam:  Vitals:    03/16/24 1442 03/16/24 1445 03/16/24 1455 03/16/24 1500   BP: (!) 86/59 90/48 90/65 104/78   Pulse: 108 102 99 98   Resp: 20 18 18 19   Temp:       TempSrc:       SpO2: 98% 98% 99% 99%   Weight:         General: laying in bed  Skin: no rash, ulcers or subcutaneous nodules  Eyes: anicteric sclerae, moist conjunctivae  Head, ears, nose, throat: atraumatic, oropharynx clear with moist mucous membranes  Neck: trachea midline with no thyromegaly  Heart: regular rate and rhythm, no murmurs / rubs / gallops  Lungs: bibasilar crackles  Abdomen: soft, nontender, nondistended  Extremities: +LE edema  Psych: interactive, answering questions appropriately, appropriate affect    Labs:  Reviewed in EMR    Inpatient Medications:  Reviewed in EMR    Imaging:   Chest imaging reviewed

## 2024-03-16 NOTE — ED INITIAL ASSESSMENT (HPI)
Pt here with increased swelling to legs, hx of CHF, recent cough.   Pt 85% on RA upon arrival, normally wears o2 at night time only.

## 2024-03-16 NOTE — ED QUICK NOTES
Orders for admission, patient is aware of plan and ready to go upstairs. Any questions, please call ED RN Joseph at extension [phone].     Patient Covid vaccination status: Fully vaccinated     COVID Test Ordered in ED: SARS-CoV-2/Flu A and B/RSV by PCR (GeneXpert)    COVID Suspicion at Admission: N/A    Running Infusions:    norepinephrine 5 mcg/min (03/16/24 1449)        Mental Status/LOC at time of transport: A&Ox4     Other pertinent information: Pt on 2L of O2  CIWA score: N/A   NIH score:  N/A

## 2024-03-16 NOTE — H&P
[facility]     Alejandro Guardado Patient Status:  Emergency    1935 MRN [id number]   Location [facility] EMERGENCY DEPARTMENT Attending Hannah Cole MD   Hosp Day # 0 PCP Stanislav Odonnell MD     Chief Complaint: Cough    Subjective:    History of Present Illness:     Alejandro Guardado is a 89 year old male with Past medical history recurrent hospitalization for septic shock secondary to pneumonia, secondary adrenal insufficiency, HFrEF, BPH, A-fib, CAD s/p CABG, hyperlipidemia, GERD, peripheral arterial disease presents to the hospital today for concerns of worsening cough and increasing shortness of breath.  Patient was otherwise in his normal state of health but over the last 24-48 hours progressively has been getting more short of breath and worsening cough.  Of note, patient has had his diuretic recently increased for concern of worsening shortness of breath and lower extremity edema.  Patient says shortness of breath never really improved.  Patient otherwise denies any fevers or chills nausea vomiting chest pain, abdominal pain, constipation, diarrhea.    History/Other:    Past Medical History:  Past Medical History:   Diagnosis Date    Arrhythmia     CAD (coronary artery disease) 2015    Cardiomyopathy (HCC)     Congestive heart disease (HCC)     Esophageal reflux     Hearing impairment     Heart attack (HCC)     High blood pressure     High cholesterol     History of MI (myocardial infarction)     Other and unspecified hyperlipidemia     PAD (peripheral artery disease) (HCC) 2023    Pneumonia due to organism 2023    Pneumonia, bacterial 2023    Unspecified essential hypertension     Visual impairment      Past Surgical History:   Past Surgical History:   Procedure Laterality Date    ANGIOGRAM      ANGIOPLASTY (CORONARY)      CABG      CATARACT  2023    FRACTURE SURGERY Left     left hip pinning    OTHER SURGICAL HISTORY Left 1977    knee  surgery    OTHER SURGICAL HISTORY  03/25/2016    Left hip ORIF pinning    TONSILLECTOMY        Family History:   Family History   Problem Relation Age of Onset    Cancer Father      Social History:    reports that he has never smoked. He has never used smokeless tobacco. He reports that he does not drink alcohol and does not use drugs.     Allergies:   Allergies   Allergen Reactions    Heparin ANAPHYLAXIS    Hydromorphone HALLUCINATION       Medications:    Current Facility-Administered Medications on File Prior to Encounter   Medication Dose Route Frequency Provider Last Rate Last Admin    [COMPLETED] hydrocortisone Na succinate PF (Solu-CORTEF) injection 50 mg  50 mg Intravenous 2 times per day Wild Arriaza, DO   50 mg at 02/14/24 2045    [COMPLETED] piperacillin-tazobactam (Zosyn) 4.5 g in dextrose 5% 100 mL IVPB-ADDV  4.5 g Intravenous Once Warren Martin MD   Stopped at 02/12/24 1300    [COMPLETED] sodium chloride 0.9 % IV bolus 500 mL  500 mL Intravenous Once Duke Wasserman  mL/hr at 02/12/24 1348 500 mL at 02/12/24 1348    [COMPLETED] hydrocortisone Na succinate PF (Solu-CORTEF) injection 100 mg  100 mg Intravenous Once Warren Martin MD   100 mg at 02/12/24 1401    [COMPLETED] vancomycin (Vancocin) 2.25 g in sodium chloride 0.9% 500 mL IVPB premix  25 mg/kg Intravenous Once Andre Campos  mL/hr at 02/12/24 1733 2,250 mg at 02/12/24 1733    [COMPLETED] magnesium sulfate in sterile water for injection 2 g/50mL IVPB premix 2 g  2 g Intravenous Once Andre Campos DO 50 mL/hr at 02/12/24 1718 2 g at 02/12/24 1718    [COMPLETED] acetaminophen (Ofirmev) 10 mg/mL infusion premix 1,000 mg  1,000 mg Intravenous Once Blanquita Jones APRN 400 mL/hr at 02/12/24 1715 1,000 mg at 02/12/24 1715    [COMPLETED] azithromycin (Zithromax) tab 500 mg  500 mg Oral Once Carley Sullivan, DO   500 mg at 12/27/23 1053    [COMPLETED] Perflutren Lipid Microsphere (DEFINITY) 6.52 MG/ML injection 1.5 mL  1.5 mL Intravenous  ONCE PRN Carley Sullivan DO   1.5 mL at 23 1122    [COMPLETED] sodium chloride 0.9 % IV bolus 2,967 mL  30 mL/kg Intravenous Once Cait Gaming  mL/hr at 23 1530 2,967 mL at 23 1530    [COMPLETED] piperacillin-tazobactam (Zosyn) 3.375 g in dextrose 5% 100 mL IVPB-ADDV  3.375 g Intravenous Once Jany Graham  mL/hr at 23 1211 3.375 g at 23 1211    [COMPLETED] potassium chloride 40 mEq in 250mL sodium chloride 0.9% IVPB premix  40 mEq Intravenous Once Carley Sullivan DO 62.5 mL/hr at 23 1607 40 mEq at 23 1607    Followed by    [COMPLETED] potassium chloride 20 mEq/100mL IVPB premix 20 mEq  20 mEq Intravenous Once Carley Sullivan DO 50 mL/hr at 23 20 mEq at 23     Current Outpatient Medications on File Prior to Encounter   Medication Sig Dispense Refill    [] amoxicillin clavulanate 875-125 MG Oral Tab Take 1 tablet by mouth 2 (two) times daily for 5 days. 10 tablet 0    metoprolol succinate ER 25 MG Oral Tablet 24 Hr Take 1 tablet (25 mg total) by mouth Daily Beta Blocker. 90 tablet 1    tamsulosin 0.4 MG Oral Cap Take 1 capsule (0.4 mg total) by mouth daily.      apixaban 5 MG Oral Tab Take 1 tablet (5 mg total) by mouth 2 (two) times daily. 60 tablet 3    collagenase (SANTYL) 250 UNIT/GM External Ointment Apply 1 g topically daily. 30 g 2    collagenase (SANTYL) 250 UNIT/GM External Ointment Apply topically to ulcer site daily 30 g 0    atorvastatin 40 MG Oral Tab Take 1 tablet (40 mg total) by mouth daily. 30 tablet 0    finasteride 5 MG Oral Tab Take 1 tablet (5 mg total) by mouth daily. 30 tablet 0    clopidogrel 75 MG Oral Tab Take 1 tablet (75 mg total) by mouth daily. 30 tablet 0    gabapentin 300 MG Oral Cap Take 1 capsule (300 mg total) by mouth 3 (three) times daily. 90 capsule 0    predniSONE 5 MG Oral Tab Take 3 tablets (15 mg total) by mouth daily.      folic acid 1 MG Oral Tab Take 1 tablet (1 mg total) by  mouth daily.      acetaminophen 500 MG Oral Tab Take 2 tablets (1,000 mg total) by mouth every 8 (eight) hours. 21 tablet 0    lisinopril 2.5 MG Oral Tab Take 1 tablet (2.5 mg total) by mouth daily.      Omeprazole 40 MG Oral Capsule Delayed Release Take 1 capsule (40 mg total) by mouth daily.         Review of Systems:   A comprehensive review of systems was completed.    Pertinent positives and negatives noted in the HPI.    Objective:   Physical Exam:    /75   Pulse 97   Temp 98.6 °F (37 °C) (Temporal)   Resp 18   Wt 207 lb (93.9 kg)   SpO2 100%   BMI 32.42 kg/m²   General: No acute distress, Alert  Respiratory: No rhonchi, no wheezes  Cardiovascular: S1, S2. Regular rate and rhythm  Abdomen: Soft, Non-tender, non-distended, positive bowel sounds  Neuro: No new focal deficits  Extremities: Lower extremity edema      Results:    Labs:      Labs Last 24 Hours:    Recent Labs   Lab 03/16/24  1300   RBC 5.18   HGB 13.3   HCT 42.8   MCV 82.6   MCH 25.7*   MCHC 31.1   RDW 16.3   NEPRELIM 18.80*   WBC 21.9*   .0       Recent Labs   Lab 03/16/24  1300   GLU 94   BUN 23   CREATSERUM 1.41*   EGFRCR 48*   CA 9.2   ALB 3.3*      K 3.3*      CO2 28.0   ALKPHO 69   AST 21   ALT 33   BILT 0.9   TP 7.4       Lab Results   Component Value Date    INR 1.35 (H) 02/12/2024    INR 1.48 (H) 12/25/2023    INR 1.39 (H) 12/01/2023       Recent Labs   Lab 03/16/24  1300   TROPHS 26       Recent Labs   Lab 03/16/24  1300   PBNP 3,462*       No results for input(s): \"PCT\" in the last 168 hours.    Imaging: Imaging data reviewed in Epic.    Assessment & Plan:      #Shock  -Etiology possibly septic versus hypovolemic, may be cardiogenic  -Levophed drip  -Admit to ICU  -Cover for possible pneumonia  Zosyn (3/16-)  -On 2 L nasal cannula currently, wean as tolerated  -Pulmonary/critical care to see     #Secondary Adrenal insufficiency  -Chronically on prednisone for nonspecific polyarthralgia/inflammatory  arthropathy  -Stress dose with IV hydrocortisone  -Has never seen a Rheumatologist for formal diagnosis, needs to see as outpatient for consideration of steroid sparing agent      #Acute Kidney Injury   #ATN, suspect  -Trend labs, continue vasopressors with goal of MAP greater than 65  -diuresis held     #Chronic HFrEF, ischemic cardiomyopathy with LVEF 25-30%  -PTA: Recently increased diuretics  -Hold home BB and lisinopril     #BPH  -Finasteride  -Flomax     #Afib  -Eliquis  -Hold metoprolol     #CAD s/p CABG   -Plavix/statin     #Hyperlipidemia  -Statin     #GERD  -PPI      #PAD with hx of non-healing R foot wound s/p thrombectomy and revascularization/stent 12/4/23 by Dr. Steward  -Plavix/Statin  -Wound care     #Recent hospitalizations  -2/12-2/15 for septic shock secondary to multifocal pneumonia  -12/25-12/28 septic shock for gram-negative pneumonia  -12/1-12/6-hospitalization for pneumonia    Plan of care discussed with ER physician, patient, patient's family    Albert Lee DO    CCT: 38 mins    Supplementary Documentation:     The 21st Century Cures Act makes medical notes like these available to patients in the interest of transparency. Please be advised this is a medical document. Medical documents are intended to carry relevant information, facts as evident, and the clinical opinion of the practitioner. The medical note is intended as peer to peer communication and may appear blunt or direct. It is written in medical language and may contain abbreviations or verbiage that are unfamiliar.

## 2024-03-16 NOTE — CONSULTS
[place] Report of Consultation      Alejandro Guardado Patient Status:  Emergency    1935 MRN [id number]   Location [facility] EMERGENCY DEPARTMENT Attending Hannah Cole MD   Hosp Day # 0 PCP Stanislav Odonnell MD     Reason for Consultation     Shortness of breath    Assessment/Plan   Hypotension, shock  Possible pneumonia  HFrEF, LVEF 20-25%  Chronic AF, rates elevated.     IV antibiotics  Levophed  Has had diuretics increased as outpt, will hold for now.  Continue DOAC        LEVEL 4  History of Present Illness:   Alejandro Guardado is a a(n) 89 year old male presents with increased shortness of breath, noted to be hypoxic and hypotensive in ER.     History:  Past Medical History:   Diagnosis Date    Arrhythmia     CAD (coronary artery disease) 2015    Cardiomyopathy (HCC)     Congestive heart disease (HCC)     Esophageal reflux     Hearing impairment     Heart attack (HCC)     High blood pressure     High cholesterol     History of MI (myocardial infarction)     Other and unspecified hyperlipidemia     PAD (peripheral artery disease) (HCC) 2023    Pneumonia due to organism 2023    Pneumonia, bacterial 2023    Unspecified essential hypertension     Visual impairment      Past Surgical History:   Procedure Laterality Date    ANGIOGRAM      ANGIOPLASTY (CORONARY)      CABG      CATARACT  2023    FRACTURE SURGERY Left     left hip pinning    OTHER SURGICAL HISTORY Left 1977    knee surgery    OTHER SURGICAL HISTORY  2016    Left hip ORIF pinning    TONSILLECTOMY       Family History   Problem Relation Age of Onset    Cancer Father       reports that he has never smoked. He has never used smokeless tobacco. He reports that he does not drink alcohol and does not use drugs.    Allergies:  Allergies   Allergen Reactions    Heparin ANAPHYLAXIS    Hydromorphone HALLUCINATION       Medications:    Current Facility-Administered Medications:     norepinephrine (Levophed)  4 mg/250mL infusion premix, 0.5-30 mcg/min, Intravenous, Continuous    Review of Systems:  Denies abdominal pain, N/V. No neurologic sx such as focal weakness or paresthesias. No cough. No visual disturbance. No fevers, chills.    Physical Exam:  Blood pressure 111/75, pulse 97, temperature 98.6 °F (37 °C), temperature source Temporal, resp. rate 18, weight 207 lb (93.9 kg), SpO2 100%.  Temp (24hrs), Av.6 °F (37 °C), Min:98.6 °F (37 °C), Max:98.6 °F (37 °C)    Wt Readings from Last 3 Encounters:   24 207 lb (93.9 kg)   02/15/24 205 lb 0.4 oz (93 kg)   24 216 lb (98 kg)       Telemetry: AF  Neck: No JVD, carotids 2+ no bruits.  Cardiac: Regular rate and rhythm, S1, S2 normal, no murmur, rub or gallop.  Lungs: Coarse breath sounds, bibasilar crackles.   Abdomen: Soft, non-tender.   Extremities: Without clubbing, cyanosis or edema.  Peripheral pulses are 2+. Right leg wrapped in dressing.   Neurologic: Alert and oriented, normal affect.  Skin: Warm and dry.     Laboratories and Data:  Lab Results   Component Value Date    WBC 21.9 2024    RBC 5.18 2024    HGB 13.3 2024    HCT 42.8 2024    MCV 82.6 2024    MCH 25.7 2024    MCHC 31.1 2024    RDW 16.3 2024    .0 2024     BUN (mg/dL)   Date Value   2024 23   02/15/2024 22   2024 22   2013 21 (H)   2013 21 (H)   08/15/2012 16     CREATININE (mg/dL)   Date Value   2013 0.85   2013 1.2   08/15/2012 1.2     Creatinine (mg/dL)   Date Value   2024 1.41 (H)   02/15/2024 0.91   2024 0.88     Lab Results   Component Value Date    INR 1.35 (H) 2024    INR 1.48 (H) 2023    INR 1.39 (H) 2023       Patient Active Problem List   Diagnosis    Closed minimally displaced zone I fracture of sacrum (HCC)    At risk for falling    CAD (coronary artery disease)    Ischemic cardiomyopathy    Azotemia    Arrhythmia    Esophageal reflux    History of MI  (myocardial infarction)    Mixed hyperlipidemia    Primary hypertension    Dizziness    Medication side effect    Closed left hip fracture (HCC)  Global 6/22/2016    Status post hip surgery    Left shoulder distal clavical resection and excision of ganglion cyst of soft tissue mass   Global  09/17/2020    Orthopedic aftercare for healing traumatic hip fracture, left, closed    Closed left hip fracture, with routine healing, subsequent encounter    Osteoarthrosis, localized, primary, knee, left    Osteoarthrosis, localized, primary, knee, right    Gangrene ([facility])    PAD (peripheral artery disease) ([facility])    Benign prostatic hyperplasia with incomplete bladder emptying    Gangrene of foot ([facility])    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) ([facility])    Leukocytosis    Atrial fibrillation with RVR ([facility])    Hypokalemia    Acute kidney injury ([facility])    Hyperglycemia    Community acquired pneumonia of right lung, unspecified part of lung    Acute respiratory failure with hypoxia ([facility])    Hypotension, unspecified hypotension type    Sepsis due to pneumonia ([facility])    Foot ulcer with fat layer exposed, right ([facility])    Syncope, unspecified syncope type    Sepsis, due to unspecified organism, unspecified whether acute organ dysfunction present ([facility])    Septic shock ([facility])    Acute hypoxic respiratory failure ([facility])    Pneumonia, bacterial    Hyponatremia    Metabolic alkalosis    Community acquired pneumonia, unspecified laterality    Sepsis due to undetermined organism ([facility])    Acute on chronic congestive heart failure, unspecified heart failure type ([facility])         Gurmeet Steel MD  3/16/2024  3:54 PM

## 2024-03-16 NOTE — ED PROVIDER NOTES
Patient Seen in: [facility] Emergency Department      History     Chief Complaint   Patient presents with    Congestive Heart Failure     Stated Complaint: chf exacerbation    Subjective:   HPI     Alejandro Guardado is a 89 year old male with PMHx CAD s/p CABG and PCI, HFrEF, ischemic cardiomyopathy with EF 25-30%, atrial fibrillation to eliquis, hypertension, hyperlipidemia, severe PAD, BPH, secondary adrenal insufficiency and GERD discharged mid February after being admitted for multifocal pneumonia and septic shock, presents to ED for evaluation today, for cough and shortness of breath.  Started around 4 AM.  Patient called his family and they found him to be hypoxic with O2 sats of 70% despite supplemental O2.  No fever.  No hemoptysis.  No chest pain.  They also report he saw the cardiologist yesterday and he was doing well.  Recently increased his Lasix this week for lower extremity edema from 40 mg to 80 mg daily.  No other complaints    Objective:   Past Medical History:   Diagnosis Date    Arrhythmia     CAD (coronary artery disease) 09/28/2015    Cardiomyopathy (HCC)     Congestive heart disease (HCC) 2022    Esophageal reflux     Hearing impairment     Heart attack (HCC)     High blood pressure     High cholesterol     History of MI (myocardial infarction)     Other and unspecified hyperlipidemia     PAD (peripheral artery disease) ([facility]) 04/26/2023    Pneumonia due to organism 12/2023    Pneumonia, bacterial 12/28/2023    Unspecified essential hypertension     Visual impairment               Past Surgical History:   Procedure Laterality Date    ANGIOGRAM      ANGIOPLASTY (CORONARY)      CABG      CATARACT  04/2023    FRACTURE SURGERY Left     left hip pinning    OTHER SURGICAL HISTORY Left 01/01/1977    knee surgery    OTHER SURGICAL HISTORY  03/25/2016    Left hip ORIF pinning    TONSILLECTOMY                  Social History     Socioeconomic History    Marital status:    Tobacco Use     Smoking status: Never    Smokeless tobacco: Never   Vaping Use    Vaping Use: Never used   Substance and Sexual Activity    Alcohol use: Never    Drug use: Never     Social Determinants of Health     Food Insecurity: No Food Insecurity (2/12/2024)    Food Insecurity     Food Insecurity: Never true   Transportation Needs: No Transportation Needs (2/12/2024)    Transportation Needs     Lack of Transportation: No   Housing Stability: Low Risk  (2/12/2024)    Housing Stability     Housing Instability: No              Review of Systems    Positive for stated complaint: chf exacerbation  Other systems are as noted in HPI.  Constitutional and vital signs reviewed.      All other systems reviewed and negative except as noted above.    Physical Exam     ED Triage Vitals   BP 03/16/24 1244 (!) 78/47   Pulse 03/16/24 1244 116   Resp 03/16/24 1244 22   Temp 03/16/24 1301 98.6 °F (37 °C)   Temp src 03/16/24 1301 Temporal   SpO2 03/16/24 1241 (!) 85 %   O2 Device 03/16/24 1241 None (Room air)       Current:/70   Pulse 91   Temp 98.6 °F (37 °C) (Temporal)   Resp 20   Wt 93.9 kg   SpO2 100%   BMI 32.42 kg/m²         Physical Exam  Vitals and nursing note reviewed.   Constitutional:       General: He is not in acute distress.     Appearance: He is well-developed. He is not toxic-appearing.   HENT:      Head: Normocephalic and atraumatic.   Eyes:      General: No scleral icterus.     Conjunctiva/sclera: Conjunctivae normal.   Cardiovascular:      Rate and Rhythm: Normal rate.   Pulmonary:      Effort: Pulmonary effort is normal. No respiratory distress.      Comments: Scattered rhonchi  Abdominal:      General: There is no distension.      Tenderness: There is no abdominal tenderness. There is no guarding.   Musculoskeletal:         General: No tenderness. Normal range of motion.      Cervical back: Normal range of motion and neck supple.      Right lower leg: No edema.      Left lower leg: Edema present.   Skin:      General: Skin is warm and dry.      Findings: No rash.   Neurological:      Mental Status: He is alert and oriented to person, place, and time.      Motor: No abnormal muscle tone.      Coordination: Coordination normal.   Psychiatric:         Mood and Affect: Mood normal.         Behavior: Behavior normal.              ED Course     Labs Reviewed   LACTIC ACID, PLASMA - Abnormal; Notable for the following components:       Result Value    Lactic Acid 2.3 (*)     All other components within normal limits   COMP METABOLIC PANEL (14) - Abnormal; Notable for the following components:    Potassium 3.3 (*)     Creatinine 1.41 (*)     eGFR-Cr 48 (*)     Albumin 3.3 (*)     A/G Ratio 0.8 (*)     All other components within normal limits   PRO BETA NATRIURETIC PEPTIDE - Abnormal; Notable for the following components:    Pro-Beta Natriuretic Peptide 3,462 (*)     All other components within normal limits   CBC W/ DIFFERENTIAL - Abnormal; Notable for the following components:    WBC 21.9 (*)     MCH 25.7 (*)     Neutrophil Absolute Prelim 18.80 (*)     Neutrophil Absolute 18.80 (*)     Monocyte Absolute 1.58 (*)     All other components within normal limits   TROPONIN I HIGH SENSITIVITY - Normal   SARS-COV-2/FLU A AND B/RSV BY PCR ([employer]) - Normal    Narrative:     This test is intended for the qualitative detection and differentiation of SARS-CoV-2, influenza A, influenza B, and respiratory syncytial virus (RSV) viral RNA in nasopharyngeal or nares swabs from individuals suspected of respiratory viral infection consistent with COVID-19 by their healthcare provider. Signs and symptoms of respiratory viral infection due to SARS-CoV-2, influenza, and RSV can be similar.    Test performed using the Xpert Xpress SARS-CoV-2/FLU/RSV (real time RT-PCR)  assay on the GeneXpert instrument, [employer], [place] [address].   This test is being used under the Food and Drug Administration's Emergency Use Authorization.    The authorized  Fact Sheet for Healthcare Providers for this assay is available upon request from the laboratory.   CBC WITH DIFFERENTIAL WITH PLATELET    Narrative:     The following orders were created for panel order CBC With Differential With Platelet.  Procedure                               Abnormality         Status                     ---------                               -----------         ------                     CBC W/ DIFFERENTIAL[[id number]]          Abnormal            Final result                 Please view results for these tests on the individual orders.   LACTIC ACID REFLEX POST POSTIVE   RAINBOW DRAW LAVENDER   RAINBOW DRAW LIGHT GREEN   RAINBOW DRAW BLUE   RAINBOW DRAW GOLD   BLOOD CULTURE   BLOOD CULTURE     EKG    Rate, intervals and axes as noted on EKG Report.  Rate: 126  Rhythm: Atrial Fibrillation  Reading: Atrial fibrillation, no acute ischemic change                           MDM          -Tracing on cardiac monitor and pulse oximetry was reviewed by myself.   -The cardiac monitor revealed afib  as interpreted by me. The cardiac monitor was ordered to monitor the patient for dysrhythmia  -Pulse oximetry was interpreted by me and was normal.  Pulse oximeter was ordered to monitor patient for hypoxia.        -History source other than patient -family        -Comorbidities did add complexity to the management are mentioned in the HPI above        -I personally reviewed the prior external notes and the medical record to obtain additional history -reviewed patient discharge summary from last month, patient was admitted for septic shock secondary to multifocal pneumonia        -DDX: Includes but not limited to cardiogenic shock, septic shock,-        -I personally reviewed the radiographs findings and they show pneumonia  Please refer to radiology report for official interpretation    XR CHEST AP PORTABLE  (CPT=71045)   Final Result   PROCEDURE:  XR CHEST AP PORTABLE  (CPT=71045)       TECHNIQUE:  AP  chest radiograph was obtained.       COMPARISON:  [facility] , XR, XR CHEST PA + LAT CHEST (CPT=71046), 2/26/2024,    2:59 PM.       INDICATIONS:  chf exacerbation       PATIENT STATED HISTORY: (As transcribed by Technologist)  Family states he    has CHF exacerbation and trouble breathing.            FINDINGS:  There are some subtle patchy airspace opacities within the    lungs suspicious for areas of pneumonia.  Heart size is within normal    limits.  No pleural effusion is seen.  Mediastinal and hilar contours are    normal.  Increased vascular markings may    also represent moderate vascular congestion and volume overload.                         =====   CONCLUSION:  Some patchy airspace opacities are present within the lungs    suspicious for areas of pneumonia.  Viral pneumonia may be possible.     There may also be moderate vascular congestion and volume overload.     Follow-up is recommended to ensure    resolution.  If clinical symptoms persist then consider CT.           LOCATION:  [facility]                       Dictated by (CST): Gregg Fields MD on 3/16/2024 at 2:17 PM        Finalized by (CST): Gregg Fields MD on 3/16/2024 at 2:17 PM             Labs Reviewed   LACTIC ACID, PLASMA - Abnormal; Notable for the following components:       Result Value    Lactic Acid 2.3 (*)     All other components within normal limits   COMP METABOLIC PANEL (14) - Abnormal; Notable for the following components:    Potassium 3.3 (*)     Creatinine 1.41 (*)     eGFR-Cr 48 (*)     Albumin 3.3 (*)     A/G Ratio 0.8 (*)     All other components within normal limits   PRO BETA NATRIURETIC PEPTIDE - Abnormal; Notable for the following components:    Pro-Beta Natriuretic Peptide 3,462 (*)     All other components within normal limits   CBC W/ DIFFERENTIAL - Abnormal; Notable for the following components:    WBC 21.9 (*)     MCH 25.7 (*)     Neutrophil Absolute Prelim 18.80 (*)     Neutrophil Absolute 18.80 (*)     Monocyte Absolute 1.58 (*)      All other components within normal limits   TROPONIN I HIGH SENSITIVITY - Normal   SARS-COV-2/FLU A AND B/RSV BY PCR ([employer]ERT) - Normal    Narrative:     This test is intended for the qualitative detection and differentiation of SARS-CoV-2, influenza A, influenza B, and respiratory syncytial virus (RSV) viral RNA in nasopharyngeal or nares swabs from individuals suspected of respiratory viral infection consistent with COVID-19 by their healthcare provider. Signs and symptoms of respiratory viral infection due to SARS-CoV-2, influenza, and RSV can be similar.    Test performed using the Xpert Xpress SARS-CoV-2/FLU/RSV (real time RT-PCR)  assay on the GeneXpert instrument, [employer], [place] [address].   This test is being used under the Food and Drug Administration's Emergency Use Authorization.    The authorized Fact Sheet for Healthcare Providers for this assay is available upon request from the laboratory.   CBC WITH DIFFERENTIAL WITH PLATELET    Narrative:     The following orders were created for panel order CBC With Differential With Platelet.  Procedure                               Abnormality         Status                     ---------                               -----------         ------                     CBC W/ DIFFERENTIAL[[id number]]          Abnormal            Final result                 Please view results for these tests on the individual orders.   LACTIC ACID REFLEX POST POSTIVE   RAINBOW DRAW LAVENDER   RAINBOW DRAW LIGHT GREEN   RAINBOW DRAW BLUE   RAINBOW DRAW GOLD   BLOOD CULTURE   BLOOD CULTURE     Patient labs have been reviewed white count is 22,000.  Potassium is 3.3 creatinine 1.4 lactic acid 2.3.  Troponin is normal.    Patient did not receive 30ml/kg of fluids despite having: Septic Shock. The reason for doing so is: patient has heart failure. 500mL of fluids were given instead        Medications   norepinephrine (Levophed) 4 mg/250mL infusion premix (5 mcg/min Intravenous  Handoff 3/16/24 1510)   sodium chloride 0.9 % IV bolus 500 mL (0 mL Intravenous Stopped 3/16/24 1445)   hydrocortisone Na succinate PF (Solu-CORTEF) injection 100 mg (100 mg Intravenous Given 3/16/24 1320)   piperacillin-tazobactam (Zosyn) 4.5 g in dextrose 5% 100 mL IVPB-ADDV (0 g Intravenous Stopped 3/16/24 1409)     Patient was given Solu-Cortef fluids antibiotics started on Levophed as well.    Discussed with pulmonary critical care, cardiology, [facility] patient will be admitted to ICU      [facility]   part of [facility]      Sepsis Reassessment Note    /70   Pulse 91   Temp 98.6 °F (37 °C) (Temporal)   Resp 20   Wt 93.9 kg   SpO2 100%   BMI 32.42 kg/m²      I completed the sepsis reassessment at 1505    Cardiac:  Regularity: irregular  Rate: Normal  Heart Sounds: S1,S2    Lungs:   Right: Clear  Left: Clear    Peripheral Pulses:  Radial: Right 1+ or Left 1+      Capillary Refill:  <3 Secs    Skin:  Temp/Moisture: Warm and Dry  Color: Normal      Hannah Cole MD  3/16/2024  3:06 PM          Admission disposition: 3/16/2024  3:27 PM                                        Medical Decision Making      Disposition and Plan     Clinical Impression:  1. Sepsis due to pneumonia (HCC)    2. Septic shock (HCC)    3. Acute hypoxemic respiratory failure (HCC)         Disposition:  Admit  3/16/2024  3:27 pm    Follow-up:  No follow-up provider specified.        Medications Prescribed:  Current Discharge Medication List                            Hospital Problems       Present on Admission  Date Reviewed: 3/11/2024            ICD-10-CM Noted POA    * (Principal) Sepsis due to pneumonia (HCC) J18.9, A41.9 12/1/2023 Unknown

## 2024-03-17 LAB
ALBUMIN SERPL-MCNC: 2.7 G/DL (ref 3.4–5)
ALBUMIN/GLOB SERPL: 0.7 {RATIO} (ref 1–2)
ALP LIVER SERPL-CCNC: 56 U/L
ALT SERPL-CCNC: 25 U/L
ANION GAP SERPL CALC-SCNC: 3 MMOL/L (ref 0–18)
AST SERPL-CCNC: 20 U/L (ref 15–37)
BASOPHILS # BLD AUTO: 0.04 X10(3) UL (ref 0–0.2)
BASOPHILS NFR BLD AUTO: 0.2 %
BILIRUB SERPL-MCNC: 0.9 MG/DL (ref 0.1–2)
BUN BLD-MCNC: 22 MG/DL (ref 9–23)
CALCIUM BLD-MCNC: 8.4 MG/DL (ref 8.5–10.1)
CHLORIDE SERPL-SCNC: 108 MMOL/L (ref 98–112)
CO2 SERPL-SCNC: 26 MMOL/L (ref 21–32)
CREAT BLD-MCNC: 0.95 MG/DL
EGFRCR SERPLBLD CKD-EPI 2021: 77 ML/MIN/1.73M2 (ref 60–?)
EOSINOPHIL # BLD AUTO: 0 X10(3) UL (ref 0–0.7)
EOSINOPHIL NFR BLD AUTO: 0 %
ERYTHROCYTE [DISTWIDTH] IN BLOOD BY AUTOMATED COUNT: 16.2 %
GLOBULIN PLAS-MCNC: 3.7 G/DL (ref 2.8–4.4)
GLUCOSE BLD-MCNC: 146 MG/DL (ref 70–99)
HCT VFR BLD AUTO: 36 %
HGB BLD-MCNC: 11.4 G/DL
IMM GRANULOCYTES # BLD AUTO: 0.13 X10(3) UL (ref 0–1)
IMM GRANULOCYTES NFR BLD: 0.6 %
LYMPHOCYTES # BLD AUTO: 0.75 X10(3) UL (ref 1–4)
LYMPHOCYTES NFR BLD AUTO: 3.6 %
MAGNESIUM SERPL-MCNC: 2.3 MG/DL (ref 1.6–2.6)
MCH RBC QN AUTO: 26 PG (ref 26–34)
MCHC RBC AUTO-ENTMCNC: 31.7 G/DL (ref 31–37)
MCV RBC AUTO: 82 FL
MONOCYTES # BLD AUTO: 1.09 X10(3) UL (ref 0.1–1)
MONOCYTES NFR BLD AUTO: 5.2 %
NEUTROPHILS # BLD AUTO: 18.95 X10 (3) UL (ref 1.5–7.7)
NEUTROPHILS # BLD AUTO: 18.95 X10(3) UL (ref 1.5–7.7)
NEUTROPHILS NFR BLD AUTO: 90.4 %
OSMOLALITY SERPL CALC.SUM OF ELEC: 290 MOSM/KG (ref 275–295)
PLATELET # BLD AUTO: 323 10(3)UL (ref 150–450)
POTASSIUM SERPL-SCNC: 4.2 MMOL/L (ref 3.5–5.1)
POTASSIUM SERPL-SCNC: 4.2 MMOL/L (ref 3.5–5.1)
PROT SERPL-MCNC: 6.4 G/DL (ref 6.4–8.2)
RBC # BLD AUTO: 4.39 X10(6)UL
SODIUM SERPL-SCNC: 137 MMOL/L (ref 136–145)
WBC # BLD AUTO: 21 X10(3) UL (ref 4–11)

## 2024-03-17 PROCEDURE — 99233 SBSQ HOSP IP/OBS HIGH 50: CPT | Performed by: HOSPITALIST

## 2024-03-17 PROCEDURE — 99233 SBSQ HOSP IP/OBS HIGH 50: CPT | Performed by: INTERNAL MEDICINE

## 2024-03-17 RX ORDER — AZITHROMYCIN 250 MG/1
500 TABLET, FILM COATED ORAL DAILY
Status: DISCONTINUED | OUTPATIENT
Start: 2024-03-17 | End: 2024-03-18

## 2024-03-17 NOTE — SPIRITUAL CARE NOTE
Spiritual Care Visit Note    Patient Name: Alejandro Guardado Date of Spiritual Care Visit: 24   : 1935 Primary Dx: Sepsis due to pneumonia (HCC)       Referred By:  Code Blue           Visit Type/Summary:     - Spiritual Care:  remains available as needed for follow up.    Spiritual Care support can be requested via an [facility] consult. For urgent/immediate needs, please contact the On Call  at: [facility]: ext [phone]    Chaplain Charlotte.

## 2024-03-17 NOTE — PROGRESS NOTES
[facility]   part of [facility]     Hospitalist Progress Note     Alejandro Guardado Patient Status:  Inpatient    1935 MRN [id number]   Location [facility] 4SW-A Attending Dipesh Wall MD   Hosp Day # 1 PCP Stanislav Odonnell MD     Chief Complaint: cough    Subjective:     Patient states cough improving. No cp or sob.     Objective:    Review of Systems:   A comprehensive review of systems was completed; pertinent positive and negatives stated in subjective.    Vital signs:  Temp:  [97.7 °F (36.5 °C)-98.6 °F (37 °C)] 97.7 °F (36.5 °C)  Pulse:  [] 105  Resp:  [10-27] 21  BP: ()/(43-99) 101/73  SpO2:  [85 %-100 %] 100 %    Physical Exam:    General: No acute distress  Respiratory: No wheezes, no rhonchi  Cardiovascular: S1, S2, regular rate and rhythm  Abdomen: Soft, Non-tender, non-distended, positive bowel sounds  Neuro: No new focal deficits.   Extremities: No edema      Diagnostic Data:    Labs:  Recent Labs   Lab 24  1300 24  0523   WBC 21.9* 21.0*   HGB 13.3 11.4*   MCV 82.6 82.0   .0 323.0       Recent Labs   Lab 24  1300 24  0523   GLU 94 146*   BUN 23 22   CREATSERUM 1.41* 0.95   CA 9.2 8.4*   ALB 3.3* 2.7*    137   K 3.3* 4.2  4.2    108   CO2 28.0 26.0   ALKPHO 69 56   AST 21 20   ALT 33 25   BILT 0.9 0.9   TP 7.4 6.4       Estimated Creatinine Clearance: 49.3 mL/min (based on SCr of 0.95 mg/dL).    Recent Labs   Lab 24  1300   TROPHS 26       No results for input(s): \"PTP\", \"INR\" in the last 168 hours.               Microbiology    No results found for this visit on 24.      Imaging: Reviewed in Epic.    Medications:    azithromycin  500 mg Oral Daily    hydrocortisone sodium succinate  50 mg Intravenous 4 times per day    apixaban  5 mg Oral BID    atorvastatin  40 mg Oral Nightly    finasteride  5 mg Oral Daily    folic acid  1 mg Oral Daily    gabapentin  300 mg Oral TID    [Held by provider] lisinopril  2.5 mg Oral  Daily    [Held by provider] metoprolol succinate ER  25 mg Oral Daily Beta Blocker    pantoprazole  40 mg Oral QAM AC    tamsulosin  0.4 mg Oral Daily    piperacillin-tazobactam  3.375 g Intravenous Q8H       Assessment & Plan:      #Shock  -Etiology possibly septic versus hypovolemic, may be cardiogenic  -s/p Levophed drip  -Cover for possible pneumonia  Zosyn (3/16-)  -On RA now  -Pulmonary/critical care following     #Secondary Adrenal insufficiency  -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  -Stress dose with IV hydrocortisone  -Has never seen a Rheumatologist for formal diagnosis, needs to see as outpatient for consideration of steroid sparing agent      #Acute Kidney Injury, improving   #ATN, suspect  -Trend labs     #Chronic HFrEF, ischemic cardiomyopathy with LVEF 25-30%  -PTA: Recently increased diuretics  -Hold home BB and lisinopril     #BPH  -Finasteride  -Flomax     #Afib  -Eliquis  -Hold metoprolol     #CAD s/p CABG   -Plavix/statin     #Hyperlipidemia  -Statin     #GERD  -PPI      #PAD with hx of non-healing R foot wound s/p thrombectomy and revascularization/stent 12/4/23 by Dr. Steward  -Plavix/Statin  -Wound care      #Recent hospitalizations  -2/12-2/15 for septic shock secondary to multifocal pneumonia  -12/25-12/28 septic shock for gram-negative pneumonia  -12/1-12/6-hospitalization for pneumonia      Albert Lee DO    Supplementary Documentation:     Quality:  DVT Mechanical Prophylaxis:   SCDs,    DVT Pharmacologic Prophylaxis   Medication    apixaban (Eliquis) tab 5 mg                Code Status: DNAR/Selective Treatment  Block: External urinary catheter in place  Block Duration (in days):   Central line:    DIEGO:     Discharge is dependent on: clinical improvement   At this point Mr. Guardado is expected to be discharge to: home    The 21st Century Cures Act makes medical notes like these available to patients in the interest of transparency. Please be  advised this is a medical document. Medical documents are intended to carry relevant information, facts as evident, and the clinical opinion of the practitioner. The medical note is intended as peer to peer communication and may appear blunt or direct. It is written in medical language and may contain abbreviations or verbiage that are unfamiliar.             **Certification      PHYSICIAN Certification of Need for Inpatient Hospitalization - Initial Certification    Patient will require inpatient services that will reasonably be expected to span two midnight's based on the clinical documentation in H+P.   Based on patients current state of illness, I anticipate that, after discharge, patient will require TBD.

## 2024-03-17 NOTE — PROGRESS NOTES
Critical Care Progress Note     Assessment / Plan:  Shock - cardiogenic and/or septic  - off vasopressors  - empiric antibiotics  - stress dose steroids given history of adrenal insufficiency  Possible PNA - RPP negative  - Zosyn and azithromycin  - Strep and Legionella antigens  - follow-up cultures  CHF  - cardiology following  FEN  - cardiac diet  Ppx  - DOAC  Dispo  - transfer to the floor later today if remains off vasopressors. Pulmonary consult service will follow after transfer      Subjective:  Weaned off vasopressors  No complaints  Wants to go home    Objective:  Vitals:    03/17/24 0745 03/17/24 0800 03/17/24 0815 03/17/24 0830   BP: 127/79 102/75 104/81 112/71   BP Location:  Left arm     Pulse: 78 92 89 91   Resp: 15 18 14 17   Temp:  97.7 °F (36.5 °C)     TempSrc:  Temporal     SpO2: 99% 100% 98% 100%   Weight:         Physical Exam:  General: laying in bed  Skin: no rash, ulcers or subcutaneous nodules  Eyes: anicteric sclerae, moist conjunctivae  Head, ears, nose, throat: atraumatic, oropharynx clear with moist mucous membranes  Neck: trachea midline with no thyromegaly  Heart: regular rate and rhythm, no murmurs / rubs / gallops  Lungs: clear bilaterally, normal respiratory effort, no accessory muscle use  Abdomen: soft, nontender, nondistended   Extremities: +LE edema  Psych: interactive, answering questions appropriately, appropriate affect    Medications:  Reviewed in EMR    Lab Data:  Reviewed in EMR    Imaging:  I independently visualized all relevant chest imaging in PACS and agree with radiology interpretation except where noted.

## 2024-03-17 NOTE — PROGRESS NOTES
Patient alert oriented throughout the night, answering questions appropriately.   No Complaints of pain  Weaned to 1L oxygen via nasal cannula as patient intermittently desats while asleep.   Levo weaned as per parameters ( please refer to mar)  Rested with no acute events this shift.

## 2024-03-17 NOTE — PHYSICAL THERAPY NOTE
PHYSICAL THERAPY EVALUATION - INPATIENT     Room Number: 464/464-A  Evaluation Date: 3/17/2024  Type of Evaluation: Initial  Physician Order: PT Eval and Treat    Presenting Problem: increase in LE swelling, cough, SOB  Co-Morbidities : CAD, CABG, PCI, HTN, hyperlipidemia, cardiomyopathy, a-fib, adrenal insufficiency, GERD, BPH, PAD  Reason for Therapy: Mobility Dysfunction and Discharge Planning    PHYSICAL THERAPY ASSESSMENT   Patient is currently functioning below baseline with bed mobility, transfers, gait, stair negotiation, and standing prolonged periods.  Prior to admission, patient's baseline is modified independent gait with the RW and modified independent with stair negotiation.  Patient is requiring contact guard assist as a result of the following impairments: decreased functional strength, decreased endurance/aerobic capacity, and decreased muscular endurance.  Physical Therapy will continue to follow for duration of hospitalization.    Patient will benefit from continued skilled PT Services For duration of hospitalization, however, given the patient is functioning near baseline level do not anticipate skilled therapy needs at discharge .    PLAN  PT Treatment Plan: Bed mobility;Patient education;Gait training;Range of motion;Strengthening;Stair training;Transfer training  Rehab Potential : Good  Frequency (Obs): 3-5x/week         CURRENT GOALS    Goal #1 Patient is able to demonstrate supine - sit EOB @ level: modified independent     Goal #2 Patient is able to demonstrate transfers Sit to/from Stand at assistance level: modified independent     Goal #3 Patient is able to ambulate 200 feet with assist device: walker - rolling at assistance level: modified independent     Goal #4 Patient to be modified independent full flight of stairs step-to pattern with 1 raiilng   Goal #5    Goal #6    Goal Comments: Goals established on 3/17/2024      PHYSICAL THERAPY MEDICAL/SOCIAL HISTORY  History related  to current admission: Patient is a 89 year old male admitted on 3/16/2024 from home for increase in LE swelling, cough, SOB.  Pt diagnosed with sepsis due to pneumonia, acute hypoxemic respiratory failur.      HOME SITUATION  Type of Home: House   Home Layout: Multi-level;Able to live on main level  Stairs to Enter : 2             Lives With: Alone  Drives: No ( License  in 2024)  Patient Owned Equipment: Rolling walker       Prior Level of Rex: Patient reported being modified independent gait with the RW, modified independent ascend/descend full flight of stairs at least a couple of times to 2nd floor full bathroom for showers.  Patient has a son in the area who helps as needed and another daughter who comes as able to assist.  Patient reports having R foot surgery/amputation on 23 and has been dealing with wound care since then and reports having a post-op shoe at home.     SUBJECTIVE  \"I can walk with you\", (+)dressing in R foot      OBJECTIVE  Precautions: Bed/chair alarm  Fall Risk: Standard fall risk    WEIGHT BEARING RESTRICTION  Weight Bearing Restriction: None                PAIN ASSESSMENT  Ratin          COGNITION  Overall Cognitive Status:  WFL - within functional limits    RANGE OF MOTION AND STRENGTH ASSESSMENT  Upper extremity ROM and strength are within functional limits     Lower extremity ROM is within functional limits     Lower extremity strength is within functional limits       BALANCE  Static Sitting: Good  Dynamic Sitting: Good  Static Standing: Fair (with rw)  Dynamic Standing: Fair (with RW)    ADDITIONAL TESTS                                    ACTIVITY TOLERANCE                         O2 WALK       NEUROLOGICAL FINDINGS                        AM-PAC '6-Clicks' INPATIENT SHORT FORM - BASIC MOBILITY  How much difficulty does the patient currently have...  Patient Difficulty: Turning over in bed (including adjusting bedclothes, sheets and blankets)?: A  Little   Patient Difficulty: Sitting down on and standing up from a chair with arms (e.g., wheelchair, bedside commode, etc.): None   Patient Difficulty: Moving from lying on back to sitting on the side of the bed?: A Little   How much help from another person does the patient currently need...   Help from Another: Moving to and from a bed to a chair (including a wheelchair)?: A Little   Help from Another: Need to walk in hospital room?: A Little   Help from Another: Climbing 3-5 steps with a railing?: A Little       AM-PAC Score:  Raw Score: 19   Approx Degree of Impairment: 41.77%   Standardized Score (AM-PAC Scale): 45.44   CMS Modifier (G-Code): CK    FUNCTIONAL ABILITY STATUS  Gait Assessment   Functional Mobility/Gait Assessment  Gait Assistance: Contact guard assist  Distance (ft): 90  Assistive Device: Rolling walker  Pattern:  (Decr evans/step length/heel-toe pattern)    Skilled Therapy Provided     Bed Mobility:  Rolling: NT  Supine to sit: NT   Sit to supine: NT     Transfer Mobility:  Sit to stand: cga with the RW   Stand to sit: cga  Gait = cga    Therapist's Comments: RN approved session, patient is sitting in the chair with LE elevated on the foot stool.  Patient performed transfer and gait with the RW; patient was encouraged to stay sitting in the chair and ambulate with staff as able; patient verbalized understanding.     Exercise/Education Provided:  Discussed goals for the session and POC.  Patient was educated on LE exs and encouraged patient to do exs as able; patient is in agreement.     Patient End of Session: Up in chair;Needs met;Call light within reach;RN aware of session/findings;All patient questions and concerns addressed;With  staff      Patient Evaluation Complexity Level:  History Moderate - 1 or 2 personal factors and/or co-morbidities   Examination of body systems Low - addressing 1-2 elements   Clinical Presentation Moderate - Evolving   Clinical Decision Making Low-stable        PT Session Time: 30  minutes  Gait Training: 10 minutes  Therapeutic Activity: 8 minutes2  Neuromuscular Re-education:  minutes  Therapeutic Exercise:  minutes

## 2024-03-17 NOTE — PROGRESS NOTES
[facility] PROGRESS NOTE      Alejandro Guardado Patient Status:  Inpatient    1935 MRN [id number]   Location [facility] 4SW-A Attending Dipesh Wall MD   Hosp Day # 1 PCP Stanislav Odonnell MD     Assessment/Plan   Hypotension, shock, BP improved today  Possible pneumonia- respiratory status appears improved today.  HFrEF, LVEF 20-25%  Chronic AF, rates better today.      IV antibiotics  Levophed weaned off  Has had diuretics increased as outpt, will hold for now.  Continue DOAC      Subjective:  Sitting comfortably in bed.    ROS:  No abdominal pain, no cough, no fevers, chills, denies musculoskeletal pain.     Objective:  /86   Pulse 101   Temp 97.5 °F (36.4 °C) (Temporal)   Resp 17   Wt 211 lb 6.7 oz (95.9 kg)   SpO2 100%   BMI 33.11 kg/m²     Temp (24hrs), Av.9 °F (36.6 °C), Min:97.5 °F (36.4 °C), Max:98.3 °F (36.8 °C)      Intake/Output:    Intake/Output Summary (Last 24 hours) at 3/17/2024 1324  Last data filed at 3/17/2024 1213  Gross per 24 hour   Intake 1557.9 ml   Output 700 ml   Net 857.9 ml       Wt Readings from Last 2 Encounters:   24 211 lb 6.7 oz (95.9 kg)   02/15/24 205 lb 0.4 oz (93 kg)       Physical Exam:    General: Alert,  No apparent distress.  HEENT: No focal deficits.  Neck: No JVD, carotids 2+ no bruits.  Cardiac: Regular rate and rhythm, S1, S2 normal, no murmur  Lungs: Clear without wheezes, rales, rhonchi.  Normal excursions and effort.  Abdomen: Soft, non-tender.   Extremities: Without clubbing, cyanosis or edema.  Peripheral pulses are 2+.  Skin: Warm and dry.     Labs:  Lab Results   Component Value Date    WBC 21.0 2024    HGB 11.4 2024    HCT 36.0 2024    .0 2024     Lab Results   Component Value Date    INR 1.35 (H) 2024     Lab Results   Component Value Date     2024    K 4.2 2024    K 4.2 2024     2024    CO2 26.0 2024    BUN 22 2024     CREATSERUM 0.95 03/17/2024     03/17/2024    MG 2.3 03/17/2024    CA 8.4 03/17/2024    ALT 25 03/17/2024    AST 20 03/17/2024    ALB 2.7 03/17/2024        No results found for: \"TROP\", \"CKMB\"     Medications:     azithromycin  500 mg Oral Daily    hydrocortisone sodium succinate  50 mg Intravenous 4 times per day    apixaban  5 mg Oral BID    atorvastatin  40 mg Oral Nightly    finasteride  5 mg Oral Daily    folic acid  1 mg Oral Daily    gabapentin  300 mg Oral TID    [Held by provider] lisinopril  2.5 mg Oral Daily    [Held by provider] metoprolol succinate ER  25 mg Oral Daily Beta Blocker    pantoprazole  40 mg Oral QAM AC    tamsulosin  0.4 mg Oral Daily    piperacillin-tazobactam  3.375 g Intravenous Q8H         Gurmeet Steel MD  3/17/2024  1:24 PM    Patient Active Problem List   Diagnosis    Closed minimally displaced zone I fracture of sacrum (HCC)    At risk for falling    CAD (coronary artery disease)    Ischemic cardiomyopathy    Azotemia    Arrhythmia    Esophageal reflux    History of MI (myocardial infarction)    Mixed hyperlipidemia    Primary hypertension    Dizziness    Medication side effect    Closed left hip fracture (HCC)  Global 6/22/2016    Status post hip surgery    Left shoulder distal clavical resection and excision of ganglion cyst of soft tissue mass   Global  09/17/2020    Orthopedic aftercare for healing traumatic hip fracture, left, closed    Closed left hip fracture, with routine healing, subsequent encounter    Osteoarthrosis, localized, primary, knee, left    Osteoarthrosis, localized, primary, knee, right    Gangrene (HCC)    PAD (peripheral artery disease) (HCC)    Benign prostatic hyperplasia with incomplete bladder emptying    Gangrene of foot (HCC)    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) (HCC)    Leukocytosis    Atrial fibrillation with RVR (HCC)    Hypokalemia    Acute kidney injury (HCC)    Hyperglycemia    Community acquired pneumonia of  right lung, unspecified part of lung    Acute respiratory failure with hypoxia ([facility])    Hypotension, unspecified hypotension type    Sepsis due to pneumonia (HCC)    Foot ulcer with fat layer exposed, right ([facility])    Syncope, unspecified syncope type    Sepsis, due to unspecified organism, unspecified whether acute organ dysfunction present ([facility])    Septic shock (HCC)    Acute hypoxic respiratory failure ([facility])    Pneumonia, bacterial    Hyponatremia    Metabolic alkalosis    Community acquired pneumonia, unspecified laterality    Sepsis due to undetermined organism ([facility])    Acute on chronic congestive heart failure, unspecified heart failure type ([facility])    Acute hypoxemic respiratory failure ([facility])

## 2024-03-17 NOTE — PLAN OF CARE
Patient awake/alert and oriented x4, pt following commands, able to make needs aware. Patient denies pain. Levophed gtt stopped this morning. Patient on room air, patient denies chest pain/sob. Patient up to chair with rolling walker/standby assist. Patient and family at bedside updated on plan of care.

## 2024-03-18 VITALS
BODY MASS INDEX: 33 KG/M2 | DIASTOLIC BLOOD PRESSURE: 83 MMHG | WEIGHT: 211.44 LBS | OXYGEN SATURATION: 96 % | TEMPERATURE: 98 F | SYSTOLIC BLOOD PRESSURE: 113 MMHG | RESPIRATION RATE: 18 BRPM | HEART RATE: 99 BPM

## 2024-03-18 LAB
ALBUMIN SERPL-MCNC: 2.8 G/DL (ref 3.4–5)
ALBUMIN/GLOB SERPL: 0.8 {RATIO} (ref 1–2)
ALP LIVER SERPL-CCNC: 54 U/L
ALT SERPL-CCNC: 26 U/L
ANION GAP SERPL CALC-SCNC: 4 MMOL/L (ref 0–18)
AST SERPL-CCNC: 12 U/L (ref 15–37)
ATRIAL RATE: 163 BPM
BASOPHILS # BLD AUTO: 0.02 X10(3) UL (ref 0–0.2)
BASOPHILS NFR BLD AUTO: 0.2 %
BILIRUB SERPL-MCNC: 0.5 MG/DL (ref 0.1–2)
BUN BLD-MCNC: 25 MG/DL (ref 9–23)
CALCIUM BLD-MCNC: 8.6 MG/DL (ref 8.5–10.1)
CHLORIDE SERPL-SCNC: 108 MMOL/L (ref 98–112)
CO2 SERPL-SCNC: 24 MMOL/L (ref 21–32)
CREAT BLD-MCNC: 0.99 MG/DL
EGFRCR SERPLBLD CKD-EPI 2021: 73 ML/MIN/1.73M2 (ref 60–?)
EOSINOPHIL # BLD AUTO: 0 X10(3) UL (ref 0–0.7)
EOSINOPHIL NFR BLD AUTO: 0 %
ERYTHROCYTE [DISTWIDTH] IN BLOOD BY AUTOMATED COUNT: 15.9 %
GLOBULIN PLAS-MCNC: 3.7 G/DL (ref 2.8–4.4)
GLUCOSE BLD-MCNC: 152 MG/DL (ref 70–99)
HCT VFR BLD AUTO: 36.2 %
HGB BLD-MCNC: 11.1 G/DL
IMM GRANULOCYTES # BLD AUTO: 0.1 X10(3) UL (ref 0–1)
IMM GRANULOCYTES NFR BLD: 0.8 %
LYMPHOCYTES # BLD AUTO: 0.6 X10(3) UL (ref 1–4)
LYMPHOCYTES NFR BLD AUTO: 4.5 %
MCH RBC QN AUTO: 25.6 PG (ref 26–34)
MCHC RBC AUTO-ENTMCNC: 30.7 G/DL (ref 31–37)
MCV RBC AUTO: 83.6 FL
MONOCYTES # BLD AUTO: 0.66 X10(3) UL (ref 0.1–1)
MONOCYTES NFR BLD AUTO: 5 %
NEUTROPHILS # BLD AUTO: 11.93 X10 (3) UL (ref 1.5–7.7)
NEUTROPHILS # BLD AUTO: 11.93 X10(3) UL (ref 1.5–7.7)
NEUTROPHILS NFR BLD AUTO: 89.5 %
OSMOLALITY SERPL CALC.SUM OF ELEC: 289 MOSM/KG (ref 275–295)
PLATELET # BLD AUTO: 312 10(3)UL (ref 150–450)
POTASSIUM SERPL-SCNC: 3.9 MMOL/L (ref 3.5–5.1)
PROT SERPL-MCNC: 6.5 G/DL (ref 6.4–8.2)
Q-T INTERVAL: 326 MS
QRS DURATION: 102 MS
QTC CALCULATION (BEZET): 472 MS
R AXIS: -6 DEGREES
RBC # BLD AUTO: 4.33 X10(6)UL
SODIUM SERPL-SCNC: 136 MMOL/L (ref 136–145)
T AXIS: -35 DEGREES
VENTRICULAR RATE: 126 BPM
WBC # BLD AUTO: 13.3 X10(3) UL (ref 4–11)

## 2024-03-18 PROCEDURE — 99232 SBSQ HOSP IP/OBS MODERATE 35: CPT | Performed by: HOSPITALIST

## 2024-03-18 RX ORDER — SENNOSIDES 8.6 MG
17.2 TABLET ORAL 2 TIMES DAILY
Status: DISCONTINUED | OUTPATIENT
Start: 2024-03-18 | End: 2024-03-18

## 2024-03-18 RX ORDER — AMOXICILLIN AND CLAVULANATE POTASSIUM 875; 125 MG/1; MG/1
1 TABLET, FILM COATED ORAL 2 TIMES DAILY
Qty: 16 TABLET | Refills: 0 | Status: SHIPPED | OUTPATIENT
Start: 2024-03-18 | End: 2024-03-26

## 2024-03-18 RX ORDER — POLYETHYLENE GLYCOL 3350 17 G/17G
17 POWDER, FOR SOLUTION ORAL DAILY
Status: DISCONTINUED | OUTPATIENT
Start: 2024-03-18 | End: 2024-03-18

## 2024-03-18 RX ORDER — FUROSEMIDE 40 MG/1
80 TABLET ORAL DAILY
Status: DISCONTINUED | OUTPATIENT
Start: 2024-03-18 | End: 2024-03-18

## 2024-03-18 NOTE — CONSULTS
HISTORY OF PRESENT ILLNESS     Alejandro Guardado is a 89 year old male with a history of CHF, atrial fibrillation, PVD, CAD and adrenal insufficiency who presented to the ER 3/16 with dyspnea, cough, and hypoxemia. The patient was noted to have hypotension requiring vasopressors. He was also found to have pneumonia. He was admitted to the ICU and started on stress dose steroids and antibiotics. He has improved and was transferred to the floor.    Today he reports that he's feeling better. He denies shortness of breath or cough. He tells me that he's normally on home O2 at night for unclear reasons. He also takes prednisone chronically but he does not know why.    PAST MEDICAL HISTORY     Past Medical History:   Diagnosis Date    Atrial fibrillation ([facility])     CAD (coronary artery disease) 09/28/2015    CHF (congestive heart failure) ([facility]) 2022    Esophageal reflux     Hearing impairment     Heart attack ([facility])     History of MI (myocardial infarction)     HTN (hypertension)     Hyperlipidemia     PAD (peripheral artery disease) ([facility]) 04/26/2023    Visual impairment        PAST SURGICAL HISTORY      Past Surgical History:   Procedure Laterality Date    ANGIOGRAM      ANGIOPLASTY (CORONARY)      CABG      CATARACT  04/2023    FRACTURE SURGERY Left     left hip pinning    OTHER SURGICAL HISTORY Left 01/01/1977    knee surgery    OTHER SURGICAL HISTORY  03/25/2016    Left hip ORIF pinning    TONSILLECTOMY         HOME MEDICATIONS      Prior to Admission Medications   Prescriptions Last Dose Informant Patient Reported? Taking?   Omeprazole 40 MG Oral Capsule Delayed Release 3/15/2024  Yes Yes   Sig: Take 1 capsule (40 mg total) by mouth daily.   acetaminophen 500 MG Oral Tab Past Week  No Yes   Sig: Take 2 tablets (1,000 mg total) by mouth every 8 (eight) hours.   amoxicillin clavulanate 875-125 MG Oral Tab   No No   Sig: Take 1 tablet by mouth 2 (two) times daily for 5 days.   apixaban 5 MG Oral Tab 3/15/2024  No Yes    Sig: Take 1 tablet (5 mg total) by mouth 2 (two) times daily.   atorvastatin 40 MG Oral Tab 3/15/2024  No Yes   Sig: Take 1 tablet (40 mg total) by mouth daily.   clopidogrel 75 MG Oral Tab 3/15/2024  No Yes   Sig: Take 1 tablet (75 mg total) by mouth daily.   collagenase (SANTYL) 250 UNIT/GM External Ointment Past Week  No Yes   Sig: Apply topically to ulcer site daily   collagenase (SANTYL) 250 UNIT/GM External Ointment Past Week  No Yes   Sig: Apply 1 g topically daily.   finasteride 5 MG Oral Tab 3/15/2024  No Yes   Sig: Take 1 tablet (5 mg total) by mouth daily.   folic acid 1 MG Oral Tab 3/15/2024  Yes Yes   Sig: Take 1 tablet (1 mg total) by mouth daily.   furosemide 80 MG Oral Tab 3/15/2024  Yes Yes   Sig: Take 1 tablet (80 mg total) by mouth daily.   gabapentin 300 MG Oral Cap 3/15/2024  No Yes   Sig: Take 1 capsule (300 mg total) by mouth 3 (three) times daily.   lisinopril 2.5 MG Oral Tab 3/15/2024  Yes Yes   Sig: Take 1 tablet (2.5 mg total) by mouth daily.   metoprolol succinate ER 25 MG Oral Tablet 24 Hr 3/15/2024  No Yes   Sig: Take 1 tablet (25 mg total) by mouth Daily Beta Blocker.   predniSONE 5 MG Oral Tab 3/15/2024  Yes Yes   Sig: Take 3 tablets (15 mg total) by mouth daily.   tamsulosin 0.4 MG Oral Cap 3/15/2024  Yes Yes   Sig: Take 1 capsule (0.4 mg total) by mouth daily.      Facility-Administered Medications: None       CURRENT MEDICATIONS       polyethylene glycol (PEG 3350)  17 g Oral Daily    sennosides  17.2 mg Oral BID    azithromycin  500 mg Oral Daily    hydrocortisone sodium succinate  50 mg Intravenous 4 times per day    apixaban  5 mg Oral BID    atorvastatin  40 mg Oral Nightly    finasteride  5 mg Oral Daily    folic acid  1 mg Oral Daily    gabapentin  300 mg Oral TID    lisinopril  2.5 mg Oral Daily    metoprolol succinate ER  25 mg Oral Daily Beta Blocker    pantoprazole  40 mg Oral QAM AC    tamsulosin  0.4 mg Oral Daily    piperacillin-tazobactam  3.375 g Intravenous Q8H        PRN meds:  acetaminophen, melatonin, ondansetron, metoclopramide, guaiFENesin, bisacodyl, fleet enema    Infusions:        ALLERGIES      Allergies   Allergen Reactions    Heparin ANAPHYLAXIS    Hydromorphone HALLUCINATION       SOCIAL HISTORY        Social History     Socioeconomic History    Marital status:      Spouse name: Not on file    Number of children: Not on file    Years of education: Not on file    Highest education level: Not on file   Occupational History    Not on file   Tobacco Use    Smoking status: Never    Smokeless tobacco: Never   Vaping Use    Vaping Use: Never used   Substance and Sexual Activity    Alcohol use: Never    Drug use: Never    Sexual activity: Not on file   Other Topics Concern    Not on file   Social History Narrative    Not on file     Social Determinants of Health     Financial Resource Strain: Not on file   Food Insecurity: No Food Insecurity (3/16/2024)    Food Insecurity     Food Insecurity: Never true   Transportation Needs: No Transportation Needs (3/16/2024)    Transportation Needs     Lack of Transportation: No   Physical Activity: Not on file   Stress: Not on file   Social Connections: Not on file   Housing Stability: Low Risk  (2/12/2024)    Housing Stability     Housing Instability: No     Housing Instability Emergency: Not on file       FAMILY HISTORY      Family History   Problem Relation Age of Onset    Cancer Father        REVIEW OF SYSTEMS      10 pt ROS negative except what is mentioned in HPI    OBJECTIVE      Vitals:    03/17/24 2030 03/18/24 0000 03/18/24 0400 03/18/24 0803   BP:  141/87 (!) 139/93 135/77   BP Location:  Left arm Left arm Left arm   Pulse: 105 98 99    Resp: 17 17 15 18   Temp: 97.5 °F (36.4 °C) 97.3 °F (36.3 °C) 97.6 °F (36.4 °C) 97.4 °F (36.3 °C)   TempSrc: Temporal Temporal Temporal Oral   SpO2: 96% 94% 96%    Weight:         O2: room air    Gen - Alert, oriented x 3, in no apparent distress  HEENT - head normocephalic,  atraumatic. Eyes-no scleral icterus or injection  Lungs - equal breath sounds bilaterally. No wheezing, crackles, rhonchi  CV - regular rate & rhythm. Normal S1, S2. No murmurs, rubs, or gallops appreciated.  Abdomen - soft, nontender to palpation. No organomegaly appreciated.   Extremities - + BLE edema      Labs:    Recent Labs   Lab 03/16/24  1300 03/17/24  0523 03/18/24  0515   WBC 21.9* 21.0* 13.3*   HGB 13.3 11.4* 11.1*   .0 323.0 312.0     Recent Labs   Lab 03/16/24  1300 03/16/24  1311 03/16/24  1650 03/17/24  0523 03/18/24  0515     --   --  137 136   K 3.3*  --   --  4.2  4.2 3.9     --   --  108 108   CO2 28.0  --   --  26.0 24.0   BUN 23  --   --  22 25*   CREATSERUM 1.41*  --   --  0.95 0.99   GLU 94  --   --  146* 152*   ANIONGAP 6  --   --  3 4   ALB 3.3*  --   --  2.7* 2.8*   CA 9.2  --   --  8.4* 8.6   MG  --   --   --  2.3  --    ALKPHO 69  --   --  56 54   AST 21  --   --  20 12*   ALT 33  --   --  25 26   BILT 0.9  --   --  0.9 0.5   TP 7.4  --   --  6.4 6.5   LACTI  --  2.3* 1.7  --   --        Recent Labs   Lab 03/16/24  1300   PBNP 3,462*   TROPHS 26       Recent Labs   Lab 03/16/24  1311 03/16/24  1702   COVID19 Not Detected Not Detected   INFAPCR Negative Not Detected   INFBPCR Negative Not Detected   RSV Negative  --          Imaging reviewed.    ASSESSMENT AND PLAN     Shock - cardiogenic and/or septic. Improved, off pressors  - continue antibiotics as below  - discontinue stress dose steroids and resume home prednisone 15mg daily at baseline  Possible PNA - RPP, strep, legionella antigens negative.   - currently on Zosyn and azithromycin - change to augmentin at discharge   CHF, CAD, Afib  - per cardiology    FEN  - cardiac diet  Ppx  - on apixaban  Dispo  - DNAR/select  - no objection to discharge home from a pulmonary standpoint. He can follow up in 1-2 weeks.    Scar Guerrero M.D.  Pulmonary/Critical Care

## 2024-03-18 NOTE — CONSULTS
[facility]  Report of Inpatient Wound Care Consultation    Alejandro Guardado Patient Status:  Inpatient    1935 MRN [id number]   Location [facility] 5NW-A Attending Albert Lee,    Hosp Day # 2 PCP Stanislav Odonnell MD     Reason for Consultation:  Right foot     History of Present Illness:  Alejandro Guardado is a a(n) 89 year old male. 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 Hx of PAD. Denies pain or any discomfort in the wound/right foot at this time.Pt follows up at the wound clinic.    History:  Past Medical History:   Diagnosis Date    Atrial fibrillation ([facility])     CAD (coronary artery disease) 2015    CHF (congestive heart failure) ([facility])     Esophageal reflux     Hearing impairment     Heart attack ([facility])     History of MI (myocardial infarction)     HTN (hypertension)     Hyperlipidemia     PAD (peripheral artery disease) ([facility]) 2023    Visual impairment      Past Surgical History:   Procedure Laterality Date    ANGIOGRAM      ANGIOPLASTY (CORONARY)      CABG      CATARACT  2023    FRACTURE SURGERY Left     left hip pinning    OTHER SURGICAL HISTORY Left 1977    knee surgery    OTHER SURGICAL HISTORY  2016    Left hip ORIF pinning    TONSILLECTOMY        reports that he has never smoked. He has never used smokeless tobacco. He reports that he does not drink alcohol and does not use drugs.    Allergies:  @ALLERGY    Laboratory Data:  Lab Results   Component Value Date    WBC 13.3 2024    HGB 11.1 2024    HCT 36.2 2024    .0 2024    CREATSERUM 0.99 2024    BUN 25 2024     2024    K 3.9 2024     2024    CO2 24.0 2024     2024    CA 8.6 2024    ALB 2.8 2024    ALKPHO 54 2024    BILT 0.5 2024    TP 6.5 2024    AST 12 2024    ALT 26 2024         Impression:see media for photos  Wound 23 #1- right foot Old surgical  Foot Right (Active)   Date First Assessed/Time First Assessed: 08/14/23 1309    Wound Number (Wound Clinic Only): #1- right foot  Primary Wound Type: Old surgical  Location: Foot  Wound Location Orientation: Right  Wound Description (Comments): Amputation site      Assessments 3/18/2024  4:25 PM   Drainage Amount Scant   Drainage Description Serosanguineous   Wound Length (cm) 0.4 cm   Wound Width (cm) 0.8 cm   Wound Surface Area (cm^2) 0.32 cm^2   Wound Depth (cm) 0.2 cm   Wound Volume (cm^3) 0.064 cm^3   Wound Healing % 100   Margins Epibole (Rolled edges)   Non-staged Wound Description Full thickness   Peggy-wound Assessment Edema   Wound Granulation Tissue Pink;Pale Stanley;Firm   Wound Bed Granulation (%) 100 %   Wound Odor None   Local pulse : faint ,dorsalis pedis  Temperature : within normal limits  Erythema : none    Wound Cleaning and Dressings:- supplies [ collagen] in the room  Showering directions: May shower with protection- use cast cover or shower boot  Wound cleansing:  Cleanse with saline  Wound product: Apply nati collagen- needs to moisten with saline.Cover with adaptic , dry dressing and wrap with kerlix  Dressing change frequency:  Change dressing every other day and/or as needed      Compression Therapy:   Elevate leg(s) as much as possible        Miscellaneous/Additional Orders:  Offloading: Turn Q 2 hours and as needed    Miscellaneous orders: Increase dietary protein intake.Dietitian to evaluate amount of protein intake/supplements .Albumin 2.8    Care Summary:  Care Summary: Discussed Plan of Care at beside with patient. Patient verbally acknowledges understanding of all instructions and all questions were answered.        Recommendations:  Continue to follow up at the wound clinic if discharged to home    Thank you for allowing me to participate in the care of your patient.  Time Spent 30 minutes , Thank you.    Leigh Hearn RN, BSN [facility]   Wound Care pager [phone]  3/18/2024  4:22 PM

## 2024-03-18 NOTE — PLAN OF CARE
Received patient on room air, saturating ell. VSS. Medications administered per the MAR and patient needs addressed. Patient is A & O x4, incontinent with primofit in place and up with a walker. Possible discharge today based on consults. Patient is resting in bed, at lowest position with bed rails up an call light within reach.

## 2024-03-18 NOTE — PLAN OF CARE
NURSING DISCHARGE NOTE    Discharged Home via Wheelchair.  Accompanied by Support staff  Belongings Taken by patient/family.  PIV and telemetry removed. Discharge instructions reviewed with patient and family who verbalized understanding.

## 2024-03-18 NOTE — PAYOR COMM NOTE
--------------  ADMISSION REVIEW     Payor: [employer] MED[employer] ADV PPO  Subscriber #:  [id number]  Authorization Number: [id number]    Admit date: 3/16/24  Admit time:  4:04 PM       REVIEW DOCUMENTATION:     ED Provider Notes        ED Provider Notes signed by Hannah Cole MD at 3/16/2024  3:27 PM       Author: Hannah Cole MD Service: -- Author Type: Physician    Filed: 3/16/2024  3:27 PM Date of Service: 3/16/2024  1:02 PM Status: Signed    : Hannah Cole MD (Physician)           Patient Seen in: [facility] Emergency Department      History     Chief Complaint   Patient presents with    Congestive Heart Failure     Stated Complaint: chf exacerbation    Subjective:   HPI     Alejandro Guardado is a 89 year old male with PMHx CAD s/p CABG and PCI, HFrEF, ischemic cardiomyopathy with EF 25-30%, atrial fibrillation to eliquis, hypertension, hyperlipidemia, severe PAD, BPH, secondary adrenal insufficiency and GERD discharged mid February after being admitted for multifocal pneumonia and septic shock, presents to ED for evaluation today, for cough and shortness of breath.  Started around 4 AM.  Patient called his family and they found him to be hypoxic with O2 sats of 70% despite supplemental O2.  No fever.  No hemoptysis.  No chest pain.  They also report he saw the cardiologist yesterday and he was doing well.  Recently increased his Lasix this week for lower extremity edema from 40 mg to 80 mg daily.  No other complaints    Objective:   Past Medical History:   Diagnosis Date    Arrhythmia     CAD (coronary artery disease) 09/28/2015    Cardiomyopathy (HCC)     Congestive heart disease (HCC) 2022    Esophageal reflux     Hearing impairment     Heart attack (HCC)     High blood pressure     High cholesterol     History of MI (myocardial infarction)     Other and unspecified hyperlipidemia     PAD (peripheral artery disease) (HCC) 04/26/2023    Pneumonia due to organism 12/2023    Pneumonia, bacterial  12/28/2023    Unspecified essential hypertension     Visual impairment               Past Surgical History:   Procedure Laterality Date    ANGIOGRAM      ANGIOPLASTY (CORONARY)      CABG      CATARACT  04/2023    FRACTURE SURGERY Left     left hip pinning    OTHER SURGICAL HISTORY Left 01/01/1977    knee surgery    OTHER SURGICAL HISTORY  03/25/2016    Left hip ORIF pinning    TONSILLECTOMY                  Social History     Socioeconomic History    Marital status:    Tobacco Use    Smoking status: Never    Smokeless tobacco: Never   Vaping Use    Vaping Use: Never used   Substance and Sexual Activity    Alcohol use: Never    Drug use: Never     Social Determinants of Health     Food Insecurity: No Food Insecurity (2/12/2024)    Food Insecurity     Food Insecurity: Never true   Transportation Needs: No Transportation Needs (2/12/2024)    Transportation Needs     Lack of Transportation: No   Housing Stability: Low Risk  (2/12/2024)    Housing Stability     Housing Instability: No              Review of Systems    Positive for stated complaint: chf exacerbation  Other systems are as noted in HPI.  Constitutional and vital signs reviewed.      All other systems reviewed and negative except as noted above.    Physical Exam     ED Triage Vitals   BP 03/16/24 1244 (!) 78/47   Pulse 03/16/24 1244 116   Resp 03/16/24 1244 22   Temp 03/16/24 1301 98.6 °F (37 °C)   Temp src 03/16/24 1301 Temporal   SpO2 03/16/24 1241 (!) 85 %   O2 Device 03/16/24 1241 None (Room air)       Current:/70   Pulse 91   Temp 98.6 °F (37 °C) (Temporal)   Resp 20   Wt 93.9 kg   SpO2 100%   BMI 32.42 kg/m²         Physical Exam  Vitals and nursing note reviewed.   Constitutional:       General: He is not in acute distress.     Appearance: He is well-developed. He is not toxic-appearing.   HENT:      Head: Normocephalic and atraumatic.   Eyes:      General: No scleral icterus.     Conjunctiva/sclera: Conjunctivae normal.    Cardiovascular:      Rate and Rhythm: Normal rate.   Pulmonary:      Effort: Pulmonary effort is normal. No respiratory distress.      Comments: Scattered rhonchi  Abdominal:      General: There is no distension.      Tenderness: There is no abdominal tenderness. There is no guarding.   Musculoskeletal:         General: No tenderness. Normal range of motion.      Cervical back: Normal range of motion and neck supple.      Right lower leg: No edema.      Left lower leg: Edema present.   Skin:     General: Skin is warm and dry.      Findings: No rash.   Neurological:      Mental Status: He is alert and oriented to person, place, and time.      Motor: No abnormal muscle tone.      Coordination: Coordination normal.   Psychiatric:         Mood and Affect: Mood normal.         Behavior: Behavior normal.              ED Course     Labs Reviewed   LACTIC ACID, PLASMA - Abnormal; Notable for the following components:       Result Value    Lactic Acid 2.3 (*)     All other components within normal limits   COMP METABOLIC PANEL (14) - Abnormal; Notable for the following components:    Potassium 3.3 (*)     Creatinine 1.41 (*)     eGFR-Cr 48 (*)     Albumin 3.3 (*)     A/G Ratio 0.8 (*)     All other components within normal limits   PRO BETA NATRIURETIC PEPTIDE - Abnormal; Notable for the following components:    Pro-Beta Natriuretic Peptide 3,462 (*)     All other components within normal limits   CBC W/ DIFFERENTIAL - Abnormal; Notable for the following components:    WBC 21.9 (*)     MCH 25.7 (*)     Neutrophil Absolute Prelim 18.80 (*)     Neutrophil Absolute 18.80 (*)     Monocyte Absolute 1.58 (*)     All other components within normal limits   TROPONIN I HIGH SENSITIVITY - Normal   SARS-COV-2/FLU A AND B/RSV BY PCR (GENEXPERT) - Normal    Narrative:     This test is intended for the qualitative detection and differentiation of SARS-CoV-2, influenza A, influenza B, and respiratory syncytial virus (RSV) viral RNA in  nasopharyngeal or nares swabs from individuals suspected of respiratory viral infection consistent with COVID-19 by their healthcare provider. Signs and symptoms of respiratory viral infection due to SARS-CoV-2, influenza, and RSV can be similar.    Test performed using the Xpert Xpress SARS-CoV-2/FLU/RSV (real time RT-PCR)  assay on the GeneXpert instrument, [employer], [place] [address].   This test is being used under the Food and Drug Administration's Emergency Use Authorization.    The authorized Fact Sheet for Healthcare Providers for this assay is available upon request from the laboratory.   CBC WITH DIFFERENTIAL WITH PLATELET    Narrative:     The following orders were created for panel order CBC With Differential With Platelet.  Procedure                               Abnormality         Status                     ---------                               -----------         ------                     CBC W/ DIFFERENTIAL[[id number]]          Abnormal            Final result                 Please view results for these tests on the individual orders.   LACTIC ACID REFLEX POST POSTIVE   RAINBOW DRAW LAVENDER   RAINBOW DRAW LIGHT GREEN   RAINBOW DRAW BLUE   RAINBOW DRAW GOLD   BLOOD CULTURE   BLOOD CULTURE     EKG    Rate, intervals and axes as noted on EKG Report.  Rate: 126  Rhythm: Atrial Fibrillation  Reading: Atrial fibrillation, no acute ischemic change                          MDM          -Tracing on cardiac monitor and pulse oximetry was reviewed by myself.   -The cardiac monitor revealed afib  as interpreted by me. The cardiac monitor was ordered to monitor the patient for dysrhythmia  -Pulse oximetry was interpreted by me and was normal.  Pulse oximeter was ordered to monitor patient for hypoxia.        -History source other than patient -family        -Comorbidities did add complexity to the management are mentioned in the HPI above        -I personally reviewed the prior external notes and the  medical record to obtain additional history -reviewed patient discharge summary from last month, patient was admitted for septic shock secondary to multifocal pneumonia        -DDX: Includes but not limited to cardiogenic shock, septic shock,-        -I personally reviewed the radiographs findings and they show pneumonia  Please refer to radiology report for official interpretation    XR CHEST AP PORTABLE  (CPT=71045)   Final Result   PROCEDURE:  XR CHEST AP PORTABLE  (CPT=71045)       TECHNIQUE:  AP chest radiograph was obtained.       COMPARISON:  [facility] , XR, XR CHEST PA + LAT CHEST (CPT=71046), 2/26/2024,    2:59 PM.       INDICATIONS:  chf exacerbation       PATIENT STATED HISTORY: (As transcribed by Technologist)  Family states he    has CHF exacerbation and trouble breathing.            FINDINGS:  There are some subtle patchy airspace opacities within the    lungs suspicious for areas of pneumonia.  Heart size is within normal    limits.  No pleural effusion is seen.  Mediastinal and hilar contours are    normal.  Increased vascular markings may    also represent moderate vascular congestion and volume overload.                         =====   CONCLUSION:  Some patchy airspace opacities are present within the lungs    suspicious for areas of pneumonia.  Viral pneumonia may be possible.     There may also be moderate vascular congestion and volume overload.     Follow-up is recommended to ensure    resolution.  If clinical symptoms persist then consider CT.           LOCATION:  [facility]                       Dictated by (CST): Gregg Fields MD on 3/16/2024 at 2:17 PM        Finalized by (CST): Gregg Fields MD on 3/16/2024 at 2:17 PM             Labs Reviewed   LACTIC ACID, PLASMA - Abnormal; Notable for the following components:       Result Value    Lactic Acid 2.3 (*)     All other components within normal limits   COMP METABOLIC PANEL (14) - Abnormal; Notable for the following components:    Potassium 3.3 (*)      Creatinine 1.41 (*)     eGFR-Cr 48 (*)     Albumin 3.3 (*)     A/G Ratio 0.8 (*)     All other components within normal limits   PRO BETA NATRIURETIC PEPTIDE - Abnormal; Notable for the following components:    Pro-Beta Natriuretic Peptide 3,462 (*)     All other components within normal limits   CBC W/ DIFFERENTIAL - Abnormal; Notable for the following components:    WBC 21.9 (*)     MCH 25.7 (*)     Neutrophil Absolute Prelim 18.80 (*)     Neutrophil Absolute 18.80 (*)     Monocyte Absolute 1.58 (*)     All other components within normal limits   TROPONIN I HIGH SENSITIVITY - Normal   SARS-COV-2/FLU A AND B/RSV BY PCR (GENEXPERT) - Normal    Narrative:     This test is intended for the qualitative detection and differentiation of SARS-CoV-2, influenza A, influenza B, and respiratory syncytial virus (RSV) viral RNA in nasopharyngeal or nares swabs from individuals suspected of respiratory viral infection consistent with COVID-19 by their healthcare provider. Signs and symptoms of respiratory viral infection due to SARS-CoV-2, influenza, and RSV can be similar.    Test performed using the Xpert Xpress SARS-CoV-2/FLU/RSV (real time RT-PCR)  assay on the GeneXpert instrument, [employer], [place] [address].   This test is being used under the Food and Drug Administration's Emergency Use Authorization.    The authorized Fact Sheet for Healthcare Providers for this assay is available upon request from the laboratory.   CBC WITH DIFFERENTIAL WITH PLATELET    Narrative:     The following orders were created for panel order CBC With Differential With Platelet.  Procedure                               Abnormality         Status                     ---------                               -----------         ------                     CBC W/ DIFFERENTIAL[[id number]]          Abnormal            Final result                 Please view results for these tests on the individual orders.   LACTIC ACID REFLEX POST POSTIVE   RAINBOW  DRAW LAVENDER   RAINBOW DRAW LIGHT GREEN   RAINBOW DRAW BLUE   RAINBOW DRAW GOLD   BLOOD CULTURE   BLOOD CULTURE     Patient labs have been reviewed white count is 22,000.  Potassium is 3.3 creatinine 1.4 lactic acid 2.3.  Troponin is normal.    Patient did not receive 30ml/kg of fluids despite having: Septic Shock. The reason for doing so is: patient has heart failure. 500mL of fluids were given instead        Medications   norepinephrine (Levophed) 4 mg/250mL infusion premix (5 mcg/min Intravenous Handoff 3/16/24 1510)   sodium chloride 0.9 % IV bolus 500 mL (0 mL Intravenous Stopped 3/16/24 1445)   hydrocortisone Na succinate PF (Solu-CORTEF) injection 100 mg (100 mg Intravenous Given 3/16/24 1320)   piperacillin-tazobactam (Zosyn) 4.5 g in dextrose 5% 100 mL IVPB-ADDV (0 g Intravenous Stopped 3/16/24 1409)     Patient was given Solu-Cortef fluids antibiotics started on Levophed as well.    Discussed with pulmonary critical care, cardiology, [facility] patient will be admitted to ICU      [facility]   part of [facility]      Sepsis Reassessment Note    /70   Pulse 91   Temp 98.6 °F (37 °C) (Temporal)   Resp 20   Wt 93.9 kg   SpO2 100%   BMI 32.42 kg/m²      I completed the sepsis reassessment at 1505    Cardiac:  Regularity: irregular  Rate: Normal  Heart Sounds: S1,S2    Lungs:   Right: Clear  Left: Clear    Peripheral Pulses:  Radial: Right 1+ or Left 1+      Capillary Refill:  <3 Secs    Skin:  Temp/Moisture: Warm and Dry  Color: Normal      Hannah Cole MD  3/16/2024  3:06 PM          Admission disposition: 3/16/2024  3:27 PM                                        Medical Decision Making      Disposition and Plan     Clinical Impression:  1. Sepsis due to pneumonia (HCC)    2. Septic shock (HCC)    3. Acute hypoxemic respiratory failure (HCC)         Disposition:  Admit  3/16/2024  3:27 pm    Follow-up:  No follow-up provider specified.        Medications Prescribed:  Current  Discharge Medication List                            Hospital Problems       Present on Admission  Date Reviewed: 3/11/2024            ICD-10-CM Noted POA    * (Principal) Sepsis due to pneumonia (HCC) J18.9, A41.9 12/1/2023 Unknown                     Signed by Hannah Cole MD on 3/16/2024  3:27 PM     History and Physical   Assessment & Plan:   #Shock  -Etiology possibly septic versus hypovolemic, may be cardiogenic  -Levophed drip  -Admit to ICU  -Cover for possible pneumonia  Zosyn (3/16-)  -On 2 L nasal cannula currently, wean as tolerated  -Pulmonary/critical care to see     #Secondary Adrenal insufficiency  -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  -Stress dose with IV hydrocortisone  -Has never seen a Rheumatologist for formal diagnosis, needs to see as outpatient for consideration of steroid sparing agent      #Acute Kidney Injury   #ATN, suspect  -Trend labs, continue vasopressors with goal of MAP greater than 65  -diuresis held     #Chronic HFrEF, ischemic cardiomyopathy with LVEF 25-30%  -PTA: Recently increased diuretics  -Hold home BB and lisinopril     #BPH  -Finasteride  -Flomax     #Afib  -Eliquis  -Hold metoprolol     #CAD s/p CABG   -Plavix/statin     #Hyperlipidemia  -Statin     #GERD  -PPI      #PAD with hx of non-healing R foot wound s/p thrombectomy and revascularization/stent 12/4/23 by Dr. Steward  -Plavix/Statin  -Wound care      #Recent hospitalizations  -2/12-2/15 for septic shock secondary to multifocal pneumonia  -12/25-12/28 septic shock for gram-negative pneumonia  -12/1-12/6-hospitalization for pneumonia  Critical Care Consult      Assessment / Plan:  Shock - cardiogenic and/or septic  - wean off norepinephrine gtt as able  - empiric antibiotics  - stress dose steroids given history of adrenal insufficiency  Possible PNA  - Zosyn and azithromycin  - RPP  - Strep and Legionella antigens  - follow-up cultures  CHF  - cardiology to  see  FEN  - cardiac diet  Ppx  - DOAC  Dispo  - ICU  Cardiovascular Diseases Report of Consultation   Assessment/Plan   Hypotension, shock  Possible pneumonia  HFrEF, LVEF 20-25%  Chronic AF, rates elevated.      IV antibiotics  Levophed  Has had diuretics increased as outpt, will hold for now.  Continue DOAC   3/17  Critical Care Progress Note      Assessment / Plan:  Shock - cardiogenic and/or septic  - off vasopressors  - empiric antibiotics  - stress dose steroids given history of adrenal insufficiency  Possible PNA - RPP negative  - Zosyn and azithromycin  - Strep and Legionella antigens  - follow-up cultures  CHF  - cardiology following  FEN  - cardiac diet  Ppx  - DOAC  Dispo  - transfer to the floor later today if remains off vasopressors. Pulmonary consult service will follow after transfer  Hospitalist Progress Note   Assessment & Plan:   #Shock  -Etiology possibly septic versus hypovolemic, may be cardiogenic  -s/p Levophed drip  -Cover for possible pneumonia  Zosyn (3/16-)  -On RA now  -Pulmonary/critical care following     #Secondary Adrenal insufficiency  -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  -Stress dose with IV hydrocortisone  -Has never seen a Rheumatologist for formal diagnosis, needs to see as outpatient for consideration of steroid sparing agent      #Acute Kidney Injury, improving   #ATN, suspect  -Trend labs     #Chronic HFrEF, ischemic cardiomyopathy with LVEF 25-30%  -PTA: Recently increased diuretics  -Hold home BB and lisinopril     #BPH  -Finasteride  -Flomax     #Afib  -Eliquis  -Hold metoprolol     #CAD s/p CABG   -Plavix/statin     #Hyperlipidemia  -Statin     #GERD  -PPI      #PAD with hx of non-healing R foot wound s/p thrombectomy and revascularization/stent 12/4/23 by Dr. Steward  -Plavix/Statin  -Wound care      #Recent hospitalizations  -2/12-2/15 for septic shock secondary to multifocal pneumonia  -12/25-12/28 septic shock for  gram-negative pneumonia  -12/1-12/6-hospitalization for pneumonia  Cardiology PROGRESS NOTE  Assessment/Plan   Hypotension, shock, BP improved today  Possible pneumonia- respiratory status appears improved today.  HFrEF, LVEF 20-25%  Chronic AF, rates better today.      IV antibiotics  Levophed weaned off  Has had diuretics increased as outpt, will hold for now.  Continue DOAC    3/18  Hospitalist Progress Note   Assessment & Plan:        #Shock  -Etiology possibly septic versus hypovolemic, may be cardiogenic  -s/p Levophed drip  -Cover for possible pneumonia  Zosyn (3/16-); po abx on discharge   -On RA now  -Pulmonary/critical care following     #Secondary Adrenal insufficiency  -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  -Stress dose with IV hydrocortisone; po prednisone taper on discharge   -Has never seen a Rheumatologist for formal diagnosis, needs to see as outpatient for consideration of steroid sparing agent      #Acute Kidney Injury, improving   #ATN, suspect  -Trend labs     #Chronic HFrEF, ischemic cardiomyopathy with LVEF 25-30%  -PTA: Recently increased diuretics  -held home BB and lisinopril; bp improving, resume w/ caution on dc, instructed to check bp     #BPH  -Finasteride  -Flomax     #Afib  -Eliquis  -Hold metoprolol     #CAD s/p CABG   -Plavix/statin     #Hyperlipidemia  -Statin     #GERD  -PPI      #PAD with hx of non-healing R foot wound s/p thrombectomy and revascularization/stent 12/4/23 by Dr. Steward  -Plavix/Statin  -Wound care      #Recent hospitalizations  -2/12-2/15 for septic shock secondary to multifocal pneumonia  -12/25-12/28 septic shock for gram-negative pneumonia  -12/1-12/6-hospitalization for pneumonia     MEDICATIONS ADMINISTERED IN LAST 1 DAY:  apixaban (Eliquis) tab 5 mg       Date Action Dose Route User    3/18/2024 0845 Given 5 mg Oral Laurie Hinojosa, RN    3/17/2024 2110 Given 5 mg Oral Andreina Ortiz, ANNALISE          atorvastatin  (Lipitor) tab 40 mg       Date Action Dose Route User    3/17/2024 2110 Given 40 mg Oral Andreina Ortiz RN          azithromycin (Zithromax) tab 500 mg       Date Action Dose Route User    3/17/2024 1706 Given 500 mg Oral Baylee Prado RN          finasteride (Proscar) tab 5 mg       Date Action Dose Route User    3/18/2024 0845 Given 5 mg Oral Laurie Hinojosa RN          folic acid (Folvite) tab 1 mg       Date Action Dose Route User    3/18/2024 0845 Given 1 mg Oral Laurie Hinojosa RN          gabapentin (Neurontin) cap 300 mg       Date Action Dose Route User    3/18/2024 0845 Given 300 mg Oral Laurie Hinojosa RN    3/17/2024 2110 Given 300 mg Oral Andreina Ortiz RN    3/17/2024 1521 Given 300 mg Oral Baylee Prado RN          hydrocortisone Na succinate PF (Solu-CORTEF) injection 50 mg       Date Action Dose Route User    3/18/2024 1109 Given 50 mg Intravenous Laurie Hinojosa RN    3/18/2024 0541 Given 50 mg Intravenous Andreina Ortiz RN    3/17/2024 2353 Given 50 mg Intravenous Andreina Ortiz RN    3/17/2024 1706 Given 50 mg Intravenous Baylee Prado RN          lisinopril (Prinivil; Zestril) tab 2.5 mg       Date Action Dose Route User    3/18/2024 0845 Given 2.5 mg Oral Laurie Hinojosa RN          metoprolol succinate ER (Toprol XL) 24 hr tab 25 mg       Date Action Dose Route User    3/18/2024 0542 Given 25 mg Oral Andreina Ortiz RN          metoprolol tartrate (Lopressor) partial tab 12.5 mg       Date Action Dose Route User    3/17/2024 1706 Given 12.5 mg Oral Baylee Prado RN          pantoprazole (Protonix) DR tab 40 mg       Date Action Dose Route User    3/18/2024 0600 Given 40 mg Oral Andreina Ortiz RN          polyethylene glycol (PEG 3350) (Miralax) 17 g oral packet 17 g       Date Action Dose Route User    3/18/2024 1109 Given 17 g Oral [place], Laurie, RN          piperacillin-tazobactam (Zosyn) 3.375 g in dextrose 5% 100 mL IVPB-ADDV       Date Action Dose Route User     3/18/2024 0541 New Bag 3.375 g Intravenous Andreina Ortiz RN    3/17/2024 2110 New Bag 3.375 g Intravenous Andreina Ortiz RN          sennosides (Senokot) tab 17.2 mg       Date Action Dose Route User    3/18/2024 1112 Given 17.2 mg Oral Laurie Hinojosa RN          tamsulosin (Flomax) cap 0.4 mg       Date Action Dose Route User    3/18/2024 0845 Given 0.4 mg Oral Laurie Hinojosa RN            Vitals (last day)       Date/Time Temp Pulse Resp BP SpO2 Weight O2 Device O2 Flow Rate (L/min) [facility] [place]    03/18/24 0400 97.6 °F (36.4 °C) 99 15 139/93 96 % -- None (Room air) -- KW    03/17/24 1500 -- 121 23 119/103 93 % -- -- --     03/17/24 1300 -- 105 9 85/60 100 % -- -- --     03/17/24 1230 -- 105 18 109/73 89 % -- -- --     03/17/24 1200 97.5 °F (36.4 °C) 110 19 114/99 95 % -- None (Room air) --     03/17/24 0800 97.7 °F (36.5 °C) 92 18 102/75 100 % -- Nasal cannula 1 L/min     03/17/24 0715 -- 82 16 88/68 100 % -- -- --     03/17/24 0530 -- 87 15 131/99 100 % -- -- -- AB    03/17/24 0400 98 °F (36.7 °C) 87 13 114/82 99 % -- Nasal cannula 1 L/min AB          CIWA Scores (since admission)       None          PLEASE FAX DAYS CERTIFIED AND NEXT REVIEW DATE [phone]

## 2024-03-18 NOTE — PROGRESS NOTES
[facility] Cardiology Progress Note    Alejandro Guardado Patient Status:  Inpatient    1935 MRN [id number]   Location [facility] 5NW-A Attending Albert Lee,    Hosp Day # 2 PCP Stanislav Odonnell MD     Subjective:  No acute events overnight  Denies sob, orthopnea  Feesl left leg is slightly more swollen than baseline     Objective:  /77 (BP Location: Left arm)   Pulse 99   Temp 97.4 °F (36.3 °C) (Oral)   Resp 18   Wt 211 lb 6.7 oz (95.9 kg)   SpO2 96%   BMI 33.11 kg/m²     Telemetry: afib      Intake/Output:    Intake/Output Summary (Last 24 hours) at 3/18/2024 1233  Last data filed at 3/18/2024 0600  Gross per 24 hour   Intake 592.7 ml   Output 1100 ml   Net -507.3 ml       Last 3 Weights   24 1604 211 lb 6.7 oz (95.9 kg)   24 1244 207 lb (93.9 kg)   02/15/24 0631 205 lb 0.4 oz (93 kg)   24 0631 204 lb 9.4 oz (92.8 kg)   24 0000 207 lb 7.3 oz (94.1 kg)   24 1446 210 lb 12.2 oz (95.6 kg)   24 1436 216 lb (98 kg)       Labs:  Recent Labs   Lab 24  1300 24  0523 24  0515   GLU 94 146* 152*   BUN 23 22 25*   CREATSERUM 1.41* 0.95 0.99   EGFRCR 48* 77 73   CA 9.2 8.4* 8.6    137 136   K 3.3* 4.2  4.2 3.9    108 108   CO2 28.0 26.0 24.0     Recent Labs   Lab 24  1300 24  0523 24  0515   RBC 5.18 4.39 4.33   HGB 13.3 11.4* 11.1*   HCT 42.8 36.0* 36.2*   MCV 82.6 82.0 83.6   MCH 25.7* 26.0 25.6*   MCHC 31.1 31.7 30.7*   RDW 16.3 16.2 15.9   NEPRELIM 18.80* 18.95* 11.93*   WBC 21.9* 21.0* 13.3*   .0 323.0 312.0         Recent Labs   Lab 24  1300   TROPHS 26       Diagnostics:             Physical Exam:    Physical Exam  Cardiovascular:      Rate and Rhythm: Normal rate and regular rhythm.   Pulmonary:      Effort: Pulmonary effort is normal.      Breath sounds: No rales.   Abdominal:      Palpations: Abdomen is soft.   Musculoskeletal:      Right lower leg: Edema present.      Left lower leg: Edema  present.   Skin:     General: Skin is warm and dry.   Neurological:      Mental Status: He is alert and oriented to person, place, and time.         Medications:   polyethylene glycol (PEG 3350)  17 g Oral Daily    sennosides  17.2 mg Oral BID    azithromycin  500 mg Oral Daily    hydrocortisone sodium succinate  50 mg Intravenous 4 times per day    apixaban  5 mg Oral BID    atorvastatin  40 mg Oral Nightly    finasteride  5 mg Oral Daily    folic acid  1 mg Oral Daily    gabapentin  300 mg Oral TID    lisinopril  2.5 mg Oral Daily    metoprolol succinate ER  25 mg Oral Daily Beta Blocker    pantoprazole  40 mg Oral QAM AC    tamsulosin  0.4 mg Oral Daily    piperacillin-tazobactam  3.375 g Intravenous Q8H         Assessment:  89yo presenting with SOB    Shock: resolved  Hypoxia with possible PNA and leukocytosis a; on room air  Chronic HFrEF: EF 20%, BNP at baseline, some increased edema but overall compensated   Permanent Afib:  rate controlled, eliquis   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  Resume home lasix 80mg oral daily.   DC planning    Mary Jain, APRN  3/18/2024  12:33 PM  Ph [phone] ([place])  Ph [phone] ([place])

## 2024-03-18 NOTE — PLAN OF CARE
Assumed care of pt at approximately 1930. Received pt resting in chair on RA, family at bedside. Pt A&Ox4. Afebrile. BP stable. Denies pain. Up w/walker/standby assist. Pt frequently states 'I want to go home'. See flowsheet/MAR. Transfer orders.      Called report to Tc GUARDADO RN at approximately 0610. Called and updated family w/transfer at approximately 0630.    Transfer request submitted at 0700.

## 2024-03-18 NOTE — PROGRESS NOTES
[facility]   part of [facility]     Hospitalist Progress Note     Alejandro Guardado Patient Status:  Inpatient    1935 MRN [id number]   Location [facility] 4SW-A Attending Dipesh Wall MD   Hosp Day # 2 PCP Stanislav Odonnell MD     Chief Complaint: cough    Subjective:     Pt states coughing has improved. Pt denies cp or sob. On RA. States \"I want to go home\".     Objective:    Review of Systems:   A comprehensive review of systems was completed; pertinent positive and negatives stated in subjective.    Vital signs:  Temp:  [97.3 °F (36.3 °C)-97.7 °F (36.5 °C)] 97.6 °F (36.4 °C)  Pulse:  [] 99  Resp:  [9-24] 15  BP: ()/() 139/93  SpO2:  [89 %-100 %] 96 %    Physical Exam:    General: No acute distress  Respiratory: No wheezes, no rhonchi  Cardiovascular: S1, S2, regular rate and rhythm  Abdomen: Soft, Non-tender, non-distended, positive bowel sounds  Neuro: No new focal deficits.   Extremities: No edema      Diagnostic Data:    Labs:  Recent Labs   Lab 24  1300 24  0523 24  0515   WBC 21.9* 21.0* 13.3*   HGB 13.3 11.4* 11.1*   MCV 82.6 82.0 83.6   .0 323.0 312.0       Recent Labs   Lab 24  1300 24  0523 24  0515   GLU 94 146* 152*   BUN 23 22 25*   CREATSERUM 1.41* 0.95 0.99   CA 9.2 8.4* 8.6   ALB 3.3* 2.7* 2.8*    137 136   K 3.3* 4.2  4.2 3.9    108 108   CO2 28.0 26.0 24.0   ALKPHO 69 56 54   AST 21 20 12*   ALT 33 25 26   BILT 0.9 0.9 0.5   TP 7.4 6.4 6.5       Estimated Creatinine Clearance: 47.3 mL/min (based on SCr of 0.99 mg/dL).    Recent Labs   Lab 24  1300   TROPHS 26       No results for input(s): \"PTP\", \"INR\" in the last 168 hours.               Microbiology    Hospital Encounter on 24   1. Blood Culture     Status: None (Preliminary result)    Collection Time: 24  1:13 PM    Specimen: Blood,peripheral   Result Value Ref Range    Blood Culture Result No Growth 1 Day N/A         Imaging:  Reviewed in Epic.    Medications:    azithromycin  500 mg Oral Daily    hydrocortisone sodium succinate  50 mg Intravenous 4 times per day    apixaban  5 mg Oral BID    atorvastatin  40 mg Oral Nightly    finasteride  5 mg Oral Daily    folic acid  1 mg Oral Daily    gabapentin  300 mg Oral TID    lisinopril  2.5 mg Oral Daily    metoprolol succinate ER  25 mg Oral Daily Beta Blocker    pantoprazole  40 mg Oral QAM AC    tamsulosin  0.4 mg Oral Daily    piperacillin-tazobactam  3.375 g Intravenous Q8H       Assessment & Plan:      #Dispo  -possible dc later today if cleared by other services    #Shock  -Etiology possibly septic versus hypovolemic, may be cardiogenic  -s/p Levophed drip  -Cover for possible pneumonia  Zosyn (3/16-); po abx on discharge   -On RA now  -Pulmonary/critical care following     #Secondary Adrenal insufficiency  -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  -Stress dose with IV hydrocortisone; po prednisone taper on discharge   -Has never seen a Rheumatologist for formal diagnosis, needs to see as outpatient for consideration of steroid sparing agent      #Acute Kidney Injury, improving   #ATN, suspect  -Trend labs     #Chronic HFrEF, ischemic cardiomyopathy with LVEF 25-30%  -PTA: Recently increased diuretics  -held home BB and lisinopril; bp improving, resume w/ caution on dc, instructed to check bp     #BPH  -Finasteride  -Flomax     #Afib  -Eliquis  -Hold metoprolol     #CAD s/p CABG   -Plavix/statin     #Hyperlipidemia  -Statin     #GERD  -PPI      #PAD with hx of non-healing R foot wound s/p thrombectomy and revascularization/stent 12/4/23 by Dr. Steward  -Plavix/Statin  -Wound care      #Recent hospitalizations  -2/12-2/15 for septic shock secondary to multifocal pneumonia  -12/25-12/28 septic shock for gram-negative pneumonia  -12/1-12/6-hospitalization for pneumonia      Albert Lee DO    Supplementary Documentation:     Quality:  DVT Mechanical  Prophylaxis:   SCDs,    DVT Pharmacologic Prophylaxis   Medication    apixaban (Eliquis) tab 5 mg                Code Status: DNAR/Selective Treatment  Block: External urinary catheter in place  Block Duration (in days):   Central line:    DIEGO:     Discharge is dependent on: clinical improvement   At this point Mr. Guadrado is expected to be discharge to: home    The 21st Century Cures Act makes medical notes like these available to patients in the interest of transparency. Please be advised this is a medical document. Medical documents are intended to carry relevant information, facts as evident, and the clinical opinion of the practitioner. The medical note is intended as peer to peer communication and may appear blunt or direct. It is written in medical language and may contain abbreviations or verbiage that are unfamiliar.             **Certification      PHYSICIAN Certification of Need for Inpatient Hospitalization - Initial Certification    Patient will require inpatient services that will reasonably be expected to span two midnight's based on the clinical documentation in H+P.   Based on patients current state of illness, I anticipate that, after discharge, patient will require TBD.

## 2024-03-18 NOTE — OCCUPATIONAL THERAPY NOTE
OCCUPATIONAL THERAPY EVALUATION - INPATIENT     Room Number: 502/502-A  Evaluation Date: 3/18/2024  Type of Evaluation: Initial  Presenting Problem: sepsis,pneumonia    Physician Order: IP Consult to Occupational Therapy  Reason for Therapy: ADL/IADL Dysfunction and Discharge Planning    OCCUPATIONAL THERAPY ASSESSMENT   Patient is currently functioning near baseline with toileting, lower body dressing, and transfers. Prior to admission, patient's baseline is supervision and Modified independent  .  Patient is requiring stand-by assist and contact guard assist as a result of the following impairments: decreased endurance and impaired standing balance. Occupational Therapy will continue to follow for duration of hospitalization.    Patient will benefit from continued skilled OT Services at discharge to promote functional independence and safety with additional support and return home with home health OT      History Related to Current Admission: Patient is a 89 year old male admitted on 3/16/2024 with Presenting Problem: sepsis,pneumonia. Co-Morbidities : CAD, CABG, PCI, HTN, hyperlipidemia, cardiomyopathy, a-fib, adrenal insufficiency, GERD, BPH, PAD. Pt was admitted from home on 3/16 with coughing and shortness of breath. Admitted for sepsis, hypoxia, pneumonia, CHF.   ICU stay 3/16 to 3/18.     Recent admissions:  2/12 to 2/15/24 for septic shock, pneumonia-> home OT/PT  2/25-28/2023 with septick shock, hypotension, PNA with dc to home with HHPT  12/1-6/2023 with PNA, a-fib, nonhealing wound R foot with dc to home with HHPT      WEIGHT BEARING RESTRICTION  Weight Bearing Restriction: None                Recommendations for nursing staff:   Transfers: cga  Toileting location: toilet    EVALUATION SESSION:  Patient Start of Session: supine  FUNCTIONAL TRANSFER ASSESSMENT  Sit to Stand: Edge of Bed  Edge of Bed: Contact Guard Assist  Toilet Transfer: Contact Guard Assist    BED MOBILITY  Supine to Sit : Contact Guard  Assist    O2 SATURATIONS       COGNITION  Overall Cognitive Status:  WFL - within functional limits    Upper Extremity   ROM: within functional limits   Strength: within functional limits       EDUCATION PROVIDED     Equipment used: rw  Demonstrates functional use,      Therapist comments: pleasant, motivated. Pt commented, \"I think I need to get to the bathroom. Can you find the RW for me?\"  Supervision supine to sit.   CGA to stand, SBA with RW to ambulate to bathroom. Placed commode over the toilet. Cueing for safe grab bar use, stand to sit sba.   Pt attempted to complete mio-care, but unable to reach. CGA to stand, OT assisted with hygiene care. Min A to guide pull up brief over legs.   SBA with RW to ambulate to the chair. Updated RN.      Patient End of Session: Up in chair;Needs met;Call light within reach;RN aware of session/findings;All patient questions and concerns addressed    OCCUPATIONAL PROFILE    HOME SITUATION  Type of Home: House  Home Layout: Multi-level;Able to live on main level  Lives With: Alone    Toilet and Equipment: Toilet riser with arms;Standard height toilet  Shower/Tub and Equipment: Walk-in shower;Shower chair  Other Equipment: Hospital bed (wheelchair, rw that is broken)    Occupation/Status: retired from working for [employer] x 50 yrs     Drives: No       Prior Level of Function: independent with ADL. Pt's grandson works here as RN, per pt. Family visits him often.   Sleeps in the hospital bed. Also has powered sit to stand chair.   R foot wound care since 2023.  Pt has post-op shoe at home, but does not wear it at home.    SUBJECTIVE   See above    PAIN ASSESSMENT  Ratin          OBJECTIVE  Precautions: Bed/chair alarm  Fall Risk: Standard fall risk      ASSESSMENTS    AM-PAC ‘6-Clicks’ Inpatient Daily Activity Short Form  -   Putting on and taking off regular lower body clothing?: A Little  -   Bathing (including washing, rinsing, drying)?: A Little  -    Toileting, which includes using toilet, bedpan or urinal? : A Little  -   Putting on and taking off regular upper body clothing?: None  -   Taking care of personal grooming such as brushing teeth?: None  -   Eating meals?: None    AM-PAC Score:  Score: 21  Approx Degree of Impairment: 32.79%  Standardized Score (AM-PAC Scale): 44.27    ADDITIONAL TESTS     NEUROLOGICAL FINDINGS      COGNITION ASSESSMENTS       PLAN  OT Treatment Plan: Balance activities;Energy conservation/work simplification techniques;ADL training;Functional transfer training;UE strengthening/ROM;Patient/Family education;Equipment eval/education  Rehab Potential : Good  Frequency: 3x/week  Number of Visits to Meet Established Goals: 2    ADL Goals   Patient will perform grooming: with supervision and while standing at sink  Patient will perform lower body dressing:  with supervision  Patient will perform toileting: with supervision    Functional Transfer Goals  Patient will transfer to toilet:  with supervision    Patient Evaluation Complexity Level:   Occupational Profile/Medical History LOW - Brief history including review of medical or therapy records    Specific performance deficits impacting engagement in ADL/IADL LOW  1 - 3 performance deficits    Client Assessment/Performance Deficits MODERATE - Comorbidities and min to mod modifications of tasks    Clinical Decision Making LOW - Analysis of occupational profile, problem-focused assessments, limited treatment options    Overall Complexity LOW     OT Session Time: 25 minutes  Self-Care Home Management: 12 minutes  Therapeutic Activity: 6 minutes                                           dependent (less than 25% patients effort) tba, pt wearing nightgown/dependent (less than 25% patients effort)

## 2024-03-19 NOTE — DISCHARGE SUMMARY
[facility]     Alejandro Gaurdado Patient Status:  Inpatient    1935 MRN [id number]   Location [facility] 5NW-A Attending No att. providers found   Hosp Day # 2 PCP Stanislav Odonnell MD     Date of Admission: 3/16/2024  Date of Discharge:  3/18/2024     Discharge Disposition: Home or Self Care    Discharge Diagnosis:  Shock -septic, due to pneumonia  Community-acquired pneumonia  Hypoxia uses O2 at night  Adrenal insufficiency chronic  Acute kidney injury resolved likely due to shock  Chronic heart failure reduced EF, ischemic cardiomyopathy with left ventricular EF 25-30%  BPH  Chronic /permanent A-fib on Eliquis  CAD remote CABG  Dyslipidemia  GERD  PAD with nonhealing right foot wound status post thrombectomy revascularization stent 2023  Recent hospitalizations due to pneumonia x 3 prior to this admission    History of Present Illness:    Alejandro Guardado is a 89 year old male with Past medical history recurrent hospitalization for septic shock secondary to pneumonia, secondary adrenal insufficiency, HFrEF, BPH, A-fib, CAD s/p CABG, hyperlipidemia, GERD, peripheral arterial disease presents to the hospital today for concerns of worsening cough and increasing shortness of breath.  Patient was otherwise in his normal state of health but over the last 24-48 hours progressively has been getting more short of breath and worsening cough.  Of note, patient has had his diuretic recently increased for concern of worsening shortness of breath and lower extremity edema.  Patient says shortness of breath never really improved.  Patient otherwise denies any fevers or chills nausea vomiting chest pain, abdominal pain, constipation, diarrhea.    Brief Synopsis:   Patient with 4-month history of recurrent pneumonia admitted with increasing shortness of breath came on suddenly again developed acute cough.  Had had recent adjustment of his diuretic therapy.  Patient was brought in admitted to  the ICU requiring pressors briefly consult with pulmonology cardiology was obtained.  Medications diuretics were held given IV fluids.  Started on antibiotics for recurrent community pneumonia.  White count was elevated at 21 K is down to 13 K.  Is now afebrile weaned off oxygen.  Uses home O2 at night compliance if he at times with this.  Patient was transferred out of the ICU remains in chronic A-fib.  Will continue on his DOAC.  Medications are being resumed per cardiology for his chronic heart failure.  Was treated with antibiotics IV.  Pulmonology recommends Augmentin at discharge twice daily dosing prescribed.  For 8 days for 10-day course.  Patient needs follow-up with PCP pulmonology cardiology.  Diuretics will be resumed will need monitoring of his renal function which improved.  Patient also needs follow-up with wound care has an appointment early next week for his wound right foot.  Patient has been cleared for discharge home today.  Patient at high risk for readmission has been admitted 4 times in the last 3 months.    Lace+ Score: 82  59-90 High Risk  29-58 Medium Risk  0-28   Low Risk       TCM Follow-Up Recommendation:  LACE > 58: High Risk of readmission after discharge from the hospital.      Procedures during hospitalization:   Chest x-ray      CONCLUSION:  Some patchy airspace opacities are present within the lungs suspicious for areas of pneumonia.  Viral pneumonia may be possible.  There may also be moderate vascular congestion and volume overload.  Follow-up is recommended to ensure       Incidental or significant findings and recommendations (brief descriptions):  Follow-up with wound care next Monday as already ordered  Follow-up with PCP 1 week  Follow-up with pulmonology 1 to 2 weeks  Augmentin for 8 days for 10-day coverage of pneumonia  Use O2 2 L at night while sleeping  Follow-up with cardiology 1 to 2 weeks  Leukocytosis resolving 13.3 on day of discharge down from 21.9  Creatinine  normalizing 0.99 on day of discharge  Blood cultures negative, negative for RVP and COVID , influenza and RSV  Continue right foot wound care  Resume Lasix daily as prescribed below    Lab/Test results pending at Discharge:   None    Consultants:  Cardiology, pulmonology, wound care, PT OT    Discharge Medication List:     Discharge Medications        CONTINUE taking these medications        Instructions Prescription details   acetaminophen 500 MG Tabs  Commonly known as: Tylenol Extra Strength      Take 2 tablets (1,000 mg total) by mouth every 8 (eight) hours.   Quantity: 21 tablet  Refills: 0     amoxicillin clavulanate 875-125 MG Tabs  Commonly known as: Augmentin      Take 1 tablet by mouth 2 (two) times daily for 8 days.   Stop taking on: March 26, 2024  Quantity: 16 tablet  Refills: 0     apixaban 5 MG Tabs  Commonly known as: Eliquis      Take 1 tablet (5 mg total) by mouth 2 (two) times daily.   Quantity: 60 tablet  Refills: 3     atorvastatin 40 MG Tabs  Commonly known as: Lipitor      Take 1 tablet (40 mg total) by mouth daily.   Quantity: 30 tablet  Refills: 0     clopidogrel 75 MG Tabs  Commonly known as: Plavix      Take 1 tablet (75 mg total) by mouth daily.   Quantity: 30 tablet  Refills: 0     finasteride 5 MG Tabs  Commonly known as: Proscar      Take 1 tablet (5 mg total) by mouth daily.   Quantity: 30 tablet  Refills: 0     folic acid 1 MG Tabs  Commonly known as: Folvite      Take 1 tablet (1 mg total) by mouth daily.   Refills: 0     furosemide 80 MG Tabs  Commonly known as: Lasix      Take 1 tablet (80 mg total) by mouth daily.   Refills: 0     gabapentin 300 MG Caps  Commonly known as: Neurontin      Take 1 capsule (300 mg total) by mouth 3 (three) times daily.   Quantity: 90 capsule  Refills: 0     lisinopril 2.5 MG Tabs  Commonly known as: Prinivil; Zestril      Take 1 tablet (2.5 mg total) by mouth daily.   Refills: 0     metoprolol succinate ER 25 MG Tb24  Commonly known as: Toprol XL       Take 1 tablet (25 mg total) by mouth Daily Beta Blocker.   Quantity: 90 tablet  Refills: 1     Omeprazole 40 MG Cpdr      Take 1 capsule (40 mg total) by mouth daily.   Refills: 0     predniSONE 5 MG Tabs  Commonly known as: Deltasone      Take 3 tablets (15 mg total) by mouth daily.   Refills: 0     Santyl 250 UNIT/GM Oint  Generic drug: collagenase      Apply topically to ulcer site daily   Quantity: 30 g  Refills: 0     Santyl 250 UNIT/GM Oint  Generic drug: collagenase      Apply 1 g topically daily.   Quantity: 30 g  Refills: 2     tamsulosin 0.4 MG Caps  Commonly known as: Flomax      Take 1 capsule (0.4 mg total) by mouth daily.   Refills: 0               Where to Get Your Medications        These medications were sent to [id number] - [place], [place] - [address] [phone], [phone]  [address] [facility] [address]      Phone: [phone]   amoxicillin clavulanate 875-125 MG Tabs         [place] reviewed: Reviewed    Follow-up appointment:   Stanislav Odonnell MD  [address] [facility] [address] [phone]    Follow up in 1 week(s)      [facility]    Follow up in 1 week(s)      Scar Guerrero MD  [address]  [facility] [address] [phone]    Follow up      Appointments for Next 30 Days 3/18/2024 - 4/17/2024        Date Arrival Time Visit Type Length Department Provider     3/25/2024 11:00 AM  WC FOLLOW UP [8497] 15 min [facility] Quincy Poon DPM    Patient Instructions:         Location Instructions:     Your appointment is scheduled at [facility]. The address is&nbsp; [address]. To reach Registration, park in the [place] Parking Garage. Go through the entrance doors located on the ground floor. Veer left past the Information Desk and proceed to the Wound Care Center.  Masks are optional for all patients and visitors, unless otherwise indicated.                      Vital signs:  Temp:  [97.3 °F (36.3  °C)-97.9 °F (36.6 °C)] 97.9 °F (36.6 °C)  Pulse:  [] 99  Resp:  [15-18] 18  BP: (113-141)/(77-93) 113/83  SpO2:  [94 %-96 %] 96 %    Physical Exam:      General: No acute distress  Respiratory: No wheezes, no rhonchi  Cardiovascular: S1, S2, regular rate and rhythm  Abdomen: Soft, Non-tender, non-distended, positive bowel sounds  Neuro: No new focal deficits.   Extremities: No edema  Right toe  -----------------------------------------------------------------------------------------------  PATIENT DISCHARGE INSTRUCTIONS: See electronic chart    Ana Taveras NP    Total time spent on discharge planning:   minutes     The 21st Century Cures Act makes medical notes like these available to patients in the interest of transparency. Please be advised this is a medical document. Medical documents are intended to carry relevant information, facts as evident, and the clinical opinion of the practitioner. The medical note is intended as peer to peer communication and may appear blunt or direct. It is written in medical language and may contain abbreviations or verbiage that are unfamiliar.

## 2024-03-19 NOTE — PAYOR COMM NOTE
--------------  DISCHARGE REVIEW    Payor: [employer] MEDICARE ADV PPO  Subscriber #:  [id number]  Authorization Number: [id number]    Admit date: 3/16/24  Admit time:   4:04 PM  Discharge Date: 3/18/2024  6:34 PM     Admitting Physician: Dipesh Wall MD  Attending Physician:  No att. providers found  Primary Care Physician: Stanislav Odonnell MD       Discharge Summary Notes    No notes of this type exist for this encounter.         REVIEWER COMMENTS

## 2024-03-22 ENCOUNTER — PATIENT MESSAGE (OUTPATIENT)
Dept: PODIATRY CLINIC | Facility: CLINIC | Age: 89
End: 2024-03-22

## 2024-03-25 ENCOUNTER — OFFICE VISIT (OUTPATIENT)
Dept: WOUND CARE | Facility: HOSPITAL | Age: 89
End: 2024-03-25
Attending: STUDENT IN AN ORGANIZED HEALTH CARE EDUCATION/TRAINING PROGRAM
Payer: MEDICARE

## 2024-03-25 VITALS
SYSTOLIC BLOOD PRESSURE: 114 MMHG | DIASTOLIC BLOOD PRESSURE: 69 MMHG | RESPIRATION RATE: 16 BRPM | HEART RATE: 92 BPM | TEMPERATURE: 98 F

## 2024-03-25 DIAGNOSIS — Z89.439 HISTORY OF TRANSMETATARSAL AMPUTATION OF FOOT (HCC): ICD-10-CM

## 2024-03-25 DIAGNOSIS — L97.512 FOOT ULCER, RIGHT, WITH FAT LAYER EXPOSED (HCC): Primary | ICD-10-CM

## 2024-03-25 DIAGNOSIS — I73.9 PAD (PERIPHERAL ARTERY DISEASE) (HCC): ICD-10-CM

## 2024-03-25 DIAGNOSIS — R26.9 GAIT DIFFICULTY: ICD-10-CM

## 2024-03-25 PROCEDURE — 11044 DBRDMT BONE 1ST 20 SQ CM/<: CPT | Performed by: PODIATRIST

## 2024-03-25 NOTE — PROGRESS NOTES
Weekly Wound Education Note    Teaching Provided To: Patient;Family  Training Topics: Dressing;Off-loading;Discharge instructions;Cleasing and general instructions  Training Method: Demonstration;Explain/Verbal;Written  Training Response: Patient responds and understands        Notes: Cleanse Right foot wound with saline or wound cleanser, apply Cellerate RX with adaptic, gauze, kerlix, Change three times weekly.

## 2024-03-25 NOTE — PROGRESS NOTES
Patient ID: Alejandro Guardado is a 89 year old male.    Debridement Old surgical Right Foot   Wound 08/14/23 #1- right foot Old surgical Foot Right    Performed by: Quincy Poon DPM  Authorized by: Quincy Poon DPM      Consent   Consent obtained? verbal  Consent given by: patient  Risks discussed? procedural risks discussed  Time out called at 3/25/2024 11:52 AM  Immediately prior to the procedure a time out was called and the performing provider verified the correct patient, procedure, equipment, support staff, and site/side marked as required.    Debridement Details  Performed by: physician  Debridement type: surgical  Level of debridement: bone  Pain control: lidocaine 4%  Pain control administration type: topical    Pre-debridement measurements  Length (cm): 0.9  Width (cm): 2.5  Depth (cm): 0.1  Surface Area (cm^2): 2.25    Post-debridement measurements  Length (cm): 1  Width (cm): 2.5  Depth (cm): 0.1  Percent debrided: 100%  Surface Area (cm^2): 2.5  Area Debrided (cm^2): 2.5  Volume (cm^3): 0.25    Tissue and other material debrided: bone and subcutaneous tissue  Devitalized tissue debrided: biofilm, callus, fibrin and bone  Instrument(s) utilized: curette and nippers  Bleeding: small  Hemostasis obtained with: not applicable  Procedural pain (0-10): 0  Post-procedural pain: 0   Response to treatment: procedure was tolerated well

## 2024-03-26 ENCOUNTER — TELEPHONE (OUTPATIENT)
Facility: CLINIC | Age: 89
End: 2024-03-26

## 2024-03-26 NOTE — TELEPHONE ENCOUNTER
Pt's son called to make a f/up appt with dr. Gaming  pt has been admitted 4 times in the last 3 months requiring from pneumonia  last was discharged March 18 scheduled him on 4/4/24 son wants to be proactive and asked if pt need to have CXR before the upc apptm in April,please let him know

## 2024-03-27 NOTE — TELEPHONE ENCOUNTER
Per Dr. Gaming, pt does not need CXR prior to his follow up appt on 4-4-24.  Message left for son.  MCM also sent.

## 2024-03-28 ENCOUNTER — HOSPITAL ENCOUNTER (INPATIENT)
Facility: HOSPITAL | Age: 89
LOS: 6 days | Discharge: HOME | End: 2024-04-03
Attending: EMERGENCY MEDICINE | Admitting: STUDENT IN AN ORGANIZED HEALTH CARE EDUCATION/TRAINING PROGRAM
Payer: MEDICARE

## 2024-03-28 ENCOUNTER — APPOINTMENT (OUTPATIENT)
Dept: GENERAL RADIOLOGY | Facility: HOSPITAL | Age: 89
End: 2024-03-28
Attending: EMERGENCY MEDICINE
Payer: MEDICARE

## 2024-03-28 DIAGNOSIS — E87.20 LACTIC ACIDOSIS: ICD-10-CM

## 2024-03-28 DIAGNOSIS — J18.9 SEPSIS DUE TO PNEUMONIA (HCC): Primary | ICD-10-CM

## 2024-03-28 DIAGNOSIS — A41.9 SEPSIS DUE TO PNEUMONIA (HCC): Primary | ICD-10-CM

## 2024-03-28 DIAGNOSIS — N17.9 AKI (ACUTE KIDNEY INJURY) (HCC): ICD-10-CM

## 2024-03-28 DIAGNOSIS — D72.829 LEUKOCYTOSIS, UNSPECIFIED TYPE: ICD-10-CM

## 2024-03-28 PROBLEM — Z51.5 PALLIATIVE CARE ENCOUNTER: Status: ACTIVE | Noted: 2024-03-28

## 2024-03-28 PROBLEM — R57.9 SHOCK (HCC): Status: ACTIVE | Noted: 2023-12-25

## 2024-03-28 LAB
ALBUMIN SERPL-MCNC: 3 G/DL (ref 3.4–5)
ALBUMIN/GLOB SERPL: 0.9 {RATIO} (ref 1–2)
ALP LIVER SERPL-CCNC: 59 U/L
ALT SERPL-CCNC: 29 U/L
ANION GAP SERPL CALC-SCNC: 9 MMOL/L (ref 0–18)
APTT PPP: 31.8 SECONDS (ref 23.3–35.6)
AST SERPL-CCNC: 15 U/L (ref 15–37)
BASOPHILS # BLD AUTO: 0.06 X10(3) UL (ref 0–0.2)
BASOPHILS NFR BLD AUTO: 0.3 %
BILIRUB SERPL-MCNC: 0.7 MG/DL (ref 0.1–2)
BILIRUB UR QL STRIP.AUTO: NEGATIVE
BUN BLD-MCNC: 38 MG/DL (ref 9–23)
CALCIUM BLD-MCNC: 9.5 MG/DL (ref 8.5–10.1)
CHLORIDE SERPL-SCNC: 102 MMOL/L (ref 98–112)
CLARITY UR REFRACT.AUTO: CLEAR
CO2 SERPL-SCNC: 26 MMOL/L (ref 21–32)
CREAT BLD-MCNC: 2.15 MG/DL
EGFRCR SERPLBLD CKD-EPI 2021: 29 ML/MIN/1.73M2 (ref 60–?)
EOSINOPHIL # BLD AUTO: 0.04 X10(3) UL (ref 0–0.7)
EOSINOPHIL NFR BLD AUTO: 0.2 %
ERYTHROCYTE [DISTWIDTH] IN BLOOD BY AUTOMATED COUNT: 15.8 %
FLUAV + FLUBV RNA SPEC NAA+PROBE: NEGATIVE
FLUAV + FLUBV RNA SPEC NAA+PROBE: NEGATIVE
GLOBULIN PLAS-MCNC: 3.4 G/DL (ref 2.8–4.4)
GLUCOSE BLD-MCNC: 121 MG/DL (ref 70–99)
GLUCOSE UR STRIP.AUTO-MCNC: NORMAL MG/DL
HCT VFR BLD AUTO: 39.5 %
HGB BLD-MCNC: 12.7 G/DL
IMM GRANULOCYTES # BLD AUTO: 0.23 X10(3) UL (ref 0–1)
IMM GRANULOCYTES NFR BLD: 1 %
INR BLD: 1.51 (ref 0.8–1.2)
KETONES UR STRIP.AUTO-MCNC: NEGATIVE MG/DL
LACTATE SERPL-SCNC: 2.7 MMOL/L (ref 0.4–2)
LACTATE SERPL-SCNC: 3.4 MMOL/L (ref 0.4–2)
LACTATE SERPL-SCNC: 4.8 MMOL/L (ref 0.4–2)
LEUKOCYTE ESTERASE UR QL STRIP.AUTO: NEGATIVE
LYMPHOCYTES # BLD AUTO: 0.98 X10(3) UL (ref 1–4)
LYMPHOCYTES NFR BLD AUTO: 4.2 %
MCH RBC QN AUTO: 26.1 PG (ref 26–34)
MCHC RBC AUTO-ENTMCNC: 32.2 G/DL (ref 31–37)
MCV RBC AUTO: 81.1 FL
MONOCYTES # BLD AUTO: 1.48 X10(3) UL (ref 0.1–1)
MONOCYTES NFR BLD AUTO: 6.4 %
MRSA DNA SPEC QL NAA+PROBE: NEGATIVE
NEUTROPHILS # BLD AUTO: 20.51 X10 (3) UL (ref 1.5–7.7)
NEUTROPHILS # BLD AUTO: 20.51 X10(3) UL (ref 1.5–7.7)
NEUTROPHILS NFR BLD AUTO: 87.9 %
NITRITE UR QL STRIP.AUTO: NEGATIVE
NT-PROBNP SERPL-MCNC: 5260 PG/ML (ref ?–450)
OSMOLALITY SERPL CALC.SUM OF ELEC: 294 MOSM/KG (ref 275–295)
PH UR STRIP.AUTO: 5 [PH] (ref 5–8)
PLATELET # BLD AUTO: 340 10(3)UL (ref 150–450)
POTASSIUM SERPL-SCNC: 3.3 MMOL/L (ref 3.5–5.1)
PROCALCITONIN SERPL-MCNC: 34.79 NG/ML (ref ?–0.16)
PROT SERPL-MCNC: 6.4 G/DL (ref 6.4–8.2)
PROT UR STRIP.AUTO-MCNC: NEGATIVE MG/DL
PROTHROMBIN TIME: 18.3 SECONDS (ref 11.6–14.8)
RBC # BLD AUTO: 4.87 X10(6)UL
RBC UR QL AUTO: NEGATIVE
RSV RNA SPEC NAA+PROBE: NEGATIVE
SARS-COV-2 RNA RESP QL NAA+PROBE: NOT DETECTED
SODIUM SERPL-SCNC: 137 MMOL/L (ref 136–145)
SP GR UR STRIP.AUTO: 1.01 (ref 1–1.03)
TROPONIN I SERPL HS-MCNC: 43 NG/L
UROBILINOGEN UR STRIP.AUTO-MCNC: NORMAL MG/DL
WBC # BLD AUTO: 23.3 X10(3) UL (ref 4–11)

## 2024-03-28 PROCEDURE — 99291 CRITICAL CARE FIRST HOUR: CPT | Performed by: INTERNAL MEDICINE

## 2024-03-28 PROCEDURE — 71045 X-RAY EXAM CHEST 1 VIEW: CPT | Performed by: EMERGENCY MEDICINE

## 2024-03-28 PROCEDURE — 99291 CRITICAL CARE FIRST HOUR: CPT | Performed by: HOSPITALIST

## 2024-03-28 RX ORDER — SENNOSIDES 8.6 MG
17.2 TABLET ORAL NIGHTLY PRN
Status: DISCONTINUED | OUTPATIENT
Start: 2024-03-28 | End: 2024-04-03

## 2024-03-28 RX ORDER — PANTOPRAZOLE SODIUM 40 MG/1
40 TABLET, DELAYED RELEASE ORAL
Status: DISCONTINUED | OUTPATIENT
Start: 2024-03-29 | End: 2024-04-03

## 2024-03-28 RX ORDER — FINASTERIDE 5 MG/1
5 TABLET, FILM COATED ORAL DAILY
Status: DISCONTINUED | OUTPATIENT
Start: 2024-03-29 | End: 2024-04-03

## 2024-03-28 RX ORDER — MELATONIN
3 NIGHTLY PRN
Status: DISCONTINUED | OUTPATIENT
Start: 2024-03-28 | End: 2024-04-03

## 2024-03-28 RX ORDER — SODIUM CHLORIDE 9 MG/ML
INJECTION, SOLUTION INTRAVENOUS CONTINUOUS
Status: ACTIVE | OUTPATIENT
Start: 2024-03-28 | End: 2024-03-29

## 2024-03-28 RX ORDER — ATORVASTATIN CALCIUM 40 MG/1
40 TABLET, FILM COATED ORAL NIGHTLY
Status: DISCONTINUED | OUTPATIENT
Start: 2024-03-28 | End: 2024-04-03

## 2024-03-28 RX ORDER — ACETAMINOPHEN 500 MG
1000 TABLET ORAL EVERY 6 HOURS PRN
Status: DISCONTINUED | OUTPATIENT
Start: 2024-03-28 | End: 2024-04-03

## 2024-03-28 RX ORDER — ONDANSETRON 2 MG/ML
4 INJECTION INTRAMUSCULAR; INTRAVENOUS EVERY 6 HOURS PRN
Status: DISCONTINUED | OUTPATIENT
Start: 2024-03-28 | End: 2024-04-03

## 2024-03-28 RX ORDER — METOCLOPRAMIDE HYDROCHLORIDE 5 MG/ML
5 INJECTION INTRAMUSCULAR; INTRAVENOUS EVERY 8 HOURS PRN
Status: DISCONTINUED | OUTPATIENT
Start: 2024-03-28 | End: 2024-03-30

## 2024-03-28 RX ORDER — FOLIC ACID 1 MG/1
1 TABLET ORAL DAILY
Status: DISCONTINUED | OUTPATIENT
Start: 2024-03-29 | End: 2024-04-03

## 2024-03-28 RX ORDER — TAMSULOSIN HYDROCHLORIDE 0.4 MG/1
0.4 CAPSULE ORAL DAILY
Status: DISCONTINUED | OUTPATIENT
Start: 2024-03-29 | End: 2024-04-03

## 2024-03-28 RX ORDER — ASPIRIN 81 MG/1
324 TABLET, CHEWABLE ORAL ONCE
Status: COMPLETED | OUTPATIENT
Start: 2024-03-28 | End: 2024-03-28

## 2024-03-28 RX ORDER — SODIUM CHLORIDE 9 MG/ML
1000 INJECTION, SOLUTION INTRAVENOUS ONCE
Status: COMPLETED | OUTPATIENT
Start: 2024-03-28 | End: 2024-03-28

## 2024-03-28 RX ORDER — POLYETHYLENE GLYCOL 3350 17 G/17G
17 POWDER, FOR SOLUTION ORAL DAILY PRN
Status: DISCONTINUED | OUTPATIENT
Start: 2024-03-28 | End: 2024-04-03

## 2024-03-28 RX ORDER — CLOPIDOGREL BISULFATE 75 MG/1
75 TABLET ORAL DAILY
Status: DISCONTINUED | OUTPATIENT
Start: 2024-03-29 | End: 2024-04-03

## 2024-03-28 RX ORDER — BISACODYL 10 MG
10 SUPPOSITORY, RECTAL RECTAL
Status: DISCONTINUED | OUTPATIENT
Start: 2024-03-28 | End: 2024-04-03

## 2024-03-28 RX ORDER — GABAPENTIN 300 MG/1
300 CAPSULE ORAL 3 TIMES DAILY
Status: DISCONTINUED | OUTPATIENT
Start: 2024-03-28 | End: 2024-04-03

## 2024-03-28 NOTE — ED INITIAL ASSESSMENT (HPI)
Pt arrives to ED with his family for evaluation of weakness, SOB, and chest discomfort. Pt does have a cough, pt is feeling weak. Pt does feel SOB with exertion. Pt presents to ED with SOB, pt is having a difficulty completing sentences without labored breathing.

## 2024-03-28 NOTE — H&P
[facility]     Alejandro Guardado Patient Status:  Emergency    1935 MRN [id number]   Location [facility] EMERGENCY DEPARTMENT Attending Jenny Ruffin DO   Hosp Day # 0 PCP Stanislav Odonnell MD     Chief Complaint: SOB, weak     Subjective:    History of Present Illness:   Alejandro Guardado is a 89 year old male with multiple recent admissions for pneumonia and septic shock.  Patient presents with similar symptoms with coughing.  He is not passing swallow evaluations and no choking with food at home.  According to family he gets sick very quickly.  He does have chronic foot wound which she has been seen at wound clinic and follows with a podiatrist as well.  Seems to have opened up but no active fevers or purulent discharge.  No nausea vomiting or diarrhea or abdominal pain.  No skin changes or rashes otherwise.  No dysuria hematuria.    History/Other:    Past Medical History:  Past Medical History:   Diagnosis Date    Atrial fibrillation ([facility])     CAD (coronary artery disease) 2015    CHF (congestive heart failure) ([facility])     Esophageal reflux     Hearing impairment     Heart attack ([facility])     History of MI (myocardial infarction)     HTN (hypertension)     Hyperlipidemia     PAD (peripheral artery disease) ([facility]) 2023    Visual impairment      Past Surgical History:   Past Surgical History:   Procedure Laterality Date    ANGIOGRAM      ANGIOPLASTY (CORONARY)      CABG      CATARACT  2023    FRACTURE SURGERY Left     left hip pinning    OTHER SURGICAL HISTORY Left 1977    knee surgery    OTHER SURGICAL HISTORY  2016    Left hip ORIF pinning    TONSILLECTOMY        Family History:   Family History   Problem Relation Age of Onset    Cancer Father      Social History:    reports that he has never smoked. He has never used smokeless tobacco. He reports that he does not drink alcohol and does not use drugs.   Allergies:   Allergies   Allergen Reactions     Heparin ANAPHYLAXIS    Hydromorphone HALLUCINATION     Medications:    No current facility-administered medications on file prior to encounter.     Current Outpatient Medications on File Prior to Encounter   Medication Sig Dispense Refill    furosemide 80 MG Oral Tab Take 1 tablet (80 mg total) by mouth daily.      metoprolol succinate ER 25 MG Oral Tablet 24 Hr Take 1 tablet (25 mg total) by mouth Daily Beta Blocker. 90 tablet 1    tamsulosin 0.4 MG Oral Cap Take 1 capsule (0.4 mg total) by mouth daily.      apixaban 5 MG Oral Tab Take 1 tablet (5 mg total) by mouth 2 (two) times daily. 60 tablet 3    collagenase (SANTYL) 250 UNIT/GM External Ointment Apply 1 g topically daily. 30 g 2    collagenase (SANTYL) 250 UNIT/GM External Ointment Apply topically to ulcer site daily 30 g 0    atorvastatin 40 MG Oral Tab Take 1 tablet (40 mg total) by mouth daily. 30 tablet 0    finasteride 5 MG Oral Tab Take 1 tablet (5 mg total) by mouth daily. 30 tablet 0    clopidogrel 75 MG Oral Tab Take 1 tablet (75 mg total) by mouth daily. 30 tablet 0    gabapentin 300 MG Oral Cap Take 1 capsule (300 mg total) by mouth 3 (three) times daily. 90 capsule 0    predniSONE 5 MG Oral Tab Take 3 tablets (15 mg total) by mouth daily.      folic acid 1 MG Oral Tab Take 1 tablet (1 mg total) by mouth daily.      acetaminophen 500 MG Oral Tab Take 2 tablets (1,000 mg total) by mouth every 8 (eight) hours. 21 tablet 0    lisinopril 2.5 MG Oral Tab Take 1 tablet (2.5 mg total) by mouth daily.      Omeprazole 40 MG Oral Capsule Delayed Release Take 1 capsule (40 mg total) by mouth daily.       Review of Systems:   A comprehensive review of systems was completed.    Pertinent positives and negatives noted in the HPI.    Objective:   Physical Exam:    BP (!) 84/54   Pulse 102   Temp 97.9 °F (36.6 °C) (Temporal)   Resp 21   Ht 5' 7\" (1.702 m)   Wt 210 lb (95.3 kg)   SpO2 97%   BMI 32.89 kg/m²   General: Critically ill on 6 L  oxygen  Respiratory: Diminished with coarse breath sounds  Cardiovascular: S1, S2.  Sinus tachycardia  Abdomen: Soft, NT/ND, +BS  Neuro: No new focal deficits  Extremities: Open foot wound but no purulent discharge. + edema     Results:    Labs:    Labs Last 24 Hours:  Recent Labs   Lab 03/28/24  1131   RBC 4.87   HGB 12.7*   HCT 39.5   MCV 81.1   MCH 26.1   MCHC 32.2   RDW 15.8   NEPRELIM 20.51*   WBC 23.3*   .0     Recent Labs   Lab 03/28/24  1131   *   BUN 38*   CREATSERUM 2.15*   EGFRCR 29*   CA 9.5   ALB 3.0*      K 3.3*      CO2 26.0   ALKPHO 59   AST 15   ALT 29   BILT 0.7   TP 6.4     Lab Results   Component Value Date    INR 1.35 (H) 02/12/2024    INR 1.48 (H) 12/25/2023    INR 1.39 (H) 12/01/2023     Recent Labs   Lab 03/28/24  1131   TROPHS 43     Recent Labs   Lab 03/28/24  1131   PBNP 5,260*     Recent Labs   Lab 03/28/24  1131   PCT 34.79*     Imaging: Imaging data reviewed in Epic.    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 Steward   -plavix, statin  #GERD-PPI    #Foot wound- podiatry and wound care consult     Admit to ICU  -Levophed  -IVF but caution with CHF, using Levophed for now   -IV Abx and f/u Cx  -Pulm consulted  -trend lactate   -stress dose steroids   -CT chest when more stable  -Cardiology consulted   -Podiatry and wound care consulted     -Dr. Dickerson consult for Palliative care   #Recent hospitalizations  This admission  -3/16-3/18- shock sepsis vs hypovolemic   -2/12-2/15 for septic shock secondary to multifocal  pneumonia  -12/25-12/28 septic shock for gram-negative pneumonia  -12/1-12/6-hospitalization for pneumonia    CCT 50 minutes  D/w family  Advance care planning 15 minutes -DNR [facility]- d/w patient and family  Quality:  DVT Mechanical Prophylaxis:        DVT Pharmacologic Prophylaxis   Medication   None                Code Status: DNAR/Selective Treatment  Block: External urinary catheter in place  Block Duration (in days):   Central line:    DIEGO:     Scar Alexander MD  Supplementary Documentation:   The 21st Century Cures Act makes medical notes like these available to patients in the interest of transparency. Please be advised this is a medical document. Medical documents are intended to carry relevant information, facts as evident, and the clinical opinion of the practitioner. The medical note is intended as peer to peer communication and may appear blunt or direct. It is written in medical language and may contain abbreviations or verbiage that are unfamiliar.

## 2024-03-28 NOTE — PLAN OF CARE
Received pt from ED. Pt alert and oriented x3 on 2L NC. Pt on levophed gtt. Sputum culture sent. Blood cultures pending. Needs urine culture. Meropenem for abx. On IV steroids. Will continue to monitor.

## 2024-03-28 NOTE — CONSULTS
[facility], Critical [facility]    Alejandro Guardado Patient Status:  Inpatient    1935 MRN [id number]   Location B3/B3 PCP Stanislav Odonnell MD     Date of Admission: 3/28/2024    History of Present Illness: Pt is a 89 year old male consulted for pneumonia with h/o recent admit, home o2 use, HFrEF 25-30%, PAD Recent admission 3/16-3/18 with septic shock and pneumonia, his 4th  admission for pneumonia since 2023. Swallow study  was normal. Pt had been discharged 3/18 on PO abx (augmentin) and had been feeling well. With last seen last ngiht. This morning at 3 am developed cough and then progressive dyspnea. In ED found to be hypotensive and hypoxic and acute renal failure with Cr 2.15 with baseline creatinine 0.99 on last admit. Pt denies dysphagia or other sick contacts. No travel, no exposures.   Is typically mentally active and lives independently.   Has been compliant with eliquis.     PMH:    Past Medical History:   Diagnosis Date    Atrial fibrillation ([facility])     CAD (coronary artery disease) 2015    CHF (congestive heart failure) ([facility])     Esophageal reflux     Hearing impairment     Heart attack ([facility])     History of MI (myocardial infarction)     HTN (hypertension)     Hyperlipidemia     PAD (peripheral artery disease) ([facility]) 2023    Visual impairment        Past Surgical History:   Procedure Laterality Date    ANGIOGRAM      ANGIOPLASTY (CORONARY)      CABG      CATARACT  2023    FRACTURE SURGERY Left     left hip pinning    OTHER SURGICAL HISTORY Left 1977    knee surgery    OTHER SURGICAL HISTORY  2016    Left hip ORIF pinning    TONSILLECTOMY         Allergies:   Allergies   Allergen Reactions    Heparin ANAPHYLAXIS    Hydromorphone HALLUCINATION       Medications:  Outpatient Medications:    Current Facility-Administered Medications on File Prior to Encounter   Medication Dose Route Frequency Provider Last Rate Last Admin    [COMPLETED] metoprolol tartrate  (Lopressor) partial tab 12.5 mg  12.5 mg Oral Once Susu Flores APRN   12.5 mg at 24 1706    [COMPLETED] sodium chloride 0.9 % IV bolus 500 mL  500 mL Intravenous Once Hannah Cole MD   Stopped at 24 1445    [COMPLETED] hydrocortisone Na succinate PF (Solu-CORTEF) injection 100 mg  100 mg Intravenous Once Hannah Cole MD   100 mg at 24 1320    [COMPLETED] piperacillin-tazobactam (Zosyn) 4.5 g in dextrose 5% 100 mL IVPB-ADDV  4.5 g Intravenous Once Hannah Cole MD   Stopped at 24 1409    [COMPLETED] potassium chloride (K-Dur) tab 40 mEq  40 mEq Oral Once Dipesh Wall MD   40 mEq at 24    [COMPLETED] hydrocortisone Na succinate PF (Solu-CORTEF) injection 50 mg  50 mg Intravenous 2 times per day Wild Arriaza, DO   50 mg at 24    [COMPLETED] piperacillin-tazobactam (Zosyn) 4.5 g in dextrose 5% 100 mL IVPB-ADDV  4.5 g Intravenous Once Warren Martin MD   Stopped at 24 1300    [COMPLETED] sodium chloride 0.9 % IV bolus 500 mL  500 mL Intravenous Once Duke Wasserman  mL/hr at 24 1348 500 mL at 24 1348    [COMPLETED] hydrocortisone Na succinate PF (Solu-CORTEF) injection 100 mg  100 mg Intravenous Once Warren Martin MD   100 mg at 24 1401    [COMPLETED] vancomycin (Vancocin) 2.25 g in sodium chloride 0.9% 500 mL IVPB premix  25 mg/kg Intravenous Once Andre Campos  mL/hr at 24 1733 2,250 mg at 24 1733    [COMPLETED] magnesium sulfate in sterile water for injection 2 g/50mL IVPB premix 2 g  2 g Intravenous Once Andre Campos DO 50 mL/hr at 24 1718 2 g at 24 1718    [COMPLETED] acetaminophen (Ofirmev) 10 mg/mL infusion premix 1,000 mg  1,000 mg Intravenous Once Blanquita Jones APRN 400 mL/hr at 24 1715 1,000 mg at 24 1715     Current Outpatient Medications on File Prior to Encounter   Medication Sig Dispense Refill    [] amoxicillin clavulanate 875-125 MG Oral Tab Take 1 tablet  by mouth 2 (two) times daily for 8 days. 16 tablet 0    furosemide 80 MG Oral Tab Take 1 tablet (80 mg total) by mouth daily.      metoprolol succinate ER 25 MG Oral Tablet 24 Hr Take 1 tablet (25 mg total) by mouth Daily Beta Blocker. 90 tablet 1    tamsulosin 0.4 MG Oral Cap Take 1 capsule (0.4 mg total) by mouth daily.      apixaban 5 MG Oral Tab Take 1 tablet (5 mg total) by mouth 2 (two) times daily. 60 tablet 3    collagenase (SANTYL) 250 UNIT/GM External Ointment Apply 1 g topically daily. 30 g 2    collagenase (SANTYL) 250 UNIT/GM External Ointment Apply topically to ulcer site daily 30 g 0    atorvastatin 40 MG Oral Tab Take 1 tablet (40 mg total) by mouth daily. 30 tablet 0    finasteride 5 MG Oral Tab Take 1 tablet (5 mg total) by mouth daily. 30 tablet 0    clopidogrel 75 MG Oral Tab Take 1 tablet (75 mg total) by mouth daily. 30 tablet 0    gabapentin 300 MG Oral Cap Take 1 capsule (300 mg total) by mouth 3 (three) times daily. 90 capsule 0    predniSONE 5 MG Oral Tab Take 3 tablets (15 mg total) by mouth daily.      folic acid 1 MG Oral Tab Take 1 tablet (1 mg total) by mouth daily.      acetaminophen 500 MG Oral Tab Take 2 tablets (1,000 mg total) by mouth every 8 (eight) hours. 21 tablet 0    lisinopril 2.5 MG Oral Tab Take 1 tablet (2.5 mg total) by mouth daily.      Omeprazole 40 MG Oral Capsule Delayed Release Take 1 capsule (40 mg total) by mouth daily.         Inpatient Medications:  Scheduled Medications:     piperacillin-tazobactam  4.5 g Intravenous Once     Infusing Medications:     norepinephrine 5 mcg/min (03/28/24 1153)     PRN Medications:      Social History:    History   Smoking Status    Never   Smokeless Tobacco    Never       History   Alcohol Use Never       History   Drug Use Unknown     Work:Retired construction , residential.   TB: None known  Asbestos: None known      Family History   Problem Relation Age of Onset    Cancer Father       REVIEW OF SYSTEMS:   A comprehensive 10  point review of systems was completed.  Pertinent positives and negatives noted in the the HPI.    OBJECTIVE:  Temp (24hrs), Av.9 °F (36.6 °C), Min:97.9 °F (36.6 °C), Max:97.9 °F (36.6 °C)   BP (!) 89/53   Pulse 63   Temp 97.9 °F (36.6 °C) (Temporal)   Resp 17   Ht 170.2 cm (5' 7\")   Wt 210 lb (95.3 kg)   SpO2 100%   BMI 32.89 kg/m²   O2: 6 LNC  General: NAD.   Neuro: Alert, no focal deficits.    HEENT: PERRL  Neck : No LAD  CV: RRR, nl S1, S2, no S4, S3 or murmur.   Lungs: Clear bilaterally.   Abd: Nontender, non distended.   Ext: No edema.   Skin: No rashes.     Labs:  Recent Labs   Lab 24  1131   WBC 23.3*   HGB 12.7*   HCT 39.5   .0     Recent Labs   Lab 24  1131   *   BUN 38*   CREATSERUM 2.15*   CA 9.5      K 3.3*      CO2 26.0   AST 15   ALT 29   ALB 3.0*     No results for input(s): \"INR\", \"PTT\" in the last 168 hours.  No results for input(s): \"ABGPHT\", \"EXXHEI2M\", \"FXHZU2M\", \"ABGHCO3\", \"SITE\", \"DEV\", \"THGB\" in the last 168 hours.    COVID-19 Lab Results    COVID-19  Lab Results   Component Value Date    COVID19 Not Detected 2024    COVID19 Not Detected 2024    COVID19 Not Detected 2024       Pro-Calcitonin  Recent Labs   Lab 24  1131   PCT 34.79*       Cardiac  Recent Labs   Lab 24  1131   PBNP 5,260*       Creatinine Kinase  No results for input(s): \"CK\" in the last 168 hours.    Inflammatory Markers  No results for input(s): \"CRP\", \"KOKO\", \"LDH\", \"DDIMER\" in the last 168 hours.      Imaging:  CXR 3/28/2024  CONCLUSION:  Right upper lobe infiltrate/lobar pneumonia.  Continued clinical correlation and follow-up to resolution recommended.     3/28      3/16/24    Chest images personally reviewed.       Assessment/Plan   Septic shock secondary to recurrent pneumonia.   IVF and pressors.  Wean pressors as tolerated.   ID: Recurrent pneumonia.   Now 5th event since 2023.   Last on zosyn then augmentin. Change to meropenem start  3/28/202  Check sputum cultures.   Speech eval again, last swallow was OK though but concerning for aspiration.   Check quant immunoglobulins.   Check CT chest and sinuses when BP more stable to evaluate for structural lung disease. Was non smoker. Was going to see Dr. Quiles as outpt.   CV: h/o afib  Rate control and anticoagulated.   Cardiology evaluation.   Renal insufficiency  Likely prerenal from sepsis.   IVF and f/u labs and monitor.   May need renal eval if does not improve.   Proph  Anticoagulated.   Dispo  DNAR [facility].   ICU monitoring.   Discussed QOL goals with pt and family   On call Intensivist 03/28/24  Critical Care Time greater than: 45 minutes    My best regards,         Sandro Aldrich MD  [facility] Pulmonary, Critical Care and Sleep Medicine

## 2024-03-28 NOTE — ED PROVIDER NOTES
Patient Seen in: [facility] 4sw-a      History     Chief Complaint   Patient presents with    Chest Pain Angina    Difficulty Breathing     Stated Complaint: ADOLFO, CP, hx chf    Subjective:   HPI    89-year-old male history recurrent recent admissions for pneumonia and heart failure over the last 3 months, uses night home oxygen, chronic heart failure with reduced EF ischemic cardiomyopathy EF 25-30%, chronic A-fib on Eliquis, CAD status post CABG presents to ED with complaint of shortness of breath 88% on room air and hypotensive with systolic BP 70s at arrival, weakness and dry cough for the last few days.  Family present at bedside and states this is exactly how he presented last time 3/16 - 3/18 when he had septic shock due to pneumonia.  Patient also states that he had mild chest pain when they entered the parking lot here.  Patient unable to contribute much history family at bedside gives most of the history.  Denies fevers.    Objective:   Past Medical History:   Diagnosis Date    Atrial fibrillation ([facility])     CAD (coronary artery disease) 09/28/2015    CHF (congestive heart failure) ([facility]) 2022    Esophageal reflux     Hearing impairment     Heart attack ([facility])     History of MI (myocardial infarction)     HTN (hypertension)     Hyperlipidemia     PAD (peripheral artery disease) ([facility]) 04/26/2023    Visual impairment               Past Surgical History:   Procedure Laterality Date    ANGIOGRAM      ANGIOPLASTY (CORONARY)      CABG      CATARACT  04/2023    FRACTURE SURGERY Left     left hip pinning    OTHER SURGICAL HISTORY Left 01/01/1977    knee surgery    OTHER SURGICAL HISTORY  03/25/2016    Left hip ORIF pinning    TONSILLECTOMY                  Social History     Socioeconomic History    Marital status:    Tobacco Use    Smoking status: Never    Smokeless tobacco: Never   Vaping Use    Vaping Use: Never used   Substance and Sexual Activity    Alcohol use: Never    Drug use: Never     Social  Determinants of Health     Food Insecurity: No Food Insecurity (3/28/2024)    Food Insecurity     Food Insecurity: Never true   Transportation Needs: No Transportation Needs (3/28/2024)    Transportation Needs     Lack of Transportation: No   Housing Stability: Low Risk  (3/28/2024)    Housing Stability     Housing Instability: No              Review of Systems    Positive for stated complaint: ADOLFO, CP, hx chf  Other systems are as noted in HPI.  Constitutional and vital signs reviewed.      All other systems reviewed and negative except as noted above.    Physical Exam     ED Triage Vitals   BP 03/28/24 1102 93/63   Pulse 03/28/24 1059 (!) 125   Resp 03/28/24 1059 25   Temp 03/28/24 1059 97.9 °F (36.6 °C)   Temp src 03/28/24 1059 Temporal   SpO2 03/28/24 1100 90 %   O2 Device 03/28/24 1100 Nasal cannula       Current:BP 97/49   Pulse 110   Temp 97.9 °F (36.6 °C) (Temporal)   Resp 19   Ht 170.2 cm (5' 7\")   Wt 98.5 kg   SpO2 94%   BMI 34.01 kg/m²         Physical Exam    Vital signs reviewed.  Nursing note reviewed.  Constitutional: Alert, well-appearing  Head: Normocephalic, atraumatic  Mouth: Moist  Eyes: Extraocular muscles intact, pupils equal  Cardiovascular: Tachycardic rate and irregularly irregular rhythm  Pulmonary: Mildly tachypneic, decreased breath sounds at bases, 92% on 6 L  Abdomen: Soft, nontender nondistended  Skin: Warm and dry  Musculoskeletal range of motion grossly normal all extremities  Neuro: Alert, at baseline, no focal neuro deficit.  Moves all extremities against gravity  Psych: Mood normal          ED Course     Labs Reviewed   COMP METABOLIC PANEL (14) - Abnormal; Notable for the following components:       Result Value    Glucose 121 (*)     Potassium 3.3 (*)     BUN 38 (*)     Creatinine 2.15 (*)     eGFR-Cr 29 (*)     Albumin 3.0 (*)     A/G Ratio 0.9 (*)     All other components within normal limits   PRO BETA NATRIURETIC PEPTIDE - Abnormal; Notable for the following  components:    Pro-Beta Natriuretic Peptide 5,260 (*)     All other components within normal limits   PROCALCITONIN - Abnormal; Notable for the following components:    Procalcitonin 34.79 (*)     All other components within normal limits    Narrative:     Resulted by: batch: TNIH, K, PBNP, CO2, CL, NA, ALB, TBIL, ALT, AST, ALP, CA, CREA, BUN, GLUC,    LACTIC ACID, PLASMA - Abnormal; Notable for the following components:    Lactic Acid 4.8 (*)     All other components within normal limits   CBC W/ DIFFERENTIAL - Abnormal; Notable for the following components:    WBC 23.3 (*)     HGB 12.7 (*)     Neutrophil Absolute Prelim 20.51 (*)     Neutrophil Absolute 20.51 (*)     Lymphocyte Absolute 0.98 (*)     Monocyte Absolute 1.48 (*)     All other components within normal limits   TROPONIN I HIGH SENSITIVITY - Normal   SARS-COV-2/FLU A AND B/RSV BY PCR (GENEXPERT) - Normal    Narrative:     This test is intended for the qualitative detection and differentiation of SARS-CoV-2, influenza A, influenza B, and respiratory syncytial virus (RSV) viral RNA in nasopharyngeal or nares swabs from individuals suspected of respiratory viral infection consistent with COVID-19 by their healthcare provider. Signs and symptoms of respiratory viral infection due to SARS-CoV-2, influenza, and RSV can be similar.    Test performed using the Xpert Xpress SARS-CoV-2/FLU/RSV (real time RT-PCR)  assay on the GeneXpert instrument, [employer] [place] [address].   This test is being used under the Food and Drug Administration's Emergency Use Authorization.    The authorized Fact Sheet for Healthcare Providers for this assay is available upon request from the laboratory.   CBC WITH DIFFERENTIAL WITH PLATELET    Narrative:     The following orders were created for panel order CBC With Differential With Platelet.  Procedure                               Abnormality         Status                     ---------                               -----------          ------                     CBC W/ DIFFERENTIAL[[id number]]          Abnormal            Final result                 Please view results for these tests on the individual orders.   URINALYSIS WITH CULTURE REFLEX   LACTIC ACID REFLEX POST POSTIVE   PROTHROMBIN TIME (PT)   PTT, ACTIVATED   RAINBOW DRAW LAVENDER   RAINBOW DRAW LIGHT GREEN   RAINBOW DRAW BLUE   BLOOD CULTURE   BLOOD CULTURE   SPUTUM CULTURE   ED/MRSA SCREEN BY PCR-CC     EKG    Rate, intervals and axes as noted on EKG Report.  Rate: 123  Rhythm: Atrial Fibrillation RVR  Reading: PVCs present, no STEMI                 XR CHEST AP PORTABLE  (CPT=71045)    Result Date: 3/28/2024  PROCEDURE:  XR CHEST AP PORTABLE  (CPT=71045)  TECHNIQUE:  AP chest radiograph was obtained.  COMPARISON:  [facility] , XR, XR CHEST AP PORTABLE  (CPT=71045), 3/16/2024, 1:32 PM.  INDICATIONS:  ADOLFO, CP, hx chf  PATIENT STATED HISTORY: (As transcribed by Technologist)  family states he has had a cough that started this morning.    FINDINGS:  Lung volumes are satisfactory.  There is new opacity corresponding to the right upper lobe.  CP angles remain sharp.  Heart and pulmonary vessels appear stable, normal caliber allowing for portable technique.  Mediastinal contours are smooth.  Sternotomy wires are noted.             CONCLUSION:  Right upper lobe infiltrate/lobar pneumonia.  Continued clinical correlation and follow-up to resolution recommended.   LOCATION:  [facility]      Dictated by (CST): Enrique Brown MD on 3/28/2024 at 11:38 AM     Finalized by (CST): Enrique Brown MD on 3/28/2024 at 11:39 AM       XR CHEST AP PORTABLE  (CPT=71045)    Result Date: 3/16/2024  PROCEDURE:  XR CHEST AP PORTABLE  (CPT=71045)  TECHNIQUE:  AP chest radiograph was obtained.  COMPARISON:  [facility] , XR, XR CHEST PA + LAT CHEST (CPT=71046), 2/26/2024, 2:59 PM.  INDICATIONS:  chf exacerbation  PATIENT STATED HISTORY: (As transcribed by Technologist)  Family states he has CHF exacerbation and trouble  breathing.    FINDINGS:  There are some subtle patchy airspace opacities within the lungs suspicious for areas of pneumonia.  Heart size is within normal limits.  No pleural effusion is seen.  Mediastinal and hilar contours are normal.  Increased vascular markings may  also represent moderate vascular congestion and volume overload.            CONCLUSION:  Some patchy airspace opacities are present within the lungs suspicious for areas of pneumonia.  Viral pneumonia may be possible.  There may also be moderate vascular congestion and volume overload.  Follow-up is recommended to ensure resolution.  If clinical symptoms persist then consider CT.   LOCATION:  [facility]      Dictated by (CST): Gregg Fields MD on 3/16/2024 at 2:17 PM     Finalized by (CST): Gregg Fields MD on 3/16/2024 at 2:17 PM               [facility]      Medical Decision Making:    Differential diagnosis before testing includes shock due to pneumonia, cardiogenic etiology, potential life threatening diagnosis which is a medical condition that poses a threat to life/function.     Comorbidities that add complexity to management: See HPI    I reviewed prior external ED notes including recent admission 3/16 - 3/18/2024 for sepsis due to pneumonia    Discussions of management with: Discussed with TAVARES cardiology, jose maria pulmonology critical care, hospitalist      I personally reviewed the radiographs and my independent interpretation includes new right upper lobe opacity    Shared decision making:  Admission disposition: 3/28/2024 12:31 PM         Afebrile, tachycardic and hypotensive with systolic BP 70s, leukocytosis of 23, lactic acid of 4.3 consistent with septic shock due to pneumonia.  New finding of right upper lobe opacity on chest x-ray today.  Discussed with pulmonology critical care, cardiology, hospitalist.  Agree with IV Zosyn.  Patient had IV azithromycin during last admission.  Patient on Levophed drip, admitted to ICU.    Troponin negative, EKG  nonischemic.  Patient states compliance with Eliquis.  Labs remarkable for creatinine 2.15, previously 0.99 consistent with acute kidney injury.     Patient did not receive 30ml/kg of fluids despite having: elevated Lactate. The reason for doing so is: a concern for fluid overload. 500mL of fluids were given instead (500 is the amount of fluids given).         Critical Care Statement:   Upon my evaluation, this patient had a high probability of imminent or life-threatening deterioration which required my direct attention, intervention and personal management. Critical care time is exclusive of time spent on separately billable procedures. Time includes review of laboratory data, radiology results, discussion with consultants, and close monitoring for potential decompensation. It involved high complexity of medical decision making to assess, manipulate and support vital system functions to treat single or multiple organ system failure and/or to prevent further life threatening deterioration of the patient's condition. Interventions were performed as documented above.  This involved direct patient intervention, complex decision making and/or extensive discussions with patient, family and clinical staff.    TOTAL CRITICAL CARE TIME ELAPSED: 35   BODY SYSTEM AT RISK/SPECIFIC INDICATION FOR CRITICAL CARE: septic shock                   [facility]   part of [facility]      Sepsis Reassessment Note    BP 97/49   Pulse 110   Temp 97.9 °F (36.6 °C) (Temporal)   Resp 19   Ht 170.2 cm (5' 7\")   Wt 98.5 kg   SpO2 94%   BMI 34.01 kg/m²      I completed the sepsis reassessment at 1515    Cardiac:  Regularity: Regular  Rate: Normal  Heart Sounds: S1,S2    Lungs:   Right: Clear  Left: Clear    Peripheral Pulses:  Radial: Right 1+ or Left 1+      Capillary Refill:  <3 Secs    Skin:  Temp/Moisture: Warm and Dry  Color: Normal      Jenny BERRY DO [place]  3/28/2024  3:56 PM            [facility]    Disposition and Plan      Clinical Impression:  1. Sepsis due to pneumonia ([facility])    2. ERON (acute kidney injury) ([facility])    3. Lactic acidosis    4. Leukocytosis, unspecified type         Disposition:  Admit  3/28/2024 12:31 pm    Follow-up:  No follow-up provider specified.        Medications Prescribed:  Current Discharge Medication List                            Hospital Problems       Present on Admission  Date Reviewed: 3/11/2024            ICD-10-CM Noted POA    * (Principal) Sepsis due to pneumonia ([facility]) J18.9, A41.9 12/1/2023 Unknown    ERON (acute kidney injury) ([facility]) N17.9 3/28/2024 Unknown    Lactic acidosis E87.20 3/28/2024 Unknown    Leukocytosis, unspecified type D72.829 3/28/2024 Unknown    Palliative care encounter Z51.5 3/28/2024 Unknown    Shock ([facility]) R57.9 12/25/2023 Yes

## 2024-03-28 NOTE — PROGRESS NOTES
[facility]  Antibiotic Dosing Consult         Alejandro Guardado is a 89 year old male for whom pharmacy was consulted to dose meropenem for treatment of sepsis 2/2 PNA by Dr. Aldrich.        Labs:   Lab Results   Component Value Date/Time    WBC 23.3 (H) 03/28/2024 11:31 AM    CREATSERUM 2.15 (H) 03/28/2024 11:31 AM    BUN 38 (H) 03/28/2024 11:31 AM       Est CrCl: ~22 mL/min (ERON on admission - baseline SCr <1.0)     Weight 95.3 kg / Body mass index is 32.89 kg/m².        Assessment/Plan:   The patient is requiring vasopressor support - start meropenem 1000mg q12hr based on weight and estimated renal fx.      Pharmacy will continue to follow him.  We appreciate the opportunity to assist in his care.      Thank you,   Evan Pedraza, PharmD, BCPS  3/28/2024  3:44 PM

## 2024-03-28 NOTE — CONSULTS
[facility]  [facility]  Report of Consultation    Alejandro Guardado Patient Status:  Inpatient    1935 MRN [id number]   Location [facility] 4SW-A Attending Scar Alexander MD   Hosp Day # 0 PCP Stanislav Odonnell MD     IP Consult to Cardiology  Consult performed by: Mary Jain APRN  Consult ordered by: Jenny Ruffin DO          Reason for Consultation:  Shock, chf     History of Present Illness:  Alejandro Guardado is a a(n) 89 year old male, recent admission for Septic shock with community acquired pneumonia. Cardiology followed as he has an CAD with remote cabg, ICM with EF 20%, permanent Afib, PAD s/p pv stenting 2023.  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   He was discharged on 3/18/2024.     He no is readmitted again with sob and cough. Can't lie flat or begins coughing. Notes sob.  Denies chest pain.   Again revisited cardiac care plan, and he continues to want to forgoe any further interventions regarding his heart or valve.     His son has been taking meticulous care of his chronic right foot wound.       History:  Past Medical History:   Diagnosis Date    Atrial fibrillation ([facility])     CAD (coronary artery disease) 2015    CHF (congestive heart failure) ([facility])     Esophageal reflux     Hearing impairment     Heart attack ([facility])     History of MI (myocardial infarction)     HTN (hypertension)     Hyperlipidemia     PAD (peripheral artery disease) ([facility]) 2023    Visual impairment      Past Surgical History:   Procedure Laterality Date    ANGIOGRAM      ANGIOPLASTY (CORONARY)      CABG      CATARACT  2023    FRACTURE SURGERY Left     left hip pinning    OTHER SURGICAL HISTORY Left 1977    knee surgery    OTHER SURGICAL HISTORY  2016    Left hip ORIF pinning    TONSILLECTOMY       Family History   Problem Relation Age of Onset    Cancer Father        reports that he has never smoked. He has never used smokeless tobacco. He reports that he does not drink alcohol and does not use drugs.    Allergies:  Allergies   Allergen Reactions    Heparin ANAPHYLAXIS    Hydromorphone HALLUCINATION       Medications:    Current Facility-Administered Medications:     norepinephrine (Levophed) 4 mg/250mL infusion premix, 0.5-30 mcg/min, Intravenous, Continuous    sodium chloride 0.9 % IV bolus 2,859 mL, 30 mL/kg, Intravenous, Once    apixaban (Eliquis) tab 5 mg, 5 mg, Oral, BID    atorvastatin (Lipitor) tab 40 mg, 40 mg, Oral, Daily    clopidogrel (Plavix) tab 75 mg, 75 mg, Oral, Daily    finasteride (Proscar) tab 5 mg, 5 mg, Oral, Daily    folic acid (Folvite) tab 1 mg, 1 mg, Oral, Daily    gabapentin (Neurontin) cap 300 mg, 300 mg, Oral, TID    [START ON 3/29/2024] pantoprazole (Protonix) DR tab 40 mg, 40 mg, Oral, QAM AC    tamsulosin (Flomax) cap 0.4 mg, 0.4 mg, Oral, Daily    sodium chloride 0.9% infusion, , Intravenous, Continuous    acetaminophen (Tylenol Extra Strength) tab 1,000 mg, 1,000 mg, Oral, Q6H PRN    melatonin tab 3 mg, 3 mg, Oral, Nightly PRN    ondansetron (Zofran) 4 MG/2ML injection 4 mg, 4 mg, Intravenous, Q6H PRN    metoclopramide (Reglan) 5 mg/mL injection 5 mg, 5 mg, Intravenous, Q8H PRN    polyethylene glycol (PEG 3350) (Miralax) 17 g oral packet 17 g, 17 g, Oral, Daily PRN    sennosides (Senokot) tab 17.2 mg, 17.2 mg, Oral, Nightly PRN    bisacodyl (Dulcolax) 10 MG rectal suppository 10 mg, 10 mg, Rectal, Daily PRN    meropenem (Merrem) 1,000 mg in sodium chloride 0.9% 100 mL IVPB-MBP, 1,000 mg, Intravenous, Q12H    hydrocortisone Na succinate PF (Solu-CORTEF) injection 50 mg, 50 mg, Intravenous, 4 times per day    Review of Systems:    Constitutional:  Positive for fatigue.   Respiratory:  Positive for cough and shortness of breath.    Cardiovascular:  Positive for leg swelling.   All other systems reviewed and are negative.        OBJECTIVE  Blood  pressure 97/49, pulse 110, temperature 99.8 °F (37.7 °C), temperature source Temporal, resp. rate 19, height 5' 7\" (1.702 m), weight 217 lb 2.5 oz (98.5 kg), SpO2 94%.  Temp (24hrs), Av.9 °F (37.2 °C), Min:97.9 °F (36.6 °C), Max:99.8 °F (37.7 °C)    Wt Readings from Last 3 Encounters:   24 217 lb 2.5 oz (98.5 kg)   24 211 lb 6.7 oz (95.9 kg)   02/15/24 205 lb 0.4 oz (93 kg)       Telemetry: Afib, frequent pvc  Code Status:DNR  Physical Exam:  Physical Exam  Constitutional:       Appearance: He is ill-appearing.   Neck:      Comments: JVD  Cardiovascular:      Rate and Rhythm: Tachycardia present. Rhythm irregular.      Heart sounds: Murmur heard.      Comments: R foot stump non-doppler DP or PT pulse, cool extremity below knee.   Healing incision with delayed capillary refill but present.   Pulmonary:      Effort: Pulmonary effort is normal.      Breath sounds: Rales present.   Abdominal:      General: There is distension.   Musculoskeletal:      Left lower leg: Edema present.   Skin:     Coloration: Skin is pale.   Neurological:      Mental Status: He is alert and oriented to person, place, and time.              Diagnostics History:  Echo 2023:   Conclusions:     1. Left ventricle:   2. Left ventricle: The cavity size was moderately increased. Wall thickness      was at the upper limits of normal. Systolic function was markedly      reduced. The estimated ejection fraction was 20-25%, by visual      assessment. There was severe diffuse hypokinesis with distinct regional      wall motion abnormalities. Unable to assess LV diastolic function due to      heart rhythm.   3. Aortic valve: Cusp separation was reduced. Transvalvular velocity was      increased, due to stenosis. The findings were consistent with moderate to      severe stenosis. There was mild regurgitation. The peak systolic velocity      was 2.57m/sec. The mean systolic gradient was 15mm Hg. The valve area      (VTI) was 1.02cm^2.  The valve area (VTI) index was 0.5cm^2/m^2. There is      low flow, low gradient aortic valve stenosis present with severity of      aortic valve stenosis likely underestimated.   4. Left atrium: The left atrial volume was markedly increased.   5. Right ventricle: The cavity size was mildly increased. Systolic function      was reduced. The tricuspid annular plane systolic excursion (TAPSE) is      1.18cm.   6. Tricuspid valve: There was mild-moderate regurgitation.   7. Pulmonary arteries: Systolic pressure was mildly increased, in the range      of 35mm Hg to 40mm Hg. Estimated pulmonary artery diastolic pressure was      13mm Hg.   Impressions:  This study is compared with previous dated 12/5/23: Similar   findings.           Results:   Recent Labs   Lab 03/28/24  1131   *   BUN 38*   CREATSERUM 2.15*   EGFRCR 29*   CA 9.5      K 3.3*      CO2 26.0     Recent Labs   Lab 03/28/24  1131   RBC 4.87   HGB 12.7*   HCT 39.5   MCV 81.1   MCH 26.1   MCHC 32.2   RDW 15.8   NEPRELIM 20.51*   WBC 23.3*   .0         [unfilled]  No results for input(s): \"BNP\" in the last 168 hours.  Lab Results   Component Value Date    INR 1.35 (H) 02/12/2024    INR 1.48 (H) 12/25/2023    INR 1.39 (H) 12/01/2023     No results found for: \"TROP\"      XR CHEST AP PORTABLE  (CPT=71045)    Result Date: 3/28/2024  CONCLUSION:  Right upper lobe infiltrate/lobar pneumonia.  Continued clinical correlation and follow-up to resolution recommended.   LOCATION:  [facility]      Dictated by (CST): Enrique Brown MD on 3/28/2024 at 11:38 AM     Finalized by (CST): Enrique Brown MD on 3/28/2024 at 11:39 AM           Assessment     Shock suspect combo septic and cardiogenic  ? Recurrent PNA: lactic 4.8, Procal 34, WBC 23, BC pending  Acute on Chronic HFrEF with Moderate-Severe Aortic Stenosis: EF 20%, declines cath or TAVR, decomdpenated/wet  Persistant Afib: recently started on Amio with plans for future cardioversion, eliquis for stroke  prophylaxis  ERON: I'm sure cardiorenal component with shock   CAD h/o cabg x4 2015  PAD: chronic wound, s/p thrombectomy R pop stent and PTA SFA,tp trunk by Dr. Steward 12/2023, R LE cool but is bleeding and has cap refill.  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   Would even consider Hospice at this point. Condition guarded at best.       Mary Jain, APRN  3/28/2024  4:05 PM    Patient seen and examined independently.  Note reviewed and labs reviewed.  Agree with above assessment and plan.  Patient well known to our service from prior admissions.  Has hx of low-flow, low-gradient severe aortic stenosis - not a candidate for aggressive interventions.  Comes in with recurrent fatigue and SOB.  Some dietary indiscretions at his age not unexpected.    On exam he appears euvolemic - I would avoid aggressive IV diuresis in patient with low LVEF and fixed cardiac output due to severe aortic stenosis.  No objection to gentle hydration but has already received 2.5 L fluid.  Will hold on use of ionotropic support at present.  Will re-evaluate in AM tomorrow.    FABIENNE Wick MD

## 2024-03-29 ENCOUNTER — APPOINTMENT (OUTPATIENT)
Dept: CT IMAGING | Facility: HOSPITAL | Age: 89
End: 2024-03-29
Attending: INTERNAL MEDICINE
Payer: MEDICARE

## 2024-03-29 PROBLEM — Z71.89 COUNSELING REGARDING ADVANCE CARE PLANNING AND GOALS OF CARE: Status: ACTIVE | Noted: 2024-03-29

## 2024-03-29 PROBLEM — I50.20 HFREF (HEART FAILURE WITH REDUCED EJECTION FRACTION) (HCC): Status: ACTIVE | Noted: 2024-03-29

## 2024-03-29 LAB
ALBUMIN SERPL-MCNC: 2.5 G/DL (ref 3.4–5)
ALBUMIN/GLOB SERPL: 0.8 {RATIO} (ref 1–2)
ALP LIVER SERPL-CCNC: 53 U/L
ALT SERPL-CCNC: 24 U/L
ANION GAP SERPL CALC-SCNC: 4 MMOL/L (ref 0–18)
AST SERPL-CCNC: 13 U/L (ref 15–37)
BASOPHILS # BLD AUTO: 0.05 X10(3) UL (ref 0–0.2)
BASOPHILS NFR BLD AUTO: 0.2 %
BILIRUB SERPL-MCNC: 0.7 MG/DL (ref 0.1–2)
BUN BLD-MCNC: 30 MG/DL (ref 9–23)
CALCIUM BLD-MCNC: 8.6 MG/DL (ref 8.5–10.1)
CHLORIDE SERPL-SCNC: 111 MMOL/L (ref 98–112)
CO2 SERPL-SCNC: 25 MMOL/L (ref 21–32)
CREAT BLD-MCNC: 1.13 MG/DL
EGFRCR SERPLBLD CKD-EPI 2021: 62 ML/MIN/1.73M2 (ref 60–?)
EOSINOPHIL # BLD AUTO: 0.01 X10(3) UL (ref 0–0.7)
EOSINOPHIL NFR BLD AUTO: 0 %
ERYTHROCYTE [DISTWIDTH] IN BLOOD BY AUTOMATED COUNT: 16 %
GLOBULIN PLAS-MCNC: 3.2 G/DL (ref 2.8–4.4)
GLUCOSE BLD-MCNC: 134 MG/DL (ref 70–99)
HCT VFR BLD AUTO: 33.1 %
HGB BLD-MCNC: 10.6 G/DL
IGA SERPL-MCNC: 211 MG/DL (ref 70–312)
IGM SERPL-MCNC: 52.7 MG/DL (ref 43–279)
IMM GRANULOCYTES # BLD AUTO: 0.26 X10(3) UL (ref 0–1)
IMM GRANULOCYTES NFR BLD: 1.1 %
IMMUNOGLOBULIN PNL SER-MCNC: 783 MG/DL (ref 791–1643)
LYMPHOCYTES # BLD AUTO: 0.49 X10(3) UL (ref 1–4)
LYMPHOCYTES NFR BLD AUTO: 2.1 %
MCH RBC QN AUTO: 25.7 PG (ref 26–34)
MCHC RBC AUTO-ENTMCNC: 32 G/DL (ref 31–37)
MCV RBC AUTO: 80.3 FL
MONOCYTES # BLD AUTO: 1.25 X10(3) UL (ref 0.1–1)
MONOCYTES NFR BLD AUTO: 5.3 %
NEUTROPHILS # BLD AUTO: 21.5 X10 (3) UL (ref 1.5–7.7)
NEUTROPHILS # BLD AUTO: 21.5 X10(3) UL (ref 1.5–7.7)
NEUTROPHILS NFR BLD AUTO: 91.3 %
OSMOLALITY SERPL CALC.SUM OF ELEC: 298 MOSM/KG (ref 275–295)
PLATELET # BLD AUTO: 305 10(3)UL (ref 150–450)
POTASSIUM SERPL-SCNC: 3.8 MMOL/L (ref 3.5–5.1)
POTASSIUM SERPL-SCNC: 3.8 MMOL/L (ref 3.5–5.1)
PROT SERPL-MCNC: 5.7 G/DL (ref 6.4–8.2)
RBC # BLD AUTO: 4.12 X10(6)UL
SODIUM SERPL-SCNC: 140 MMOL/L (ref 136–145)
WBC # BLD AUTO: 23.6 X10(3) UL (ref 4–11)

## 2024-03-29 PROCEDURE — 71250 CT THORAX DX C-: CPT | Performed by: INTERNAL MEDICINE

## 2024-03-29 PROCEDURE — 99497 ADVNCD CARE PLAN 30 MIN: CPT | Performed by: STUDENT IN AN ORGANIZED HEALTH CARE EDUCATION/TRAINING PROGRAM

## 2024-03-29 PROCEDURE — 99233 SBSQ HOSP IP/OBS HIGH 50: CPT | Performed by: INTERNAL MEDICINE

## 2024-03-29 PROCEDURE — 99233 SBSQ HOSP IP/OBS HIGH 50: CPT | Performed by: HOSPITALIST

## 2024-03-29 PROCEDURE — 99223 1ST HOSP IP/OBS HIGH 75: CPT | Performed by: STUDENT IN AN ORGANIZED HEALTH CARE EDUCATION/TRAINING PROGRAM

## 2024-03-29 NOTE — CONSULTS
.[facility]  Report of Inpatient Wound Care Consultation    Alejandro Guardado Patient Status:  Inpatient    1935 MRN [id number]   Location [facility] 4SW-A Attending Scar Alexander MD   Hosp Day # 1 PCP Stanislav Odonnell MD     Reason for Consultation:  foot wound     History of Present Illness:  Alejandro Guardado is a a(n) 89 year old male. Assessment completed in pt room while he is lying in bed, fellow wound care staff present during assessment. Pt's family is room during this time. Pt is currently seeing outpatient wound clinic podiatrist at this time - dressing is currently order by podiatry using - cellerate (sample) powder to wound bed covered with adaptic, 4x4 gauze, ABD, kerlix, tape. Family states they have the extra cellerate powder that was provided to them from the clinic- RN to complete assessment and continue current wound care dressing plan, wound care to complete dressing at this time while patient is in house.  Patient with multiple comorbidities, with skin breakdown described below.       History:  Past Medical History:   Diagnosis Date    Atrial fibrillation ([facility])     CAD (coronary artery disease) 2015    CHF (congestive heart failure) ([facility])     Esophageal reflux     Hearing impairment     Heart attack ([facility])     History of MI (myocardial infarction)     HTN (hypertension)     Hyperlipidemia     PAD (peripheral artery disease) ([facility]) 2023    Visual impairment      Past Surgical History:   Procedure Laterality Date    ANGIOGRAM      ANGIOPLASTY (CORONARY)      CABG      CATARACT  2023    FRACTURE SURGERY Left     left hip pinning    OTHER SURGICAL HISTORY Left 1977    knee surgery    OTHER SURGICAL HISTORY  2016    Left hip ORIF pinning    TONSILLECTOMY        reports that he has never smoked. He has never used smokeless tobacco. He reports that he does not drink alcohol and does not use drugs.      Allergies:  @ALLERGY    Laboratory Data:    Recent Labs   Lab  03/28/24  1131 03/28/24  1555 03/29/24  0343   WBC 23.3*  --  23.6*   HGB 12.7*  --  10.6*   HCT 39.5  --  33.1*   .0  --  305.0   CREATSERUM 2.15*  --  1.13   BUN 38*  --  30*   *  --  134*   CA 9.5  --  8.6   ALB 3.0*  --  2.5*   TP 6.4  --  5.7*   PTT  --  31.8  --    INR  --  1.51*  --          ASSESSMENT:  Wound 03/28/24 Dorsal;Right (Active)   Date First Assessed/Time First Assessed: 03/28/24 1624   Wound Location Orientation: Dorsal;Right      Assessments 3/29/2024 10:57 AM   Wound Image      Drainage Amount Small   Drainage Description Serous;Yellow   Wound Length (cm) 0.9 cm   Wound Width (cm) 2.5 cm   Wound Surface Area (cm^2) 2.25 cm^2   Wound Depth (cm) 0.1 cm   Wound Volume (cm^3) 0.225 cm^3   Margins Well-defined edges   Non-staged Wound Description Full thickness   Peggy-wound Assessment Edema;Moist   Wound Granulation Tissue Pink;Pale Stanley;Firm   Wound Bed Granulation (%) 100 %   Wound Odor None      Right Foot:  Wound Cleaning and Dressings:  Wound cleansing:  Cleanse with normal saline or wound cleanser  Wound cleaning frequency:  every 2-3 days   Wound product: Other Cleanse wound with normal saline, apply small amount of cellerate powder to wound bed covered with adaptic, 4x4 gauze, kerlix, tape (powder-cellerate is in patients room- wound care/family to complete dressing)  Dressing change frequency:  Change dressing every 2-3 days     ALL WOUND CARE SUPPLIES CAN BE OBTAINED FROM [facility]     If patient is Diabetic : want to make sure blood sugars are within a controled range for wound healing     Protein intake: depending on providers recommendations and patients kidney functions - if kidneys are good then recommend patient to increase protein intake (Boost, Kwan, Ensure, Premiere Protein)     Recommendations:  -Follow up with outpatient wound clinic  -elevate legs above the level of the heart  -Continue wound dressing recommendations every 2-3 days  -Kwan supplement        Thank you for this consultation and for allowing me to participate in the care of your patient.  Please page me at #[phone] if you have any questions about this consultation and plan of care.     Time Spent 45 Minutes.    Thank you,  Andrew Celaya RN  Wound/Ostomy/Continence nurse    3/29/2024  11:49 AM

## 2024-03-29 NOTE — SPIRITUAL CARE NOTE
Spiritual Care Visit Note    Patient Name: Alejandro Guardado Date of Spiritual Care Visit: 24   : 1935 Primary Dx: Sepsis due to pneumonia (HCC)       Referred By:      Spiritual Care Taxonomy:         Methods: Offer support    Interventions: Assist someone with Advance Directives    Visit Type/Summary:     - PoA: Patient unable to complete PoAH at this time:  remains available for follow up.POA Documents were left with pt and family, They will fill them out and call when pt is ready for a witness of signature.    Spiritual Care support can be requested via an [facility] consult. For urgent/immediate needs, please contact the On Call  at: Edward: ext [phone]    DIANNA Schumacher  Chaplain Resident  Ext:[phone]

## 2024-03-29 NOTE — PLAN OF CARE
Received this morning at 0730H after AM reprot with NOC RN. Alert and oriented x 4, on O2 at 2L/NC, no SOB noted. Levophed at 2 mcg/min ongoing. Seen by intensivist during AM rounds, mary to wean off O2, no desaturation noted. CT scan of chest and pelvis ordered, CT scan done, able to wean off Levo at 0915 after CT scan procedure, no hypotension noted. Seen by wound and dressing change done, wound care also done. Seen by palliative before noontime and talked to patient and 2 family members, speech therapist also seen patient at noon time, patient passed swallow evaluation, patient able to eat lunch, no aspiration noted.

## 2024-03-29 NOTE — CM/SW NOTE
Orders received from Dr. Dickerson for [facility]. Referral sent via [facility] liaison, Alina notified of [facility] referral.    CM/SW will remain available for DC planning and/or support.     LORETO Benton, CMSRN    [id number]

## 2024-03-29 NOTE — PROGRESS NOTES
IVF discontinued before noon per Cardiology, good appetite noted during lunch, good amount of urine noted, uses Primofit to collect and monitor urine, urine noted is 800 since 0730H this morning.

## 2024-03-29 NOTE — PROGRESS NOTES
[facility] [place]   part of [facility]     Hospitalist Progress Note     Alejandro Guardado Patient Status:  Inpatient    1935 MRN [id number]   Location [facility] 4SW-A Attending Scar Alexander MD   Hosp Day # 1 PCP Stanislav Odonnell MD     Subjective:   Feeling better and grateful but exhausted coming back to the hospital     Objective:    Review of Systems:   A comprehensive review of systems was completed; pertinent positive and negatives stated in subjective.  Vital signs:  Temp:  [97.4 °F (36.3 °C)-99.8 °F (37.7 °C)] 97.4 °F (36.3 °C)  Pulse:  [] 92  Resp:  [10-33] 14  BP: ()/(34-85) 107/61  SpO2:  [90 %-100 %] 96 %  Physical Exam:    General: No acute distress chronically ill appearing  Respiratory: diminished b/l, no wheezes, no rhonchi  Cardiovascular: S1, S2, RRR  Abdomen: Soft, NT/ND, +BS  Extremities: no edema, R foot wound please see notes and pix in media     Diagnostic Data:    Labs:  Recent Labs   Lab 24  1131 24  1555 24  0343   WBC 23.3*  --  23.6*   HGB 12.7*  --  10.6*   MCV 81.1  --  80.3   .0  --  305.0   INR  --  1.51*  --      Recent Labs   Lab 24  1131 24  0343   * 134*   BUN 38* 30*   CREATSERUM 2.15* 1.13   CA 9.5 8.6   ALB 3.0* 2.5*    140   K 3.3* 3.8  3.8    111   CO2 26.0 25.0   ALKPHO 59 53   AST 15 13*   ALT 29 24   BILT 0.7 0.7   TP 6.4 5.7*     Estimated Creatinine Clearance: 41.4 mL/min (based on SCr of 1.13 mg/dL).  Recent Labs   Lab 24  1555   PTP 18.3*   INR 1.51*        Microbiology  No results found for this visit on 24.  Imaging: Reviewed in Epic.  Medications:    apixaban  5 mg Oral BID    atorvastatin  40 mg Oral Nightly    clopidogrel  75 mg Oral Daily    finasteride  5 mg Oral Daily    folic acid  1 mg Oral Daily    gabapentin  300 mg Oral TID    pantoprazole  40 mg Oral QAM AC    tamsulosin  0.4 mg Oral Daily    meropenem  1,000 mg Intravenous Q12H    hydrocortisone sodium  succinate  50 mg Intravenous 4 times per day       Assessment & Plan:    #Septic Shock 2/2 PNA  -IVF stopped with CHF EF 20%  -PRESSORS  -ICU and Cardio following  -cont abx and f/u Cx   -CT chest when more stable    #PVCs with h/o NSVT- holding dobutamine per Cardiology   #Acute hypoxic respiratory failure - wean O2 as tolerated   #ERON- IVF and pressors and much improved   #Lactic acidosis 2/2 above   #Leukocytosis- stable on steroids IV for now   #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 Steward   -plavix, statin  #GERD-PPI    #Foot wound- podiatry and wound care consult        Dr. Dickerson consult for Palliative care   -Recent hospitalizations:  This admission  -3/16-3/18- shock sepsis vs hypovolemic   -2/12-2/15 for septic shock secondary to multifocal pneumonia  -12/25-12/28 septic shock for gram-negative pneumonia  -12/1-12/6-hospitalization for pneumonia     D/w family, staff      Scar Alexander MD  Supplementary Documentation:   Quality:  DVT Mechanical Prophylaxis:   SCDs, Early ambuation  DVT Pharmacologic Prophylaxis   Medication    apixaban (Eliquis) tab 5 mg                Code Status: DNAR/Selective Treatment  Block: External urinary catheter in place  Block Duration (in days):   Central line:    DIEGO:   At this point Mr. Guardado is expected to be discharge to: tbd   **Certification      PHYSICIAN Certification of Need for Inpatient Hospitalization - Initial Certification    Patient will require inpatient services that will reasonably be expected to span two midnight's based on the clinical documentation in H+P.   Based on patients  current state of illness, I anticipate that, after discharge, patient will require TBD.    The 21st Century Cures Act makes medical notes like these available to patients in the interest of transparency. Please be advised this is a medical document. Medical documents are intended to carry relevant information, facts as evident, and the clinical opinion of the practitioner. The medical note is intended as peer to peer communication and may appear blunt or direct. It is written in medical language and may contain abbreviations or verbiage that are unfamiliar.

## 2024-03-29 NOTE — PAYOR COMM NOTE
--------------  ADMISSION REVIEW     Payor: [employer] MEDICARE ADV PPO  Subscriber #:  [id number]  Authorization Number: [id number]    Admit date: 3/28/24  Admit time:  2:45 PM       REVIEW DOCUMENTATION:     ED Provider Notes      Patient Seen in: [facility] [id number]      History     Chief Complaint   Patient presents with    Chest Pain Angina    Difficulty Breathing     Stated Complaint: ADOLFO, CP, hx chf    Subjective:   HPI    89-year-old male history recurrent recent admissions for pneumonia and heart failure over the last 3 months, uses night home oxygen, chronic heart failure with reduced EF ischemic cardiomyopathy EF 25-30%, chronic A-fib on Eliquis, CAD status post CABG presents to ED with complaint of shortness of breath 88% on room air and hypotensive with systolic BP 70s at arrival, weakness and dry cough for the last few days.  Family present at bedside and states this is exactly how he presented last time 3/16 - 3/18 when he had septic shock due to pneumonia.  Patient also states that he had mild chest pain when they entered the parking lot here.  Patient unable to contribute much history family at bedside gives most of the history.  Denies fevers.    Objective:   Past Medical History:   Diagnosis Date    Atrial fibrillation (HCC)     CAD (coronary artery disease) 09/28/2015    CHF (congestive heart failure) (Formerly [facility]) 2022    Esophageal reflux     Hearing impairment     Heart attack ([facility])     History of MI (myocardial infarction)     HTN (hypertension)     Hyperlipidemia     PAD (peripheral artery disease) ([facility]ly [facility]) 04/26/2023    Visual impairment      Review of Systems    Positive for stated complaint: ADOLFO, CP, hx chf  Other systems are as noted in HPI.  Constitutional and vital signs reviewed.      All other systems reviewed and negative except as noted above.    Physical Exam     ED Triage Vitals   BP 03/28/24 1102 93/63   Pulse 03/28/24 1059 (!) 125   Resp 03/28/24 1059 25   Temp 03/28/24 1059 97.9 °F (36.6  °C)   Temp src 03/28/24 1059 Temporal   SpO2 03/28/24 1100 90 %   O2 Device 03/28/24 1100 Nasal cannula       Current:BP 97/49   Pulse 110   Temp 97.9 °F (36.6 °C) (Temporal)   Resp 19   Ht 170.2 cm (5' 7\")   Wt 98.5 kg   SpO2 94%   BMI 34.01 kg/m²         Physical Exam    Vital signs reviewed.  Nursing note reviewed.  Constitutional: Alert, well-appearing  Head: Normocephalic, atraumatic  Mouth: Moist  Eyes: Extraocular muscles intact, pupils equal  Cardiovascular: Tachycardic rate and irregularly irregular rhythm  Pulmonary: Mildly tachypneic, decreased breath sounds at bases, 92% on 6 L  Abdomen: Soft, nontender nondistended  Skin: Warm and dry  Musculoskeletal range of motion grossly normal all extremities  Neuro: Alert, at baseline, no focal neuro deficit.  Moves all extremities against gravity  Psych: Mood normal          ED Course     Labs Reviewed   COMP METABOLIC PANEL (14) - Abnormal; Notable for the following components:       Result Value    Glucose 121 (*)     Potassium 3.3 (*)     BUN 38 (*)     Creatinine 2.15 (*)     eGFR-Cr 29 (*)     Albumin 3.0 (*)     A/G Ratio 0.9 (*)     All other components within normal limits   PRO BETA NATRIURETIC PEPTIDE - Abnormal; Notable for the following components:    Pro-Beta Natriuretic Peptide 5,260 (*)     All other components within normal limits   PROCALCITONIN - Abnormal; Notable for the following components:    Procalcitonin 34.79 (*)     All other components within normal limits    Narrative:     Resulted by: batch: TNIH, K, PBNP, CO2, CL, NA, ALB, TBIL, ALT, AST, ALP, CA, CREA, BUN, GLUC,    LACTIC ACID, PLASMA - Abnormal; Notable for the following components:    Lactic Acid 4.8 (*)     All other components within normal limits   CBC W/ DIFFERENTIAL - Abnormal; Notable for the following components:    WBC 23.3 (*)     HGB 12.7 (*)     Neutrophil Absolute Prelim 20.51 (*)     Neutrophil Absolute 20.51 (*)     Lymphocyte Absolute 0.98 (*)     Monocyte  Absolute 1.48 (*)     All other components within normal limits   TROPONIN I HIGH SENSITIVITY - Normal   SARS-COV-2/FLU A AND B/RSV BY PCR (GENEXPERT) - Normal    Narrative:     This test is intended for the qualitative detection and differentiation of SARS-CoV-2, influenza A, influenza B, and respiratory syncytial virus (RSV) viral RNA in nasopharyngeal or nares swabs from individuals suspected of respiratory viral infection consistent with COVID-19 by their healthcare provider. Signs and symptoms of respiratory viral infection due to SARS-CoV-2, influenza, and RSV can be similar.    Test performed using the Xpert Xpress SARS-CoV-2/FLU/RSV (real time RT-PCR)  assay on the Gel[employer]pert instrument, [employer], [place] [address].   This test is being used under the Food and Drug Administration's Emergency Use Authorization.    The authorized Fact Sheet for Healthcare Providers for this assay is available upon request from the laboratory.   CBC WITH DIFFERENTIAL WITH PLATELET    Narrative:     The following orders were created for panel order CBC With Differential With Platelet.  Procedure                               Abnormality         Status                     ---------                               -----------         ------                     CBC W/ DIFFERENTIAL[[id number]]          Abnormal            Final result                 Please view results for these tests on the individual orders.   URINALYSIS WITH CULTURE REFLEX   LACTIC ACID REFLEX POST POSTIVE   PROTHROMBIN TIME (PT)   PTT, ACTIVATED   RAINBOW DRAW LAVENDER   RAINBOW DRAW LIGHT GREEN   RAINBOW DRAW BLUE   BLOOD CULTURE   BLOOD CULTURE   SPUTUM CULTURE   ED/MRSA SCREEN BY PCR-CC     EKG    Rate, intervals and axes as noted on EKG Report.  Rate: 123  Rhythm: Atrial Fibrillation RVR  Reading: PVCs present, no STEMI       XR CHEST AP PORTABLE  (CPT=71045)    Result Date: 3/28/2024  PROCEDURE:  XR CHEST AP PORTABLE  (CPT=71045)  TECHNIQUE:  AP chest  radiograph was obtained.  COMPARISON:  [facility] , XR, XR CHEST AP PORTABLE  (CPT=71045), 3/16/2024, 1:32 PM.  INDICATIONS:  ADOLFO, CP, hx chf  PATIENT STATED HISTORY: (As transcribed by Technologist)  family states he has had a cough that started this morning.    FINDINGS:  Lung volumes are satisfactory.  There is new opacity corresponding to the right upper lobe.  CP angles remain sharp.  Heart and pulmonary vessels appear stable, normal caliber allowing for portable technique.  Mediastinal contours are smooth.  Sternotomy wires are noted.             CONCLUSION:  Right upper lobe infiltrate/lobar pneumonia.  Continued clinical correlation and follow-up to resolution recommended.   LOCATION:  [facility]      Dictated by (CST): Enrique Brown MD on 3/28/2024 at 11:38 AM     Finalized by (CST): Enrique Brown MD on 3/28/2024 at 11:39 AM       XR CHEST AP PORTABLE  (CPT=71045)    Result Date: 3/16/2024  PROCEDURE:  XR CHEST AP PORTABLE  (CPT=71045)  TECHNIQUE:  AP chest radiograph was obtained.  COMPARISON:  [facility] , XR, XR CHEST PA + LAT CHEST (CPT=71046), 2/26/2024, 2:59 PM.  INDICATIONS:  chf exacerbation  PATIENT STATED HISTORY: (As transcribed by Technologist)  Family states he has CHF exacerbation and trouble breathing.    FINDINGS:  There are some subtle patchy airspace opacities within the lungs suspicious for areas of pneumonia.  Heart size is within normal limits.  No pleural effusion is seen.  Mediastinal and hilar contours are normal.  Increased vascular markings may  also represent moderate vascular congestion and volume overload.            CONCLUSION:  Some patchy airspace opacities are present within the lungs suspicious for areas of pneumonia.  Viral pneumonia may be possible.  There may also be moderate vascular congestion and volume overload.  Follow-up is recommended to ensure resolution.  If clinical symptoms persist then consider CT.   LOCATION:  [facility]      Dictated by (CST): Gregg Fields MD on 3/16/2024 at  2:17 PM     Finalized by (CST): Gregg Fields MD on 3/16/2024 at 2:17 PM              [facility]      Medical Decision Making:    Differential diagnosis before testing includes shock due to pneumonia, cardiogenic etiology, potential life threatening diagnosis which is a medical condition that poses a threat to life/function.     Comorbidities that add complexity to management: See HPI    I reviewed prior external ED notes including recent admission 3/16 - 3/18/2024 for sepsis due to pneumonia    Discussions of management with: Discussed with TAVARES cardiology, jose maria pulmonology critical care, hospitalist      I personally reviewed the radiographs and my independent interpretation includes new right upper lobe opacity    Shared decision making:  Admission disposition: 3/28/2024 12:31 PM         Afebrile, tachycardic and hypotensive with systolic BP 70s, leukocytosis of 23, lactic acid of 4.3 consistent with septic shock due to pneumonia.  New finding of right upper lobe opacity on chest x-ray today.  Discussed with pulmonology critical care, cardiology, hospitalist.  Agree with IV Zosyn.  Patient had IV azithromycin during last admission.  Patient on Levophed drip, admitted to ICU.    Troponin negative, EKG nonischemic.  Patient states compliance with Eliquis.  Labs remarkable for creatinine 2.15, previously 0.99 consistent with acute kidney injury.     Patient did not receive 30ml/kg of fluids despite having: elevated Lactate. The reason for doing so is: a concern for fluid overload. 500mL of fluids were given instead (500 is the amount of fluids given).         Critical Care Statement:   Upon my evaluation, this patient had a high probability of imminent or life-threatening deterioration which required my direct attention, intervention and personal management. Critical care time is exclusive of time spent on separately billable procedures. Time includes review of laboratory data, radiology results, discussion with consultants,  and close monitoring for potential decompensation. It involved high complexity of medical decision making to assess, manipulate and support vital system functions to treat single or multiple organ system failure and/or to prevent further life threatening deterioration of the patient's condition. Interventions were performed as documented above.  This involved direct patient intervention, complex decision making and/or extensive discussions with patient, family and clinical staff.    TOTAL CRITICAL CARE TIME ELAPSED: 35   BODY SYSTEM AT RISK/SPECIFIC INDICATION FOR CRITICAL CARE: septic shock                   [facility]   part of [facility]      Sepsis Reassessment Note    BP 97/49   Pulse 110   Temp 97.9 °F (36.6 °C) (Temporal)   Resp 19   Ht 170.2 cm (5' 7\")   Wt 98.5 kg   SpO2 94%   BMI 34.01 kg/m²      I completed the sepsis reassessment at 1515    Cardiac:  Regularity: Regular  Rate: Normal  Heart Sounds: S1,S2    Lungs:   Right: Clear  Left: Clear    Peripheral Pulses:  Radial: Right 1+ or Left 1+      Capillary Refill:  <3 Secs    Skin:  Temp/Moisture: Warm and Dry  Color: Normal      Jenny Ruffin DO  3/28/2024  3:56 PM            MDM    Disposition and Plan     Clinical Impression:  1. Sepsis due to pneumonia (HCC)    2. ERON (acute kidney injury) ([facility])    3. Lactic acidosis    4. Leukocytosis, unspecified type         Disposition:  Admit  3/28/2024 12:31 pm    Hospital Problems       Present on Admission  Date Reviewed: 3/11/2024            ICD-10-CM Noted POA    * (Principal) Sepsis due to pneumonia (HCC) J18.9, A41.9 12/1/2023 Unknown    ERON (acute kidney injury) (HCC) N17.9 3/28/2024 Unknown    Lactic acidosis E87.20 3/28/2024 Unknown    Leukocytosis, unspecified type D72.829 3/28/2024 Unknown    Palliative care encounter Z51.5 3/28/2024 Unknown    Shock (HCC) R57.9 12/25/2023 Yes           Signed by Jenny Ruffin DO on 3/28/2024  4:09 PM         H&P signed by Scar Alexander MD  at 3/28/2024  3:37 PM    Author: Scar Alexander MD Service: -- Author Type: Physician   Filed: 3/28/2024  3:37 PM Date of Service: 3/28/2024  1:45 PM Status: Signed   : Scar Alexander MD (Physician)      [facility]  [facility] and Physical     Chief Complaint: SOB, weak      Subjective:    History of Present Illness:   Alejandro Guardado is a 89 year old male with multiple recent admissions for pneumonia and septic shock.  Patient presents with similar symptoms with coughing.  He is not passing swallow evaluations and no choking with food at home.  According to family he gets sick very quickly.  He does have chronic foot wound which she has been seen at wound clinic and follows with a podiatrist as well.  Seems to have opened up but no active fevers or purulent discharge.  No nausea vomiting or diarrhea or abdominal pain.  No skin changes or rashes otherwise.  No dysuria hematuria.        Assessment & Plan:       #Septic Shock 2/2 PNA  #Acute hypoxic respiratory failure   #ERON  #Lactic acidosis  #Leukocytosis  #Secondary adrenal insufficiency- -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   #Chronic HFrEF, ischemic cardiomyopathy with LVEF 25-30%   #BPH  #Afib RVR- physiologic as expect tachycardia with shock  #CAD s/p CABG   #DL-statin   #PAD with hx of non-healing R foot wound s/p thrombectomy and revascularization/stent 12/4/23 by Dr. Steward   -plavix, statin  #GERD-PPI    #Foot wound- podiatry and wound care consult      Admit to ICU  -Levophed  -IVF but caution with CHF, using Levophed for now   -IV Abx and f/u Cx  -Pulm consulted  -trend lactate   -stress dose steroids   -CT chest when more stable  -Cardiology consulted   -Podiatry and wound care consulted      -Dr. Dickerson consult for Palliative care   #Recent hospitalizations  This admission  -3/16-3/18- shock sepsis vs hypovolemic   -2/12-2/15 for septic shock secondary to multifocal pneumonia  -12/25-12/28 septic  shock for gram-negative pneumonia  -12/1-12/6-hospitalization for pneumonia     CCT 50 minutes  D/w family  Advance care planning 15 minutes -DNR [facility]- d/w patient and family     Scar Alexander MD    CONSULT  Assessment      Shock suspect combo septic and cardiogenic  ? Recurrent PNA: lactic 4.8, Procal 34, WBC 23, BC pending  Acute on Chronic HFrEF with Moderate-Severe Aortic Stenosis: EF 20%, declines cath or TAVR, decomdpenated/wet  Persistant Afib: recently started on Amio with plans for future cardioversion, eliquis for stroke prophylaxis  ERON: I'm sure cardiorenal component with shock   CAD h/o cabg x4 2015  PAD: chronic wound, s/p thrombectomy R pop stent and PTA SFA,tp trunk by Dr. Steward 12/2023, R LE cool but is bleeding and has cap refill.  No dopple DP or PT pulse, but currently on vasopressors  Secondary adrenal insufficiency: r/t long term prednisone use with non-specific poly-arthralgia     Plan:   -has gotten 2.5L of IVF, and I would stop here for now to avoid further respiratory failure with EF 20 and sev Aortic Stenosis (suspect low-flow low-gradient)  - no diuretics for now   -considered Dobutamine, but has frequent PVCs and prior notation of NSVT  -hold eliquis, will discuss starting heparin gtt to cover for both stroke prohpylaxis as well as PAD, R Foot is cool likely from known disease with need for vasopressors  -plans for palliative care discussions tomorrow, which patient and family are agreeable to.   Would even consider Hospice at this point. Condition guarded at best.         Mary Jain, APRN  3/28/2024  4:05 PM     Patient seen and examined independently.  Note reviewed and labs reviewed.  Agree with above assessment and plan.  Patient well known to our service from prior admissions.  Has hx of low-flow, low-gradient severe aortic stenosis - not a candidate for aggressive interventions.  Comes in with recurrent fatigue and SOB.  Some dietary indiscretions at his age not  unexpected.     On exam he appears euvolemic - I would avoid aggressive IV diuresis in patient with low LVEF and fixed cardiac output due to severe aortic stenosis.  No objection to gentle hydration but has already received 2.5 L fluid.  Will hold on use of ionotropic support at present.  Will re-evaluate in AM tomorrow.     FABIENNE Wick MD    3/29  Critical Care Progress Note      Assessment / Plan:  Septic shock - secondary to recurrent pneumonia  - continue IVF  - off levo this morning  - lactate elevated  - stress dose steroids (on chronic steroids)  - further as below  ID: Recurrent pneumonia - now 5th event since 12/2023, last on zosyn then augmentin  - meropenem (3/28/202)  - follow up blood and sputum cultures  - SLP eval  - CT chest to eval for chronic lung disease  CV: h/o afib  - Rate control and anticoagulated  - Cardiology following  Acute kidney injury - likely prerenal from sepsis, improving  - continue IVF  - monitor UOP and renal function  Proph  Anticoagulated.   Dispo  - [facility].   - ICU, will follow  - ok for floor this afternoon if remains off pressors     Discussed with patient and RN     Duke Gonzalez MD  Pulmonary & Critical Care Medicine  [facility]        Subjective:  No acute events overnight  Breathing is doing well this morning  No chest pain     Objective:         Vitals:     03/29/24 0600 03/29/24 0615 03/29/24 0630 03/29/24 0645   BP: 114/62 113/72 106/59 115/68   BP Location:           Pulse: 83 88 88 83   Resp: 12 14 14 15   Temp:           TempSrc:           SpO2: 100% 100% 98% 100%   Weight:           Height:              Physical Exam:  General: laying in bed  Respiratory: clear bilaterally, normal effort  Cardiovascular: regular rate and rhythm, no m/r/g  Abdomen: soft, NTND  Extremities: 2+ lower extremity edema  Mental status: interactive, answering questions appropriately     Medications:  Reviewed in EMR     Lab Data:  Reviewed in EMR     Imaging:  I  independently visualized all relevant chest imaging in PACS and agree with radiology interpretation except where noted.            MEDICATIONS ADMINISTERED IN LAST 1 DAY:  apixaban (Eliquis) tab 5 mg       Date Action Dose Route User    3/29/2024 0954 Given 5 mg Oral Rayshawn Whitehead RN          clopidogrel (Plavix) tab 75 mg       Date Action Dose Route User    3/29/2024 0954 Given 75 mg Oral Rayshawn Whitehead RN          finasteride (Proscar) tab 5 mg       Date Action Dose Route User    3/29/2024 0954 Given 5 mg Oral Rayshawn Whitehead RN          folic acid (Folvite) tab 1 mg       Date Action Dose Route User    3/29/2024 0953 Given 1 mg Oral Rayshawn Whitehead RN          gabapentin (Neurontin) cap 300 mg       Date Action Dose Route User    3/29/2024 0954 Given 300 mg Oral Rayshawn Whitehead RN          hydrocortisone Na succinate PF (Solu-CORTEF) injection 50 mg       Date Action Dose Route User    3/29/2024 1230 Given 50 mg Intravenous Rayshawn Whitehead RN    3/29/2024 0533 Given 50 mg Intravenous Arnaud Martinez RN    3/29/2024 0016 Given 50 mg Intravenous Arnaud Martinez RN    3/28/2024 1651 Given 50 mg Intravenous Jaron Renae RN          meropenem (Merrem) 1,000 mg in sodium chloride 0.9% 100 mL IVPB-MBP       Date Action Dose Route User    3/29/2024 0409 New Bag 1,000 mg Intravenous Arnaud Martinez RN    3/28/2024 1649 New Bag 1,000 mg Intravenous Jaron Renae RN          norepinephrine (Levophed) 4 mg/250mL infusion premix       Date Action Dose Route User    3/29/2024 0730 Rate/Dose Change 1 mcg/min Intravenous Rayshawn Whitehead RN    3/29/2024 0549 New Bag 2 mcg/min Intravenous Arnaud Martinez RN    3/29/2024 0546 Restarted 2 mcg/min Intravenous Arnaud Martinez RN    3/29/2024 0401 Restarted 2 mcg/min Intravenous Arnaud Martinez RN    3/29/2024 0300 Rate/Dose Change 2 mcg/min Intravenous Arnaud Martinez RN    3/29/2024 0236 Rate/Dose Change 3 mcg/min Intravenous Arnaud Martinez, RN    3/29/2024  0117 Rate/Dose Change 4 mcg/min Intravenous Arnaud Martinez RN    3/28/2024 2146 Rate/Dose Change 5 mcg/min Intravenous Arnaud Martinez RN    3/28/2024 2102 Rate/Dose Change 6 mcg/min Intravenous Arnaud Martinez RN    3/28/2024 1920 Rate/Dose Change 7 mcg/min Intravenous Arnaud Martinez RN    3/28/2024 1753 Rate/Dose Change 6 mcg/min Intravenous Jaron Renae RN    3/28/2024 1717 New Bag 8 mcg/min Intravenous Jaron Renae, ANNALISE          potassium chloride 40 mEq in 250mL sodium chloride 0.9% IVPB premix       Date Action Dose Route User    3/28/2024 1954 New Bag 40 mEq Intravenous Arnaud Martinez RN          sodium chloride 0.9% infusion       Date Action Dose Route User    3/29/2024 0255 New Bag (none) Intravenous Arnaud Martinez RN    3/28/2024 1829 Restarted (none) Intravenous Jaron Renae RN          sodium chloride 0.9 % IV bolus 2,859 mL       Date Action Dose Route User    3/28/2024 1545 New Bag 1,000 mL Intravenous Jaron Renae RN          tamsulosin (Flomax) cap 0.4 mg       Date Action Dose Route User    3/29/2024 0954 Given 0.4 mg Oral Rayshawn Whitehead, RN            Vitals (last day)       Date/Time Temp Pulse Resp BP SpO2 Weight O2 Device O2 Flow Rate (L/min) Who    03/29/24 1500 -- 117 18 -- 94 % -- -- -- MG    03/29/24 1400 -- 99 19 117/67 94 % -- -- -- MG    03/29/24 1300 -- 99 21 113/76 94 % -- -- -- MG    03/29/24 1200 98 °F (36.7 °C) 105 17 140/81 100 % -- None (Room air) -- MG    03/29/24 1130 -- 99 15 132/76 98 % -- -- -- MG    03/29/24 1100 -- 97 21 120/78 96 % -- -- -- MG    03/29/24 1030 -- 95 18 118/84 95 % -- -- -- MG    03/29/24 1000 -- 93 14 123/81 100 % -- -- -- MG    03/29/24 0930 -- 94 17 102/68 95 % -- -- -- MG    03/29/24 0900 -- 109 15 114/72 83 % -- -- -- MG    03/29/24 0830 -- 90 17 107/67 100 % -- -- -- MG    03/29/24 0800 -- 92 14 107/61 96 % -- -- -- MG    03/29/24 0730 97.4 °F (36.3 °C) 93 15 109/65 94 % -- None (Room air) -- MG    03/29/24 0700 -- 88 16 117/61 100 % -- -- -- MG     03/29/24 0645 -- 83 15 115/68 100 % -- -- -- JL    03/29/24 0630 -- 88 14 106/59 98 % -- -- -- JL    03/29/24 0615 -- 88 14 113/72 100 % -- -- -- JL    03/29/24 0600 -- 83 12 114/62 100 % -- -- -- JL    03/29/24 0545 -- 85 15 89/46 97 % -- -- -- JL    03/29/24 0530 -- 86 13 113/70 99 % 221 lb 1.9 oz -- -- JL    03/29/24 0515 -- 88 14 112/72 99 % -- -- -- JL    03/29/24 0500 -- 90 15 117/63 100 % -- -- -- JL    03/29/24 0445 -- 93 10 106/60 100 % -- -- -- JL    03/29/24 0430 -- 82 14 110/62 100 % -- -- -- JL    03/29/24 0425 -- 83 14 -- 100 % -- -- -- JL    03/29/24 0415 -- 90 13 107/68 100 % -- -- -- JL    03/29/24 0400 97.6 °F (36.4 °C) 91 14 88/55 96 % -- Nasal cannula 2 L/min JL    03/29/24 0345 -- 84 18 113/67 99 % -- -- -- JL    03/29/24 0330 -- 89 15 111/61 98 % -- -- -- JL    03/29/24 0315 -- 94 17 104/64 99 % -- -- -- JL    03/29/24 0300 -- 89 15 122/78 100 % -- -- -- JL    03/29/24 0245 -- 92 14 110/63 100 % -- -- -- JL    03/29/24 0230 -- 90 15 118/58 99 % -- -- -- JL    03/29/24 0215 -- 91 16 113/56 98 % -- -- -- JL    03/29/24 0200 -- 78 18 114/60 100 % -- -- -- JL    03/29/24 0145 -- 86 16 106/58 99 % -- -- -- JL    03/29/24 0130 -- 85 15 107/63 100 % -- -- -- JL    03/29/24 0115 -- 83 14 113/67 100 % -- -- -- JL    03/29/24 0100 -- 89 17 101/65 100 % -- -- -- JL    03/29/24 0045 -- 90 14 103/68 100 % -- -- -- JL    03/29/24 0030 -- 89 13 112/63 100 % -- -- -- JL    03/29/24 0015 -- 94 16 112/71 100 % -- -- --     03/29/24 0000 98.5 °F (36.9 °C) 84 17 107/65 100 % -- Nasal cannula 2 L/min     03/28/24 2345 -- 87 16 104/52 99 % -- -- -- JL    03/28/24 2330 -- 87 17 108/58 99 % -- -- -- JL    03/28/24 2315 -- 84 17 106/46 97 % -- -- -- JL    03/28/24 2300 -- 84 16 108/53 97 % -- -- -- JL    03/28/24 2245 -- 87 17 105/71 99 % -- -- -- JL    03/28/24 2230 -- 92 15 103/54 98 % -- -- -- JL    03/28/24 2215 -- 94 12 105/58 100 % -- -- -- JL    03/28/24 2200 -- 87 17 108/54 98 % -- -- -- JL    03/28/24 2145  -- 90 20 113/55 98 % -- -- -- JL    03/28/24 2130 -- 95 17 119/64 98 % -- -- -- JL    03/28/24 2115 -- 90 16 98/59 98 % -- -- -- JL    03/28/24 2110 -- 95 17 98/59 98 % -- -- -- JL    03/28/24 2100 -- 93 21 108/61 98 % -- -- -- JL    03/28/24 2045 -- 92 20 109/66 97 % -- -- -- JL    03/28/24 2030 -- 96 22 115/85 100 % -- -- --     03/28/24 2015 -- 93 15 107/60 100 % -- -- --     03/28/24 2000 98.3 °F (36.8 °C) 91 18 105/57 100 % -- Nasal cannula 2 L/min     03/28/24 1945 -- 94 18 112/56 100 % -- -- --     03/28/24 1930 -- 97 17 117/71 99 % -- -- -- JL    03/28/24 1915 -- 97 19 90/51 100 % -- -- -- JL    03/28/24 1900 -- 102 23 110/53 98 % -- -- -- SK    03/28/24 1845 -- 101 24 99/56 97 % -- -- -- SK    03/28/24 1830 -- 99 20 101/47 97 % -- -- -- SK    03/28/24 1815 -- 104 20 104/50 98 % -- -- -- SK    03/28/24 1800 -- 104 19 101/59 96 % -- -- -- SK    03/28/24 1745 -- 103 24 112/76 97 % -- -- -- SK    03/28/24 1730 -- 103 25 95/34 95 % -- -- -- SK    03/28/24 1726 -- 109 21 95/34 96 % -- -- -- SK    03/28/24 1715 -- 97 19 95/51 96 % -- -- -- SK    03/28/24 1700 -- 102 19 102/45 96 % -- -- -- SK    03/28/24 1645 -- 97 18 105/55 98 % -- -- -- SK    03/28/24 1630 -- 111 19 109/52 98 % -- -- -- SK    03/28/24 1628 -- 103 24 95/50 96 % -- -- -- SK    03/28/24 1600 -- 103 18 81/48 95 % -- -- -- SK    03/28/24 1547 99.8 °F (37.7 °C) 109 16 100/45 96 % 217 lb 2.5 oz Nasal cannula 3 L/min SK    03/28/24 1545 -- 105 17 100/45 95 % -- -- -- SK    03/28/24 1515 -- 110 19 97/49 94 % -- -- -- SK    03/28/24 1500 -- 111 23 101/52 96 % -- -- -- SK    03/28/24 1415 -- 61 22 84/49 97 % -- Nasal cannula 6 L/min     03/28/24 1400 -- 104 19 84/53 98 % -- Nasal cannula 6 L/min     03/28/24 1345 -- 56 21 91/50 95 % -- Nasal cannula 6 L/min     03/28/24 1330 -- 102 21 84/54 97 % -- -- --     03/28/24 1323 -- 108 23 81/54 99 % -- None (Room air) 6 L/min     03/28/24 1300 -- 63 17 89/53 100 % -- Nasal cannula 6 L/min      03/28/24 1200 -- 103 23 68/52 93 % -- Nasal cannula --     03/28/24 1130 -- 79 23 69/48 94 % -- Nasal cannula 6 L/min     03/28/24 1115 -- 62 28 78/51 90 % -- Nasal cannula 6 L/min     03/28/24 1104 -- -- -- -- -- -- Nasal cannula 2 L/min     03/28/24 1102 -- -- -- 93/63 -- -- Nasal cannula 2 L/min     03/28/24 1100 -- 78 33 -- 90 % -- Nasal cannula 2 L/min     03/28/24 1059 97.9 °F (36.6 °C) 125 25 -- -- 210 lb -- -- KM

## 2024-03-29 NOTE — CONSULTS
[facility]   part of [facility]  Palliative Care Initial Consult Note    Alejandro Guardado Patient Status:  Inpatient    1935 MRN [id number]   Location [facility] 4SW-A Attending Scar Alexnader MD   Hosp Day # 1 PCP Stanislav Odonnell MD     Date of Consult: 3/29/2024  Patient seen at: [facility] In[facility]    Reason for Consultation: Consult ordered by:: Dr. Alexander for evaluation of Palliative Care needs and Uncontrolled symptoms;Goals of care discussion.    Subjective     History of Present Illness: Alejandro Guardado is a 89 year old male with HFrEF (20-25%), mod-severe aortic stenosis, PAD with chronic wounds, CAD, HTN, Afib,  hearing/visual impairments who was admitted on 3/28/2024 for SOB and cough. Found to by hypoxic and hypotensive with CXR c/f recurrent PNA. Work up in our hospital revealed ERON, septic shock, PNA, and lactic acidosis.  History was obtained from [facility] and patient, son, and DIL. In brief, pt lives alone but has a son within minutes. This is his 5th hospitalization for PNA in the past 4 months.     Today is day #1 of hospitalization.     When I entered the room, the patient was awake, alert, and sitting up in bed. Son and DIL  present at bedside.   Pt is very [place]. His son was able to communicate efficiently with him.   He states that he feels okay today and despite an intermittent cough has no SOB at rest.     See summary of discussion below.      Review of Systems:   Symptoms(s): Cough  Bowel Movement    No data found in the last 1 encounters.       Wt Readings from Last 6 Encounters:   24 221 lb 1.9 oz (100.3 kg)   24 211 lb 6.7 oz (95.9 kg)   02/15/24 205 lb 0.4 oz (93 kg)   24 216 lb (98 kg)   23 216 lb 0.8 oz (98 kg)   23 207 lb 10.8 oz (94.2 kg)        Palliative Care Social History:   Marital Status: ; wife  about 5 years ago   Children: Yes; son local and daughter lives about an hour away.   Living Situation Prior to Admit: Lives  home alone   Is Patient Confused: No  Occupational History: Retired  Hobbies: house and yard work with his son    Substance History:   reports that he has never smoked. He has never used smokeless tobacco.  reports no history of alcohol use.  reports no history of drug use.    Spiritual Assessment:   Mario Hawkins Not Listed    Past Medical History/Past Surgical History:     Medical History: obtained from [facility]  Past Medical History:   Diagnosis Date    Atrial fibrillation ([facility])     CAD (coronary artery disease) 09/28/2015    CHF (congestive heart failure) ([facility]) 2022    Esophageal reflux     Hearing impairment     Heart attack ([facility])     History of MI (myocardial infarction)     HTN (hypertension)     Hyperlipidemia     PAD (peripheral artery disease) ([facility]) 04/26/2023    Visual impairment      Past Surgical History:   Procedure Laterality Date    ANGIOGRAM      ANGIOPLASTY (CORONARY)      CABG      CATARACT  04/2023    FRACTURE SURGERY Left     left hip pinning    OTHER SURGICAL HISTORY Left 01/01/1977    knee surgery    OTHER SURGICAL HISTORY  03/25/2016    Left hip ORIF pinning    TONSILLECTOMY         Family History: obtained from [facility]  Family History   Problem Relation Age of Onset    Cancer Father        Allergies:  Allergies   Allergen Reactions    Heparin ANAPHYLAXIS    Hydromorphone HALLUCINATION       Medications:     Current Facility-Administered Medications:     apixaban (Eliquis) tab 5 mg, 5 mg, Oral, BID    norepinephrine (Levophed) 4 mg/250mL infusion premix, 0.5-30 mcg/min, Intravenous, Continuous    atorvastatin (Lipitor) tab 40 mg, 40 mg, Oral, Nightly    clopidogrel (Plavix) tab 75 mg, 75 mg, Oral, Daily    finasteride (Proscar) tab 5 mg, 5 mg, Oral, Daily    folic acid (Folvite) tab 1 mg, 1 mg, Oral, Daily    gabapentin (Neurontin) cap 300 mg, 300 mg, Oral, TID    pantoprazole (Protonix) DR tab 40 mg, 40 mg, Oral, QAM AC    tamsulosin (Flomax) cap 0.4 mg, 0.4 mg, Oral, Daily    sodium chloride  0.9% infusion, , Intravenous, Continuous    acetaminophen (Tylenol Extra Strength) tab 1,000 mg, 1,000 mg, Oral, Q6H PRN    melatonin tab 3 mg, 3 mg, Oral, Nightly PRN    ondansetron (Zofran) 4 MG/2ML injection 4 mg, 4 mg, Intravenous, Q6H PRN    metoclopramide (Reglan) 5 mg/mL injection 5 mg, 5 mg, Intravenous, Q8H PRN    polyethylene glycol (PEG 3350) (Miralax) 17 g oral packet 17 g, 17 g, Oral, Daily PRN    sennosides (Senokot) tab 17.2 mg, 17.2 mg, Oral, Nightly PRN    bisacodyl (Dulcolax) 10 MG rectal suppository 10 mg, 10 mg, Rectal, Daily PRN    meropenem (Merrem) 1,000 mg in sodium chloride 0.9% 100 mL IVPB-MBP, 1,000 mg, Intravenous, Q12H    hydrocortisone Na succinate PF (Solu-CORTEF) injection 50 mg, 50 mg, Intravenous, 4 times per day    Functional Status History:  ADLs: bathing or showering, dressing, getting in and out of bed or a chair, walking, using the toilet, and eating  - Independent   IADLs: use the phone, shop for groceries, meal preparation, manage medicines, clean living area, use transportation by self, manage money  - LOW  1 - 3 performance deficits   DME: Walker intermittently   Recurrent falls: No    Palliative Performance Scale:   Prior to admission Palliative performance scale PPSv2 (%): 80 (pt/family reported)   Current Palliative performance scale PPSv2 (%): 75   % Ambulation Activity Level Self-Care Intake Consciousness   100 Full  Normal  No Disease Full Normal Full   90 Full  Normal  Some Disease Full Normal Full   80 Full  Normal w/effort  Some Disease Full Normal or reduced Full   70 Reduced  Can't Perform Job  Some Disease Full Normal or reduced Full   60 Reduced  Can't Perform Hobby   Significant Disease Occ Assist Normal or reduced Full or confused   50 Mainly sit/lie Can't do any work  Extensive Disease Partial Assist Normal or reduced Full or confused   40 Mainly in bed Can't do any work  Extensive Disease Mainly Assist Normal or reduced Full or confused   30 Bed Bound Can't  do any work  Extensive Disease Max Assist  Total Care Reduced  Drowsy/confused   20 Bed Bound Can't do any work  Extensive Disease Max Assist  Total Care Minimal  Drowsy/confused   10 Bed Bound Can't do any work  Extensive Disease Max Assist  Total Care Mouth Care  Drowsy/confused   0 Death        Objective      Vital Signs:  Blood pressure 132/76, pulse 99, temperature 97.4 °F (36.3 °C), temperature source Temporal, resp. rate 15, height 5' 7\" (1.702 m), weight 221 lb 1.9 oz (100.3 kg), SpO2 98%.  Body mass index is 34.63 kg/m².  Present Level of pain: denies  Non-verbal signs of pain present: No    Physical Exam:  General: Alert & Awake. In no apparent respiratory distress. Body habitus  overweight     HEENT: AT/NC. No gross focal deficits. MMM. JVD noted  Cardiac: tachycardia on tele  Lungs: Normal effort on RA; intermittent dry cough noted   Neurologic: Alert and oriented to person, place, time, and situation   Psychiatric: Mood pleasant mood   Skin: Warm and dry.    Hematology:  Lab Results   Component Value Date    WBC 23.6 (H) 03/29/2024    HGB 10.6 (L) 03/29/2024    HCT 33.1 (L) 03/29/2024    .0 03/29/2024       Coags:  Lab Results   Component Value Date    INR 1.51 (H) 03/28/2024    PTT 31.8 03/28/2024       Chemistry:  Lab Results   Component Value Date    CREATSERUM 1.13 03/29/2024    BUN 30 (H) 03/29/2024     03/29/2024    K 3.8 03/29/2024    K 3.8 03/29/2024     03/29/2024    CO2 25.0 03/29/2024     (H) 03/29/2024    CA 8.6 03/29/2024    ALB 2.5 (L) 03/29/2024    ALKPHO 53 03/29/2024    BILT 0.7 03/29/2024    TP 5.7 (L) 03/29/2024    AST 13 (L) 03/29/2024    ALT 24 03/29/2024    MG 2.3 03/17/2024    PHOS 2.6 12/02/2023       Imaging:  XR CHEST AP PORTABLE  (CPT=71045)    Result Date: 3/28/2024  CONCLUSION:  Right upper lobe infiltrate/lobar pneumonia.  Continued clinical correlation and follow-up to resolution recommended.   LOCATION:  [facility]      Dictated by (CST): Enrique Brown,  MD on 3/28/2024 at 11:38 AM     Finalized by (CST): Enrique Brown MD on 3/28/2024 at 11:39 AM        Summary of Discussion      I discussed reason for palliative care consultation with patient, son, and DIL.    I differentiated the palliative treatment-focus model versus the hospice comfort-focused philosophy of care. I informed the patient/family that having palliative care support does not limit medical treatment options or decisions to those who wish to continue curative or restorative medical therapies. I discussed the benefits of palliative care to include assistance with arising symptom management needs, an extra layer of support, to ensure GOC are respected throughout healthcare continuum, and assist with transition to hospice care when appropriate.      Outpatient/Community Palliative Care Services:  Usually visit once per 4 weeks  Focus on GOC and symptom management   Palliative Care criteria:  Not altered by prognosis   Does not limit curative or restorative therapies      Outpatient Hospice services:  24/7 phone triage services   RN visit one or more times per week depending on need  Home health aid to assist in ADLs/hygiene   Hospice criteria:  Less than six-month prognosis   Must forego most life-prolonging  measures/treatments   Focus solely on comfort   Must sign onto hospice benefit with agency     I provided brief overview of Medicare hospice benefit along with hospice philosophy and answered all questions. At this time, Alejandro Guardado condition may qualify for hospice services but goals do not align with hospice philosophy at this time.    Prognostic awareness/understanding: Good  Pt and family aware of chronic medical conditions and how this is impacting his mobility and health.   The recurrent PNAs are most concerning to pt/family. Evan makes comments like \"I don't want to go to the hospital\" and \"just let me go\" but expressed this is because he does not want to be a burden on his son/family. When  questioned directly, Evan wishes that he did not have to come back so often as it is interrupting his QoL and that they could cure the source of his PNA. He would like to continue to live and function independently at home without the need for frequent hospitalizations, but wants to live and utilize the hospital for these needs.   We discussed the disease trajectory of  cardiac comorbid medical conditions and recurrent PNA with associated symptoms and decline over time.     Hopes/goals:   Pt would like to go home and resume his independence. He does not want to be completely dependent on others for daily care and wishes to continue working around his home in his spare time.   He would like to limit his hospitalizations but does not want to completely forego treatment.     Fears/concerns:   Pt does not fear death. He is depressed that he cannot seem to stay out of the hospital much these past few months.   Provided emotional support to  pt, son, and DIL .    Advance Care Planning counseling and discussion:   HC POA Documentation Completed--Document in Epic. Current POA on file names daughter Randi. After discussion with pt, he would like to name his son as POA for HC.    We voluntarily discussed the risks vs benefits of life sustaining treatments in the setting of comorbid medical conditions with patient, son, and DIL.  POLST FORM Completed--Document in Epic  DNAR/Selective Treatment    Assessment and Recommendations        Principal Problem:    Sepsis due to pneumonia (HCC)  Active Problems:    Shock (HCC)    ERON (acute kidney injury) (HCC)    Lactic acidosis    Leukocytosis, unspecified type    Palliative care encounter    Goals of care: established and treatment focused   Pt would like to continue current POC with CPC support at home. Albeit he would like to hospitalized less, goals do not align with hospice care. He tends to say these things because of fear that he is a burden to others and worries his functional  independence will be compromised.   Contacted spiritual care to complete POA for HC as pt wishes to name his son as POA.   Code Status: DNAR/Selective Treatment    Symptom  #Dyspnea and cough   -at this time, denies SOB at rest. Able to fix portions of the home without significant SHEEHAN.     Discussed today's visit with Family and cardiologist.     Palliative Care Follow Up: Palliative care team will follow peripherally and see patient as needed. Please contact provider with any questions, concerns, or if a care conference is to be held.  Palliative care follow up outpatient: is indicated and community palliative care ordered.  The Palliative Care Service does not round on the weekends but providers are available by B[employer], if needed.   Will plan to follow up on Tuesday if still inpatient and visit is warranted.     Thank you for allowing Palliative Care services to participate in the care of Alejandro Guardado.    A total of  100  minutes were spent on this consult, which included all of the following: chart review, direct face to face contact, history taking, physical examination, counseling and coordinating care, and documentation. >16 minutes spent on ACP including pt preferences, EOL discussions, POA, and POLST form.     Minnie Dickerson MD  3/29/2024  12:37 PM  Palliative Care Services    The 21st Century Cures Act makes medical notes like these available to patients in the interest of transparency. Please be advised this is a medical document. Medical documents are intended to carry relevant information, facts as evident, and the clinical opinion of the practitioner. The medical note is intended as peer to peer communication and may appear blunt or direct. It is written in medical language and may contain abbreviations or verbiage that are unfamiliar.

## 2024-03-29 NOTE — PROGRESS NOTES
Critical Care Progress Note     Assessment / Plan:  Septic shock - secondary to recurrent pneumonia  - continue IVF  - off levo this morning  - lactate elevated  - stress dose steroids (on chronic steroids)  - further as below  ID: Recurrent pneumonia - now 5th event since 12/2023, last on zosyn then augmentin  - meropenem (3/28/202)  - follow up blood and sputum cultures  - SLP eval  - CT chest to eval for chronic lung disease  CV: h/o afib  - Rate control and anticoagulated  - Cardiology following  Acute kidney injury - likely prerenal from sepsis, improving  - continue IVF  - monitor UOP and renal function  Proph  Anticoagulated.   Dispo  - DNAR [facility].   - ICU, will follow  - ok for floor this afternoon if remains off pressors    Discussed with patient and RN    Duke Gonzalez MD  Pulmonary & Critical Care Medicine  [facility]      Subjective:  No acute events overnight  Breathing is doing well this morning  No chest pain    Objective:  Vitals:    03/29/24 0600 03/29/24 0615 03/29/24 0630 03/29/24 0645   BP: 114/62 113/72 106/59 115/68   BP Location:       Pulse: 83 88 88 83   Resp: 12 14 14 15   Temp:       TempSrc:       SpO2: 100% 100% 98% 100%   Weight:       Height:         Physical Exam:  General: laying in bed  Respiratory: clear bilaterally, normal effort  Cardiovascular: regular rate and rhythm, no m/r/g  Abdomen: soft, NTND  Extremities: 2+ lower extremity edema  Mental status: interactive, answering questions appropriately    Medications:  Reviewed in EMR    Lab Data:  Reviewed in EMR    Imaging:  I independently visualized all relevant chest imaging in PACS and agree with radiology interpretation except where noted.

## 2024-03-29 NOTE — PROGRESS NOTES
Progress Note  Alejandro Guardado Patient Status:  Inpatient    1935 MRN [id number]   [facility] 4SW-A Attending Scar Alexander MD   Hosp Day # 1 PCP Stanislav Odonnell MD     Subjective:  Recent hospitalization with fatigue, shortness of breath.  Readmitted with same.  Has known dilated cardiomyopathy with severe LV dysfunction, LVEF less than 20% and low-flow-low gradient severe aortic stenosis.  He has declined intervention in the past and is no longer a candidate for TAVR or other invasive cardiac procedures.  Pt denies SOB, orthopnea or PND. States he is feeling much better. Currently on room air.     Objective:  /78   Pulse 97   Temp 97.4 °F (36.3 °C) (Temporal)   Resp 21   Ht 5' 7\" (1.702 m)   Wt 221 lb 1.9 oz (100.3 kg)   SpO2 96%   BMI 34.63 kg/m²     Telemetry: Afib, rates 90's, freq PVCs    Intake/Output:    Intake/Output Summary (Last 24 hours) at 3/29/2024 1111  Last data filed at 3/29/2024 0730  Gross per 24 hour   Intake 3380 ml   Output 1550 ml   Net 1830 ml       Last 3 Weights   24 0530 221 lb 1.9 oz (100.3 kg)   24 1547 217 lb 2.5 oz (98.5 kg)   24 1059 210 lb (95.3 kg)   24 1604 211 lb 6.7 oz (95.9 kg)   24 1244 207 lb (93.9 kg)   02/15/24 0631 205 lb 0.4 oz (93 kg)   24 0631 204 lb 9.4 oz (92.8 kg)   24 0000 207 lb 7.3 oz (94.1 kg)   24 1446 210 lb 12.2 oz (95.6 kg)       Labs:  Recent Labs   Lab 24  1131 24  0343   * 134*   BUN 38* 30*   CREATSERUM 2.15* 1.13   EGFRCR 29* 62   CA 9.5 8.6    140   K 3.3* 3.8  3.8    111   CO2 26.0 25.0     Recent Labs   Lab 24  1131 24  0343   RBC 4.87 4.12   HGB 12.7* 10.6*   HCT 39.5 33.1*   MCV 81.1 80.3   MCH 26.1 25.7*   MCHC 32.2 32.0   RDW 15.8 16.0   NEPRELIM 20.51* 21.50*   WBC 23.3* 23.6*   .0 305.0         Recent Labs   Lab 24  1131   TROPHS 43       Diagnostics:  XR CHEST AP PORTABLE  (CPT=71045)    Result Date:  3/28/2024  CONCLUSION:  Right upper lobe infiltrate/lobar pneumonia.  Continued clinical correlation and follow-up to resolution recommended.   LOCATION:  [facility]      Dictated by (CST): Enrique Brown MD on 3/28/2024 at 11:38 AM     Finalized by (CST): Enrique Brown MD on 3/28/2024 at 11:39 AM       Review of Systems   Cardiovascular:  Positive for leg swelling. Negative for chest pain, dyspnea on exertion, orthopnea and paroxysmal nocturnal dyspnea.   Respiratory:  Negative for cough and shortness of breath.        Physical Exam:    Gen: alert, oriented x 3, NAD  Heent: pupils equal, reactive. Mucous membranes moist.   Neck: no jvd  Cardiac: irregular rate and rhythm, normal S1,S2, + murmur, clicks, rub or gallop  Lungs: CTA  Abd: soft, NT/ND +bs  Ext: LLE 1+ pitting edema (family states much improved)  Skin: Warm, dry  Neuro: No focal deficits      Medications:     apixaban  5 mg Oral BID    atorvastatin  40 mg Oral Nightly    clopidogrel  75 mg Oral Daily    finasteride  5 mg Oral Daily    folic acid  1 mg Oral Daily    gabapentin  300 mg Oral TID    pantoprazole  40 mg Oral QAM AC    tamsulosin  0.4 mg Oral Daily    meropenem  1,000 mg Intravenous Q12H    hydrocortisone sodium succinate  50 mg Intravenous 4 times per day      norepinephrine Stopped (03/29/24 0915)    sodium chloride 100 mL/hr at 03/29/24 0255       Assessment:  Shock - Multifactorial Sepsis/Cardiogenic, Recurrent PNA, per Dr. Wick note yesterday, the patient was hypovolemic.  He appears better following IV fluids.  Recent admit for CAP  Elev lactic, procal, WBC; Cultures pending  IV antibx, stress dose steroids  CT chest pending this am  Off vasopressor support this am - BP stable  Pulm/Crit care following  Chronic HFrEF, ICM with LVEF 20-25%  ProBNP 5260  Historically on toprol, lisinopril, furosemide at home - on hold   IVF stopped today - net +2.4L  Appears compensated on exam   Moderate-Severe Low Flow, Low Gradient Aortic Stenosis (mean  grad 15, VTA 1.02)  Has declined intervention   Permanent Atrial Fibrillation  Rate controlled  A/C: on long term eliquis  ERON - Cr improved s/p IVF  Acute on Chronic Anemia - Hgb 10.6  Hx CAD w/remote CABG  On plavix, eliquis  Hx PAD s/p stenting 12/2023  Hx Polyarthralgia on long term steroids  Possible dysphagia - swallow eval pending    Plan:  Would discontinue IVF today with reduced LV function, severe aortic stenosis. Will hold off on diuretic for now and reassess volume status in AM. Recently on furosemide 80mg po daily at home.   Off levophed this am. If BP remains stable, may be able to reintroduce toprol tomorrow.   Continue eliquis, plavix   Palliative Care consultation this morning with ongoing goals of care discussion.     Plan of care discussed with patient, RN.    MATTIE Boyd  3/29/2024  11:11 AM  [phone] [facility]  [phone] [facility]        Seen and examined.  Agree with exam and assessment as amended.  From a cardiac perspective, he is a candidate only for palliative medical therapy.  Cardiac MDM reviewed in detail with SUMANTH Lee.    EVA Corbett MD  2

## 2024-03-29 NOTE — PLAN OF CARE
Assumed pt care after RN shift report. Pt Aox3. Disoriented to time. Denies pain. Pt on 2L nasal cannula. Levo gtt titrated as tolerated to maintain systolic/MAP goal. Pt in Afib on the monitor- frequent PVCs. Premofit in place. No BM. IV abx and steroids. Palliative to see pt 3/29. Pt updated on plan of care.

## 2024-03-29 NOTE — SLP NOTE
ADULT SWALLOWING EVALUATION    ASSESSMENT    ASSESSMENT/OVERALL IMPRESSION:  Order received for BSE to r/I aspiration. Patient admitted with recurrent PNA.  This is patient's 5th hospitalization for PNA in past 4 months.  Patient was evaluated by this dept last month with VFSS completed at that time with functional oropharyngeal swallow reported and no evidence of aspiration.  Patient currently in ICU and RN requested for SLP to proceed.     Patient received awake, alert, and eager to go home. Son and daughter present. Patient with no focal oral motor deficits. Speech intelligibility and vocal quality clear. Patient denied difficulty swallowing nor did he or family have any concerns with PO intake.  Patient assisted with PO trials.     Patient exhibited a functional oropharyngeal swallow with no overt clinical s/s aspiration observed or suspected at this time.  Education completed with patient/family at bedside and they expressed good understanding.  No further skilled SLP needs identified at this time.     Orellana Assessment of Swallow Function Score: No abnormality detected    RECOMMENDATIONS   Diet Recommendations - Solids: Regular  Diet Recommendations - Liquids: Thin Liquids  Aspiration Precautions: Upright position  Medication Administration Recommendations: No restrictions  Treatment Plan/Recommendations: No further inpatient SLP service warranted    HISTORY   MEDICAL HISTORY  Reason for Referral: R/O aspiration    Problem List  Principal Problem:    Sepsis due to pneumonia ([facility])  Active Problems:    Shock ([facility])    ERON (acute kidney injury) ([facility])    Lactic acidosis    Leukocytosis, unspecified type    Palliative care encounter    Counseling regarding advance care planning and goals of care    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) ([facility])      Past Medical History  Past Medical History:   Diagnosis Date    Atrial fibrillation ([facility])     CAD (coronary artery disease) 09/28/2015    CHF (congestive heart  failure) ([facility]) 2022    Esophageal reflux     Hearing impairment     Heart attack ([facility])     History of MI (myocardial infarction)     HTN (hypertension)     Hyperlipidemia     PAD (peripheral artery disease) ([facility]) 04/26/2023    Visual impairment        Prior Living Situation: Home with support  Diet Prior to Admission: Regular;Thin liquids       Patient/Family Goals: to go home    SWALLOWING HISTORY  Current Diet Consistency: NPO  Dysphagia History: VFSS completed last month due to PNA; diagnosed with functional oropharyngeal swallow  Imaging Results: CXR yesterday: right upper lobe PNA    OBJECTIVE   ORAL MOTOR EXAMINATION  Dentition: Functional  Symmetry: Within Functional Limits  Strength: Within Functional Limits  Tone: Within Functional Limits  Range of Motion: Within Functional Limits  Rate of Motion: Within Functional Limits    Voice Quality: Clear  Respiratory Status: Unlabored  Consistencies Trialed: Thin liquids;Hard solid  Method of Presentation: Self presentation  Patient Positioning: Upright;Midline    Oral Phase of Swallow: Within Functional Limits        Pharyngeal Phase of Swallow: Within Functional Limits           (Please note: Silent aspiration cannot be evaluated clinically. Videofluoroscopic Swallow Study is required to rule-out silent aspiration.)    Esophageal Phase of Swallow: No complaints consistent with possible esophageal involvement          FOLLOW UP  Treatment Plan/Recommendations: No further inpatient SLP service warranted     Follow Up Needed (Documentation Required): No       Thank you for your referral.   If you have any questions, please contact Joy Cole, MOLLY Cole MS CCC-SLP/L, pager [phone]  Speech-Language Pathologist

## 2024-03-30 LAB
ALBUMIN SERPL-MCNC: 2.3 G/DL (ref 3.4–5)
ALBUMIN/GLOB SERPL: 0.7 {RATIO} (ref 1–2)
ALP LIVER SERPL-CCNC: 51 U/L
ALT SERPL-CCNC: 24 U/L
ANION GAP SERPL CALC-SCNC: 5 MMOL/L (ref 0–18)
AST SERPL-CCNC: 20 U/L (ref 15–37)
BILIRUB SERPL-MCNC: 0.5 MG/DL (ref 0.1–2)
BUN BLD-MCNC: 23 MG/DL (ref 9–23)
CALCIUM BLD-MCNC: 8.6 MG/DL (ref 8.5–10.1)
CHLORIDE SERPL-SCNC: 111 MMOL/L (ref 98–112)
CO2 SERPL-SCNC: 26 MMOL/L (ref 21–32)
CREAT BLD-MCNC: 0.89 MG/DL
EGFRCR SERPLBLD CKD-EPI 2021: 82 ML/MIN/1.73M2 (ref 60–?)
ERYTHROCYTE [DISTWIDTH] IN BLOOD BY AUTOMATED COUNT: 16 %
GLOBULIN PLAS-MCNC: 3.3 G/DL (ref 2.8–4.4)
GLUCOSE BLD-MCNC: 155 MG/DL (ref 70–99)
HCT VFR BLD AUTO: 31.8 %
HGB BLD-MCNC: 9.8 G/DL
MCH RBC QN AUTO: 25.3 PG (ref 26–34)
MCHC RBC AUTO-ENTMCNC: 30.8 G/DL (ref 31–37)
MCV RBC AUTO: 82.2 FL
OSMOLALITY SERPL CALC.SUM OF ELEC: 301 MOSM/KG (ref 275–295)
PLATELET # BLD AUTO: 241 10(3)UL (ref 150–450)
POTASSIUM SERPL-SCNC: 4 MMOL/L (ref 3.5–5.1)
PROT SERPL-MCNC: 5.6 G/DL (ref 6.4–8.2)
RBC # BLD AUTO: 3.87 X10(6)UL
SODIUM SERPL-SCNC: 142 MMOL/L (ref 136–145)
WBC # BLD AUTO: 16 X10(3) UL (ref 4–11)

## 2024-03-30 PROCEDURE — 99233 SBSQ HOSP IP/OBS HIGH 50: CPT | Performed by: HOSPITALIST

## 2024-03-30 PROCEDURE — 99233 SBSQ HOSP IP/OBS HIGH 50: CPT | Performed by: INTERNAL MEDICINE

## 2024-03-30 RX ORDER — METOCLOPRAMIDE HYDROCHLORIDE 5 MG/ML
10 INJECTION INTRAMUSCULAR; INTRAVENOUS EVERY 8 HOURS PRN
Status: DISCONTINUED | OUTPATIENT
Start: 2024-03-30 | End: 2024-04-03

## 2024-03-30 NOTE — CM/SW NOTE
Patient is an 89 y/o male who admitted with Sepsis due to pneumonia. PT recommending HH. Asked DSC to send referrals in aidin. Awaiting accepting provider and patient choice. SW will remain available.       DERECK Myers  Discharge Planner  [phone]

## 2024-03-30 NOTE — PLAN OF CARE
Patient is axox4. Follows all commands. Moves all extremities. Up to chair today. 2L NC. A fib with PVC's on the monitor, md aware. Bp stable throughout shift. Ext cath in place with adequate output. No bm this shift.

## 2024-03-30 NOTE — CM/SW NOTE
Department  notified of request for HHC, aidin referrals started. Assigned CM/SW to follow up with pt/family on further discharge planning.    Celestina Munoz  DSC

## 2024-03-30 NOTE — PROGRESS NOTES
Critical Care Progress Note     Assessment / Plan:  Septic shock - secondary to recurrent pneumonia  - received fluid resuscitation   - remains off pressors  - lactate elevated  - stress dose steroids (on chronic steroids), start tapering  - further as below  Acute respiratory failure - due to pneumonia  - wean oxygen as able, currently 2 LPM  - further as below  ID: Recurrent pneumonia - now 5th event since 12/2023, last on zosyn then augmentin, CT chest showed bilateral opacities, greater in the RUL consistent with pneumonia, PCT very elevated  - meropenem (3/28/202)  - follow up blood and sputum cultures  - SLP eval  - repeat CT chest outpatient  CV: h/o afib  - Rate control and anticoagulated  - Cardiology following  Acute kidney injury - likely prerenal from sepsis  - resolved  - monitor UOP and renal function  Proph  - apixaban  Dispo  - DNAR [facility].   - stable for floor, [facility] to follow out of ICU    Discussed with patient and RN    Duke Gonzalez MD  Pulmonary & Critical Care Medicine  [facility]      Subjective:  No acute events overnight  No dyspnea  Denies chest pain    Objective:  Vitals:    03/30/24 0616 03/30/24 0617 03/30/24 0700 03/30/24 0800   BP:    126/78   BP Location:       Pulse: 91 86 87 86   Resp: 15 16 13 13   Temp:       TempSrc:       SpO2: (!) 79% 92% 97% 98%   Weight:       Height:         Physical Exam:  General: laying in bed  Respiratory: clear bilaterally, normal effort  Cardiovascular: regular rate and rhythm, no m/r/g  Abdomen: soft, NTND  Extremities: 2+ lower extremity edema, right foot wound is dressed  Mental status: interactive, answering questions appropriately    Medications:  Reviewed in EMR    Lab Data:  Reviewed in EMR    Imaging:  I independently visualized all relevant chest imaging in PACS and agree with radiology interpretation except where noted.

## 2024-03-30 NOTE — PHYSICAL THERAPY NOTE
PHYSICAL THERAPY EVALUATION - INPATIENT     Room Number: 452/452-A  Evaluation Date: 3/30/2024  Type of Evaluation: Initial  Physician Order: PT Eval and Treat    Presenting Problem: sepsis, PNA  Co-Morbidities : h/o L foot wounds  Reason for Therapy: Mobility Dysfunction and Discharge Planning    PHYSICAL THERAPY ASSESSMENT   Patient is currently functioning near baseline with bed mobility, transfers, gait, and stair negotiation.  Prior to admission, patient's baseline is mod ind.  Patient is requiring supervision as a result of the following impairments: decreased functional strength and decreased endurance/aerobic capacity.  Physical Therapy will continue to follow for duration of hospitalization.    Patient will benefit from continued skilled PT Services at discharge to promote functional independence and safety with additional support and return home with home health PT.    PLAN  PT Treatment Plan: Bed mobility;Body mechanics;Endurance;Energy conservation;Patient education;Family education;Gait training;Range of motion;Strengthening;Stair training;Transfer training;Balance training  Rehab Potential : Good  Frequency (Obs):  (2-3x/week)  Number of Visits to Meet Established Goals: 3      CURRENT GOALS    Goal #1 Patient is able to demonstrate supine - sit EOB @ level: supervision     Goal #2 Patient is able to demonstrate transfers Sit to/from Stand at assistance level: supervision     Goal #3 Patient is able to ambulate 50 feet with assist device: walker - rolling at assistance level: supervision     Goal #4    Goal #5    Goal #6    Goal Comments: Goals established on 3/30/2024      PHYSICAL THERAPY MEDICAL/SOCIAL HISTORY  History related to current admission: Patient is a 89 year old male admitted on 3/28/2024 from home for sepsis.  Pt diagnosed with PNA.      HOME SITUATION  Type of Home: House   Home Layout: Two level                Lives With: Alone (reorts son over daily)     Patient Owned Equipment:  Rolling walker       Prior Level of [place]: Pt reports mod ind PTA.  Pt states son is over daily to assist as needed.      SUBJECTIVE  \"I can get a  if you want!\"        OBJECTIVE  Precautions: Bed/chair alarm  Fall Risk: High fall risk    WEIGHT BEARING RESTRICTION                   PAIN ASSESSMENT  Ratin          COGNITION  Overall Cognitive Status:  WFL - within functional limits    RANGE OF MOTION AND STRENGTH ASSESSMENT  Upper extremity ROM and strength are within functional limits     Lower extremity ROM is within functional limits     Lower extremity strength is within functional limits       BALANCE  Static Sitting: Good  Dynamic Sitting: Good  Static Standing: Fair -  Dynamic Standing: Fair -    ADDITIONAL TESTS                                    ACTIVITY TOLERANCE                         O2 WALK       NEUROLOGICAL FINDINGS                        AM-PAC '6-Clicks' INPATIENT SHORT FORM - BASIC MOBILITY  How much difficulty does the patient currently have...  Patient Difficulty: Turning over in bed (including adjusting bedclothes, sheets and blankets)?: None   Patient Difficulty: Sitting down on and standing up from a chair with arms (e.g., wheelchair, bedside commode, etc.): A Little   Patient Difficulty: Moving from lying on back to sitting on the side of the bed?: A Little   How much help from another person does the patient currently need...   Help from Another: Moving to and from a bed to a chair (including a wheelchair)?: A Little   Help from Another: Need to walk in hospital room?: A Little   Help from Another: Climbing 3-5 steps with a railing?: A Little       AM-PAC Score:  Raw Score: 19   Approx Degree of Impairment: 41.77%   Standardized Score (AM-PAC Scale): 45.44   CMS Modifier (G-Code): CK    FUNCTIONAL ABILITY STATUS  Gait Assessment   Functional Mobility/Gait Assessment  Gait Assistance: Contact guard assist  Distance (ft): 10ftx2  Assistive Device: Rolling  walker  Pattern: Shuffle    Skilled Therapy Provided     Bed Mobility:  Rolling: ind  Supine to sit: ind   Sit to supine: NT     Transfer Mobility:  Sit to stand: supervision to RW from bed and low toilet.     Stand to sit: supervision from RW.    Gait = CGA as above to/from bathroom.    Therapist's Comments: pt requesting to ambulate to bathroom.  Increased time spent untangling wires as well as time for pt in bathroom.      Exercise/Education Provided:  Bed mobility  Body mechanics  Functional activity tolerated  Gait training  Posture  Transfer training    Patient End of Session: Up in chair;Needs met;Call light within reach;RN aware of session/findings;All patient questions and concerns addressed;Alarm set      Patient Evaluation Complexity Level:  History Moderate - 1 or 2 personal factors and/or co-morbidities   Examination of body systems Moderate - addressing a total of 3 or more elements   Clinical Presentation Moderate - Evolving   Clinical Decision Making Moderate - Evolving       PT Session Time: 35 minutes    Therapeutic Activity: 15 minutes

## 2024-03-30 NOTE — PLAN OF CARE
Assumed pt care after RN shift report. Pt Aox4. Pt remained on room air until 0344. Pt desaturating while sleeping- put back on 2L NC to maintian O2 sats. Remained normotensive. Pt and family updated on POC at bedside.

## 2024-03-30 NOTE — PROGRESS NOTES
[facility]   part of [facility]     Hospitalist Progress Note     Alejandro Guardado Patient Status:  Inpatient    1935 MRN [id number]   Location [facility] 4SW-A Attending Scar Alexander MD   Hosp Day # 2 PCP Stanislav Odonnell MD     Subjective:   Feeling better     Objective:    Review of Systems:   A comprehensive review of systems was completed; pertinent positive and negatives stated in subjective.  Vital signs:  Temp:  [98 °F (36.7 °C)-98.6 °F (37 °C)] 98 °F (36.7 °C)  Pulse:  [] 86  Resp:  [12-23] 13  BP: (102-140)/(59-84) 126/78  SpO2:  [79 %-100 %] 98 %  Physical Exam:    General: No acute distress chronically ill appearing  Respiratory: diminished b/l, no wheezes, no rhonchi  Cardiovascular: S1, S2, RRR  Abdomen: Soft, NT/ND, +BS  Extremities: no edema, R foot wound please see notes and pix in media     Diagnostic Data:    Labs:  Recent Labs   Lab 24  1131 24  1555 24  0343 24  0451   WBC 23.3*  --  23.6* 16.0*   HGB 12.7*  --  10.6* 9.8*   MCV 81.1  --  80.3 82.2   .0  --  305.0 241.0   INR  --  1.51*  --   --      Recent Labs   Lab 24  1131 24  0343 24  0451   * 134* 155*   BUN 38* 30* 23   CREATSERUM 2.15* 1.13 0.89   CA 9.5 8.6 8.6   ALB 3.0* 2.5* 2.3*    140 142   K 3.3* 3.8  3.8 4.0    111 111   CO2 26.0 25.0 26.0   ALKPHO 59 53 51   AST 15 13* 20   ALT  24   BILT 0.7 0.7 0.5   TP 6.4 5.7* 5.6*     Estimated Creatinine Clearance: 52.6 mL/min (based on SCr of 0.89 mg/dL).  Recent Labs   Lab 24  1555   PTP 18.3*   INR 1.51*        Microbiology  Hospital Encounter on 24   1. Blood Culture     Status: None (Preliminary result)    Collection Time: 24 11:31 AM    Specimen: Blood,peripheral   Result Value Ref Range    Blood Culture Result No Growth 1 Day N/A     Imaging: Reviewed in Epic.  Medications:    hydrocortisone sodium succinate  50 mg Intravenous Q8H LAURY    apixaban  5 mg Oral BID     meropenem  500 mg Intravenous Q12H    atorvastatin  40 mg Oral Nightly    clopidogrel  75 mg Oral Daily    finasteride  5 mg Oral Daily    folic acid  1 mg Oral Daily    gabapentin  300 mg Oral TID    pantoprazole  40 mg Oral QAM AC    tamsulosin  0.4 mg Oral Daily       Assessment & Plan:    #Septic Shock 2/2 PNA  -IVF stopped with CHF EF 20%  -PRESSORS OFF   -ICU and Cardio following  -cont abx and f/u Cx   -CT chest reviewed- per Pulm   -tapering stress steroids     #PVCs with h/o NSVT- holding dobutamine per Cardiology   #Acute hypoxic respiratory failure - wean O2 as tolerated   #REON- IVF and pressors and much improved   #Lactic acidosis 2/2 above   #Leukocytosis- stable on steroids IV for now   #Secondary adrenal insufficiency- -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 Steward   -plavix, statin  #GERD-PPI    #Foot wound- podiatry and wound care consult        Dr. Dickerson consult for Palliative care - recs much appreciated   -Recent hospitalizations:  This admission  -3/16-3/18- shock sepsis vs hypovolemic   -2/12-2/15 for septic shock secondary to multifocal pneumonia  -12/25-12/28 septic shock for gram-negative pneumonia  -12/1-12/6-hospitalization for pneumonia     D/w family, staff      Scar Alexander MD  Supplementary Documentation:   Quality:  DVT Mechanical Prophylaxis:   SCDs, Early ambuation  DVT Pharmacologic Prophylaxis   Medication    apixaban (Eliquis) tab 5 mg                Code Status: DNAR/Selective Treatment  Block: External urinary catheter in place  Block Duration (in days):   Central line:     DIEGO:   At this point Mr. Guardado is expected to be discharge to: tbd   **Certification      PHYSICIAN Certification of Need for Inpatient Hospitalization - Initial Certification    Patient will require inpatient services that will reasonably be expected to span two midnight's based on the clinical documentation in H+P.   Based on patients current state of illness, I anticipate that, after discharge, patient will require TBD.    The 21st Century Cures Act makes medical notes like these available to patients in the interest of transparency. Please be advised this is a medical document. Medical documents are intended to carry relevant information, facts as evident, and the clinical opinion of the practitioner. The medical note is intended as peer to peer communication and may appear blunt or direct. It is written in medical language and may contain abbreviations or verbiage that are unfamiliar.

## 2024-03-31 LAB
ALBUMIN SERPL-MCNC: 2.4 G/DL (ref 3.4–5)
ALBUMIN/GLOB SERPL: 0.7 {RATIO} (ref 1–2)
ALP LIVER SERPL-CCNC: 54 U/L
ALT SERPL-CCNC: 47 U/L
ANION GAP SERPL CALC-SCNC: 3 MMOL/L (ref 0–18)
AST SERPL-CCNC: 27 U/L (ref 15–37)
BILIRUB SERPL-MCNC: 0.4 MG/DL (ref 0.1–2)
BUN BLD-MCNC: 28 MG/DL (ref 9–23)
CALCIUM BLD-MCNC: 8.9 MG/DL (ref 8.5–10.1)
CHLORIDE SERPL-SCNC: 111 MMOL/L (ref 98–112)
CO2 SERPL-SCNC: 28 MMOL/L (ref 21–32)
CREAT BLD-MCNC: 0.87 MG/DL
EGFRCR SERPLBLD CKD-EPI 2021: 82 ML/MIN/1.73M2 (ref 60–?)
ERYTHROCYTE [DISTWIDTH] IN BLOOD BY AUTOMATED COUNT: 15.9 %
GLOBULIN PLAS-MCNC: 3.5 G/DL (ref 2.8–4.4)
GLUCOSE BLD-MCNC: 151 MG/DL (ref 70–99)
HCT VFR BLD AUTO: 31.8 %
HGB BLD-MCNC: 9.9 G/DL
MCH RBC QN AUTO: 25.5 PG (ref 26–34)
MCHC RBC AUTO-ENTMCNC: 31.1 G/DL (ref 31–37)
MCV RBC AUTO: 82 FL
OSMOLALITY SERPL CALC.SUM OF ELEC: 302 MOSM/KG (ref 275–295)
PLATELET # BLD AUTO: 261 10(3)UL (ref 150–450)
POTASSIUM SERPL-SCNC: 4 MMOL/L (ref 3.5–5.1)
PROT SERPL-MCNC: 5.9 G/DL (ref 6.4–8.2)
RBC # BLD AUTO: 3.88 X10(6)UL
SODIUM SERPL-SCNC: 142 MMOL/L (ref 136–145)
WBC # BLD AUTO: 10.7 X10(3) UL (ref 4–11)

## 2024-03-31 PROCEDURE — 99232 SBSQ HOSP IP/OBS MODERATE 35: CPT | Performed by: HOSPITALIST

## 2024-03-31 PROCEDURE — 99233 SBSQ HOSP IP/OBS HIGH 50: CPT | Performed by: INTERNAL MEDICINE

## 2024-03-31 RX ORDER — PREDNISONE 20 MG/1
40 TABLET ORAL
Status: DISCONTINUED | OUTPATIENT
Start: 2024-04-01 | End: 2024-04-03

## 2024-03-31 NOTE — PLAN OF CARE
Rec'd pt from ICU at ~1030. Oriented to room, belongings placed in closet. A&O x 4. Tele shows a fib. O2 sats adequate on 2L NC, baseline RA. Primofit in place. No C/O pain or SOB. Rt foot dressing CDI, due to be changed tomorrow. Bed locked and in low position, call light and personal items within reach. Will continue to monitor. POC - IV abx, wean O2, wound care as ordered.    Problem: SKIN/TISSUE INTEGRITY - ADULT  Goal: Skin integrity remains intact  Description: INTERVENTIONS  - Assess and document risk factors for pressure ulcer development  - Assess and document skin integrity  - Monitor for areas of redness and/or skin breakdown  - Initiate interventions, skin care algorithm/standards of care as needed  Outcome: Progressing     Problem: CARDIOVASCULAR - ADULT  Goal: Maintains optimal cardiac output and hemodynamic stability  Description: INTERVENTIONS:  - Monitor vital signs, rhythm, and trends  - Monitor for bleeding, hypotension and signs of decreased cardiac output  - Evaluate effectiveness of vasoactive medications to optimize hemodynamic stability  - Monitor arterial and/or venous puncture sites for bleeding and/or hematoma  - Assess quality of pulses, skin color and temperature  - Assess for signs of decreased coronary artery perfusion - ex. Angina  - Evaluate fluid balance, assess for edema, trend weights  Outcome: Progressing  Goal: Absence of cardiac arrhythmias or at baseline  Description: INTERVENTIONS:  - Continuous cardiac monitoring, monitor vital signs, obtain 12 lead EKG if indicated  - Evaluate effectiveness of antiarrhythmic and heart rate control medications as ordered  - Initiate emergency measures for life threatening arrhythmias  - Monitor electrolytes and administer replacement therapy as ordered  Outcome: Progressing

## 2024-03-31 NOTE — CM/SW NOTE
Spoke with pt about HH and pt declines HH at this time.  He says his son lives 5 minutes away and comes over everyday to help him if he needs something.  Pt lives alone in a two story home and only goes upstairs to shower.  His son gets him groceries or brings food over for him.  Pt was going to outpt PT about two weeks ago and would rather go to outpt PT.

## 2024-03-31 NOTE — PLAN OF CARE
Assumed pt care after RN shift report. Pt Aox4. Remains on 2L NC. Afib w/frequent PVCs on the monitor. BP normotensive. Premofit in place. No BM. Pt and family updated on the plan of care.

## 2024-03-31 NOTE — PROGRESS NOTES
Critical Care Progress Note     Assessment / Plan:  Septic shock - secondary to recurrent pneumonia  - received fluid resuscitation   - remains off pressors  - stress dose steroids (on chronic steroids), tapering  - further as below  Acute respiratory failure - due to pneumonia  - wean oxygen as able, currently 2 LPM  - further as below  ID: Recurrent pneumonia - now 5th event since 12/2023, last on zosyn then augmentin, CT chest showed bilateral opacities, greater in the RUL consistent with pneumonia, PCT very elevated  - meropenem (3/28-)  - follow up blood and sputum cultures  - SLP eval  - repeat CT chest outpatient  CV: h/o afib  - Rate control and anticoagulated  - Cardiology following  Acute kidney injury - likely prerenal from sepsis  - resolved  - monitor UOP and renal function  Proph  - apixaban  Dispo  - DNAR Select.   - stable for floor, [facility] to follow out of ICU    Discussed with patient and RN    Duke Gonzalez MD  Pulmonary & Critical Care Medicine  [facility]      Subjective:  No acute events overnight  Awaiting floor bed  No complaints today    Objective:  Vitals:    03/31/24 0500 03/31/24 0600 03/31/24 0700 03/31/24 0800   BP:    (!) 148/91   BP Location:    Left arm   Pulse: 87 92 85 81   Resp: 12 13 11 12   Temp:    97.9 °F (36.6 °C)   TempSrc:    Temporal   SpO2: 95% 97% 97% 94%   Weight:       Height:         Physical Exam:  General: laying in bed  Respiratory: clear bilaterally, normal effort  Cardiovascular: regular rate and rhythm, no m/r/g  Abdomen: soft, NTND  Extremities: 2+ lower extremity edema, right foot wound is dressed  Mental status: interactive, answering questions appropriately    Medications:  Reviewed in EMR    Lab Data:  Reviewed in EMR    Imaging:  I independently visualized all relevant chest imaging in PACS and agree with radiology interpretation except where noted.

## 2024-03-31 NOTE — PROGRESS NOTES
[facility]   part of [facility]     Hospitalist Progress Note     Alejandro Guardado Patient Status:  Inpatient    1935 MRN [id number]   Location [facility] 4SW-A Attending Scar Alexander MD   Hosp Day # 3 PCP Stanislav Odonnell MD     Subjective:   Doing well     Objective:    Review of Systems:   A comprehensive review of systems was completed; pertinent positive and negatives stated in subjective.  Vital signs:  Temp:  [97.3 °F (36.3 °C)-98 °F (36.7 °C)] 98 °F (36.7 °C)  Pulse:  [] 85  Resp:  [11-23] 11  BP: (101-140)/(63-93) 137/93  SpO2:  [93 %-99 %] 97 %  Physical Exam:    General: No acute distress chronically ill appearing  Respiratory: diminished b/l, no wheezes, no rhonchi  Cardiovascular: S1, S2, RRR  Abdomen: Soft, NT/ND, +BS  Extremities: no edema, R foot wound please see notes and pix in media     Diagnostic Data:    Labs:  Recent Labs   Lab 24  1131 24  1555 24  0343 24  0451 24  0423   WBC 23.3*  --  23.6* 16.0* 10.7   HGB 12.7*  --  10.6* 9.8* 9.9*   MCV 81.1  --  80.3 82.2 82.0   .0  --  305.0 241.0 261.0   INR  --  1.51*  --   --   --      Recent Labs   Lab 24  0343 24  0451 24  0423   * 155* 151*   BUN 30* 23 28*   CREATSERUM 1.13 0.89 0.87   CA 8.6 8.6 8.9   ALB 2.5* 2.3* 2.4*    142 142   K 3.8  3.8 4.0 4.0    111 111   CO2 25.0 26.0 28.0   ALKPHO 53 51 54   AST 13* 20 27   ALT 24 24 47   BILT 0.7 0.5 0.4   TP 5.7* 5.6* 5.9*     Estimated Creatinine Clearance: 53.8 mL/min (based on SCr of 0.87 mg/dL).  Recent Labs   Lab 24  1555   PTP 18.3*   INR 1.51*        Microbiology  Hospital Encounter on 24   1. Blood Culture     Status: None (Preliminary result)    Collection Time: 24 11:31 AM    Specimen: Blood,peripheral   Result Value Ref Range    Blood Culture Result No Growth 2 Days N/A     Imaging: Reviewed in Epic.  Medications:    hydrocortisone sodium succinate  50 mg Intravenous  Q8H LAURY    apixaban  5 mg Oral BID    meropenem  500 mg Intravenous Q12H    atorvastatin  40 mg Oral Nightly    clopidogrel  75 mg Oral Daily    finasteride  5 mg Oral Daily    folic acid  1 mg Oral Daily    gabapentin  300 mg Oral TID    pantoprazole  40 mg Oral QAM AC    tamsulosin  0.4 mg Oral Daily       Assessment & Plan:    #Septic Shock 2/2 PNA  -IVF stopped with CHF EF 20%  -PRESSORS OFF   -ICU and Cardio following  -cont abx and f/u Cx   -CT chest reviewed- per Pulm   -tapering stress steroids     #PVCs with h/o NSVT- holding dobutamine per Cardiology   #Acute hypoxic respiratory failure - wean O2 as tolerated   #ERON- IVF and pressors and much improved   #Lactic acidosis 2/2 above   #Leukocytosis- stable on steroids IV for now   #Secondary adrenal insufficiency- -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 Steward   -plavix, statin  #GERD-PPI    #Foot wound- podiatry and wound care consult        Dr. Dickerson consult for Palliative care - recs much appreciated   -Recent hospitalizations:  This admission  -3/16-3/18- shock sepsis vs hypovolemic   -2/12-2/15 for septic shock secondary to multifocal pneumonia  -12/25-12/28 septic shock for gram-negative pneumonia  -12/1-12/6-hospitalization for pneumonia     D/w family, staff      Scar Alexander MD  Supplementary Documentation:   Quality:  DVT Mechanical Prophylaxis:   SCDs, Early ambuation  DVT Pharmacologic Prophylaxis   Medication    apixaban (Eliquis) tab 5 mg                Code Status: DNAR/Selective Treatment  Block: External urinary catheter in place  Block  Duration (in days):   Central line:    DIEGO:   At this point Mr. Guardado is expected to be discharge to: tbd   **Certification      PHYSICIAN Certification of Need for Inpatient Hospitalization - Initial Certification    Patient will require inpatient services that will reasonably be expected to span two midnight's based on the clinical documentation in H+P.   Based on patients current state of illness, I anticipate that, after discharge, patient will require TBD.    The 21st Century Cures Act makes medical notes like these available to patients in the interest of transparency. Please be advised this is a medical document. Medical documents are intended to carry relevant information, facts as evident, and the clinical opinion of the practitioner. The medical note is intended as peer to peer communication and may appear blunt or direct. It is written in medical language and may contain abbreviations or verbiage that are unfamiliar.

## 2024-04-01 ENCOUNTER — PATIENT MESSAGE (OUTPATIENT)
Dept: PODIATRY CLINIC | Facility: CLINIC | Age: 89
End: 2024-04-01

## 2024-04-01 PROBLEM — J18.9 RECURRENT PNEUMONIA: Status: ACTIVE | Noted: 2024-02-12

## 2024-04-01 LAB
ALBUMIN SERPL-MCNC: 2.2 G/DL (ref 3.4–5)
ALBUMIN/GLOB SERPL: 0.6 {RATIO} (ref 1–2)
ALP LIVER SERPL-CCNC: 54 U/L
ALT SERPL-CCNC: 56 U/L
ANION GAP SERPL CALC-SCNC: 5 MMOL/L (ref 0–18)
AST SERPL-CCNC: 37 U/L (ref 15–37)
ATRIAL RATE: 65 BPM
BILIRUB SERPL-MCNC: 0.4 MG/DL (ref 0.1–2)
BUN BLD-MCNC: 25 MG/DL (ref 9–23)
CALCIUM BLD-MCNC: 8.7 MG/DL (ref 8.5–10.1)
CHLORIDE SERPL-SCNC: 109 MMOL/L (ref 98–112)
CO2 SERPL-SCNC: 27 MMOL/L (ref 21–32)
CREAT BLD-MCNC: 0.89 MG/DL
EGFRCR SERPLBLD CKD-EPI 2021: 82 ML/MIN/1.73M2 (ref 60–?)
ERYTHROCYTE [DISTWIDTH] IN BLOOD BY AUTOMATED COUNT: 15.9 %
GLOBULIN PLAS-MCNC: 3.4 G/DL (ref 2.8–4.4)
GLUCOSE BLD-MCNC: 102 MG/DL (ref 70–99)
HCT VFR BLD AUTO: 33.2 %
HGB BLD-MCNC: 10.2 G/DL
MCH RBC QN AUTO: 25.7 PG (ref 26–34)
MCHC RBC AUTO-ENTMCNC: 30.7 G/DL (ref 31–37)
MCV RBC AUTO: 83.6 FL
OSMOLALITY SERPL CALC.SUM OF ELEC: 297 MOSM/KG (ref 275–295)
PLATELET # BLD AUTO: 271 10(3)UL (ref 150–450)
POTASSIUM SERPL-SCNC: 3.6 MMOL/L (ref 3.5–5.1)
PROT SERPL-MCNC: 5.6 G/DL (ref 6.4–8.2)
Q-T INTERVAL: 300 MS
QRS DURATION: 96 MS
QTC CALCULATION (BEZET): 429 MS
R AXIS: -16 DEGREES
RBC # BLD AUTO: 3.97 X10(6)UL
SODIUM SERPL-SCNC: 141 MMOL/L (ref 136–145)
T AXIS: -12 DEGREES
VENTRICULAR RATE: 123 BPM
WBC # BLD AUTO: 6.3 X10(3) UL (ref 4–11)

## 2024-04-01 PROCEDURE — 99223 1ST HOSP IP/OBS HIGH 75: CPT | Performed by: INTERNAL MEDICINE

## 2024-04-01 PROCEDURE — 99232 SBSQ HOSP IP/OBS MODERATE 35: CPT | Performed by: NURSE PRACTITIONER

## 2024-04-01 PROCEDURE — 99233 SBSQ HOSP IP/OBS HIGH 50: CPT | Performed by: HOSPITALIST

## 2024-04-01 NOTE — PROGRESS NOTES
Patient A/Ox4, no complaints of pain. Family at bedside. Right foot dressing changed by son, RN at bedside to assess wound. Plan for XR esophagus tomorrow to check for aspiration. Possible discharge thereafter.

## 2024-04-01 NOTE — PLAN OF CARE
Assumed care of pt at 1930, family at bedside. Pt denies pain at this time. Received pt on 2L nc with sats 90-92. Once pt went to sleep he started mouth breathing and sats decreased to 70's and oxygen increased to maintain sats .90% oxygen at 5L at present. Sat 97% will titrate down as able. Dressing to right foot c/d/I. Remains on iv antibx. Remains in afib rate controlled. On primofit. Iv steroids to change to po in am. Up with assistance and walker. Iv sl. Bed alarm in place. All questions and concerns addressed. Pt voiced that he would like to go home.

## 2024-04-01 NOTE — CONSULTS
[facility]  Report of Consultation    Alejandro Guardado Patient Status:  Inpatient    1935 MRN [id number]   [facility] 2NE-A Attending Scar Alexander MD   Hosp Day # 4 PCP Stanislav Odonnell MD     Reason for Consultation:  Recurrent pneumonia    History of Present Illness:  Alejandro Guardado is a a(n) 89 year old male never smoker with PMH HFrEF, PAD who was admitted with pneumonia. He has been hospitalized multiple times over the past 4 months for recurrent pneumonia with each time the infiltrates are located in the right lung fields. He has had multiple speech therapy evaluations which have been negative.   He denies sensing aspiration, reflux, choking.  He can only detail that he develops a cough, chest congestion and dyspnea leading to his presentation. This episode he was admitted to the ICU for septic shock managed on vasopressors and antibiotics with improvement and now transferred to the floor. Presently he feels well, back to normal with no cough, dyspnea, pain. No fevers  Remains on RA  Denies any previous hx of lung disease, asthma bronchitis or COPD    History:  Past Medical History:   Diagnosis Date    Atrial fibrillation (Formerly [facility])     CAD (coronary artery disease) 2015    CHF (congestive heart failure) (Formerly [facility])     Esophageal reflux     Hearing impairment     Heart attack (Formerly [facility])     History of MI (myocardial infarction)     HTN (hypertension)     Hyperlipidemia     PAD (peripheral artery disease) (Formerly [facility]) 2023    Visual impairment      Past Surgical History:   Procedure Laterality Date    ANGIOGRAM      ANGIOPLASTY (CORONARY)      CABG      CATARACT  2023    FRACTURE SURGERY Left     left hip pinning    OTHER SURGICAL HISTORY Left 1977    knee surgery    OTHER SURGICAL HISTORY  2016    Left hip ORIF pinning    TONSILLECTOMY       Family History   Problem Relation Age of Onset    Cancer Father       reports that he has never smoked. He has never used smokeless  tobacco. He reports that he does not drink alcohol and does not use drugs.    Allergies:  Allergies   Allergen Reactions    Heparin ANAPHYLAXIS    Hydromorphone HALLUCINATION       Medications:   predniSONE  40 mg Oral Daily with breakfast    apixaban  5 mg Oral BID    meropenem  500 mg Intravenous Q12H    atorvastatin  40 mg Oral Nightly    clopidogrel  75 mg Oral Daily    finasteride  5 mg Oral Daily    folic acid  1 mg Oral Daily    gabapentin  300 mg Oral TID    pantoprazole  40 mg Oral QAM AC    tamsulosin  0.4 mg Oral Daily     metoclopramide, acetaminophen, melatonin, ondansetron, polyethylene glycol (PEG 3350), sennosides, bisacodyl    Review of Systems:   Constitutional: Negative for anorexia, chills, fatigue, fevers, malaise, night sweats and weight loss.  Eyes: Negative for visual disturbance, irritation and redness.  Ears, nose, mouth, throat, and face: Negative for hearing loss, tinnitus, nasal congestion, snoring, sore throat, hoarseness and voice change.  Respiratory: see HPI  Cardiovascular: Negative for chest pain, palpitations, irregular heart beats, syncope, fatigue, orthopnea, paroxysmal nocturnal dyspnea, lower extremity edema.  Gastrointestinal: Negative for dysphagia, odynophagia, reflux symptoms, nausea, vomiting, change in bowel habits, diarrhea, constipation and abdominal pain.  Integument/breast: Negative for rash, skin lesions, and pruritus.  Hematologic/lymphatic: Negative for easy bruising, bleeding, and lymphadenopathy.  Musculoskeletal: Negative for myalgias, arthralgias, muscle weakness.  Neurological: Negative for headaches, dizziness, seizures, memory problems, trouble swallowing, speech problems, gait problems and weakness.  : Denies dysuria, hematuria, urinary retention.  Behavioral/Psych: Normal affect, mood, speech. No AVH, No SI/HI    All other review of systems are negative.    Vital signs in last 24 hours:  Patient Vitals for the past 24 hrs:   BP Temp Temp src Pulse Resp  SpO2   04/01/24 1345 106/68 97.9 °F (36.6 °C) Oral 101 18 95 %   04/01/24 1059 120/86 97.7 °F (36.5 °C) Oral 92 18 92 %   04/01/24 0850 -- -- -- (!) 121 -- --   04/01/24 0628 -- -- -- 87 -- 97 %   04/01/24 0530 147/89 97.2 °F (36.2 °C) Oral 64 18 98 %   04/01/24 0011 -- -- -- 90 -- 97 %   04/01/24 0010 -- -- -- 94 -- (!) 75 %   04/01/24 0009 -- -- -- 92 -- 97 %   04/01/24 0004 -- -- -- 89 -- (!) 86 %   03/31/24 2357 -- -- -- 90 -- (!) 84 %   03/31/24 2354 126/76 97.2 °F (36.2 °C) Oral 80 18 95 %   03/31/24 2349 -- -- -- 90 -- (!) 77 %   03/31/24 2200 -- -- -- 84 -- 95 %   03/31/24 2054 114/73 97.1 °F (36.2 °C) Oral 102 18 92 %   03/31/24 1818 (!) 156/96 97.8 °F (36.6 °C) Oral 86 18 97 %   03/31/24 1414 (!) 135/91 98.1 °F (36.7 °C) Oral 99 18 95 %       Intake/Output:    Intake/Output Summary (Last 24 hours) at 4/1/2024 1712  Last data filed at 4/1/2024 1345  Gross per 24 hour   Intake 340 ml   Output 1100 ml   Net -760 ml         PHYSICAL EXAM  GEN: Appears alert, comfortable. No acute distress  PSYCH: Normal mood and affect, AAOX3  NEURO: CN 2-12 grossly intact, no focal deficits  HEENT: Atraumatic, normocephalic, EOMI, no icterus/hemorrhage, no conjunctival injection/discharge, nares normal  MOUTH: MMM, good dentition  NECK: Trachea midline, symmetric, no visible masses or scars, no crepitus, normal flexion/extension  CHEST: Normal chest excursion, no visible deformity or scars, no tenderness to palpation  PULMONARY:CTAB no wheeze. No distress  COR: RRR no m  GI: NABS X 4, S/NT/ND, No hernias or HSM  LYMPHATIC: No palpable or visible lymphadenopathy in neck, axillae, groin  MSK: Normal strength and sensation in all extremities  EXT: No overt deformities, No C/C/E, 2+ DP/PT pulses b/l   SKIN: No rashes, ulcers, nodules    Lab Data Review:  Recent Labs   Lab 03/30/24  0451 03/31/24  0423 04/01/24  0618   * 151* 102*   BUN 23 28* 25*   CREATSERUM 0.89 0.87 0.89   CA 8.6 8.9 8.7    142 141   K 4.0 4.0 3.6     111 109   CO2 26.0 28.0 27.0     Recent Labs   Lab 03/28/24  1131 03/29/24  0343 03/30/24  0451 03/31/24  0423 04/01/24  0618   RBC 4.87 4.12 3.87 3.88 3.97   HGB 12.7* 10.6* 9.8* 9.9* 10.2*   HCT 39.5 33.1* 31.8* 31.8* 33.2*   MCV 81.1 80.3 82.2 82.0 83.6   MCH 26.1 25.7* 25.3* 25.5* 25.7*   MCHC 32.2 32.0 30.8* 31.1 30.7*   RDW 15.8 16.0 16.0 15.9 15.9   NEPRELIM 20.51* 21.50*  --   --   --    WBC 23.3* 23.6* 16.0* 10.7 6.3   .0 305.0 241.0 261.0 271.0     No results for input(s): \"BNP\" in the last 168 hours.  No results for input(s): \"TROP\", \"CK\" in the last 168 hours.  Recent Labs   Lab 03/28/24  1555   INR 1.51*   PTT 31.8       Other Labs:     ABG:  No results for input(s): \"ABGPHT\", \"OEVPVF9B\", \"KZXSX3B\", \"ABGHCO3\", \"ABGBE\", \"TEMP\", \"MICHAEL\", \"SITE\", \"DEV\", \"THGB\" in the last 168 hours.    Invalid input(s): \"TEE45CAJ\", \"CHOB\"    Cultures:   Hospital Encounter on 03/28/24   1. Blood Culture     Status: None (Preliminary result)    Collection Time: 03/28/24 11:31 AM    Specimen: Blood,peripheral   Result Value Ref Range    Blood Culture Result No Growth 3 Days N/A     Recent Labs   Lab 03/28/24  2105   COLORUR Light-Yellow   CLARITY Clear   SPECGRAVITY 1.012   GLUUR Normal   BILUR Negative   KETUR Negative   BLOODURINE Negative   PHURINE 5.0   PROUR Negative   UROBILINOGEN Normal   NITRITE Negative   LEUUR Negative       Radiology:   CT CHEST (CPT=71250)    Result Date: 3/29/2024  CONCLUSION:  1. Consolidative opacities within the bilateral lungs, greatest in the right upper lobe.  Findings are likely infectious/inflammatory, though clinical correlation and follow-up to resolution is recommended. 2. There are few ground-glass nodular opacities in the right middle lobe, which may also be infectious/inflammatory, though assessment on follow-up is recommended to document resolution and exclude other etiologies. 3. Mild bronchiectasis in the bilateral lower lobes.  LOCATION:  [id number]   Dictated by  (CST): Andres Maldonado MD on 3/29/2024 at 1:42 PM     Finalized by (CST): Andres Maldonado MD on 3/29/2024 at 1:45 PM       XR CHEST AP PORTABLE  (CPT=71045)    Result Date: 3/28/2024  CONCLUSION:  Right upper lobe infiltrate/lobar pneumonia.  Continued clinical correlation and follow-up to resolution recommended.   LOCATION:  [facility]      Dictated by (CST): Enrique Brown MD on 3/28/2024 at 11:38 AM     Finalized by (CST): Enrique Brown MD on 3/28/2024 at 11:39 AM       XR CHEST AP PORTABLE  (CPT=71045)    Result Date: 3/16/2024  CONCLUSION:  Some patchy airspace opacities are present within the lungs suspicious for areas of pneumonia.  Viral pneumonia may be possible.  There may also be moderate vascular congestion and volume overload.  Follow-up is recommended to ensure resolution.  If clinical symptoms persist then consider CT.   LOCATION:  [facility]      Dictated by (CST): Gregg Fields MD on 3/16/2024 at 2:17 PM     Finalized by (CST): Gregg Fields MD on 3/16/2024 at 2:17 PM       XR CHEST PA + LAT CHEST (CPT=71046)    Result Date: 2/26/2024  CONCLUSION:  No acute cardiopulmonary process.   LOCATION:  [facility]   Dictated by (CST): Debbie Lozada MD on 2/26/2024 at 3:34 PM     Finalized by (CST): Debbie Lozada MD on 2/26/2024 at 3:38 PM       XR VIDEO SWALLOW (CPT=74230)    Result Date: 2/13/2024  PROCEDURE:  XR VIDEO SWALLOW (CPT=74230)  TECHNIQUE:  Video fluoroscopic swallowing study was performed in cooperation with the speech pathologist.  Barium of varying consistencies was administered orally with patient in lateral projection.  COMPARISON:  None.  INDICATIONS:  recurrent pneumonia; r/o silent aspiration  PATIENT STATED HISTORY: (As transcribed by Technologist)  R/O aspiration   CINE CAPTURES:  3 FLUORSCOPY TIME:  34 seconds RADIATION DOSE (AIR KERMA PRODUCT):  35.5 uGy/m2   FINDINGS:  PHARYNGEAL PHASE:  Normal for age. ASPIRATION:  None. PENETRATION:  Normal. OTHER:  Negative.  PLEASE SEE SPEECH PATHOLOGIST REPORT FOR  FULL ASSESSMENT OF SWALLOWING MECHANISM.   LOCATION:  [facility]    Dictated by (CST): Aidan Doss MD on 2/13/2024 at 1:30 PM     Finalized by (CST): Aidan Doss MD on 2/13/2024 at 1:30 PM       XR CHEST AP PORTABLE  (CPT=71045)    Result Date: 2/12/2024  CONCLUSION:  Right IJ catheter tip in SVC.  Diffuse infiltrates throughout the right lung unchanged.  Worsening airspace disease and atelectasis in the left lung base.  Stable opacity left upper lobe suprahilar region.  Heart size and pulmonary vascularity appear stable.  No pneumothorax.   LOCATION:  [id number]      Dictated by (CST): Debbie Lozada MD on 2/12/2024 at 4:32 PM     Finalized by (CST): Debbie Lozada MD on 2/12/2024 at 4:33 PM       XR CHEST AP PORTABLE  (CPT=71045)    Result Date: 2/12/2024  CONCLUSION:    Considerable worsening since the prior exam, now with extensive airspace disease throughout the upper and lower right lung especially right perihilar and right upper-mid lung, but also the right lower lung.  There may be minimal much more subtle similar infiltrates in the left upper and lower lung.  Concern for pneumonia, with asymmetric edema also possible.  Cardiac enlargement seen with vascular congestion present.  No sizable effusion or pneumothorax.  LOCATION:  [facility]      Dictated by (CST): Aidan Doss MD on 2/12/2024 at 11:45 AM     Finalized by (CST): Aidan Doss MD on 2/12/2024 at 11:48 AM       XR CHEST PA + LAT CHEST (CPT=71046)    Result Date: 1/9/2024  CONCLUSION:  There is significantly improved aeration of the bilateral lungs when compared to the previous exam. Peribronchial thickening with mild hyperinflation could be related to bronchitis and/or asthma. Clinical correlation recommended.    LOCATION:  [id number]   Dictated by (CST): Delfin Trujillo MD on 1/09/2024 at 3:42 PM     Finalized by (CST): Delfin Trujillo MD on 1/09/2024 at 3:43 PM          ASSESSMENT  Pneumonia, recurrent. The recurrent right sided opacities are most  suspicious for aspiration whether from choking/secretion clearance issues or reflux.  The infiltrates are not persistent and do clear in between episodes. Frustratingly he has had repeated normal speech therapy evaluations. Will need to assess for reflux, hiatal hernia, stricture and fistula  Acute hypoxic respiratory failure due to above, now resolved  Septic shock, resolved  HFrEF, compensated  Afib, controlled    PLAN  Continue close monitoring of respiratory status, wean o2 for sats >89%  Can stop antibiotics given negative cultures and rapid improvement  Obtain swallow study - esophageal follow through  If the above testing is normal, then he should have a bronchoscopy which can be arranged as outpatient - would need to hold plavix and apixaban prior to bronchoscopy    Thank you for the consultation.  Will follow with you.    Karel Romero MD

## 2024-04-01 NOTE — PROGRESS NOTES
[facility]   part of [facility]     Hospitalist Progress Note     Alejandro Guardado Patient Status:  Inpatient    1935 MRN [id number]   Location [facility] 4SW-A Attending Scar Alexander MD   Hosp Day # 4 PCP Stanislav Odonnell MD     Subjective:   Doing well     Objective:    Review of Systems:   A comprehensive review of systems was completed; pertinent positive and negatives stated in subjective.  Vital signs:  Temp:  [97.1 °F (36.2 °C)-98.1 °F (36.7 °C)] 97.2 °F (36.2 °C)  Pulse:  [] 87  Resp:  [12-23] 18  BP: (114-156)/(73-96) 147/89  SpO2:  [75 %-98 %] 97 %  Physical Exam:    General: No acute distress chronically ill appearing  Respiratory: diminished b/l, no wheezes, no rhonchi  Cardiovascular: S1, S2, RRR  Abdomen: Soft, NT/ND, +BS  Extremities: no edema, R foot wound please see notes and pix in media     Diagnostic Data:    Labs:  Recent Labs   Lab 24  1131 24  1555 24  0343 24  0451 24  0423 24  0618   WBC 23.3*  --  23.6* 16.0* 10.7 6.3   HGB 12.7*  --  10.6* 9.8* 9.9* 10.2*   MCV 81.1  --  80.3 82.2 82.0 83.6   .0  --  305.0 241.0 261.0 271.0   INR  --  1.51*  --   --   --   --      Recent Labs   Lab 24  0343 24  0451 24  0423   * 155* 151*   BUN 30* 23 28*   CREATSERUM 1.13 0.89 0.87   CA 8.6 8.6 8.9   ALB 2.5* 2.3* 2.4*    142 142   K 3.8  3.8 4.0 4.0    111 111   CO2 25.0 26.0 28.0   ALKPHO 53 51 54   AST 13* 20 27   ALT 24 24 47   BILT 0.7 0.5 0.4   TP 5.7* 5.6* 5.9*     Estimated Creatinine Clearance: 53.8 mL/min (based on SCr of 0.87 mg/dL).  Recent Labs   Lab 24  1555   PTP 18.3*   INR 1.51*        Microbiology  Hospital Encounter on 24   1. Blood Culture     Status: None (Preliminary result)    Collection Time: 24 11:31 AM    Specimen: Blood,peripheral   Result Value Ref Range    Blood Culture Result No Growth 3 Days N/A     Imaging: Reviewed in Epic.  Medications:     predniSONE  40 mg Oral Daily with breakfast    apixaban  5 mg Oral BID    meropenem  500 mg Intravenous Q12H    atorvastatin  40 mg Oral Nightly    clopidogrel  75 mg Oral Daily    finasteride  5 mg Oral Daily    folic acid  1 mg Oral Daily    gabapentin  300 mg Oral TID    pantoprazole  40 mg Oral QAM AC    tamsulosin  0.4 mg Oral Daily       Assessment & Plan:    #Septic Shock 2/2 PNA  -IVF stopped with CHF EF 20%  -PRESSORS OFF   -ICU and Cardio following  -cont abx and f/u Cx   -CT chest reviewed- per Pulm   -tapering stress steroids   -weaning O2     #PVCs with h/o NSVT- holding dobutamine per Cardiology   #Acute hypoxic respiratory failure - wean O2 as tolerated   #ERON- IVF and pressors and much improved   #Lactic acidosis 2/2 above   #Leukocytosis- stable on steroids IV for now   #Secondary adrenal insufficiency- -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 Steward   -plavix, statin  #GERD-PPI    #Foot wound- podiatry and wound care consult        Dr. Dickerson consult for Palliative care - recs much appreciated   -Recent hospitalizations:  This admission  -3/16-3/18- shock sepsis vs hypovolemic   -2/12-2/15 for septic shock secondary to multifocal pneumonia  -12/25-12/28 septic shock for gram-negative pneumonia  -12/1-12/6-hospitalization for pneumonia     D/w family/son bedside  F/u Pulm input       Scar Alexander MD  Supplementary Documentation:   Quality:  DVT Mechanical Prophylaxis:   SCDs, Early ambuation  DVT Pharmacologic Prophylaxis   Medication    apixaban (Eliquis) tab 5 mg                Code Status:  DNAR/Selective Treatment  Block: External urinary catheter in place  Block Duration (in days):   Central line:    DIEGO:   At this point Mr. Guardado is expected to be discharge to: tbd   **Certification      PHYSICIAN Certification of Need for Inpatient Hospitalization - Initial Certification    Patient will require inpatient services that will reasonably be expected to span two midnight's based on the clinical documentation in H+P.   Based on patients current state of illness, I anticipate that, after discharge, patient will require TBD.    The 21st Century Cures Act makes medical notes like these available to patients in the interest of transparency. Please be advised this is a medical document. Medical documents are intended to carry relevant information, facts as evident, and the clinical opinion of the practitioner. The medical note is intended as peer to peer communication and may appear blunt or direct. It is written in medical language and may contain abbreviations or verbiage that are unfamiliar.

## 2024-04-01 NOTE — TELEPHONE ENCOUNTER
From: Alejandro Guardado  To: Brian Moreno  Sent: 4/1/2024 3:01 PM CDT  Subject: Photo    Attached is an image from Monday, 4/1. Thank you.

## 2024-04-01 NOTE — OCCUPATIONAL THERAPY NOTE
OCCUPATIONAL THERAPY EVALUATION - INPATIENT    Room Number: 2601/2601-A  Evaluation Date: 4/1/2024     Type of Evaluation: Initial  Presenting Problem: Sepsis due to PNA (Lactic acidosis, ERON)    Physician Order: IP Consult to Occupational Therapy  Reason for Therapy:  ADL/IADL Dysfunction and Discharge Planning    Previous admissions:  3/16-3/18- shock sepsis vs hypovolemic (ICU) --> home  2/12-2/15 for septic shock secondary to multifocal pneumonia --> home  12/25-12/28 septic shock for gram-negative pneumonia --> home  12/1-12/6-hospitalization for pneumonia --> home  OCCUPATIONAL THERAPY ASSESSMENT   Patient is a 89 year old male admitted on 3/28/2024 with Presenting Problem: Sepsis due to PNA (Lactic acidosis, ERON). Co-Morbidities : h/o L foot wounds/transmetatarsal amputation, HFrEF, a.fib, CAD s/p CABG, HTN  Patient is currently functioning near baseline with BADLs, bed mobility, transfers, static standing balance, dynamic standing balance, and functional standing tolerance.  Prior to admission, patient's baseline is mod I with assist PRN for BADLs and functional mobility.  Patient met all OT goals at baseline assist level.  Patient reports no further questions/concerns at this time.     Patient will benefit from continued skilled OT Services at discharge to promote functional independence and safety with additional support and return home with home health OT      WEIGHT BEARING RESTRICTION  Weight Bearing Restriction: None                Recommendations for nursing staff:   Transfers: 1p CGA c. FWW  Toileting location: Toilet    EVALUATION SESSION:  Patient at start of session: Semi-supine in bed with nursing staff at bedside  FUNCTIONAL TRANSFER ASSESSMENT  Sit to Stand: Edge of Bed  Edge of Bed: Modified Independent  Toilet Transfer: Modified Independent (commode over toilet 2/2 TR at home)    BED MOBILITY  Rolling: Modified Independent  Supine to Sit : Modified Independent  Scooting: IND to scoot  EOB    BALANCE ASSESSMENT  Static Sitting: Independent  Sitting Unilateral: Independent  Static Standing: Modified Independent  Standing Bilateral: Supervision    FUNCTIONAL ADL ASSESSMENT       ACTIVITY TOLERANCE: Impaired; increased O2 needs    Pulse: (!) 121 (with activity)  Heart Rate Source: Monitor                   O2 SATURATIONS  Oxygen Therapy  SPO2% on Room Air at Rest: 97  At rest oxygen flow (liters per minute): 2  SPO2% Ambulation on Oxygen: 95  Ambulation oxygen flow (liters per minute): 2    COGNITION  Overall Cognitive Status:  WFL - within functional limits  COGNITION ASSESSMENTS       Upper Extremity:   ROM: within functional limits   Strength: is within functional limits   Coordination:  Gross motor: WFL  Fine motor: WFL  Sensation: Light touch:  intact    EDUCATION PROVIDED  Patient: Role of Occupational Therapy; Plan of Care; Adaptive Equipment Recommendations; Fall Prevention; Posture/Positioning; Energy Conservation; Compensatory ADL Techniques  Patient's Response to Education: Verbalized Understanding; Returned Demonstration    Equipment used: FWW  Demonstrates functional use    Therapist comments: Pt pleasant and agreeable to therapy. Reports he lives alone but son visits daily and has been cleaning out the house. Son has made adaptations for safety within the home, added grab bars in bathroom, toilet riser, recliner chair, hospital bed in dining room. Educated pt on safe transfer techniques and energy conservation techniques. 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 Limited 2/2 increased O2 needs, BL RA. Uses O2 at night, unsure if CPAP.     Patient End of Session: Up in chair;Needs met;Call light within reach;All patient questions and concerns addressed;Alarm set    OCCUPATIONAL PROFILE    HOME SITUATION  Type of Home: House  Home Layout: Two level;Able to live on main level  Lives With: Alone (Son visits daily)    Toilet and Equipment: Toilet riser with arms;Grab bar  Shower/Tub  and Equipment: Walk-in shower;Grab bar;Shower chair  Other Equipment: Sock aid;Hospital bed (Recliner, home O2 at night)    Occupation/Status: retired construction             Prior Level of Function: mod I with BADLs, goes upstairs 2x/wk to bathe, son can assist with BADLs PRN, mod I with functional mobility    SUBJECTIVE  \"I haven't been out of bed in 200 years\"    PAIN ASSESSMENT  [place]  Location: None reported or observed       OBJECTIVE  Precautions: Bed/chair alarm;Hard of hearing  Fall Risk: High fall risk    WEIGHT BEARING RESTRICTION  Weight Bearing Restriction: None                AM-PAC ‘6-Clicks’ Inpatient Daily Activity Short Form  -   Putting on and taking off regular lower body clothing?: A Little  -   Bathing (including washing, rinsing, drying)?: A Little  -   Toileting, which includes using toilet, bedpan or urinal? : A Little  -   Putting on and taking off regular upper body clothing?: None  -   Taking care of personal grooming such as brushing teeth?: None  -   Eating meals?: None    AM-PAC Score:  Score: 21  Approx Degree of Impairment: 32.79%  Standardized Score (AM-PAC Scale): 44.27      ADDITIONAL TESTS     NEUROLOGICAL FINDINGS        PLAN   Patient has been evaluated and presents with no skilled Occupational Therapy needs at this time.  Patient discharged from Occupational Therapy services.  Please re-order if a new functional limitation presents during this admission.      Patient Evaluation Complexity Level:   Occupational Profile/Medical History LOW - Brief history including review of medical or therapy records    Specific performance deficits impacting engagement in ADL/IADL LOW  1 - 3 performance deficits    Client Assessment/Performance Deficits LOW - No comorbidities nor modifications of tasks    Clinical Decision Making LOW - Analysis of occupational profile, problem-focused assessments, limited treatment options    Overall Complexity LOW     OT Session Time: 20  minutes  Self-Care Home Management: 14 minutes  Therapeutic Activity: 0 minutes  Neuromuscular Re-education: 0 minutes  Therapeutic Exercise: 0 minutes  Cognitive Skills: 0 minutes  Sensory Integrative: 0 minutes  Orthotic Management and Trainin minutes  Can add/delete any of these

## 2024-04-01 NOTE — PAYOR COMM NOTE
--------------  CONTINUED STAY REVIEW  3/30-4/1  Payor: [facility] MEDICARE ADV PPO  Subscriber #:  [id number]  Authorization Number: [id number]    Admit date: 3/28/24  Admit time:  2:45 PM    Admitting Physician: Ruth Cardoso MD  Attending Physician:  Scar Alexander MD  Primary Care Physician: Stanislav Odonnell MD    REVIEW DOCUMENTATION:  3/30   Critical Care Progress Note      Assessment / Plan:  Septic shock - secondary to recurrent pneumonia  - received fluid resuscitation   - remains off pressors  - lactate elevated  - stress dose steroids (on chronic steroids), start tapering  - further as below  Acute respiratory failure - due to pneumonia  - wean oxygen as able, currently 2 LPM  - further as below  ID: Recurrent pneumonia - now 5th event since 12/2023, last on zosyn then augmentin, CT chest showed bilateral opacities, greater in the RUL consistent with pneumonia, PCT very elevated  - meropenem (3/28/202)  - follow up blood and sputum cultures  - SLP eval  - repeat CT chest outpatient  CV: h/o afib  - Rate control and anticoagulated  - Cardiology following  Acute kidney injury - likely prerenal from sepsis  - resolved  - monitor UOP and renal function  Proph  - apixaban  Dispo  - DNAR [facility].   - stable for floor, [facility] to follow out of ICU        3/30  Temp:  [98 °F (36.7 °C)-98.6 °F (37 °C)] 98 °F (36.7 °C)  Pulse:  [] 86  Resp:  [12-23] 13  BP: (102-140)/(59-84) 126/78  SpO2:  [79 %-100 %] 98 %  Physical Exam:    General: No acute distress chronically ill appearing  Respiratory: diminished b/l, no wheezes, no rhonchi  Cardiovascular: S1, S2, RRR  Abdomen: Soft, NT/ND, +BS  Extremities: no edema, R foot wound please see notes and pix in media      Diagnostic Data:    Labs:         Recent Labs   Lab 03/28/24  1131 03/28/24  1555 03/29/24  0343 03/30/24  0451   WBC 23.3*  --  23.6* 16.0*   HGB 12.7*  --  10.6* 9.8*   MCV 81.1  --  80.3 82.2   .0  --  305.0 241.0   INR  --  1.51*  --   --              Recent Labs   Lab 03/28/24  1131 03/29/24  0343 03/30/24  0451   * 134* 155*   BUN 38* 30* 23   CREATSERUM 2.15* 1.13 0.89   CA 9.5 8.6 8.6   ALB 3.0* 2.5* 2.3*    140 142   K 3.3* 3.8  3.8 4.0    111 111   CO2 26.0 25.0 26.0   ALKPHO 59 53 51   AST 15 13* 20   ALT 29 24 24   BILT 0.7 0.7 0.5   TP 6.4 5.7* 5.6*     Medications:    hydrocortisone sodium succinate  50 mg Intravenous Q8H LUARY    meropenem  500 mg Intravenous Q12H     Assessment & Plan:   #Septic Shock 2/2 PNA  -IVF stopped with CHF EF 20%  -PRESSORS OFF   -ICU and Cardio following  -cont abx and f/u Cx   -CT chest reviewed- per Pulm   -tapering stress steroids      #PVCs with h/o NSVT- holding dobutamine per Cardiology   #Acute hypoxic respiratory failure - wean O2 as tolerated   #ERON- IVF and pressors and much improved   #Lactic acidosis 2/2 above   #Leukocytosis- stable on steroids IV for now   #Secondary adrenal insufficiency- -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 Steward   -plavix, statin  #GERD-PPI    #Foot wound- podiatry and wound care consult         Dr. Dickerson consult for Palliative care - recs much appreciated   -Recent hospitalizations:  This admission  -3/16-3/18- shock sepsis vs hypovolemic   -2/12-2/15 for septic shock secondary to multifocal pneumonia  -12/25-12/28 septic shock for gram-negative pneumonia  -12/1-12/6-hospitalization for pneumonia           3/31  Temp:  [97.3 °F (36.3 °C)-98 °F (36.7 °C)] 98 °F (36.7 °C)  Pulse:  [] 85  Resp:  [11-23] 11  BP: (101-140)/(63-93) 137/93  SpO2:  [93 %-99  %] 97 %  Physical Exam:    General: No acute distress chronically ill appearing  Respiratory: diminished b/l, no wheezes, no rhonchi  Cardiovascular: S1, S2, RRR  Abdomen: Soft, NT/ND, +BS  Extremities: no edema, R foot wound please see notes and pix in media      Diagnostic Data:    Labs:          Recent Labs   Lab 03/28/24  1131 03/28/24  1555 03/29/24  0343 03/30/24  0451 03/31/24  0423   WBC 23.3*  --  23.6* 16.0* 10.7   HGB 12.7*  --  10.6* 9.8* 9.9*   MCV 81.1  --  80.3 82.2 82.0   .0  --  305.0 241.0 261.0   INR  --  1.51*  --   --   --             Recent Labs   Lab 03/29/24  0343 03/30/24  0451 03/31/24  0423   * 155* 151*   BUN 30* 23 28*   CREATSERUM 1.13 0.89 0.87   CA 8.6 8.6 8.9   ALB 2.5* 2.3* 2.4*    142 142   K 3.8  3.8 4.0 4.0    111 111   CO2 25.0 26.0 28.0   ALKPHO 53 51 54   AST 13* 20 27   ALT 24 24 47   BILT 0.7 0.5 0.4   TP 5.7* 5.6* 5.9*     Medications:    hydrocortisone sodium succinate  50 mg Intravenous Q8H LAURY    meropenem  500 mg Intravenous Q12H     Assessment & Plan:   #Septic Shock 2/2 PNA  -IVF stopped with CHF EF 20%  -PRESSORS OFF   -ICU and Cardio following  -cont abx and f/u Cx   -CT chest reviewed- per Pulm   -tapering stress steroids      #PVCs with h/o NSVT- holding dobutamine per Cardiology   #Acute hypoxic respiratory failure - wean O2 as tolerated   #ERON- IVF and pressors and much improved   #Lactic acidosis 2/2 above   #Leukocytosis- stable on steroids IV for now   #Secondary adrenal insufficiency- -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 - weaning stress dose  #Chronic HFrEF, ischemic cardiomyopathy with LVEF 20%- monitor closely  #BPH  #Afib RVR- physiologic as expect tachycardia with shock- improving   #Severe Aortic Stenosis- not a candidate for TAVR per Cardiology   #CAD s/p CABG   #DL-statin   #PAD with hx of non-healing R foot wound s/p thrombectomy and revascularization/stent 12/4/23 by   [facility]   -plavix, statin  #GERD-PPI    #Foot wound- podiatry and wound care consult         Dr. Dickerson consult for Palliative care - recs much appreciated   -Recent hospitalizations:  This admission  -3/16-3/18- shock sepsis vs hypovolemic   -2/12-2/15 for septic shock secondary to multifocal pneumonia  -12/25-12/28 septic shock for gram-negative pneumonia  -12/1-12/6-hospitalization for pneumonia      3/31   Critical Care Progress Note      Assessment / Plan:  Septic shock - secondary to recurrent pneumonia  - received fluid resuscitation   - remains off pressors  - stress dose steroids (on chronic steroids), tapering  - further as below  Acute respiratory failure - due to pneumonia  - wean oxygen as able, currently 2 LPM  - further as below  ID: Recurrent pneumonia - now 5th event since 12/2023, last on zosyn then augmentin, CT chest showed bilateral opacities, greater in the RUL consistent with pneumonia, PCT very elevated  - meropenem (3/28-)  - follow up blood and sputum cultures  - SLP eval  - repeat CT chest outpatient  CV: h/o afib  - Rate control and anticoagulated  - Cardiology following  Acute kidney injury - likely prerenal from sepsis  - resolved  - monitor UOP and renal function  Proph  - apixaban  Dispo  - [facility].   - stable for floor, [facility] to follow out of ICU          4/1  Temp:  [97.1 °F (36.2 °C)-98.1 °F (36.7 °C)] 97.2 °F (36.2 °C)  Pulse:  [] 87  Resp:  [12-23] 18  BP: (114-156)/(73-96) 147/89  SpO2:  [75 %-98 %] 97 %  Physical Exam:    General: No acute distress chronically ill appearing  Respiratory: diminished b/l, no wheezes, no rhonchi  Cardiovascular: S1, S2, RRR  Abdomen: Soft, NT/ND, +BS  Extremities: no edema, R foot wound please see notes and pix in media      Diagnostic Data:    Labs:           Recent Labs   Lab 03/28/24  1131 03/28/24  1555 03/29/24  0343 03/30/24  0451 03/31/24  0423 04/01/24  0618   WBC 23.3*  --  23.6* 16.0* 10.7 6.3   HGB 12.7*  --  10.6* 9.8*  9.9* 10.2*   MCV 81.1  --  80.3 82.2 82.0 83.6   .0  --  305.0 241.0 261.0 271.0   INR  --  1.51*  --   --   --   --       meropenem  500 mg Intravenous Q12H     Assessment & Plan:   #Septic Shock 2/2 PNA  -IVF stopped with CHF EF 20%  -PRESSORS OFF   -ICU and Cardio following  -cont abx and f/u Cx   -CT chest reviewed- per Pulm   -tapering stress steroids   -weaning O2      #PVCs with h/o NSVT- holding dobutamine per Cardiology   #Acute hypoxic respiratory failure - wean O2 as tolerated   #ERON- IVF and pressors and much improved   #Lactic acidosis 2/2 above   #Leukocytosis- stable on steroids IV for now   #Secondary adrenal insufficiency- -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 Steward   -plavix, statin  #GERD-PPI    #Foot wound- podiatry and wound care consult         Dr. Dickerson consult for Palliative care - recs much appreciated   -Recent hospitalizations:  This admission  -3/16-3/18- shock sepsis vs hypovolemic   -2/12-2/15 for septic shock secondary to multifocal pneumonia  -12/25-12/28 septic shock for gram-negative pneumonia  -12/1-12/6-hospitalization for pneumonia             MEDICATIONS ADMINISTERED IN LAST 1 DAY:  apixaban (Eliquis) tab 5 mg       Date Action Dose Route User    4/1/2024 0844 Given 5 mg Oral Shannon Galvin RN    3/31/2024 2026 Given 5 mg Oral Lady Bullard RN          atorvastatin (Lipitor) tab 40 mg       Date Action Dose Route User    3/31/2024 2026 Given 40 mg Oral Lady Bullard RN          clopidogrel (Plavix) tab 75 mg       Date Action Dose Route User    4/1/2024  0844 Given 75 mg Oral Shannon Galvin RN          finasteride (Proscar) tab 5 mg       Date Action Dose Route User    4/1/2024 0844 Given 5 mg Oral Shannon Galvin RN          folic acid (Folvite) tab 1 mg       Date Action Dose Route User    4/1/2024 0844 Given 1 mg Oral Shannon Galvin RN          gabapentin (Neurontin) cap 300 mg       Date Action Dose Route User    4/1/2024 0844 Given 300 mg Oral Shannon Galvin RN    3/31/2024 2026 Given 300 mg Oral Lady Bullard RN    3/31/2024 1652 Given 300 mg Oral Kwaku Summers RN          meropenem (Merrem) 500 mg in sodium chloride 0.9% 100 mL IVPB-MBP       Date Action Dose Route User    4/1/2024 0410 New Bag 500 mg Intravenous Lady Bullard RN    3/31/2024 1652 New Bag 500 mg Intravenous Kwaku Summers RN          pantoprazole (Protonix) DR tab 40 mg       Date Action Dose Route User    4/1/2024 0540 Given 40 mg Oral Lady Bullard RN          predniSONE (Deltasone) tab 40 mg       Date Action Dose Route User    4/1/2024 0844 Given 40 mg Oral Shannon Galvin RN          tamsulosin (Flomax) cap 0.4 mg       Date Action Dose Route User    4/1/2024 0844 Given 0.4 mg Oral Shannon Galvin RN            Vitals (last day)       Date/Time Temp Pulse Resp BP SpO2 Weight O2 Device O2 Flow Rate (L/min) [facility]    04/01/24 1059 97.7 °F (36.5 °C) 92 18 120/86 92 % -- None (Room air) -- NB    04/01/24 0850 -- 121 -- -- -- -- -- -- AM    04/01/24 0700 -- -- -- -- -- -- Nasal cannula 6 L/min DL    04/01/24 0628 -- 87 -- -- 97 % -- -- -- BR    04/01/24 0530 -- -- -- -- -- -- -- 6 L/min KT    04/01/24 0530 97.2 °F (36.2 °C) 64 18 147/89 98 % -- Nasal cannula -- BR    04/01/24 0011 -- 90 -- -- 97 % -- -- 6 L/min KT    04/01/24 0010 -- 94 -- -- 75 % -- -- 4 L/min KT    04/01/24 0009 -- 92 -- -- 97 % -- -- 4 L/min KT    04/01/24 0004 -- 89 -- -- 86 % -- -- 4 L/min KT    03/31/24 2357 -- 90 -- -- 84 % -- -- 4 L/min KT    03/31/24 2354 97.2 °F (36.2 °C) 80 18 126/76  95 % -- Nasal cannula 2 L/min     03/31/24 2349 -- 90 -- -- 77 % -- Nasal cannula 2 L/min     03/31/24 2200 -- 84 -- -- 95 % -- -- --     03/31/24 2054 97.1 °F (36.2 °C) 102 18 114/73 92 % -- Nasal cannula 2 L/min     03/31/24 1818 97.8 °F (36.6 °C) 86 18 156/96 97 % -- Nasal cannula 2 L/min NB    03/31/24 1414 98.1 °F (36.7 °C) 99 18 135/91 95 % -- Nasal cannula 2 L/min NB    03/31/24 1000 -- 102 15 -- 95 % -- -- --     03/31/24 0900 -- 108 23 -- 97 % -- -- --     03/31/24 0800 97.9 °F (36.6 °C) 81 12 148/91 94 % -- Nasal cannula 2 L/min     03/31/24 0700 -- 85 11 -- 97 % -- -- --     03/31/24 0600 -- 92 13 -- 97 % -- -- --     03/31/24 0500 -- 87 12 -- 95 % -- -- --     03/31/24 0400 98 °F (36.7 °C) 89 23 137/93 97 % -- Nasal cannula 2 L/min     03/31/24 0300 -- 89 16 -- 98 % -- -- --     03/31/24 0200 -- 86 15 -- 97 % -- -- --     03/31/24 0100 -- 95 20 -- 97 % -- -- --     03/31/24 0000 97.9 °F (36.6 °C) 88 11 140/88 95 % -- Nasal cannula 2 L/min

## 2024-04-01 NOTE — PROGRESS NOTES
Residential Palliative Liaison received palliative referral for community PC services. Met with Evan and son Rita bedside to discuss Residential PC services. Residential PC services discussed and is agreeable to our PC services upon DC home.      Alina Lawrence  Residential Palliative Liaison  [phone]

## 2024-04-01 NOTE — PROGRESS NOTES
Residential Palliative Liaison received palliative referral for community PC services. Waiting to obtain insurance (verification/authorization) prior to pursuing.         Alina Lawrence  Residential Palliative Liaison   [phone]

## 2024-04-01 NOTE — PROGRESS NOTES
[facility]   part of [facility]  Palliative Care Follow Up    Alejandro Guardado Patient Status:  Inpatient    1935 MRN [id number]   Location [facility] 2NE-A Attending Scar Alexander MD   Hosp Day # 4 PCP Stanislav Odonnell MD   2601/2601-A    Date of visit:  2024  Day 4 of hospitalization.        Summary:         History of Present Illness: Alejandro Guardado is a 89 year old male with HFrEF (20-25%), mod-severe aortic stenosis, PAD with chronic wounds, CAD, HTN, Afib,  hearing/visual impairments who was admitted on 3/28/2024 for SOB and cough. Found to by hypoxic and hypotensive with CXR c/f recurrent PNA. Work up in our hospital revealed ERON, septic shock, PNA, and lactic acidosis.  History was obtained from [facility] and patient, son, and DIL. In brief, pt lives alone but has a son within minutes. This is his 5th hospitalization for PNA in the past 4 months.     Palliative care is following for support with goals of care.    Interval Events: Note increased O2 needs while asleep overnight, now resolved.   Case d/w RN and primary MD    SUBJECTIVE  Review of Systems - Palliative Care Symptom Assessment     Evan reports feeling ok today. He is up in chair with son at bedside.  Feeling prepared to go home when cleared following d/w primary this AM, awaiting further input from pulm.    OBJECTIVE     Hematology:   Lab Results   Component Value Date    WBC 6.3 2024    HGB 10.2 (L) 2024    HCT 33.2 (L) 2024    .0 2024       Chemistry:  Lab Results   Component Value Date    CREATSERUM 0.89 2024    BUN 25 (H) 2024     2024    K 3.6 2024     2024    CO2 27.0 2024     (H) 2024    CA 8.7 2024    ALB 2.2 (L) 2024    ALKPHO 54 2024    BILT 0.4 2024    TP 5.6 (L) 2024    AST 37 2024    ALT 56 2024    MG 2.3 2024    PHOS 2.6 2023       Imaging:  No results found.    Vital  Signs:  Blood pressure 120/86, pulse 92, temperature 97.7 °F (36.5 °C), temperature source Oral, resp. rate 18, height 5' 7\" (1.702 m), weight 221 lb 1.9 oz (100.3 kg), SpO2 92%.  Body mass index is 34.63 kg/m².    Physical Exam:     GENERAL: alert up in chair. Appears chronically ill. NAD.  HEENT: [place]  RESPIRATORY: no increased effort at rest on room air.  NEUROLOGIC: Alert and oriented x4. RUSS.  PSYCHIATRIC:  pleasant    Palliative Performance Scale: 75%   Palliative Performance Scale   % Ambulation Activity Level Self-Care Intake Consciousness   100 Full Normal Full   Normal Full   90 Full No disease  Normal Full Normal Full   80 Full Some disease  Normal w/effort Full Normal or  Reduced Full   70 Reduced Some disease  Can't perform job Full Normal or   Reduced Full   60 Reduced Significant disease  Can't perform hobby Occasional  Assist Normal or   Reduced Full or confused   50 Mainly sit/lie Extensive Disease  Can't do any work Partial Assist Normal or Reduced Full or confused   40 Mainly in bed Extensive Disease  Can't do any work Mainly Assist Normal or Reduced Full or confused   30 Bedbound Extensive Disease  Can't do any work Max Assist  Total Care Reduced Drowsy/confused   20 Bedbound Extensive Disease  Can't do any work Max Assist  Total Care Minimal Drowsy/confused   10 Bedbound/coma Extensive Disease  Can't do any work  coma  Max Assist  Total Care Mouth care Drowsy or coma   0 Death       Palliative Care Assessment:     Goals of Care Discussion:   Case d/w RN and Primary on unit this AM.  Follow up with Evan and his son at bedside to confirm. They are both interested in hearing from pulm re status of PNA and possible suggestions aimed at reducing frequency at which he develops PNA and requires hospitalization.     Though Evan states he prefers not to be hospitalized, he wants to keep the option open for treatment to tx/reverse PNA and return to his current baseline. He shared he is \"getting close\" to  setting limit for no further hospital stays, but not there yet - son confirms not ready to explore hospice further at this time.    Both agree with having support of [facility] at [place], and we discussed when to involve PC provider to support with discussions surrounding QOL, GOC, and further limit setting.    Son voiced plans to contact spiritual care to finalize HCPOA document at the bedside.     Emotional support provided.      Problem List:       Principal Problem:    Sepsis due to pneumonia (HCC)  Active Problems:    Shock (HCC)    ERON (acute kidney injury) ([facility])    Lactic acidosis    Leukocytosis, unspecified type    Palliative care encounter    Counseling regarding advance care planning and goals of care    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) ([facility])      Palliative Care Recommendations/Plan:         Goals of Care:    Evan prefers to not be re-hospitalized, but hospice not in line with GOC at this time.  [facility] is arranged to follow at home to continue supporting with disucssions surrounding QOL and GOC.     Code Status: DNAR/Selective Treatment.   POLST on file.    HCPOA:  Document at bedside completed to appoint son, and awaiting pt and witness signature.    Disposition:  anticipating home with PC.      A total of 35 minutes were spent on this visit, which included all of the following:  Chart review, direct face to face contact, history taking, physical examination, counseling and coordinating care, and documentation.        Reviewed the above with patient's RN.    Thank you for inviting Palliative Care to participate in the care of Alejandro Guardado and family.       Will sign off as GOC are established.      MATTIE Bartholomew  Palliative Care    4/1/2024  11:02 AM    The 21st Century Cures Act makes medical notes like these available to patients in the interest of transparency. Please be advised this is a medical document. Medical documents are intended to carry relevant information, facts  as evident, and the clinical opinion of the practitioner. The medical note is intended as a peer to peer communication, and may appear blunt or direct. It is written in medical language and may contain abbreviations or verbiage that are unfamiliar.

## 2024-04-01 NOTE — CONGREGATE LIVING REVIEW
Congregate Living Authorization    [facility] ([facility]) has reviewed this case and the committee DOES NOT RECOMMEND discharge to a skilled nursing facility under skilled care.    The CLRC recommends:  Home with home health and any other appropriate caregiver assistance or medical equipment as needed vs respite in SNF.     Does not appear to have skilled services for DONOVAN, but additional home support appears to be needed.    For questions regarding CLRC approval process, please contact the CM assigned to the case.  For questions regarding RN discharge workflow, please contact the unit Clinical Leader.

## 2024-04-01 NOTE — PHYSICAL THERAPY NOTE
PHYSICAL THERAPY TREATMENT NOTE - INPATIENT    Room Number: 2601/2601-A     Session: 1     Number of Visits to Meet Established Goals: 3    Presenting Problem: sepsis, PNA  Co-Morbidities : h/o L foot wounds    PHYSICAL THERAPY MEDICAL/SOCIAL HISTORY  History related to current admission: Patient is a 89 year old male admitted on 3/28/2024 from home for sepsis.  Pt diagnosed with PNA.     HOME SITUATION  Type of Home: House   Home Layout: Two level     Lives With: Alone (reorts son over daily)  Patient Owned Equipment: Rolling walker     Prior Level of [place]: Pt reports mod ind PTA.  Pt states son is over daily to assist as needed.      ASSESSMENT   Patient demonstrates good  progress this session, goals  remain in progress.    Patient continues to function below baseline with bed mobility, transfers, gait, and stair negotiation.  Contributing factors to remaining limitations include decreased functional strength, decreased endurance/aerobic capacity, impaired standing balance, decreased muscular endurance, and increased O2 needs from baseline.  Next session anticipate patient to progress bed mobility, transfers, gait, and stair negotiation.  Physical Therapy will continue to follow patient for duration of hospitalization.    Patient continues to benefit from continued skilled PT services: at discharge to promote functional independence and safety with additional support and return home with home health PT.    PLAN  PT Treatment Plan: Bed mobility;Body mechanics;Endurance;Energy conservation;Patient education;Family education;Gait training;Range of motion;Strengthening;Stair training;Transfer training;Balance training  Rehab Potential : Good  Frequency (Obs):  (2-3x/week)    CURRENT GOALS   Goal #1 Patient is able to demonstrate supine - sit EOB @ level: supervision      Goal #2 Patient is able to demonstrate transfers Sit to/from Stand at assistance level: supervision      Goal #3 Patient is able to  ambulate 50 feet with assist device: walker - rolling at assistance level: supervision      Goal #4  Patient is able to stair climb 1 flight of stairs with supervision   Goal #5     Goal #6     Goal Comments: Goals established on 3/30/2024  2024 all goals ongoing     SUBJECTIVE  \"I need to go home\"     OBJECTIVE  Precautions: Bed/chair alarm    WEIGHT BEARING RESTRICTION                   PAIN ASSESSMENT   Ratin          BALANCE                                                                                                                       Static Sitting: Good  Dynamic Sitting: Good           Static Standing: Fair -  Dynamic Standing: Poor +    ACTIVITY TOLERANCE   Pt denies dizziness                      O2 WALK   SPO2 97% on 2L O2 at rest   SPO2 95% on 2L O2 with ambulation      AM-PAC '6-Clicks' INPATIENT SHORT FORM - BASIC MOBILITY  How much difficulty does the patient currently have...  Patient Difficulty: Turning over in bed (including adjusting bedclothes, sheets and blankets)?: None   Patient Difficulty: Sitting down on and standing up from a chair with arms (e.g., wheelchair, bedside commode, etc.): A Little   Patient Difficulty: Moving from lying on back to sitting on the side of the bed?: A Little   How much help from another person does the patient currently need...   Help from Another: Moving to and from a bed to a chair (including a wheelchair)?: A Little   Help from Another: Need to walk in hospital room?: A Little   Help from Another: Climbing 3-5 steps with a railing?: A Little       AM-PAC Score:  Raw Score: 19   Approx Degree of Impairment: 41.77%   Standardized Score (AM-PAC Scale): 45.44   CMS Modifier (G-Code): CK    FUNCTIONAL ABILITY STATUS  Gait Assessment   Functional Mobility/Gait Assessment  Gait Assistance: Supervision;Minimum assistance  Distance (ft): 100  Assistive Device: Rolling walker  Pattern: Shuffle (flexed posture)    Skilled Therapy Provided: Per RN okay to work  with pt. Pt received in supine and was agreeable to PT session.     Bed Mobility:  Rolling: NT   Supine<>Sit: supervision, HOB elevated    Sit<>Supine: NT     Transfer Mobility:  Sit<>Stand: supervision   Stand<>Sit: supervision   Gait: pt ambulated with RW and supervision that with fatigue regressed to min A. With fatigue pt with increased flexed posture, walker far ahead of self, and assist to manage walker.     Therapist's Comments: Pt educated on role of therapy, goals for session, safety, fall prevention, and activity recommendations.     Pt educated that while in the hospital to be up to chair for meals, ambulating to/from bathroom, and ambulating 3-4 times per day with staff.       THERAPEUTIC EXERCISES  Lower Extremity Ankle pumps  Glut sets  Quad sets     Upper Extremity N/a     Position reclined     Repetitions   10   Sets   2     Patient End of Session: Up in chair;Needs met;Call light within reach;RN aware of session/findings;All patient questions and concerns addressed;Alarm set    PT Session Time: 25 minutes  Gait Trainin minutes  Therapeutic Exercise: 12 minutes

## 2024-04-01 NOTE — PROGRESS NOTES
Subjective   Alejandro Guardado is a 89 year old male.    Chief Complaint   Patient presents with    Wound Care     Patient arrives for a wound care follow up visit. Patient arrives wearing a slipper, surgilast, gauze wrapped around the foot/wound, and medipore to hold on. Patient reports no pain and no concerns at this time.        HPI  Alejandro Guardado is a 88 year old male with PAD who is returning to clinic for recheck of right foot.  Patient is accompanied today by his son and daughter-in-law, who is returning to clinic today for recheck on right foot.  Patient is doing well today.  He was recently hospitalized with pneumonia, but has since been discharged.  His family continues to assist him with dressing changes utilizing Cellerate every 3 days.  Patient has a house slipper on today.  Denies any pain.  No other concerns at this time.    Review of Systems  Denies nausea, vomiting, fever, chills, shortness of breath, chest pain, and calf pain.    Objective    Physical Exam:    Derm:  Wound Assessment  Wound 03/28/24 Tibial Right (Active)       Wound 03/28/24 Dorsal;Right (Active)       Vascular: DP and PT pulse faintly palpable.  Improvements to pitting edema noted to right lower extremity.  Brisk refill noted to stump site and cool to touch to the entire right foot noted compared to recent visits.  Pitting edema noted to bilateral lower extremities (left worse than right).  Some coolness noted to the right foot.  Musculoskeletal: no acute deformities noted.  In a wheelchair today  Neurological: Gross sensation intact via light touch.  Protective sensation diminished via Ipswitch test.    Vital Signs  Vital Signs    03/25/24 1100   BP: 114/69   Pulse: 92   Resp: 16   Temp: 98 °F (36.7 °C)   PainSc: 0 - (None)         Allergies  Allergies   Allergen Reactions    Heparin ANAPHYLAXIS    Hydromorphone HALLUCINATION       Assessment    Encounter Diagnosis  1. Foot ulcer, right, with fat layer exposed (HCC)    2. PAD  (peripheral artery disease) ([facility])    3. History of transmetatarsal amputation of foot ([facility])    4. Gait difficulty        Problem List  Patient Active Problem List   Diagnosis    Closed minimally displaced zone I fracture of sacrum ([facility])    At risk for falling    CAD (coronary artery disease)    Ischemic cardiomyopathy    Azotemia    Arrhythmia    Esophageal reflux    History of MI (myocardial infarction)    Mixed hyperlipidemia    Primary hypertension    Dizziness    Medication side effect    Closed left hip fracture ([facility])  Global 6/22/2016    Status post hip surgery    Left shoulder distal clavical resection and excision of ganglion cyst of soft tissue mass   Global  09/17/2020    Orthopedic aftercare for healing traumatic hip fracture, left, closed    Closed left hip fracture, with routine healing, subsequent encounter    Osteoarthrosis, localized, primary, knee, left    Osteoarthrosis, localized, primary, knee, right    Gangrene ([facility])    PAD (peripheral artery disease) ([facility])    Benign prostatic hyperplasia with incomplete bladder emptying    Gangrene of foot ([facility])    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) ([facility])    Leukocytosis    Atrial fibrillation with RVR ([facility])    Hypokalemia    Acute kidney injury ([facility])    Hyperglycemia    Community acquired pneumonia of right lung, unspecified part of lung    Acute respiratory failure with hypoxia ([facility])    Hypotension, unspecified hypotension type    Sepsis due to pneumonia ([facility])    Foot ulcer with fat layer exposed, right ([facility])    Syncope, unspecified syncope type    Sepsis, due to unspecified organism, unspecified whether acute organ dysfunction present ([facility])    Shock ([facility])    Acute hypoxic respiratory failure ([facility])    Pneumonia, bacterial    Hyponatremia    Metabolic alkalosis    Community acquired pneumonia, unspecified laterality    Sepsis due to undetermined organism ([facility])    Acute on chronic congestive heart failure, unspecified heart failure  type (HCC)    Acute hypoxemic respiratory failure (HCC)    ERON (acute kidney injury) ([facility])    Lactic acidosis    Leukocytosis, unspecified type    Palliative care encounter    Counseling regarding advance care planning and goals of care    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) ([facility])       Plan  Orders:  Orders Placed This Encounter   Procedures    Debridement Old surgical Right Foot         -Patient examined, chart history reviewed.    -Wound inspected today--overall stable wound today.  Improved dimensions.  Medial foot wound remains epithelialized today.  There continues to be no tunneling.  There is fibrogranular wound bed.  There is palpable bone to the most dorsal portion of the wound.  No current purulence, surrounding erythema, or other signs of infection.  Some maceration noted within the surgical cicatrix today.     -Excisional debridement performed to the previous surgical site utilizing dermal curette and tissue nippers down to and including bone today.  A more granular, bleeding wound bed was present upon debridement today.  No tunneling noted.      -Wound site covered with Cellerate activated collagen, Adaptic, Betadine to macerated cicatrix, and a dry dressing today.  Will have patient and family change dressings 3 times this week.  Encourage patient to utilize Betadine if noticing any increased maceration     -Patient will continue to ambulate as tolerated in surgical shoe utilizing walker for gait assistance.  Okay for patient to use house slipper for small amount of time while at home.  I would recommend open toed shoe to prevent any increased moisture    -Educated on signs of infection and encouraged patient to seek immediate medical attention if noticing any of these signs.    Follow-Up  2 weeks    Pavan Poon DPM    3/31/2024    Dragon speech recognition software was used to prepare this note.  Errors in word recognition may occur.  Please contact me with any questions/concerns  with this note.

## 2024-04-01 NOTE — DISCHARGE INSTRUCTIONS
[facility] to follow at discharge.   Please call [facility] with any questions, they can be reached by   phone at [phone].

## 2024-04-01 NOTE — CM/SW NOTE
SW met w/ pt at bedside to f/up on discharge planning. Pt's son also present.     SW received order received for [facility]. Referrals sent and pt agreeable w/ [facility] for palliative care d/t hospital affiliation.     Mango from [facility] aware and will f/up with patient.     Therapy recommended HH, pt declined services and stated he's had it in the past and did not find it beneficial. SW inquired about OP therapy, pt declined that service also.     PLAN: Home w/ [facility]    Kandace Esparza, BRAVOW - [id number]

## 2024-04-02 ENCOUNTER — APPOINTMENT (OUTPATIENT)
Dept: GENERAL RADIOLOGY | Facility: HOSPITAL | Age: 89
End: 2024-04-02
Attending: INTERNAL MEDICINE
Payer: MEDICARE

## 2024-04-02 LAB
ALBUMIN SERPL-MCNC: 2.5 G/DL (ref 3.4–5)
ALBUMIN/GLOB SERPL: 0.7 {RATIO} (ref 1–2)
ALP LIVER SERPL-CCNC: 65 U/L
ALT SERPL-CCNC: 61 U/L
ANION GAP SERPL CALC-SCNC: 5 MMOL/L (ref 0–18)
AST SERPL-CCNC: 26 U/L (ref 15–37)
BILIRUB SERPL-MCNC: 0.5 MG/DL (ref 0.1–2)
BUN BLD-MCNC: 20 MG/DL (ref 9–23)
CALCIUM BLD-MCNC: 8.8 MG/DL (ref 8.5–10.1)
CHLORIDE SERPL-SCNC: 109 MMOL/L (ref 98–112)
CO2 SERPL-SCNC: 29 MMOL/L (ref 21–32)
CREAT BLD-MCNC: 0.82 MG/DL
EGFRCR SERPLBLD CKD-EPI 2021: 84 ML/MIN/1.73M2 (ref 60–?)
ERYTHROCYTE [DISTWIDTH] IN BLOOD BY AUTOMATED COUNT: 15.4 %
GLOBULIN PLAS-MCNC: 3.6 G/DL (ref 2.8–4.4)
GLUCOSE BLD-MCNC: 109 MG/DL (ref 70–99)
HCT VFR BLD AUTO: 35.3 %
HGB BLD-MCNC: 11.1 G/DL
MCH RBC QN AUTO: 25.3 PG (ref 26–34)
MCHC RBC AUTO-ENTMCNC: 31.4 G/DL (ref 31–37)
MCV RBC AUTO: 80.4 FL
OSMOLALITY SERPL CALC.SUM OF ELEC: 299 MOSM/KG (ref 275–295)
PLATELET # BLD AUTO: 307 10(3)UL (ref 150–450)
POTASSIUM SERPL-SCNC: 3.9 MMOL/L (ref 3.5–5.1)
PROT SERPL-MCNC: 6.1 G/DL (ref 6.4–8.2)
RBC # BLD AUTO: 4.39 X10(6)UL
SODIUM SERPL-SCNC: 143 MMOL/L (ref 136–145)
WBC # BLD AUTO: 7.4 X10(3) UL (ref 4–11)

## 2024-04-02 PROCEDURE — 99233 SBSQ HOSP IP/OBS HIGH 50: CPT | Performed by: INTERNAL MEDICINE

## 2024-04-02 PROCEDURE — 74220 X-RAY XM ESOPHAGUS 1CNTRST: CPT | Performed by: INTERNAL MEDICINE

## 2024-04-02 PROCEDURE — 99233 SBSQ HOSP IP/OBS HIGH 50: CPT | Performed by: HOSPITALIST

## 2024-04-02 NOTE — PLAN OF CARE
PT A/O, 93% ON RA W/A, DESATS WITH SLEEP, 2L NC FOR NIGHT, AFIB, AFEBRILE, LE EDEMA, DRESSING TO RT FOOT D/I, SCABS TO RT LE LUX, VOIDING PER EXTERNAL CATHETER, DENIES PAIN, LABS IN AM, INSTRUCTED PT ON POC    Problem: CARDIOVASCULAR - ADULT  Goal: Maintains optimal cardiac output and hemodynamic stability  Description: INTERVENTIONS:  - Monitor vital signs, rhythm, and trends  - Monitor for bleeding, hypotension and signs of decreased cardiac output  - Evaluate effectiveness of vasoactive medications to optimize hemodynamic stability  - Monitor arterial and/or venous puncture sites for bleeding and/or hematoma  - Assess quality of pulses, skin color and temperature  - Assess for signs of decreased coronary artery perfusion - ex. Angina  - Evaluate fluid balance, assess for edema, trend weights  Outcome: Progressing

## 2024-04-02 NOTE — PAYOR COMM NOTE
--------------  CONTINUED STAY REVIEW  4/2  Payor: [employer] MED[employer] ADV PPO  Subscriber #:  [id number]  Authorization Number: [id number]    Admit date: 3/28/24  Admit time:  2:45 PM    Admitting Physician: Ruth Cardoso MD  Attending Physician:  Scar Alexander MD  Primary Care Physician: Stanislav Odonnell MD    REVIEW DOCUMENTATION:    4/2    Vital signs:  Temp:  [97.3 °F (36.3 °C)-97.9 °F (36.6 °C)] 97.3 °F (36.3 °C)  Pulse:  [] 90  Resp:  [18] 18  BP: (106-142)/() 142/108  SpO2:  [87 %-99 %] 99 %  Physical Exam:    General: No acute distress chronically ill appearing  Respiratory: diminished b/l, no wheezes, no rhonchi  Cardiovascular: S1, S2, RRR  Abdomen: Soft, NT/ND, +BS  Extremities: no edema, R foot wound please see notes and pix in media      Diagnostic Data:    Labs:           Recent Labs   Lab 03/28/24  1555 03/29/24  0343 03/30/24  0451 03/31/24  0423 04/01/24 0618 04/02/24  0541   WBC  --  23.6* 16.0* 10.7 6.3 7.4   HGB  --  10.6* 9.8* 9.9* 10.2* 11.1*   MCV  --  80.3 82.2 82.0 83.6 80.4   PLT  --  305.0 241.0 261.0 271.0 307.0   INR 1.51*  --   --   --   --   --             Recent Labs   Lab 03/31/24  0423 04/01/24  0618 04/02/24  0541   * 102* 109*   BUN 28* 25* 20   CREATSERUM 0.87 0.89 0.82   CA 8.9 8.7 8.8   ALB 2.4* 2.2* 2.5*    141 143   K 4.0 3.6 3.9    109 109   CO2 28.0 27.0 29.0   ALKPHO 54 54 65   AST 27 37 26   ALT 47 56 61   BILT 0.4 0.4 0.5   TP 5.9* 5.6* 6.1*     Assessment & Plan:   #Septic Shock 2/2 PNA  -IVF stopped with CHF EF 20%  -PRESSORS OFF   -ICU and Cardio following  -cont abx and f/u Cx   -CT chest reviewed- per Pulm   -tapering stress steroids   -weaning O2   -swallow study- esophagram -dilatation of the proximal to mid esophagus with multiple tertiary contractions and significant delay and contrast transit through the esophagus into the stomach.  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   -GI consulted per Pulm   -Bronchosocopy likely  as outpt      #PVCs with h/o NSVT- holding dobutamine per Cardiology   #Acute hypoxic respiratory failure - weaned O2   #ERON- IVF and pressors and much improved   #Lactic acidosis 2/2 above   #Leukocytosis- stable on steroids IV for now   #Secondary adrenal insufficiency- -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 Steward   -plavix, statin  #GERD-PPI    #Foot wound- podiatry and wound care consult         Dr. Dickerson consult for Palliative care - recs much appreciated   -Recent hospitalizations:  This admission  -3/16-3/18- shock sepsis vs hypovolemic   -2/12-2/15 for septic shock secondary to multifocal pneumonia  -12/25-12/28 septic shock for gram-negative pneumonia  -12/1-12/6-hospitalization for pneumonia          4/2 Pulmonary/Critical Care Progress Note     ASSESSMENT  Pneumonia, recurrent. The recurrent right sided opacities are most suspicious for aspiration whether from choking/secretion clearance issues or reflux.  The infiltrates are not persistent and do clear in between episodes. Frustratingly he has had repeated normal speech therapy evaluations. Will need to assess for reflux, hiatal hernia, stricture and fistula  Acute hypoxic respiratory failure due to above, now resolved  Septic shock, resolved  HFrEF, compensated  Afib, controlled     PLAN  Continue close monitoring of respiratory status, wean o2 for sats >89%  Off antibiotics given negative cultures and rapid improvement  Await swallow study - esophageal follow through this am  If the above testing is normal, then he should have a bronchoscopy which  can be arranged as outpatient - would need to hold plavix and apixaban prior to bronchoscopy     MEDICATIONS ADMINISTERED IN LAST 1 DAY:  apixaban (Eliquis) tab 5 mg       Date Action Dose Route User    4/2/2024 1008 Given 5 mg Oral Glory Williamson RN    4/1/2024 2207 Given 5 mg Oral Aliza Evans RN          atorvastatin (Lipitor) tab 40 mg       Date Action Dose Route User    4/1/2024 2207 Given 40 mg Oral Aliza Evans RN          clopidogrel (Plavix) tab 75 mg       Date Action Dose Route User    4/2/2024 1007 Given 75 mg Oral Glory Williamson RN          finasteride (Proscar) tab 5 mg       Date Action Dose Route User    4/2/2024 1008 Given 5 mg Oral Glory Williamson RN          folic acid (Folvite) tab 1 mg       Date Action Dose Route User    4/2/2024 1008 Given 1 mg Oral Glory Williamson RN          gabapentin (Neurontin) cap 300 mg       Date Action Dose Route User    4/2/2024 1006 Given 300 mg Oral Glory Williamson RN    4/1/2024 2205 Given 300 mg Oral Aliza Evans RN    4/1/2024 1751 Given 300 mg Oral Shannon Galvin RN          pantoprazole (Protonix) DR tab 40 mg       Date Action Dose Route User    4/2/2024 0514 Given 40 mg Oral Aliza Evans RN          predniSONE (Deltasone) tab 40 mg       Date Action Dose Route User    4/2/2024 1008 Given 40 mg Oral Glory Williamson RN          tamsulosin (Flomax) cap 0.4 mg       Date Action Dose Route User    4/2/2024 1007 Given 0.4 mg Oral Glory Williamson RN            Vitals (last day)       Date/Time Temp Pulse Resp BP SpO2 Weight O2 Device O2 Flow Rate (L/min) Who    04/02/24 1010 -- -- -- -- 95 % 215 lb 9.8 oz -- -- JN    04/02/24 0824 97.5 °F (36.4 °C) 94 18 141/100 96 % -- None (Room air) -- NB    04/02/24 0508 -- 90 -- 142/108 99 % -- Nasal cannula --     04/02/24 0505 97.3 °F (36.3 °C) -- 18 -- -- -- -- --     04/02/24 0130 -- 94 -- -- 94 % -- Nasal cannula 2 L/min     04/02/24 0120 -- -- -- -- 87 % -- None (Room air) -- BK     04/01/24 2345 -- 90 -- 130/79 89 % -- -- --     04/01/24 2300 97.5 °F (36.4 °C) 87 18 -- 94 % -- None (Room air) -- AM    04/01/24 2136 -- 103 -- -- 94 % -- -- -- AM    04/01/24 1930 97.9 °F (36.6 °C) 109 18 127/77 94 % -- None (Room air) --     04/01/24 1753 97.9 °F (36.6 °C) 108 18 123/89 95 % -- None (Room air) -- NB    04/01/24 1345 97.9 °F (36.6 °C) 101 18 106/68 95 % -- None (Room air) -- NB    04/01/24 1059 97.7 °F (36.5 °C) 92 18 120/86 92 % -- None (Room air) -- NB    04/01/24 0850 -- 121 -- -- -- -- -- -- AMA    04/01/24 0700 -- -- -- -- -- -- Nasal cannula 6 L/min DL    04/01/24 0628 -- 87 -- -- 97 % -- -- -- BR    04/01/24 0530 -- -- -- -- -- -- -- 6 L/min KT    04/01/24 0530 97.2 °F (36.2 °C) 64 18 147/89 98 % -- Nasal cannula -- BR    04/01/24 0011 -- 90 -- -- 97 % -- -- 6 L/min KT    04/01/24 0010 -- 94 -- -- 75 % -- -- 4 L/min KT    04/01/24 0009 -- 92 -- -- 97 % -- -- 4 L/min KT    04/01/24 0004 -- 89 -- -- 86 % -- -- 4 L/min KT

## 2024-04-02 NOTE — PROGRESS NOTES
[facility] Pulmonary/Critical Care Progress Note     SUBJECTIVE/Interval history:  No acute events overnight, he feels well, wants to go home. Denies cough or dyspnea. No pain. Afebrile  Remains on RA    Review of Systems:   A comprehensive 14 point review of systems was completed.   Pertinent positives and negatives noted in the HPI.    OBJECTIVE:  Vitals:    04/02/24 0130 04/02/24 0505 04/02/24 0508 04/02/24 0824   BP:   (!) 142/108 (!) 141/100   BP Location:   Left arm Right arm   Pulse: 94  90 94   Resp:  18  18   Temp:  97.3 °F (36.3 °C)  97.5 °F (36.4 °C)   TempSrc:  Oral  Oral   SpO2: 94%  99% 96%   Weight:       Height:           Vital signs in last 24 hours:  Blood pressure (!) 141/100, pulse 94, temperature 97.5 °F (36.4 °C), temperature source Oral, resp. rate 18, height 5' 7\" (1.702 m), weight 221 lb 1.9 oz (100.3 kg), SpO2 96%.     Intake/Output:    Intake/Output Summary (Last 24 hours) at 4/2/2024 0956  Last data filed at 4/2/2024 0824  Gross per 24 hour   Intake 480 ml   Output 2150 ml   Net -1670 ml           Physical Exam:  General: Appears alert, comfortable. No acute distress  Neurologic:No focal deficits.  Alert.  Oriented.  Lungs:CTAB no wheeze. No distress  Heart:RRR no m  Abdomen:Soft, non-tender.  No masses.  No guarding.  No rebound.  Extremities:no edema    Lab Data Review:   Recent Labs   Lab 03/31/24  0423 04/01/24  0618 04/02/24  0541   * 102* 109*   BUN 28* 25* 20   CREATSERUM 0.87 0.89 0.82   CA 8.9 8.7 8.8    141 143   K 4.0 3.6 3.9    109 109   CO2 28.0 27.0 29.0     Recent Labs   Lab 03/28/24  1131 03/29/24  0343 03/30/24  0451 03/31/24  0423 04/01/24  0618 04/02/24  0541   RBC 4.87 4.12   < > 3.88 3.97 4.39   HGB 12.7* 10.6*   < > 9.9* 10.2* 11.1*   HCT 39.5 33.1*   < > 31.8* 33.2* 35.3*   MCV 81.1 80.3   < > 82.0 83.6 80.4   MCH 26.1 25.7*   < > 25.5* 25.7* 25.3*   MCHC 32.2 32.0   < > 31.1 30.7* 31.4   RDW 15.8 16.0   < > 15.9 15.9 15.4    NEPRELIM 20.51* 21.50*  --   --   --   --    WBC 23.3* 23.6*   < > 10.7 6.3 7.4   .0 305.0   < > 261.0 271.0 307.0    < > = values in this interval not displayed.     No results for input(s): \"BNP\" in the last 168 hours.  No results for input(s): \"TROP\", \"CK\" in the last 168 hours.  Recent Labs   Lab 03/28/24  1555   INR 1.51*   PTT 31.8     No results for input(s): \"ABGPHT\", \"PFXHXY7A\", \"JMPHJ5V\", \"ABGHCO3\", \"ABGBE\", \"TEMP\", \"MICHAEL\", \"SITE\", \"DEV\", \"THGB\" in the last 168 hours.    Invalid input(s): \"XEE03NRW\", \"CHOB\"    Other Labs:  Interval Culture Data:   Hospital Encounter on 03/28/24   1. Blood Culture     Status: None (Preliminary result)    Collection Time: 03/28/24 11:31 AM    Specimen: Blood,peripheral   Result Value Ref Range    Blood Culture Result No Growth 4 Days N/A     Recent Labs   Lab 03/28/24  2105   COLORUR Light-Yellow   CLARITY Clear   SPECGRAVITY 1.012   GLUUR Normal   BILUR Negative   KETUR Negative   BLOODURINE Negative   PHURINE 5.0   PROUR Negative   UROBILINOGEN Normal   NITRITE Negative   LEUUR Negative       Interval Radiology:   No results found.     predniSONE  40 mg Oral Daily with breakfast    apixaban  5 mg Oral BID    atorvastatin  40 mg Oral Nightly    clopidogrel  75 mg Oral Daily    finasteride  5 mg Oral Daily    folic acid  1 mg Oral Daily    gabapentin  300 mg Oral TID    pantoprazole  40 mg Oral QAM AC    tamsulosin  0.4 mg Oral Daily     metoclopramide, acetaminophen, melatonin, ondansetron, polyethylene glycol (PEG 3350), sennosides, bisacodyl        ASSESSMENT  Pneumonia, recurrent. The recurrent right sided opacities are most suspicious for aspiration whether from choking/secretion clearance issues or reflux.  The infiltrates are not persistent and do clear in between episodes. Frustratingly he has had repeated normal speech therapy evaluations. Will need to assess for reflux, hiatal hernia, stricture and fistula  Acute hypoxic respiratory failure due to above,  now resolved  Septic shock, resolved  HFrEF, compensated  Afib, controlled     PLAN  Continue close monitoring of respiratory status, wean o2 for sats >89%  Off antibiotics given negative cultures and rapid improvement  Await swallow study - esophageal follow through this am  If the above testing is normal, then he should have a bronchoscopy which can be arranged as outpatient - would need to hold plavix and apixaban prior to bronchoscopy     Karel Romero MD

## 2024-04-02 NOTE — PROGRESS NOTES
[facility]   part of [facility]     Hospitalist Progress Note     Alejandro Guardado Patient Status:  Inpatient    1935 MRN [id number]   Location [facility] 4SW-A Attending Scar Alexander MD   Hosp Day # 5 PCP Stanislav Odonnell MD     Subjective:   Doing well   Would like to go home     Objective:    Review of Systems:   A comprehensive review of systems was completed; pertinent positive and negatives stated in subjective.  Vital signs:  Temp:  [97.3 °F (36.3 °C)-97.9 °F (36.6 °C)] 97.3 °F (36.3 °C)  Pulse:  [] 90  Resp:  [18] 18  BP: (106-142)/() 142/108  SpO2:  [87 %-99 %] 99 %  Physical Exam:    General: No acute distress chronically ill appearing  Respiratory: diminished b/l, no wheezes, no rhonchi  Cardiovascular: S1, S2, RRR  Abdomen: Soft, NT/ND, +BS  Extremities: no edema, R foot wound please see notes and pix in media     Diagnostic Data:    Labs:  Recent Labs   Lab 24  1555 24  0343 24  0451 24  0423 24  0618 24  0541   WBC  --  23.6* 16.0* 10.7 6.3 7.4   HGB  --  10.6* 9.8* 9.9* 10.2* 11.1*   MCV  --  80.3 82.2 82.0 83.6 80.4   PLT  --  305.0 241.0 261.0 271.0 307.0   INR 1.51*  --   --   --   --   --      Recent Labs   Lab 24  0423 24  0618 24  0541   * 102* 109*   BUN 28* 25* 20   CREATSERUM 0.87 0.89 0.82   CA 8.9 8.7 8.8   ALB 2.4* 2.2* 2.5*    141 143   K 4.0 3.6 3.9    109 109   CO2 28.0 27.0 29.0   ALKPHO 54 54 65   AST 27 37 26   ALT 47 56 61   BILT 0.4 0.4 0.5   TP 5.9* 5.6* 6.1*     Estimated Creatinine Clearance: 57.1 mL/min (based on SCr of 0.82 mg/dL).  Recent Labs   Lab 24  1555   PTP 18.3*   INR 1.51*        Microbiology  Hospital Encounter on 24   1. Blood Culture     Status: None (Preliminary result)    Collection Time: 24 11:31 AM    Specimen: Blood,peripheral   Result Value Ref Range    Blood Culture Result No Growth 4 Days N/A     Imaging: Reviewed in  Epic.  Medications:    predniSONE  40 mg Oral Daily with breakfast    apixaban  5 mg Oral BID    atorvastatin  40 mg Oral Nightly    clopidogrel  75 mg Oral Daily    finasteride  5 mg Oral Daily    folic acid  1 mg Oral Daily    gabapentin  300 mg Oral TID    pantoprazole  40 mg Oral QAM AC    tamsulosin  0.4 mg Oral Daily       Assessment & Plan:    #Septic Shock 2/2 PNA  -IVF stopped with CHF EF 20%  -PRESSORS OFF   -ICU and Cardio following  -cont abx and f/u Cx   -CT chest reviewed- per Pulm   -tapering stress steroids   -weaning O2   -swallow study- esophagram -dilatation of the proximal to mid esophagus with multiple tertiary contractions and significant delay and contrast transit through the esophagus into the stomach.  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   -GI consulted per Pulm   -Bronchosocopy likely as outpt     #PVCs with h/o NSVT- holding dobutamine per Cardiology   #Acute hypoxic respiratory failure - weaned O2   #ERON- IVF and pressors and much improved   #Lactic acidosis 2/2 above   #Leukocytosis- stable on steroids IV for now   #Secondary adrenal insufficiency- -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 Steward   -plavix, statin  #GERD-PPI    #Foot wound- podiatry and wound care consult        Dr. Dickerson consult for Palliative care - recs much appreciated   -Recent hospitalizations:  This admission  -3/16-3/18- shock sepsis vs hypovolemic   -2/12-2/15 for septic shock secondary to multifocal pneumonia  -12/25-12/28 septic shock for gram-negative pneumonia  -12/1-12/6-hospitalization for  pneumonia     D/w family/son bedside  F/u Pulm and GI recs  DC planning soon       Scar Alexander MD  Supplementary Documentation:   Quality:  DVT Mechanical Prophylaxis:   SCDs, Early ambuation  DVT Pharmacologic Prophylaxis   Medication    apixaban (Eliquis) tab 5 mg                Code Status: DNAR/Selective Treatment  Block: External urinary catheter in place  Block Duration (in days):   Central line:    DIEGO: 4/3/2024  At this point Mr. Guardado is expected to be discharge to: tbd   **Certification      PHYSICIAN Certification of Need for Inpatient Hospitalization - Initial Certification    Patient will require inpatient services that will reasonably be expected to span two midnight's based on the clinical documentation in H+P.   Based on patients current state of illness, I anticipate that, after discharge, patient will require TBD.    The 21st Century Cures Act makes medical notes like these available to patients in the interest of transparency. Please be advised this is a medical document. Medical documents are intended to carry relevant information, facts as evident, and the clinical opinion of the practitioner. The medical note is intended as peer to peer communication and may appear blunt or direct. It is written in medical language and may contain abbreviations or verbiage that are unfamiliar.

## 2024-04-02 NOTE — PHYSICAL THERAPY NOTE
Attempted to see pt for PT treatment session. Pt waiting on lunch and requesting to eat prior to PT session. Will follow and re-attempt as able and appropriate.

## 2024-04-02 NOTE — CONSULTS
[facility]                       Gastroenterology Consultation-[facility] Gastroenterology    Alejandro Guardado Patient Status:  Inpatient    1935 MRN [id number]   Location [facility] 2NE-A Attending Scar Alexander MD   Hosp Day # 5 PCP Stanislav Odonnell MD     Reason for consultation: Recurrent aspiration; abnormal esophogram   HPI: This is an 89 yr-old male with PMhx that includes achalasia s/p Toupet fundoplication and esophagomyotomy, CAD s/p CABG, AF (Eliquis--last dose 4/2 AM), HF, PAD s/p peripheral stenting (2023; Plavix), dyslipidemia, adrenal insufficiency, and recurrent PNA who was re-admitted 3/28/24 with dyspnea, cough following admission earlier in the month for the same. Pt has now been admitted 5x for PNA since 2023. Prior speech eval without clinical signs/symptoms of aspiration.   Review of EMR reveals pt dx with achalasia following an esophageal manometry--pt's symptoms at that time was dysphagia + regurgitation. He underwent laparoscopic esophagomyotomy + Toupet fundoplication (2009: Dr Ti Montiel; [facility]). Pt recalls symptoms at that time as a complete inability to tolerate any PO intake and does not feel any return of these symptoms. Pt denies abd pain, dysphagia, odynophagia, and chronic nausea/vomiting. He reports rare heartburn--and reports compliance with daily PPI. He has a fair appetite per family reports and has gained wt over the last several months. No recent EGD (at least 10 yrs ago).   Xray esophagus suggests dilation of the prox to mid esophagus with multiple tertiary contractions and significant delay of contrast transit through the esophagus into the stomach  Per bedside RN--no overt dysphagia and he remains stable on room air   PMHx:   Past Medical History:   Diagnosis Date    Atrial fibrillation (HCC)     CAD (coronary artery disease) 2015    CHF (congestive heart failure) ([facility])     Esophageal reflux     Hearing impairment     Heart  attack (HCC)     History of MI (myocardial infarction)     HTN (hypertension)     Hyperlipidemia     PAD (peripheral artery disease) (HCC) 2023    Visual impairment                 PSHx:   Past Surgical History:   Procedure Laterality Date    ANGIOGRAM      ANGIOPLASTY (CORONARY)      CABG      CATARACT  2023    FRACTURE SURGERY Left     left hip pinning    OTHER SURGICAL HISTORY Left 1977    knee surgery    OTHER SURGICAL HISTORY  2016    Left hip ORIF pinning    TONSILLECTOMY       Medications:    predniSONE (Deltasone) tab 40 mg  40 mg Oral Daily with breakfast    metoclopramide (Reglan) 5 mg/mL injection 10 mg  10 mg Intravenous Q8H PRN    apixaban (Eliquis) tab 5 mg  5 mg Oral BID    [COMPLETED] sodium chloride 0.9 % IV bolus 500 mL  500 mL Intravenous Once    [COMPLETED] piperacillin-tazobactam (Zosyn) 4.5 g in dextrose 5% 100 mL IVPB-ADDV  4.5 g Intravenous Once    [COMPLETED] aspirin chewable tab 324 mg  324 mg Oral Once    [COMPLETED] sodium chloride 0.9% infusion 1,000 mL  1,000 mL Intravenous Once    [COMPLETED] sodium chloride 0.9 % IV bolus 2,859 mL  30 mL/kg Intravenous Once    atorvastatin (Lipitor) tab 40 mg  40 mg Oral Nightly    clopidogrel (Plavix) tab 75 mg  75 mg Oral Daily    finasteride (Proscar) tab 5 mg  5 mg Oral Daily    folic acid (Folvite) tab 1 mg  1 mg Oral Daily    gabapentin (Neurontin) cap 300 mg  300 mg Oral TID    pantoprazole (Protonix) DR tab 40 mg  40 mg Oral QAM AC    tamsulosin (Flomax) cap 0.4 mg  0.4 mg Oral Daily    [] sodium chloride 0.9% infusion   Intravenous Continuous    acetaminophen (Tylenol Extra Strength) tab 1,000 mg  1,000 mg Oral Q6H PRN    melatonin tab 3 mg  3 mg Oral Nightly PRN    ondansetron (Zofran) 4 MG/2ML injection 4 mg  4 mg Intravenous Q6H PRN    polyethylene glycol (PEG 3350) (Miralax) 17 g oral packet 17 g  17 g Oral Daily PRN    sennosides (Senokot) tab 17.2 mg  17.2 mg Oral Nightly PRN    bisacodyl (Dulcolax) 10 MG  rectal suppository 10 mg  10 mg Rectal Daily PRN    [COMPLETED] meropenem (Merrem) 1,000 mg in sodium chloride 0.9% 100 mL IVPB-MBP  1,000 mg Intravenous Q12H    [COMPLETED] potassium chloride 40 mEq in 250mL sodium chloride 0.9% IVPB premix  40 mEq Intravenous Once     Allergies:   Allergies   Allergen Reactions    Heparin ANAPHYLAXIS    Hydromorphone HALLUCINATION     Social HX:   Social History     Socioeconomic History    Marital status:    Tobacco Use    Smoking status: Never    Smokeless tobacco: Never   Vaping Use    Vaping Use: Never used   Substance and Sexual Activity    Alcohol use: Never    Drug use: Never     Social Determinants of Health     Food Insecurity: No Food Insecurity (3/28/2024)    Food Insecurity     Food Insecurity: Never true   Transportation Needs: No Transportation Needs (3/28/2024)    Transportation Needs     Lack of Transportation: No   Housing Stability: Low Risk  (3/28/2024)    Housing Stability     Housing Instability: No      FamHx: The patient has no family history of colon cancer or other gastrointestinal malignancies;  No family history of ulcer disease, or inflammatory bowel disease  ROS:  In addition to the pertinent positives described above:            Infectious Disease:  No chronic infections or recent fevers prior to the acute illness            Cardiovascular: + MI, CAD s/p CABG, HF, PAD s/p peripheral stenting (Plavis), AF (Eliquis), dyslipidemia             Respiratory: + recurrent pneumonia            Hematologic: The patient reports no easy bruising, frequent gum bleeding or nose bleeding;  The patient has no history of known chronic anemia            Dermatologic: The patient reports no recent rashes or chronic skin disorders            Rheumatologic: The patient reports no history of chronic arthritis, myalgias, arthralgias            Genitourinary:  The patient reports no history of recurrent urinary tract infections, hematuria, dysuria, or  nephrolithiasis           Psychiatric: The patient reports no history of depression, anxiety, suicidal ideation, or homicidal ideation           Oncologic: The patient reports no history of prior solid tumor or hematologic malignancy           ENT: + hearing loss           Neurologic: The patient reports no history of seizure, stroke, or frequent headaches  PE: BP (!) 141/100 (BP Location: Right arm)   Pulse 94   Temp 97.5 °F (36.4 °C) (Oral)   Resp 18   Ht 5' 7\" (1.702 m)   Wt 215 lb 9.8 oz (97.8 kg)   SpO2 95%   BMI 33.77 kg/m²   Gen: AAO x 3, able to speak in complete sentences--hard of hearing   HENT: EOMI, PERRL, oropharynx is clear with moist mucosal membranes  Eyes: Sclerae are anicteric  Neck:  Supple without nuchal rigidity  CV: Regular rate and rhythm, with normal S1 and S2  Resp: Diminished bilaterally   Abdomen: Soft, non-tender, non-distended with the presence of bowel sounds; No hepatosplenomegaly; no rebound or guarding; No ascites is clinically apparent; no tympany to percussion  Ext: No peripheral edema   Skin: Warm and dry; right wound on leg   Psychiatric: Appropriate mood and congruent affect without obvious depression or anxiety  Labs:   Lab Results   Component Value Date    WBC 7.4 04/02/2024    HGB 11.1 04/02/2024    HCT 35.3 04/02/2024    .0 04/02/2024    CREATSERUM 0.82 04/02/2024    BUN 20 04/02/2024     04/02/2024    K 3.9 04/02/2024     04/02/2024    CO2 29.0 04/02/2024     04/02/2024    CA 8.8 04/02/2024    ALB 2.5 04/02/2024    ALKPHO 65 04/02/2024    BILT 0.5 04/02/2024    AST 26 04/02/2024    ALT 61 04/02/2024     Recent Labs   Lab 03/31/24  0423 04/01/24  0618 04/02/24  0541   * 102* 109*   BUN 28* 25* 20   CREATSERUM 0.87 0.89 0.82   CA 8.9 8.7 8.8    141 143   K 4.0 3.6 3.9    109 109   CO2 28.0 27.0 29.0     Recent Labs   Lab 03/29/24  0343 03/30/24  0451 04/02/24  0541   RBC 4.12   < > 4.39   HGB 10.6*   < > 11.1*   HCT 33.1*    < > 35.3*   MCV 80.3   < > 80.4   MCH 25.7*   < > 25.3*   MCHC 32.0   < > 31.4   RDW 16.0   < > 15.4   NEPRELIM 21.50*  --   --    WBC 23.6*   < > 7.4   .0   < > 307.0    < > = values in this interval not displayed.       Recent Labs   Lab 03/31/24  0423 04/01/24  0618 04/02/24  0541   ALT 47 56 61   AST 27 37 26       Imaging:   PROCEDURE:  XR ESOPHAGUS SINGLE CONTRAST (CPT=74220)     TECHNIQUE:  An esophagram was performed with fluoroscopy in the usual manner.     COMPARISON:  [facility] , XR, XR VIDEO SWALLOW (CPT=74230), 2/13/2024, 1:27 PM.  [facility] , CT, CT CHEST (CPT=71250), 3/29/2024, 8:59 AM.     INDICATIONS:  Patient is a history of dysphasia and recurrent pneumonias.  Patient relays that he has a remote history of esophageal surgery, performed over 20 years ago.  The details of the surgery are not currently available.     PATIENT STATED HISTORY: (As transcribed by Technologist)  Patient shares he has had pneumonia 5 times. Patient had polio as a child and has had foot surgery a few times on right foot.      FLUOROSCOPY IMAGES OBTAINED:  18  FLUOROSCOPY TIME:  1 minute 56 seconds  RADIATION DOSE (AIR KERMA PRODUCT):  990.uGy/m2  CONTRAST USED:  Sips from a straw thin barium.     FINDINGS:    There is dilatation of the proximal to mid esophagus, with a normal caliber of the mid to distal esophagus.  There are multiple tertiary contractions throughout the dilated upper to mid esophagus with delayed transit of barium contrast into the distal  esophagus and stomach.  No focal ulcerative, inflammatory or neoplastic changes are suspected.  No focal filling defects or masses suggested.       Impression   CONCLUSION:    There is dilatation of the proximal to mid esophagus with multiple tertiary contractions and significant delay and contrast transit through the esophagus into the stomach.  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      LOCATION:  [facility]     Dictated by (CST): Miguelangel Worley DO on 4/02/2024  at 10:06 AM      Finalized by (CST): Miguelangel Worley DO on 4/02/2024 at 10:10 AM    ___________________________  OPERATIVE REPORT    PREOPERATIVE DIAGNOSIS  Achalasia.    POSTOPERATIVE DIAGNOSIS  Achalasia.    PROCEDURE  Esophageal manometry.    TECHNIQUE  An esophageal manometry catheter was passed nasally into the  stomach. The water-perfused catheter was then slowly withdrawn  at 1 cm increments. The lower esophageal sphincter was located  at approximately 43 to 47 cm from the nares. The average lower  esophageal sphincter pressure was 38 mmHg with a range of 30 to  44 mmHg. The relaxation of the lower esophageal sphincter with  wet swallows appeared incomplete. The esophageal body was  evaluated with 10 wet swallows. None of these 10 wet swallows  resulted in normal peristalsis. All 10 wet swallows resulted in  simultaneous contractions of the esophageal body. The average  amplitude of esophageal body contractions was 23 mmHg with a  range of 22 to 24 mmHg. The average duration of esophageal body  contractions was 3 seconds with a range of 2 to 4 seconds. The  upper esophageal sphincter was located at approximately 21 cm  from the nares. The relaxation of the upper esophageal  sphincter with wet swallows appeared complete.    The findings of incomplete relaxation of the lower esophageal  sphincter along with all simultaneous, low-amplitude esophageal  body contractions are manometrically suggestive of achalasia.  The remainder of the esophageal manometry appears unremarkable.    Pending Signature  ______________________________  Attending Surgeon: Ti Lopez  Dictated By: Ti Lopez    [facility]/[id number]  Dictated: 04/02/2009/ 5:56 P  Transcribed: 04/02/2009 8:37 P  Document #: [id number]    cc: Ti Lopez   Impression: 89 yr-old male with hx of achalasia s/p Toupet fundoplication and esophagomyotomy, CAD s/p CABG, AF (Eliquis--last dose 4/2 AM), HF, PAD s/p peripheral stenting (12/2023; Plavix), dyslipidemia,  adrenal insufficiency, and recurrent PNA re-admitted 3/28/24 with dyspnea, cough following 5x admissions for PNA since 12/2023. GI consulted as there is concern for aspiration leading to PNA. Pt with prior hx of achalasia s/p laparoscopic esophagomyotomy + Toupet fundoplication (5/2009: Dr Ti Montiel; [facility]).  Pt currently without overt GI symptoms however Xray esophagus suggests dilation of the prox to mid esophagus with multiple tertiary contractions and significant delay of contrast transit through the esophagus into the stomach. Will plan for a diagnostic EGD in AM to r/o neoplasm, infection, ulceration and plan for a manometry to assess motility. Of note, the patient is currently on plavix and eliquis.  The risks, benefits, alternatives of the procedure including the risks of anesthesia, bleeding, perforation, missed lesions, need for surgery, and infection were discussed with the patient. He expressed understanding of the risks and was agreeable to proceed.    Recommendations:     Esophageal manometry in early am and then EGD under MAC with Dr Kramer on 4/3  NPO at midnight for above  Pending results of above can consider additional procedures if achalasia is noted however will need to hold Plavix + Eliquis     Thank you for the consultation, we will follow the patient with you.  Attending addendum (Dr Kramer) to follow later today and provide formal, final recommendations at that time   MATTIE Payne  11:57 AM  4/2/2024  [facility] Gastroenterology  [phone]    This patient was seen in conjunction with Kimberly Kowalski NP. I have personally seen and examined the patient and have discussed the above stated diagnosis and treatment plan with my edits above. The patient is a 89 year old male with a history of CHF, CAD, atrial fibrillation, PAD, and achalasia s/p esophagomyotomy in 2009, with Toupet fundoplication, who presented with recurrent aspiration pneumonia. On exam, the patient  appears well, is not on oxygen or tachypneic, and has no abdominal tenderness. Given his history of achalasia, there is a possibility that these aspiration events are due to recurrent achalasia, vs abnormal myotomy/fundoplication anatomy, vs a mass or stricture. Recommend manometry and diagnostic EGD on 4/3 for further evaluation, continue daily PPI.    Samuel Kramer MD  [facility] Gastroenterology

## 2024-04-02 NOTE — PHYSICAL THERAPY NOTE
PHYSICAL THERAPY TREATMENT NOTE - INPATIENT    Room Number: 2601/2601-A     Session: 2     Number of Visits to Meet Established Goals: 3    Presenting Problem: sepsis, PNA  Co-Morbidities : h/o L foot wounds/transmetatarsal amputation, HFrEF, a.fib, CAD s/p CABG, HTN    PHYSICAL THERAPY MEDICAL/SOCIAL HISTORY  History related to current admission: Patient is a 89 year old male admitted on 3/28/2024 from home for sepsis.  Pt diagnosed with PNA.     HOME SITUATION  Type of Home: House   Home Layout: Two level     Lives With: Alone (reorts son over daily)  Patient Owned Equipment: Rolling walker     Prior Level of [place]: Pt reports mod ind PTA.  Pt states son is over daily to assist as     ASSESSMENT   Patient demonstrates good  progress this session, goals  remain in progress.    Patient continues to function near baseline with bed mobility, transfers, gait, and stair negotiation.  Contributing factors to remaining limitations include decreased functional strength, decreased endurance/aerobic capacity, impaired standing balance, and decreased muscular endurance.  Next session anticipate patient to progress bed mobility, transfers, gait, and stair negotiation.  Physical Therapy will continue to follow patient for duration of hospitalization.    Patient continues to benefit from continued skilled PT services: at discharge to promote functional independence and safety with additional support and return home with home health PT.    PLAN  PT Treatment Plan: Bed mobility;Body mechanics;Endurance;Energy conservation;Patient education;Family education;Gait training;Range of motion;Strengthening;Stair training;Transfer training;Balance training  Rehab Potential : Good  Frequency (Obs):  (2-3x/week)    CURRENT GOALS   Goal #1 Patient is able to demonstrate supine - sit EOB @ level: supervision      Goal #2 Patient is able to demonstrate transfers Sit to/from Stand at assistance level: supervision - met 4/2      Goal  #3 Patient is able to ambulate 50 feet with assist device: walker - rolling at assistance level: supervision      Goal #4  Patient is able to stair climb 1 flight of stairs with supervision   Goal #5     Goal #6     Goal Comments: Goals established on 3/30/2024  2024 all goals ongoing     SUBJECTIVE  \"Get me out of here, I'll pay you\"     OBJECTIVE  Precautions: Bed/chair alarm;Hard of hearing    WEIGHT BEARING RESTRICTION  Weight Bearing Restriction: None                PAIN ASSESSMENT   Ratin          BALANCE                                                                                                                       Static Sitting: Good  Dynamic Sitting: Good           Static Standing: Fair -  Dynamic Standing: Poor +    ACTIVITY TOLERANCE   Denies dizziness                      O2 WALK  Oxygen Therapy  SPO2% on Room Air at Rest: 92  SPO2% Ambulation on Room Air: 97      AM-PAC '6-Clicks' INPATIENT SHORT FORM - BASIC MOBILITY  How much difficulty does the patient currently have...  Patient Difficulty: Turning over in bed (including adjusting bedclothes, sheets and blankets)?: A Little   Patient Difficulty: Sitting down on and standing up from a chair with arms (e.g., wheelchair, bedside commode, etc.): A Little   Patient Difficulty: Moving from lying on back to sitting on the side of the bed?: A Little   How much help from another person does the patient currently need...   Help from Another: Moving to and from a bed to a chair (including a wheelchair)?: A Little   Help from Another: Need to walk in hospital room?: A Little   Help from Another: Climbing 3-5 steps with a railing?: A Little       AM-PAC Score:  Raw Score: 18   Approx Degree of Impairment: 46.58%   Standardized Score (AM-PAC Scale): 43.63   CMS Modifier (G-Code): CK    FUNCTIONAL ABILITY STATUS  Gait Assessment   Functional Mobility/Gait Assessment  Gait Assistance: Contact guard assist  Distance (ft): 50  Assistive Device: Rolling  walker  Pattern: Shuffle (flexed posture)  Stairs: Stairs  How Many Stairs: 5  Device: 2 Rails  Assist: Minimal assist  Pattern: Ascend and Descend  Ascend and Descend : Step to    Skilled Therapy Provided: Per RN okay to work with pt. Pt received in chair and was agreeable to PT session.     Bed Mobility:  Rolling: NT   Supine<>Sit: NT   Sit<>Supine: NT     Transfer Mobility:  Sit<>Stand: supervision   Stand<>Sit: supervision, cues to square up with surface prior to sitting   Gait: Pt ambulated with RW and CGA    Pt agreeable to stair training, but declines further activity at this time.     Therapist's Comments: Pt and family educated on role of therapy, goals for session, safety, fall prevention, activity recommendations, and energy conservation techniques.       Patient End of Session: Up in chair;Needs met;Call light within reach;RN aware of session/findings;All patient questions and concerns addressed;Family present    PT Session Time: 15 minutes  Gait Training: 15 minutes

## 2024-04-02 NOTE — PLAN OF CARE
Pt alert and oriented x 4  Up standby with walker. Pt right foot dressing in place , clean, dry and intact.  On room air.  Afib on tele.  Continent of bowel and  bladder.  No complaints of pain, sob, or chest pain/ discomfort.  Plan of care discussed with patient.  Falls precautions in place.  Call light within reach.    Problem: SKIN/TISSUE INTEGRITY - ADULT  Goal: Skin integrity remains intact  Description: INTERVENTIONS  - Assess and document risk factors for pressure ulcer development  - Assess and document skin integrity  - Monitor for areas of redness and/or skin breakdown  - Initiate interventions, skin care algorithm/standards of care as needed  Outcome: Progressing     Problem: CARDIOVASCULAR - ADULT  Goal: Maintains optimal cardiac output and hemodynamic stability  Description: INTERVENTIONS:  - Monitor vital signs, rhythm, and trends  - Monitor for bleeding, hypotension and signs of decreased cardiac output  - Evaluate effectiveness of vasoactive medications to optimize hemodynamic stability  - Monitor arterial and/or venous puncture sites for bleeding and/or hematoma  - Assess quality of pulses, skin color and temperature  - Assess for signs of decreased coronary artery perfusion - ex. Angina  - Evaluate fluid balance, assess for edema, trend weights  Outcome: Progressing  Goal: Absence of cardiac arrhythmias or at baseline  Description: INTERVENTIONS:  - Continuous cardiac monitoring, monitor vital signs, obtain 12 lead EKG if indicated  - Evaluate effectiveness of antiarrhythmic and heart rate control medications as ordered  - Initiate emergency measures for life threatening arrhythmias  - Monitor electrolytes and administer replacement therapy as ordered  Outcome: Progressing

## 2024-04-03 ENCOUNTER — ANESTHESIA (OUTPATIENT)
Dept: ENDOSCOPY | Facility: HOSPITAL | Age: 89
End: 2024-04-03
Payer: MEDICARE

## 2024-04-03 ENCOUNTER — ANESTHESIA EVENT (OUTPATIENT)
Dept: ENDOSCOPY | Facility: HOSPITAL | Age: 89
End: 2024-04-03
Payer: MEDICARE

## 2024-04-03 VITALS
RESPIRATION RATE: 19 BRPM | OXYGEN SATURATION: 92 % | SYSTOLIC BLOOD PRESSURE: 146 MMHG | HEART RATE: 105 BPM | WEIGHT: 217.69 LBS | BODY MASS INDEX: 34.17 KG/M2 | HEIGHT: 67 IN | TEMPERATURE: 99 F | DIASTOLIC BLOOD PRESSURE: 87 MMHG

## 2024-04-03 LAB
ANION GAP SERPL CALC-SCNC: 6 MMOL/L (ref 0–18)
BASOPHILS # BLD AUTO: 0.04 X10(3) UL (ref 0–0.2)
BASOPHILS NFR BLD AUTO: 0.5 %
BUN BLD-MCNC: 21 MG/DL (ref 9–23)
CALCIUM BLD-MCNC: 8.8 MG/DL (ref 8.5–10.1)
CHLORIDE SERPL-SCNC: 109 MMOL/L (ref 98–112)
CO2 SERPL-SCNC: 26 MMOL/L (ref 21–32)
CREAT BLD-MCNC: 0.94 MG/DL
EGFRCR SERPLBLD CKD-EPI 2021: 77 ML/MIN/1.73M2 (ref 60–?)
EOSINOPHIL # BLD AUTO: 0.11 X10(3) UL (ref 0–0.7)
EOSINOPHIL NFR BLD AUTO: 1.4 %
ERYTHROCYTE [DISTWIDTH] IN BLOOD BY AUTOMATED COUNT: 15.5 %
GLUCOSE BLD-MCNC: 118 MG/DL (ref 70–99)
HCT VFR BLD AUTO: 36.2 %
HGB BLD-MCNC: 11.4 G/DL
IMM GRANULOCYTES # BLD AUTO: 0.14 X10(3) UL (ref 0–1)
IMM GRANULOCYTES NFR BLD: 1.8 %
LYMPHOCYTES # BLD AUTO: 1.12 X10(3) UL (ref 1–4)
LYMPHOCYTES NFR BLD AUTO: 14.6 %
MAGNESIUM SERPL-MCNC: 2.2 MG/DL (ref 1.6–2.6)
MCH RBC QN AUTO: 25.5 PG (ref 26–34)
MCHC RBC AUTO-ENTMCNC: 31.5 G/DL (ref 31–37)
MCV RBC AUTO: 81 FL
MONOCYTES # BLD AUTO: 0.7 X10(3) UL (ref 0.1–1)
MONOCYTES NFR BLD AUTO: 9.2 %
NEUTROPHILS # BLD AUTO: 5.54 X10 (3) UL (ref 1.5–7.7)
NEUTROPHILS # BLD AUTO: 5.54 X10(3) UL (ref 1.5–7.7)
NEUTROPHILS NFR BLD AUTO: 72.5 %
OSMOLALITY SERPL CALC.SUM OF ELEC: 296 MOSM/KG (ref 275–295)
PLATELET # BLD AUTO: 309 10(3)UL (ref 150–450)
POTASSIUM SERPL-SCNC: 4.1 MMOL/L (ref 3.5–5.1)
RBC # BLD AUTO: 4.47 X10(6)UL
SODIUM SERPL-SCNC: 141 MMOL/L (ref 136–145)
WBC # BLD AUTO: 7.7 X10(3) UL (ref 4–11)

## 2024-04-03 PROCEDURE — 99239 HOSP IP/OBS DSCHRG MGMT >30: CPT | Performed by: HOSPITALIST

## 2024-04-03 PROCEDURE — 0D728ZZ DILATION OF MIDDLE ESOPHAGUS, VIA NATURAL OR ARTIFICIAL OPENING ENDOSCOPIC: ICD-10-PCS | Performed by: INTERNAL MEDICINE

## 2024-04-03 PROCEDURE — 4A0B7BZ MEASUREMENT OF GASTROINTESTINAL PRESSURE, VIA NATURAL OR ARTIFICIAL OPENING: ICD-10-PCS | Performed by: INTERNAL MEDICINE

## 2024-04-03 PROCEDURE — 0D718ZZ DILATION OF UPPER ESOPHAGUS, VIA NATURAL OR ARTIFICIAL OPENING ENDOSCOPIC: ICD-10-PCS | Performed by: INTERNAL MEDICINE

## 2024-04-03 PROCEDURE — 99232 SBSQ HOSP IP/OBS MODERATE 35: CPT | Performed by: INTERNAL MEDICINE

## 2024-04-03 RX ORDER — ONDANSETRON 2 MG/ML
4 INJECTION INTRAMUSCULAR; INTRAVENOUS ONCE AS NEEDED
Status: DISCONTINUED | OUTPATIENT
Start: 2024-04-03 | End: 2024-04-03 | Stop reason: HOSPADM

## 2024-04-03 RX ORDER — SODIUM CHLORIDE, SODIUM LACTATE, POTASSIUM CHLORIDE, CALCIUM CHLORIDE 600; 310; 30; 20 MG/100ML; MG/100ML; MG/100ML; MG/100ML
INJECTION, SOLUTION INTRAVENOUS CONTINUOUS PRN
Status: DISCONTINUED | OUTPATIENT
Start: 2024-04-03 | End: 2024-04-03 | Stop reason: SURG

## 2024-04-03 RX ORDER — FUROSEMIDE 20 MG/1
20 TABLET ORAL DAILY
Qty: 15 TABLET | Refills: 0 | Status: SHIPPED | OUTPATIENT
Start: 2024-04-03

## 2024-04-03 RX ORDER — NALOXONE HYDROCHLORIDE 0.4 MG/ML
0.08 INJECTION, SOLUTION INTRAMUSCULAR; INTRAVENOUS; SUBCUTANEOUS ONCE AS NEEDED
Status: DISCONTINUED | OUTPATIENT
Start: 2024-04-03 | End: 2024-04-03 | Stop reason: HOSPADM

## 2024-04-03 RX ORDER — LIDOCAINE HYDROCHLORIDE 10 MG/ML
INJECTION, SOLUTION EPIDURAL; INFILTRATION; INTRACAUDAL; PERINEURAL AS NEEDED
Status: DISCONTINUED | OUTPATIENT
Start: 2024-04-03 | End: 2024-04-03 | Stop reason: SURG

## 2024-04-03 RX ORDER — LIDOCAINE HYDROCHLORIDE 20 MG/ML
JELLY TOPICAL
Status: DISCONTINUED | OUTPATIENT
Start: 2024-04-03 | End: 2024-04-03 | Stop reason: HOSPADM

## 2024-04-03 RX ORDER — SODIUM CHLORIDE, SODIUM LACTATE, POTASSIUM CHLORIDE, CALCIUM CHLORIDE 600; 310; 30; 20 MG/100ML; MG/100ML; MG/100ML; MG/100ML
INJECTION, SOLUTION INTRAVENOUS CONTINUOUS
Status: DISCONTINUED | OUTPATIENT
Start: 2024-04-03 | End: 2024-04-03

## 2024-04-03 RX ADMIN — SODIUM CHLORIDE, SODIUM LACTATE, POTASSIUM CHLORIDE, CALCIUM CHLORIDE: 600; 310; 30; 20 INJECTION, SOLUTION INTRAVENOUS at 13:39:00

## 2024-04-03 RX ADMIN — LIDOCAINE HYDROCHLORIDE 30 MG: 10 INJECTION, SOLUTION EPIDURAL; INFILTRATION; INTRACAUDAL; PERINEURAL at 13:44:00

## 2024-04-03 NOTE — DISCHARGE SUMMARY
[facility]     Alejandro Guardado Patient Status:  Inpatient    1935 MRN [id number]   Location [facility] 2NE-A Attending Roosevelt Garcia,    Hosp Day # 6 PCP Stanislav Odonnell MD     Date of Admission: 3/28/2024  Date of Discharge:   4/3/2024    Discharge Disposition: Home or Self Care    Discharge Diagnosis:  Septic shock and acute hypoxic respiratory failure secondary to pneumonia  Achalasia   ERON  Lactic acidosis  Chronic systolic heart failure   Severe aortic stenosis  CAD with prior CABG  Dyslipidemia  PAD with non-healing right foot wound s/p thrombectomy and revascularization/stent 23 by Dr. Steward  GERD    History of Present Illness: Alejandro Guardado is a 89 year old male with multiple recent admissions for pneumonia and septic shock.  Patient presents with similar symptoms with coughing.  He is not passing swallow evaluations and no choking with food at home.  According to family he gets sick very quickly.  He does have chronic foot wound which she has been seen at wound clinic and follows with a podiatrist as well.  Seems to have opened up but no active fevers or purulent discharge.  No nausea vomiting or diarrhea or abdominal pain.  No skin changes or rashes otherwise.  No dysuria hematuria.     Brief Synopsis: Patient admitted with septic shock and acute hypoxic respiratory failure presumed secondary to pneumonia. With empiric antibiotics, patient improved rapidly with antibiotics and was weaned off oxygen and pressor support. Esophagram abnormal. GI consulted. Manometry suggestive of achalasia - OP follow-up recommended, no other acute findings seen on EGD. Patient discharged home in stable condition.     Lace+ Score: 86  59-90 High Risk  29-58 Medium Risk  0-28   Low Risk       TCM Follow-Up Recommendation:  LACE > 58: High Risk of readmission after discharge from the hospital.      Procedures during hospitalization:   EGD  Esophageal manometry    Incidental or  significant findings and recommendations (brief descriptions):  None    Lab/Test results pending at Discharge:   None    Consultants:  Pulmonary  Cardiology  GI  Palliative care    [facility] reviewed: N/A    Follow-up appointment:   Cait Gaming MD  [address]  [facility] [address] [phone]    Schedule an appointment as soon as possible for a visit in 2 week(s)      Jeremy Koch MD  [address]   [place] [address] [phone]    Go in 2 week(s)  for a follow-up visit with GI. The office will call you to arrange the day and time of your appointment    Appointments for Next 30 Days 4/3/2024 - 5/3/2024        Date Arrival Time Visit Type Length Department Provider     4/4/2024  3:30 PM  EXAM - ESTABLISHED [1274] 15 min [facility] Cait Gaming MD    Patient Instructions:         Location Instructions:     Masks are optional for all patients and visitors, unless otherwise indicated.               4/8/2024  3:15 PM  WC FOLLOW UP [1184] 15 min [facility] Quincy Poon DPM    Patient Instructions:         Location Instructions:     Your appointment is scheduled at [facility]. The address is&nbsp; [address]. To reach Registration, park in the [place] [address]. Go through the entrance doors located on the ground floor. Veer left past the Information Desk and proceed to the Wound Care Center.  Masks are optional for all patients and visitors, unless otherwise indicated.                      Vital signs:  Temp:  [97.1 °F (36.2 °C)-98 °F (36.7 °C)] 98 °F (36.7 °C)  Pulse:  [] 98  Resp:  [11-22] 17  BP: ()/(71-93) 141/86  SpO2:  [92 %-100 %] 97 %    Physical Exam:    General: No acute distress   Lungs: clear to auscultation  Cardiovascular: S1, S2. +NACHO.  Abdomen:  Soft    -----------------------------------------------------------------------------------------------  PATIENT DISCHARGE INSTRUCTIONS: See electronic chart    Roosevelt Garcia,     Total time spent on discharge plannin minutes     The  [employer] Cures Act makes medical notes like these available to patients in the interest of transparency. Please be advised this is a medical document. Medical documents are intended to carry relevant information, facts as evident, and the clinical opinion of the practitioner. The medical note is intended as peer to peer communication and may appear blunt or direct. It is written in medical language and may contain abbreviations or verbiage that are unfamiliar.

## 2024-04-03 NOTE — OPERATIVE REPORT
EGD Operative Report  Patient Name: Alejandro Guardado  YOB: 1935  MRN: [id number]  Procedure: Esophagogastroduodenoscopy (EGD)    Pre-operative Diagnosis & Indication: aspiration, achalasia  Post-operative Diagnosis:   Dilated mid and proximal esophagus without any obstructive lesions present  Attending Endoscopist: Samuel Kramer MD  Informed Consent: The planned procedure(s), the explanation of the procedure, its expected benefits, the potential complications and risks and possible alternatives and their benefits and risks were discussed with the patient or the patient's surrogate. The discussion of risks, not limited to but including bleeding, infection, perforation, adverse effects from anesthesia, need for emergency surgery/prolonged hospitalization,  cardiac arrhythmias,  and aspiration were discussed with patient.  Pt and/or surrogate understood the proposed procedure(s), its risks, benefits and alternatives and wish to proceed with procedure(s). All questions answered in full.  After all questions were answered to their satisfaction, a signed, informed, and witnessed consent was obtained.  Physical Exam: Heart: regular rate and rhythm. No rubs, murmurs, or gallops. Lungs: Clear to auscultation bilaterally. Abdomen: Soft, non-tender, non distended. No rebound tenderness, no guarding.   A TIME OUT WAS COMPLETED prior to the procedure to confirm the patient, procedure(s) and complete endoscopy safety procedure.  Sedation: Monitored Anesthesia Care; ASA class per anesthesiology team   Monitoring: Pulsoximetry, pulse, respirations, and blood pressure , vitals were monitored throughout the entire procedure under monitored anesthesia care.   Procedure: The patient was then brought to the endoscopy suite where his/her pulse, pulse oximetry and blood pressure were monitored. The patient was placed in the left lateral decubitus position and deep sedation was administered. Once adequate sedation was achieved,  a bite block was placed and a lubricated tip of an Olympus video upper endoscope was inserted through the oropharynx and gently manipulated through the esophagus into the stomach and the second portion of the duodenum. Upon withdrawal of the endoscope, careful visualization of the mucosa was performed. The endoscope was then withdrawn into the gastric antrum and placed in a retroflexed position.  The endoscope was then righted, and air was suctioned from the stomach.  The endoscope was then withdrawn from the patient, with careful visual inspection of the mucosa. The patient left the procedure room in stable condition for recovery. Findings and endotherapy as listed below  Toleration: Patient tolerated procedure well   Complications: No immediate complications   Technical Difficulty:  The procedure was not technically difficult   Estimated Blood Loss: Minimal, less than 5mL of estimated blood loss.   Findings and Therapeutics:  Esophagus:   The mid and proximal esophagus was dilated, however there were no obstructive lesions in the esophagus and the mucosa was normal. There were secretions in the esophagus that were suctioned without difficulty. No food was seen in the esophagus.  There were no strictures or stenosis. GEJ junction traversed with endoscope without resistance.  The Z-line was irregular, appreciated at 39 cm from the incisors.    Biopsies not obtained due to patient being on plavix and eliquis.  Stomach:    The gastric body, antrum, fundus, cardia, and angularis were normal. No ulcers, erosions or masses visualized. Endoscope was placed in a retroflexion view in the stomach. There was  evidence of a prior Toupet fundoplication.   Duodenum:   The entire examined duodenum was normal.    Impression:  Recommendations:  Post EGD precautions, watch for bleeding, infection, perforation, adverse drug reactions   Ok for regular diet, recommend chewing food thoroughly  Avoid non-aspirin NSAID  Ok for discharge  from a GI perspective. We will arrange close outpatient follow up, and also contact cardiology today about clearance for his anti-platelet and blood thinner agent. He will need follow up to discuss endoscopic options with Dr. Koch regarding his achalasia, potential options include pneumatic balloon dilation vs POEM vs less likely Botox injection.    Samuel Kramer MD  4/3/2024  1:51 PM

## 2024-04-03 NOTE — PROGRESS NOTES
NURSING DISCHARGE NOTE    Discharged Home via wheelchair  Accompanied by Support staff  Belongings Taken by patient/family.  AVS given to patient ,and daughter in law at bedside. Discharge teaching completed. Patient verbalized understanding, appointment reviewed, IV removed ,Catheter intact.

## 2024-04-03 NOTE — OPERATIVE REPORT
Alejandro Guardado Patient Status:  Inpatient    1935 MRN [id number]   [facility] 2NE-A Attending Roosevelt Garcia, DO   Date 4/3/2024 PCP Stanislav Odonnell MD     PREOPERATIVE DIAGNOSIS/INDICATION: Dysphagia, h/o Achalsia and surgical myotomy and fundoplication  POSTOPERTATIVE DIAGNOSIS: Same  PROCEDURE PERFORMED: High resolution esophageal manometry  TIME OUT WAS PERFORMED    FINDINGS:  LES Resting pressure elevated at 52.8 mmHg, relaxation residual  pressure elevated at 49.2 mmHg, 90% failed ineffective peristalsis with panesophageal pressurization   RECOMMENDATIONS:   EGD to rule out underlying esophageal pathology  Consider balloon dilatation 20mm, if no response consider Achalasia balloon dilatation 30mm or posterior MARGARITA Koch MD  4/3/2024  4:34 PM

## 2024-04-03 NOTE — PLAN OF CARE
Pt alert and oriented x4.  Up standby and walker .  On room air.  Afib on tele.  continent of bowel and bladder.Dressing wound on right foot  No complaints of pain, sob, or chest pain/ discomfort.  Plan of care discussed with patient.  Falls precautions in place.  Call light within reach.    Problem: SKIN/TISSUE INTEGRITY - ADULT  Goal: Skin integrity remains intact  Description: INTERVENTIONS  - Assess and document risk factors for pressure ulcer development  - Assess and document skin integrity  - Monitor for areas of redness and/or skin breakdown  - Initiate interventions, skin care algorithm/standards of care as needed  Outcome: Progressing     Problem: CARDIOVASCULAR - ADULT  Goal: Maintains optimal cardiac output and hemodynamic stability  Description: INTERVENTIONS:  - Monitor vital signs, rhythm, and trends  - Monitor for bleeding, hypotension and signs of decreased cardiac output  - Evaluate effectiveness of vasoactive medications to optimize hemodynamic stability  - Monitor arterial and/or venous puncture sites for bleeding and/or hematoma  - Assess quality of pulses, skin color and temperature  - Assess for signs of decreased coronary artery perfusion - ex. Angina  - Evaluate fluid balance, assess for edema, trend weights  Outcome: Progressing  Goal: Absence of cardiac arrhythmias or at baseline  Description: INTERVENTIONS:  - Continuous cardiac monitoring, monitor vital signs, obtain 12 lead EKG if indicated  - Evaluate effectiveness of antiarrhythmic and heart rate control medications as ordered  - Initiate emergency measures for life threatening arrhythmias  - Monitor electrolytes and administer replacement therapy as ordered  Outcome: Progressing

## 2024-04-03 NOTE — ANESTHESIA PREPROCEDURE EVALUATION
PRE-OP EVALUATION    Patient Name: Alejandro Guardado    Admit Diagnosis: Lactic acidosis [E87.20]  ERON (acute kidney injury) (HCC) [N17.9]  Sepsis due to pneumonia (HCC) [J18.9, A41.9]  Leukocytosis, unspecified type [D72.829]    Pre-op Diagnosis: Recurrent aspiration; abnormal esophogram    ESOPHAGOGASTRODUODENOSCOPY (EGD)    Anesthesia Procedure: ESOPHAGOGASTRODUODENOSCOPY (EGD)    Surgeon(s) and Role:     * Samuel Kramer MD - Primary    Pre-op vitals reviewed.  Temp: 97.4 °F (36.3 °C)  Pulse: 100  Resp: 17  BP: 127/79  SpO2: 95 %  Body mass index is 34.1 kg/m².    Current medications reviewed.  Hospital Medications:   lidocaine (Urojet) 2 % urethral jelly    PRN    predniSONE (Deltasone) tab 40 mg  40 mg Oral Daily with breakfast    metoclopramide (Reglan) 5 mg/mL injection 10 mg  10 mg Intravenous Q8H PRN    apixaban (Eliquis) tab 5 mg  5 mg Oral BID    [COMPLETED] sodium chloride 0.9 % IV bolus 500 mL  500 mL Intravenous Once    [COMPLETED] piperacillin-tazobactam (Zosyn) 4.5 g in dextrose 5% 100 mL IVPB-ADDV  4.5 g Intravenous Once    [COMPLETED] aspirin chewable tab 324 mg  324 mg Oral Once    [COMPLETED] sodium chloride 0.9% infusion 1,000 mL  1,000 mL Intravenous Once    [COMPLETED] sodium chloride 0.9 % IV bolus 2,859 mL  30 mL/kg Intravenous Once    atorvastatin (Lipitor) tab 40 mg  40 mg Oral Nightly    clopidogrel (Plavix) tab 75 mg  75 mg Oral Daily    finasteride (Proscar) tab 5 mg  5 mg Oral Daily    folic acid (Folvite) tab 1 mg  1 mg Oral Daily    gabapentin (Neurontin) cap 300 mg  300 mg Oral TID    pantoprazole (Protonix) DR tab 40 mg  40 mg Oral QAM AC    tamsulosin (Flomax) cap 0.4 mg  0.4 mg Oral Daily    [] sodium chloride 0.9% infusion   Intravenous Continuous    acetaminophen (Tylenol Extra Strength) tab 1,000 mg  1,000 mg Oral Q6H PRN    melatonin tab 3 mg  3 mg Oral Nightly PRN    ondansetron (Zofran) 4 MG/2ML injection 4 mg  4 mg Intravenous Q6H PRN    polyethylene glycol (PEG 3350)  (Miralax) 17 g oral packet 17 g  17 g Oral Daily PRN    sennosides (Senokot) tab 17.2 mg  17.2 mg Oral Nightly PRN    bisacodyl (Dulcolax) 10 MG rectal suppository 10 mg  10 mg Rectal Daily PRN    [COMPLETED] meropenem (Merrem) 1,000 mg in sodium chloride 0.9% 100 mL IVPB-MBP  1,000 mg Intravenous Q12H    [COMPLETED] potassium chloride 40 mEq in 250mL sodium chloride 0.9% IVPB premix  40 mEq Intravenous Once       Outpatient Medications:     Medications Prior to Admission   Medication Sig Dispense Refill Last Dose    furosemide 80 MG Oral Tab Take 1 tablet (80 mg total) by mouth daily.   3/27/2024    metoprolol succinate ER 25 MG Oral Tablet 24 Hr Take 1 tablet (25 mg total) by mouth Daily Beta Blocker. 90 tablet 1 3/27/2024    tamsulosin 0.4 MG Oral Cap Take 1 capsule (0.4 mg total) by mouth daily.   3/27/2024    apixaban 5 MG Oral Tab Take 1 tablet (5 mg total) by mouth 2 (two) times daily. 60 tablet 3 3/27/2024    atorvastatin 40 MG Oral Tab Take 1 tablet (40 mg total) by mouth daily. 30 tablet 0 3/27/2024    finasteride 5 MG Oral Tab Take 1 tablet (5 mg total) by mouth daily. 30 tablet 0 3/27/2024    clopidogrel 75 MG Oral Tab Take 1 tablet (75 mg total) by mouth daily. 30 tablet 0 3/27/2024    gabapentin 300 MG Oral Cap Take 1 capsule (300 mg total) by mouth 3 (three) times daily. 90 capsule 0 3/27/2024    predniSONE 5 MG Oral Tab Take 3 tablets (15 mg total) by mouth daily.   3/27/2024    folic acid 1 MG Oral Tab Take 1 tablet (1 mg total) by mouth daily.   3/27/2024    lisinopril 2.5 MG Oral Tab Take 1 tablet (2.5 mg total) by mouth daily.   3/27/2024    Omeprazole 40 MG Oral Capsule Delayed Release Take 1 capsule (40 mg total) by mouth daily.   3/27/2024    [] amoxicillin clavulanate 875-125 MG Oral Tab Take 1 tablet by mouth 2 (two) times daily for 8 days. 16 tablet 0     collagenase (SANTYL) 250 UNIT/GM External Ointment Apply 1 g topically daily. 30 g 2 Unknown    collagenase (SANTYL) 250 UNIT/GM  External Ointment Apply topically to ulcer site daily 30 g 0 Unknown    acetaminophen 500 MG Oral Tab Take 2 tablets (1,000 mg total) by mouth every 8 (eight) hours. 21 tablet 0 Unknown       Allergies: Heparin and Hydromorphone      Anesthesia Evaluation    Patient summary reviewed.    Anesthetic Complications  (-) history of anesthetic complications         GI/Hepatic/Renal      (+) GERD                           Cardiovascular      ECG reviewed.  Exercise tolerance: good     MET: >4      (+) hypertension     (+) CAD  (+) past MI  (+) CABG/stent         (+) dysrhythmias and atrial fibrillation  (+) CHF                Endo/Other                                  Pulmonary             (+) pneumonia              Neuro/Psych                              Patient Active Problem List:     Closed minimally displaced zone I fracture of sacrum (HCC)     At risk for falling     CAD (coronary artery disease)     Ischemic cardiomyopathy     Azotemia     Arrhythmia     Esophageal reflux     History of MI (myocardial infarction)     Mixed hyperlipidemia     Primary hypertension     Dizziness     Medication side effect     Closed left hip fracture (HCC)  Global 6/22/2016     Status post hip surgery     Left shoulder distal clavical resection and excision of ganglion cyst of soft tissue mass   Global  09/17/2020     Orthopedic aftercare for healing traumatic hip fracture, left, closed     Closed left hip fracture, with routine healing, subsequent encounter     Osteoarthrosis, localized, primary, knee, left     Osteoarthrosis, localized, primary, knee, right     Gangrene (HCC)     PAD (peripheral artery disease) (HCC)     Benign prostatic hyperplasia with incomplete bladder emptying     Gangrene of foot (HCC)     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) (HCC)     Leukocytosis     Atrial fibrillation with RVR (HCC)     Hypokalemia     Acute kidney injury (HCC)     Hyperglycemia     Community acquired pneumonia of  right lung, unspecified part of lung     Acute respiratory failure with hypoxia ([facility])     Hypotension, unspecified hypotension type     Sepsis due to pneumonia ([facility])     Foot ulcer with fat layer exposed, right ([facility])     Syncope, unspecified syncope type     Sepsis, due to unspecified organism, unspecified whether acute organ dysfunction present ([facility])     Shock (HCC)     Acute hypoxic respiratory failure ([facility])     Pneumonia, bacterial     Hyponatremia     Metabolic alkalosis     Recurrent pneumonia     Sepsis due to undetermined organism ([facility])     Acute on chronic congestive heart failure, unspecified heart failure type (HCC)     Acute hypoxemic respiratory failure (HCC)     ERON (acute kidney injury) ([facility])     Lactic acidosis     Leukocytosis, unspecified type     Palliative care encounter     Counseling regarding advance care planning and goals of care     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) ([facility])            Past Surgical History:   Procedure Laterality Date    ANGIOGRAM      ANGIOPLASTY (CORONARY)      CABG      CATARACT  04/2023    FRACTURE SURGERY Left     left hip pinning    OTHER SURGICAL HISTORY Left 01/01/1977    knee surgery    OTHER SURGICAL HISTORY  03/25/2016    Left hip ORIF pinning    TONSILLECTOMY       Social History     Socioeconomic History    Marital status:    Tobacco Use    Smoking status: Never    Smokeless tobacco: Never   Vaping Use    Vaping Use: Never used   Substance and Sexual Activity    Alcohol use: Never    Drug use: Never     History   Drug Use Unknown     Available pre-op labs reviewed.  Lab Results   Component Value Date    WBC 7.7 04/03/2024    RBC 4.47 04/03/2024    HGB 11.4 (L) 04/03/2024    HCT 36.2 (L) 04/03/2024    MCV 81.0 04/03/2024    MCH 25.5 (L) 04/03/2024    MCHC 31.5 04/03/2024    RDW 15.5 04/03/2024    .0 04/03/2024     Lab Results   Component Value Date     04/03/2024    K 4.1 04/03/2024     04/03/2024    CO2 26.0 04/03/2024     BUN 21 04/03/2024    CREATSERUM 0.94 04/03/2024     (H) 04/03/2024    CA 8.8 04/03/2024     Lab Results   Component Value Date    INR 1.51 (H) 03/28/2024         Airway      Mallampati: II  Mouth opening: >3 FB  TM distance: 4 - 6 cm  Neck ROM: full Cardiovascular      Rhythm: regular  Rate: normal     Dental      Dental appliance(s): partials       Pulmonary      Breath sounds clear to auscultation bilaterally.               Other findings              ASA: 3   Plan: MAC  NPO status verified and patient meets guidelines.    Post-procedure pain management plan discussed with surgeon and patient.      Plan/risks discussed with: patient                Present on Admission:   Shock (HCC)   Recurrent pneumonia

## 2024-04-03 NOTE — PROGRESS NOTES
[facility] Pulmonary/Critical Care Progress Note     SUBJECTIVE/Interval history:  No acute events overnight, he feels well, continues to ask when he can go home. Denies cough or dyspnea. No pain. Afebrile  Remains on RA    Review of Systems:   A comprehensive 14 point review of systems was completed.   Pertinent positives and negatives noted in the HPI.    OBJECTIVE:  Vitals:    04/03/24 0515 04/03/24 0720 04/03/24 0746 04/03/24 0806   BP:  139/87 (!) 135/93 145/90   BP Location:  Right arm Right arm Right arm   Pulse:  91 99 96   Resp:  15 11 18   Temp:    97.5 °F (36.4 °C)   TempSrc:    Oral   SpO2:  95% 100% 92%   Weight: 217 lb 11.2 oz (98.7 kg)      Height:           Vital signs in last 24 hours:  Blood pressure 145/90, pulse 96, temperature 97.5 °F (36.4 °C), temperature source Oral, resp. rate 18, height 5' 7\" (1.702 m), weight 217 lb 11.2 oz (98.7 kg), SpO2 92%.     Intake/Output:    Intake/Output Summary (Last 24 hours) at 4/3/2024 0954  Last data filed at 4/3/2024 0806  Gross per 24 hour   Intake 500 ml   Output 1200 ml   Net -700 ml           Physical Exam:  General: Appears alert, comfortable. No acute distress  Neurologic:No focal deficits.  Alert.  Oriented.  Lungs:CTAB no wheeze. No distress  Heart:RRR no m  Abdomen:Soft, non-tender.  No masses.  No guarding.  No rebound.  Extremities:no edema    Lab Data Review:   Recent Labs   Lab 04/01/24  0618 04/02/24  0541 04/03/24  0535   * 109* 118*   BUN 25* 20 21   CREATSERUM 0.89 0.82 0.94   CA 8.7 8.8 8.8    143 141   K 3.6 3.9 4.1    109 109   CO2 27.0 29.0 26.0     Recent Labs   Lab 03/28/24  1131 03/29/24  0343 03/30/24  0451 04/01/24  0618 04/02/24  0541 04/03/24  0535   RBC 4.87 4.12   < > 3.97 4.39 4.47   HGB 12.7* 10.6*   < > 10.2* 11.1* 11.4*   HCT 39.5 33.1*   < > 33.2* 35.3* 36.2*   MCV 81.1 80.3   < > 83.6 80.4 81.0   MCH 26.1 25.7*   < > 25.7* 25.3* 25.5*   MCHC 32.2 32.0   < > 30.7* 31.4 31.5   RDW 15.8 16.0    < > 15.9 15.4 15.5   NEPRELIM 20.51* 21.50*  --   --   --  5.54   WBC 23.3* 23.6*   < > 6.3 7.4 7.7   .0 305.0   < > 271.0 307.0 309.0    < > = values in this interval not displayed.     No results for input(s): \"BNP\" in the last 168 hours.  No results for input(s): \"TROP\", \"CK\" in the last 168 hours.  Recent Labs   Lab 03/28/24  1555   INR 1.51*   PTT 31.8     No results for input(s): \"ABGPHT\", \"WHEISV4N\", \"PBZFS7R\", \"ABGHCO3\", \"ABGBE\", \"TEMP\", \"MICHAEL\", \"SITE\", \"DEV\", \"THGB\" in the last 168 hours.    Invalid input(s): \"WFU83RAL\", \"CHOB\"    Other Labs:  Interval Culture Data:   Hospital Encounter on 03/28/24   1. Blood Culture     Status: None    Collection Time: 03/28/24 11:31 AM    Specimen: Blood,peripheral   Result Value Ref Range    Blood Culture Result No Growth 5 Days N/A     Recent Labs   Lab 03/28/24  2105   COLORUR Light-Yellow   CLARITY Clear   SPECGRAVITY 1.012   GLUUR Normal   BILUR Negative   KETUR Negative   BLOODURINE Negative   PHURINE 5.0   PROUR Negative   UROBILINOGEN Normal   NITRITE Negative   LEUUR Negative       Interval Radiology:   XR ESOPHAGUS SINGLE CONTRAST (CPT=74220)    Result Date: 4/2/2024  CONCLUSION:  There is dilatation of the proximal to mid esophagus with multiple tertiary contractions and significant delay and contrast transit through the esophagus into the stomach.  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   LOCATION:  [facility]   Dictated by (CST): Miguelangel Worley DO on 4/02/2024 at 10:06 AM     Finalized by (CST): Miguelangel Worley DO on 4/02/2024 at 10:10 AM         predniSONE  40 mg Oral Daily with breakfast    apixaban  5 mg Oral BID    atorvastatin  40 mg Oral Nightly    clopidogrel  75 mg Oral Daily    finasteride  5 mg Oral Daily    folic acid  1 mg Oral Daily    gabapentin  300 mg Oral TID    pantoprazole  40 mg Oral QAM AC    tamsulosin  0.4 mg Oral Daily     lidocaine, metoclopramide, acetaminophen, melatonin, ondansetron, polyethylene glycol (PEG 3350),  sennosides, bisacodyl        ASSESSMENT  Pneumonia, recurrent. The recurrent right sided opacities are most suspicious for aspiration whether from choking/secretion clearance issues or reflux.  Esophagram demonstrating abnormal filling/emptying suspect achalasia  Acute hypoxic respiratory failure due to above, now resolved  Septic shock, resolved  HFrEF, compensated  Afib, controlled     PLAN  Continue close monitoring of respiratory status, wean o2 for sats >89%  Appreciate GI input and planning EGD later today  Remains off antibiotics given negative cultures and rapid improvement  No objection to discharge post EGD today  The pulmonary service will follow peripherally. He is requested to follow up as outpatient.  Please call with questions/concerns     Karel Romero MD

## 2024-04-03 NOTE — ANESTHESIA POSTPROCEDURE EVALUATION
[facility]    Alejandro Guardado Patient Status:  Inpatient   Age/Gender 89 year old male MRN [id number]   Location [facility] Attending Roosevelt Garcia DO   Hosp Day # 6 PCP Stanislav Odonnell MD       Anesthesia Post-op Note    ESOPHAGOGASTRODUODENOSCOPY (EGD)    Procedure Summary       Date: 04/03/24 Room / Location:  ENDOSCOPY 01 / [facility]    Anesthesia Start: 1339 Anesthesia Stop: 1355    Procedure: ESOPHAGOGASTRODUODENOSCOPY (EGD) Diagnosis: (dilated esophagus)    Surgeons: Samuel Kramer MD Anesthesiologist: Arnaud Sher MD    Anesthesia Type: MAC ASA Status: 3            Anesthesia Type: MAC    Vitals Value Taken Time   BP 86/71 04/03/24 1356   Temp  04/03/24 1356   Pulse 93 04/03/24 1356   Resp 16 04/03/24 1356   SpO2 97 04/03/24 1356       Patient Location: Endoscopy    Anesthesia Type: MAC    Airway Patency: patent    Postop Pain Control: adequate    Mental Status: mildly sedated but able to meaningfully participate in the post-anesthesia evaluation    Nausea/Vomiting: none    Cardiopulmonary/Hydration status: stable euvolemic    Complications: no apparent anesthesia related complications    Postop vital signs: stable    Dental Exam: Unchanged from Postop

## 2024-04-03 NOTE — PLAN OF CARE
Assumed patient care at 1930. Patient alert and oriented x 4. Afib on tele monitor, HR well controlled. Maintaining O2 saturation WNL on room air during the day needs 2 L/min via NC at night. Continent of bladder and bowel. No complains of pain. Patient is aware of plan of care. Patient NPO since midnight prior to manometry and EGD in the morning. No complains of pain. Safety precautions place, call light within reach, bed alarm on. All needs met at this time.       Problem: SKIN/TISSUE INTEGRITY - ADULT  Goal: Skin integrity remains intact  Description: INTERVENTIONS  - Assess and document risk factors for pressure ulcer development  - Assess and document skin integrity  - Monitor for areas of redness and/or skin breakdown  - Initiate interventions, skin care algorithm/standards of care as needed  Outcome: Progressing     Problem: CARDIOVASCULAR - ADULT  Goal: Maintains optimal cardiac output and hemodynamic stability  Description: INTERVENTIONS:  - Monitor vital signs, rhythm, and trends  - Monitor for bleeding, hypotension and signs of decreased cardiac output  - Evaluate effectiveness of vasoactive medications to optimize hemodynamic stability  - Monitor arterial and/or venous puncture sites for bleeding and/or hematoma  - Assess quality of pulses, skin color and temperature  - Assess for signs of decreased coronary artery perfusion - ex. Angina  - Evaluate fluid balance, assess for edema, trend weights  Outcome: Progressing  Goal: Absence of cardiac arrhythmias or at baseline  Description: INTERVENTIONS:  - Continuous cardiac monitoring, monitor vital signs, obtain 12 lead EKG if indicated  - Evaluate effectiveness of antiarrhythmic and heart rate control medications as ordered  - Initiate emergency measures for life threatening arrhythmias  - Monitor electrolytes and administer replacement therapy as ordered  Outcome: Progressing

## 2024-04-03 NOTE — PLAN OF CARE
Patient alert and oriented x4. On room air and Continuous SPO2 . Afib on tele. Incontinent of bowel and anuric. Up with total lift. complaints of severe foot pain. Patient resting comfortably at this time. All needs addressed. Call light within reach. Bed in low and locked position. All belongings within reach.   Problem: SKIN/TISSUE INTEGRITY - ADULT  Goal: Skin integrity remains intact  Description: INTERVENTIONS  - Assess and document risk factors for pressure ulcer development  - Assess and document skin integrity  - Monitor for areas of redness and/or skin breakdown  - Initiate interventions, skin care algorithm/standards of care as needed  4/3/2024 1244 by Glory Williamson RN  Outcome: Progressing  4/3/2024 1030 by Glory Williamson RN  Outcome: Progressing     Problem: CARDIOVASCULAR - ADULT  Goal: Maintains optimal cardiac output and hemodynamic stability  Description: INTERVENTIONS:  - Monitor vital signs, rhythm, and trends  - Monitor for bleeding, hypotension and signs of decreased cardiac output  - Evaluate effectiveness of vasoactive medications to optimize hemodynamic stability  - Monitor arterial and/or venous puncture sites for bleeding and/or hematoma  - Assess quality of pulses, skin color and temperature  - Assess for signs of decreased coronary artery perfusion - ex. Angina  - Evaluate fluid balance, assess for edema, trend weights  4/3/2024 1244 by Glory Williamson RN  Outcome: Progressing  4/3/2024 1030 by Glory Williamson, RN  Outcome: Progressing  Goal: Absence of cardiac arrhythmias or at baseline  Description: INTERVENTIONS:  - Continuous cardiac monitoring, monitor vital signs, obtain 12 lead EKG if indicated  - Evaluate effectiveness of antiarrhythmic and heart rate control medications as ordered  - Initiate emergency measures for life threatening arrhythmias  - Monitor electrolytes and administer replacement therapy as ordered  4/3/2024 1244 by Glory Williamson, RN  Outcome:  Progressing  4/3/2024 1030 by Glory Williamson, RN  Outcome: Progressing

## 2024-04-04 ENCOUNTER — PATIENT MESSAGE (OUTPATIENT)
Dept: PODIATRY CLINIC | Facility: CLINIC | Age: 89
End: 2024-04-04

## 2024-04-04 NOTE — PAYOR COMM NOTE
--------------  DISCHARGE REVIEW    Payor: [employer] MEDICARE ADV PPO  Subscriber #:  [id number]  Authorization Number: [id number]    Admit date: 3/28/24  Admit time:   2:45 PM  Discharge Date: 4/3/2024  5:30 PM     Admitting Physician: Ruth Cardoso MD  Attending Physician:  No att. providers found  Primary Care Physician: Stanislav Odonnell MD          Discharge Summary Notes        Discharge Summary signed by Roosevelt Garcia DO at 4/3/2024  3:39 PM       Author: Roosevelt Garcia DO Specialty: HOSPITALIST Author Type: Physician    Filed: 4/3/2024  3:39 PM Date of Service: 4/3/2024  3:30 PM Status: Signed    : Roosevelt Garcia DO (Physician)           [facility]     Alejandro Guardado Patient Status:  Inpatient    1935 MRN [id number]   Location [facility] 2NE-A Attending Roosevelt Garcia DO   Hosp Day # 6 PCP Stanislav Odonnell MD     Date of Admission: 3/28/2024  Date of Discharge:   4/3/2024    Discharge Disposition: Home or Self Care    Discharge Diagnosis:  Septic shock and acute hypoxic respiratory failure secondary to pneumonia  Achalasia   ERON  Lactic acidosis  Chronic systolic heart failure   Severe aortic stenosis  CAD with prior CABG  Dyslipidemia  PAD with non-healing right foot wound s/p thrombectomy and revascularization/stent 23 by Dr. Steward  GERD    History of Present Illness: Alejandro Guardado is a 89 year old male with multiple recent admissions for pneumonia and septic shock.  Patient presents with similar symptoms with coughing.  He is not passing swallow evaluations and no choking with food at home.  According to family he gets sick very quickly.  He does have chronic foot wound which she has been seen at wound clinic and follows with a podiatrist as well.  Seems to have opened up but no active fevers or purulent discharge.  No nausea vomiting or diarrhea or abdominal pain.  No skin changes or rashes otherwise.  No dysuria hematuria.     Brief Synopsis: Patient  admitted with septic shock and acute hypoxic respiratory failure presumed secondary to pneumonia. With empiric antibiotics, patient improved rapidly with antibiotics and was weaned off oxygen and pressor support. Esophagram abnormal. GI consulted. Manometry suggestive of achalasia - OP follow-up recommended, no other acute findings seen on EGD. Patient discharged home in stable condition.     Lace+ Score: 86  59-90 High Risk  29-58 Medium Risk  0-28   Low Risk       TCM Follow-Up Recommendation:  LACE > 58: High Risk of readmission after discharge from the hospital.      Procedures during hospitalization:   EGD  Esophageal manometry    Incidental or significant findings and recommendations (brief descriptions):  None    Lab/Test results pending at Discharge:   None    Consultants:  Pulmonary  Cardiology  GI  Palliative care    [place]    Follow-up appointment:   Cait Gaming MD  [address]   [facility] [address]  [facility] [address] [phone]    Schedule an appointment as soon as possible for a visit in 2 week(s)      Jeremy Koch MD  [address] [place] [address]   [place] [id number] [phone]    Go in 2 week(s)  for a follow-up visit with GI. The office will call you to arrange the day and time of your appointment    Appointments for Next 30 Days 4/3/2024 - 5/3/2024        Date Arrival Time Visit Type Length Department Provider     4/4/2024  3:30 PM  EXAM - ESTABLISHED [8324] 15 min [facility] Cait Gaming MD    Patient Instructions:         Location Instructions:     Masks are optional for all patients and visitors, unless otherwise indicated.               4/8/2024  3:15 PM  WC FOLLOW UP [5697] 15 min [facility] Quincy Poon DPM    Patient Instructions:         Location Instructions:     Your appointment is scheduled at [facility]. The address is&nbsp; [address]. To reach  Registration, park in the [place] [facility] [address]. Go through the entrance doors located on the ground floor. Veer left past the Information Desk and proceed to the Wound Care Center.  Masks are optional for all patients and visitors, unless otherwise indicated.                      Vital signs:  Temp:  [97.1 °F (36.2 °C)-98 °F (36.7 °C)] 98 °F (36.7 °C)  Pulse:  [] 98  Resp:  [11-22] 17  BP: ()/(71-93) 141/86  SpO2:  [92 %-100 %] 97 %    Physical Exam:    General: No acute distress   Lungs: clear to auscultation  Cardiovascular: S1, S2. +NACHO.  Abdomen: Soft    -----------------------------------------------------------------------------------------------  PATIENT DISCHARGE INSTRUCTIONS: See electronic chart    Roosevelt Garcia DO    Total time spent on discharge plannin minutes     The  [employer] Cures Act makes medical notes like these available to patients in the interest of transparency. Please be advised this is a medical document. Medical documents are intended to carry relevant information, facts as evident, and the clinical opinion of the practitioner. The medical note is intended as peer to peer communication and may appear blunt or direct. It is written in medical language and may contain abbreviations or verbiage that are unfamiliar.       Electronically signed by Roosevelt Garcia DO on 4/3/2024  3:39 PM         REVIEWER COMMENTS

## 2024-04-08 ENCOUNTER — OFFICE VISIT (OUTPATIENT)
Dept: WOUND CARE | Facility: HOSPITAL | Age: 89
End: 2024-04-08
Attending: STUDENT IN AN ORGANIZED HEALTH CARE EDUCATION/TRAINING PROGRAM
Payer: MEDICARE

## 2024-04-08 VITALS
HEART RATE: 69 BPM | RESPIRATION RATE: 16 BRPM | DIASTOLIC BLOOD PRESSURE: 76 MMHG | TEMPERATURE: 98 F | SYSTOLIC BLOOD PRESSURE: 120 MMHG

## 2024-04-08 DIAGNOSIS — Z89.439 HISTORY OF TRANSMETATARSAL AMPUTATION OF FOOT (HCC): ICD-10-CM

## 2024-04-08 DIAGNOSIS — L97.512 FOOT ULCER, RIGHT, WITH FAT LAYER EXPOSED (HCC): Primary | ICD-10-CM

## 2024-04-08 DIAGNOSIS — I73.9 PAD (PERIPHERAL ARTERY DISEASE) (HCC): ICD-10-CM

## 2024-04-08 DIAGNOSIS — R26.9 GAIT DIFFICULTY: ICD-10-CM

## 2024-04-08 PROCEDURE — 11042 DBRDMT SUBQ TIS 1ST 20SQCM/<: CPT | Performed by: PODIATRIST

## 2024-04-08 NOTE — PROGRESS NOTES
Patient ID: Alejandro Guardado is a 89 year old male.    Debridement Old surgical Right Foot   Wound 08/14/23 #1- right foot Foot Right    Performed by: Quincy Poon DPM  Authorized by: Quincy Poon DPM      Consent   Consent obtained? verbal  Consent given by: patient  Risks discussed? procedural risks discussed  Time out called at 4/8/2024 4:01 PM  Immediately prior to the procedure a time out was called and the performing provider verified the correct patient, procedure, equipment, support staff, and site/side marked as required.    Debridement Details  Performed by: physician  Debridement type: surgical  Level of debridement: subcutaneous tissue  Pain control: lidocaine 4%    Pre-debridement measurements  Length (cm): 0.6  Width (cm): 2.5  Depth (cm): 0.5  Surface Area (cm^2): 1.5    Post-debridement measurements  Length (cm): 0.7  Width (cm): 2.6  Depth (cm): 0.5  Percent debrided: 100%  Surface Area (cm^2): 1.82  Area Debrided (cm^2): 1.82  Volume (cm^3): 0.91    Tissue and other material debrided: subcutaneous tissue  Devitalized tissue debrided: biofilm, fibrin, necrotic debris and slough  Instrument(s) utilized: curette  Bleeding: small  Hemostasis obtained with: not applicable  Procedural pain (0-10): 0  Post-procedural pain: 0   Response to treatment: procedure was tolerated well

## 2024-04-08 NOTE — PROGRESS NOTES
Subjective   Alejandro Guardado is a 89 year old male.    Chief Complaint   Patient presents with    Wound Care     Pt here for follow up wound care to right foot wound. Pt denies any concerns or issues, pt denies any pain.        HPI  Alejandro Guardado is a 88 year old male with PAD who is returning to clinic for recheck of right foot.  Patient is accompanied today by his son and daughter-in-law, who is returning to clinic today for recheck on right foot.  Patient continues to do well overall.  Patient's family continues to send me pictures of his wound throughout the weeks.  Denies noticing any recent signs of infection.  They have been changing the dressings every 3 days utilizing Cellerate.  Patient is in his house slippers today.  Denies any other concerns.    Review of Systems  Denies nausea, vomiting, fever, chills, shortness of breath, chest pain, and calf pain.    Objective    Physical Exam:    Derm:  Wound Assessment  Wound 08/14/23 #1- right foot Foot Right (Active)   Wound Image   04/08/24 1603   Drainage Amount Small 04/08/24 1525   Drainage Description Serosanguineous 04/08/24 1525   Treatments Wound Vac - Neg Pressure 10/16/23 0853   Wound Vac Brand [facility] 10/30/23 0907   Wound Length (cm) 0.7 cm 04/08/24 1526   Wound Width (cm) 2.6 cm 04/08/24 1526   Wound Surface Area (cm^2) 1.82 cm^2 04/08/24 1526   Wound Depth (cm) 0.5 cm 04/08/24 1526   Wound Volume (cm^3) 0.91 cm^3 04/08/24 1526   Wound Healing % 96 04/08/24 1526   Margins Well-defined edges;Epibole (Rolled edges) 04/08/24 1525   Non-staged Wound Description Full thickness 04/08/24 1525   Peggy-wound Assessment Edema;Moist;Maceration 04/08/24 1525   Wound Granulation Tissue Pale Grey;Pink;Spongy 04/08/24 1525   Wound Bed Granulation (%) 100 % 04/08/24 1525   Wound Bed Epithelium (%) 5 % 02/26/24 1413   Wound Bed Slough (%) 10 % 01/29/24 1445   Wound Bed Eschar (%) 40 % 08/14/23 1315   Wound Odor None 04/08/24 1525   Shape Bridged 02/26/24 1413   Tunneling?  No 03/25/24 1107   Undermining? No 03/11/24 1401   Sinus Tracts? No 03/25/24 1107       Vascular: DP and PT pulse not palpable today.  Continued pitting edema noted to right lower extremity.  Slightly sluggish refill noted to stump site and cool to touch to the entire right foot noted compared to recent visits.  Some continued coolness noted to the right foot.  Musculoskeletal: no acute deformities noted.  In a wheelchair today  Neurological: Gross sensation intact via light touch.  Protective sensation diminished via Ipswitch test.    Vital Signs  Vital Signs    04/08/24 1522   BP: 120/76   Pulse: 69   Resp: 16   Temp: 98.2 °F (36.8 °C)   PainSc: 0 - (None)         Allergies  Allergies   Allergen Reactions    Heparin ANAPHYLAXIS    Hydromorphone HALLUCINATION       Assessment    Encounter Diagnosis  1. Foot ulcer, right, with fat layer exposed ([facility])    2. PAD (peripheral artery disease) ([facility])    3. History of transmetatarsal amputation of foot ([facility])    4. Gait difficulty        Problem List  Patient Active Problem List   Diagnosis    Closed minimally displaced zone I fracture of sacrum ([facility])    At risk for falling    CAD (coronary artery disease)    Ischemic cardiomyopathy    Azotemia    Arrhythmia    Esophageal reflux    History of MI (myocardial infarction)    Mixed hyperlipidemia    Primary hypertension    Dizziness    Medication side effect    Closed left hip fracture ([facility])  Global 6/22/2016    Status post hip surgery    Left shoulder distal clavical resection and excision of ganglion cyst of soft tissue mass   Global  09/17/2020    Orthopedic aftercare for healing traumatic hip fracture, left, closed    Closed left hip fracture, with routine healing, subsequent encounter    Osteoarthrosis, localized, primary, knee, left    Osteoarthrosis, localized, primary, knee, right    Gangrene (HCC)    PAD (peripheral artery disease) ([facility])    Benign prostatic hyperplasia with incomplete bladder emptying    Gangrene of  foot (HCC)    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) ([facility])    Leukocytosis    Atrial fibrillation with RVR ([facility])    Hypokalemia    Acute kidney injury ([facility])    Hyperglycemia    Community acquired pneumonia of right lung, unspecified part of lung    Acute respiratory failure with hypoxia ([facility])    Hypotension, unspecified hypotension type    Sepsis due to pneumonia ([facility])    Foot ulcer with fat layer exposed, right ([facility])    Syncope, unspecified syncope type    Sepsis, due to unspecified organism, unspecified whether acute organ dysfunction present ([facility])    Shock ([facility])    Acute hypoxic respiratory failure ([facility])    Pneumonia, bacterial    Hyponatremia    Metabolic alkalosis    Recurrent pneumonia    Sepsis due to undetermined organism ([facility])    Acute on chronic congestive heart failure, unspecified heart failure type ([facility])    Acute hypoxemic respiratory failure ([facility])    ERON (acute kidney injury) ([facility])    Lactic acidosis    Leukocytosis, unspecified type    Palliative care encounter    Counseling regarding advance care planning and goals of care    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) ([facility])       Plan  Orders:  Orders Placed This Encounter   Procedures    Debridement Old surgical Right Foot         -Patient examined, chart history reviewed.    -Wound inspected today--overall stable wound today.  Dimensions about the same.  Medial foot wound remains epithelialized today.  There continues to be no tunneling.  There is fibrogranular wound bed.  There is palpable bone to the most dorsal portion of the wound.  No current purulence, surrounding erythema, or other signs of infection.  Some maceration noted within the surgical cicatrix today.  Foot is overall cool to touch     -Patient has a new right lower extremity wound.  It appears uninfected today and we will set patient up to see one of our other providers for evaluation.    -Excisional debridement performed to the previous surgical site  utilizing dermal curette and tissue nippers down to and including bone today.  A more granular, wound bed was present upon debridement today, but less than ideal bleeding noted after debridement.  No tunneling noted.      -Wound site covered with nati and transfer with dry dressings.  Honey to RLE wound.  Will have patient change foot dressings every 2-3 days.  Encourage patient to utilize Betadine if noticing any increased maceration     -Patient will continue to ambulate as tolerated in surgical shoe utilizing walker for gait assistance.  Okay for patient to use house slipper for small amount of time while at home.  I would recommend open toed shoe to prevent any increased moisture    -Recommended following up with Dr. Alston d/t stalling out of wound improvements, as well as the foot becoming cold again.  I will contact Dr. Alston to discuss.    -Educated on signs of infection and encouraged patient to seek immediate medical attention if noticing any of these signs.    Follow-Up  1 week with another provider in [facility] for evaluation of new RLE shin wound.  F/u with me in 2 weeks    Pavan Poon DPM    4/8/2024    Dragon speech recognition software was used to prepare this note.  Errors in word recognition may occur.  Please contact me with any questions/concerns with this note.

## 2024-04-09 NOTE — PROGRESS NOTES
Weekly Wound Education Note    Teaching Provided To: (P) Patient;Family  Training Topics: (P) Dressing;Cleasing and general instructions;Discharge instructions  Training Method: (P) Explain/Verbal;Demonstration;Written  Training Response: (P) Patient responds and understands         Cleanse Right foot wound with saline or wound cleanser, apply nati to open area cover with hydrofera transfer, abd, kerlix. Change every 2 days    Honey, gauze and kerlix to right lower leg wound. PT to follow up Dr Butler, next Monday

## 2024-04-11 ENCOUNTER — PATIENT MESSAGE (OUTPATIENT)
Dept: PODIATRY CLINIC | Facility: CLINIC | Age: 89
End: 2024-04-11

## 2024-04-12 RX ORDER — DOCUSATE SODIUM 250 MG
250 CAPSULE ORAL DAILY
COMMUNITY

## 2024-04-12 RX ORDER — MULTIVIT WITH MINERALS/LUTEIN
1500 TABLET ORAL DAILY
COMMUNITY

## 2024-04-12 NOTE — TELEPHONE ENCOUNTER
From: Alejandro Guardado  To: Brian Moreno  Sent: 4/11/2024 5:40 PM CDT  Subject: Photos 1 of 2    Attached are images from Thursday, 4/11. Thank you.

## 2024-04-15 ENCOUNTER — OFFICE VISIT (OUTPATIENT)
Dept: WOUND CARE | Facility: HOSPITAL | Age: 89
End: 2024-04-15
Attending: STUDENT IN AN ORGANIZED HEALTH CARE EDUCATION/TRAINING PROGRAM
Payer: MEDICARE

## 2024-04-15 VITALS
DIASTOLIC BLOOD PRESSURE: 61 MMHG | HEART RATE: 87 BPM | RESPIRATION RATE: 16 BRPM | TEMPERATURE: 99 F | SYSTOLIC BLOOD PRESSURE: 101 MMHG

## 2024-04-15 DIAGNOSIS — Z89.439 HISTORY OF TRANSMETATARSAL AMPUTATION OF FOOT (HCC): ICD-10-CM

## 2024-04-15 DIAGNOSIS — L97.512 FOOT ULCER, RIGHT, WITH FAT LAYER EXPOSED (HCC): ICD-10-CM

## 2024-04-15 DIAGNOSIS — I73.9 PAD (PERIPHERAL ARTERY DISEASE) (HCC): ICD-10-CM

## 2024-04-15 DIAGNOSIS — L97.212 NON-PRESSURE CHRONIC ULCER OF RIGHT CALF WITH FAT LAYER EXPOSED (HCC): Primary | ICD-10-CM

## 2024-04-15 DIAGNOSIS — M79.89 RIGHT LEG SWELLING: ICD-10-CM

## 2024-04-15 DIAGNOSIS — R26.9 GAIT DIFFICULTY: ICD-10-CM

## 2024-04-15 PROCEDURE — 97597 DBRDMT OPN WND 1ST 20 CM/<: CPT | Performed by: INTERNAL MEDICINE

## 2024-04-15 NOTE — PROGRESS NOTES
[facility] PROGRESS NOTE  FARZAD WILLINGHAM MD  4/15/2024    Chief Complaint:   Chief Complaint   Patient presents with    Wound Care     Patient is here for a wound care follow up. He denies any pain or new wound concerns. They were told by vascular that the stent that had been placed in the foot is now closed/clogged. They have an appointment to see Dr. Paris on Thursday.        HPI:   Subjective   Alejandro Guardado is a 89 year old male coming in for a follow-up visit.    HPI    New consult for right leg wound that opened up few weeks ago.   He has significant edema of lower extremities which is probably slowing down the healing of this wound.     Right foot wound - chronic   Bone now exposed again as of 1-2? Weeks ago.   No periwound redness / purulent drainage / malodor / increased periwound pain/ fever.   Not on antibiotics currently.   Wound being debrided routinely by Dr. Poon  He has an appointment with vascular surgeon coming up on Friday.     Review of Systems  Negative except HPI   Denies chest pain / SOB / palpitations  Denies fever.     Allergies  Allergies   Allergen Reactions    Heparin ANAPHYLAXIS    Hydromorphone HALLUCINATION       Current Meds:  Current Outpatient Medications   Medication Sig Dispense Refill    Ascorbic Acid (VITAMIN C) 1000 MG Oral Tab Take 1.5 tablets (1,500 mg total) by mouth daily.      NON FORMULARY Oxygen at 2L per NC a during sleep      NON FORMULARY ZOILA dressing to Right foot s/p amputation      docusate sodium 250 MG Oral Cap Take 1 capsule (250 mg total) by mouth daily.      furosemide 20 MG Oral Tab Take 1 tablet (20 mg total) by mouth daily. (Patient taking differently: Take 4 tablets (80 mg total) by mouth daily.) 15 tablet 0    metoprolol succinate ER 25 MG Oral Tablet 24 Hr Take 1 tablet (25 mg total) by mouth Daily Beta Blocker. 90 tablet 1    tamsulosin 0.4 MG Oral Cap Take 1 capsule (0.4 mg total) by mouth daily.      apixaban 5 MG Oral Tab Take 1  tablet (5 mg total) by mouth 2 (two) times daily. 60 tablet 3    atorvastatin 40 MG Oral Tab Take 1 tablet (40 mg total) by mouth daily. 30 tablet 0    finasteride 5 MG Oral Tab Take 1 tablet (5 mg total) by mouth daily. 30 tablet 0    clopidogrel 75 MG Oral Tab Take 1 tablet (75 mg total) by mouth daily. 30 tablet 0    gabapentin 300 MG Oral Cap Take 1 capsule (300 mg total) by mouth 3 (three) times daily. 90 capsule 0    predniSONE 5 MG Oral Tab Take 3 tablets (15 mg total) by mouth daily.      folic acid 1 MG Oral Tab Take 1 tablet (1 mg total) by mouth daily.      acetaminophen 500 MG Oral Tab Take 2 tablets (1,000 mg total) by mouth every 8 (eight) hours. 21 tablet 0    Omeprazole 40 MG Oral Capsule Delayed Release Take 1 capsule (40 mg total) by mouth daily.           EXAM:   Objective   Objective    Physical Exam    Vital Signs  Vitals:    04/15/24 1616   BP: 101/61   Pulse: 87   Resp: 16   Temp: 98.5 °F (36.9 °C)       Wound Assessment  Wound 08/14/23 #1- right foot Foot Right (Active)   Wound Image    04/15/24 1612   Drainage Amount Moderate 04/15/24 1612   Drainage Description Serous;Yellow 04/15/24 1612   Treatments Compression 04/15/24 1612   Wound Vac Brand [facility] 10/30/23 0907   Wound Length (cm) 0.8 cm 04/15/24 1612   Wound Width (cm) 6.4 cm 04/15/24 1612   Wound Surface Area (cm^2) 5.12 cm^2 04/15/24 1612   Wound Depth (cm) 0.5 cm 04/15/24 1612   Wound Volume (cm^3) 2.56 cm^3 04/15/24 1612   Wound Healing % 89 04/15/24 1612   Margins Well-defined edges;Epibole (Rolled edges) 04/15/24 1612   Non-staged Wound Description Full thickness 04/15/24 1612   Peggy-wound Assessment Edema;Moist;Maceration 04/15/24 1612   Wound Granulation Tissue Spongy;Red 04/15/24 1612   Wound Bed Granulation (%) 5 % 04/15/24 1612   Wound Bed Epithelium (%) 20 % 04/15/24 1612   Wound Bed Slough (%) 75 % 04/15/24 1612   Wound Bed Eschar (%) 40 % 08/14/23 1315   Wound Odor None 04/15/24 1612   Shape Bridged 04/15/24 1612    Tunneling? No 04/15/24 1612   Undermining? No 04/15/24 1612   Sinus Tracts? No 04/15/24 1612       Wound 04/15/24 #2 Right anterior lower leg Leg Right;Anterior;Lower (Active)   Wound Image   04/15/24 1614   Drainage Amount Large 04/15/24 1614   Drainage Description Serous;Clear 04/15/24 1614   Wound Length (cm) 1.3 cm 04/15/24 1614   Wound Width (cm) 0.9 cm 04/15/24 1614   Wound Surface Area (cm^2) 1.17 cm^2 04/15/24 1614   Wound Depth (cm) 0.1 cm 04/15/24 1614   Wound Volume (cm^3) 0.117 cm^3 04/15/24 1614   Margins Well-defined edges 04/15/24 1614   Non-staged Wound Description Full thickness 04/15/24 1614   Peggy-wound Assessment Edema 04/15/24 1614   Wound Bed Slough (%) 100 % 04/15/24 1614   Wound Odor None 04/15/24 1614   Tunneling? No 04/15/24 1614   Undermining? No 04/15/24 1614   Sinus Tracts? No 04/15/24 1614           ASSESSMENT AND PLAN:     Assessment   Assessment    Encounter Diagnosis  1. Non-pressure chronic ulcer of right calf with fat layer exposed ([facility])    2. Right leg swelling    3. PAD (peripheral artery disease) ([facility])    4. Foot ulcer, right, with fat layer exposed ([facility])    5. History of transmetatarsal amputation of foot ([facility])    6. Gait difficulty        Problem List  Patient Active Problem List   Diagnosis    Closed minimally displaced zone I fracture of sacrum ([facility])    At risk for falling    CAD (coronary artery disease)    Ischemic cardiomyopathy    Azotemia    Arrhythmia    Esophageal reflux    History of MI (myocardial infarction)    Mixed hyperlipidemia    Primary hypertension    Dizziness    Medication side effect    Closed left hip fracture ([facility])  Global 6/22/2016    Status post hip surgery    Left shoulder distal clavical resection and excision of ganglion cyst of soft tissue mass   Global  09/17/2020    Orthopedic aftercare for healing traumatic hip fracture, left, closed    Closed left hip fracture, with routine healing, subsequent encounter    Osteoarthrosis, localized, primary,  knee, left    Osteoarthrosis, localized, primary, knee, right    Gangrene ([facility])    PAD (peripheral artery disease) ([facility])    Benign prostatic hyperplasia with incomplete bladder emptying    Gangrene of foot ([facility])    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) ([facility])    Leukocytosis    Atrial fibrillation with RVR ([facility])    Hypokalemia    Acute kidney injury ([facility])    Hyperglycemia    Community acquired pneumonia of right lung, unspecified part of lung    Acute respiratory failure with hypoxia ([facility])    Hypotension, unspecified hypotension type    Sepsis due to pneumonia ([facility])    Foot ulcer with fat layer exposed, right ([facility])    Syncope, unspecified syncope type    Sepsis, due to unspecified organism, unspecified whether acute organ dysfunction present ([facility])    Shock ([facility])    Acute hypoxic respiratory failure ([facility])    Pneumonia, bacterial    Hyponatremia    Metabolic alkalosis    Recurrent pneumonia    Sepsis due to undetermined organism ([facility])    Acute on chronic congestive heart failure, unspecified heart failure type ([facility])    Acute hypoxemic respiratory failure ([facility])    ERON (acute kidney injury) ([facility])    Lactic acidosis    Leukocytosis, unspecified type    Palliative care encounter    Counseling regarding advance care planning and goals of care    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) ([facility])         MANAGEMENT    Start santyl o leg wound for enzymatic debridement.   Serial debridements as able on both wounds  Consider wound culture / ID consult for foot wound if not improving in 1-2 weeks.   Return 1 week    PROCEDURES:    Debridement Old surgical Right Foot   Wound 08/14/23 #1- right foot Foot Right     Performed by: Fortino Mo MD  Authorized by: Fortino Mo MD       Consent   Consent obtained? verbal  Consent given by: patient     Debridement Details  Performed by: physician  Debridement type: conservative sharp  Pain control: lidocaine 4%  Pain control administration  type: topical     Pre-debridement measurements  Length (cm): 0.8  Width (cm): 6.4  Depth (cm): 0.5  Surface Area (cm^2): 5.12     Post-debridement measurements  Length (cm): 0.8  Width (cm): 6.4  Depth (cm): 0.6  Percent debrided: 100%  Surface Area (cm^2): 5.12  Area Debrided (cm^2): 5.12  Volume (cm^3): 3.07     Devitalized tissue debrided: biofilm and slough  Instrument(s) utilized: curette  Bleeding: small  Hemostasis obtained with: pressure  Procedural pain (0-10): 0  Post-procedural pain: 0   Response to treatment: procedure was tolerated well         Patient Instructions     Consider wound culture next visit to look for wound colonization.   Consider ID consult for management of possible osteomyelitis due to exposed bone  Start santyl on right leg wound for enzyatic debridement  Vascular consult this Friday.   Need recommendation from vascular surgeon reg: level of compression as he has 3-4+ edema.     Wound Cleaning and Dressings:    Wash your hands with soap and water. Always wear gloves while changing dressings. Donot touch wound / mio-wound skin with un-gloved hands. Remove old dressing, discard and place into trash.    DRESSINGS:   Right leg: santyl / moist gauze  - Change dressing daily.  Right foot: nati / HF transfer- change dressing QOD  Offload wounded area.     Off-Loading: modified foot wear for maximal offloading.     Miscellaneous Instructions:  Supplement with a daily multivitamin   Low salt diet  Intense blood sugar control - Goal Blood sugar below 180 at all times recommended.  Increase protein intake / consider protein supplements - see below  Elevate extremities at all times when sitting / laying down.    DIETARY MODIFICATIONS TO HELP WITH WOUND HEALING:    Protein: Meats, beans, eggs, milk and yogurt particularly Greek yogurt), tofu, soy nuts, soy protein products    Vitamin C: Citrus fruits and juices, strawberries, tomatoes, tomato juice, peppers, baked potatoes, spinach, broccoli,  cauliflower, [place] sprouts, cabbage    Vitamin A: Dark green, leafy vegetables, orange or yellow vegetables, cantaloupe, fortified dairy products, liver, fortified cereals    Zinc: Fortified cereals, red meats, seafood    Consider Kwan [employer] (These are essential branch chain amino acids that help with tissue building and wound healing) and take 2 packets/day. you can order online at [employer] or [employer]    ADDITIONAL REMINDERS:    The treatment plan has been discussed at length with you and your provider. Follow all instructions carefully, it is very important. If you do not follow all instructions, you are at  risk of your wound not healing, infection, possible loss of limb and even end of life.  Please call the clinic during regular business hours ( 7:30 AM - 5:30 PM) if you notice increased bleeding, redness, warmth, pain or pus like drainage or start running a fever greater than 100.3.    For after hour emergencies, please call your primary physician or go to the nearest emergency room.      Orders  Orders Placed This Encounter   Procedures    Debridement Old surgical Right Foot     Patient/Caregiver Education: There are no barriers to learning. Medical education for above diagnosis given.   Answered all questions.    Outcome: Patient verbalizes understanding. Patient is notified to call with any questions, complications, allergies, or worsening or changing symptoms.  Patient is to call with any side effects or complications as a result of the treatments today.      DOCUMENTATION OF TIME SPENT: Code selection for this visit was based on time spent : 55 min on date of service in preparing to see the patient, obtaining and/or reviewing separately obtained history, performing a medically appropriate examination, counseling and educating the patient/family/caregiver, ordering medications or testing, referring and communicating with other healthcare providers, documenting clinical information in the E HR,  independently interpreting results and communicating results to the patient/family/caregiver and care coordination with the patient's other providers.    Followup: Return in about 1 week (around 4/22/2024) for Wound followup.      Note to Patient:  The 21st Century Cures Act makes medical notes like these available to patients in the interest of transparency. However, be advised this is a medical document and is intended as [id number] communication; it is written in medical language and may appear blunt, direct, or contain abbreviations or verbiage that are unfamiliar. Medical documents are intended to carry relevant information, facts as evident, and the clinical opinion of the practitioner.    Also, please note that this report has been produced using speech recognition software and may contain errors related to that system including, but not limited to, errors in grammar, punctuation, and spelling, as well as words and phrases that possibly may have been recognized inappropriately.  If there are any questions or concerns, contact the dictating provider for clarification.      Fortino Butler MD  4/15/2024  4:43 PM

## 2024-04-15 NOTE — PATIENT INSTRUCTIONS
Consider wound culture next visit to look for wound colonization.   Consider ID consult for management of possible osteomyelitis due to exposed bone  Start santyl on right leg wound for enzyatic debridement  Vascular consult this Friday.   Need recommendation from vascular surgeon reg: level of compression as he has 3-4+ edema.     Wound Cleaning and Dressings:    Wash your hands with soap and water. Always wear gloves while changing dressings. Donot touch wound / mio-wound skin with un-gloved hands. Remove old dressing, discard and place into trash.    DRESSINGS:   Right leg: santyl / moist gauze  - Change dressing daily.  Right foot: nati / HF transfer- change dressing QOD  Offload wounded area.     Off-Loading: modified foot wear for maximal offloading.     Miscellaneous Instructions:  Supplement with a daily multivitamin   Low salt diet  Intense blood sugar control - Goal Blood sugar below 180 at all times recommended.  Increase protein intake / consider protein supplements - see below  Elevate extremities at all times when sitting / laying down.    DIETARY MODIFICATIONS TO HELP WITH WOUND HEALING:    Protein: Meats, beans, eggs, milk and yogurt particularly Greek yogurt), tofu, soy nuts, soy protein products    Vitamin C: Citrus fruits and juices, strawberries, tomatoes, tomato juice, peppers, baked potatoes, spinach, broccoli, cauliflower, Lebo sprouts, cabbage    Vitamin A: Dark green, leafy vegetables, orange or yellow vegetables, cantaloupe, fortified dairy products, liver, fortified cereals    Zinc: Fortified cereals, red meats, seafood    Consider Kwan [employer] (These are essential branch chain amino acids that help with tissue building and wound healing) and take 2 packets/day. you can order online at [employer] or [employer]    ADDITIONAL REMINDERS:    The treatment plan has been discussed at length with you and your provider. Follow all instructions carefully, it is very important. If you do  not follow all instructions, you are at  risk of your wound not healing, infection, possible loss of limb and even end of life.  Please call the clinic during regular business hours ( 7:30 AM - 5:30 PM) if you notice increased bleeding, redness, warmth, pain or pus like drainage or start running a fever greater than 100.3.    For after hour emergencies, please call your primary physician or go to the nearest emergency room.

## 2024-04-15 NOTE — PROGRESS NOTES
Patient ID: Alejandro Guardado is a 89 year old male.    Debridement Old surgical Right Foot   Wound 08/14/23 #1- right foot Foot Right    Performed by: Fortino Mo MD  Authorized by: Fortino Mo MD      Consent   Consent obtained? verbal  Consent given by: patient    Debridement Details  Performed by: physician  Debridement type: conservative sharp  Pain control: lidocaine 4%  Pain control administration type: topical    Pre-debridement measurements  Length (cm): 0.8  Width (cm): 6.4  Depth (cm): 0.5  Surface Area (cm^2): 5.12    Post-debridement measurements  Length (cm): 0.8  Width (cm): 6.4  Depth (cm): 0.6  Percent debrided: 100%  Surface Area (cm^2): 5.12  Area Debrided (cm^2): 5.12  Volume (cm^3): 3.07    Devitalized tissue debrided: biofilm and slough  Instrument(s) utilized: curette  Bleeding: small  Hemostasis obtained with: pressure  Procedural pain (0-10): 0  Post-procedural pain: 0   Response to treatment: procedure was tolerated well

## 2024-04-15 NOTE — PROGRESS NOTES
.Weekly Wound Education Note    Teaching Provided To: Patient;Family  Training Topics: Dressing;Discharge instructions;Cleasing and general instructions;Compression  Training Method: Explain/Verbal;Written  Training Response: Patient responds and understands;Reinforcement needed            Patient has an appointment with vascular on Thursday of this week to discuss interventions for blood flow.  Patient will continue to see Dr. Poon for foot wound, exposed bone.  Continue Josi Ag, hydrofera transfer, abd pad to foot wound, family to change every 2 days.  Offload as much as possible.  Santyl ointment to leg wound daily, cover with dry dressing.  Will hold off on compression until assessed by vascular.

## 2024-04-16 ENCOUNTER — HOSPITAL ENCOUNTER (OUTPATIENT)
Facility: HOSPITAL | Age: 89
Setting detail: HOSPITAL OUTPATIENT SURGERY
Discharge: HOME | End: 2024-04-16
Attending: INTERNAL MEDICINE | Admitting: INTERNAL MEDICINE
Payer: MEDICARE

## 2024-04-16 ENCOUNTER — ANESTHESIA EVENT (OUTPATIENT)
Dept: ENDOSCOPY | Facility: HOSPITAL | Age: 89
End: 2024-04-16
Payer: MEDICARE

## 2024-04-16 ENCOUNTER — ANESTHESIA (OUTPATIENT)
Dept: ENDOSCOPY | Facility: HOSPITAL | Age: 89
End: 2024-04-16
Payer: MEDICARE

## 2024-04-16 VITALS
SYSTOLIC BLOOD PRESSURE: 102 MMHG | HEART RATE: 87 BPM | BODY MASS INDEX: 32.18 KG/M2 | RESPIRATION RATE: 18 BRPM | HEIGHT: 67 IN | WEIGHT: 205 LBS | DIASTOLIC BLOOD PRESSURE: 72 MMHG | OXYGEN SATURATION: 95 % | TEMPERATURE: 99 F

## 2024-04-16 PROCEDURE — 3E0G8GC INTRODUCTION OF OTHER THERAPEUTIC SUBSTANCE INTO UPPER GI, VIA NATURAL OR ARTIFICIAL OPENING ENDOSCOPIC: ICD-10-PCS | Performed by: INTERNAL MEDICINE

## 2024-04-16 RX ORDER — LISINOPRIL 2.5 MG/1
2.5 TABLET ORAL DAILY
COMMUNITY

## 2024-04-16 RX ORDER — SODIUM CHLORIDE, SODIUM LACTATE, POTASSIUM CHLORIDE, CALCIUM CHLORIDE 600; 310; 30; 20 MG/100ML; MG/100ML; MG/100ML; MG/100ML
INJECTION, SOLUTION INTRAVENOUS CONTINUOUS
OUTPATIENT
Start: 2024-04-16

## 2024-04-16 RX ORDER — PHENYLEPHRINE HCL 10 MG/ML
VIAL (ML) INJECTION AS NEEDED
Status: DISCONTINUED | OUTPATIENT
Start: 2024-04-16 | End: 2024-04-16 | Stop reason: SURG

## 2024-04-16 RX ORDER — NALOXONE HYDROCHLORIDE 0.4 MG/ML
0.08 INJECTION, SOLUTION INTRAMUSCULAR; INTRAVENOUS; SUBCUTANEOUS ONCE AS NEEDED
OUTPATIENT
Start: 2024-04-16 | End: 2024-04-16

## 2024-04-16 RX ORDER — SODIUM CHLORIDE, SODIUM LACTATE, POTASSIUM CHLORIDE, CALCIUM CHLORIDE 600; 310; 30; 20 MG/100ML; MG/100ML; MG/100ML; MG/100ML
INJECTION, SOLUTION INTRAVENOUS CONTINUOUS
Status: DISCONTINUED | OUTPATIENT
Start: 2024-04-16 | End: 2024-04-16

## 2024-04-16 RX ORDER — LIDOCAINE HYDROCHLORIDE 10 MG/ML
INJECTION, SOLUTION EPIDURAL; INFILTRATION; INTRACAUDAL; PERINEURAL AS NEEDED
Status: DISCONTINUED | OUTPATIENT
Start: 2024-04-16 | End: 2024-04-16 | Stop reason: SURG

## 2024-04-16 RX ADMIN — LIDOCAINE HYDROCHLORIDE 50 MG: 10 INJECTION, SOLUTION EPIDURAL; INFILTRATION; INTRACAUDAL; PERINEURAL at 15:26:00

## 2024-04-16 RX ADMIN — PHENYLEPHRINE HCL 200 MCG: 10 MG/ML VIAL (ML) INJECTION at 15:37:00

## 2024-04-16 NOTE — DISCHARGE INSTRUCTIONS

## 2024-04-16 NOTE — ANESTHESIA PREPROCEDURE EVALUATION
PRE-OP EVALUATION    Patient Name: Alejandro Guardado    Admit Diagnosis: Lactic acidosis [E87.20]  ERON (acute kidney injury) (HCC) [N17.9]  Sepsis due to pneumonia (HCC) [J18.9, A41.9]  Leukocytosis, unspecified type [D72.829]    Pre-op Diagnosis: Recurrent aspiration; abnormal esophogram    ESOPHAGOGASTRODUODENOSCOPY (EGD) with Botox    Anesthesia Procedure: ESOPHAGOGASTRODUODENOSCOPY (EGD) with Botox    Surgeon(s) and Role:     * Samuel Kramer MD - Primary    Pre-op vitals reviewed.  Temp: 98.5 °F (36.9 °C)  Pulse: 87  Resp: 16  BP: 101/61     Body mass index is 32.11 kg/m².    Current medications reviewed.  Hospital Medications:   lactated ringers infusion   Intravenous Continuous    onabotulinumtoxinA (Botox) injection 100 Units  100 Units Injection Once       Outpatient Medications:     Medications Prior to Admission   Medication Sig Dispense Refill Last Dose    Ascorbic Acid (VITAMIN C) 1000 MG Oral Tab Take 1.5 tablets (1,500 mg total) by mouth daily.       NON FORMULARY Oxygen at 2L per NC a during sleep       NON FORMULARY ZOILA dressing to Right foot s/p amputation       docusate sodium 250 MG Oral Cap Take 1 capsule (250 mg total) by mouth daily.       furosemide 20 MG Oral Tab Take 1 tablet (20 mg total) by mouth daily. (Patient taking differently: Take 4 tablets (80 mg total) by mouth daily.) 15 tablet 0     metoprolol succinate ER 25 MG Oral Tablet 24 Hr Take 1 tablet (25 mg total) by mouth Daily Beta Blocker. 90 tablet 1     tamsulosin 0.4 MG Oral Cap Take 1 capsule (0.4 mg total) by mouth daily.       apixaban 5 MG Oral Tab Take 1 tablet (5 mg total) by mouth 2 (two) times daily. 60 tablet 3     atorvastatin 40 MG Oral Tab Take 1 tablet (40 mg total) by mouth daily. 30 tablet 0     finasteride 5 MG Oral Tab Take 1 tablet (5 mg total) by mouth daily. 30 tablet 0     clopidogrel 75 MG Oral Tab Take 1 tablet (75 mg total) by mouth daily. 30 tablet 0     gabapentin 300 MG Oral Cap Take 1 capsule (300 mg  total) by mouth 3 (three) times daily. 90 capsule 0     predniSONE 5 MG Oral Tab Take 3 tablets (15 mg total) by mouth daily.       folic acid 1 MG Oral Tab Take 1 tablet (1 mg total) by mouth daily.       acetaminophen 500 MG Oral Tab Take 2 tablets (1,000 mg total) by mouth every 8 (eight) hours. 21 tablet 0     Omeprazole 40 MG Oral Capsule Delayed Release Take 1 capsule (40 mg total) by mouth daily.          Allergies: Heparin and Hydromorphone      Anesthesia Evaluation    Patient summary reviewed.    Anesthetic Complications  (-) history of anesthetic complications         GI/Hepatic/Renal      (+) GERD                           Cardiovascular      ECG reviewed.  Exercise tolerance: good     MET: >4      (+) hypertension     (+) CAD  (+) past MI  (+) CABG/stent         (+) dysrhythmias and atrial fibrillation  (+) CHF                Endo/Other                                  Pulmonary             (+) pneumonia              Neuro/Psych                              Patient Active Problem List:     Closed minimally displaced zone I fracture of sacrum (HCC)     At risk for falling     CAD (coronary artery disease)     Ischemic cardiomyopathy     Azotemia     Arrhythmia     Esophageal reflux     History of MI (myocardial infarction)     Mixed hyperlipidemia     Primary hypertension     Dizziness     Medication side effect     Closed left hip fracture (HCC)  Global 6/22/2016     Status post hip surgery     Left shoulder distal clavical resection and excision of ganglion cyst of soft tissue mass   Global  09/17/2020     Orthopedic aftercare for healing traumatic hip fracture, left, closed     Closed left hip fracture, with routine healing, subsequent encounter     Osteoarthrosis, localized, primary, knee, left     Osteoarthrosis, localized, primary, knee, right     Gangrene (HCC)     PAD (peripheral artery disease) (HCC)     Benign prostatic hyperplasia with incomplete bladder emptying     Gangrene of foot (HCC)      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) ([facility])     Leukocytosis     Atrial fibrillation with RVR ([facility])     Hypokalemia     Acute kidney injury ([facility])     Hyperglycemia     Community acquired pneumonia of right lung, unspecified part of lung     Acute respiratory failure with hypoxia ([facility])     Hypotension, unspecified hypotension type     Sepsis due to pneumonia ([facility])     Foot ulcer with fat layer exposed, right ([facility])     Syncope, unspecified syncope type     Sepsis, due to unspecified organism, unspecified whether acute organ dysfunction present ([facility])     Shock ([facility])     Acute hypoxic respiratory failure ([facility])     Pneumonia, bacterial     Hyponatremia     Metabolic alkalosis     Recurrent pneumonia     Sepsis due to undetermined organism ([facility])     Acute on chronic congestive heart failure, unspecified heart failure type ([facility])     Acute hypoxemic respiratory failure ([facility])     ERON (acute kidney injury) ([facility])     Lactic acidosis     Leukocytosis, unspecified type     Palliative care encounter     Counseling regarding advance care planning and goals of care     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) ([facility])            Past Surgical History:   Procedure Laterality Date    Angiogram      Angioplasty (coronary)      Cabg      Cataract  04/2023    Fracture surgery Left     left hip pinning    Other surgical history Left 01/01/1977    knee surgery    Other surgical history  03/25/2016    Left hip ORIF pinning    Tonsillectomy       Social History     Socioeconomic History    Marital status:    Tobacco Use    Smoking status: Never    Smokeless tobacco: Never   Vaping Use    Vaping status: Never Used   Substance and Sexual Activity    Alcohol use: Never    Drug use: Never     History   Drug Use Unknown     Available pre-op labs reviewed.  Lab Results   Component Value Date    WBC 7.7 04/03/2024    RBC 4.47 04/03/2024    HGB 11.4 (L) 04/03/2024    HCT 36.2 (L) 04/03/2024    MCV 81.0  04/03/2024    MCH 25.5 (L) 04/03/2024    MCHC 31.5 04/03/2024    RDW 15.5 04/03/2024    .0 04/03/2024     Lab Results   Component Value Date     04/03/2024    K 4.1 04/03/2024     04/03/2024    CO2 26.0 04/03/2024    BUN 21 04/03/2024    CREATSERUM 0.94 04/03/2024     (H) 04/03/2024    CA 8.8 04/03/2024     Lab Results   Component Value Date    INR 1.51 (H) 03/28/2024         Airway      Mallampati: II  Mouth opening: >3 FB  TM distance: 4 - 6 cm  Neck ROM: full Cardiovascular      Rhythm: regular  Rate: normal     Dental      Dental appliance(s): partials       Pulmonary      Breath sounds clear to auscultation bilaterally.               Other findings              ASA: 3   Plan: MAC  NPO status verified and patient meets guidelines.    Post-procedure pain management plan discussed with surgeon and patient.      Plan/risks discussed with: patient                Present on Admission:  **None**

## 2024-04-16 NOTE — ANESTHESIA POSTPROCEDURE EVALUATION
[facility]    Alejandro Guardado Patient Status:  Hospital Outpatient Surgery   Age/Gender 89 year old male MRN [id number]   Location [facility] ENDOSCOPY [facility] Attending Jeremy Koch MD   Hosp Day # 0 PCP Stanislav Odonnell MD       Anesthesia Post-op Note    ESOPHAGOGASTRODUODENOSCOPY (EGD) with Botox    Procedure Summary       Date: 04/16/24 Room / Location:  ENDOSCOPY 02 /  ENDOSCOPY    Anesthesia Start: 1523 Anesthesia Stop:     Procedure: ESOPHAGOGASTRODUODENOSCOPY (EGD) with Botox Diagnosis:       Achalasia      (achalasia)    Surgeons: Jeremy Koch MD Anesthesiologist: Maximo Lopez MD    Anesthesia Type: MAC ASA Status: 3            Anesthesia Type: MAC    Vitals Value Taken Time   BP 99/73 04/16/24 1543   Temp na 04/16/24 1543   Pulse 87 04/16/24 1543   Resp 16 04/16/24 1543   SpO2 94 04/16/24 1543       Patient Location: Endoscopy    Anesthesia Type: MAC    Airway Patency: patent    Postop Pain Control: adequate    Mental Status: mildly sedated but able to meaningfully participate in the post-anesthesia evaluation    Nausea/Vomiting: none    Cardiopulmonary/Hydration status: stable euvolemic    Complications: no apparent anesthesia related complications    Postop vital signs: stable    Dental Exam: Unchanged from Preop    Patient to be discharged from PACU when criteria met.

## 2024-04-16 NOTE — OPERATIVE REPORT
Alejandro Guardado Patient Status:  [facility] Outpatient Surgery    1935 MRN [id number]   [facility] ENDOSCOPY PAIN CENTER Attending Jeremy Koch MD   Date 2024 PCP Stanislav Odonnell MD     PREOPERATIVE DIAGNOSIS/INDICATION: Achalasia, chronic active use of antiplatelet therapy, dysphagia, aspiration, prior laparoscopic esophagomyotomy + Toupet fundoplication 2009   POSTOPERTATIVE DIAGNOSIS: Same  PROCEDURE PERFORMED: EGD with submucosal injection  TIME OUT WAS PERFORMED    SEDATION: MAC sedation provided by General Anesthesia    INFORMED CONSENT: Risks, benefits and alternatives to the procedure were explained to the patient including but not limited to bleeding, infection, perforation, adverse drug reactions, pancreatitis and the need for hospitalization and surgery if this occurs, the patient understands and agrees to procedure.  PROCEDURE DESCRIPTION: A regular upper endoscope was introduced into the patient’s mouth, hypo pharynx, esophagus, stomach and the first and second portion of the duodenum, retroflexion of the endoscope was performed in the stomach. Careful examination of the above described areas was performed on withdrawal of the endoscope. The patient tolerated the procedure well, there were no immediate complication immediately following the procedure, and the patient was transferred to recovery in stable condition.  FINDINGS:  ESOPHAGUS: Normal  EGJ: Normal  STOMACH: Post partial fundoplication wrap, loose  DUODENUM: Normal  THERAPEUTICS: 4 Quadrant submucosal injection of Botox for a total of 100 Units, no complications noted  RECOMMENDATIONS:   Post EGD precautions, watch for bleeding, infection, perforation, adverse drug reactions     Jeremy Koch MD  2024  3:35 PM

## 2024-04-16 NOTE — H&P
[facility]  Pre-op H and P    Alejandro Guardado Patient Status:  Hospital Outpatient Surgery    1935 MRN [id number]   Location [facility] ENDOSCOPY PAIN CENTER Attending Jeremy Koch MD   Date 2024 PCP Stanislav Odonnell MD     CC: Achalasia    History of Present Illness:  Alejandro Guardado is a a(n) 89 year old male. 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 Followed by would clinic    Atrial fibrillation ([facility])    CAD (coronary artery disease)    CHF (congestive heart failure) ([facility])    Esophageal reflux    Hearing impairment    Heart attack (HCC)    History of MI (myocardial infarction)    HTN (hypertension)    Hyperlipidemia    PAD (peripheral artery disease) ([facility])    Visual impairment    glasses     Past Surgical History:   Procedure Laterality Date    Angiogram      Angioplasty (coronary)      Cabg      Cataract  2023    Fracture surgery Left     left hip pinning    Other surgical history Left 1977    knee surgery    Other surgical history  2016    Left hip ORIF pinning    Tonsillectomy       Family History   Problem Relation Age of Onset    Cancer Father       reports that he has never smoked. He has never used smokeless tobacco. He reports that he does not drink alcohol and does not use drugs.    Allergies:  Allergies   Allergen Reactions    Heparin ANAPHYLAXIS    Hydromorphone HALLUCINATION       Medications:  No current facility-administered medications for this encounter.    Physical Exam:    Height 5' 7\" (1.702 m), weight 205 lb (93 kg).    General: Appears alert, oriented x3 and in no acute distress.  CV: Normal rate   Lungs: Normal effort   Skin: Warm and dry.  Laboratory Data:       Imaging:      Assessment/Plan/Procedure:  Patient Active Problem List   Diagnosis    Closed minimally displaced zone I fracture of sacrum (HCC)    At risk for falling    CAD (coronary artery disease)    Ischemic cardiomyopathy    Azotemia     Arrhythmia    Esophageal reflux    History of MI (myocardial infarction)    Mixed hyperlipidemia    Primary hypertension    Dizziness    Medication side effect    Closed left hip fracture (HCC)  Global 6/22/2016    Status post hip surgery    Left shoulder distal clavical resection and excision of ganglion cyst of soft tissue mass   Global  09/17/2020    Orthopedic aftercare for healing traumatic hip fracture, left, closed    Closed left hip fracture, with routine healing, subsequent encounter    Osteoarthrosis, localized, primary, knee, left    Osteoarthrosis, localized, primary, knee, right    Gangrene ([facility])    PAD (peripheral artery disease) ([facility])    Benign prostatic hyperplasia with incomplete bladder emptying    Gangrene of foot ([facility])    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) ([facility])    Leukocytosis    Atrial fibrillation with RVR ([facility])    Hypokalemia    Acute kidney injury ([facility])    Hyperglycemia    Community acquired pneumonia of right lung, unspecified part of lung    Acute respiratory failure with hypoxia ([facility])    Hypotension, unspecified hypotension type    Sepsis due to pneumonia ([facility])    Foot ulcer with fat layer exposed, right ([facility])    Syncope, unspecified syncope type    Sepsis, due to unspecified organism, unspecified whether acute organ dysfunction present ([facility])    Shock ([facility])    Acute hypoxic respiratory failure ([facility])    Pneumonia, bacterial    Hyponatremia    Metabolic alkalosis    Recurrent pneumonia    Sepsis due to undetermined organism ([facility])    Acute on chronic congestive heart failure, unspecified heart failure type ([facility])    Acute hypoxemic respiratory failure ([facility])    ERON (acute kidney injury) ([facility])    Lactic acidosis    Leukocytosis, unspecified type    Palliative care encounter    Counseling regarding advance care planning and goals of care    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) ([facility])       Achalasia    Plan:  KATY Koch,  MD  4/16/2024  2:13 PM

## 2024-04-22 ENCOUNTER — OFFICE VISIT (OUTPATIENT)
Dept: WOUND CARE | Facility: HOSPITAL | Age: 89
End: 2024-04-22
Attending: PODIATRIST
Payer: MEDICARE

## 2024-04-22 VITALS
DIASTOLIC BLOOD PRESSURE: 75 MMHG | RESPIRATION RATE: 17 BRPM | TEMPERATURE: 98 F | HEART RATE: 71 BPM | SYSTOLIC BLOOD PRESSURE: 120 MMHG

## 2024-04-22 DIAGNOSIS — Z89.439 HISTORY OF TRANSMETATARSAL AMPUTATION OF FOOT (HCC): ICD-10-CM

## 2024-04-22 DIAGNOSIS — M79.89 RIGHT LEG SWELLING: ICD-10-CM

## 2024-04-22 DIAGNOSIS — I73.9 PAD (PERIPHERAL ARTERY DISEASE) (HCC): ICD-10-CM

## 2024-04-22 DIAGNOSIS — S90.821A BLISTER OF RIGHT HEEL, INITIAL ENCOUNTER: ICD-10-CM

## 2024-04-22 DIAGNOSIS — L97.512 FOOT ULCER, RIGHT, WITH FAT LAYER EXPOSED (HCC): Primary | ICD-10-CM

## 2024-04-22 PROCEDURE — 99213 OFFICE O/P EST LOW 20 MIN: CPT | Performed by: PODIATRIST

## 2024-04-22 RX ORDER — SULFAMETHOXAZOLE AND TRIMETHOPRIM 800; 160 MG/1; MG/1
1 TABLET ORAL 2 TIMES DAILY
COMMUNITY
Start: 2024-04-18 | End: 2024-04-28

## 2024-04-22 NOTE — PROGRESS NOTES
Subjective   Alejandro Guardado is a 89 year old male.    Chief Complaint   Patient presents with    Wound Care     Patient arrives for a wound care follow appointment. Patient arrives with a surgical shoe and surgilast to the right foot. Patient removed dressing shortly before arriving. Patient states he has no pain. There is transfer and gauze to the wound.        HPI  Alejandro Guardado is a 88 year old male with PAD who is returning to clinic for recheck of right foot.  Patient is accompanied today by his son and daughter-in-law, who is returning to clinic today for recheck on right foot.  Per patient's family, Dr. Alston is planning for another angioplasty of the right lower extremity due to reocclusion.  This will be done a week from today.  Patient's son has continued putting Josi to the stump site.  He is concerned that there is more bone exposed.  Patient does have a new stable appearing blister to the right heel.  He is also seeing Dr. Butler for a right shin ulcer, which they are putting Santyl on currently.  Patient is currently on an oral antibiotic that was ordered by Dr. Alston d/t concerns of RLE cellulitis.  Patient does relate some discomfort to the right lower extremity.  He is currently on house slippers.  No other concerns at this time.    Review of Systems  Denies nausea, vomiting, fever, chills, shortness of breath, chest pain, and calf pain.    Objective    Physical Exam:    Derm:  Wound Assessment  Wound 08/14/23 #1- right foot Foot Right (Active)   Wound Image   04/22/24 1306   Drainage Amount Small 04/22/24 1306   Drainage Description Serosanguineous 04/22/24 1306   Treatments Compression 04/15/24 1612   Wound Vac Brand KCI 10/30/23 0907   Wound Length (cm) 0.7 cm 04/22/24 1306   Wound Width (cm) 5.4 cm 04/22/24 1306   Wound Surface Area (cm^2) 3.78 cm^2 04/22/24 1306   Wound Depth (cm) 0.3 cm 04/22/24 1306   Wound Volume (cm^3) 1.134 cm^3 04/22/24 1306   Wound Healing % 95 04/22/24 1306    Margins Well-defined edges;Epibole (Rolled edges) 04/22/24 1306   Non-staged Wound Description Full thickness 04/22/24 1306   Peggy-wound Assessment Edema;Moist;Maceration 04/22/24 1306   Wound Granulation Tissue Spongy;Red 04/22/24 1306   Wound Bed Granulation (%) 30 % 04/22/24 1306   Wound Bed Epithelium (%) 20 % 04/22/24 1306   Wound Bed Slough (%) 50 % 04/22/24 1306   Wound Bed Eschar (%) 40 % 08/14/23 1315   Wound Odor None 04/22/24 1306   Shape Bridged 04/22/24 1306   Tunneling? No 04/22/24 1306   Undermining? No 04/22/24 1306   Sinus Tracts? No 04/15/24 1612       Wound 04/15/24 #2 Right anterior lower leg Leg Right;Anterior;Lower (Active)   Wound Image   04/22/24 1309   Drainage Amount RHONDA 04/22/24 1309   Drainage Description Serous;Clear 04/15/24 1614   Wound Length (cm) 1.7 cm 04/22/24 1309   Wound Width (cm) 1.1 cm 04/22/24 1309   Wound Surface Area (cm^2) 1.87 cm^2 04/22/24 1309   Wound Depth (cm) 0.1 cm 04/22/24 1309   Wound Volume (cm^3) 0.187 cm^3 04/22/24 1309   Wound Healing % -60 04/22/24 1309   Margins Well-defined edges 04/22/24 1309   Non-staged Wound Description Full thickness 04/22/24 1309   Peggy-wound Assessment Edema;Red 04/22/24 1309   Wound Bed Slough (%) 100 % 04/22/24 1309   Wound Odor None 04/22/24 1309   Tunneling? No 04/22/24 1309   Undermining? No 04/22/24 1309   Sinus Tracts? No 04/22/24 1309       Vascular: DP and PT pulse not palpable today.  Continued pitting edema noted to right lower extremity.  Slightly sluggish refill noted to stump site and cool to touch to the entire right foot noted compared to recent visits.  Some continued coolness noted to the right lower extremity compared to left lower extremity.  Musculoskeletal: no acute deformities noted.  In a wheelchair today  Neurological: Gross sensation intact via light touch.  Protective sensation diminished via Ipswitch test.    Vital Signs  Vital Signs    04/22/24 1311   BP: 120/75   Pulse: 71   Resp: 17   Temp: 97.9 °F  (36.6 °C)   PainSc: 0 - (None)         Allergies  Allergies   Allergen Reactions    Heparin ANAPHYLAXIS    Hydromorphone HALLUCINATION       Assessment    Encounter Diagnosis  1. Foot ulcer, right, with fat layer exposed ([facility])    2. History of transmetatarsal amputation of foot ([facility])    3. Blister of right heel, initial encounter    4. PAD (peripheral artery disease) ([facility])    5. Right leg swelling        Problem List  Patient Active Problem List   Diagnosis    Closed minimally displaced zone I fracture of sacrum ([facility])    At risk for falling    CAD (coronary artery disease)    Ischemic cardiomyopathy    Azotemia    Arrhythmia    Esophageal reflux    History of MI (myocardial infarction)    Mixed hyperlipidemia    Primary hypertension    Dizziness    Medication side effect    Closed left hip fracture ([facility])  Global 6/22/2016    Status post hip surgery    Left shoulder distal clavical resection and excision of ganglion cyst of soft tissue mass   Global  09/17/2020    Orthopedic aftercare for healing traumatic hip fracture, left, closed    Closed left hip fracture, with routine healing, subsequent encounter    Osteoarthrosis, localized, primary, knee, left    Osteoarthrosis, localized, primary, knee, right    Gangrene ([facility])    PAD (peripheral artery disease) ([facility])    Benign prostatic hyperplasia with incomplete bladder emptying    Gangrene of foot ([facility])    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) ([facility])    Leukocytosis    Atrial fibrillation with RVR ([facility])    Hypokalemia    Acute kidney injury ([facility])    Hyperglycemia    Community acquired pneumonia of right lung, unspecified part of lung    Acute respiratory failure with hypoxia ([facility])    Hypotension, unspecified hypotension type    Sepsis due to pneumonia ([facility])    Foot ulcer with fat layer exposed, right ([facility])    Syncope, unspecified syncope type    Sepsis, due to unspecified organism, unspecified whether acute organ dysfunction present ([facility])    Shock  (HCC)    Acute hypoxic respiratory failure (HCC)    Pneumonia, bacterial    Hyponatremia    Metabolic alkalosis    Recurrent pneumonia    Sepsis due to undetermined organism (HCC)    Acute on chronic congestive heart failure, unspecified heart failure type (HCC)    Acute hypoxemic respiratory failure (HCC)    ERON (acute kidney injury) ([facility])    Lactic acidosis    Leukocytosis, unspecified type    Palliative care encounter    Counseling regarding advance care planning and goals of care    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) ([facility])       Plan  Orders:  Orders Placed This Encounter   Procedures    Aerobic Bacterial Culture         -Patient examined, chart history reviewed.    -Wound inspected today--overall stable wound today.  Dimensions worsened.  Medial foot wound has reopened and is now probing roughly 0.4 cm deep.  There is no deep tunneling.  There is fibrogranular wound bed.  There is continued palpable bone to the most dorsal portion of the wound.  No current purulence, surrounding erythema, or other signs of infection.  Some maceration noted within the surgical cicatrix today.  Foot is overall cool to touch     -Patient is seeing Dr. Butler for right shin ulceration.  Patient's family will assist him in continuing with Santyl daily per Dr. Butler's orders.     -Will avoid any debridements until after revascularization procedure.  Patient is scheduled for angioplasty of right lower extremity by Dr. Alston next week.    -Updated wound cultures obtained of right foot wound today.  Will follow-up with results.     -Wound site covered with Betadine and dry dressings.  Betadine to RLE wound today (patient will continue with Santyl to this location).  Will have patient change foot dressings daily.      -Educated on signs of infection and encouraged patient to seek immediate medical attention if noticing any of these signs.    Follow-Up  2 weeks    Pavan Poon DPM    4/22/2024    Dragon speech  recognition software was used to prepare this note.  Errors in word recognition may occur.  Please contact me with any questions/concerns with this note.

## 2024-04-23 RX ORDER — SULFAMETHOXAZOLE AND TRIMETHOPRIM 800; 160 MG/1; MG/1
1 TABLET ORAL 2 TIMES DAILY
COMMUNITY
Start: 2024-04-18 | End: 2024-04-28

## 2024-04-23 RX ORDER — PSEUDOEPHEDRINE HCL 30 MG
250 TABLET ORAL
COMMUNITY

## 2024-04-23 RX ORDER — ASPIRIN 81 MG/1
1 TABLET ORAL DAILY
COMMUNITY
End: 2024-04-24 | Stop reason: CLARIF

## 2024-04-24 ENCOUNTER — APPOINTMENT (OUTPATIENT)
Dept: CARDIOLOGY | Age: 89
End: 2024-04-24

## 2024-04-24 VITALS
HEIGHT: 68 IN | HEART RATE: 114 BPM | BODY MASS INDEX: 31.2 KG/M2 | DIASTOLIC BLOOD PRESSURE: 72 MMHG | WEIGHT: 205.9 LBS | SYSTOLIC BLOOD PRESSURE: 113 MMHG

## 2024-04-24 DIAGNOSIS — N18.31 STAGE 3A CHRONIC KIDNEY DISEASE (CMD): ICD-10-CM

## 2024-04-24 DIAGNOSIS — E78.00 PURE HYPERCHOLESTEROLEMIA: ICD-10-CM

## 2024-04-24 DIAGNOSIS — I65.23 ASYMPTOMATIC CAROTID ARTERY STENOSIS, BILATERAL: Primary | ICD-10-CM

## 2024-04-24 DIAGNOSIS — I77.811 ECTATIC ABDOMINAL AORTA (CMD): ICD-10-CM

## 2024-04-24 DIAGNOSIS — I25.10 CORONARY ARTERY DISEASE INVOLVING NATIVE CORONARY ARTERY OF NATIVE HEART WITHOUT ANGINA PECTORIS: ICD-10-CM

## 2024-04-24 DIAGNOSIS — I25.5 ISCHEMIC CARDIOMYOPATHY: ICD-10-CM

## 2024-04-24 DIAGNOSIS — Z95.1 HX OF CABG: ICD-10-CM

## 2024-04-24 DIAGNOSIS — I49.3 PVCS (PREMATURE VENTRICULAR CONTRACTIONS): ICD-10-CM

## 2024-04-24 RX ORDER — METOPROLOL SUCCINATE 50 MG/1
25 TABLET, EXTENDED RELEASE ORAL DAILY
Qty: 90 TABLET | Refills: 3 | Status: SHIPPED | OUTPATIENT
Start: 2024-04-24

## 2024-04-25 RX ORDER — GENTAMICIN SULFATE 1 MG/G
CREAM TOPICAL
Qty: 30 G | Refills: 1 | Status: SHIPPED | OUTPATIENT
Start: 2024-04-25

## 2024-04-26 LAB
ATRIAL RATE (BPM): 66
QRS-INTERVAL (MSEC): 106
QT-INTERVAL (MSEC): 344
QTC: 450
R AXIS (DEGREES): -14
REPORT TEXT: NORMAL
T AXIS (DEGREES): -121
VENTRICULAR RATE EKG/MIN (BPM): 103

## 2024-04-29 ENCOUNTER — APPOINTMENT (OUTPATIENT)
Dept: WOUND CARE | Facility: HOSPITAL | Age: 89
End: 2024-04-29
Attending: STUDENT IN AN ORGANIZED HEALTH CARE EDUCATION/TRAINING PROGRAM
Payer: MEDICARE

## 2024-05-01 ENCOUNTER — HOSPITAL ENCOUNTER (OUTPATIENT)
Dept: INTERVENTIONAL RADIOLOGY/VASCULAR | Facility: HOSPITAL | Age: 89
Discharge: HOME | End: 2024-05-01
Attending: SURGERY | Admitting: SURGERY
Payer: MEDICARE

## 2024-05-01 VITALS
SYSTOLIC BLOOD PRESSURE: 90 MMHG | HEIGHT: 67 IN | BODY MASS INDEX: 32.18 KG/M2 | HEART RATE: 88 BPM | TEMPERATURE: 97 F | RESPIRATION RATE: 14 BRPM | DIASTOLIC BLOOD PRESSURE: 61 MMHG | OXYGEN SATURATION: 96 % | WEIGHT: 205 LBS

## 2024-05-01 DIAGNOSIS — I70.269: ICD-10-CM

## 2024-05-01 PROCEDURE — 37229 HC TRANSL ANGIO W ATHERECT TIBIAL PERONEAL UNILAT INIT: CPT | Performed by: SURGERY

## 2024-05-01 PROCEDURE — 047M3Z1 DILATION OF RIGHT POPLITEAL ARTERY USING DRUG-COATED BALLOON, PERCUTANEOUS APPROACH: ICD-10-PCS | Performed by: SURGERY

## 2024-05-01 PROCEDURE — 047V3ZZ DILATION OF RIGHT FOOT ARTERY, PERCUTANEOUS APPROACH: ICD-10-PCS | Performed by: SURGERY

## 2024-05-01 PROCEDURE — 37228 HC TRANSLUMINAL ANGIO TIBIAL PERONEAL UNILAT INIT: CPT | Performed by: SURGERY

## 2024-05-01 PROCEDURE — 37225 HC TRANSLUM ANGIOPLASTY W ATHERECT FEM POP UNILAT: CPT | Performed by: SURGERY

## 2024-05-01 PROCEDURE — 75710 ARTERY X-RAYS ARM/LEG: CPT | Performed by: SURGERY

## 2024-05-01 PROCEDURE — 36415 COLL VENOUS BLD VENIPUNCTURE: CPT

## 2024-05-01 PROCEDURE — 99152 MOD SED SAME PHYS/QHP 5/>YRS: CPT | Performed by: SURGERY

## 2024-05-01 PROCEDURE — 99153 MOD SED SAME PHYS/QHP EA: CPT | Performed by: SURGERY

## 2024-05-01 RX ORDER — 0.9 % SODIUM CHLORIDE 0.9 %
INTRAVENOUS SOLUTION INTRAVENOUS
Status: COMPLETED
Start: 2024-05-01 | End: 2024-05-01

## 2024-05-01 RX ORDER — MIDAZOLAM HYDROCHLORIDE 1 MG/ML
INJECTION INTRAMUSCULAR; INTRAVENOUS
Status: COMPLETED
Start: 2024-05-01 | End: 2024-05-01

## 2024-05-01 RX ORDER — BIVALIRUDIN 250 MG/5ML
INJECTION, POWDER, LYOPHILIZED, FOR SOLUTION INTRAVENOUS
Status: COMPLETED
Start: 2024-05-01 | End: 2024-05-01

## 2024-05-01 RX ORDER — LIDOCAINE HYDROCHLORIDE 20 MG/ML
INJECTION, SOLUTION INFILTRATION; PERINEURAL
Status: COMPLETED
Start: 2024-05-01 | End: 2024-05-01

## 2024-05-01 RX ORDER — SODIUM CHLORIDE 9 MG/ML
INJECTION, SOLUTION INTRAVENOUS CONTINUOUS
Status: DISCONTINUED | OUTPATIENT
Start: 2024-05-01 | End: 2024-05-01

## 2024-05-01 RX ORDER — NITROGLYCERIN 20 MG/100ML
INJECTION INTRAVENOUS
Status: COMPLETED
Start: 2024-05-01 | End: 2024-05-01

## 2024-05-01 RX ORDER — HEPARIN SODIUM 1000 [USP'U]/ML
INJECTION, SOLUTION INTRAVENOUS; SUBCUTANEOUS
Status: DISCONTINUED
Start: 2024-05-01 | End: 2024-05-01 | Stop reason: WASHOUT

## 2024-05-01 RX ORDER — SODIUM CHLORIDE 0.9 % (FLUSH) 0.9 %
10 SYRINGE (ML) INJECTION AS NEEDED
Status: DISCONTINUED | OUTPATIENT
Start: 2024-05-01 | End: 2024-05-01

## 2024-05-01 RX ADMIN — SODIUM CHLORIDE: 9 INJECTION, SOLUTION INTRAVENOUS at 08:30:00

## 2024-05-01 NOTE — IVS NOTE
DISCHARGE NOTE     Pt is able to sit up and ambulate without difficulty.   Pt voided and tolerated fluids and food.   Left groin procedural site remains dry and intact with good circulation, motion and sensation.   No signs or symptoms of bleeding/hematoma noted.   Pt denies any pain or discomfort at this time.  IV access removed  Instructions provided, patient/family verbalizes understanding.   Dr. Alston spoke with patient/family post procedure.     Pt discharge via wheelchair to [facility]      Follow up Appointment: follow-up with Dr. Alston in 3 weeks    New Prescription: n/a

## 2024-05-01 NOTE — DISCHARGE INSTRUCTIONS
Take tylenol as needed for pain.    You will need to take plavix and eliquis. Start your medications back tonight..    You will need to followup with Dr. Alston in the vascular clinic in 3 weeks. Please call [phone] to make an appt.    If you have any fevers, chills, chest pain, shortness of breath, drainage, swelling or bleeding, please call the office in order to return to clinic sooner for evaluation.     HOME CARE INSTRUCTIONS FOLLOWING PERIPHERAL ANGIOGRAPHY/ ANGIOPLASTY (PTCA/PTA)     Activity     DO NOT drive after the procedure.  You may resume driving late the following day according to the nurse or physician's instructions  Plan on resting and relaxing tonight and tomorrow  Resume your normal activity after 48 hours, or as instructed by your physician  Do not lift anything over 10 pounds or strain for 2 WEEK  Avoid repeated stair use and excessive walking for the next 24 hours.  Avoid repetitive squatting/bending exercises/motions for 1 week.   Avoid drinking alcohol for the next 24 hours      What is Normal?    A small lump at the procedure site associated with mild tenderness when touched  The procedure site may be bruised or discolored  There may be a small amount of drainage on the bandage    Special Instructions    Drink plenty of fluids during the next 24 hours to \"flush\" the contrast from your system  Keep the bandage clean and dry   After 24 hours, remove the bandage (REMOVE TOMORROW 05/02)  You may shower after removing the bandage, and wash the procedure site gently with soap and water  Do NOT apply any powders, ointments or creams to the site for 1 week.   DO NOT submerge/soak the procedure site for 2 WEEKS (no bath tubs or pools).  You may then resume normal activities.  If you choose to wear a bandage for a few days, make sure it remains clean and dry and that it is changed daily      When you should NOTIFY YOUR PHYSICIAN    Bleeding can occur at the procedure site - both on the outside  of the skin and/or beneath the surface of the skin  Swelling or a large lump at the procedure site can occur, which may be accompanied by moderate to severe pain    If either of the above occurs, lie down flat.    Have someone apply pressure to the procedure site with both hands, as instructed by the nurse.    Hold pressure for 20 minutes and the bleeding should stop.    Notify your physician of the occurrence  If the bleeding does not stop, call 911 and continue to apply pressure    If you experience signs of a fever, temperature > 101°, chills, infection (redness, swelling, thick yellow drainage, or a foul odor from the procedure site)  If you notice any numbness, tingling, or loss of feeling to your leg or foot or groin access      Other    You may resume your present diet, unless otherwise specified by your physician.  You may resume all of your medications as prescribed, unless otherwise directed by your physician.  A list of your medications was provided to you at discharge.      Closure device utilized?  Yes (see additional instructions below)  If yes, type of closure used: Perclose

## 2024-05-01 NOTE — H&P
The above referenced H&P was reviewed by Manuelito Alston MD on 5/1/2024, the patient was examined and no significant changes have occurred in the patient's condition since the H&P was performed.  Risks and benefits were discussed, proceed with procedure as planned.    Manuelito Alston MD  [facility]  Vascular Surgery

## 2024-05-01 NOTE — OPERATIVE REPORT
Vascular Surgery Op Note  Patients Name:  Alejandro Guardado   Operating Physician: Manuelito Alston MD                                                     Location:  OR                                               YOB: 1935     Operation Date:   05/01/2024         Pre-Operative Diagnosis:   1.  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  Atherosclerosis with ulceration of the right lower extremity        Procedure Performed:   1.  Ultrasound-guided access of the left common femoral artery  2.  Aortogram and pelvic angiogram with radiologic supervision and interpretation  3.  Selective catheterization third order branch of the right lower extremity  4.  Right leg angiogram with radiologic supervision and interpretation  5.  Atherectomy with thrombectomy of the right popliteal, right TP trunk with 6 [place] Rota Delbert catheter.  6.  Angioplasty of the right popliteal and TP trunk with 5 mm drug-coated balloon.  7.  Angioplasty of the right posterior tibial with 3 mm balloon  8.  Angioplasty of the right medial plantar with 3 mm balloon  9..  Closure of  left groin with Pro-glide Perclose suture (6Fr).       Surgeon:  Manuelito Alston MD      Anesthesia:   An independent observer was present for the physiological monitoring and administration medication throughout the duration of the procedure under my direct supervision.    Versed 2 mg  Fentanyl 200mcg  Sedation time 60 minutes       EBL: 10cc     Complications: None    Drains: None     Findings: Widely patent aorta, iliac arteries, common femoral, SFA and profunda.  Occlusion of the stent with reconstitution of faint collaterals.  Atherectomy with thrombectomy performed with significant debris removed with subsequent angioplasty.  Wide patency at completion.  Triphasic PT in the right and triphasic DP on the left.  Patient cleared for podiatry intervention.     Indications for procedure: This is an  89-year-old male who presented with nonhealing ulceration of the right foot.  He has undergone numerous interventions.  He had near complete wound healing and then this stalled.  He underwent ultrasound that revealed occlusion of his distal repair. As such I recommended him for a right leg angiogram with the possibility for intervention.  I discussed the risk and benefits of the procedure.  Informed consent was directly obtained.  On the morning of 05/01/2024 the patient was brought to the Cath Lab and placed in supine position.  Conscious sedation was initiated without any issues.  The patient was subsequently prepped and draped in the usual sterile fashion.  Timeout was performed.  With the use the ultrasound the left common femoral was identified proximal to its bifurcation at the level of the femoral head.  The skin and soft tissues were anesthetized and then a micropuncture needle was used to access the left common femoral with advancement of a microwire and exchanged for a micro sheath.  Glidewire advantage was then put in place followed by placement of a 5 [place] sheath.  Sheath shot angiogram deemed appropriate location of the access.  The patient was systemically heparinized.  A rim catheter was advanced to the aorta and an aortogram and pelvic angiogram was performed that revealed wide patency of the aortoiliac vasculature.  The glide wire advantage and rim catheter were advanced up and over the bifurcation into the right  common femoral.  A right leg angiogram was performed with the findings as listed above.  As such I opted to proceed with intervention.  A Glidewire advantage was then put in place and then exchanged for a 6 [place] sheath that was advanced up and over the bifurcation into the SFA.    Then utilized a Glidewire advantage and Noel cross catheter to traverse the occlusion and enter into the reconstituted segment and directed a wire into the PTA after confirmation of reentry into the true  lumen with contrast injection.  I then advanced a 6 [place] Rota Delbert catheter and performed thrombectomy and atherectomy of the popliteal and TP trunk with removal of significant debris.  I then exchanged for a 5 mm drug-coated balloon and performed angioplasty of the distal SFA and popliteal into the TP trunk.  I then exchanged for a 3 mm balloon and performed angioplasty of the medial plantar extending to the PT proximally.  Intra-arterial nitroglycerin was administered.  Repeat angiogram revealed wide patency with two-vessel outflow via the peroneal to collaterals as well as the PT to the medial plantar with dramatic improvement of opacification of the foot.  At this point I was satisfied.    I opted to forego any further intervention and opted to terminate the procedure.  All catheters were withdrawn.  I then deployed a Pro-glide Perclose suture which was then cinched locked and cut thereby closing the arteriotomy site.  Manual pressure was held.  Upon release there was evidence of excellent hemostasis and a sterile dressing was applied.  The patient awoke from sedation and was taken to recovery in stable condition.  All counts were correct at the end of the case.    Manuelito Alston MD

## 2024-05-02 ENCOUNTER — PATIENT MESSAGE (OUTPATIENT)
Dept: PODIATRY CLINIC | Facility: CLINIC | Age: 89
End: 2024-05-02

## 2024-05-06 ENCOUNTER — OFFICE VISIT (OUTPATIENT)
Dept: WOUND CARE | Facility: HOSPITAL | Age: 89
End: 2024-05-06
Attending: PODIATRIST
Payer: MEDICARE

## 2024-05-06 VITALS
DIASTOLIC BLOOD PRESSURE: 63 MMHG | HEART RATE: 83 BPM | TEMPERATURE: 98 F | SYSTOLIC BLOOD PRESSURE: 93 MMHG | RESPIRATION RATE: 16 BRPM

## 2024-05-06 DIAGNOSIS — I73.9 PAD (PERIPHERAL ARTERY DISEASE) (HCC): ICD-10-CM

## 2024-05-06 DIAGNOSIS — S90.821D BLISTER OF RIGHT HEEL, SUBSEQUENT ENCOUNTER: ICD-10-CM

## 2024-05-06 DIAGNOSIS — Z89.439 HISTORY OF TRANSMETATARSAL AMPUTATION OF FOOT (HCC): ICD-10-CM

## 2024-05-06 DIAGNOSIS — L97.519 FOOT ULCERATION, RIGHT, WITH UNSPECIFIED SEVERITY (HCC): Primary | ICD-10-CM

## 2024-05-06 DIAGNOSIS — R60.0 EDEMA OF RIGHT LOWER EXTREMITY: ICD-10-CM

## 2024-05-06 PROCEDURE — 11042 DBRDMT SUBQ TIS 1ST 20SQCM/<: CPT | Performed by: PODIATRIST

## 2024-05-06 NOTE — PROGRESS NOTES
Subjective   Alejandro Guardado is a 89 year old male.    Chief Complaint   Patient presents with    Wound Care     Patient is here for a wound care follow up. He denies any pain or new wound concerns. He is on a new antibiotic for the cellulitis.        HPI  Alejandro Guardado is a 88 year old male with PAD who is returning to clinic for recheck of right foot.  Patient is accompanied today by his son and daughter-in-law, who is returning to clinic today for recheck on right foot.  Patient received an angioplasty with stenting to his right lower extremity by Dr. Alston last week.  Patient verbalizes noticing a difference immediately after the procedure and states that his leg is warmer now.  His son has been helping change dressings daily with Josi to the stump site.  They have been putting Betadine to his new blister to his right heel.  Patient is continuing to see Dr. Butler for follow-up of his right shin ulcer.  Patient is continuing with his oral antibiotic as prescribed.  No discomfort to right lower extremity at this time.  No other concerns.    Review of Systems  Denies nausea, vomiting, fever, chills, shortness of breath, chest pain, and calf pain.    Objective    Physical Exam:    Derm:  Wound Assessment  Wound 08/14/23 #1- right foot Foot Right (Active)   Wound Image   05/06/24 1325   Drainage Amount Moderate 05/06/24 1307   Drainage Description Serosanguineous 05/06/24 1307   Treatments Compression 04/15/24 1612   Wound Vac Brand I 10/30/23 0907   Wound Length (cm) 1.7 cm 05/06/24 1308   Wound Width (cm) 7.9 cm 05/06/24 1308   Wound Surface Area (cm^2) 13.43 cm^2 05/06/24 1308   Wound Depth (cm) 1 cm 05/06/24 1308   Wound Volume (cm^3) 13.43 cm^3 05/06/24 1308   Wound Healing % 43 05/06/24 1308   Margins Well-defined edges;Epibole (Rolled edges) 05/06/24 1307   Non-staged Wound Description Full thickness 05/06/24 1307   Peggy-wound Assessment Edema;Moist 05/06/24 1307   Wound Granulation Tissue Spongy;Red  05/06/24 1307   Wound Bed Granulation (%) 10 % 05/06/24 1307   Wound Bed Epithelium (%) 20 % 05/06/24 1307   Wound Bed Slough (%) 70 % 05/06/24 1307   Wound Bed Eschar (%) 40 % 08/14/23 1315   Wound Odor None 05/06/24 1307   Shape Bridged 05/06/24 1307   Tunneling? No 04/22/24 1306   Undermining? No 04/22/24 1306   Sinus Tracts? No 05/06/24 1307       Wound 04/15/24 #2 Right anterior lower leg Leg Right;Anterior;Lower (Active)   Wound Image   05/06/24 1306   Drainage Amount RHONDA 04/22/24 1309   Drainage Description Serous;Clear 04/15/24 1614   Wound Length (cm) 1.7 cm 04/22/24 1309   Wound Width (cm) 1.1 cm 04/22/24 1309   Wound Surface Area (cm^2) 1.87 cm^2 04/22/24 1309   Wound Depth (cm) 0.1 cm 04/22/24 1309   Wound Volume (cm^3) 0.187 cm^3 04/22/24 1309   Wound Healing % -60 04/22/24 1309   Margins Well-defined edges 04/22/24 1309   Non-staged Wound Description Full thickness 04/22/24 1309   Peggy-wound Assessment Edema;Red 04/22/24 1309   Wound Bed Slough (%) 100 % 04/22/24 1309   Wound Odor None 04/22/24 1309   Tunneling? No 04/22/24 1309   Undermining? No 04/22/24 1309   Sinus Tracts? No 04/22/24 1309       Vascular: DP and PT pulse not palpable today.  Continued pitting edema noted to right lower extremity.  Improved refill noted to stump site and warmth to touch to the entire right foot noted compared to recent visits.  Improved warmth noted to the right lower extremity.  Musculoskeletal: no acute deformities noted.  In a wheelchair today  Neurological: Gross sensation intact via light touch.  Protective sensation diminished via Ipswitch test.    Vital Signs  Vital Signs    05/06/24 1310   BP: 93/63   Pulse: 83   Resp: 16   Temp: 98.2 °F (36.8 °C)   PainSc: 0 - (None)         Allergies  Allergies   Allergen Reactions    Heparin ANAPHYLAXIS    Hydromorphone HALLUCINATION       Assessment    Encounter Diagnosis  1. Foot ulceration, right, with unspecified severity (HCC)    2. History of transmetatarsal  amputation of foot ([facility])    3. Blister of right heel, subsequent encounter    4. PAD (peripheral artery disease) ([facility])    5. Edema of right lower extremity        Problem List  Patient Active Problem List   Diagnosis    Closed minimally displaced zone I fracture of sacrum ([facility])    At risk for falling    CAD (coronary artery disease)    Ischemic cardiomyopathy    Azotemia    Arrhythmia    Esophageal reflux    History of MI (myocardial infarction)    Mixed hyperlipidemia    Primary hypertension    Dizziness    Medication side effect    Closed left hip fracture ([facility])  Global 6/22/2016    Status post hip surgery    Left shoulder distal clavical resection and excision of ganglion cyst of soft tissue mass   Global  09/17/2020    Orthopedic aftercare for healing traumatic hip fracture, left, closed    Closed left hip fracture, with routine healing, subsequent encounter    Osteoarthrosis, localized, primary, knee, left    Osteoarthrosis, localized, primary, knee, right    Gangrene ([facility])    PAD (peripheral artery disease) ([facility])    Benign prostatic hyperplasia with incomplete bladder emptying    Gangrene of foot ([facility])    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) ([facility])    Leukocytosis    Atrial fibrillation with RVR ([facility])    Hypokalemia    Acute kidney injury ([facility])    Hyperglycemia    Community acquired pneumonia of right lung, unspecified part of lung    Acute respiratory failure with hypoxia ([facility])    Hypotension, unspecified hypotension type    Sepsis due to pneumonia ([facility])    Foot ulcer with fat layer exposed, right ([facility])    Syncope, unspecified syncope type    Sepsis, due to unspecified organism, unspecified whether acute organ dysfunction present ([facility])    Shock ([facility])    Acute hypoxic respiratory failure ([facility])    Pneumonia, bacterial    Hyponatremia    Metabolic alkalosis    Recurrent pneumonia    Sepsis due to undetermined organism ([facility])    Acute on chronic congestive heart failure, unspecified heart  failure type (HCC)    Acute hypoxemic respiratory failure ([facility])    ERON (acute kidney injury) ([facility])    Lactic acidosis    Leukocytosis, unspecified type    Palliative care encounter    Counseling regarding advance care planning and goals of care    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) ([facility])       Plan  Orders:  Orders Placed This Encounter   Procedures    Debridement Old surgical Right Foot    Cellular tissue product application Old surgical Right Foot         -Patient examined, chart history reviewed.  Patient did have an angioplasty with stenting to his right lower extremity by Dr. Alston last week.  I did speak with Dr. Alston, who gave the okay with further debridements.    -Wound inspected today--overall stable wound today.  It does appear that the wound itself has retracted due to recent lack of blood flow.  Dimensions worsened.  Medial foot wound has reopened and is now probing roughly 0.4 cm deep.  There is no deep tunneling.  There is fibrogranular wound bed.  There are multiple areas of palpable bone to the wound stump.  Significant amount of serous drainage with ongoing right lower extremity pitting edema.  No current purulence, surrounding erythema, or other signs of infection.  Some maceration noted within the surgical cicatrix today.  Foot is overall warm to touch today.  Superficial deroofed bulla to posterior heel is stable     -Excisional debridement performed to the left foot ulcerations utilizing tissue nipper and curette.  All palpable osseous structures debrided today with tissue nippers.  Discussed with patient and family that the we can discuss surgical treatment in the future if needed.  They are hoping to continue with outpatient visits and debridements.    -Discussed treatment options.  We do have samples of Kerecis micro.  Recommended utilizing this today.  This was applied to patient's stump site and medial wound today and covered with silicone layer and a dry dressing.   Encouraged patient's family to change dressings down to the level of the silicone layer daily.    -Patient is seeing Dr. Butler for right shin ulceration.  Patient's family will assist him in continuing with Santyl daily per Dr. Butler's orders.     -Placed Betadine to RLE heel wound today.  Will have patient change foot dressings daily.      -Encourage family to change dressings 1-2 times daily due to excessive serous drainage.  Patient should elevate right lower extremity as frequently as possible as we cannot compress his right lower extremity due to recent stenting.    -Patient should remain nonweightbearing to his right foot in his soft slipper.  Okay to stand for transfers.     -Educated on signs of infection and encouraged patient to seek immediate medical attention if noticing any of these signs.    Follow-Up  Weekly with Dr. Butler until I return from out of town    Pavan Poon DPM    5/6/2024    Dragon speech recognition software was used to prepare this note.  Errors in word recognition may occur.  Please contact me with any questions/concerns with this note.

## 2024-05-06 NOTE — PROGRESS NOTES
Patient ID: Alejandro Guardado is a 89 year old male.    Debridement Old surgical Right Foot   Wound 08/14/23 #1- right foot Foot Right    Performed by: Quincy Poon DPM  Authorized by: Quincy Poon DPM      Consent   Consent obtained? verbal  Consent given by: patient  Risks discussed? procedural risks discussed  Time out called at 5/6/2024 1:19 PM  Immediately prior to the procedure a time out was called and the performing provider verified the correct patient, procedure, equipment, support staff, and site/side marked as required.    Debridement Details  Performed by: physician  Debridement type: surgical  Level of debridement: subcutaneous tissue  Pain control: lidocaine 4%  Pain control administration type: topical    Pre-debridement measurements  Length (cm): 1.6  Width (cm): 7.8  Depth (cm): 1  Surface Area (cm^2): 12.48    Post-debridement measurements  Length (cm): 1.7  Width (cm): 7.9  Depth (cm): 1  Percent debrided: 100%  Surface Area (cm^2): 13.43  Area Debrided (cm^2): 13.43  Volume (cm^3): 13.43    Tissue and other material debrided: subcutaneous tissue  Comment regarding debrided tissue: bone  Devitalized tissue debrided: biofilm, callus, necrotic debris and slough  Instrument(s) utilized: blade, curette and nippers  Bleeding: medium  Hemostasis obtained with: not applicable  Procedural pain (0-10): 0  Post-procedural pain: 0   Response to treatment: procedure was tolerated well

## 2024-05-06 NOTE — PROGRESS NOTES
Patient ID: Alejandro Guardado is a 89 year old male.    Cellular tissue product application Old surgical Right Foot    Date/Time: 5/6/2024 1:32 PM    Performed by: Quincy Poon DPM  Authorized by: Quincy Poon DPM  Associated wounds:   Wound 08/14/23 #1- right foot Foot Right  Consent:     Consent obtained:  Verbal    Consent given by:  Patient    Risks discussed:  Infection    Alternatives discussed:  No treatment  Pre-procedure details:     Preparation: Patient was prepped and draped in usual sterile fashion    Anesthesia (see MAR for exact dosages):     Anesthesia method:  None  Procedure details:     Location:  head/hands/feet/digits/genitalia    Product applied:  Kerecis omega3    Product lot #:  [id number]    Product expiration:  4/1/2026    Amount used (cm^2):  13    Secured/Fixated: Yes      Secured/Fixated with:  SILICONE  Post-procedure details:     Patient tolerance of procedure:  Tolerated well, no immediate complications

## 2024-05-06 NOTE — PROGRESS NOTES
Weekly Wound Education Note    Teaching Provided To: Patient;Family  Training Topics: Dressing;Edema control;Cleasing and general instructions;Discharge instructions;Off-loading  Training Method: Demonstration;Explain/Verbal;Written  Training Response: Patient responds and understands        Notes: Right foot: Kerecis micro sample applied covered with silicone, hydrofera transfer, abd, kerlix. change outer dressing only do not remove silicone layer or skin sub.   Betadine to right medial heel reapply daily. Cleanse right leg wound with saline or wound cleanser and apply santyl and dry dressing, change daily. Follow up with Dr Butler weekly, Continue to stay off your foot as much as possible. Elevate legs above level of heart several times daily for at least 30 minutes to help improve edema.

## 2024-05-13 ENCOUNTER — OFFICE VISIT (OUTPATIENT)
Dept: WOUND CARE | Facility: HOSPITAL | Age: 89
End: 2024-05-13
Attending: INTERNAL MEDICINE
Payer: MEDICARE

## 2024-05-13 VITALS
TEMPERATURE: 99 F | RESPIRATION RATE: 16 BRPM | HEART RATE: 73 BPM | SYSTOLIC BLOOD PRESSURE: 111 MMHG | DIASTOLIC BLOOD PRESSURE: 73 MMHG

## 2024-05-13 DIAGNOSIS — R60.0 EDEMA OF RIGHT LOWER EXTREMITY: ICD-10-CM

## 2024-05-13 DIAGNOSIS — I73.9 PAD (PERIPHERAL ARTERY DISEASE) (HCC): Primary | ICD-10-CM

## 2024-05-13 DIAGNOSIS — L97.212 NON-PRESSURE CHRONIC ULCER OF RIGHT CALF WITH FAT LAYER EXPOSED (HCC): ICD-10-CM

## 2024-05-13 DIAGNOSIS — S91.301D NON-HEALING OPEN WOUND OF RIGHT HEEL, SUBSEQUENT ENCOUNTER: ICD-10-CM

## 2024-05-13 DIAGNOSIS — Z89.439 HISTORY OF TRANSMETATARSAL AMPUTATION OF FOOT (HCC): ICD-10-CM

## 2024-05-13 DIAGNOSIS — R26.9 GAIT DIFFICULTY: ICD-10-CM

## 2024-05-13 DIAGNOSIS — L97.512 FOOT ULCER, RIGHT, WITH FAT LAYER EXPOSED (HCC): ICD-10-CM

## 2024-05-13 PROCEDURE — 99214 OFFICE O/P EST MOD 30 MIN: CPT

## 2024-05-13 NOTE — PROGRESS NOTES
[facility] PROGRESS NOTE  FARZAD WILLINGHAM MD  5/13/2024    Chief Complaint:   Chief Complaint   Patient presents with    Wound Care     Patient is here for a wound care follow up. He denies any pain or new wound concerns.       HPI:   Subjective   Alejandro Guardado is a 89 year old male coming in for a follow-up visit.    HPI    Right leg wound not improved at all - covered with 100% slough.   Edema 3+    New wound right heel - covered with slough  100%    Right foot distal (TMA)  wound - not improved - kerecis in place - adhered well.     No s/o active infection.     Pt admits that he is very much noncompliant with low salt diet.   He eats salty foods a a lot.   ? Compliance with offloading / leg elevation.     Family accompanying   I d/w them plan of care in detail.   There is a risk of limb loss if these wounds fail to heal.     Review of Systems  Negative except HPI   Denies chest pain / SOB / palpitations  Denies fever.     Allergies  Allergies   Allergen Reactions    Heparin ANAPHYLAXIS    Hydromorphone HALLUCINATION       Current Meds:  Current Outpatient Medications   Medication Sig Dispense Refill    gentamicin 0.1 % External Cream Apply topically to the ulcer site once daily 30 g 1    furosemide 40 MG Oral Tab Take 2 tablets (80 mg total) by mouth 2 (two) times daily.      lisinopril 2.5 MG Oral Tab Take 1 tablet (2.5 mg total) by mouth daily.      Ascorbic Acid (VITAMIN C) 1000 MG Oral Tab Take 1.5 tablets (1,500 mg total) by mouth daily.      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      metoprolol succinate ER 25 MG Oral Tablet 24 Hr Take 1 tablet (25 mg total) by mouth Daily Beta Blocker. (Patient taking differently: Take 2 tablets (50 mg total) by mouth Daily Beta Blocker.) 90 tablet 1    tamsulosin 0.4 MG Oral Cap Take 1 capsule  (0.4 mg total) by mouth daily.      apixaban 5 MG Oral Tab Take 1 tablet (5 mg total) by mouth 2 (two) times daily. 60 tablet 3    atorvastatin 40 MG Oral Tab Take 1 tablet (40 mg total) by mouth daily. (Patient taking differently: Take 2 tablets (80 mg total) by mouth daily.) 30 tablet 0    finasteride 5 MG Oral Tab Take 1 tablet (5 mg total) by mouth daily. 30 tablet 0    clopidogrel 75 MG Oral Tab Take 1 tablet (75 mg total) by mouth daily. 30 tablet 0    gabapentin 300 MG Oral Cap Take 1 capsule (300 mg total) by mouth 3 (three) times daily. (Patient taking differently: Take 1 capsule (300 mg total) by mouth. Takes 300 mg in am and 600 mg at bedtime) 90 capsule 0    predniSONE 5 MG Oral Tab Take 3 tablets (15 mg total) by mouth daily.      folic acid 1 MG Oral Tab Take 1 tablet (1 mg total) by mouth daily.      acetaminophen 500 MG Oral Tab Take 2 tablets (1,000 mg total) by mouth every 8 (eight) hours. 21 tablet 0    Omeprazole 40 MG Oral Capsule Delayed Release Take 1 capsule (40 mg total) by mouth daily.           EXAM:   Objective   Objective    Physical Exam    Vital Signs  Vitals:    05/13/24 1555   BP: 111/73   Pulse: 73   Resp: 16   Temp: 98.7 °F (37.1 °C)       Wound Assessment  Wound 08/14/23 #1- right foot Foot Right (Active)   Wound Image   05/13/24 1602   Drainage Amount Large 05/13/24 1602   Drainage Description Serosanguineous 05/13/24 1602   Treatments Compression 04/15/24 1612   Wound Vac [facility] 10/30/23 0907   Wound Length (cm) 1.5 cm 05/13/24 1602   Wound Width (cm) 7.5 cm 05/13/24 1602   Wound Surface Area (cm^2) 11.25 cm^2 05/13/24 1602   Wound Depth (cm) 0.8 cm 05/13/24 1602   Wound Volume (cm^3) 9 cm^3 05/13/24 1602   Wound Healing % 62 05/13/24 1602   Margins Well-defined edges;Epibole (Rolled edges) 05/13/24 1602   Non-staged Wound Description Full thickness 05/13/24 1602   Peggy-wound Assessment Edema;Moist 05/13/24 1602   Wound Granulation Tissue Firm;Pink 05/13/24 1602   Wound Bed  Granulation (%) 15 % 05/13/24 1602   Wound Bed Epithelium (%) 15 % 05/13/24 1602   Wound Bed Slough (%) 70 % 05/06/24 1307   Wound Bed Eschar (%) 40 % 08/14/23 1315   Wound Odor None 05/13/24 1602   Exposed Structure Bone Necrosis 05/13/24 1602   Shape Bridged 05/13/24 1602   Tunneling? No 05/13/24 1602   Undermining? No 05/13/24 1602   Sinus Tracts? No 05/13/24 1602       Wound 04/15/24 #2 Right anterior lower leg Leg Right;Anterior;Lower (Active)   Wound Image   05/13/24 1600   Drainage Amount Small 05/13/24 1600   Drainage Description Serous;Yellow 05/13/24 1600   Wound Length (cm) 2.4 cm 05/13/24 1600   Wound Width (cm) 1.7 cm 05/13/24 1600   Wound Surface Area (cm^2) 4.08 cm^2 05/13/24 1600   Wound Depth (cm) 0.1 cm 05/13/24 1600   Wound Volume (cm^3) 0.408 cm^3 05/13/24 1600   Wound Healing % -249 05/13/24 1600   Margins Well-defined edges 05/13/24 1600   Non-staged Wound Description Full thickness 05/13/24 1600   Peggy-wound Assessment Edema;Red;Dry 05/13/24 1600   Wound Bed Slough (%) 100 % 05/13/24 1600   Wound Odor None 05/13/24 1600   Tunneling? No 05/13/24 1600   Undermining? No 05/13/24 1600   Sinus Tracts? No 05/13/24 1600       Wound 05/13/24 #3 Right posterior heel Heel Right;Posterior (Active)   Wound Image   05/13/24 1605   Drainage Amount Small 05/13/24 1605   Drainage Description Serosanguineous 05/13/24 1605   Wound Length (cm) 2 cm 05/13/24 1605   Wound Width (cm) 2.7 cm 05/13/24 1605   Wound Surface Area (cm^2) 5.4 cm^2 05/13/24 1605   Wound Depth (cm) 0.1 cm 05/13/24 1605   Wound Volume (cm^3) 0.54 cm^3 05/13/24 1605   Margins Well-defined edges 05/13/24 1605   Non-staged Wound Description Full thickness 05/13/24 1605   Peggy-wound Assessment Dry;Edema 05/13/24 1605   Wound Granulation Tissue Firm;[place] 05/13/24 1605   Wound Bed Granulation (%) 90 % 05/13/24 1605   Wound Bed Slough (%) 10 % 05/13/24 1605   Wound Odor None 05/13/24 1605   Tunneling? No 05/13/24 1605   Undermining? No  05/13/24 1605   Sinus Tracts? No 05/13/24 1605   Pressure Injury Stage 3 05/13/24 1605           ASSESSMENT AND PLAN:     Assessment   Assessment    Encounter Diagnosis  1. PAD (peripheral artery disease) ([facility])    2. Non-pressure chronic ulcer of right calf with fat layer exposed ([facility])    3. Non-healing open wound of right heel, subsequent encounter    4. Foot ulcer, right, with fat layer exposed ([facility])    5. Edema of right lower extremity    6. History of transmetatarsal amputation of foot ([facility])    7. Gait difficulty        Problem List  Patient Active Problem List   Diagnosis    Closed minimally displaced zone I fracture of sacrum ([facility])    At risk for falling    CAD (coronary artery disease)    Ischemic cardiomyopathy    Azotemia    Arrhythmia    Esophageal reflux    History of MI (myocardial infarction)    Mixed hyperlipidemia    Primary hypertension    Dizziness    Medication side effect    Closed left hip fracture ([facility])  Global 6/22/2016    Status post hip surgery    Left shoulder distal clavical resection and excision of ganglion cyst of soft tissue mass   Global  09/17/2020    Orthopedic aftercare for healing traumatic hip fracture, left, closed    Closed left hip fracture, with routine healing, subsequent encounter    Osteoarthrosis, localized, primary, knee, left    Osteoarthrosis, localized, primary, knee, right    Gangrene (HCC)    PAD (peripheral artery disease) (HCC)    Benign prostatic hyperplasia with incomplete bladder emptying    Gangrene of foot (HCC)    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) ([facility])    Leukocytosis    Atrial fibrillation with RVR ([facility])    Hypokalemia    Acute kidney injury ([facility])    Hyperglycemia    Community acquired pneumonia of right lung, unspecified part of lung    Acute respiratory failure with hypoxia ([facility])    Hypotension, unspecified hypotension type    Sepsis due to pneumonia ([facility])    Foot ulcer with fat layer exposed, right ([facility])    Syncope, unspecified  syncope type    Sepsis, due to unspecified organism, unspecified whether acute organ dysfunction present (HCC)    Shock (HCC)    Acute hypoxic respiratory failure ([facility])    Pneumonia, bacterial    Hyponatremia    Metabolic alkalosis    Recurrent pneumonia    Sepsis due to undetermined organism (HCC)    Acute on chronic congestive heart failure, unspecified heart failure type (HCC)    Acute hypoxemic respiratory failure (HCC)    ERON (acute kidney injury) ([facility])    Lactic acidosis    Leukocytosis, unspecified type    Palliative care encounter    Counseling regarding advance care planning and goals of care    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) ([facility])         MANAGEMENT          Continue  santyl on right leg wound for enzymatic debridement  Start santyl on heel wound as well due to increasing nonviable tissue / slough.   Continue HF transfer on Foot / TMA wound.   Low salt diet  Leg elevation  Intense offloading.   If edema is not well controlled, I doubt the wounds will heal. Will need to consider palliative goals of care.   Rtc 1 week    Wound Cleaning and Dressings:    Wash your hands with soap and water. Always wear gloves while changing dressings. Donot touch wound / mio-wound skin with un-gloved hands. Remove old dressing, discard and place into trash.    DRESSINGS:   Right leg and right heel : santyl / HF transfer - Change dressing daily.  Right foot: nati / HF transfer- change dressing QOD  Offload wounded area.     Off-Loading: modified foot wear for maximal offloading.     Miscellaneous Instructions:  Supplement with a daily multivitamin   Low salt diet  Intense blood sugar control - Goal Blood sugar below 180 at all times recommended.  Increase protein intake / consider protein supplements - see below  Elevate extremities at all times when sitting / laying down.    DIETARY MODIFICATIONS TO HELP WITH WOUND HEALING:    Protein: Meats, beans, eggs, milk and yogurt particularly Greek yogurt),  tofu, soy nuts, soy protein products    Vitamin C: Citrus fruits and juices, strawberries, tomatoes, tomato juice, peppers, baked potatoes, spinach, broccoli, cauliflower, [place] sprouts, cabbage    Vitamin A: Dark green, leafy vegetables, orange or yellow vegetables, cantaloupe, fortified dairy products, liver, fortified cereals    Zinc: Fortified cereals, red meats, seafood    Consider Kwan by [employer] [facility] (These are essential branch chain amino acids that help with tissue building and wound healing) and take 2 packets/day. you can order online at [employer] or [employer]    ADDITIONAL REMINDERS:    The treatment plan has been discussed at length with you and your provider. Follow all instructions carefully, it is very important. If you do not follow all instructions, you are at  risk of your wound not healing, infection, possible loss of limb and even end of life.  Please call the clinic during regular business hours ( 7:30 AM - 5:30 PM) if you notice increased bleeding, redness, warmth, pain or pus like drainage or start running a fever greater than 100.3.    For after hour emergencies, please call your primary physician or go to the nearest emergency room.      Patient/Caregiver Education: There are no barriers to learning. Medical education for above diagnosis given.   Answered all questions.    Outcome: Patient verbalizes understanding. Patient is notified to call with any questions, complications, allergies, or worsening or changing symptoms.  Patient is to call with any side effects or complications as a result of the treatments today.      DOCUMENTATION OF TIME SPENT: Code selection for this visit was based on time spent : 45 min on date of service in preparing to see the patient, obtaining and/or reviewing separately obtained history, performing a medically appropriate examination, counseling and educating the patient/family/caregiver, ordering medications or testing, referring and communicating with other  healthcare providers, documenting clinical information in the E HR, independently interpreting results and communicating results to the patient/family/caregiver and care coordination with the patient's other providers.    Followup: Return in about 1 week (around 5/20/2024) for Wound followup.      Note to Patient:  The 21st Century Cures Act makes medical notes like these available to patients in the interest of transparency. However, be advised this is a medical document and is intended as [id number] communication; it is written in medical language and may appear blunt, direct, or contain abbreviations or verbiage that are unfamiliar. Medical documents are intended to carry relevant information, facts as evident, and the clinical opinion of the practitioner.    Also, please note that this report has been produced using speech recognition software and may contain errors related to that system including, but not limited to, errors in grammar, punctuation, and spelling, as well as words and phrases that possibly may have been recognized inappropriately.  If there are any questions or concerns, contact the dictating provider for clarification.      Fortino Butler MD  5/13/2024  4:50 PM

## 2024-05-13 NOTE — PATIENT INSTRUCTIONS
Continue  santyl on right leg wound for enzyatic debridement  Start santyl on heel wound as well due to increasing nonviable tissue / slough.   Continue HF transfer on Foot / TMA wound.     Wound Cleaning and Dressings:    Wash your hands with soap and water. Always wear gloves while changing dressings. Donot touch wound / mio-wound skin with un-gloved hands. Remove old dressing, discard and place into trash.    DRESSINGS:   Right leg and right heel : santyl / HF transfer - Change dressing daily.  Right foot: nati / HF transfer- change dressing QOD  Offload wounded area.     Off-Loading: modified foot wear for maximal offloading.     Miscellaneous Instructions:  Supplement with a daily multivitamin   Low salt diet  Intense blood sugar control - Goal Blood sugar below 180 at all times recommended.  Increase protein intake / consider protein supplements - see below  Elevate extremities at all times when sitting / laying down.    DIETARY MODIFICATIONS TO HELP WITH WOUND HEALING:    Protein: Meats, beans, eggs, milk and yogurt particularly Greek yogurt), tofu, soy nuts, soy protein products    Vitamin C: Citrus fruits and juices, strawberries, tomatoes, tomato juice, peppers, baked potatoes, spinach, broccoli, cauliflower, [place] sprouts, cabbage    Vitamin A: Dark green, leafy vegetables, orange or yellow vegetables, cantaloupe, fortified dairy products, liver, fortified cereals    Zinc: Fortified cereals, red meats, seafood    Consider Kwan by [employer] (These are essential branch chain amino acids that help with tissue building and wound healing) and take 2 packets/day. you can order online at [employer] or [employer]    ADDITIONAL REMINDERS:    The treatment plan has been discussed at length with you and your provider. Follow all instructions carefully, it is very important. If you do not follow all instructions, you are at  risk of your wound not healing, infection, possible loss of limb and even end of  life.  Please call the clinic during regular business hours ( 7:30 AM - 5:30 PM) if you notice increased bleeding, redness, warmth, pain or pus like drainage or start running a fever greater than 100.3.    For after hour emergencies, please call your primary physician or go to the nearest emergency room.

## 2024-05-13 NOTE — PROGRESS NOTES
.Weekly Wound Education Note    Teaching Provided To: Patient;Family  Training Topics: Dressing;Discharge instructions;Cleasing and general instructions;Off-loading  Training Method: Explain/Verbal;Written  Training Response: Patient responds and understands;Reinforcement needed            Santyl ointment to leg and heel wound, cover all wounds with hydrofera transfer, abd pad, wrap with kerlix.  Offload as much as possible.

## 2024-05-20 ENCOUNTER — HOSPITAL ENCOUNTER (OUTPATIENT)
Dept: GENERAL RADIOLOGY | Facility: HOSPITAL | Age: 89
Discharge: HOME | End: 2024-05-20
Attending: INTERNAL MEDICINE
Payer: MEDICARE

## 2024-05-20 ENCOUNTER — OFFICE VISIT (OUTPATIENT)
Dept: WOUND CARE | Facility: HOSPITAL | Age: 89
End: 2024-05-20
Attending: INTERNAL MEDICINE
Payer: MEDICARE

## 2024-05-20 VITALS
HEART RATE: 86 BPM | RESPIRATION RATE: 16 BRPM | SYSTOLIC BLOOD PRESSURE: 89 MMHG | TEMPERATURE: 98 F | DIASTOLIC BLOOD PRESSURE: 52 MMHG

## 2024-05-20 DIAGNOSIS — S91.301D NON-HEALING OPEN WOUND OF RIGHT HEEL, SUBSEQUENT ENCOUNTER: ICD-10-CM

## 2024-05-20 DIAGNOSIS — L97.512 FOOT ULCER, RIGHT, WITH FAT LAYER EXPOSED (HCC): ICD-10-CM

## 2024-05-20 DIAGNOSIS — R60.0 EDEMA OF RIGHT LOWER EXTREMITY: ICD-10-CM

## 2024-05-20 DIAGNOSIS — L97.212 NON-PRESSURE CHRONIC ULCER OF RIGHT CALF WITH FAT LAYER EXPOSED (HCC): Primary | ICD-10-CM

## 2024-05-20 DIAGNOSIS — I73.9 PAD (PERIPHERAL ARTERY DISEASE) (HCC): ICD-10-CM

## 2024-05-20 DIAGNOSIS — R26.9 GAIT DIFFICULTY: ICD-10-CM

## 2024-05-20 DIAGNOSIS — L97.212 NON-PRESSURE CHRONIC ULCER OF RIGHT CALF WITH FAT LAYER EXPOSED (HCC): ICD-10-CM

## 2024-05-20 DIAGNOSIS — Z89.439 HISTORY OF TRANSMETATARSAL AMPUTATION OF FOOT (HCC): ICD-10-CM

## 2024-05-20 DIAGNOSIS — T81.89XD NONHEALING SURGICAL WOUND, SUBSEQUENT ENCOUNTER: ICD-10-CM

## 2024-05-20 PROCEDURE — 73590 X-RAY EXAM OF LOWER LEG: CPT | Performed by: INTERNAL MEDICINE

## 2024-05-20 PROCEDURE — 15275 SKIN SUB GRAFT FACE/NK/HF/G: CPT | Performed by: INTERNAL MEDICINE

## 2024-05-20 NOTE — PROGRESS NOTES
[facility] PROGRESS NOTE  FARZAD WILLINGHAM MD  5/20/2024    Chief Complaint:   Chief Complaint   Patient presents with    Wound Care     Follow-up for wounds to RLE. Denies pain or concerns at this time.        HPI:   Subjective   Alejandro Guardado is a 89 year old male coming in for a follow-up visit.    HPI    Rt leg and heel wounds stable.     Right foot surgical wound with good granulation. However, not thick granulation - bone very superficial.     No s/o active infection.     Edema not well controlled - noncompliant with elevation / salt restriction / offloading.     Family accompanying.     Review of Systems  Negative except HPI   Denies chest pain / SOB / palpitations  Denies fever.     Allergies  Allergies   Allergen Reactions    Heparin ANAPHYLAXIS    Hydromorphone HALLUCINATION       Current Meds:  Current Outpatient Medications   Medication Sig Dispense Refill    gentamicin 0.1 % External Cream Apply topically to the ulcer site once daily 30 g 1    furosemide 40 MG Oral Tab Take 2 tablets (80 mg total) by mouth 2 (two) times daily.      lisinopril 2.5 MG Oral Tab Take 1 tablet (2.5 mg total) by mouth daily.      Ascorbic Acid (VITAMIN C) 1000 MG Oral Tab Take 1.5 tablets (1,500 mg total) by mouth daily.      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      metoprolol succinate ER 25 MG Oral Tablet 24 Hr Take 1 tablet (25 mg total) by mouth Daily Beta Blocker. (Patient taking differently: Take 2 tablets (50 mg total) by mouth Daily Beta Blocker.) 90 tablet 1    tamsulosin 0.4 MG Oral Cap Take 1 capsule (0.4 mg total) by mouth daily.      apixaban 5 MG Oral Tab Take 1 tablet (5 mg total) by mouth 2 (two) times daily. 60 tablet 3    atorvastatin 40 MG Oral Tab Take 1 tablet (40 mg total) by mouth daily. (Patient taking differently: Take 2  tablets (80 mg total) by mouth daily.) 30 tablet 0    finasteride 5 MG Oral Tab Take 1 tablet (5 mg total) by mouth daily. 30 tablet 0    clopidogrel 75 MG Oral Tab Take 1 tablet (75 mg total) by mouth daily. 30 tablet 0    gabapentin 300 MG Oral Cap Take 1 capsule (300 mg total) by mouth 3 (three) times daily. (Patient taking differently: Take 1 capsule (300 mg total) by mouth. Takes 300 mg in am and 600 mg at bedtime) 90 capsule 0    predniSONE 5 MG Oral Tab Take 3 tablets (15 mg total) by mouth daily.      folic acid 1 MG Oral Tab Take 1 tablet (1 mg total) by mouth daily.      acetaminophen 500 MG Oral Tab Take 2 tablets (1,000 mg total) by mouth every 8 (eight) hours. 21 tablet 0    Omeprazole 40 MG Oral Capsule Delayed Release Take 1 capsule (40 mg total) by mouth daily.           EXAM:   Objective   Objective    Physical Exam    Vital Signs  Vitals:    05/20/24 0700   BP: (!) 89/52   Pulse: 86   Resp: 16   Temp: 98 °F (36.7 °C)       Wound Assessment  Wound 08/14/23 #1- right foot Foot Right (Active)   Wound Image   05/20/24 1027   Drainage Amount Moderate 05/20/24 1003   Drainage Description Serous;Yellow 05/20/24 1003   Treatments Compression 04/15/24 1612   Wound Vac Brand I 10/30/23 0907   Wound Length (cm) 1.5 cm 05/20/24 1003   Wound Width (cm) 6.2 cm 05/20/24 1003   Wound Surface Area (cm^2) 9.3 cm^2 05/20/24 1003   Wound Depth (cm) 0.4 cm 05/20/24 1003   Wound Volume (cm^3) 3.72 cm^3 05/20/24 1003   Wound Healing % 84 05/20/24 1003   Margins Well-defined edges;Epibole (Rolled edges) 05/20/24 1003   Non-staged Wound Description Full thickness 05/20/24 1003   Peggy-wound Assessment Edema;Moist;Maceration 05/20/24 1003   Wound Granulation Tissue Firm;Pink 05/13/24 1602   Wound Bed Granulation (%) 10 % 05/20/24 1003   Wound Bed Epithelium (%) 30 % 05/20/24 1003   Wound Bed Slough (%) 60 % 05/20/24 1003   Wound Bed Eschar (%) 40 % 08/14/23 1315   Wound Odor None 05/20/24 1003   Exposed Structure Bone  Necrosis 05/13/24 1602   Shape Bridged 05/20/24 1003   Tunneling? No 05/20/24 1003   Undermining? No 05/20/24 1003   Sinus Tracts? No 05/20/24 1003       Wound 04/15/24 #2 Right anterior lower leg Leg Right;Anterior;Lower (Active)   Wound Image   05/20/24 1002   Drainage Amount Small 05/20/24 1002   Drainage Description Serous;Yellow 05/20/24 1002   Wound Length (cm) 2.3 cm 05/20/24 1002   Wound Width (cm) 1.9 cm 05/20/24 1002   Wound Surface Area (cm^2) 4.37 cm^2 05/20/24 1002   Wound Depth (cm) 0.1 cm 05/20/24 1002   Wound Volume (cm^3) 0.437 cm^3 05/20/24 1002   Wound Healing % -274 05/20/24 1002   Margins Well-defined edges 05/20/24 1002   Non-staged Wound Description Full thickness 05/20/24 1002   Peggy-wound Assessment Edema 05/20/24 1002   Wound Bed Slough (%) 100 % 05/20/24 1002   Wound Odor None 05/20/24 1002   Tunneling? No 05/20/24 1002   Undermining? No 05/20/24 1002   Sinus Tracts? No 05/20/24 1002       Wound 05/13/24 #3 Right posterior heel Heel Right;Posterior (Active)   Wound Image   05/20/24 1005   Drainage Amount Small 05/20/24 1005   Drainage Description Yellow;Serous 05/20/24 1005   Wound Length (cm) 1.7 cm 05/20/24 1005   Wound Width (cm) 2.6 cm 05/20/24 1005   Wound Surface Area (cm^2) 4.42 cm^2 05/20/24 1005   Wound Depth (cm) 0.1 cm 05/20/24 1005   Wound Volume (cm^3) 0.442 cm^3 05/20/24 1005   Wound Healing % 18 05/20/24 1005   Margins Well-defined edges 05/20/24 1005   Non-staged Wound Description Full thickness 05/20/24 1005   Peggy-wound Assessment Dry;Edema 05/20/24 1005   Wound Granulation Tissue Firm;[place];Pink 05/20/24 1005   Wound Bed Granulation (%) 100 % 05/20/24 1005   Wound Bed Slough (%) 10 % 05/13/24 1605   Wound Odor None 05/20/24 1005   Tunneling? No 05/20/24 1005   Undermining? No 05/20/24 1005   Sinus Tracts? No 05/20/24 1005   Pressure Injury Stage 3 05/13/24 1605           ASSESSMENT AND PLAN:     Assessment   Assessment    Encounter Diagnosis  1. Non-pressure chronic  ulcer of right calf with fat layer exposed ([facility])    2. Nonhealing surgical wound, subsequent encounter    3. Non-healing open wound of right heel, subsequent encounter    4. Foot ulcer, right, with fat layer exposed ([facility])    5. PAD (peripheral artery disease) ([facility])    6. Edema of right lower extremity    7. History of transmetatarsal amputation of foot ([facility])    8. Gait difficulty        Problem List  Patient Active Problem List   Diagnosis    Closed minimally displaced zone I fracture of sacrum ([facility])    At risk for falling    CAD (coronary artery disease)    Ischemic cardiomyopathy    Azotemia    Arrhythmia    Esophageal reflux    History of MI (myocardial infarction)    Mixed hyperlipidemia    Primary hypertension    Dizziness    Medication side effect    Closed left hip fracture ([facility])  Global 6/22/2016    Status post hip surgery    Left shoulder distal clavical resection and excision of ganglion cyst of soft tissue mass   Global  09/17/2020    Orthopedic aftercare for healing traumatic hip fracture, left, closed    Closed left hip fracture, with routine healing, subsequent encounter    Osteoarthrosis, localized, primary, knee, left    Osteoarthrosis, localized, primary, knee, right    Gangrene ([facility])    PAD (peripheral artery disease) ([facility])    Benign prostatic hyperplasia with incomplete bladder emptying    Gangrene of foot ([facility])    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) ([facility])    Leukocytosis    Atrial fibrillation with RVR ([facility])    Hypokalemia    Acute kidney injury ([facility])    Hyperglycemia    Community acquired pneumonia of right lung, unspecified part of lung    Acute respiratory failure with hypoxia ([facility])    Hypotension, unspecified hypotension type    Sepsis due to pneumonia ([facility])    Foot ulcer with fat layer exposed, right ([facility])    Syncope, unspecified syncope type    Sepsis, due to unspecified organism, unspecified whether acute organ dysfunction present ([facility])    Shock ([facility])    Acute  hypoxic respiratory failure ([facility])    Pneumonia, bacterial    Hyponatremia    Metabolic alkalosis    Recurrent pneumonia    Sepsis due to undetermined organism ([facility])    Acute on chronic congestive heart failure, unspecified heart failure type ([facility])    Acute hypoxemic respiratory failure ([facility])    ERON (acute kidney injury) ([facility])    Lactic acidosis    Leukocytosis, unspecified type    Palliative care encounter    Counseling regarding advance care planning and goals of care    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) ([facility])         MANAGEMENT    Xray right  foot.   Start epifix skin substitute on right foot surgical wound.   Continue santyl on other 2 wounds.   HF transfer on all wounds.   Counseled leg elevation , low salt diet , offloading.   Return next Wednesday    First application of Epifix to right foot wound (surgical site). Covered with contact layer, hydrofera transfer and kerramax. Continue Santyl and hydrofera transfer to heel and leg wounds. Xray ordered of right leg this visit. Encouraged to offload as much as possible and elevate legs.     PROCEDURES:    Debridement Old surgical Right Foot   Wound 08/14/23 #1- right foot Foot Right     Performed by: Fortino Mo MD  Authorized by: Fortino Mo MD       Consent   Consent obtained? verbal  Consent given by: patient     Debridement Details  Performed by: physician  Debridement type: conservative sharp  Pain control: lidocaine 4%  Pain control administration type: topical     Pre-debridement measurements  Length (cm): 1.5  Width (cm): 6.2  Depth (cm): 0.4  Surface Area (cm^2): 9.3     Post-debridement measurements  Length (cm): 1.5  Width (cm): 6.2  Depth (cm): 0.5  Percent debrided: 100%  Surface Area (cm^2): 9.3  Area Debrided (cm^2): 9.3  Volume (cm^3): 4.65     Devitalized tissue debrided: biofilm and slough  Instrument(s) utilized: curette  Bleeding: small  Hemostasis obtained with: pressure  Procedural pain (0-10):  0  Post-procedural pain: 0   Response to treatment: procedure was tolerated well           Cellular tissue product application Old surgical Right Foot     Date/Time: 5/20/2024 12:04 PM     Performed by: Fortino Mo MD  Authorized by: Fortino Mo MD  Associated wounds:   Wound 08/14/23 #1- right foot Foot Right  Consent:     Consent obtained:  Verbal    Consent given by:  Patient  Anesthesia (see MAR for exact dosages):     Anesthesia method:  Topical application  Procedure details:     Location:  head/hands/feet/digits/genitalia    Product applied:  Epifix    Product lot #:  [id number]    Product expiration:  11/1/2028    Amount used (cm^2):  16    Amount wasted (cm^2):  0    Secured/Fixated: Yes      Secured/Fixated with:  Contact layer, hydrofera transfer, kerramax, kerlix  Post-procedure details:     Patient tolerance of procedure:  Tolerated well, no immediate complications    MEDICAL NECESSITY FOR SKIN SUBS.     Despite optimal treatment: management of co-morbidities DM and OM, edema control, off-loading, nutritional optimization with MVI and supplements, control of infection, and gold standard wound treatment wound continues to show delayed healing not meeting healing goals (10% current wound size per week).   To avoid further hospitalizations, infections, and loss of work days, this application is being performed in anticipation of staged, related and further necessary applications in attempt to achieve rapid wound closure.  Affixed by Non-Adherent, steri-strips and further secured with secondary dressing.  Amount of product wasted: none.  Patient tolerated procedure well without any complications, was instructed to leave non-adherent in place until next appointment and keep it dry.  Follow-up in 7 days.  Patient Instructions     Xray right leg.   Continue  santyl on right leg wound for enzyatic debridement  Continue santyl on heel wound for one more week.   Start epifix on right foot  wound    Wound Cleaning and Dressings:    Wash your hands with soap and water. Always wear gloves while changing dressings. Donot touch wound / mio-wound skin with un-gloved hands. Remove old dressing, discard and place into trash.    DRESSINGS:   Right leg and right heel : santyl / HF transfer - Change dressing daily.  Right foot: EPIFIX SKIN SUBSTITUTE / HF transfer- change dressing QOD  Offload wounded area.     Off-Loading: modified foot wear for maximal offloading.     Miscellaneous Instructions:  Supplement with a daily multivitamin   Low salt diet  Intense blood sugar control - Goal Blood sugar below 180 at all times recommended.  Increase protein intake / consider protein supplements - see below  Elevate extremities at all times when sitting / laying down.    DIETARY MODIFICATIONS TO HELP WITH WOUND HEALING:    Protein: Meats, beans, eggs, milk and yogurt particularly Greek yogurt), tofu, soy nuts, soy protein products    Vitamin C: Citrus fruits and juices, strawberries, tomatoes, tomato juice, peppers, baked potatoes, spinach, broccoli, cauliflower, Todd sprouts, cabbage    Vitamin A: Dark green, leafy vegetables, orange or yellow vegetables, cantaloupe, fortified dairy products, liver, fortified cereals    Zinc: Fortified cereals, red meats, seafood    Consider Kwan by [employer] (These are essential branch chain amino acids that help with tissue building and wound healing) and take 2 packets/day. you can order online at [employer] or [employer]    ADDITIONAL REMINDERS:    The treatment plan has been discussed at length with you and your provider. Follow all instructions carefully, it is very important. If you do not follow all instructions, you are at  risk of your wound not healing, infection, possible loss of limb and even end of life.  Please call the clinic during regular business hours ( 7:30 AM - 5:30 PM) if you notice increased bleeding, redness, warmth, pain or pus like drainage or start running a  fever greater than 100.3.    For after hour emergencies, please call your primary physician or go to the nearest emergency room.    Orders  Orders Placed This Encounter   Procedures    Debridement Old surgical Right Foot    Cellular tissue product application Old surgical Right Foot       Patient/Caregiver Education: There are no barriers to learning. Medical education for above diagnosis given.   Answered all questions.    Outcome: Patient verbalizes understanding. Patient is notified to call with any questions, complications, allergies, or worsening or changing symptoms.  Patient is to call with any side effects or complications as a result of the treatments today.      DOCUMENTATION OF TIME SPENT: Code selection for this visit was based on time spent : 40 min on date of service in preparing to see the patient, obtaining and/or reviewing separately obtained history, performing a medically appropriate examination, counseling and educating the patient/family/caregiver, ordering medications or testing, referring and communicating with other healthcare providers, documenting clinical information in the E HR, independently interpreting results and communicating results to the patient/family/caregiver and care coordination with the patient's other providers.    Followup: Return in about 9 days (around 5/29/2024) for Wound followup.      Note to Patient:  The 21st Century Cures Act makes medical notes like these available to patients in the interest of transparency. However, be advised this is a medical document and is intended as [id number] communication; it is written in medical language and may appear blunt, direct, or contain abbreviations or verbiage that are unfamiliar. Medical documents are intended to carry relevant information, facts as evident, and the clinical opinion of the practitioner.    Also, please note that this report has been produced using speech recognition software and may contain errors related  to that system including, but not limited to, errors in grammar, punctuation, and spelling, as well as words and phrases that possibly may have been recognized inappropriately.  If there are any questions or concerns, contact the dictating provider for clarification.      Fortino Butler MD  5/20/2024  10:37 AM

## 2024-05-20 NOTE — PATIENT INSTRUCTIONS
Xray right leg.   Continue  santyl on right leg wound for enzyatic debridement  Continue santyl on heel wound for one more week.   Start epifix on right foot wound    Wound Cleaning and Dressings:    Wash your hands with soap and water. Always wear gloves while changing dressings. Donot touch wound / mio-wound skin with un-gloved hands. Remove old dressing, discard and place into trash.    DRESSINGS:   Right leg and right heel : santyl / HF transfer - Change dressing daily.  Right foot: EPIFIX SKIN SUBSTITUTE / HF transfer- change dressing QOD  Offload wounded area.     Off-Loading: modified foot wear for maximal offloading.     Miscellaneous Instructions:  Supplement with a daily multivitamin   Low salt diet  Intense blood sugar control - Goal Blood sugar below 180 at all times recommended.  Increase protein intake / consider protein supplements - see below  Elevate extremities at all times when sitting / laying down.    DIETARY MODIFICATIONS TO HELP WITH WOUND HEALING:    Protein: Meats, beans, eggs, milk and yogurt particularly Greek yogurt), tofu, soy nuts, soy protein products    Vitamin C: Citrus fruits and juices, strawberries, tomatoes, tomato juice, peppers, baked potatoes, spinach, broccoli, cauliflower, [place] sprouts, cabbage    Vitamin A: Dark green, leafy vegetables, orange or yellow vegetables, cantaloupe, fortified dairy products, liver, fortified cereals    Zinc: Fortified cereals, red meats, seafood    Consider Kwan by [employer] (These are essential branch chain amino acids that help with tissue building and wound healing) and take 2 packets/day. you can order online at [employer] or [employer]    ADDITIONAL REMINDERS:    The treatment plan has been discussed at length with you and your provider. Follow all instructions carefully, it is very important. If you do not follow all instructions, you are at  risk of your wound not healing, infection, possible loss of limb and even end of life.  Please  call the clinic during regular business hours ( 7:30 AM - 5:30 PM) if you notice increased bleeding, redness, warmth, pain or pus like drainage or start running a fever greater than 100.3.    For after hour emergencies, please call your primary physician or go to the nearest emergency room.

## 2024-05-20 NOTE — PROGRESS NOTES
.Weekly Wound Education Note    Teaching Provided To: Patient;Family  Training Topics: Dressing;Discharge instructions;Cleasing and general instructions;Test/procedures;Off-loading  Training Method: Explain/Verbal;Written  Training Response: Patient responds and understands            First application of Epifix to right foot wound (surgical site).  Covered with contact layer, hydrofera transfer and kerramax.  Continue Santyl and hydrofera transfer to heel and leg wounds.  Xray ordered of right leg this visit.  Encouraged to offload as much as possible and elevate legs.

## 2024-05-20 NOTE — PROGRESS NOTES
Patient ID: Alejandro Guardado is a 89 year old male.    Debridement Old surgical Right Foot   Wound 08/14/23 #1- right foot Foot Right    Performed by: Fortino Mo MD  Authorized by: Fortino Mo MD      Consent   Consent obtained? verbal  Consent given by: patient    Debridement Details  Performed by: physician  Debridement type: conservative sharp  Pain control: lidocaine 4%  Pain control administration type: topical    Pre-debridement measurements  Length (cm): 1.5  Width (cm): 6.2  Depth (cm): 0.4  Surface Area (cm^2): 9.3    Post-debridement measurements  Length (cm): 1.5  Width (cm): 6.2  Depth (cm): 0.5  Percent debrided: 100%  Surface Area (cm^2): 9.3  Area Debrided (cm^2): 9.3  Volume (cm^3): 4.65    Devitalized tissue debrided: biofilm and slough  Instrument(s) utilized: curette  Bleeding: small  Hemostasis obtained with: pressure  Procedural pain (0-10): 0  Post-procedural pain: 0   Response to treatment: procedure was tolerated well

## 2024-05-20 NOTE — PROGRESS NOTES
Patient ID: Alejandro Guardado is a 89 year old male.    Cellular tissue product application Old surgical Right Foot    Date/Time: 5/20/2024 12:04 PM    Performed by: Fortino Mo MD  Authorized by: Fortino Mo MD  Associated wounds:   Wound 08/14/23 #1- right foot Foot Right  Consent:     Consent obtained:  Verbal    Consent given by:  Patient  Anesthesia (see MAR for exact dosages):     Anesthesia method:  Topical application  Procedure details:     Location:  head/hands/feet/digits/genitalia    Product applied:  Epifix    Product lot #:  [id number]    Product expiration:  11/1/2028    Amount used (cm^2):  16    Amount wasted (cm^2):  0    Secured/Fixated: Yes      Secured/Fixated with:  Contact layer, hydrofera transfer, kerramax, kerlix  Post-procedure details:     Patient tolerance of procedure:  Tolerated well, no immediate complications

## 2024-05-22 NOTE — PROGRESS NOTES
Called and spoke with pt's daughter and informed them of xray results. Stated understanding and no questions at this time.

## 2024-05-29 ENCOUNTER — OFFICE VISIT (OUTPATIENT)
Dept: WOUND CARE | Facility: HOSPITAL | Age: 89
End: 2024-05-29
Attending: INTERNAL MEDICINE
Payer: MEDICARE

## 2024-05-29 ENCOUNTER — APPOINTMENT (OUTPATIENT)
Dept: WOUND CARE | Facility: HOSPITAL | Age: 89
End: 2024-05-29
Payer: MEDICARE

## 2024-05-29 VITALS
DIASTOLIC BLOOD PRESSURE: 68 MMHG | HEART RATE: 90 BPM | SYSTOLIC BLOOD PRESSURE: 110 MMHG | TEMPERATURE: 99 F | RESPIRATION RATE: 16 BRPM

## 2024-05-29 DIAGNOSIS — S91.301D NON-HEALING OPEN WOUND OF RIGHT HEEL, SUBSEQUENT ENCOUNTER: ICD-10-CM

## 2024-05-29 DIAGNOSIS — I73.9 PAD (PERIPHERAL ARTERY DISEASE) (HCC): ICD-10-CM

## 2024-05-29 DIAGNOSIS — T81.89XD NONHEALING SURGICAL WOUND, SUBSEQUENT ENCOUNTER: Primary | ICD-10-CM

## 2024-05-29 DIAGNOSIS — L97.512 FOOT ULCER, RIGHT, WITH FAT LAYER EXPOSED (HCC): ICD-10-CM

## 2024-05-29 DIAGNOSIS — R26.9 GAIT DIFFICULTY: ICD-10-CM

## 2024-05-29 DIAGNOSIS — R60.0 EDEMA OF RIGHT LOWER EXTREMITY: ICD-10-CM

## 2024-05-29 DIAGNOSIS — L97.212 NON-PRESSURE CHRONIC ULCER OF RIGHT CALF WITH FAT LAYER EXPOSED (HCC): ICD-10-CM

## 2024-05-29 DIAGNOSIS — Z89.439 HISTORY OF TRANSMETATARSAL AMPUTATION OF FOOT (HCC): ICD-10-CM

## 2024-05-29 PROCEDURE — 15275 SKIN SUB GRAFT FACE/NK/HF/G: CPT | Performed by: INTERNAL MEDICINE

## 2024-05-29 NOTE — PROGRESS NOTES
[facility] PROGRESS NOTE  FARZAD WILLINGHAM MD  5/29/2024    Chief Complaint:   Chief Complaint   Patient presents with    Wound Care     Patient is here for a wound care follow up. He denies any pain or new wound concerns.       HPI:   Subjective   Alejandro Guardado is a 89 year old male coming in for a follow-up visit.    HPI    Wound on right leg and right heel stable   Nonviable tissue right leg wound 100%  Heel with minimal nonviable tissue.   Edema down - trying to elevate lower extremities.     Right foot wound stable and improved.   Covered with large amount of nonviable tissue and skin sub.   However after debridement - good granulation on most of wound base.   However, some bone still exposed    Xray leg - no bone infection - reviewed.     Family accompanying - all questions answered - plan of care reviewed.     Review of Systems  Negative except HPI   Denies chest pain / SOB / palpitations  Denies fever.     Allergies  Allergies   Allergen Reactions    Heparin ANAPHYLAXIS    Hydromorphone HALLUCINATION       Current Meds:  Current Outpatient Medications   Medication Sig Dispense Refill    gentamicin 0.1 % External Cream Apply topically to the ulcer site once daily 30 g 1    furosemide 40 MG Oral Tab Take 2 tablets (80 mg total) by mouth 2 (two) times daily.      lisinopril 2.5 MG Oral Tab Take 1 tablet (2.5 mg total) by mouth daily.      Ascorbic Acid (VITAMIN C) 1000 MG Oral Tab Take 1.5 tablets (1,500 mg total) by mouth daily.      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      metoprolol succinate ER 25 MG Oral Tablet 24 Hr Take 1 tablet (25 mg total) by mouth Daily Beta Blocker. (Patient taking differently: Take 2 tablets (50 mg total) by mouth Daily Beta Blocker.) 90 tablet 1    tamsulosin 0.4 MG Oral Cap Take 1 capsule (0.4 mg  total) by mouth daily.      apixaban 5 MG Oral Tab Take 1 tablet (5 mg total) by mouth 2 (two) times daily. 60 tablet 3    atorvastatin 40 MG Oral Tab Take 1 tablet (40 mg total) by mouth daily. (Patient taking differently: Take 2 tablets (80 mg total) by mouth daily.) 30 tablet 0    finasteride 5 MG Oral Tab Take 1 tablet (5 mg total) by mouth daily. 30 tablet 0    clopidogrel 75 MG Oral Tab Take 1 tablet (75 mg total) by mouth daily. 30 tablet 0    gabapentin 300 MG Oral Cap Take 1 capsule (300 mg total) by mouth 3 (three) times daily. (Patient taking differently: Take 1 capsule (300 mg total) by mouth. Takes 300 mg in am and 600 mg at bedtime) 90 capsule 0    predniSONE 5 MG Oral Tab Take 3 tablets (15 mg total) by mouth daily.      folic acid 1 MG Oral Tab Take 1 tablet (1 mg total) by mouth daily.      acetaminophen 500 MG Oral Tab Take 2 tablets (1,000 mg total) by mouth every 8 (eight) hours. 21 tablet 0    Omeprazole 40 MG Oral Capsule Delayed Release Take 1 capsule (40 mg total) by mouth daily.           EXAM:   Objective   Objective    Physical Exam    Vital Signs  Vitals:    05/29/24 1128   BP: 110/68   Pulse: 90   Resp: 16   Temp: 98.6 °F (37 °C)       Wound Assessment  Wound 08/14/23 #1- right foot Foot Right (Active)   Wound Image   05/29/24 1124   Drainage Amount Small 05/29/24 1124   Drainage Description Serous;Yellow 05/29/24 1124   Treatments Compression 04/15/24 1612   Wound Vac Brand KCI 10/30/23 0907   Wound Length (cm) 1.9 cm 05/29/24 1124   Wound Width (cm) 3.8 cm 05/29/24 1124   Wound Surface Area (cm^2) 7.22 cm^2 05/29/24 1124   Wound Depth (cm) 0.7 cm 05/29/24 1124   Wound Volume (cm^3) 5.054 cm^3 05/29/24 1124   Wound Healing % 79 05/29/24 1124   Margins Well-defined edges;Epibole (Rolled edges) 05/29/24 1124   Non-staged Wound Description Full thickness 05/29/24 1124   Peggy-wound Assessment Edema;Moist;Maceration 05/29/24 1124   Wound Granulation Tissue Firm;Pink 05/13/24 1602   Wound Bed  Granulation (%) 10 % 05/20/24 1003   Wound Bed Epithelium (%) 30 % 05/20/24 1003   Wound Bed Slough (%) 60 % 05/20/24 1003   Wound Bed Eschar (%) 40 % 08/14/23 1315   Wound Odor None 05/20/24 1003   Exposed Structure Bone Necrosis 05/13/24 1602   Shape 100% epifix 05/29/24 1124   Tunneling? No 05/29/24 1124   Undermining? No 05/29/24 1124   Sinus Tracts? No 05/29/24 1124       Wound 04/15/24 #2 Right anterior lower leg Leg Right;Anterior;Lower (Active)   Wound Image   05/29/24 1125   Drainage Amount Small 05/29/24 1125   Drainage Description Serous;Yellow 05/29/24 1125   Wound Length (cm) 2.4 cm 05/29/24 1125   Wound Width (cm) 1.7 cm 05/29/24 1125   Wound Surface Area (cm^2) 4.08 cm^2 05/29/24 1125   Wound Depth (cm) 0.1 cm 05/29/24 1125   Wound Volume (cm^3) 0.408 cm^3 05/29/24 1125   Wound Healing % -249 05/29/24 1125   Margins Well-defined edges 05/29/24 1125   Non-staged Wound Description Full thickness 05/29/24 1125   Peggy-wound Assessment Edema 05/29/24 1125   Wound Bed Slough (%) 100 % 05/29/24 1125   Wound Odor None 05/29/24 1125   Tunneling? No 05/29/24 1125   Undermining? No 05/29/24 1125   Sinus Tracts? No 05/29/24 1125       Wound 05/13/24 #3 Right posterior heel Heel Right;Posterior (Active)   Wound Image   05/29/24 1126   Drainage Amount Small 05/29/24 1126   Drainage Description Yellow;Serous 05/29/24 1126   Wound Length (cm) 1.5 cm 05/29/24 1126   Wound Width (cm) 2.2 cm 05/29/24 1126   Wound Surface Area (cm^2) 3.3 cm^2 05/29/24 1126   Wound Depth (cm) 0.1 cm 05/29/24 1126   Wound Volume (cm^3) 0.33 cm^3 05/29/24 1126   Wound Healing % 39 05/29/24 1126   Margins Well-defined edges 05/29/24 1126   Non-staged Wound Description Full thickness 05/29/24 1126   Peggy-wound Assessment Dry;Edema 05/29/24 1126   Wound Granulation Tissue Firm;[place];Pink 05/29/24 1126   Wound Bed Granulation (%) 100 % 05/29/24 1126   Wound Bed Slough (%) 10 % 05/13/24 1605   Wound Odor Mild 05/29/24 1126   Tunneling? No  05/29/24 1126   Undermining? No 05/29/24 1126   Sinus Tracts? No 05/29/24 1126   Pressure Injury Stage 3 05/13/24 3616           ASSESSMENT AND PLAN:     Assessment   Assessment    Encounter Diagnosis  1. Nonhealing surgical wound, subsequent encounter    2. 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 Non-healing open wound of right heel, subsequent encounter    4. Foot ulcer, right, with fat layer exposed (HCC)    5. PAD (peripheral artery disease) ([facility])    6. Edema of right lower extremity    7. History of transmetatarsal amputation of foot ([facility])    8. Gait difficulty        Problem List  Patient Active Problem List   Diagnosis    Closed minimally displaced zone I fracture of sacrum (HCC)    At risk for falling    CAD (coronary artery disease)    Ischemic cardiomyopathy    Azotemia    Arrhythmia    Esophageal reflux    History of MI (myocardial infarction)    Mixed hyperlipidemia    Primary hypertension    Dizziness    Medication side effect    Closed left hip fracture (HCC)  Global 6/22/2016    Status post hip surgery    Left shoulder distal clavical resection and excision of ganglion cyst of soft tissue mass   Global  09/17/2020    Orthopedic aftercare for healing traumatic hip fracture, left, closed    Closed left hip fracture, with routine healing, subsequent encounter    Osteoarthrosis, localized, primary, knee, left    Osteoarthrosis, localized, primary, knee, right    Gangrene (HCC)    PAD (peripheral artery disease) (HCC)    Benign prostatic hyperplasia with incomplete bladder emptying    Gangrene of foot (HCC)    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) ([facility])    Leukocytosis    Atrial fibrillation with RVR (HCC)    Hypokalemia    Acute kidney injury (HCC)    Hyperglycemia    Community acquired pneumonia of right lung, unspecified part of lung    Acute respiratory failure with hypoxia (HCC)    Hypotension, unspecified hypotension type    Sepsis due to  pneumonia (HCC)    Foot ulcer with fat layer exposed, right ([facility])    Syncope, unspecified syncope type    Sepsis, due to unspecified organism, unspecified whether acute organ dysfunction present ([facility])    Shock (HCC)    Acute hypoxic respiratory failure ([facility])    Pneumonia, bacterial    Hyponatremia    Metabolic alkalosis    Recurrent pneumonia    Sepsis due to undetermined organism ([facility])    Acute on chronic congestive heart failure, unspecified heart failure type (HCC)    Acute hypoxemic respiratory failure (HCC)    ERON (acute kidney injury) ([facility])    Lactic acidosis    Leukocytosis, unspecified type    Palliative care encounter    Counseling regarding advance care planning and goals of care    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) ([facility])         MANAGEMENT    Continue skin sub - Epifix on foot wound.   Continue santyl on other 2 wounds for now.   Low salt diet  Leg elevation  Followup with Dr. Poon on Monday  See me once every 4 weeks to followup on leg wound.     PROCEDURES:    Cellular tissue product application Old surgical Right Foot     Date/Time: 5/29/2024 11:52 AM     Performed by: Fortino Mo MD  Authorized by: Fortino Mo MD  Associated wounds:   Wound 08/14/23 #1- right foot Foot Right  Consent:     Consent obtained:  Verbal    Consent given by:  Patient    Risks discussed:  Bleeding, infection and pain  Procedure details:     Location:  head/hands/feet/digits/genitalia    Product applied:  Epifix    Product lot #:  [id number]    Product expiration:  12/1/2028    Amount used (cm^2):  16    Amount wasted (cm^2):  0    Reason for waste:  Size of wound    Secured/Fixated: Yes      Secured/Fixated with:  Silicone contact layer  Post-procedure details:     Patient tolerance of procedure:  Tolerated well, no immediate complications  Comments:      Dressed with enluxtra and ABD pad.    Patient Instructions     Continue current plan of care.   Continue  santyl on right leg  wound for enzyatic debridement  Continue santyl on heel wound for one more week.   Continue epifix on right foot wound  Leg elevation and low salt diet  See Dr. Poon next Monday.     Wound Cleaning and Dressings:    Wash your hands with soap and water. Always wear gloves while changing dressings. Donot touch wound / mio-wound skin with un-gloved hands. Remove old dressing, discard and place into trash.    DRESSINGS:   Right leg and right heel : santyl / HF transfer - Change dressing daily.  Right foot: EPIFIX SKIN SUBSTITUTE / HF transfer- change dressing QOD  Offload wounded area.     Off-Loading: modified foot wear for maximal offloading.     Miscellaneous Instructions:  Supplement with a daily multivitamin   Low salt diet  Intense blood sugar control - Goal Blood sugar below 180 at all times recommended.  Increase protein intake / consider protein supplements - see below  Elevate extremities at all times when sitting / laying down.    DIETARY MODIFICATIONS TO HELP WITH WOUND HEALING:    Protein: Meats, beans, eggs, milk and yogurt particularly Greek yogurt), tofu, soy nuts, soy protein products    Vitamin C: Citrus fruits and juices, strawberries, tomatoes, tomato juice, peppers, baked potatoes, spinach, broccoli, cauliflower, [place] sprouts, cabbage    Vitamin A: Dark green, leafy vegetables, orange or yellow vegetables, cantaloupe, fortified dairy products, liver, fortified cereals    Zinc: Fortified cereals, red meats, seafood    Consider Kwan by [employer] (These are essential branch chain amino acids that help with tissue building and wound healing) and take 2 packets/day. you can order online at [email or web address] or [email or web address]    ADDITIONAL REMINDERS:    The treatment plan has been discussed at length with you and your provider. Follow all instructions carefully, it is very important. If you do not follow all instructions, you are at  risk of your wound not healing, infection, possible loss of limb and even end of  life.  Please call the clinic during regular business hours ( 7:30 AM - 5:30 PM) if you notice increased bleeding, redness, warmth, pain or pus like drainage or start running a fever greater than 100.3.    For after hour emergencies, please call your primary physician or go to the nearest emergency room.    Orders  Orders Placed This Encounter   Procedures    Cellular tissue product application Old surgical Right Foot       Patient/Caregiver Education: There are no barriers to learning. Medical education for above diagnosis given.   Answered all questions.    Outcome: Patient verbalizes understanding. Patient is notified to call with any questions, complications, allergies, or worsening or changing symptoms.  Patient is to call with any side effects or complications as a result of the treatments today.      DOCUMENTATION OF TIME SPENT: Code selection for this visit was based on time spent : 40 min on date of service in preparing to see the patient, obtaining and/or reviewing separately obtained history, performing a medically appropriate examination, counseling and educating the patient/family/caregiver, ordering medications or testing, referring and communicating with other healthcare providers, documenting clinical information in the E HR, independently interpreting results and communicating results to the patient/family/caregiver and care coordination with the patient's other providers.    Followup: Return in about 5 days (around 6/3/2024) for Wound followup Dr. Poon. .      Note to Patient:  The 21st Century Cures Act makes medical notes like these available to patients in the interest of transparency. However, be advised this is a medical document and is intended as [id number] communication; it is written in medical language and may appear blunt, direct, or contain abbreviations or verbiage that are unfamiliar. Medical documents are intended to carry relevant information, facts as evident, and the clinical  opinion of the practitioner.    Also, please note that this report has been produced using speech recognition software and may contain errors related to that system including, but not limited to, errors in grammar, punctuation, and spelling, as well as words and phrases that possibly may have been recognized inappropriately.  If there are any questions or concerns, contact the dictating provider for clarification.      Fortino Butler MD  5/29/2024  11:48 AM

## 2024-05-29 NOTE — PATIENT INSTRUCTIONS
Continue current plan of care.   Continue  santyl on right leg wound for enzyatic debridement  Continue santyl on heel wound for one more week.   Continue epifix on right foot wound  Leg elevation and low salt diet  See Dr. Poon next Monday.     Wound Cleaning and Dressings:    Wash your hands with soap and water. Always wear gloves while changing dressings. Donot touch wound / mio-wound skin with un-gloved hands. Remove old dressing, discard and place into trash.    DRESSINGS:   Right leg and right heel : santyl / HF transfer - Change dressing daily.  Right foot: EPIFIX SKIN SUBSTITUTE / HF transfer- change dressing QOD  Offload wounded area.     Off-Loading: modified foot wear for maximal offloading.     Miscellaneous Instructions:  Supplement with a daily multivitamin   Low salt diet  Intense blood sugar control - Goal Blood sugar below 180 at all times recommended.  Increase protein intake / consider protein supplements - see below  Elevate extremities at all times when sitting / laying down.    DIETARY MODIFICATIONS TO HELP WITH WOUND HEALING:    Protein: Meats, beans, eggs, milk and yogurt particularly Greek yogurt), tofu, soy nuts, soy protein products    Vitamin C: Citrus fruits and juices, strawberries, tomatoes, tomato juice, peppers, baked potatoes, spinach, broccoli, cauliflower, [place] sprouts, cabbage    Vitamin A: Dark green, leafy vegetables, orange or yellow vegetables, cantaloupe, fortified dairy products, liver, fortified cereals    Zinc: Fortified cereals, red meats, seafood    Consider Kwan by [employer] (These are essential branch chain amino acids that help with tissue building and wound healing) and take 2 packets/day. you can order online at [employer] or [employer]    ADDITIONAL REMINDERS:    The treatment plan has been discussed at length with you and your provider. Follow all instructions carefully, it is very important. If you do not follow all instructions, you are at  risk of your  wound not healing, infection, possible loss of limb and even end of life.  Please call the clinic during regular business hours ( 7:30 AM - 5:30 PM) if you notice increased bleeding, redness, warmth, pain or pus like drainage or start running a fever greater than 100.3.    For after hour emergencies, please call your primary physician or go to the nearest emergency room.

## 2024-05-29 NOTE — PROGRESS NOTES
Patient ID: Alejandro Guardado is a 89 year old male.    Cellular tissue product application Old surgical Right Foot    Date/Time: 5/29/2024 11:52 AM    Performed by: Fortino Mo MD  Authorized by: Fortino Mo MD  Associated wounds:   Wound 08/14/23 #1- right foot Foot Right  Consent:     Consent obtained:  Verbal    Consent given by:  Patient    Risks discussed:  Bleeding, infection and pain  Procedure details:     Location:  head/hands/feet/digits/genitalia    Product applied:  Epifix    Product lot #:  [id number]    Product expiration:  12/1/2028    Amount used (cm^2):  16    Amount wasted (cm^2):  0    Reason for waste:  Size of wound    Secured/Fixated: Yes      Secured/Fixated with:  Silicone contact layer  Post-procedure details:     Patient tolerance of procedure:  Tolerated well, no immediate complications  Comments:      Dressed with enluxtra and ABD pad.

## 2024-05-29 NOTE — PROGRESS NOTES
Weekly Wound Education Note    Teaching Provided To: Patient;Family  Training Topics: Cleasing and general instructions;Discharge instructions;Dressing;Skin substitute  Training Method: Explain/Verbal  Training Response: Patient responds and understands;Reinforcement needed        Notes: Stable. Right foot: Epifix #2 applied, silicone contact layer, enluxtra (remove backing), ABD pad, conforming gauze, tape. Right leg/heel: santyl, moistened gauze, ABD pad, conforming gauze, tape. Supplies ordered from [employer].

## 2024-06-03 ENCOUNTER — OFFICE VISIT (OUTPATIENT)
Dept: WOUND CARE | Facility: HOSPITAL | Age: 89
End: 2024-06-03
Attending: PODIATRIST
Payer: MEDICARE

## 2024-06-03 VITALS
TEMPERATURE: 98 F | SYSTOLIC BLOOD PRESSURE: 116 MMHG | HEART RATE: 92 BPM | RESPIRATION RATE: 16 BRPM | DIASTOLIC BLOOD PRESSURE: 60 MMHG

## 2024-06-03 DIAGNOSIS — S91.301D NON-HEALING OPEN WOUND OF RIGHT HEEL, SUBSEQUENT ENCOUNTER: ICD-10-CM

## 2024-06-03 DIAGNOSIS — L97.212 NON-PRESSURE CHRONIC ULCER OF RIGHT CALF WITH FAT LAYER EXPOSED (HCC): ICD-10-CM

## 2024-06-03 DIAGNOSIS — I73.9 PAD (PERIPHERAL ARTERY DISEASE) (HCC): ICD-10-CM

## 2024-06-03 DIAGNOSIS — T81.89XD NONHEALING SURGICAL WOUND, SUBSEQUENT ENCOUNTER: Primary | ICD-10-CM

## 2024-06-03 DIAGNOSIS — Z89.439 HISTORY OF TRANSMETATARSAL AMPUTATION OF FOOT (HCC): ICD-10-CM

## 2024-06-03 DIAGNOSIS — R60.0 EDEMA OF RIGHT LOWER EXTREMITY: ICD-10-CM

## 2024-06-03 PROCEDURE — 11042 DBRDMT SUBQ TIS 1ST 20SQCM/<: CPT | Performed by: PODIATRIST

## 2024-06-03 NOTE — PROGRESS NOTES
Patient ID: Alejandro Guardado is a 89 year old male.    Debridement Old surgical Right Foot   Wound 08/14/23 #1- right foot Foot Right    Performed by: Quincy Poon DPM  Authorized by: Quincy Poon DPM      Consent   Consent obtained? verbal  Consent given by: patient  Risks discussed? procedural risks discussed  Time out called at 6/3/2024 1:32 PM  Immediately prior to the procedure a time out was called and the performing provider verified the correct patient, procedure, equipment, support staff, and site/side marked as required.    Debridement Details  Performed by: physician  Debridement type: surgical  Level of debridement: subcutaneous tissue  Pain control: none    Pre-debridement measurements  Length (cm): 1.5  Width (cm): 6.1  Depth (cm): 0.4  Surface Area (cm^2): 9.15    Post-debridement measurements  Length (cm): 1.6  Width (cm): 6.2  Depth (cm): 0.4  Percent debrided: 100%  Surface Area (cm^2): 9.92  Area Debrided (cm^2): 9.92  Volume (cm^3): 3.97    Tissue and other material debrided: subcutaneous tissue  Devitalized tissue debrided: biofilm, callus and fibrin  Instrument(s) utilized: curette  Bleeding: small  Hemostasis obtained with: not applicable  Procedural pain (0-10): 2  Post-procedural pain: 0   Response to treatment: procedure was tolerated well

## 2024-06-03 NOTE — PROGRESS NOTES
Patient ID: Alejandro Guardado is a 89 year old male.    Cellular tissue product application Old surgical Right Foot    Date/Time: 6/3/2024 1:44 PM    Performed by: Quincy Poon DPM  Authorized by: Quincy Poon DPM  Associated wounds:   Wound 08/14/23 #1- right foot Foot Right  Consent:     Consent obtained:  Verbal    Consent given by:  Patient    Risks discussed:  Infection    Alternatives discussed:  No treatment  Pre-procedure details:     Preparation: Patient was prepped and draped in usual sterile fashion    Anesthesia (see MAR for exact dosages):     Anesthesia method:  None  Procedure details:     Location:  head/hands/feet/digits/genitalia    Product applied:  Kerecis omega3    Product lot #:  [id number]    Product expiration:  4/10/2026    Amount used (cm^2):  2    Amount wasted (cm^2):  0    Secured/Fixated: Yes      Secured/Fixated with:  Silicone  Comments:      Kerecis micro sample

## 2024-06-04 NOTE — PROGRESS NOTES
Weekly Wound Education Note    Teaching Provided To: Patient;Family  Training Topics: Dressing;Edema control;Cleasing and general instructions;Discharge instructions  Training Method: Demonstration;Explain/Verbal;Written  Training Response: Patient responds and understands        Notes: Kerecis sample applied to right foot wound, fixated with silicone, covered with hydrofera transfer, abd, kerlix do not remove silicone and skin sun, may change hydrofera and gauze as needed, Continue to cleanse right heel and right leg wounds with saline or wound cleanser applym santyl ointment with gauze, Change daily

## 2024-06-09 NOTE — PROGRESS NOTES
Subjective   Alejandro Guardado is a 89 year old male.    Chief Complaint   Patient presents with    Wound Care     Patient here for follow up wound care for wound on left foot, left heel, left anterior leg. Hydrofera on left anterior leg and foot. Santyl applied to left heel and left foot. Contact layer to foot       HPI  Alejandro Guardado is a 88 year old male with PAD who is returning to clinic for recheck of right foot.  Patient is accompanied today by his son and daughter-in-law, who is returning to clinic today for recheck on right foot.  Patient has been seeing Dr. Butler while I have been out of the office.  Patient recently received an angioplasty with stenting to his right lower extremity by Dr. Alston.  Family believes they have noticed improvements in patient's ulceration following recent angioplasty.  Denies noticing any signs of recent infection.  Patient does continue to experience swelling to both of his lower extremities.  His son and daughter-in-law both admit to patient sitting with his legs in a dependent position for little long periods of time.  Patient received an EpiFix application at his last visit by Dr. Butler.  Patient does have an ulceration to his left heel and his lower extremities, which they have been applying Santyl.  Patient arrives in a wheelchair today.    Review of Systems  Denies nausea, vomiting, fever, chills, shortness of breath, chest pain, and calf pain.    Objective    Physical Exam:    Derm:  Wound Assessment  Wound 08/14/23 #1- right foot Foot Right (Active)   Wound Image   06/03/24 1316   Drainage Amount Scant 06/03/24 1315   Drainage Description Serosanguineous 06/03/24 1315   Treatments Skin Substitutes 05/29/24 1124   Wound Vac Brand KCI 10/30/23 0907   Wound Length (cm) 1.6 cm 06/03/24 1316   Wound Width (cm) 6.2 cm 06/03/24 1316   Wound Surface Area (cm^2) 9.92 cm^2 06/03/24 1316   Wound Depth (cm) 0.4 cm 06/03/24 1316   Wound Volume (cm^3) 3.968 cm^3 06/03/24 1316    Wound Healing % 83 06/03/24 1316   Margins Well-defined edges;Epibole (Rolled edges) 06/03/24 1315   Non-staged Wound Description Full thickness 06/03/24 1315   Peggy-wound Assessment Edema;Moist;Maceration 06/03/24 1315   Wound Granulation Tissue Red;Spongy 06/03/24 1315   Wound Bed Granulation (%) 40 % 06/03/24 1315   Wound Bed Epithelium (%) 30 % 06/03/24 1315   Wound Bed Slough (%) 30 % 06/03/24 1315   Wound Bed Eschar (%) 40 % 08/14/23 1315   Wound Odor None 06/03/24 1315   Exposed Structure Bone Necrosis 05/13/24 1602   Shape 100% epifix 05/29/24 1124   Tunneling? No 05/29/24 1124   Undermining? No 05/29/24 1124   Sinus Tracts? No 05/29/24 1124       Wound 04/15/24 #2 Right anterior lower leg Leg Right;Anterior;Lower (Active)   Wound Image   06/03/24 1314   Drainage Amount Small 06/03/24 1314   Drainage Description Serous;Yellow 06/03/24 1314   Wound Length (cm) 2.2 cm 06/03/24 1314   Wound Width (cm) 1.6 cm 06/03/24 1314   Wound Surface Area (cm^2) 3.52 cm^2 06/03/24 1314   Wound Depth (cm) 0.1 cm 06/03/24 1314   Wound Volume (cm^3) 0.352 cm^3 06/03/24 1314   Wound Healing % -201 06/03/24 1314   Margins Well-defined edges 06/03/24 1314   Non-staged Wound Description Full thickness 06/03/24 1314   Peggy-wound Assessment Edema;Moist;Hemosiderin staining 06/03/24 1314   Wound Granulation Tissue Pink;Firm 06/03/24 1314   Wound Bed Granulation (%) 10 % 06/03/24 1314   Wound Bed Slough (%) 90 % 06/03/24 1314   Wound Odor None 06/03/24 1314   Tunneling? No 05/29/24 1125   Undermining? No 05/29/24 1125   Sinus Tracts? No 05/29/24 1125       Wound 05/13/24 #3 Right posterior heel Heel Right;Posterior (Active)   Wound Image   06/03/24 1317   Drainage Amount Scant 06/03/24 1317   Drainage Description Yellow;Serous 06/03/24 1317   Wound Length (cm) 1.5 cm 06/03/24 1317   Wound Width (cm) 1.5 cm 06/03/24 1317   Wound Surface Area (cm^2) 2.25 cm^2 06/03/24 1317   Wound Depth (cm) 0.1 cm 06/03/24 1317   Wound Volume (cm^3)  0.225 cm^3 06/03/24 1317   Wound Healing % 58 06/03/24 1317   Margins Well-defined edges 06/03/24 1317   Non-staged Wound Description Full thickness 06/03/24 1317   Peggy-wound Assessment Dry;Edema;Callous 06/03/24 1317   Wound Granulation Tissue Pink;Spongy 06/03/24 1317   Wound Bed Granulation (%) 100 % 06/03/24 1317   Wound Bed Slough (%) 10 % 05/13/24 1605   Wound Odor None 06/03/24 1317   Tunneling? No 05/29/24 1126   Undermining? No 05/29/24 1126   Sinus Tracts? No 05/29/24 1126   Pressure Injury Stage 3 05/13/24 1605       Vascular: DP and PT pulse not palpable today.  Continued pitting edema noted to right lower extremity.  Improved refill noted to stump site and continue warmth to touch to the entire right foot.    Musculoskeletal: no acute deformities noted.  In a wheelchair today  Neurological: Gross sensation intact via light touch.  Protective sensation diminished via Ipswitch test.    Vital Signs  Vital Signs    06/03/24 1310   BP: 116/60   Pulse: 92   Resp: 16   Temp: 98.3 °F (36.8 °C)   PainSc: 0 - (None)         Allergies  Allergies   Allergen Reactions    Heparin ANAPHYLAXIS    Hydromorphone HALLUCINATION       Assessment    Encounter Diagnosis  1. Nonhealing surgical wound, subsequent encounter    2. Non-healing open wound of right heel, subsequent encounter    3. Non-pressure chronic ulcer of right calf with fat layer exposed ([facility])    4. PAD (peripheral artery disease) ([facility])    5. Edema of right lower extremity    6. History of transmetatarsal amputation of foot ([facility])        Problem List  Patient Active Problem List   Diagnosis    Closed minimally displaced zone I fracture of sacrum ([facility])    At risk for falling    CAD (coronary artery disease)    Ischemic cardiomyopathy    Azotemia    Arrhythmia    Esophageal reflux    History of MI (myocardial infarction)    Mixed hyperlipidemia    Primary hypertension    Dizziness    Medication side effect    Closed left hip fracture ([facility])  Global 6/22/2016     Status post hip surgery    Left shoulder distal clavical resection and excision of ganglion cyst of soft tissue mass   Global  09/17/2020    Orthopedic aftercare for healing traumatic hip fracture, left, closed    Closed left hip fracture, with routine healing, subsequent encounter    Osteoarthrosis, localized, primary, knee, left    Osteoarthrosis, localized, primary, knee, right    Gangrene ([facility])    PAD (peripheral artery disease) ([facility])    Benign prostatic hyperplasia with incomplete bladder emptying    Gangrene of foot ([facility])    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) ([facility])    Leukocytosis    Atrial fibrillation with RVR ([facility])    Hypokalemia    Acute kidney injury ([facility])    Hyperglycemia    Community acquired pneumonia of right lung, unspecified part of lung    Acute respiratory failure with hypoxia ([facility])    Hypotension, unspecified hypotension type    Sepsis due to pneumonia ([facility])    Foot ulcer with fat layer exposed, right ([facility])    Syncope, unspecified syncope type    Sepsis, due to unspecified organism, unspecified whether acute organ dysfunction present ([facility])    Shock ([facility])    Acute hypoxic respiratory failure ([facility])    Pneumonia, bacterial    Hyponatremia    Metabolic alkalosis    Recurrent pneumonia    Sepsis due to undetermined organism ([facility])    Acute on chronic congestive heart failure, unspecified heart failure type ([facility])    Acute hypoxemic respiratory failure ([facility])    ERON (acute kidney injury) ([facility])    Lactic acidosis    Leukocytosis, unspecified type    Palliative care encounter    Counseling regarding advance care planning and goals of care    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) ([facility])       Plan  Orders:  Orders Placed This Encounter   Procedures    Debridement Old surgical Right Foot    Cellular tissue product application Old surgical Right Foot         -Patient examined, chart history reviewed.    -Patient examined, chart history reviewed.  Patient did have an  angioplasty with stenting to his right lower extremity by Dr. Alston recently.  Patient had a recent follow-up and Doppler revealed continued adequate blood flow.  Patient will be following up in 6 weeks.    -Wound inspected today--overall stable wound today.  Dimensions improved overall.  Medial foot wound improved.  There is no deep tunneling.  There is fibrogranular wound bed.  No palpable bone today.  Continue serous drainage with ongoing right lower extremity pitting edema.  No current purulence, surrounding erythema, or other signs of infection.  Foot is overall warm to touch today.  Posterior heel does have an ulceration noted.  There is no current signs of infection.     -Excisional debridement performed to the left foot ulcerations utilizing tissue nipper and curette.  A bleeding, granular wound bed was present upon debridement.  No palpable bone today.  Overall improvements noted.  We will continue with outpatient debridements.    -Discussed treatment options.  We do have samples of Kerecis micro.  Recommended utilizing this today.  This was applied to patient's stump site and medial wound today and covered with silicone layer and a dry dressing.  Encouraged patient's family to change dressings down to the level of the silicone layer daily.       -Patient is seeing Dr. Butler for right shin ulceration.  Patient's family will assist him in continuing with Santyl daily per Dr. Butler's orders.     -Patient's family did not bring Santyl today.  Will have them apply Santyl to heel wound.  Will have patient change foot dressings daily.       -Encourage family to change dressings 1-2 times daily due to excessive serous drainage.  Patient should elevate right lower extremity as frequently as possible as we cannot compress his right lower extremity due to recent stenting.  Reiterated importance of elevation to patient today     -Patient should remain nonweightbearing to his right foot in his soft slipper.   Okay to stand for transfers.    -Educated on signs of infection and encouraged patient to seek immediate medical attention if noticing any of these signs.    Follow-Up  1 week    Pavan Poon DPM    6/8/2024    Dragon speech recognition software was used to prepare this note.  Errors in word recognition may occur.  Please contact me with any questions/concerns with this note.

## 2024-06-10 ENCOUNTER — APPOINTMENT (OUTPATIENT)
Dept: WOUND CARE | Facility: HOSPITAL | Age: 89
End: 2024-06-10
Attending: PODIATRIST
Payer: MEDICARE

## 2024-06-10 VITALS
SYSTOLIC BLOOD PRESSURE: 101 MMHG | RESPIRATION RATE: 16 BRPM | HEART RATE: 93 BPM | TEMPERATURE: 99 F | DIASTOLIC BLOOD PRESSURE: 62 MMHG

## 2024-06-10 DIAGNOSIS — I73.9 PAD (PERIPHERAL ARTERY DISEASE) (HCC): ICD-10-CM

## 2024-06-10 DIAGNOSIS — R60.0 EDEMA OF RIGHT LOWER EXTREMITY: ICD-10-CM

## 2024-06-10 DIAGNOSIS — T81.89XD NONHEALING SURGICAL WOUND, SUBSEQUENT ENCOUNTER: Primary | ICD-10-CM

## 2024-06-10 DIAGNOSIS — Z89.439 HISTORY OF TRANSMETATARSAL AMPUTATION OF FOOT (HCC): ICD-10-CM

## 2024-06-10 DIAGNOSIS — S91.301D NON-HEALING OPEN WOUND OF RIGHT HEEL, SUBSEQUENT ENCOUNTER: ICD-10-CM

## 2024-06-10 PROCEDURE — 11042 DBRDMT SUBQ TIS 1ST 20SQCM/<: CPT | Performed by: PODIATRIST

## 2024-06-10 NOTE — PROGRESS NOTES
Patient ID: Alejandro Guardado is a 89 year old male.          Debridement Old surgical Right Foot   Wound 08/14/23 #1- right foot Foot Right    Performed by: Quincy Poon DPM  Authorized by: Quincy Poon DPM      Consent   Consent obtained? verbal  Consent given by: patient  Risks discussed? procedural risks discussed  Time out called at 6/10/2024 2:18 PM  Immediately prior to the procedure a time out was called and the performing provider verified the correct patient, procedure, equipment, support staff, and site/side marked as required.    Debridement Details  Performed by: physician  Debridement type: surgical  Level of debridement: subcutaneous tissue  Pain control: none    Pre-debridement measurements  Length (cm): 1.6  Width (cm): 6.5  Depth (cm): 0.2  Surface Area (cm^2): 10.4    Post-debridement measurements  Length (cm): 1.7  Width (cm): 6.6  Depth (cm): 0.2  Percent debrided: 100%  Surface Area (cm^2): 11.22  Area Debrided (cm^2): 11.22  Volume (cm^3): 2.24    Tissue and other material debrided: subcutaneous tissue  Devitalized tissue debrided: biofilm, callus, fibrin and necrotic debris  Instrument(s) utilized: curette  Bleeding: small  Hemostasis obtained with: not applicable  Procedural pain (0-10): 0  Post-procedural pain: 0   Response to treatment: procedure was tolerated well

## 2024-06-10 NOTE — PROGRESS NOTES
Patient ID: Alejandro Guardado is a 89 year old male.    Cellular tissue product application Old surgical Right Foot    Date/Time: 6/10/2024 2:34 PM    Performed by: Quincy Poon DPM  Authorized by: Quincy Poon DPM  Associated wounds:   Wound 08/14/23 #1- right foot Foot Right  Consent:     Consent obtained:  Verbal    Consent given by:  Patient    Risks discussed:  Infection    Alternatives discussed:  No treatment  Pre-procedure details:     Preparation: Patient was prepped and draped in usual sterile fashion    Anesthesia (see MAR for exact dosages):     Anesthesia method:  None  Procedure details:     Location:  head/hands/feet/digits/genitalia    Product applied:  Kerecis omega3    Product lot #:  [id number]    Product expiration:  4/10/2026    Amount used (cm^2):  10    Secured/Fixated: Yes      Secured/Fixated with:  Silicone layer  Dressing:     Dressing applied:  Kerlix  Comments:      sample

## 2024-06-10 NOTE — PROGRESS NOTES
Weekly Wound Education Note    Teaching Provided To: Patient;Family  Training Topics: Dressing;Off-loading;Discharge instructions;Cleasing and general instructions  Training Method: Demonstration;Explain/Verbal;Written  Training Response: Patient responds and understands        Notes: Kerecis sample applied to right foot, covered with silicone layer, hydrofera transfer, abd, kerlix. Do not remove silicone or skin sub. may change outer dressing of hydrofera, abd and kerlix if needed for moisture control.  Right heel and right leg wounds cleanse with saline or wound cleanser, apply santyl ointment and hydrofera transfer with gauze and tape change daily.

## 2024-06-11 NOTE — PROGRESS NOTES
Subjective   Alejandro Guardado is a 89 year old male.    Chief Complaint   Patient presents with    Wound Care     Patients is here for a follow up. Patients denies any issue        HPI  Alejandro Guardado is a 88 year old male with PAD who is returning to clinic for recheck of right foot.  Patient is accompanied today by his son and daughter-in-law, who is returning to clinic today for recheck on right foot.  Patient recently received an angioplasty with stenting to his right lower extremity by Dr. Alston, in which we have seen improvements of his wound since.  Patient also continues to experience bilateral lower extremity edema, which CHF is playing a role in.  We used a sample of Kerecis micro on the wound last visit.  Patient's family has been assisting with outer dressing changes.  They have also been utilizing Santyl to patient's shin wound, as well as right heel wound.  Patient continues to minimize his weightbearing.  Patient and family deny recent signs of infection and no other concerns today.        Review of Systems  Denies nausea, vomiting, fever, chills, shortness of breath, chest pain, and calf pain.    Objective    Physical Exam:    Derm:  Wound Assessment  Wound 08/14/23 #1- right foot Foot Right (Active)   Wound Image    06/10/24 1350   Drainage Amount Small 06/10/24 1349   Drainage Description Serosanguineous 06/10/24 1349   Treatments Skin Substitutes 05/29/24 1124   Wound Vac Brand KCI 10/30/23 0907   Wound Length (cm) 1.7 cm 06/10/24 1350   Wound Width (cm) 6.6 cm 06/10/24 1350   Wound Surface Area (cm^2) 11.22 cm^2 06/10/24 1350   Wound Depth (cm) 0.2 cm 06/10/24 1350   Wound Volume (cm^3) 2.244 cm^3 06/10/24 1350   Wound Healing % 91 06/10/24 1350   Margins Well-defined edges;Epibole (Rolled edges) 06/10/24 1349   Non-staged Wound Description Full thickness 06/10/24 1349   Peggy-wound Assessment Edema;Dry 06/10/24 1349   Wound Granulation Tissue Red;Spongy 06/03/24 1315   Wound Bed Granulation (%)  40 % 06/03/24 1315   Wound Bed Epithelium (%) 40 % 06/10/24 1349   Wound Bed Slough (%) 30 % 06/03/24 1315   Wound Bed Eschar (%) 40 % 08/14/23 1315   Wound Odor None 06/10/24 1349   Exposed Structure Bone Necrosis 05/13/24 1602   Shape 60% epifix 06/10/24 1349   Tunneling? No 05/29/24 1124   Undermining? No 05/29/24 1124   Sinus Tracts? No 05/29/24 1124       Wound 04/15/24 #2 Right anterior lower leg Leg Right;Anterior;Lower (Active)   Wound Image   06/10/24 1359   Drainage Amount Small 06/10/24 1359   Drainage Description Serous;Yellow 06/10/24 1359   Wound Length (cm) 2.2 cm 06/10/24 1359   Wound Width (cm) 1.6 cm 06/10/24 1359   Wound Surface Area (cm^2) 3.52 cm^2 06/10/24 1359   Wound Depth (cm) 0.1 cm 06/10/24 1359   Wound Volume (cm^3) 0.352 cm^3 06/10/24 1359   Wound Healing % -201 06/10/24 1359   Margins Well-defined edges 06/10/24 1359   Non-staged Wound Description Full thickness 06/10/24 1359   Peggy-wound Assessment Edema;Hemosiderin staining;Moist 06/10/24 1359   Wound Granulation Tissue Firm;Pink 06/10/24 1359   Wound Bed Granulation (%) 10 % 06/10/24 1359   Wound Bed Slough (%) 90 % 06/10/24 1359   Wound Odor None 06/10/24 1359   Tunneling? No 05/29/24 1125   Undermining? No 05/29/24 1125   Sinus Tracts? No 05/29/24 1125       Wound 05/13/24 #3 Right posterior heel Heel Right;Posterior (Active)   Wound Image   06/10/24 1353   Drainage Amount Scant 06/10/24 1353   Drainage Description Serosanguineous 06/10/24 1353   Wound Length (cm) 1 cm 06/10/24 1353   Wound Width (cm) 1.5 cm 06/10/24 1353   Wound Surface Area (cm^2) 1.5 cm^2 06/10/24 1353   Wound Depth (cm) 0.1 cm 06/10/24 1353   Wound Volume (cm^3) 0.15 cm^3 06/10/24 1353   Wound Healing % 72 06/10/24 [facility]   Margins Well-defined edges 06/10/24 [facility]   Non-staged Wound Description Full thickness 06/10/24 1353   Peggy-wound Assessment Dry;Edema 06/10/24 1353   Wound Granulation Tissue Firm;Pink 06/10/24 1353   Wound Bed Granulation (%) 100 % 06/10/24  1353   Wound Bed Slough (%) 10 % 05/13/24 1605   Wound Odor None 06/10/24 1353   Tunneling? No 05/29/24 1126   Undermining? No 05/29/24 1126   Sinus Tracts? No 05/29/24 1126   Pressure Injury Stage 3 06/10/24 1353       Vascular: DP and PT pulse not palpable today.  Continued pitting edema noted to right lower extremity.  Improved refill noted to stump site and continue warmth to touch to the entire right foot.  Right foot is slightly cooler than left foot.  Musculoskeletal: no acute deformities noted.  In a wheelchair today  Neurological: Gross sensation intact via light touch.  Protective sensation diminished via Ipswitch test.    Vital Signs  Vital Signs    06/10/24 1343   BP: 101/62   Pulse: 93   Resp: 16   Temp: 98.6 °F (37 °C)   PainSc: 0 - (None)         Allergies  Allergies   Allergen Reactions    Heparin ANAPHYLAXIS    Hydromorphone HALLUCINATION       Assessment    Encounter Diagnosis  1. Nonhealing surgical wound, subsequent encounter    2. Non-healing open wound of right heel, subsequent encounter    3. PAD (peripheral artery disease) ([facility])    4. Edema of right lower extremity    5. History of transmetatarsal amputation of foot ([facility])        Problem List  Patient Active Problem List   Diagnosis    Closed minimally displaced zone I fracture of sacrum (HCC)    At risk for falling    CAD (coronary artery disease)    Ischemic cardiomyopathy    Azotemia    Arrhythmia    Esophageal reflux    History of MI (myocardial infarction)    Mixed hyperlipidemia    Primary hypertension    Dizziness    Medication side effect    Closed left hip fracture (HCC)  Global 6/22/2016    Status post hip surgery    Left shoulder distal clavical resection and excision of ganglion cyst of soft tissue mass   Global  09/17/2020    Orthopedic aftercare for healing traumatic hip fracture, left, closed    Closed left hip fracture, with routine healing, subsequent encounter    Osteoarthrosis, localized, primary, knee, left     Osteoarthrosis, localized, primary, knee, right    Gangrene ([facility])    PAD (peripheral artery disease) ([facility])    Benign prostatic hyperplasia with incomplete bladder emptying    Gangrene of foot ([facility])    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) ([facility])    Leukocytosis    Atrial fibrillation with RVR ([facility])    Hypokalemia    Acute kidney injury ([facility])    Hyperglycemia    Community acquired pneumonia of right lung, unspecified part of lung    Acute respiratory failure with hypoxia ([facility])    Hypotension, unspecified hypotension type    Sepsis due to pneumonia ([facility])    Foot ulcer with fat layer exposed, right ([facility])    Syncope, unspecified syncope type    Sepsis, due to unspecified organism, unspecified whether acute organ dysfunction present ([facility])    Shock ([facility])    Acute hypoxic respiratory failure ([facility])    Pneumonia, bacterial    Hyponatremia    Metabolic alkalosis    Recurrent pneumonia    Sepsis due to undetermined organism ([facility])    Acute on chronic congestive heart failure, unspecified heart failure type ([facility])    Acute hypoxemic respiratory failure ([facility])    ERON (acute kidney injury) ([facility])    Lactic acidosis    Leukocytosis, unspecified type    Palliative care encounter    Counseling regarding advance care planning and goals of care    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) ([facility])       Plan  Orders:  Orders Placed This Encounter   Procedures    Debridement Old surgical Right Foot    Cellular tissue product application Old surgical Right Foot         -Patient examined, chart history reviewed.    -Patient did have an angioplasty with stenting to his right lower extremity by Dr. Alston recently.  Patient had a recent follow-up and Doppler revealed continued adequate blood flow.  Patient will be following up in about 4 weeks.     -Wound inspected today--overall stable wound today.  Dimensions improved overall.  Medial foot wound improved.  There is no deep tunneling.  There is fibrogranular  wound bed.  No palpable bone today.  Continue serous drainage with ongoing right lower extremity pitting edema.  No current purulence, surrounding erythema, or other signs of infection.  Right foot remains fairly warm.  Posterior heel does have a full-thickness ulceration with no current signs of infection.     -Excisional debridement performed to the left foot ulcerations utilizing tissue nipper and curette, and a moistened gauze for the left heel ulcer.  A bleeding, granular wound bed was present upon debridement.  No palpable bone today.  Overall improvements noted.  We will continue with outpatient debridements.     -Discussed treatment options.  We do have samples of Kerecis micro.  Recommended utilizing this today we have seen improvements with use of this over the past week.  A sample of Kerecis micro was applied to patient's stump site and medial wound today and covered with silicone layer and a dry dressing.  Santyl applied to right heel and right shin ulcerations.  Encouraged patient's family to change dressings down to the level of the silicone layer daily.       -Patient is seeing Dr. Butler for right shin ulceration.  Patient's family will assist him in continuing with Santyl daily per Dr. Butler's orders.     -Patient's family did not bring Santyl today.  Will have them apply Santyl to heel wound.  Will have patient change foot dressings daily.       -Encourage family to change dressings 1-2 times daily due to excessive serous drainage.  Patient should elevate right lower extremity as frequently as possible as we cannot compress his right lower extremity due to recent stenting and chronic CHF.  Reiterated importance of elevation to patient today     -Patient should remain nonweightbearing to his right foot in his soft slipper.  Okay to stand for transfers.    -Patient unfortunately seems frustrated with how slow the process has been going.  I talked to patient's family about a balance between  treating his ulcerations and overall mental health.  I do agree that patient should be able to do activities that he would like to do, but I would just avoid excessive ambulation or excessive amounts of time on his feet.    -Educated on signs of infection and encouraged patient to seek immediate medical attention if noticing any of these signs.    Follow-Up  1 week    Pavan Poon DPM    6/11/2024    Dragon speech recognition software was used to prepare this note.  Errors in word recognition may occur.  Please contact me with any questions/concerns with this note.

## 2024-06-17 ENCOUNTER — APPOINTMENT (OUTPATIENT)
Dept: WOUND CARE | Facility: HOSPITAL | Age: 89
End: 2024-06-17
Payer: MEDICARE

## 2024-06-17 ENCOUNTER — OFFICE VISIT (OUTPATIENT)
Dept: WOUND CARE | Facility: HOSPITAL | Age: 89
End: 2024-06-17
Attending: PODIATRIST
Payer: MEDICARE

## 2024-06-17 VITALS
DIASTOLIC BLOOD PRESSURE: 66 MMHG | SYSTOLIC BLOOD PRESSURE: 103 MMHG | RESPIRATION RATE: 16 BRPM | HEART RATE: 93 BPM | TEMPERATURE: 98 F

## 2024-06-17 DIAGNOSIS — R60.0 EDEMA OF RIGHT LOWER EXTREMITY: ICD-10-CM

## 2024-06-17 DIAGNOSIS — Z89.439 HISTORY OF TRANSMETATARSAL AMPUTATION OF FOOT (HCC): ICD-10-CM

## 2024-06-17 DIAGNOSIS — S91.301D NON-HEALING OPEN WOUND OF RIGHT HEEL, SUBSEQUENT ENCOUNTER: ICD-10-CM

## 2024-06-17 DIAGNOSIS — I73.9 PAD (PERIPHERAL ARTERY DISEASE) (HCC): ICD-10-CM

## 2024-06-17 DIAGNOSIS — T81.89XD NONHEALING SURGICAL WOUND, SUBSEQUENT ENCOUNTER: Primary | ICD-10-CM

## 2024-06-17 PROCEDURE — 11042 DBRDMT SUBQ TIS 1ST 20SQCM/<: CPT | Performed by: PODIATRIST

## 2024-06-17 NOTE — PROGRESS NOTES
Patient ID: Alejandro Guardado is a 89 year old male.          Debridement Old surgical Right Foot   Wound 08/14/23 #1- Foot Right    Performed by: Quincy Poon DPM  Authorized by: Quincy Poon DPM      Consent   Consent obtained? verbal  Consent given by: patient  Risks discussed? procedural risks discussed  Time out called at 6/17/2024 2:01 PM  Immediately prior to the procedure a time out was called and the performing provider verified the correct patient, procedure, equipment, support staff, and site/side marked as required.    Debridement Details  Performed by: physician  Debridement type: surgical  Level of debridement: subcutaneous tissue  Pain control: none    Pre-debridement measurements  Length (cm): 1.2  Width (cm): 5.6  Depth (cm): 0.3  Surface Area (cm^2): 6.72    Post-debridement measurements  Length (cm): 1.3  Width (cm): 5.7  Depth (cm): 0.3  Percent debrided: 100%  Surface Area (cm^2): 7.41  Area Debrided (cm^2): 7.41  Volume (cm^3): 2.22    Tissue and other material debrided: subcutaneous tissue  Devitalized tissue debrided: biofilm, fibrin, necrotic debris and slough  Instrument(s) utilized: curette  Bleeding: small  Hemostasis obtained with: pressure  Procedural pain (0-10): 0  Post-procedural pain: 0   Response to treatment: procedure was tolerated well    Debridement Pressure Injury Posterior;Right Heel   Wound 05/13/24 #3 Heel Posterior;Right    Performed by: Quincy Poon DPM  Authorized by: Quincy Poon DPM      Consent   Consent obtained? verbal  Consent given by: patient  Risks discussed? procedural risks discussed  Time out called at 6/17/2024 2:05 PM  Immediately prior to the procedure a time out was called and the performing provider verified the correct patient, procedure, equipment, support staff, and site/side marked as required.    Debridement Details  Performed by: physician  Debridement type: surgical  Level of debridement: subcutaneous tissue  Pain control:  none    Pre-debridement measurements  Length (cm): 0.5  Width (cm): 0.9  Depth (cm): 0.1  Surface Area (cm^2): 0.45    Post-debridement measurements  Length (cm): 0.6  Width (cm): 1  Depth (cm): 0.1  Percent debrided: 100%  Surface Area (cm^2): 0.6  Area Debrided (cm^2): 0.6  Volume (cm^3): 0.06    Tissue and other material debrided: subcutaneous tissue  Devitalized tissue debrided: biofilm, fibrin, necrotic debris and slough  Instrument(s) utilized: curette  Bleeding: small  Hemostasis obtained with: pressure  Procedural pain (0-10): 0  Post-procedural pain: 0   Response to treatment: procedure was tolerated well

## 2024-06-17 NOTE — PROGRESS NOTES
Weekly Wound Education Note    Teaching Provided To: Patient;Family  Training Topics: Dressing;Edema control;Off-loading;Discharge instructions;Cleasing and general instructions  Training Method: Demonstration;Explain/Verbal;Written  Training Response: Patient responds and understands        Notes: Right foot wound and posterior right heel wound show improvement per measurements: Cleanse with saline or wound cleanser, apply Endoform collagen slightly moistened with saline, cover with hydrofera transfer, abd, kerlix. change every 2-3 days.  Right leg wound stable, Cleanse with saline or wound cleanser and gauze, apply marifer thick santyl ointment,  hydrofera transfer, and abd pad secured with kerlix. Change daily. Encouraged to elevate leg several times daily for at least 30 minutes each.

## 2024-06-17 NOTE — PROGRESS NOTES
Subjective   Alejandro Guardado is a 89 year old male.    Chief Complaint   Patient presents with    Wound Care     Patient is here for a wound care follow up. He denies any pain or new wound concerns.       HPI  Alejandro Guardado is a 88 year old male with PAD who is returning to clinic for recheck of right foot.  Patient is accompanied today by his son and daughter-in-law, who is returning to clinic today for recheck on right foot.  Patient recently received an angioplasty with stenting to his right lower extremity by Dr. Alston, in which we have seen improvements of his wound since.  Patient had received a sample of Kerecis micro on his wound at his last visit.  Patient's family has been continuing to assist with changing her dressings, as well as utilizing Santyl to patient's shin wound per Dr. Butler's instructions.  Denying any recent signs of infection.  No other concerns at this time.    Review of Systems  Denies nausea, vomiting, fever, chills, shortness of breath, chest pain, and calf pain.    Objective    Physical Exam:    Derm:  Wound Assessment  Wound 08/14/23 #1- Foot Right (Active)   Wound Image   06/17/24 1358   Drainage Amount Small 06/17/24 1342   Drainage Description Serous;Yellow 06/17/24 1342   Treatments Skin Substitutes 05/29/24 1124   Wound Vac Brand KCI 10/30/23 0907   Wound Length (cm) 1.3 cm 06/17/24 1343   Wound Width (cm) 5.7 cm 06/17/24 1343   Wound Surface Area (cm^2) 7.41 cm^2 06/17/24 1343   Wound Depth (cm) 0.3 cm 06/17/24 1343   Wound Volume (cm^3) 2.223 cm^3 06/17/24 1343   Wound Healing % 91 06/17/24 1343   Margins Well-defined edges;Epibole (Rolled edges) 06/17/24 1342   Non-staged Wound Description Full thickness 06/17/24 1342   Peggy-wound Assessment Edema;Dry 06/17/24 1342   Wound Granulation Tissue Firm;Pink 06/17/24 1342   Wound Bed Granulation (%) 20 % 06/17/24 1342   Wound Bed Epithelium (%) 40 % 06/17/24 1342   Wound Bed Slough (%) 40 % 06/17/24 1342   Wound Bed Eschar (%)  40 % 08/14/23 1315   Wound Odor None 06/17/24 1342   Exposed Structure Bone Necrosis 05/13/24 1602   Shape 60% epifix 06/10/24 1349   Tunneling? No 06/17/24 1342   Undermining? No 06/17/24 1342   Sinus Tracts? No 06/17/24 1342       Wound 04/15/24 #2 Leg Right;Anterior (Active)   Wound Image   06/17/24 1344   Drainage Amount Small 06/17/24 1344   Drainage Description Serous;Yellow 06/17/24 1344   Wound Length (cm) 2.2 cm 06/17/24 1344   Wound Width (cm) 1.6 cm 06/17/24 1344   Wound Surface Area (cm^2) 3.52 cm^2 06/17/24 1344   Wound Depth (cm) 0.1 cm 06/17/24 1344   Wound Volume (cm^3) 0.352 cm^3 06/17/24 1344   Wound Healing % -201 06/17/24 1344   Margins Well-defined edges 06/17/24 1344   Non-staged Wound Description Full thickness 06/17/24 1344   Peggy-wound Assessment Edema;Hemosiderin staining;Moist 06/17/24 1344   Wound Granulation Tissue Firm;Pink 06/10/24 1359   Wound Bed Granulation (%) 10 % 06/10/24 1359   Wound Bed Slough (%) 100 % 06/17/24 1344   Wound Odor None 06/17/24 1344   Tunneling? No 06/17/24 1344   Undermining? No 06/17/24 1344   Sinus Tracts? No 06/17/24 1344       Wound 05/13/24 #3 Heel Posterior;Right (Active)   Wound Image   06/17/24 1404   Drainage Amount Scant 06/17/24 1345   Drainage Description Serous;Yellow 06/17/24 1345   Wound Length (cm) 0.6 cm 06/17/24 1346   Wound Width (cm) 1 cm 06/17/24 1346   Wound Surface Area (cm^2) 0.6 cm^2 06/17/24 1346   Wound Depth (cm) 0.1 cm 06/17/24 1346   Wound Volume (cm^3) 0.06 cm^3 06/17/24 1346   Wound Healing % 89 06/17/24 1346   Margins Well-defined edges 06/17/24 1345   Non-staged Wound Description Full thickness 06/17/24 1345   Peggy-wound Assessment Edema;Callous 06/17/24 1345   Wound Granulation Tissue Firm;Pink 06/17/24 1345   Wound Bed Granulation (%) 100 % 06/17/24 1345   Wound Bed Slough (%) 10 % 05/13/24 1605   Wound Odor None 06/17/24 1345   Tunneling? No 06/17/24 1345   Undermining? No 06/17/24 1345   Sinus Tracts? No 06/17/24 1345    Pressure Injury Stage 3 06/17/24 1345       Vascular: DP and PT pulse not palpable today.  Continued pitting edema noted to right lower extremity.  Improved refill noted to stump site and continue warmth to touch to the entire right foot.  Right foot is slightly cooler than left foot, but remains warm.  Musculoskeletal: no acute deformities noted.  In a wheelchair today  Neurological: Gross sensation intact via light touch.  Protective sensation diminished via Ipswitch test.    Vital Signs  Vital Signs    06/17/24 1341   BP: 103/66   Pulse: 93   Resp: 16   Temp: 97.6 °F (36.4 °C)   PainSc: 0 - (None)         Allergies  Allergies   Allergen Reactions    Heparin ANAPHYLAXIS    Hydromorphone HALLUCINATION       Assessment    Encounter Diagnosis  1. Nonhealing surgical wound, subsequent encounter    2. Non-healing open wound of right heel, subsequent encounter    3. PAD (peripheral artery disease) ([facility])    4. Edema of right lower extremity    5. History of transmetatarsal amputation of foot ([facility])        Problem List  Patient Active Problem List   Diagnosis    Closed minimally displaced zone I fracture of sacrum ([facility])    At risk for falling    CAD (coronary artery disease)    Ischemic cardiomyopathy    Azotemia    Arrhythmia    Esophageal reflux    History of MI (myocardial infarction)    Mixed hyperlipidemia    Primary hypertension    Dizziness    Medication side effect    Closed left hip fracture ([facility])  Global 6/22/2016    Status post hip surgery    Left shoulder distal clavical resection and excision of ganglion cyst of soft tissue mass   Global  09/17/2020    Orthopedic aftercare for healing traumatic hip fracture, left, closed    Closed left hip fracture, with routine healing, subsequent encounter    Osteoarthrosis, localized, primary, knee, left    Osteoarthrosis, localized, primary, knee, right    Gangrene ([facility])    PAD (peripheral artery disease) ([facility])    Benign prostatic hyperplasia with incomplete bladder  emptying    Gangrene of foot ([facility])    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) ([facility])    Leukocytosis    Atrial fibrillation with RVR ([facility])    Hypokalemia    Acute kidney injury ([facility])    Hyperglycemia    Community acquired pneumonia of right lung, unspecified part of lung    Acute respiratory failure with hypoxia ([facility])    Hypotension, unspecified hypotension type    Sepsis due to pneumonia ([facility])    Foot ulcer with fat layer exposed, right ([facility])    Syncope, unspecified syncope type    Sepsis, due to unspecified organism, unspecified whether acute organ dysfunction present ([facility])    Shock ([facility])    Acute hypoxic respiratory failure ([facility])    Pneumonia, bacterial    Hyponatremia    Metabolic alkalosis    Recurrent pneumonia    Sepsis due to undetermined organism ([facility])    Acute on chronic congestive heart failure, unspecified heart failure type ([facility])    Acute hypoxemic respiratory failure ([facility])    ERON (acute kidney injury) ([facility])    Lactic acidosis    Leukocytosis, unspecified type    Palliative care encounter    Counseling regarding advance care planning and goals of care    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) ([facility])       Plan  Orders:  Orders Placed This Encounter   Procedures    Debridement Old surgical Right Foot    Debridement Pressure Injury Posterior;Right Heel         -Patient examined, chart history reviewed.    -Patient did have an angioplasty with stenting to his right lower extremity by Dr. Alston recently.  Patient had a recent follow-up and Doppler revealed continued adequate blood flow.  Patient will be following up in in a few weeks.     -Wound inspected today--overall stable wound today.  Dimensions improved overall.  Medial foot wound improved.  There is no deep tunneling.  There is fibrogranular wound bed.  No palpable bone today.  Continue serous drainage with ongoing right lower extremity pitting edema.  No current purulence, surrounding erythema, or other signs of  infection.  Right foot remains fairly warm.  Posterior heel does have a full-thickness ulceration with no current signs of infection that has also improved in dimensions and does not probe to bone.     -Excisional debridement performed to the left foot ulcerations utilizing curette down to and including subcutaneous tissue.  A bleeding, granular wound bed was present upon debridement of both ulcerations today.  No palpable bone today.  Overall improvements noted.  We will continue with outpatient debridements.     -Discussed treatment options.  Recommend we continue to promote healing utilizing a collagen.  Endoform and Hydrofera Blue placed to both ulceration sites today and cover with a dry dressing.  Santyl applied to right heel and right shin ulcerations.  Will have patient's family assist with dressing changes to his foot every other day.     -Patient is seeing Dr. Butler for right shin ulceration, who did stop in to see the wound today..  Patient's family will assist him in continuing with Santyl daily per Dr. Butler's orders.      -Patient should elevate right lower extremity as frequently as possible as we cannot compress his right lower extremity due to recent stenting and chronic CHF.  Reiterated importance of elevation to patient today     -Patient should remain nonweightbearing to his right foot in his soft slipper.  Okay to stand for transfers.     -Patient remains frustrated with how slow the process has been going and is wanting to be more active.  I talked to patient's family about a balance between treating his ulcerations and overall mental health/quality of life.  I do agree that patient should be able to do activities that he would like to do, but I would just avoid excessive ambulation or excessive amounts of time on his feet.    -Educated on signs of infection and encouraged patient to seek immediate medical attention if noticing any of these signs.    Follow-Up  1 week    Pavan Poon  JAN    6/17/2024    Dragon speech recognition software was used to prepare this note.  Errors in word recognition may occur.  Please contact me with any questions/concerns with this note.

## 2024-06-24 ENCOUNTER — APPOINTMENT (OUTPATIENT)
Dept: WOUND CARE | Facility: HOSPITAL | Age: 89
End: 2024-06-24

## 2024-06-24 ENCOUNTER — OFFICE VISIT (OUTPATIENT)
Dept: WOUND CARE | Facility: HOSPITAL | Age: 89
End: 2024-06-24
Attending: PODIATRIST

## 2024-06-24 VITALS
HEART RATE: 101 BPM | RESPIRATION RATE: 16 BRPM | TEMPERATURE: 98 F | DIASTOLIC BLOOD PRESSURE: 51 MMHG | SYSTOLIC BLOOD PRESSURE: 91 MMHG

## 2024-06-24 DIAGNOSIS — I73.9 PAD (PERIPHERAL ARTERY DISEASE) (HCC): ICD-10-CM

## 2024-06-24 DIAGNOSIS — R26.9 GAIT DIFFICULTY: ICD-10-CM

## 2024-06-24 DIAGNOSIS — S91.301D NON-HEALING OPEN WOUND OF RIGHT HEEL, SUBSEQUENT ENCOUNTER: ICD-10-CM

## 2024-06-24 DIAGNOSIS — R60.0 EDEMA OF RIGHT LOWER EXTREMITY: ICD-10-CM

## 2024-06-24 DIAGNOSIS — T81.89XD NONHEALING SURGICAL WOUND, SUBSEQUENT ENCOUNTER: Primary | ICD-10-CM

## 2024-06-24 DIAGNOSIS — Z89.439 HISTORY OF TRANSMETATARSAL AMPUTATION OF FOOT (HCC): ICD-10-CM

## 2024-06-24 PROCEDURE — 11042 DBRDMT SUBQ TIS 1ST 20SQCM/<: CPT | Performed by: PODIATRIST

## 2024-06-24 NOTE — PROGRESS NOTES
Weekly Wound Education Note    Teaching Provided To: Family;Patient  Training Topics: Cleasing and general instructions;Discharge instructions;Edema control;Dressing  Training Method: Demonstration;Explain/Verbal;Written  Training Response: Patient responds and understands        Notes: Cleanse right foot wound with saline or wound cleanser apply endoform, hydrofera transfer, abd kerlix,  Cleanse right heel wound with saline or wound cleanser apply hydrofera transfer, abd, kerlix, Cleanse right leg wound with saline or wound cleanser apply santyl, hydrofera transfer, abd and kerlix. change every other day

## 2024-06-24 NOTE — PROGRESS NOTES
Patient ID: Alejandro Guardado is a 89 year old male.          Debridement Old surgical Right Foot   Wound 08/14/23 #1- Foot Right    Performed by: Quincy Poon DPM  Authorized by: Quincy Poon DPM      Consent   Consent obtained? verbal  Consent given by: patient  Risks discussed? procedural risks discussed  Time out called at 6/24/2024 1:54 PM  Immediately prior to the procedure a time out was called and the performing provider verified the correct patient, procedure, equipment, support staff, and site/side marked as required.    Debridement Details  Performed by: physician  Debridement type: surgical  Level of debridement: subcutaneous tissue    Pre-debridement measurements  Length (cm): 0.9  Width (cm): 3  Depth (cm): 0.3  Surface Area (cm^2): 2.7    Post-debridement measurements  Length (cm): 1  Width (cm): 5  Depth (cm): 0.2  Percent debrided: 100%  Surface Area (cm^2): 5  Area Debrided (cm^2): 5  Volume (cm^3): 1    Tissue and other material debrided: subcutaneous tissue  Devitalized tissue debrided: biofilm, fibrin and necrotic debris  Instrument(s) utilized: curette  Bleeding: small  Hemostasis obtained with: not applicable  Procedural pain (0-10): 0  Post-procedural pain: 0   Response to treatment: procedure was tolerated well

## 2024-06-24 NOTE — PROGRESS NOTES
Subjective   Alejandro Guardado is a 89 year old male.    Chief Complaint   Patient presents with    Wound Care     Patient is here for a wound care follow up. He denies any pain or new wound concerns.       HPI  Alejandro Guardado is a 88 year old male with PAD who is returning to clinic for recheck of right foot.  Patient is accompanied today by his son and daughter-in-law, who is returning to clinic today for recheck on right foot.  Patient continues to do well overall.  His family states that he has been doing well trying to stay off his feet, except for cleaning his garage this Saturday.  Patient's son has been assisting with dressing changes with endoform to the anterior foot wound and heel wound and Santyl to the [place]han.  Dr. Butler continues to follow-up with patient's shin wound.  Continued edema noted to right lower extremity.  No other concerns today.    Review of Systems  Denies nausea, vomiting, fever, chills, shortness of breath, chest pain, and calf pain.    Objective    Physical Exam:    Derm:  Wound Assessment  Wound 08/14/23 #1- Foot Right (Active)   Wound Image    06/24/24 1358   Drainage Amount Moderate 06/24/24 1338   Drainage Description Serosanguineous 06/24/24 1338   Treatments Skin Substitutes 05/29/24 1124   Wound Vac Brand KCI 10/30/23 0907   Wound Length (cm) 1 cm 06/24/24 1339   Wound Width (cm) 5 cm 06/24/24 1339   Wound Surface Area (cm^2) 5 cm^2 06/24/24 1339   Wound Depth (cm) 0.2 cm 06/24/24 1339   Wound Volume (cm^3) 1 cm^3 06/24/24 1339   Wound Healing % 96 06/24/24 1339   Margins Well-defined edges;Epibole (Rolled edges) 06/24/24 1338   Non-staged Wound Description Full thickness 06/24/24 1338   Peggy-wound Assessment Moist;Maceration;Edema 06/24/24 1338   Wound Granulation Tissue Spongy;Pink;Red 06/24/24 1338   Wound Bed Granulation (%) 95 % 06/24/24 1338   Wound Bed Epithelium (%) 40 % 06/17/24 1342   Wound Bed Slough (%) 5 % 06/24/24 1338   Wound Bed Eschar (%) 40 % 08/14/23 1315    Wound Odor None 06/24/24 1338   Exposed Structure Bone Necrosis 05/13/24 1602   Shape 60% epifix 06/10/24 1349   Tunneling? No 06/24/24 1338   Undermining? No 06/24/24 1338   Sinus Tracts? No 06/24/24 1338       Wound 04/15/24 #2 Leg Right;Anterior (Active)   Wound Image   06/24/24 1340   Drainage Amount RHONDA 06/24/24 1340   Drainage Description Serous;Yellow 06/17/24 1344   Wound Length (cm) 2.1 cm 06/24/24 1340   Wound Width (cm) 1.4 cm 06/24/24 1340   Wound Surface Area (cm^2) 2.94 cm^2 06/24/24 1340   Wound Depth (cm) 0.1 cm 06/24/24 1340   Wound Volume (cm^3) 0.294 cm^3 06/24/24 1340   Wound Healing % -151 06/24/24 1340   Margins Well-defined edges 06/24/24 1340   Non-staged Wound Description Full thickness 06/24/24 1340   Peggy-wound Assessment Edema;Hemosiderin staining;Clean 06/24/24 1340   Wound Granulation Tissue Firm;Pink 06/10/24 1359   Wound Bed Granulation (%) 10 % 06/10/24 1359   Wound Bed Slough (%) 100 % 06/24/24 1340   Wound Odor None 06/24/24 1340   Tunneling? No 06/24/24 1340   Undermining? No 06/24/24 1340   Sinus Tracts? No 06/24/24 1340       Wound 05/13/24 #3 Heel Posterior;Right (Active)   Wound Image   06/24/24 1341   Drainage Amount None 06/24/24 1341   Drainage Description Serous;Yellow 06/17/24 1345   Wound Length (cm) 0.1 cm 06/24/24 1341   Wound Width (cm) 0.1 cm 06/24/24 1341   Wound Surface Area (cm^2) 0.01 cm^2 06/24/24 1341   Wound Depth (cm) 0 cm 06/24/24 1341   Wound Volume (cm^3) 0 cm^3 06/24/24 1341   Wound Healing % 100 06/24/24 1341   Margins Well-defined edges 06/24/24 1341   Non-staged Wound Description Full thickness 06/24/24 1341   Peggy-wound Assessment Edema;Callous 06/24/24 1341   Wound Granulation Tissue Firm;Pink 06/17/24 1345   Wound Bed Granulation (%) 100 % 06/17/24 1345   Wound Bed Slough (%) 10 % 05/13/24 1605   Wound Odor None 06/17/24 1345   Shape 100% callus 06/24/24 1341   Tunneling? No 06/24/24 1341   Undermining? No 06/24/24 1341   Sinus Tracts? No 06/17/24  1345   Pressure Injury Stage 3 06/24/24 1341       Vascular: DP palpable today and PT pulse not palpable today.  Continued pitting edema noted to right lower extremity.  Improved refill noted to stump site and continue warmth to touch to the entire right foot.  Right foot is slightly cooler than left foot, but remains warm.  Musculoskeletal: no acute deformities noted.  In a wheelchair today  Neurological: Gross sensation intact via light touch.  Protective sensation diminished via Ipswitch test.    Vital Signs  Vital Signs    06/24/24 1333   BP: 91/51   Pulse: 101   Resp: 16   Temp: 98.2 °F (36.8 °C)   PainSc: 0 - (None)         Allergies  Allergies   Allergen Reactions    Heparin ANAPHYLAXIS    Hydromorphone HALLUCINATION       Assessment    Encounter Diagnosis  1. Nonhealing surgical wound, subsequent encounter    2. Non-healing open wound of right heel, subsequent encounter    3. PAD (peripheral artery disease) ([facility])    4. Edema of right lower extremity    5. History of transmetatarsal amputation of foot ([facility])    6. Gait difficulty        Problem List  Patient Active Problem List   Diagnosis    Closed minimally displaced zone I fracture of sacrum ([facility])    At risk for falling    CAD (coronary artery disease)    Ischemic cardiomyopathy    Azotemia    Arrhythmia    Esophageal reflux    History of MI (myocardial infarction)    Mixed hyperlipidemia    Primary hypertension    Dizziness    Medication side effect    Closed left hip fracture ([facility])  Global 6/22/2016    Status post hip surgery    Left shoulder distal clavical resection and excision of ganglion cyst of soft tissue mass   Global  09/17/2020    Orthopedic aftercare for healing traumatic hip fracture, left, closed    Closed left hip fracture, with routine healing, subsequent encounter    Osteoarthrosis, localized, primary, knee, left    Osteoarthrosis, localized, primary, knee, right    Gangrene ([facility])    PAD (peripheral artery disease) ([facility])    Benign  prostatic hyperplasia with incomplete bladder emptying    Gangrene of foot ([facility])    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) ([facility])    Leukocytosis    Atrial fibrillation with RVR ([facility])    Hypokalemia    Acute kidney injury ([facility])    Hyperglycemia    Community acquired pneumonia of right lung, unspecified part of lung    Acute respiratory failure with hypoxia ([facility])    Hypotension, unspecified hypotension type    Sepsis due to pneumonia ([facility])    Foot ulcer with fat layer exposed, right ([facility])    Syncope, unspecified syncope type    Sepsis, due to unspecified organism, unspecified whether acute organ dysfunction present ([facility])    Shock ([facility])    Acute hypoxic respiratory failure ([facility])    Pneumonia, bacterial    Hyponatremia    Metabolic alkalosis    Recurrent pneumonia    Sepsis due to undetermined organism ([facility])    Acute on chronic congestive heart failure, unspecified heart failure type ([facility])    Acute hypoxemic respiratory failure ([facility])    ERON (acute kidney injury) ([facility])    Lactic acidosis    Leukocytosis, unspecified type    Palliative care encounter    Counseling regarding advance care planning and goals of care    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) ([facility])       Plan  Orders:  Orders Placed This Encounter   Procedures    Debridement Old surgical Right Foot         -Patient examined, chart history reviewed.    -Patient did have an angioplasty with stenting to his right lower extremity by Dr. Alston recently.  Patient had a recent follow-up and Doppler revealed continued adequate blood flow.  Patient will be following up in in a few weeks.     -Wound inspected today--overall stable wound today.  Dimensions improved overall.  Medial foot wound improved.  There is no deep tunneling.  There is fibrogranular wound bed.  No palpable bone today.  Minimal serous drainage with ongoing right lower extremity pitting edema.  No current purulence, surrounding erythema, or other signs of  infection.  Right foot remains fairly warm.  Posterior heel does have a stable ulceration with no current drainage or signs of infection.     -Excisional debridement performed to the left foot ulcerations (except the heel ulceration, in which hyperkeratotic tissue removed) utilizing curette down to and including subcutaneous tissue.  A bleeding, granular wound bed was present upon debridement of both ulcerations today.  No palpable bone today.  Overall improvements noted.  We will continue with outpatient debridements.     -Discussed treatment options.  Recommend we continue to promote healing utilizing a collagen.  Endoform and Hydrofera Blue placed to both ulceration sites today and cover with a dry dressing.  Santyl applied to right heel and right shin ulcerations.  Will have patient's family assist with dressing changes to his foot every other day.     -Patient is seeing Dr. Butler for right shin ulceration, who did stop in to see the wound today..  Patient's family will assist him in continuing with Santyl daily per Dr. Butler's orders.      -Patient should elevate right lower extremity as frequently as possible as we cannot compress his right lower extremity due to recent stenting and chronic CHF.  Reiterated importance of elevation to patient today     -Patient should remain nonweightbearing to his right foot in his soft slipper.  Okay to stand for transfers.     -Patient remains frustrated with how slow the process has been going and is wanting to be more active.  I talked to patient's family about a balance between treating his ulcerations and overall mental health/quality of life.  I do agree that patient should be able to do activities that he would like to do, but I would just avoid excessive ambulation or excessive amounts of time on his feet.    -Educated on signs of infection and encouraged patient to seek immediate medical attention if noticing any of these signs.    Follow-Up  1 week    Pavan  JAN Poon    6/24/2024    Dragon speech recognition software was used to prepare this note.  Errors in word recognition may occur.  Please contact me with any questions/concerns with this note.

## 2024-07-01 ENCOUNTER — OFFICE VISIT (OUTPATIENT)
Dept: WOUND CARE | Facility: HOSPITAL | Age: 89
End: 2024-07-01
Attending: PODIATRIST
Payer: MEDICARE

## 2024-07-01 VITALS
DIASTOLIC BLOOD PRESSURE: 72 MMHG | TEMPERATURE: 98 F | SYSTOLIC BLOOD PRESSURE: 106 MMHG | HEART RATE: 94 BPM | RESPIRATION RATE: 14 BRPM

## 2024-07-01 DIAGNOSIS — Z89.439 HISTORY OF TRANSMETATARSAL AMPUTATION OF FOOT (HCC): ICD-10-CM

## 2024-07-01 DIAGNOSIS — T81.89XD NONHEALING SURGICAL WOUND, SUBSEQUENT ENCOUNTER: Primary | ICD-10-CM

## 2024-07-01 DIAGNOSIS — S91.301D NON-HEALING OPEN WOUND OF RIGHT HEEL, SUBSEQUENT ENCOUNTER: ICD-10-CM

## 2024-07-01 DIAGNOSIS — R26.9 GAIT DIFFICULTY: ICD-10-CM

## 2024-07-01 DIAGNOSIS — I73.9 PAD (PERIPHERAL ARTERY DISEASE) (HCC): ICD-10-CM

## 2024-07-01 DIAGNOSIS — R60.0 EDEMA OF RIGHT LOWER EXTREMITY: ICD-10-CM

## 2024-07-01 PROCEDURE — 11042 DBRDMT SUBQ TIS 1ST 20SQCM/<: CPT | Performed by: PODIATRIST

## 2024-07-01 NOTE — PROGRESS NOTES
Weekly Wound Education Note    Teaching Provided To: Patient;Family  Training Topics: Dressing;Cleasing and general instructions;Discharge instructions;Edema control  Training Method: Demonstration;Explain/Verbal;Written  Training Response: Patient responds and understands        Notes: Cleanse right hoot wound with saline or wound cleanser, apply endoform collagen, hydrofera transfer, abd, kerlix change every other day. Cleanse right leg wound with saline or wound cleanser apply santyl ointment, transfer and border gauze, change daily.    Abd pad to protect newly healed right heel wound, change as needed every other day

## 2024-07-01 NOTE — PROGRESS NOTES
Patient ID: Alejandro Guardado is a 89 year old male.    Debridement Old surgical Right Foot   Wound 08/14/23 #1- Foot Right    Performed by: Quincy Poon DPM  Authorized by: Quincy Poon DPM      Consent   Consent obtained? verbal  Consent given by: patient  Risks discussed? procedural risks discussed  Time out called at 7/1/2024 1:36 PM  Immediately prior to the procedure a time out was called and the performing provider verified the correct patient, procedure, equipment, support staff, and site/side marked as required.    Debridement Details  Performed by: physician  Debridement type: surgical  Level of debridement: subcutaneous tissue  Pain control: none    Pre-debridement measurements  Length (cm): 1.3  Width (cm): 3.5  Depth (cm): 0.1  Surface Area (cm^2): 4.55    Post-debridement measurements  Length (cm): 1.4  Width (cm): 3.5  Depth (cm): 0.1  Percent debrided: 100%  Surface Area (cm^2): 4.9  Area Debrided (cm^2): 4.9  Volume (cm^3): 0.49    Tissue and other material debrided: subcutaneous tissue  Devitalized tissue debrided: biofilm, fibrin and necrotic debris  Instrument(s) utilized: curette  Bleeding: small  Hemostasis obtained with: not applicable  Procedural pain (0-10): 0  Post-procedural pain: 0   Response to treatment: procedure was tolerated well

## 2024-07-01 NOTE — PROGRESS NOTES
Subjective   Alejandro Guardado is a 89 year old male.    Chief Complaint   Patient presents with    Wound Care     Patients is here for a follow up. Patients denies any issues        HPI  Alejandro Guardado is a 89 year old male with PAD who is returning to clinic for recheck of right foot.  Patient is accompanied today by his son and daughter-in-law, who is returning to clinic today for recheck on right foot.  Patient is doing well.  They have continued to change dressings every 2-3 days with endoform to the front foot wound, and foam dressing to posterior heel.  They continue to place Santyl to the right shin wound, which Dr. Butler has been continuing to see.  Denies noticing any recent signs of infection.    Review of Systems  Denies nausea, vomiting, fever, chills, shortness of breath, chest pain, and calf pain.    Objective    Physical Exam:    Derm:  Wound Assessment  Wound 08/14/23 #1- Foot Right (Active)   Wound Image   07/01/24 1313   Drainage Amount Small 07/01/24 1313   Drainage Description Serosanguineous 07/01/24 1313   Treatments Skin Substitutes 05/29/24 1124   Wound Vac Brand KCI 10/30/23 0907   Wound Length (cm) 1.3 cm 07/01/24 1313   Wound Width (cm) 3.5 cm 07/01/24 1313   Wound Surface Area (cm^2) 4.55 cm^2 07/01/24 1313   Wound Depth (cm) 0.1 cm 07/01/24 1313   Wound Volume (cm^3) 0.455 cm^3 07/01/24 1313   Wound Healing % 98 07/01/24 1313   Margins Well-defined edges;Epibole (Rolled edges) 07/01/24 1313   Non-staged Wound Description Full thickness 07/01/24 1313   Peggy-wound Assessment Edema 07/01/24 1313   Wound Granulation Tissue Firm;Pink 07/01/24 1313   Wound Bed Granulation (%) 100 % 07/01/24 1313   Wound Bed Epithelium (%) 40 % 06/17/24 1342   Wound Bed Slough (%) 5 % 06/24/24 1338   Wound Bed Eschar (%) 40 % 08/14/23 1315   Wound Odor None 07/01/24 1313   Exposed Structure Bone Necrosis 05/13/24 1602   Shape 60% epifix 06/10/24 1349   Tunneling? No 06/24/24 1338   Undermining? No 06/24/24  1338   Sinus Tracts? No 06/24/24 1338       Wound 04/15/24 #2 Leg Right;Anterior (Active)   Wound Image   07/01/24 1312   Drainage Amount Small 07/01/24 1312   Drainage Description Yellow;Serous 07/01/24 1312   Wound Length (cm) 2.2 cm 07/01/24 1312   Wound Width (cm) 1.7 cm 07/01/24 1312   Wound Surface Area (cm^2) 3.74 cm^2 07/01/24 1312   Wound Depth (cm) 0.1 cm 07/01/24 1312   Wound Volume (cm^3) 0.374 cm^3 07/01/24 1312   Wound Healing % -220 07/01/24 1312   Margins Well-defined edges 07/01/24 1312   Non-staged Wound Description Full thickness 07/01/24 1312   Peggy-wound Assessment Edema;Hemosiderin staining 07/01/24 1312   Wound Granulation Tissue Firm;Pink 06/10/24 1359   Wound Bed Granulation (%) 10 % 06/10/24 1359   Wound Bed Slough (%) 100 % 07/01/24 1312   Wound Odor None 07/01/24 1312   Tunneling? No 06/24/24 1340   Undermining? No 06/24/24 1340   Sinus Tracts? No 06/24/24 1340       Wound 05/13/24 #3 Heel Posterior;Right (Active)   Wound Image   07/01/24 1315   Drainage Amount None 07/01/24 1315   Drainage Description Serous;Yellow 06/17/24 1345   Wound Length (cm) 0.1 cm 07/01/24 1315   Wound Width (cm) 0.1 cm 07/01/24 1315   Wound Surface Area (cm^2) 0.01 cm^2 07/01/24 1315   Wound Depth (cm) 0 cm 07/01/24 1315   Wound Volume (cm^3) 0 cm^3 07/01/24 1315   Wound Healing % 100 07/01/24 1315   Margins Well-defined edges 07/01/24 1315   Non-staged Wound Description Full thickness 07/01/24 1315   Peggy-wound Assessment Edema;Callous;Dry 07/01/24 1315   Wound Granulation Tissue Firm;Pink 06/17/24 1345   Wound Bed Granulation (%) 100 % 06/17/24 1345   Wound Bed Slough (%) 10 % 05/13/24 1605   Wound Odor None 07/01/24 1315   Shape 100% callus 07/01/24 1315   Tunneling? No 06/24/24 1341   Undermining? No 06/24/24 1341   Sinus Tracts? No 06/17/24 1345   Pressure Injury Stage 3 07/01/24 1315         Vascular: DP palpable today and PT pulse not palpable today.  Continued pitting edema noted to right lower  extremity.  Improved refill noted to stump site and continue warmth to touch to the entire right foot.  Right foot is slightly cooler than left foot  Musculoskeletal: no acute deformities noted.  In a wheelchair today  Neurological: Gross sensation intact via light touch.  Protective sensation diminished via Ipswitch test.      Vital Signs  Vital Signs    07/01/24 1315   BP: 106/72   Pulse: 94   Resp: 14   Temp: 97.8 °F (36.6 °C)   PainSc: 0 - (None)         Allergies  Allergies   Allergen Reactions    Heparin ANAPHYLAXIS    Hydromorphone HALLUCINATION       Assessment    Encounter Diagnosis  1. Nonhealing surgical wound, subsequent encounter    2. Non-healing open wound of right heel, subsequent encounter    3. PAD (peripheral artery disease) ([facility])    4. Edema of right lower extremity    5. History of transmetatarsal amputation of foot ([facility])    6. Gait difficulty        Problem List  Patient Active Problem List   Diagnosis    Closed minimally displaced zone I fracture of sacrum ([facility])    At risk for falling    CAD (coronary artery disease)    Ischemic cardiomyopathy    Azotemia    Arrhythmia    Esophageal reflux    History of MI (myocardial infarction)    Mixed hyperlipidemia    Primary hypertension    Dizziness    Medication side effect    Closed left hip fracture ([facility])  Global 6/22/2016    Status post hip surgery    Left shoulder distal clavical resection and excision of ganglion cyst of soft tissue mass   Global  09/17/2020    Orthopedic aftercare for healing traumatic hip fracture, left, closed    Closed left hip fracture, with routine healing, subsequent encounter    Osteoarthrosis, localized, primary, knee, left    Osteoarthrosis, localized, primary, knee, right    Gangrene ([facility])    PAD (peripheral artery disease) ([facility])    Benign prostatic hyperplasia with incomplete bladder emptying    Gangrene of foot ([facility])    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) ([facility])    Leukocytosis    Atrial  fibrillation with RVR ([facility])    Hypokalemia    Acute kidney injury ([facility])    Hyperglycemia    Community acquired pneumonia of right lung, unspecified part of lung    Acute respiratory failure with hypoxia ([facility])    Hypotension, unspecified hypotension type    Sepsis due to pneumonia ([facility])    Foot ulcer with fat layer exposed, right ([facility])    Syncope, unspecified syncope type    Sepsis, due to unspecified organism, unspecified whether acute organ dysfunction present ([facility])    Shock ([facility])    Acute hypoxic respiratory failure ([facility])    Pneumonia, bacterial    Hyponatremia    Metabolic alkalosis    Recurrent pneumonia    Sepsis due to undetermined organism ([facility])    Acute on chronic congestive heart failure, unspecified heart failure type ([facility])    Acute hypoxemic respiratory failure ([facility])    ERON (acute kidney injury) ([facility])    Lactic acidosis    Leukocytosis, unspecified type    Palliative care encounter    Counseling regarding advance care planning and goals of care    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) ([facility])       Plan  Orders:  No orders of the defined types were placed in this encounter.        -Patient examined, chart history reviewed.    -Patient did have an angioplasty with stenting to his right lower extremity by Dr. Alston recently.  Patient had a recent follow-up and Doppler revealed continued adequate blood flow.  Patient will continue to follow up.     -Wound inspected today--overall stable wound today.  Dimensions about the same.  Medial foot wound appears epithelialized.  There is fibrogranular wound bed.  No palpable bone today.  Minimal serous drainage with ongoing right lower extremity pitting edema.  No current purulence, surrounding erythema, or other signs of infection.  Right foot remains fairly warm.  Posterior heel does have a stable HPK tissue with no current drainage or signs of infection.     -Excisional debridement performed to the left foot ulcerations (except the heel ulceration,  in which small amount of hyperkeratotic tissue is stable) utilizing curette down to and including subcutaneous tissue.  A bleeding, granular wound bed was present upon debridement of both ulcerations today.  No palpable bone today.  We will continue with outpatient debridements.     -Discussed treatment options.  Recommend we continue to promote healing utilizing a collagen.  Endoform and Hydrofera Blue placed to both ulceration sites today and cover with a dry dressing.  Santyl applied to right heel and right shin ulcerations.  Will have patient's family assist with dressing changes to his foot every other day.     -Patient is seeing Dr. Butler for right shin ulceration, who did stop in to see the wound today.  Patient's family will assist him in continuing with Santyl daily per Dr. Butler's orders.      -Patient should elevate right lower extremity as frequently as possible as we cannot compress his right lower extremity due to recent stenting and chronic CHF.  Reiterated importance of elevation to patient today     -Patient should remain minimally weightbearing to his right foot in his soft slipper.  Okay to stand for transfers and a take a few steps at a time.       -I talked to patient's family about a balance between treating his ulcerations and overall mental health/quality of life.  I do agree that patient should be able to do activities that he would like to do, but I would just avoid excessive ambulation or excessive amounts of time on his feet.    -Educated on signs of infection and encouraged patient to seek immediate medical attention if noticing any of these signs.    Follow-Up  1 week    Pavan Poon DPM    7/1/2024    Dragon speech recognition software was used to prepare this note.  Errors in word recognition may occur.  Please contact me with any questions/concerns with this note.

## 2024-07-08 ENCOUNTER — OFFICE VISIT (OUTPATIENT)
Dept: WOUND CARE | Facility: HOSPITAL | Age: 89
End: 2024-07-08
Attending: PODIATRIST
Payer: MEDICARE

## 2024-07-08 VITALS
SYSTOLIC BLOOD PRESSURE: 108 MMHG | DIASTOLIC BLOOD PRESSURE: 62 MMHG | RESPIRATION RATE: 14 BRPM | HEART RATE: 89 BPM | TEMPERATURE: 98 F

## 2024-07-08 DIAGNOSIS — I73.9 PAD (PERIPHERAL ARTERY DISEASE) (HCC): ICD-10-CM

## 2024-07-08 DIAGNOSIS — R60.0 EDEMA OF RIGHT LOWER EXTREMITY: ICD-10-CM

## 2024-07-08 DIAGNOSIS — Z89.439 HISTORY OF TRANSMETATARSAL AMPUTATION OF FOOT (HCC): ICD-10-CM

## 2024-07-08 DIAGNOSIS — T81.89XD NONHEALING SURGICAL WOUND, SUBSEQUENT ENCOUNTER: Primary | ICD-10-CM

## 2024-07-08 DIAGNOSIS — R26.9 GAIT DIFFICULTY: ICD-10-CM

## 2024-07-08 PROCEDURE — 11042 DBRDMT SUBQ TIS 1ST 20SQCM/<: CPT | Performed by: PODIATRIST

## 2024-07-08 NOTE — PROGRESS NOTES
Patient ID: Alejandro Guardado is a 89 year old male.    Debridement Old surgical Right Foot   Wound 08/14/23 #1- Foot Right    Performed by: Quincy Poon DPM  Authorized by: Quincy Poon DPM      Consent   Consent obtained? verbal  Consent given by: patient  Risks discussed? procedural risks discussed  Time out called at 7/8/2024 1:28 PM    Debridement Details  Performed by: physician  Debridement type: surgical  Level of debridement: subcutaneous tissue  Pain control: lidocaine 4%    Pre-debridement measurements  Length (cm): 0.7  Width (cm): 3.1  Depth (cm): 0.1  Surface Area (cm^2): 2.17    Post-debridement measurements  Length (cm): 0.8  Width (cm): 3.1  Depth (cm): 0.1  Percent debrided: 100%  Surface Area (cm^2): 2.48  Area Debrided (cm^2): 2.48  Volume (cm^3): 0.25    Tissue and other material debrided: subcutaneous tissue  Devitalized tissue debrided: biofilm  Instrument(s) utilized: curette  Bleeding: small  Hemostasis obtained with: not applicable  Procedural pain (0-10): 0  Post-procedural pain: 0   Response to treatment: procedure was tolerated well

## 2024-07-08 NOTE — PROGRESS NOTES
Weekly Wound Education Note    Teaching Provided To: Patient;Family  Training Topics: Dressing;Discharge instructions;Cleasing and general instructions;Off-loading  Training Method: Demonstration;Explain/Verbal;Written  Training Response: Patient responds and understands        Notes: Cleanse Right foot wound with saline or wound cleanser, apply endoform, hydrofera transfer abd, kerlix, change every 2-3 days, Cleanse right leg wound with saline or wound cleanser, apply santyl and dry dressing, change daily. Follow up with Dr. Butler next week and Dr Poon in two weeks. Stay off your foot as much as possible

## 2024-07-08 NOTE — PROGRESS NOTES
Subjective   Alejandro Guardado is a 89 year old male.    Chief Complaint   Patient presents with    Wound Care     Follow up for right foot and leg wound. Pt arrives with dressings in place and no compression in place.        HPI  Alejandro Guardado is a 89 year old male with PAD who is returning to clinic for recheck of right foot.  Patient is accompanied today by his son and daughter-in-law, who is returning to clinic today for recheck on right foot.  Patient continues to do well overall.  He does state he continues to minimize his ambulation.  Has been having dressings changed every 2 to 3 days with endoform to the wound of his right foot and foam dressing to posterior heel.  They also continue to place Santyl to his right shin wound per Dr. Butler's recommendations.  Denies noticing any recent signs of infection.  No other concerns.    Review of Systems  Denies nausea, vomiting, fever, chills, shortness of breath, chest pain, and calf pain.    Objective    Physical Exam:    Derm:  Wound Assessment  Wound 08/14/23 #1- Foot Right (Active)   Wound Image   07/08/24 1331   Drainage Amount Scant 07/08/24 1308   Drainage Description Serosanguineous 07/08/24 1308   Treatments Skin Substitutes 05/29/24 1124   Wound Vac Brand KCI 10/30/23 0907   Wound Length (cm) 0.8 cm 07/08/24 1309   Wound Width (cm) 3.1 cm 07/08/24 1309   Wound Surface Area (cm^2) 2.48 cm^2 07/08/24 1309   Wound Depth (cm) 0.1 cm 07/08/24 1309   Wound Volume (cm^3) 0.248 cm^3 07/08/24 1309   Wound Healing % 99 07/08/24 1309   Margins Well-defined edges;Epibole (Rolled edges) 07/08/24 1308   Non-staged Wound Description Full thickness 07/08/24 1308   Peggy-wound Assessment Edema;Moist;Maceration 07/08/24 1308   Wound Granulation Tissue Pink;Firm 07/08/24 1308   Wound Bed Granulation (%) 40 % 07/08/24 1308   Wound Bed Epithelium (%) 40 % 06/17/24 1342   Wound Bed Slough (%) 60 % 07/08/24 1308   Wound Bed Eschar (%) 40 % 08/14/23 1315   Wound Odor None 07/08/24  1308   Exposed Structure Bone Necrosis 05/13/24 1602   Shape 60% epifix 06/10/24 1349   Tunneling? No 06/24/24 1338   Undermining? No 06/24/24 1338   Sinus Tracts? No 06/24/24 1338       Wound 04/15/24 #2 Leg Right;Anterior (Active)   Wound Image   07/08/24 1309   Drainage Amount Scant 07/08/24 1309   Drainage Description Yellow;Serous 07/08/24 1309   Wound Length (cm) 1.9 cm 07/08/24 1309   Wound Width (cm) 1.3 cm 07/08/24 1309   Wound Surface Area (cm^2) 2.47 cm^2 07/08/24 1309   Wound Depth (cm) 0.1 cm 07/08/24 1309   Wound Volume (cm^3) 0.247 cm^3 07/08/24 1309   Wound Healing % -111 07/08/24 1309   Margins Well-defined edges 07/08/24 1309   Non-staged Wound Description Full thickness 07/08/24 1309   Peggy-wound Assessment Edema;Hemosiderin staining 07/08/24 1309   Wound Granulation Tissue Pink;[place];Firm 07/08/24 1309   Wound Bed Granulation (%) 5 % 07/08/24 1309   Wound Bed Slough (%) 95 % 07/08/24 1309   Wound Odor None 07/08/24 1309   Tunneling? No 06/24/24 1340   Undermining? No 06/24/24 1340   Sinus Tracts? No 06/24/24 1340       Vascular: DP palpable today and PT pulse not palpable today.  Continued pitting edema noted to right lower extremity.  Continued adequate refill noted to stump site and continue warmth to touch to the entire right foot.  Right foot is slightly cooler than left foot  Musculoskeletal: no acute deformities noted.  In a wheelchair today  Neurological: Gross sensation intact via light touch.  Protective sensation diminished via Ipswitch test.    Vital Signs  Vital Signs    07/08/24 1100   BP: 108/62   Pulse: 89   Resp: 14   Temp: 98.4 °F (36.9 °C)   PainSc: 0 - (None)         Allergies  Allergies   Allergen Reactions    Heparin ANAPHYLAXIS    Hydromorphone HALLUCINATION       Assessment    Encounter Diagnosis  1. Nonhealing surgical wound, subsequent encounter    2. PAD (peripheral artery disease) ([facility])    3. Edema of right lower extremity    4. History of transmetatarsal amputation  of foot ([facility])    5. Gait difficulty        Problem List  Patient Active Problem List   Diagnosis    Closed minimally displaced zone I fracture of sacrum ([facility])    At risk for falling    CAD (coronary artery disease)    Ischemic cardiomyopathy    Azotemia    Arrhythmia    Esophageal reflux    History of MI (myocardial infarction)    Mixed hyperlipidemia    Primary hypertension    Dizziness    Medication side effect    Closed left hip fracture ([facility])  Global 6/22/2016    Status post hip surgery    Left shoulder distal clavical resection and excision of ganglion cyst of soft tissue mass   Global  09/17/2020    Orthopedic aftercare for healing traumatic hip fracture, left, closed    Closed left hip fracture, with routine healing, subsequent encounter    Osteoarthrosis, localized, primary, knee, left    Osteoarthrosis, localized, primary, knee, right    Gangrene ([facility])    PAD (peripheral artery disease) ([facility])    Benign prostatic hyperplasia with incomplete bladder emptying    Gangrene of foot ([facility])    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) ([facility])    Leukocytosis    Atrial fibrillation with RVR ([facility])    Hypokalemia    Acute kidney injury ([facility])    Hyperglycemia    Community acquired pneumonia of right lung, unspecified part of lung    Acute respiratory failure with hypoxia ([facility])    Hypotension, unspecified hypotension type    Sepsis due to pneumonia ([facility])    Foot ulcer with fat layer exposed, right ([facility])    Syncope, unspecified syncope type    Sepsis, due to unspecified organism, unspecified whether acute organ dysfunction present ([facility])    Shock ([facility])    Acute hypoxic respiratory failure ([facility])    Pneumonia, bacterial    Hyponatremia    Metabolic alkalosis    Recurrent pneumonia    Sepsis due to undetermined organism ([facility])    Acute on chronic congestive heart failure, unspecified heart failure type ([facility])    Acute hypoxemic respiratory failure ([facility])    ERON (acute kidney injury) ([facility])    Lactic acidosis     Leukocytosis, unspecified type    Palliative care encounter    Counseling regarding advance care planning and goals of care    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) ([facility])       Plan  Orders:  Orders Placed This Encounter   Procedures    Debridement Old surgical Right Foot         -Patient examined, chart history reviewed.    -Patient did have an angioplasty with stenting to his right lower extremity by Dr. Alston recently.  Patient had a recent follow-up and Doppler revealed continued adequate blood flow.  Patient will be following up this week.     -Wound inspected today--overall stable wound today.  Dimensions improved overall.  Medial foot wound remains healed.  There is fibrogranular wound bed.  No palpable bone today.  No serous drainage with ongoing bilateral lower extremity pitting edema.  No current purulence, surrounding erythema, or other signs of infection.  Right foot remains fairly warm.  Posterior heel does have a stable HPK tissue with no current drainage or signs of infection.  HPK removed today, revealing no open ulceration     -Excisional debridement performed to the left foot ulceration utilizing curette down to and including subcutaneous tissue.  A bleeding, granular wound bed was present upon debridement of both ulcerations today.  No palpable bone today.  Overall improvements in dimensions today.  We will continue with outpatient debridements.     -Discussed treatment options.  Recommend we continue to promote healing utilizing a collagen.  Endoform and Hydrofera Blue placed to both ulceration sites today and cover with a dry dressing.  Santyl applied to right heel and right shin ulcerations.  Will have patient's family assist with dressing changes to his foot every other day.     -Patient is seeing Dr. Butler for right shin ulceration, who did stop in to see the wound today.  Patient's family will assist him in continuing with Santyl daily per Dr. Butler's orders.       -Patient should elevate right lower extremity as frequently as possible as we cannot compress his right lower extremity due to recent stenting and chronic CHF.  Reiterated importance of elevation to patient today     -Patient should remain minimally weightbearing to his right foot in his soft slipper.  Okay to stand for transfers and take a few steps at a time.       -Again discussed a balance between treating his ulcerations and overall mental health/quality of life.  I do agree that patient should be able to do activities that he would like to do, but I would just avoid excessive ambulation or excessive amounts of time on his feet.    -Educated on signs of infection and encouraged patient to seek immediate medical attention if noticing any of these signs.    Follow-Up  1 week with Dr. Butler and 2 weeks with me    Pavan Poon DPM    7/8/2024    Dragon speech recognition software was used to prepare this note.  Errors in word recognition may occur.  Please contact me with any questions/concerns with this note.

## 2024-07-15 ENCOUNTER — APPOINTMENT (OUTPATIENT)
Dept: WOUND CARE | Facility: HOSPITAL | Age: 89
End: 2024-07-15
Payer: MEDICARE

## 2024-07-17 ENCOUNTER — OFFICE VISIT (OUTPATIENT)
Dept: WOUND CARE | Facility: HOSPITAL | Age: 89
End: 2024-07-17
Attending: INTERNAL MEDICINE
Payer: MEDICARE

## 2024-07-17 VITALS
HEART RATE: 101 BPM | SYSTOLIC BLOOD PRESSURE: 110 MMHG | DIASTOLIC BLOOD PRESSURE: 71 MMHG | RESPIRATION RATE: 15 BRPM | TEMPERATURE: 99 F

## 2024-07-17 DIAGNOSIS — L97.212 NON-PRESSURE CHRONIC ULCER OF RIGHT CALF WITH FAT LAYER EXPOSED (HCC): ICD-10-CM

## 2024-07-17 DIAGNOSIS — L97.512 FOOT ULCER, RIGHT, WITH FAT LAYER EXPOSED (HCC): ICD-10-CM

## 2024-07-17 DIAGNOSIS — I73.9 PAD (PERIPHERAL ARTERY DISEASE) (HCC): ICD-10-CM

## 2024-07-17 DIAGNOSIS — S91.301D NON-HEALING OPEN WOUND OF RIGHT HEEL, SUBSEQUENT ENCOUNTER: ICD-10-CM

## 2024-07-17 DIAGNOSIS — R26.9 GAIT DIFFICULTY: ICD-10-CM

## 2024-07-17 DIAGNOSIS — R23.8 SLOUGHING OF WOUND: ICD-10-CM

## 2024-07-17 DIAGNOSIS — Z89.439 HISTORY OF TRANSMETATARSAL AMPUTATION OF FOOT (HCC): ICD-10-CM

## 2024-07-17 DIAGNOSIS — M79.89 RIGHT LEG SWELLING: ICD-10-CM

## 2024-07-17 DIAGNOSIS — R60.0 EDEMA OF RIGHT LOWER EXTREMITY: Primary | ICD-10-CM

## 2024-07-17 PROCEDURE — 97597 DBRDMT OPN WND 1ST 20 CM/<: CPT | Performed by: INTERNAL MEDICINE

## 2024-07-17 NOTE — PATIENT INSTRUCTIONS
Continue current plan of care.   Continue  santyl on right leg wound for enzymatic debridement  Continue collagen / HF transfer for foot wound.   Leg elevation and low salt diet  See Dr. Poon next Monday.     Wound Cleaning and Dressings:    Wash your hands with soap and water. Always wear gloves while changing dressings. Donot touch wound / mio-wound skin with un-gloved hands. Remove old dressing, discard and place into trash.    DRESSINGS:   Right leg: santyl / HF transfer - Change dressing daily.  Right foot: collagen / HF transfer- change dressing QOD  Offload wounded area.     Off-Loading: modified foot wear for maximal offloading.     Miscellaneous Instructions:  Supplement with a daily multivitamin   Low salt diet  Intense blood sugar control - Goal Blood sugar below 180 at all times recommended.  Increase protein intake / consider protein supplements - see below  Elevate extremities at all times when sitting / laying down.    DIETARY MODIFICATIONS TO HELP WITH WOUND HEALING:    Protein: Meats, beans, eggs, milk and yogurt particularly Greek yogurt), tofu, soy nuts, soy protein products    Vitamin C: Citrus fruits and juices, strawberries, tomatoes, tomato juice, peppers, baked potatoes, spinach, broccoli, cauliflower, [place] sprouts, cabbage    Vitamin A: Dark green, leafy vegetables, orange or yellow vegetables, cantaloupe, fortified dairy products, liver, fortified cereals    Zinc: Fortified cereals, red meats, seafood    Consider Kwan by [employer] (These are essential branch chain amino acids that help with tissue building and wound healing) and take 2 packets/day. you can order online at [employer] or [employer]    ADDITIONAL REMINDERS:    The treatment plan has been discussed at length with you and your provider. Follow all instructions carefully, it is very important. If you do not follow all instructions, you are at  risk of your wound not healing, infection, possible loss of limb and even end of  life.  Please call the clinic during regular business hours ( 7:30 AM - 5:30 PM) if you notice increased bleeding, redness, warmth, pain or pus like drainage or start running a fever greater than 100.3.    For after hour emergencies, please call your primary physician or go to the nearest emergency room.

## 2024-07-17 NOTE — PROGRESS NOTES
Patient ID: Alejandro Guardado is a 89 year old male.    Debridement Venous Ulcer Right;Anterior Leg   Wound 04/15/24 #2 Leg Right;Anterior    Performed by: Fortino Mo MD  Authorized by: Fortino Mo MD      Consent   Consent obtained? verbal  Consent given by: patient  Risks discussed? procedural risks discussed    Debridement Details  Performed by: physician  Debridement type: conservative sharp  Pain control: lidocaine 4%  Pain control administration type: topical    Pre-debridement measurements  Length (cm): 2.2  Width (cm): 1.3  Depth (cm): 0.1  Surface Area (cm^2): 2.86    Post-debridement measurements  Length (cm): 2.2  Width (cm): 1.3  Depth (cm): 0.1  Percent debrided: 50%  Surface Area (cm^2): 2.86  Area Debrided (cm^2): 1.43  Volume (cm^3): 0.29    Devitalized tissue debrided: biofilm and slough  Bleeding: small  Hemostasis obtained with: pressure  Procedural pain (0-10): 2  Post-procedural pain: 0   Response to treatment: procedure was tolerated well

## 2024-07-17 NOTE — PROGRESS NOTES
[facility] PROGRESS NOTE  FARZAD WILLINGHAM MD  7/17/2024    Chief Complaint:   Chief Complaint   Patient presents with    Wound Care     Pt here for follow up wound care visit to right lower leg wound. Pt denies any concerns or issues. Pt denies any pain in wound at this time.        HPI:   Subjective   Alejandro Guardado is a 89 year old male coming in for a follow-up visit.    HPI    Right leg wound improved - less thick slough.   Granulation peeking from under the slough.   Edema slightly better than before.     Heel wound closed.     Left foot wound improved - good granulation - looking the best it ever has - being managed by podiatrist Dr. Poon.     Review of Systems  Negative except HPI   Denies chest pain / SOB / palpitations  Denies fever.     Allergies  Allergies   Allergen Reactions    Heparin ANAPHYLAXIS    Hydromorphone HALLUCINATION       Current Meds:  Current Outpatient Medications   Medication Sig Dispense Refill    gentamicin 0.1 % External Cream Apply topically to the ulcer site once daily 30 g 1    furosemide 40 MG Oral Tab Take 2 tablets (80 mg total) by mouth 2 (two) times daily.      lisinopril 2.5 MG Oral Tab Take 1 tablet (2.5 mg total) by mouth daily.      Ascorbic Acid (VITAMIN C) 1000 MG Oral Tab Take 1.5 tablets (1,500 mg total) by mouth daily.      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      metoprolol succinate ER 25 MG Oral Tablet 24 Hr Take 1 tablet (25 mg total) by mouth Daily Beta Blocker. (Patient taking differently: Take 2 tablets (50 mg total) by mouth Daily Beta Blocker.) 90 tablet 1    tamsulosin 0.4 MG Oral Cap Take 1 capsule (0.4 mg total) by mouth daily.      apixaban 5 MG Oral Tab Take 1 tablet (5 mg total) by mouth 2 (two) times daily. 60 tablet 3    atorvastatin 40 MG Oral Tab Take 1 tablet (40 mg  total) by mouth daily. (Patient taking differently: Take 2 tablets (80 mg total) by mouth daily.) 30 tablet 0    finasteride 5 MG Oral Tab Take 1 tablet (5 mg total) by mouth daily. 30 tablet 0    clopidogrel 75 MG Oral Tab Take 1 tablet (75 mg total) by mouth daily. 30 tablet 0    gabapentin 300 MG Oral Cap Take 1 capsule (300 mg total) by mouth 3 (three) times daily. (Patient taking differently: Take 1 capsule (300 mg total) by mouth. Takes 300 mg in am and 600 mg at bedtime) 90 capsule 0    predniSONE 5 MG Oral Tab Take 3 tablets (15 mg total) by mouth daily.      folic acid 1 MG Oral Tab Take 1 tablet (1 mg total) by mouth daily.      acetaminophen 500 MG Oral Tab Take 2 tablets (1,000 mg total) by mouth every 8 (eight) hours. 21 tablet 0    Omeprazole 40 MG Oral Capsule Delayed Release Take 1 capsule (40 mg total) by mouth daily.           EXAM:   Objective   Objective    Physical Exam    Vital Signs  Vitals:    07/17/24 1500   BP: 110/71   Pulse: 101   Resp: 15   Temp: 98.7 °F (37.1 °C)       Wound Assessment  Wound 08/14/23 #1- Foot Right (Active)   Wound Image   07/08/24 1309   Drainage Amount Scant 07/08/24 1308   Drainage Description Serosanguineous 07/08/24 1308   Treatments Skin Substitutes 05/29/24 1124   Wound Vac Brand KCI 10/30/23 0907   Wound Length (cm) 0.8 cm 07/08/24 1309   Wound Width (cm) 3.1 cm 07/08/24 1309   Wound Surface Area (cm^2) 2.48 cm^2 07/08/24 1309   Wound Depth (cm) 0.1 cm 07/08/24 1309   Wound Volume (cm^3) 0.248 cm^3 07/08/24 1309   Wound Healing % 99 07/08/24 1309   Margins Well-defined edges;Epibole (Rolled edges) 07/08/24 1308   Non-staged Wound Description Full thickness 07/08/24 1308   Peggy-wound Assessment Edema;Moist;Maceration 07/08/24 1308   Wound Granulation Tissue Pink;Firm 07/08/24 1308   Wound Bed Granulation (%) 40 % 07/08/24 1308   Wound Bed Epithelium (%) 40 % 06/17/24 1342   Wound Bed Slough (%) 60 % 07/08/24 1308   Wound Bed Eschar (%) 40 % 08/14/23 1315   Wound  Odor None 07/08/24 1308   Exposed Structure Bone Necrosis 05/13/24 1602   Shape 60% epifix 06/10/24 1349   Tunneling? No 06/24/24 1338   Undermining? No 06/24/24 1338   Sinus Tracts? No 06/24/24 1338       Wound 04/15/24 #2 Leg Right;Anterior (Active)   Wound Image   07/17/24 1512   Drainage Amount Moderate 07/17/24 1512   Drainage Description Yellow;Serous 07/17/24 1512   Treatments Santyl 07/08/24 1309   Wound Length (cm) 2.2 cm 07/17/24 1512   Wound Width (cm) 1.3 cm 07/17/24 1512   Wound Surface Area (cm^2) 2.86 cm^2 07/17/24 1512   Wound Depth (cm) 0.1 cm 07/17/24 1512   Wound Volume (cm^3) 0.286 cm^3 07/17/24 1512   Wound Healing % -144 07/17/24 1512   Margins Well-defined edges 07/17/24 1512   Non-staged Wound Description Full thickness 07/17/24 1512   Peggy-wound Assessment Edema;Hemosiderin staining;Maceration;Moist 07/17/24 1512   Wound Granulation Tissue Pink;[place];Firm 07/17/24 1512   Wound Bed Granulation (%) 5 % 07/17/24 1512   Wound Bed Slough (%) 95 % 07/17/24 1512   Wound Odor None 07/17/24 1512   Tunneling? No 06/24/24 1340   Undermining? No 06/24/24 1340   Sinus Tracts? No 06/24/24 1340           ASSESSMENT AND PLAN:     Assessment   Assessment    Encounter Diagnosis  1. Edema of right lower extremity    2. Non-pressure chronic ulcer of right calf with fat layer exposed ([facility])    3. Sloughing of wound    4. Foot ulcer, right, with fat layer exposed ([facility])    5. Non-healing open wound of right heel, subsequent encounter    6. Right leg swelling    7. PAD (peripheral artery disease) ([facility])    8. History of transmetatarsal amputation of foot ([facility])    9. Gait difficulty        Problem List  Patient Active Problem List   Diagnosis    Closed minimally displaced zone I fracture of sacrum ([facility])    At risk for falling    CAD (coronary artery disease)    Ischemic cardiomyopathy    Azotemia    Arrhythmia    Esophageal reflux    History of MI (myocardial infarction)    Mixed hyperlipidemia    Primary  hypertension    Dizziness    Medication side effect    Closed left hip fracture (HCC)  Global 6/22/2016    Status post hip surgery    Left shoulder distal clavical resection and excision of ganglion cyst of soft tissue mass   Global  09/17/2020    Orthopedic aftercare for healing traumatic hip fracture, left, closed    Closed left hip fracture, with routine healing, subsequent encounter    Osteoarthrosis, localized, primary, knee, left    Osteoarthrosis, localized, primary, knee, right    Gangrene ([facility])    PAD (peripheral artery disease) ([facility])    Benign prostatic hyperplasia with incomplete bladder emptying    Gangrene of foot ([facility])    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) ([facility])    Leukocytosis    Atrial fibrillation with RVR ([facility])    Hypokalemia    Acute kidney injury ([facility])    Hyperglycemia    Community acquired pneumonia of right lung, unspecified part of lung    Acute respiratory failure with hypoxia ([facility])    Hypotension, unspecified hypotension type    Sepsis due to pneumonia ([facility])    Foot ulcer with fat layer exposed, right ([facility])    Syncope, unspecified syncope type    Sepsis, due to unspecified organism, unspecified whether acute organ dysfunction present ([facility])    Shock ([facility])    Acute hypoxic respiratory failure ([facility])    Pneumonia, bacterial    Hyponatremia    Metabolic alkalosis    Recurrent pneumonia    Sepsis due to undetermined organism ([facility])    Acute on chronic congestive heart failure, unspecified heart failure type ([facility])    Acute hypoxemic respiratory failure ([facility])    ERON (acute kidney injury) ([facility])    Lactic acidosis    Leukocytosis, unspecified type    Palliative care encounter    Counseling regarding advance care planning and goals of care    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) ([facility])         MANAGEMENT      Serial debridements to hasten healing.   Continue santyl on right leg wound   Continue collagen on foot wound  Counseled low salt diet and leg  elevation  May not drive at this point of time.   Return one week.     PROCEDURES:    Debridement Venous Ulcer Right;Anterior Leg   Wound 04/15/24 #2 Leg Right;Anterior     Performed by: Fortino Mo MD  Authorized by: Fortino Mo MD       Consent   Consent obtained? verbal  Consent given by: patient  Risks discussed? procedural risks discussed     Debridement Details  Performed by: physician  Debridement type: conservative sharp  Pain control: lidocaine 4%  Pain control administration type: topical     Pre-debridement measurements  Length (cm): 2.2  Width (cm): 1.3  Depth (cm): 0.1  Surface Area (cm^2): 2.86     Post-debridement measurements  Length (cm): 2.2  Width (cm): 1.3  Depth (cm): 0.1  Percent debrided: 50%  Surface Area (cm^2): 2.86  Area Debrided (cm^2): 1.43  Volume (cm^3): 0.29     Devitalized tissue debrided: biofilm and slough  Bleeding: small  Hemostasis obtained with: pressure  Procedural pain (0-10): 2  Post-procedural pain: 0   Response to treatment: procedure was tolerated well    Patient Instructions     Continue current plan of care.   Continue  santyl on right leg wound for enzymatic debridement  Continue collagen / HF transfer for foot wound.   Leg elevation and low salt diet  See Dr. Poon next Monday.     Wound Cleaning and Dressings:    Wash your hands with soap and water. Always wear gloves while changing dressings. Donot touch wound / mio-wound skin with un-gloved hands. Remove old dressing, discard and place into trash.    DRESSINGS:   Right leg: santyl / HF transfer - Change dressing daily.  Right foot: collagen / HF transfer- change dressing QOD  Offload wounded area.     Off-Loading: modified foot wear for maximal offloading.     Miscellaneous Instructions:  Supplement with a daily multivitamin   Low salt diet  Intense blood sugar control - Goal Blood sugar below 180 at all times recommended.  Increase protein intake / consider protein supplements - see  below  Elevate extremities at all times when sitting / laying down.    DIETARY MODIFICATIONS TO HELP WITH WOUND HEALING:    Protein: Meats, beans, eggs, milk and yogurt particularly Greek yogurt), tofu, soy nuts, soy protein products    Vitamin C: Citrus fruits and juices, strawberries, tomatoes, tomato juice, peppers, baked potatoes, spinach, broccoli, cauliflower, [place] sprouts, cabbage    Vitamin A: Dark green, leafy vegetables, orange or yellow vegetables, cantaloupe, fortified dairy products, liver, fortified cereals    Zinc: Fortified cereals, red meats, seafood    Consider Kwan by [employer] (These are essential branch chain amino acids that help with tissue building and wound healing) and take 2 packets/day. you can order online at [employer] or [employer]    ADDITIONAL REMINDERS:    The treatment plan has been discussed at length with you and your provider. Follow all instructions carefully, it is very important. If you do not follow all instructions, you are at  risk of your wound not healing, infection, possible loss of limb and even end of life.  Please call the clinic during regular business hours ( 7:30 AM - 5:30 PM) if you notice increased bleeding, redness, warmth, pain or pus like drainage or start running a fever greater than 100.3.    For after hour emergencies, please call your primary physician or go to the nearest emergency room.    Orders  Orders Placed This Encounter   Procedures    Debridement Venous Ulcer Right;Anterior Leg       Patient/Caregiver Education: There are no barriers to learning. Medical education for above diagnosis given.   Answered all questions.    Outcome: Patient verbalizes understanding. Patient is notified to call with any questions, complications, allergies, or worsening or changing symptoms.  Patient is to call with any side effects or complications as a result of the treatments today.      DOCUMENTATION OF TIME SPENT: Code selection for this visit was based on time  spent : 35 min on date of service in preparing to see the patient, obtaining and/or reviewing separately obtained history, performing a medically appropriate examination, counseling and educating the patient/family/caregiver, ordering medications or testing, referring and communicating with other healthcare providers, documenting clinical information in the E HR, independently interpreting results and communicating results to the patient/family/caregiver and care coordination with the patient's other providers.    Followup: Return in 1 week (on 7/24/2024) for Wound followup.      Note to Patient:  The 21st Century Cures Act makes medical notes like these available to patients in the interest of transparency. However, be advised this is a medical document and is intended as [id number] communication; it is written in medical language and may appear blunt, direct, or contain abbreviations or verbiage that are unfamiliar. Medical documents are intended to carry relevant information, facts as evident, and the clinical opinion of the practitioner.    Also, please note that this report has been produced using speech recognition software and may contain errors related to that system including, but not limited to, errors in grammar, punctuation, and spelling, as well as words and phrases that possibly may have been recognized inappropriately.  If there are any questions or concerns, contact the dictating provider for clarification.      Fortino Butler MD  7/17/2024  3:19 PM

## 2024-07-17 NOTE — PROGRESS NOTES
Weekly Wound Education Note    Teaching Provided To: Patient  Training Topics: Cleasing and general instructions;Discharge instructions;Dressing;Off-loading  Training Method: Explain/Verbal  Training Response: Patient responds and understands;Reinforcement needed        Notes: Slightly improving. Right leg: santyl, hydrofera transfer, ABD pad, conforming gauze, tape. Foot: endoform, hydrofera transfer, ABD pad, conforming gauze, tape.

## 2024-07-21 ENCOUNTER — HOSPITAL ENCOUNTER (INPATIENT)
Facility: HOSPITAL | Age: 89
LOS: 3 days | Discharge: HOME | End: 2024-07-25
Attending: EMERGENCY MEDICINE | Admitting: INTERNAL MEDICINE
Payer: MEDICARE

## 2024-07-21 ENCOUNTER — APPOINTMENT (OUTPATIENT)
Dept: CT IMAGING | Facility: HOSPITAL | Age: 89
End: 2024-07-21
Attending: EMERGENCY MEDICINE
Payer: MEDICARE

## 2024-07-21 DIAGNOSIS — N17.9 ACUTE RENAL FAILURE SUPERIMPOSED ON CHRONIC KIDNEY DISEASE, UNSPECIFIED ACUTE RENAL FAILURE TYPE, UNSPECIFIED CKD STAGE (HCC): ICD-10-CM

## 2024-07-21 DIAGNOSIS — R65.20 SEVERE SEPSIS (HCC): Primary | ICD-10-CM

## 2024-07-21 DIAGNOSIS — I21.4 NSTEMI (NON-ST ELEVATED MYOCARDIAL INFARCTION) (HCC): ICD-10-CM

## 2024-07-21 DIAGNOSIS — R79.89 ELEVATED TROPONIN: ICD-10-CM

## 2024-07-21 DIAGNOSIS — J18.9 MULTIFOCAL PNEUMONIA: ICD-10-CM

## 2024-07-21 DIAGNOSIS — N18.9 ACUTE RENAL FAILURE SUPERIMPOSED ON CHRONIC KIDNEY DISEASE, UNSPECIFIED ACUTE RENAL FAILURE TYPE, UNSPECIFIED CKD STAGE (HCC): ICD-10-CM

## 2024-07-21 DIAGNOSIS — A41.9 SEVERE SEPSIS (HCC): Primary | ICD-10-CM

## 2024-07-21 DIAGNOSIS — I48.91 ATRIAL FIBRILLATION WITH RVR (HCC): ICD-10-CM

## 2024-07-21 PROBLEM — J69.0 ASPIRATION PNEUMONITIS (HCC): Status: ACTIVE | Noted: 2024-07-21

## 2024-07-21 PROBLEM — E86.1 HYPOTENSION DUE TO HYPOVOLEMIA: Status: ACTIVE | Noted: 2023-12-01

## 2024-07-21 LAB
ALBUMIN SERPL-MCNC: 3.9 G/DL (ref 3.2–4.8)
ALBUMIN/GLOB SERPL: 1.4 {RATIO} (ref 1–2)
ALP LIVER SERPL-CCNC: 62 U/L
ALT SERPL-CCNC: 20 U/L
ANION GAP SERPL CALC-SCNC: 17 MMOL/L (ref 0–18)
APTT PPP: 32.7 SECONDS (ref 23–36)
AST SERPL-CCNC: 39 U/L (ref ?–34)
BASOPHILS # BLD: 0 X10(3) UL (ref 0–0.2)
BASOPHILS NFR BLD: 0 %
BILIRUB SERPL-MCNC: 0.6 MG/DL (ref 0.2–1.1)
BILIRUB UR QL STRIP.AUTO: NEGATIVE
BUN BLD-MCNC: 33 MG/DL (ref 9–23)
CALCIUM BLD-MCNC: 9.3 MG/DL (ref 8.7–10.4)
CHLORIDE SERPL-SCNC: 105 MMOL/L (ref 98–112)
CHOLEST SERPL-MCNC: 112 MG/DL (ref ?–200)
CLARITY UR REFRACT.AUTO: CLEAR
CO2 SERPL-SCNC: 19 MMOL/L (ref 21–32)
CREAT BLD-MCNC: 2.57 MG/DL
EGFRCR SERPLBLD CKD-EPI 2021: 23 ML/MIN/1.73M2 (ref 60–?)
EOSINOPHIL # BLD: 0 X10(3) UL (ref 0–0.7)
EOSINOPHIL NFR BLD: 0 %
ERYTHROCYTE [DISTWIDTH] IN BLOOD BY AUTOMATED COUNT: 16.9 %
FLUAV + FLUBV RNA SPEC NAA+PROBE: NEGATIVE
FLUAV + FLUBV RNA SPEC NAA+PROBE: NEGATIVE
GLOBULIN PLAS-MCNC: 2.7 G/DL (ref 2.8–4.4)
GLUCOSE BLD-MCNC: 128 MG/DL (ref 70–99)
GLUCOSE BLD-MCNC: 134 MG/DL (ref 70–99)
GLUCOSE UR STRIP.AUTO-MCNC: NORMAL MG/DL
HCT VFR BLD AUTO: 40.6 %
HDLC SERPL-MCNC: 46 MG/DL (ref 40–59)
HGB BLD-MCNC: 12.5 G/DL
INR BLD: 1.45 (ref 0.8–1.2)
KETONES UR STRIP.AUTO-MCNC: NEGATIVE MG/DL
LACTATE SERPL-SCNC: 9.9 MMOL/L (ref 0.5–2)
LDLC SERPL CALC-MCNC: 42 MG/DL (ref ?–100)
LEUKOCYTE ESTERASE UR QL STRIP.AUTO: NEGATIVE
LYMPHOCYTES NFR BLD: 1.97 X10(3) UL (ref 1–4)
LYMPHOCYTES NFR BLD: 10 %
MAGNESIUM SERPL-MCNC: 1.7 MG/DL (ref 1.6–2.6)
MCH RBC QN AUTO: 26.7 PG (ref 26–34)
MCHC RBC AUTO-ENTMCNC: 30.8 G/DL (ref 31–37)
MCV RBC AUTO: 86.6 FL
METAMYELOCYTES # BLD: 0.59 X10(3) UL
METAMYELOCYTES NFR BLD: 3 %
MONOCYTES # BLD: 1.38 X10(3) UL (ref 0.1–1)
MONOCYTES NFR BLD: 7 %
MORPHOLOGY: NORMAL
NEUTROPHILS # BLD AUTO: 15.8 X10 (3) UL (ref 1.5–7.7)
NEUTROPHILS NFR BLD: 39 %
NEUTS BAND NFR BLD: 41 %
NEUTS HYPERSEG # BLD: 15.76 X10(3) UL (ref 1.5–7.7)
NITRITE UR QL STRIP.AUTO: NEGATIVE
NONHDLC SERPL-MCNC: 66 MG/DL (ref ?–130)
NT-PROBNP SERPL-MCNC: 8007 PG/ML (ref ?–450)
OSMOLALITY SERPL CALC.SUM OF ELEC: 301 MOSM/KG (ref 275–295)
PH UR STRIP.AUTO: 5.5 [PH] (ref 5–8)
PLATELET # BLD AUTO: 309 10(3)UL (ref 150–450)
PLATELET MORPHOLOGY: NORMAL
POTASSIUM SERPL-SCNC: 4.3 MMOL/L (ref 3.5–5.1)
PROT SERPL-MCNC: 6.6 G/DL (ref 5.7–8.2)
PROT UR STRIP.AUTO-MCNC: NEGATIVE MG/DL
PROTHROMBIN TIME: 17.7 SECONDS (ref 11.6–14.8)
RBC # BLD AUTO: 4.69 X10(6)UL
RBC UR QL AUTO: NEGATIVE
RSV RNA SPEC NAA+PROBE: NEGATIVE
SARS-COV-2 RNA RESP QL NAA+PROBE: NOT DETECTED
SODIUM SERPL-SCNC: 141 MMOL/L (ref 136–145)
SP GR UR STRIP.AUTO: 1.01 (ref 1–1.03)
TOTAL CELLS COUNTED BLD: 100
TRIGL SERPL-MCNC: 141 MG/DL (ref 30–149)
TROPONIN I SERPL HS-MCNC: 255 NG/L
UROBILINOGEN UR STRIP.AUTO-MCNC: NORMAL MG/DL
VLDLC SERPL CALC-MCNC: 20 MG/DL (ref 0–30)
WBC # BLD AUTO: 19.7 X10(3) UL (ref 4–11)

## 2024-07-21 PROCEDURE — 71250 CT THORAX DX C-: CPT | Performed by: EMERGENCY MEDICINE

## 2024-07-21 PROCEDURE — 99223 1ST HOSP IP/OBS HIGH 75: CPT | Performed by: INTERNAL MEDICINE

## 2024-07-21 PROCEDURE — 74176 CT ABD & PELVIS W/O CONTRAST: CPT | Performed by: EMERGENCY MEDICINE

## 2024-07-21 NOTE — ED PROVIDER NOTES
Patient Seen in: [facility] Emergency Department      History     Chief Complaint   Patient presents with    Hypotension     Stated Complaint: lethergic, hypotension    Subjective:   89-year-old male, history of A-fib on Eliquis, CAD, CHF, peripheral arterial disease with multiple stenting, right toe and foot amputations, chronic wounds and follows at the wound clinic, presents from home with lethargy.  Per EMS his blood pressure was 57 systolic upon arrival.  He been lethargic since this morning.  He does not ambulate using a walker and transfers from bed to chair which she did this morning.  Family states when he gets sick he is sick really quickly.  History of recurrent pneumonia.  Oriented to person and situation.  Lethargic but arousable.  No falls or trauma            Objective:   Past Medical History:    Amputation of right foot (HCC)    non-healing. Followed by [facility] clinic    Atrial fibrillation (HCC)    CAD (coronary artery disease)    CHF (congestive heart failure) (HCC)    Easy bruising    Esophageal reflux    Fatigue    Hearing impairment    Hearing loss    Heart attack (HCC)    High blood pressure    High cholesterol    History of MI (myocardial infarction)    HTN (hypertension)    Hyperlipidemia    Leg swelling    Loss of appetite    PAD (peripheral artery disease) (HCC)    Peripheral vascular disease (HCC)    Uncomfortable fullness after meals    Visual impairment    glasses    Wears glasses              Past Surgical History:   Procedure Laterality Date    Angiogram      Angioplasty (coronary)      Cabg      Cataract  04/2023    Fracture surgery Left     left hip pinning    Other surgical history Left 01/01/1977    knee surgery    Other surgical history  03/25/2016    Left hip ORIF pinning    Tonsillectomy                  Social History     Socioeconomic History    Marital status:    Tobacco Use    Smoking status: Never    Smokeless tobacco: Never   Vaping Use    Vaping status: Never  Used   Substance and Sexual Activity    Alcohol use: Never    Drug use: Never     Social Determinants of Health     Financial Resource Strain: Low Risk  (11/7/2023)    Received from [facility]    Financial Resource Strain     In the past year, have you or any family members you live with been unable to get any of the following when it was really needed? Check all that apply.: None   Food Insecurity: No Food Insecurity (3/28/2024)    Food Insecurity     Food Insecurity: Never true   Transportation Needs: No Transportation Needs (3/28/2024)    Transportation Needs     Lack of Transportation: No   Physical Activity: High Risk (3/15/2024)    Received from [facility], [facility]    Exercise Vital Sign     On average, how many days per week do you engage in moderate to strenuous exercise (like a brisk walk)?: 0 days     On average, how many minutes do you engage in exercise at this level?: 0 min   Social Connections: Low Risk  (11/7/2023)    [facility] from [facility], [facility]    Social Connections     How often do you see or talk to people that you care about and feel close to? (For example: talking to friends on the phone, visiting friends or family, going to Hinduism or club meetings): 5 or more times a week   Housing Stability: Low Risk  (3/28/2024)    Housing Stability     Housing Instability: No              Review of Systems   Unable to perform ROS: Acuity of condition       Positive for stated Chief Complaint: Hypotension    Other systems are as noted in HPI.  Constitutional and vital signs reviewed.      All other systems reviewed and negative except as noted above.    Physical Exam     ED Triage Vitals [07/21/24 1752]   BP (!) 119/99   Pulse 119   Resp (!) 28   Temp 98.3 °F (36.8 °C)   Temp src Temporal   SpO2 96 %   O2 Device None (Room air)       Current Vitals:   Vital Signs  BP: 132/50  Pulse: 101  Resp: 22  Temp: 98.3 °F  (36.8 °C)  Temp src: Temporal  MAP (mmHg): 68    Oxygen Therapy  SpO2: 95 %  O2 Device: None (Room air)            Physical Exam  Vitals and nursing note reviewed.   Constitutional:       General: He is in acute distress.      Appearance: He is ill-appearing.   HENT:      Head: Normocephalic.   Cardiovascular:      Rate and Rhythm: Tachycardia present. Rhythm irregular.      Pulses: Normal pulses.      Heart sounds: Murmur heard.   Pulmonary:      Effort: Respiratory distress present.      Breath sounds: Rales present.   Abdominal:      Palpations: Abdomen is soft.      Tenderness: There is no abdominal tenderness.   Musculoskeletal:      Cervical back: Neck supple.      Right lower leg: Edema present.      Left lower leg: Edema present.   Neurological:      Mental Status: He is alert.         Small wound to the right anterior shin.  Nonhealing wounds of his amputations of his foot.  Son changed it yesterday and was in the wound clinic on Monday    ED Course     Labs Reviewed   COMP METABOLIC PANEL (14) - Abnormal; Notable for the following components:       Result Value    Glucose 134 (*)     CO2 19.0 (*)     BUN 33 (*)     Creatinine 2.57 (*)     Calculated Osmolality 301 (*)     eGFR-Cr 23 (*)     AST 39 (*)     Globulin  2.7 (*)     All other components within normal limits   LACTIC ACID, PLASMA - Abnormal; Notable for the following components:    Lactic Acid 9.9 (*)     All other components within normal limits   PROTHROMBIN TIME (PT) - Abnormal; Notable for the following components:    PT 17.7 (*)     INR 1.45 (*)     All other components within normal limits   TROPONIN I HIGH SENSITIVITY - Abnormal; Notable for the following components:    Troponin I (High Sensitivity) 255 (*)     All other components within normal limits   PRO BETA NATRIURETIC PEPTIDE - Abnormal; Notable for the following components:    Pro-Beta Natriuretic Peptide 8,007 (*)     All other components within normal limits   MANUAL DIFFERENTIAL  - Abnormal; Notable for the following components:    Neutrophil Absolute Manual 15.76 (*)     Monocyte Absolute Manual 1.38 (*)     Metamyelocyte Absolute Manual 0.59 (*)     All other components within normal limits   POCT GLUCOSE - Abnormal; Notable for the following components:    POC Glucose 128 (*)     All other components within normal limits   CBC W/ DIFFERENTIAL - Abnormal; Notable for the following components:    WBC 19.7 (*)     HGB 12.5 (*)     MCHC 30.8 (*)     Neutrophil Absolute Prelim 15.80 (*)     All other components within normal limits   PTT, ACTIVATED - Normal   MAGNESIUM - Normal   LIPID PANEL - Normal   SARS-COV-2/FLU A AND B/RSV BY PCR (GENEXPERT) - Normal    Narrative:     This test is intended for the qualitative detection and differentiation of SARS-CoV-2, influenza A, influenza B, and respiratory syncytial virus (RSV) viral RNA in nasopharyngeal or nares swabs from individuals suspected of respiratory viral infection consistent with COVID-19 by their healthcare provider. Signs and symptoms of respiratory viral infection due to SARS-CoV-2, influenza, and RSV can be similar.    Test performed using the Xpert Xpress SARS-CoV-2/FLU/RSV (real time RT-PCR)  assay on the [employer] instrument, [employer], [place] [address].   This test is being used under the Food and Drug Administration's Emergency Use Authorization.    The authorized Fact Sheet for Healthcare Providers for this assay is available upon request from the laboratory.   CBC WITH DIFFERENTIAL WITH PLATELET    Narrative:     The following orders were created for panel order CBC With Differential With Platelet.  Procedure                               Abnormality         Status                     ---------                               -----------         ------                     CBC W/ DIFFERENTIAL[[id number]]          Abnormal            Final result                 Please view results for these tests on the individual orders.    URINALYSIS WITH CULTURE REFLEX   SCAN SLIDE   LACTIC ACID REFLEX POST POSTIVE   RAINBOW DRAW LAVENDER   RAINBOW DRAW LIGHT GREEN   RAINBOW DRAW BLUE   RAINBOW DRAW GOLD   BLOOD CULTURE   BLOOD CULTURE     EKG    Rate, intervals and axes as noted on EKG Report.  Rate: 121  Rhythm: Atrial Fibrillation  Reading: EKG A-fib  bpm.  No ST elevations.  Poor baseline secondary tears.  HERNANDEZ motion artifact.  When compared to March 2024, no significant changes are noted    Patient placed on cardiac monitor for telemetry monitoring secondary to lethargy, weakness. Interpretation at bedside by me is afib.                            MDM      CT CHEST+ABDOMEN+PELVIS(CPT=71250/10237)    Result Date: 7/21/2024  CONCLUSION:   1. Extensive tree-in-bud and patchy consolidative opacities involving the lower lobes, middle lobe, lingula, and right upper lobe, features and distribution favoring aspiration pneumonia.  This is supported by fluid distention of the esophagus which may represent gastroesophageal reflux or possibly motility disorder.  2. No acute process of the abdomen or pelvis.      LOCATION:  [facility]    Dictated by (CST): Luisito Faria MD on 7/21/2024 at 8:11 PM     Finalized by (CST): Luisito Faria MD on 7/21/2024 at 8:16 PM        I independently interpreted the CT of the chest and note multifocal pneumonia most notably in the lower lobes bilaterally in the right upper lobe    Family at bedside helpful to provide information on the history presenting illness    Differential diagnosis includes, but not limited to, pneumonia, bacteremia, hypoglycemia, dehydration    External chart review demonstrates admissions for cardiac workups and for his pneumonia in the past    89-year-old male was at home, checked by family today and lethargic.  States he gets this way when he has pneumonia.  CT does have pneumonia.  Also has renal failure likely from not drinking.  No obstructive uropathy.  Has lactic acidosis.  He was hypotensive  on arrival.  He is given 1 L normal saline.  He has severe aortic stenosis.  He has a very poor EF about 24% therefore was concerning for pulmonary edema fluids were scaled back.  Broad-spectrum antibiotics were given.  He has chronic wounds to his right lower extremity.  He is select DNAR.  Blood pressures 130s over 50s, heart rate is 98.  He is now rate controlled A-fib.  Cautious to put on any sort of Cardizem drip secondary to his elevated lactate and possibility of hypotension/shock.  Not currently hypotensive.  Heart rate is improved.  Mental status is improved as well.  Admitted to Dr. Ramírez, CTU, awaiting bed assignment.  Repeat lactate pending. [facility] on consult    CRITICAL CARE:  A total of 85 minutes of critical care time (exclusive of billable procedures) was administered to manage the patient's critical lab values, critical imaging findings, unstable vital signs, respiratory instability, cardiovascular instability, neurologic instability, and metabolic instability due to his severe sepsis, A-fib with RVR, hypotension tachycardia in the setting of severe arctic stenosis but also poor ejection fraction.  Monitoring volume status.  Discussed with [facility] cardiology.  Multiple discussion with hospitalist.  Frequent assessments and reassessments.  Chart review, documentation, imaging reviewed interpretation  This involved direct patient intervention, complex decision making, and/or extensive discussions with the patient, family, and clinical staff.      Admission disposition: 7/21/2024  9:27 PM                     Patient did not receive 30ml/kg of fluids despite having: elevated Lactate. The reason for doing so is: patient has heart failure. 1200mL of fluids were given instead (1200 is the amount of fluids given).                 [facility]   part of [facility]      Sepsis Reassessment Note    BP (!) 119/99   Pulse 119   Temp 98.3 °F (36.8 °C) (Temporal)   Resp (!) 28   Ht 182.9 cm (6')   Wt 90.7  kg   SpO2 96%   BMI 27.12 kg/m²      I completed the sepsis reassessment at 2100    Cardiac:  Regularity: Irregular  Rate: Tachycardic  Heart Sounds: Murmur    Lungs:   Right: Diminished  Left: Diminished    Peripheral Pulses:  Radial: Right 1+ or Left 1+      Capillary Refill:  <3 Secs    Skin:  Temp/Moisture: Warm and Dry  Color: Normal      Chon Whitehead DO  7/21/2024  6:06 PM            Medical Decision Making      Disposition and Plan     Clinical Impression:  1. Severe sepsis (HCC)    2. Multifocal pneumonia    3. Acute renal failure superimposed on chronic kidney disease, unspecified acute renal failure type, unspecified CKD stage ([facility])    4. Elevated troponin    5. NSTEMI (non-ST elevated myocardial infarction) ([facility])    6. Atrial fibrillation with RVR ([facility])         Disposition:  Admit  7/21/2024  9:27 pm    Follow-up:  No follow-up provider specified.        Medications Prescribed:  Current Discharge Medication List                            Hospital Problems       Present on Admission  Date Reviewed: 7/17/2024            ICD-10-CM Noted POA    * (Principal) Severe sepsis ([facility]) A41.9, R65.20 7/21/2024 Unknown

## 2024-07-21 NOTE — ED INITIAL ASSESSMENT (HPI)
Pt found unresponsive in wheelchair at home.  Pt was arousal by EMS.  Pt found hypotensive, 50's systolic.  Pt was also hypoxic and placed on O2.

## 2024-07-22 ENCOUNTER — APPOINTMENT (OUTPATIENT)
Dept: WOUND CARE | Facility: HOSPITAL | Age: 89
End: 2024-07-22
Payer: MEDICARE

## 2024-07-22 PROBLEM — I21.4 NSTEMI (NON-ST ELEVATED MYOCARDIAL INFARCTION) (HCC): Status: ACTIVE | Noted: 2024-07-22

## 2024-07-22 PROBLEM — N17.9 ACUTE RENAL FAILURE SUPERIMPOSED ON CHRONIC KIDNEY DISEASE, UNSPECIFIED ACUTE RENAL FAILURE TYPE, UNSPECIFIED CKD STAGE (HCC): Status: ACTIVE | Noted: 2024-07-22

## 2024-07-22 PROBLEM — N17.9 ACUTE RENAL FAILURE SUPERIMPOSED ON CHRONIC KIDNEY DISEASE, UNSPECIFIED ACUTE RENAL FAILURE TYPE, UNSPECIFIED CKD STAGE: Status: ACTIVE | Noted: 2024-07-22

## 2024-07-22 PROBLEM — N18.9 ACUTE RENAL FAILURE SUPERIMPOSED ON CHRONIC KIDNEY DISEASE, UNSPECIFIED ACUTE RENAL FAILURE TYPE, UNSPECIFIED CKD STAGE (HCC): Status: ACTIVE | Noted: 2024-07-22

## 2024-07-22 PROBLEM — R79.89 ELEVATED TROPONIN: Status: ACTIVE | Noted: 2024-07-22

## 2024-07-22 PROBLEM — J18.9 MULTIFOCAL PNEUMONIA: Status: ACTIVE | Noted: 2024-07-22

## 2024-07-22 PROBLEM — N18.9 ACUTE RENAL FAILURE SUPERIMPOSED ON CHRONIC KIDNEY DISEASE, UNSPECIFIED ACUTE RENAL FAILURE TYPE, UNSPECIFIED CKD STAGE: Status: ACTIVE | Noted: 2024-07-22

## 2024-07-22 LAB
ANION GAP SERPL CALC-SCNC: 6 MMOL/L (ref 0–18)
ANION GAP SERPL CALC-SCNC: 7 MMOL/L (ref 0–18)
BUN BLD-MCNC: 26 MG/DL (ref 9–23)
BUN BLD-MCNC: 30 MG/DL (ref 9–23)
CALCIUM BLD-MCNC: 8.2 MG/DL (ref 8.7–10.4)
CALCIUM BLD-MCNC: 8.7 MG/DL (ref 8.7–10.4)
CHLORIDE SERPL-SCNC: 108 MMOL/L (ref 98–112)
CHLORIDE SERPL-SCNC: 109 MMOL/L (ref 98–112)
CO2 SERPL-SCNC: 24 MMOL/L (ref 21–32)
CO2 SERPL-SCNC: 25 MMOL/L (ref 21–32)
CREAT BLD-MCNC: 1.59 MG/DL
CREAT BLD-MCNC: 2.01 MG/DL
EGFRCR SERPLBLD CKD-EPI 2021: 31 ML/MIN/1.73M2 (ref 60–?)
EGFRCR SERPLBLD CKD-EPI 2021: 41 ML/MIN/1.73M2 (ref 60–?)
ERYTHROCYTE [DISTWIDTH] IN BLOOD BY AUTOMATED COUNT: 17.2 %
GLUCOSE BLD-MCNC: 105 MG/DL (ref 70–99)
GLUCOSE BLD-MCNC: 118 MG/DL (ref 70–99)
HCT VFR BLD AUTO: 32.3 %
HGB BLD-MCNC: 10.1 G/DL
LACTATE SERPL-SCNC: 1.4 MMOL/L (ref 0.5–2)
LACTATE SERPL-SCNC: 2.3 MMOL/L (ref 0.5–2)
MCH RBC QN AUTO: 26.4 PG (ref 26–34)
MCHC RBC AUTO-ENTMCNC: 31.3 G/DL (ref 31–37)
MCV RBC AUTO: 84.6 FL
OSMOLALITY SERPL CALC.SUM OF ELEC: 292 MOSM/KG (ref 275–295)
OSMOLALITY SERPL CALC.SUM OF ELEC: 299 MOSM/KG (ref 275–295)
PLATELET # BLD AUTO: 227 10(3)UL (ref 150–450)
POTASSIUM SERPL-SCNC: 3.3 MMOL/L (ref 3.5–5.1)
POTASSIUM SERPL-SCNC: 3.5 MMOL/L (ref 3.5–5.1)
RBC # BLD AUTO: 3.82 X10(6)UL
SODIUM SERPL-SCNC: 138 MMOL/L (ref 136–145)
SODIUM SERPL-SCNC: 141 MMOL/L (ref 136–145)
TROPONIN I SERPL HS-MCNC: 362 NG/L
TROPONIN I SERPL HS-MCNC: 463 NG/L
WBC # BLD AUTO: 13.1 X10(3) UL (ref 4–11)

## 2024-07-22 PROCEDURE — 99233 SBSQ HOSP IP/OBS HIGH 50: CPT | Performed by: HOSPITALIST

## 2024-07-22 RX ORDER — POTASSIUM CHLORIDE 20 MEQ/1
40 TABLET, EXTENDED RELEASE ORAL ONCE
Status: COMPLETED | OUTPATIENT
Start: 2024-07-22 | End: 2024-07-22

## 2024-07-22 RX ORDER — ONDANSETRON 2 MG/ML
4 INJECTION INTRAMUSCULAR; INTRAVENOUS EVERY 6 HOURS PRN
Status: DISCONTINUED | OUTPATIENT
Start: 2024-07-22 | End: 2024-07-25

## 2024-07-22 RX ORDER — FINASTERIDE 5 MG/1
5 TABLET, FILM COATED ORAL DAILY
Status: DISCONTINUED | OUTPATIENT
Start: 2024-07-22 | End: 2024-07-25

## 2024-07-22 RX ORDER — ECHINACEA PURPUREA EXTRACT 125 MG
1 TABLET ORAL
Status: DISCONTINUED | OUTPATIENT
Start: 2024-07-22 | End: 2024-07-25

## 2024-07-22 RX ORDER — ATORVASTATIN CALCIUM 40 MG/1
40 TABLET, FILM COATED ORAL DAILY
Status: DISCONTINUED | OUTPATIENT
Start: 2024-07-22 | End: 2024-07-25

## 2024-07-22 RX ORDER — METOPROLOL SUCCINATE 50 MG/1
50 TABLET, EXTENDED RELEASE ORAL
Status: DISCONTINUED | OUTPATIENT
Start: 2024-07-22 | End: 2024-07-22

## 2024-07-22 RX ORDER — GABAPENTIN 300 MG/1
300 CAPSULE ORAL 3 TIMES DAILY
Status: DISCONTINUED | OUTPATIENT
Start: 2024-07-22 | End: 2024-07-22

## 2024-07-22 RX ORDER — MELATONIN
3 NIGHTLY PRN
Status: DISCONTINUED | OUTPATIENT
Start: 2024-07-22 | End: 2024-07-25

## 2024-07-22 RX ORDER — CLOPIDOGREL BISULFATE 75 MG/1
75 TABLET ORAL DAILY
Status: DISCONTINUED | OUTPATIENT
Start: 2024-07-22 | End: 2024-07-25

## 2024-07-22 RX ORDER — TAMSULOSIN HYDROCHLORIDE 0.4 MG/1
0.4 CAPSULE ORAL DAILY
Status: DISCONTINUED | OUTPATIENT
Start: 2024-07-22 | End: 2024-07-25

## 2024-07-22 RX ORDER — MAGNESIUM OXIDE 400 MG/1
400 TABLET ORAL ONCE
Status: COMPLETED | OUTPATIENT
Start: 2024-07-22 | End: 2024-07-22

## 2024-07-22 RX ORDER — GABAPENTIN 100 MG/1
200 CAPSULE ORAL 2 TIMES DAILY
Status: DISCONTINUED | OUTPATIENT
Start: 2024-07-22 | End: 2024-07-25

## 2024-07-22 RX ORDER — BENZONATATE 100 MG/1
200 CAPSULE ORAL 3 TIMES DAILY PRN
Status: DISCONTINUED | OUTPATIENT
Start: 2024-07-22 | End: 2024-07-25

## 2024-07-22 RX ORDER — ACETAMINOPHEN 500 MG
500 TABLET ORAL EVERY 4 HOURS PRN
Status: DISCONTINUED | OUTPATIENT
Start: 2024-07-22 | End: 2024-07-25

## 2024-07-22 RX ORDER — FOLIC ACID 1 MG/1
1 TABLET ORAL DAILY
Status: DISCONTINUED | OUTPATIENT
Start: 2024-07-22 | End: 2024-07-25

## 2024-07-22 NOTE — CONSULTS
[facility]   part of [facility]      Report of Consultation    Alejandro Guardado Patient Status:  Inpatient    1935 MRN [id number]   Location [facility] 3NE-A Attending Sherwin Samuel MD   Hosp Day # 0 PCP Stanislav Odonnell MD     Reason for Consultation:  Atrial fibrillation with RVR  Elevated troponin    History of Present Illness:  Alejandro Guardado is a a(n) 89 year old male with PMHx persistent AFIB, CAD s/p CABG and PCI, HFrEF, ICM EF 25-30%, moderate-severe aortic stenosis, PAD admitted with lethargy secondary to sepsis pneumonia, ERON on CKD, and AFIB RVR. Cardiology consulted for atrial fibrillation with RVR and elevated troponin. He was lethargic and his Son brought him in to hospital. He is currently confused and alert. Denies chest pain. Denies shortness of breath.     History:  Past Medical History:    Amputation of right foot (HCC)    non-healing. Followed by [facility] clinic    Atrial fibrillation (HCC)    CAD (coronary artery disease)    CHF (congestive heart failure) ([facility])    Easy bruising    Esophageal reflux    Fatigue    Hearing impairment    Hearing loss    Heart attack (HCC)    High blood pressure    High cholesterol    History of MI (myocardial infarction)    HTN (hypertension)    Hyperlipidemia    Leg swelling    Loss of appetite    PAD (peripheral artery disease) (HCC)    Peripheral vascular disease (HCC)    Uncomfortable fullness after meals    Visual impairment    glasses    Wears glasses     Past Surgical History:   Procedure Laterality Date    Angiogram      Angioplasty (coronary)      Cabg      Fracture surgery Left     left hip pinning    Other surgical history Left 1977    knee surgery    Other surgical history  2016    Left hip ORIF pinning    Tonsillectomy       Family History   Problem Relation Age of Onset    Cancer Father       reports that he has never smoked. He has never used smokeless tobacco. He reports that he does not drink alcohol and does not use  drugs.    Allergies:  Allergies   Allergen Reactions    Heparin ANAPHYLAXIS    Hydromorphone HALLUCINATION       Medications:    Current Facility-Administered Medications:     apixaban (Eliquis) tab 5 mg, 5 mg, Oral, BID    atorvastatin (Lipitor) tab 40 mg, 40 mg, Oral, Daily    clopidogrel (Plavix) tab 75 mg, 75 mg, Oral, Daily    finasteride (Proscar) tab 5 mg, 5 mg, Oral, Daily    metoprolol succinate ER (Toprol XL) 24 hr tab 50 mg, 50 mg, Oral, Daily Beta Blocker    tamsulosin (Flomax) cap 0.4 mg, 0.4 mg, Oral, Daily    benzonatate (Tessalon) cap 200 mg, 200 mg, Oral, TID PRN    sodium chloride (Saline Mist) 0.65 % nasal solution 1 spray, 1 spray, Each Nare, Q3H PRN    piperacillin-tazobactam (Zosyn) 3.375 g in dextrose 5% 100 mL IVPB-ADDV, 3.375 g, Intravenous, Q8H    acetaminophen (Tylenol Extra Strength) tab 500 mg, 500 mg, Oral, Q4H PRN    melatonin tab 3 mg, 3 mg, Oral, Nightly PRN    ondansetron (Zofran) 4 MG/2ML injection 4 mg, 4 mg, Intravenous, Q6H PRN    Review of Systems:  All systems were reviewed and are negative except as described above in HPI.    Physical Exam:  Blood pressure 101/55, pulse 80, temperature 99.1 °F (37.3 °C), temperature source Axillary, resp. rate 18, height 6' (1.829 m), weight 199 lb 15.3 oz (90.7 kg), SpO2 98%.  Temp (24hrs), Av.4 °F (36.9 °C), Min:97.8 °F (36.6 °C), Max:99.1 °F (37.3 °C)    Wt Readings from Last 3 Encounters:   24 199 lb 15.3 oz (90.7 kg)   24 205 lb (93 kg)   24 205 lb (93 kg)       Telemetry: AFIB rates trending 100-110 bpm    General: Alert and appears confused. NAD. Hard of hearing  HEENT: Pupils equal. Mucous membranes moist.   Neck: No JVD.  Cardiac: irreg irreg, 1/6 systolic murmur  Lungs: Clear without wheezes, rales, rhonchi or dullness.  Normal effort.  Abdomen: Soft, non-tender.   Extremities: no significant LE edema. RLL amputation  Neurologic: normal affect.   Skin: Warm and dry.     Laboratories and  Data:  Diagnostics:    EKG: AFIB  bpm    Echo: 12/2023  1. Left ventricle:   2. Left ventricle: The cavity size was moderately increased. Wall thickness      was at the upper limits of normal. Systolic function was markedly      reduced. The estimated ejection fraction was 20-25%, by visual      assessment. There was severe diffuse hypokinesis with distinct regional      wall motion abnormalities. Unable to assess LV diastolic function due to      heart rhythm.   3. Aortic valve: Cusp separation was reduced. Transvalvular velocity was      increased, due to stenosis. The findings were consistent with moderate to      severe stenosis. There was mild regurgitation. The peak systolic velocity      was 2.57m/sec. The mean systolic gradient was 15mm Hg. The valve area      (VTI) was 1.02cm^2. The valve area (VTI) index was 0.5cm^2/m^2. There is      low flow, low gradient aortic valve stenosis present with severity of      aortic valve stenosis likely underestimated.   4. Left atrium: The left atrial volume was markedly increased.   5. Right ventricle: The cavity size was mildly increased. Systolic function      was reduced. The tricuspid annular plane systolic excursion (TAPSE) is      1.18cm.   6. Tricuspid valve: There was mild-moderate regurgitation.   7. Pulmonary arteries: Systolic pressure was mildly increased, in the range      of 35mm Hg to 40mm Hg. Estimated pulmonary artery diastolic pressure was      13mm Hg.   Impressions:  This study is compared with previous dated 12/5/23: Similar   findings.     CXR 7/21/24  1. Extensive tree-in-bud and patchy consolidative opacities involving the lower lobes, middle lobe, lingula, and right upper lobe, features and distribution favoring aspiration pneumonia.  This is supported by fluid distention of the esophagus which may   represent gastroesophageal reflux or possibly motility disorder.     Stress Test: 2019  Abnormal rest/stress gated SPECT myocardial perfusion  scan with a primarily fixed inferior infarct without signs of ischemia.  Normal EKG response to regadenoson.  Mildly reduced ejection fraction of 42% with hypokinesis of the inferior and inferoapical walls.  Compared to the previous study on 09/13/2017, ejection fraction was 41% and is now 42%.  The wall motion abnormalities and perfusion defects are similar.  Normal Lexiscan stress EKG.        Labs:   Lab Results   Component Value Date    WBC 13.1 07/22/2024    RBC 3.82 07/22/2024    HGB 10.1 07/22/2024    HCT 32.3 07/22/2024    MCV 84.6 07/22/2024    MCH 26.4 07/22/2024    MCHC 31.3 07/22/2024    RDW 17.2 07/22/2024    .0 07/22/2024     Lab Results   Component Value Date    INR 1.45 (H) 07/21/2024    INR 1.51 (H) 03/28/2024    INR 1.35 (H) 02/12/2024       Assessment:    Sepsis pneumonia  IV antibiotics/fluids  Persistent AFIB  Rates trending 100-110 bpm. Goal HR<110 in the setting of sepsis  Toprol-XL was held this morning due to hypotension. Continue to hold BB for now. Likely low dose lopressor tomorrow.   Anticoagulated with Eliquis  Elevated troponin, known CAD s/p CABG and PCI  HsTrop 255 > 463 > trend until peak and downtrend. Demand likely with hypoperfusion with sepsis lactate 9.9 on arrival and AF RVR  He is chest pain free  EKG on arrival without acute ST/T changes in AFIB RVR. Baseline TTE is abnormal 12/2023 with resting WMA and reduced EF with known ICM  Plavix/eliquis, statin. Holding BB with hypotension  HFrEF, ICM EF 25-30%  Likely dehydrated. Agree with IV fluids. Closely monitor volume status  GDMT: bb held with hypotension. No ARNI/ARB/ACE/MRA/SGLT2 with ERON  Moderate-severe aortic stenosis  Avoid intravascular depletion with preload dependence. Agree with IV fluids  PAD history of popliteal stent occlusion s/p PTA 12/2023  Eliquis/plavix, statin  ERON on CKD  Baseline normal renal function. Suspect ERON secondary to intravascular depletion and possibly ATN with  hypotension/sepsis    Plan:    Hold BB for now with relative hypotension. AFIB rate goal 110 bpm or less with sepsis.  Repeat troponin now. Monitor until peak. Likely demand with sepsis.   Continue current cardiac medications      ESPERANZA Friend  7/22/2024  11:09 AM    Patient seen and examined independently.  Note reviewed and labs reviewed. Agree with above assessment and plan.  89-year-old male with complex medical history as noted above including ischemic cardiomyopathy with LVEF of 25-30% as well as moderate to severe aortic valve stenosis.  He presented with clinical symptoms suggestive of sepsis with signs of hypoperfusion given elevated lactate.  CT imaging was completed yesterday notable for extensive tree-in-bud and patchy consolidative opacities in the lower lobes, middle lobe, right upper lobe suggestive of aspiration pneumonia process.  Given these findings, he was noted to have elevated high-sensitivity troponin.  Supply/demand mismatch ischemia is likely the etiology.  Plan recheck of high sensitive troponin today.  Hold beta-blockade for now given labile BP.  Will allow for moderate control of A-fib rates.  Will need adequate intravascular volume status given underlying moderate to severe aortic valve stenosis is preload will be important.  Continue supportive care in the interim.  Will continue to closely follow.        Mckay Campos DO  Cardiologist  [facility]  7/22/2024 12:12 PM      Note to the patient: The 21st Century Cures Act makes medical notes like these available to patients in the interest of transparency. However, be advised that this is a medical document. It is intended as peer to peer communication. It is written in medical language and may contain abbreviations or verbiage that are unfamiliar. It may appear blunt or direct. Medical documents are intended to carry relevant information, facts as evident, and clinical opinion of the practitioner.     Disclaimer:  Components of this note were documented using voice recognition system and are subject to errors not corrected at proofreading. Contact the author of this note for any clarifications.

## 2024-07-22 NOTE — PLAN OF CARE
Assumed patient care @ 07:30.  Alert and oriented x 3-4.  Room air.  Afib on telemetry.  Denies pain.  NPO  Whole pill with apple sauce.  Zosyn q8 hours.  Safety fall precautions maintained.  Needs attended, call light within his reach.  Bed in lowest position, bed alarm is on.  Problem: Patient/Family Goals  Goal: Patient/Family Long Term Goal  Description: Patient's Long Term Goal: discharge to home.    Interventions:  - Cards on consult  -PT/OT on consult  -wound care on consult  - See additional Care Plan goals for specific interventions  Outcome: Progressing  Goal: Patient/Family Short Term Goal  Description: Patient's Short Term Goal: stable health condition    Interventions:   - administer medications as ordered.  -monitor vital signs, mental status.  -monitor oxygenation  - See additional Care Plan goals for specific interventions  Outcome: Progressing

## 2024-07-22 NOTE — H&P
[facility]     Alejandro Guardado Patient Status:  Inpatient    1935 MRN [id number]   Location [facility] 3NE-A Attending Rody Herr MD   Hosp Day # 0 PCP Stanislav Odonnell MD     Chief Complaint: lethargy     Subjective:    History of Present Illness:     Alejandro Guardado is a 89 year old male with CHF, CAD, A-fib on Eliquis, presented to the emergency room with lethargy over the last 24 hours.  Patient's son and daughter at bedside and providing history.  They had seen patient yesterday morning and he was eating and drinking just fine.  However patient was found in feces and looked very lethargic when found today.  Family cleaned him up and brought him to the emergency room for further evaluation.  When he arrived in the emergency room he was very tachycardic and tachypneic, hypotensive upon arrival.  Had multiple readmissions for aspiration pneumonia in the past.    History/Other:    Past Medical History:  Past Medical History:    Amputation of right foot (HCC)    non-healing. [facility]ed by [facility]    Atrial fibrillation (HCC)    CAD (coronary artery disease)    CHF (congestive heart failure) (HCC)    Easy bruising    Esophageal reflux    Fatigue    Hearing impairment    Hearing loss    Heart attack (HCC)    High blood pressure    High cholesterol    History of MI (myocardial infarction)    HTN (hypertension)    Hyperlipidemia    Leg swelling    Loss of appetite    PAD (peripheral artery disease) (HCC)    Peripheral vascular disease (HCC)    Uncomfortable fullness after meals    Visual impairment    glasses    Wears glasses     Past Surgical History:   Past Surgical History:   Procedure Laterality Date    Angiogram      Angioplasty (coronary)      Cabg      Fracture surgery Left     left hip pinning    Other surgical history Left 1977    knee surgery    Other surgical history  2016    Left hip ORIF pinning    Tonsillectomy        Family History:   Family  History   Problem Relation Age of Onset    Cancer Father      Social History:    reports that he has never smoked. He has never used smokeless tobacco. He reports that he does not drink alcohol and does not use drugs.     Allergies:   Allergies   Allergen Reactions    Heparin ANAPHYLAXIS    Hydromorphone HALLUCINATION       Medications:    Current Facility-Administered Medications on File Prior to Encounter   Medication Dose Route Frequency Provider Last Rate Last Admin    [COMPLETED] fentaNYL (Sublimaze) 50 mcg/mL injection             [COMPLETED] midazolam (Versed) 2 MG/2ML injection             [COMPLETED] lidocaine (Xylocaine) 2 % injection             [COMPLETED] Bivalirudin (Angiomax) 250 MG injection             [COMPLETED] sodium chloride 0.9% IVPB             [COMPLETED] Nitroglycerin in D5W 200-5 MCG/ML-% injection             [COMPLETED] fentaNYL (Sublimaze) 50 mcg/mL injection             [COMPLETED] iopamidol (ISOVUE-300) 61 % injection 100 mL  100 mL Injection ONCE PRN Manuelito Alston MD   45 mL at 05/01/24 1243     Current Outpatient Medications on File Prior to Encounter   Medication Sig Dispense Refill    gentamicin 0.1 % External Cream Apply topically to the ulcer site once daily (Patient not taking: Reported on 7/22/2024) 30 g 1    furosemide 40 MG Oral Tab Take 2 tablets (80 mg total) by mouth 2 (two) times daily.      lisinopril 2.5 MG Oral Tab Take 1 tablet (2.5 mg total) by mouth daily.      Ascorbic Acid (VITAMIN C) 1000 MG Oral Tab Take 1.5 tablets (1,500 mg total) by mouth daily.      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      metoprolol succinate ER 25 MG Oral Tablet 24 Hr Take 1 tablet (25 mg total) by mouth Daily Beta Blocker. (Patient taking differently: Take 2 tablets (50 mg total) by mouth Daily Beta Blocker.) 90  tablet 1    tamsulosin 0.4 MG Oral Cap Take 1 capsule (0.4 mg total) by mouth daily.      apixaban 5 MG Oral Tab Take 1 tablet (5 mg total) by mouth 2 (two) times daily. 60 tablet 3    atorvastatin 40 MG Oral Tab Take 1 tablet (40 mg total) by mouth daily. (Patient taking differently: Take 2 tablets (80 mg total) by mouth daily.) 30 tablet 0    finasteride 5 MG Oral Tab Take 1 tablet (5 mg total) by mouth daily. (Patient not taking: Reported on 7/22/2024) 30 tablet 0    clopidogrel 75 MG Oral Tab Take 1 tablet (75 mg total) by mouth daily. 30 tablet 0    gabapentin 300 MG Oral Cap Take 1 capsule (300 mg total) by mouth 3 (three) times daily. (Patient taking differently: Take 1 capsule (300 mg total) by mouth. Takes 300 mg in am and 600 mg at bedtime) 90 capsule 0    predniSONE 5 MG Oral Tab Take 3 tablets (15 mg total) by mouth daily.      folic acid 1 MG Oral Tab Take 1 tablet (1 mg total) by mouth daily.      acetaminophen 500 MG Oral Tab Take 2 tablets (1,000 mg total) by mouth every 8 (eight) hours. (Patient not taking: Reported on 7/22/2024) 21 tablet 0    Omeprazole 40 MG Oral Capsule Delayed Release Take 1 capsule (40 mg total) by mouth daily.         Review of Systems:   A comprehensive review of systems was completed.    Pertinent positives and negatives noted in the HPI.    Objective:   Physical Exam:    /63   Pulse 97   Temp 98.3 °F (36.8 °C) (Temporal)   Resp 19   Ht 6' (1.829 m)   Wt 199 lb 15.3 oz (90.7 kg)   SpO2 94%   BMI 27.12 kg/m²   General: No acute distress, Alert  Respiratory: No rhonchi, no wheezes  Cardiovascular: S1, S2.IRIR  Abdomen: Soft, Non-tender, non-distended, positive bowel sounds  Neuro: No new focal deficits  Extremities: No edema      Results:    Labs:      Labs Last 24 Hours:    Recent Labs   Lab 07/21/24  1754   RBC 4.69   HGB 12.5*   HCT 40.6   MCV 86.6   MCH 26.7   MCHC 30.8*   RDW 16.9   NEPRELIM 15.80*   WBC 19.7*   .0       Recent Labs   Lab  07/21/24  1754   *   BUN 33*   CREATSERUM 2.57*   EGFRCR 23*   CA 9.3   ALB 3.9      K 4.3      CO2 19.0*   ALKPHO 62   AST 39*   ALT 20   BILT 0.6   TP 6.6       Lab Results   Component Value Date    INR 1.45 (H) 07/21/2024    INR 1.51 (H) 03/28/2024    INR 1.35 (H) 02/12/2024       Recent Labs   Lab 07/21/24  1754   TROPHS 255*       Recent Labs   Lab 07/21/24  1754   PBNP 8,007*       No results for input(s): \"PCT\" in the last 168 hours.    Imaging: Imaging data reviewed in Epic.    Assessment & Plan:      # Severe sepsis with tachycardia, tachypnea, lactic acidosis, leukocytosis   -blood and urine cx  - CT CAP reviewed-> aspiration pneumonia noted  - serial lactic     #ERON due to hypoperfusion, mild dehydration  - IVF given   - will hold off on further iVF  - hold nephrotoxic agents  - per course consider renal US and nephrology consult  - BMP in am   - IO    #Lactic acidosis as above   #Chronic systolic heart failure   #Severe aortic stenosis  #CAD with prior CABG  #Permenant Afib  #Dyslipidemia  #PAD with non-healing right foot wound s/p thrombectomy and revascularization/stent 12/4/23 by Dr. Steward  #GERD    CODE status: DNAR select       Plan of care discussed with patient, son and daughter, ER.     Rody Herr MD    Supplementary Documentation:     The 21st Century Cures Act makes medical notes like these available to patients in the interest of transparency. Please be advised this is a medical document. Medical documents are intended to carry relevant information, facts as evident, and the clinical opinion of the practitioner. The medical note is intended as peer to peer communication and may appear blunt or direct. It is written in medical language and may contain abbreviations or verbiage that are unfamiliar.               **Certification      PHYSICIAN Certification of Need for Inpatient Hospitalization - Initial Certification    Patient will require inpatient services that will  reasonably be expected to span two midnight's based on the clinical documentation in H+P.   Based on patients current state of illness, I anticipate that, after discharge, patient will require TBD.

## 2024-07-22 NOTE — PROGRESS NOTES
[facility]   part of [facility]     Hospitalist Progress Note     Alejandro Guardado Patient Status:  Inpatient    1935 MRN [id number]   Location [facility] 3NE-A Attending iDpesh Wall MD   Hosp Day # 0 PCP Stanislav Odonnell MD     Chief Complaint: confusion    Subjective:     Patient feels well. No complaints.     Objective:    Review of Systems:   ROS completed; pertinent positive and negatives stated in subjective.    Vital signs:  Temp:  [97.8 °F (36.6 °C)-99.1 °F (37.3 °C)] 97.9 °F (36.6 °C)  Pulse:  [] 61  Resp:  [18-28] 18  BP: ()/(50-99) 92/58  SpO2:  [94 %-100 %] 94 %    Physical Exam:    General: No acute distress  Respiratory:  mild rhonchi  Cardiovascular: S1, S2. IR IR  Abdomen: Soft  Neuro: No new focal deficits  MSK:  RLE toe amputations      Diagnostic Data:    Labs:  Recent Labs   Lab 24  0547   WBC 19.7* 13.1*   HGB 12.5* 10.1*   MCV 86.6 84.6   .0 227.0   BAND 41  --    INR 1.45*  --        Recent Labs   Lab 24  0547   * 105*   BUN 33* 30*   CREATSERUM 2.57* 2.01*   CA 9.3 8.2*   ALB 3.9  --     141   K 4.3 3.3*    109   CO2 19.0* 25.0   ALKPHO 62  --    AST 39*  --    ALT 20  --    BILT 0.6  --    TP 6.6  --        Estimated Creatinine Clearance: 27.3 mL/min (A) (based on SCr of 2.01 mg/dL (H)).     Recent Labs   Lab 24  0547 24  1146   TROPHS 255* 463* 362*       Recent Labs   Lab 24   PTP 17.7*   INR 1.45*              Imaging: Imaging data reviewed in Epic.    Medications:    atorvastatin  40 mg Oral Daily    clopidogrel  75 mg Oral Daily    finasteride  5 mg Oral Daily    tamsulosin  0.4 mg Oral Daily    piperacillin-tazobactam  3.375 g Intravenous Q8H    apixaban  2.5 mg Oral BID    folic acid  1 mg Oral Daily    predniSONE  15 mg Oral Daily    gabapentin  200 mg Oral BID       Assessment & Plan:     # Severe sepsis  Resolving  BCX pending    #Asp  PNA  Cont. Zosyn  SLP eval  Likely due to  hx of achalasia    #Achalasia  Hx of botox and surgery    #Neuropathy  Wean ivelisse given recurrent sedation issues     #ERON due to hypoperfusion, mild dehydration  Received septic bolus  Hold on further fluids and monitor     #Chronic systolic heart failure, EF: 20-25%  Currently compensated  Monitor    #NSTEMI type II  Due to sepsis    #A. Fib  Resume BB when able  Cont. OAC, dose decreased for now given age and ERON    #Moderate to Severe aortic stenosis  #CAD with prior CABG  #Dyslipidemia  #PAD with non-healing right foot wound s/p thrombectomy and revascularization/stent 12/4/23 by Dr. Steward  #GERD    #Adrenal insuff  Resume PO pred    #Hypokalemia  Replace and monitor    #ACP  DNAR/S    Dispo: as above. Discussed with cardiology. Resume PO diet.  Discussed home living situation.  Pt not interested in alternative living arrangements.  High risk for pulm edema, re-bolus fluids as needed, hold on maintenance fluids for now.  Cont. ABX.            Dipesh Wall MD    Supplementary Documentation:   P(1) + D(C1+C3)  Quality:    DVT Mechanical Prophylaxis:        DVT Pharmacologic Prophylaxis   Medication    apixaban (Eliquis) tab 2.5 mg                Code Status: DNAR/Selective Treatment  Block: No urinary catheter in place  Block Duration (in days):   Central line:    DIEGO:       Discharge is dependent on: clinical stability  At this point Mr. Guardado is expected to be discharge to: home    The 21st Century Cures Act makes medical notes like these available to patients in the interest of transparency. Please be advised this is a medical document. Medical documents are intended to carry relevant information, facts as evident, and the clinical opinion of the practitioner. The medical note is intended as peer to peer communication and may appear blunt or direct. It is written in medical language and may contain abbreviations or verbiage that are unfamiliar.

## 2024-07-22 NOTE — PROGRESS NOTES
NURSING ADMISSION NOTE      Patient admitted via Cart  Oriented to room.  Safety precautions initiated.  Bed in low position.  Call light in reach.    Admission navigation completed.

## 2024-07-22 NOTE — PLAN OF CARE
Pt a&ox1-2  Lethargic, Supplemental O2 at 2L  Tele on afib  Abx therapy  NPO  Right toe and foot amputee with chronic wound  For Cards consult at bedside in the morning  Bed at lowest position, bed alarm on  Safety and fall prec maintained  POC on-going        Problem: SAFETY ADULT - FALL  Goal: Free from fall injury  Description: INTERVENTIONS:  - Assess pt frequently for physical needs  - Identify cognitive and physical deficits and behaviors that affect risk of falls.  - Joaquin fall precautions as indicated by assessment.  - Educate pt/family on patient safety including physical limitations  - Instruct pt to call for assistance with activity based on assessment  - Modify environment to reduce risk of injury  - Provide assistive devices as appropriate  - Consider OT/PT consult to assist with strengthening/mobility  - Encourage toileting schedule  Outcome: Progressing     Problem: CARDIOVASCULAR - ADULT  Goal: Maintains optimal cardiac output and hemodynamic stability  Description: INTERVENTIONS:  - Monitor vital signs, rhythm, and trends  - Monitor for bleeding, hypotension and signs of decreased cardiac output  - Evaluate effectiveness of vasoactive medications to optimize hemodynamic stability  - Monitor arterial and/or venous puncture sites for bleeding and/or hematoma  - Assess quality of pulses, skin color and temperature  - Assess for signs of decreased coronary artery perfusion - ex. Angina  - Evaluate fluid balance, assess for edema, trend weights  Outcome: Progressing  Goal: Absence of cardiac arrhythmias or at baseline  Description: INTERVENTIONS:  - Continuous cardiac monitoring, monitor vital signs, obtain 12 lead EKG if indicated  - Evaluate effectiveness of antiarrhythmic and heart rate control medications as ordered  - Initiate emergency measures for life threatening arrhythmias  - Monitor electrolytes and administer replacement therapy as ordered  Outcome: Progressing     Problem:  Integumentary status not within defined limits  Goal: Pt's integumentary status will be adequate for discharge  Outcome: Progressing

## 2024-07-22 NOTE — PROGRESS NOTES
lAejandro Guardado Patient Status:  Inpatient    1935 MRN [id number]   Location [facility] 3NE-A Attending Rody Herr MD   Hosp Day # 0 PCP Stanislav Odonnell MD     Cardiology Nocturnal APN Note    Briefly: (Documentation from chart review)     Alejandro Guardado is a 88 y/o male with PMH/PSH of AF on Eliquis, CAD, CHF, PAD who presents with lethargy. Sepsis, pneumonia, ERON on CKD noted. AF RVR.    Primary Cardiologist [facility]    Vital Signs:       2024    11:30 PM 2024    12:25 AM   Vitals History   /63    Pulse 97    Resp 19    SpO2 94 %    Weight  199 lbs 15 oz   BMI  27.12 kg/m2        Labs:   Lab Results   Component Value Date    WBC 19.7 2024    HGB 12.5 2024    HCT 40.6 2024    .0 2024    CREATSERUM 2.57 2024    BUN 33 2024     2024    K 4.3 2024     2024    CO2 19.0 2024     2024    CA 9.3 2024    ALB 3.9 2024    ALKPHO 62 2024    BILT 0.6 2024    TP 6.6 2024    AST 39 2024    ALT 20 2024    PTT 32.7 2024    INR 1.45 2024    MG 1.7 2024    TROPHS 255 2024       Diagnostics:   CT CHEST+ABDOMEN+PELVIS(CPT=71250/18911)    Result Date: 2024  CONCLUSION:   1. Extensive tree-in-bud and patchy consolidative opacities involving the lower lobes, middle lobe, lingula, and right upper lobe, features and distribution favoring aspiration pneumonia.  This is supported by fluid distention of the esophagus which may represent gastroesophageal reflux or possibly motility disorder.  2. No acute process of the abdomen or pelvis.      LOCATION:  [place]    Dictated by (CST): Luisito Faria MD on 2024 at 8:11 PM     Finalized by (CST): Luisito Faria MD on 2024 at 8:16 PM        Allergies:  Allergies   Allergen Reactions    Heparin ANAPHYLAXIS    Hydromorphone HALLUCINATION       Medications:    apixaban    atorvastatin    clopidogrel     finasteride    metoprolol succinate ER    tamsulosin    benzonatate    sodium chloride    piperacillin-tazobactam    acetaminophen    melatonin    ondansetron    Assessment:   AF RVR  - Rater controlled after IVF  - Wilburn rate control with sepsis goal <110 at rest  - On Eliquis  - Troponin 255, likely secondary to tachycardia  - BNP 8007      Plan:    - Continue to monitor overnight  - Formal Cardiology consult to follow in AM.       MATTIE Lugo  [facility]  7/22/2024  12:35 AM

## 2024-07-22 NOTE — SLP NOTE
ADULT SWALLOWING EVALUATION    ASSESSMENT    ASSESSMENT/OVERALL IMPRESSION:  Patient seen for swallowing evaluation to assess swallow function given history of recurrent pneumonia and impression of aspiration pneumonia on imaging this admission. Patient son at bedside is able to provide history and is very supportive. Patient known to this department from previous VFSS completed 2/13/24 with no aspiration identified.  Esophagram 4/2/24 revealed dilatation of the proximal esophagus with multiple tertiary contractions and significant delay and contrast transit through the esophagus into the stomach. EGD completed 4/16/24 due to achalasia, dysphagia, aspiration, prior laparascopic esophagectomy + Toupet fundoplication in May of 2009. Patient son at bedside reports patient experienced multiple recurrent pneumonias over 4-5 months and has managed to avoid further pneumonia over the past 4 months until this episode. Patient son reports they have implemented aspiration and reflux precautions.     Oral mechanism exam unremarkable. Patient able to accept thin liquids and solid trials. He demonstrated intact oral retrieval and containment. Mastication of solids WFL with complete oral clearance.  Laryngeal excursion judged to be of adequate strength and timeliness to palpation.  There were no overt signs of aspiration throughout.      Given clinical presentation and previous VFSS revealing functional oropharyngeal swallow function as well as known esophageal impairment, suspect functional oropharyngeal swallow. Discussed consideration or further instrumental exam pending ongoing workup. Both in agreement. Will continue to follow.          RECOMMENDATIONS   Diet Recommendations - Solids: Regular  Diet Recommendations - Liquids: Thin Liquids                        Compensatory Strategies Recommended: Slow rate;Small bites and sips  Aspiration Precautions: Upright position;Slow rate;Small bites and sips  Medication Administration  Recommendations:  (as tolerated)  Treatment Plan/Recommendations: Aspiration precautions    HISTORY   MEDICAL HISTORY  Reason for Referral: R/O aspiration (diagnosis of recurrent aspiration pneumonia)    Problem List  Principal Problem:    Severe sepsis (HCC)  Active Problems:    Atrial fibrillation with RVR (HCC)    Hypotension due to hypovolemia    Aspiration pneumonitis (HCC)    Multifocal pneumonia    Acute renal failure superimposed on chronic kidney disease, unspecified acute renal failure type, unspecified CKD stage (HCC)    Elevated troponin    NSTEMI (non-ST elevated myocardial infarction) ([facility])      Past Medical History  Past Medical History:    Amputation of right foot ([facility])    non-healing. Followed by [facility]    Atrial fibrillation ([facility])    CAD (coronary artery disease)    CHF (congestive heart failure) ([facility])    Easy bruising    Esophageal reflux    Fatigue    Hearing impairment    Hearing loss    Heart attack (HCC)    High blood pressure    High cholesterol    History of MI (myocardial infarction)    HTN (hypertension)    Hyperlipidemia    Leg swelling    Loss of appetite    PAD (peripheral artery disease) ([facility])    Peripheral vascular disease (HCC)    Uncomfortable fullness after meals    Visual impairment    glasses    Wears glasses          Diet Prior to Admission: Regular;Thin liquids  Precautions: Aspiration;Reflux    Patient/Family Goals: to get better, avoid hospitalizations    SWALLOWING HISTORY  Current Diet Consistency: Regular;Thin liquids  Dysphagia History: as above  Imaging Results:   CT Chest + Abdomen + Pelvis from 7/21/24:  CONCLUSION:       1. Extensive tree-in-bud and patchy consolidative opacities involving the lower lobes, middle lobe, lingula, and right upper lobe, features and distribution favoring aspiration pneumonia.  This is supported by fluid distention of the esophagus which may  represent gastroesophageal reflux or possibly motility disorder.     2. No acute process of  the abdomen or pelvis.                LOCATION:  [place]           Dictated by (CST): Luisito Faria MD on 7/21/2024 at 8:11 PM      Finalized by (CST): Luisito Faria MD on 7/21/2024 at 8:16 PM      SUBJECTIVE       OBJECTIVE   ORAL MOTOR EXAMINATION  Dentition: Upper partials;Lower partials  Symmetry: Within Functional Limits  Strength: Within Functional Limits  Tone: Within Functional Limits  Range of Motion: Within Functional Limits  Rate of Motion: Within Functional Limits    Voice Quality: Clear  Respiratory Status: Unlabored  Consistencies Trialed: Thin liquids;Hard solid  Method of Presentation: Self presentation;Straw;Consecutive swallows  Patient Positioning: Upright;Midline (in bed)    Oral Phase of Swallow: Within Functional Limits                      Pharyngeal Phase of Swallow: Within Functional Limits           (Please note: Silent aspiration cannot be evaluated clinically. Videofluoroscopic Swallow Study is required to rule-out silent aspiration.)    Esophageal Phase of Swallow:  (history of fundoplication and botox in esophagus)              GOALS  Goal #1 The patient will tolerate regular consistency and thin liquids without overt signs or symptoms of aspiration with 100 % accuracy over 1-2 session(s).  In Progress   Goal #2 The patient/family/caregiver will demonstrate understanding and implementation of aspiration precautions and swallow strategies independently over 1-2 session(s).    In Progress     FOLLOW UP  Treatment Plan/Recommendations: Aspiration precautions  Number of Visits to Meet Established Goals: 1  Follow Up Needed (Documentation Required): Yes  SLP Follow-up Date: 07/24/24    Thank you for your referral.   If you have any questions, please contact Reg Sosa MS CCC-SLP  Pager [phone]

## 2024-07-23 LAB
ANION GAP SERPL CALC-SCNC: 5 MMOL/L (ref 0–18)
BASOPHILS # BLD AUTO: 0.02 X10(3) UL (ref 0–0.2)
BASOPHILS NFR BLD AUTO: 0.2 %
BUN BLD-MCNC: 19 MG/DL (ref 9–23)
CALCIUM BLD-MCNC: 9 MG/DL (ref 8.7–10.4)
CHLORIDE SERPL-SCNC: 109 MMOL/L (ref 98–112)
CO2 SERPL-SCNC: 24 MMOL/L (ref 21–32)
CREAT BLD-MCNC: 1.16 MG/DL
EGFRCR SERPLBLD CKD-EPI 2021: 60 ML/MIN/1.73M2 (ref 60–?)
EOSINOPHIL # BLD AUTO: 0.06 X10(3) UL (ref 0–0.7)
EOSINOPHIL NFR BLD AUTO: 0.6 %
ERYTHROCYTE [DISTWIDTH] IN BLOOD BY AUTOMATED COUNT: 16.6 %
GLUCOSE BLD-MCNC: 103 MG/DL (ref 70–99)
HCT VFR BLD AUTO: 31.3 %
HGB BLD-MCNC: 10 G/DL
IMM GRANULOCYTES # BLD AUTO: 0.06 X10(3) UL (ref 0–1)
IMM GRANULOCYTES NFR BLD: 0.6 %
LYMPHOCYTES # BLD AUTO: 0.69 X10(3) UL (ref 1–4)
LYMPHOCYTES NFR BLD AUTO: 6.4 %
MAGNESIUM SERPL-MCNC: 1.9 MG/DL (ref 1.6–2.6)
MCH RBC QN AUTO: 26.6 PG (ref 26–34)
MCHC RBC AUTO-ENTMCNC: 31.9 G/DL (ref 31–37)
MCV RBC AUTO: 83.2 FL
MONOCYTES # BLD AUTO: 0.85 X10(3) UL (ref 0.1–1)
MONOCYTES NFR BLD AUTO: 7.8 %
NEUTROPHILS # BLD AUTO: 9.18 X10 (3) UL (ref 1.5–7.7)
NEUTROPHILS # BLD AUTO: 9.18 X10(3) UL (ref 1.5–7.7)
NEUTROPHILS NFR BLD AUTO: 84.4 %
OSMOLALITY SERPL CALC.SUM OF ELEC: 289 MOSM/KG (ref 275–295)
PLATELET # BLD AUTO: 233 10(3)UL (ref 150–450)
POTASSIUM SERPL-SCNC: 3.6 MMOL/L (ref 3.5–5.1)
POTASSIUM SERPL-SCNC: 3.6 MMOL/L (ref 3.5–5.1)
Q-T INTERVAL: 314 MS
QRS DURATION: 92 MS
QTC CALCULATION (BEZET): 445 MS
R AXIS: -29 DEGREES
RBC # BLD AUTO: 3.76 X10(6)UL
SODIUM SERPL-SCNC: 138 MMOL/L (ref 136–145)
T AXIS: 123 DEGREES
VENTRICULAR RATE: 121 BPM
WBC # BLD AUTO: 10.9 X10(3) UL (ref 4–11)

## 2024-07-23 PROCEDURE — 99232 SBSQ HOSP IP/OBS MODERATE 35: CPT | Performed by: HOSPITALIST

## 2024-07-23 RX ORDER — FUROSEMIDE 40 MG/1
40 TABLET ORAL
Status: DISCONTINUED | OUTPATIENT
Start: 2024-07-23 | End: 2024-07-25

## 2024-07-23 RX ORDER — FINASTERIDE 5 MG/1
5 TABLET, FILM COATED ORAL DAILY
Qty: 90 TABLET | Refills: 0 | Status: SHIPPED | OUTPATIENT
Start: 2024-07-23

## 2024-07-23 NOTE — CONSULTS
[facility]  Report of Inpatient Wound Care Consultation    Alejandro Guardado Patient Status:  Inpatient    1935 MRN [id number]   Location [facility] 3NE-A Attending Dipesh Wall MD   Hosp Day # 1 PCP Stanislav Odonnell MD     Reason for Consultation:  Chronic right foot wound    History of Present Illness:  Alejandro Guardado is a a(n) 89 year old male. Patient presents with wounds to right anterior lower leg and right foot.Denies pain in the wounds at this time.Patient follows up at the outpatient wound clinic.Patient's family in the room.      History:  Past Medical History:    Amputation of right foot (HCC)    non-healing. Followed by would clinic    Atrial fibrillation (HCC)    CAD (coronary artery disease)    CHF (congestive heart failure) (HCC)    Easy bruising    Esophageal reflux    Fatigue    Hearing impairment    Hearing loss    Heart attack (HCC)    High blood pressure    High cholesterol    History of MI (myocardial infarction)    HTN (hypertension)    Hyperlipidemia    Leg swelling    Loss of appetite    PAD (peripheral artery disease) (HCC)    Peripheral vascular disease (HCC)    Uncomfortable fullness after meals    Visual impairment    glasses    Wears glasses     Past Surgical History:   Procedure Laterality Date    Angiogram      Angioplasty (coronary)      Cabg      Fracture surgery Left     left hip pinning    Other surgical history Left 1977    knee surgery    Other surgical history  2016    Left hip ORIF pinning    Tonsillectomy        reports that he has never smoked. He has never used smokeless tobacco. He reports that he does not drink alcohol and does not use drugs.      Allergies:  @ALLERGY    Laboratory Data:    Recent Labs   Lab 24  1752 24  1754 24  1754 24  0547 24  1719 24  0903   WBC  --  19.7*  --  13.1*  --  10.9   HGB  --  12.5*  --  10.1*  --  10.0*   HCT  --  40.6  --  32.3*  --  31.3*   PLT  --  309.0  --  227.0  --   233.0   CREATSERUM  --  2.57*   < > 2.01* 1.59* 1.16   BUN  --  33*   < > 30* 26* 19   GLU  --  134*   < > 105* 118* 103*   CA  --  9.3   < > 8.2* 8.7 9.0   ALB  --  3.9  --   --   --   --    TP  --  6.6  --   --   --   --    PTT  --  32.7  --   --   --   --    INR  --  1.45*  --   --   --   --    PGLU 128*  --   --   --   --   --     < > = values in this interval not displayed.         ASSESSMENT:  Wound 08/14/23 #1- Foot Right (Active)   Date First Assessed/Time First Assessed: 08/14/23 1309    Wound Number (Wound Clinic Only): #1-  Primary Wound Type: Old surgical  Location: Foot  Wound Location Orientation: Right  Wound Description (Comments): Amputation site      Assessments 7/23/2024  4:02 PM   Wound Image     Drainage Amount Small   Drainage Description Serosanguineous   Wound Length (cm) 0.4 cm   Wound Width (cm) 1.8 cm   Wound Surface Area (cm^2) 0.72 cm^2   Wound Depth (cm) 0.2 cm   Wound Volume (cm^3) 0.144 cm^3   Wound Healing % 99   Margins Epibole (Rolled edges)   Non-staged Wound Description Full thickness   Mio-wound Assessment Edema   Wound Granulation Tissue Pink;Firm   Wound Bed Granulation (%) 100 %   Wound Odor None   Tunneling? No   Undermining? No   Sinus Tracts? No       Wound 04/15/24 #2 Leg Right;Anterior (Active)   Date First Assessed/Time First Assessed: 04/15/24 1611    Wound Number (Wound Clinic Only): #2  Primary Wound Type: Venous Ulcer  Location: Leg  Wound Location Orientation: Right;Anterior      Assessments 7/23/2024  3:58 PM   Wound Image     Drainage Amount Small   Drainage Description Serosanguineous   Wound Length (cm) 2 cm   Wound Width (cm) 1 cm   Wound Surface Area (cm^2) 2 cm^2   Wound Depth (cm) 0.2 cm   Wound Volume (cm^3) 0.4 cm^3   Wound Healing % -242   Margins Well-defined edges   Non-staged Wound Description Full thickness   Mio-wound Assessment Edema (intact fluid filled blister to mio wound)   Wound Granulation Tissue Pink;Firm   Wound Bed Granulation (%) 5 %    Wound Bed Slough (%) 95 %   Wound Odor None   Tunneling? No   Undermining? No   Sinus Tracts? No   Local pulse: faint, dorsalis pedis  Temperature : within normal limits      Wound Cleaning and Dressings:  1.Right anterior lower leg  Showering directions: May shower with protection- use cast cover or shower boot at home  Wound cleansing:  Cleanse with saline  Wound product:Apply nickel thick Santyl ointment  to wound bed only.Cover with hydrofera blue ready.May bevel the edges of the hydrofera blue ready..Secure with paper tape.[ paper tape only per pt's family requests]  Dressing change frequency:  Change dressing daily and/or PRN    2.Right foot- amputation site   Showering directions: May shower with protection- use cast cover or shower boot at home  Wound cleansing:  Cleanse with saline  Wound product:Apply endoform collagen,moisten with saline.Cover with  hyrofera blue ready- writings on the dressing outside the wound, plain side comes in contact with the wound..Cover with gauze and wrap with kerlix. [Collagen and hyrofera blue ready available in patient's room]  Dressing change frequency:  Change dressing daily and/or PRN    Additional notes : Right lateral heel noted with dry skin,and some skin discoloration.No open wound at this time    Compression Therapy:   Elevate leg(s) as much as possible    Miscellaneous/Additional Orders:  Offloading: Turn Q 2 hours and as needed- as tolerated , off  load heels at all times  to prevent further skin breakdown    Miscellaneous orders: Increase dietary protein intake to promote wound healing.Dietitian or the attending physician may need to evaluate amount of protein intake/supplements depending on kidney functions     Discussed Plan of Care at bedside with patient and his family. Patient and his family verbally acknowledge understanding of all instructions and all questions were answered.    Recommendations :  Continue to follow up at the wound clinic if discharged to  home    Thank you for this consultation and for allowing me to participate in the care of your patient.      Time Spent  40 minutes     Thank you,  Leigh Hearn RN BSN [facility]  Wound Care Clinician  [facility] Wound Care Team  7/23/2024  4:36 PM

## 2024-07-23 NOTE — CONSULTS
[facility]  Department of Urology   Consultation Note    Alejandro Guardado Patient Status:  Inpatient    1935 MRN [id number]   Location [facility] 3NE-A Attending Dipesh Wall MD   Hosp Day # 1 PCP Stanislav Odonnell MD     Reason for Consultation:  Urinary retention    History of Present Illness:  Alejandro Guardado is a a(n) 89 year old male with CHF, CAD, A-fib on Eliquis, presented to the emergency room with lethargy over the last 24 hours.  patient was found in feces and looked very lethargic when found by family on .  When he arrived in the emergency room he was very tachycardic and tachypneic, hypotensive upon arrival.    Labs:  WBC 19.7 > 13.1   Hgb 12.5 > 10.1  Platelet count 309 > 227  Serum creat 2.57 > 2.01 > 1.59    UA 24: negative    2/2 blood cultures 24: prelim no growth after 1 day    SARS-CoV-2/Flu A and B/RSV by PCR 24: negative    CT chest, abdomen, and pelvis 24:  CONCLUSION:    1. Extensive tree-in-bud and patchy consolidative opacities involving the lower lobes, middle lobe, lingula, and right upper lobe, features and distribution favoring aspiration pneumonia.  This is supported by fluid distention of the esophagus which may   represent gastroesophageal reflux or possibly motility disorder.   2. No acute process of the abdomen or pelvis.       Straight cath 1100 mL > 900 mL  Block catheter was placed.    Urology was consulted    History of BPH, urinary retention  On tamsulosin, finasteride    Office cystoscopy 23 with Dr. Moose Campso:  Urethra: Abnormal irritated  Prostate / Pelvic: Abnormal obstructive, bilobar, 6 cm prostate in length  Bladder: Abnormal in retention, median lobe enlarged into bladder . No tumor, stone, diverticulum, or glomerulation,  U.O's: Normal  Trabeculation: mild trabeculation     Reports he was voiding ok prior to admission    Baseline urination:  Urinary frequency every 2 hours  No urinary urgency  No  urinary incontinence  Nocturia x 0-1  No dysuria or gross hematuria    No abdominal/flank pain  No LE numbness  No saddle anesthesia  No constipation  History:  Past Medical History:    Amputation of right foot ([facility])    non-healing. Followed by would clinic    Atrial fibrillation ([facility])    CAD (coronary artery disease)    CHF (congestive heart failure) ([facility])    Easy bruising    Esophageal reflux    Fatigue    Hearing impairment    Hearing loss    Heart attack ([facility])    High blood pressure    High cholesterol    History of MI (myocardial infarction)    HTN (hypertension)    Hyperlipidemia    Leg swelling    Loss of appetite    PAD (peripheral artery disease) ([facility])    Peripheral vascular disease ([facility])    Uncomfortable fullness after meals    Visual impairment    glasses    Wears glasses     Past Surgical History:   Procedure Laterality Date    Angiogram      Angioplasty (coronary)      Cabg      Fracture surgery Left     left hip pinning    Other surgical history Left 01/01/1977    knee surgery    Other surgical history  03/25/2016    Left hip ORIF pinning    Tonsillectomy       Family History   Problem Relation Age of Onset    Cancer Father       reports that he has never smoked. He has never used smokeless tobacco. He reports that he does not drink alcohol and does not use drugs.    Allergies:  Allergies   Allergen Reactions    Heparin ANAPHYLAXIS    Hydromorphone HALLUCINATION       Medications:    Current Facility-Administered Medications:     atorvastatin (Lipitor) tab 40 mg, 40 mg, Oral, Daily    clopidogrel (Plavix) tab 75 mg, 75 mg, Oral, Daily    finasteride (Proscar) tab 5 mg, 5 mg, Oral, Daily    tamsulosin (Flomax) cap 0.4 mg, 0.4 mg, Oral, Daily    benzonatate (Tessalon) cap 200 mg, 200 mg, Oral, TID PRN    sodium chloride (Saline Mist) 0.65 % nasal solution 1 spray, 1 spray, Each Nare, Q3H PRN    piperacillin-tazobactam (Zosyn) 3.375 g in dextrose 5% 100 mL IVPB-ADDV, 3.375 g, Intravenous, Q8H     acetaminophen (Tylenol Extra Strength) tab 500 mg, 500 mg, Oral, Q4H PRN    melatonin tab 3 mg, 3 mg, Oral, Nightly PRN    ondansetron (Zofran) 4 MG/2ML injection 4 mg, 4 mg, Intravenous, Q6H PRN    apixaban (Eliquis) tab 2.5 mg, 2.5 mg, Oral, BID    folic acid (Folvite) tab 1 mg, 1 mg, Oral, Daily    predniSONE (Deltasone) tab 15 mg, 15 mg, Oral, Daily    gabapentin (Neurontin) cap 200 mg, 200 mg, Oral, BID    Review of Systems:  Pertinent items are noted in HPI.  10 point review of systems completed.    Physical Exam:  /71 (BP Location: Left arm)   Pulse 93   Temp 98 °F (36.7 °C) (Oral)   Resp 18   Ht 6' (1.829 m)   Wt 199 lb 15.3 oz (90.7 kg)   SpO2 100%   BMI 27.12 kg/m²   GENERAL: the patient is resting in bed in no acute distress.    HEENT: Unremarkable.  NECK: Supple.  LUNGS: non-labored respirations.    ABDOMEN: The abdomen is soft and nontender without rebound or guarding.     BACK: Without CVA tenderness.   GENITOURINARY: pena catheter in place with faintly pink tinged urine  Moving extremities     Laboratory Data:  Lab Results   Component Value Date    CREATSERUM 1.59 07/22/2024    BUN 26 07/22/2024     07/22/2024    K 3.5 07/22/2024     07/22/2024    CO2 24.0 07/22/2024     07/22/2024    CA 8.7 07/22/2024       Hospital Encounter on 07/21/24   1. Blood Culture     Status: None (Preliminary result)    Collection Time: 07/21/24  6:01 PM    Specimen: Blood,peripheral   Result Value Ref Range    Blood Culture Result No Growth 1 Day N/A      WBC 19.7 > 13.1   Hgb 12.5 > 10.1  Platelet count 309 > 227  Serum creat 2.57 > 2.01 > 1.59    UA 7/21/24: negative    2/2 blood cultures 7/21/24: prelim no growth after 1 day    SARS-CoV-2/Flu A and B/RSV by PCR 7/21/24: negative    Imaging:    CT CHEST+ABDOMEN+PELVIS(CPT=71250/15700)    Result Date: 7/21/2024  CONCLUSION:   1. Extensive tree-in-bud and patchy consolidative opacities involving the lower lobes, middle lobe, lingula, and  right upper lobe, features and distribution favoring aspiration pneumonia.  This is supported by fluid distention of the esophagus which may represent gastroesophageal reflux or possibly motility disorder.  2. No acute process of the abdomen or pelvis.      LOCATION:  [facility]    Dictated by (CST): Luisito Faria MD on 7/21/2024 at 8:11 PM     Finalized by (CST): Luisito Faria MD on 7/21/2024 at 8:16 PM       Impression:  Patient Active Problem List   Diagnosis    Closed minimally displaced zone I fracture of sacrum (HCC)    At risk for falling    CAD (coronary artery disease)    Ischemic cardiomyopathy    Azotemia    Arrhythmia    Esophageal reflux    History of MI (myocardial infarction)    Mixed hyperlipidemia    Primary hypertension    Dizziness    Medication side effect    Closed left hip fracture ([facility])  Global 6/22/2016    Status post hip surgery    Left shoulder distal clavical resection and excision of ganglion cyst of soft tissue mass   Global  09/17/2020    Orthopedic aftercare for healing traumatic hip fracture, left, closed    Closed left hip fracture, with routine healing, subsequent encounter    Osteoarthrosis, localized, primary, knee, left    Osteoarthrosis, localized, primary, knee, right    Gangrene ([facility])    PAD (peripheral artery disease) ([facility])    Benign prostatic hyperplasia with incomplete bladder emptying    Gangrene of foot ([facility])    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) ([facility])    Leukocytosis    Atrial fibrillation with RVR ([facility])    Hypokalemia    Acute kidney injury ([facility])    Hyperglycemia    Community acquired pneumonia of right lung, unspecified part of lung    Acute respiratory failure with hypoxia ([facility])    Hypotension due to hypovolemia    Sepsis due to pneumonia ([facility])    Foot ulcer with fat layer exposed, right ([facility])    Syncope, unspecified syncope type    Sepsis, due to unspecified organism, unspecified whether acute organ dysfunction present ([facility])    Shock ([facility])    Acute  hypoxic respiratory failure (HCC)    Pneumonia, bacterial    Hyponatremia    Metabolic alkalosis    Recurrent pneumonia    Sepsis due to undetermined organism (HCC)    Acute on chronic congestive heart failure, unspecified heart failure type (HCC)    Acute hypoxemic respiratory failure (HCC)    ERON (acute kidney injury) (HCC)    Lactic acidosis    Leukocytosis, unspecified type    Palliative care encounter    Counseling regarding advance care planning and goals of care    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) (HCC)    Severe sepsis (HCC)    Aspiration pneumonitis (HCC)    Multifocal pneumonia    Acute renal failure superimposed on chronic kidney disease, unspecified acute renal failure type, unspecified CKD stage (HCC)    Elevated troponin    NSTEMI (non-ST elevated myocardial infarction) (HCC)       URINARY RETENTION, BPH  -pena catheter in place  -hematuria after pena catheter placed in setting of blood thinner - observe.    UA does not appear infectious  CT chest/abdomen/pelvis 7/21/24: KIDNEYS: Moderate renal cortical atrophy. No obstructive uropathy of the upper tracts.  Right lower pole cortical cyst.  Prostatomegaly.  Office cystoscopy 5/30/23:   Urethra: Abnormal irritated  Prostate / Pelvic: Abnormal obstructive, bilobar, 6 cm prostate in length  Bladder: Abnormal in retention, median lobe enlarged into bladder . No tumor, stone, diverticulum, or glomerulation,  U.O's: Normal  Trabeculation: mild trabeculation     ERON  -no hydronephrosis on CT scan  -serum creat 2.57 > 2.01 > 1.59    Recommendations:  -Continue present management with tamsulosin, finasteride.    -Continue with pena catheter to gravity drainage.  Voiding trial when patient's general medical condition has improved/stronger.      Will follow peripherally.    Above discussed patient, nurse    Thank you for allowing me to participate in the care of your patient.    Apple Cantu PA-C  [facility]  [facility]  7/23/2024  7:03 AM

## 2024-07-23 NOTE — PROGRESS NOTES
[facility]   part of [facility]     Hospitalist Progress Note     Alejandro Guardado Patient Status:  Inpatient    1935 MRN [id number]   Location [facility] 3NE-A Attending Dipesh Wall MD   Hosp Day # 2 PCP Stanislav Odonnell MD     Chief Complaint: Aspiration pneumonia     Subjective:   Patient feeling well. Laying in bed. On room air. No complaints of pain. No nausea, vomiting. +BM    Current medications:   metoprolol tartrate  25 mg Oral 2x Daily(Beta Blocker)    furosemide  40 mg Oral BID (Diuretic)    apixaban  5 mg Oral BID    collagenase   Topical Daily    atorvastatin  40 mg Oral Daily    clopidogrel  75 mg Oral Daily    finasteride  5 mg Oral Daily    tamsulosin  0.4 mg Oral Daily    piperacillin-tazobactam  3.375 g Intravenous Q8H    folic acid  1 mg Oral Daily    predniSONE  15 mg Oral Daily    gabapentin  200 mg Oral BID       Objective:    Review of Systems:   10 point ROS completed and was negative, except for pertinent positive and negatives stated in subjective.    Vital signs:  Temp:  [97.4 °F (36.3 °C)-98.3 °F (36.8 °C)] 97.9 °F (36.6 °C)  Pulse:  [] 114  Resp:  [16-18] 18  BP: (103-135)/(67-84) 125/67  SpO2:  [93 %-96 %] 96 %  Patient Weight for the past 72 hrs:   Weight   24 1752 200 lb (90.7 kg)   24 0025 199 lb 15.3 oz (90.7 kg)     Physical Exam:    General: No acute distress.   Respiratory: Clear to auscultation bilaterally.   Cardiovascular: S1, S2.  Abdomen: Soft, nontender, nondistended.  Positive bowel sounds.   Extremities: Right foot wrapped  Neuro: AAOx3    Diagnostic Data:    Labs:  Recent Labs   Lab 24  1754 24  0547 24  0903   WBC 19.7* 13.1* 10.9   HGB 12.5* 10.1* 10.0*   MCV 86.6 84.6 83.2   .0 227.0 233.0   BAND 41  --   --    INR 1.45*  --   --        Recent Labs   Lab 24  1754 24  0547 24  1719 24  0903   * 105* 118* 103*   BUN 33* 30* 26* 19   CREATSERUM 2.57* 2.01* 1.59* 1.16   CA 9.3  8.2* 8.7 9.0   ALB 3.9  --   --   --     141 138 138   K 4.3 3.3* 3.5 3.6  3.6    109 108 109   CO2 19.0* 25.0 24.0 24.0   ALKPHO 62  --   --   --    AST 39*  --   --   --    ALT 20  --   --   --    BILT 0.6  --   --   --    TP 6.6  --   --   --        Estimated Creatinine Clearance: 47.4 mL/min (based on SCr of 1.16 mg/dL).    Recent Labs   Lab 07/21/24 1754   PTP 17.7*   INR 1.45*            COVID-19 Lab Results    COVID-19  Lab Results   Component Value Date    COVID19 Not Detected 07/21/2024    COVID19 Not Detected 03/28/2024    COVID19 Not Detected 03/16/2024       Pro-Calcitonin  No results for input(s): \"PCT\" in the last 168 hours.    Cardiac  Recent Labs   Lab 07/21/24 1754   PBNP 8,007*       Creatinine Kinase  No results for input(s): \"CK\" in the last 168 hours.    Inflammatory Markers  No results for input(s): \"CRP\", \"KOKO\", \"LDH\", \"DDIMER\" in the last 168 hours.    Recent Labs   Lab 07/21/24  1754 07/22/24  0547 07/22/24  1146   TROPHS 255* 463* 362*       Imaging: Imaging data reviewed in Epic.    Medications:    metoprolol tartrate  25 mg Oral 2x Daily(Beta Blocker)    furosemide  40 mg Oral BID (Diuretic)    apixaban  5 mg Oral BID    collagenase   Topical Daily    atorvastatin  40 mg Oral Daily    clopidogrel  75 mg Oral Daily    finasteride  5 mg Oral Daily    tamsulosin  0.4 mg Oral Daily    piperacillin-tazobactam  3.375 g Intravenous Q8H    folic acid  1 mg Oral Daily    predniSONE  15 mg Oral Daily    gabapentin  200 mg Oral BID       Assessment & Plan:    Sepsis d/t aspiration pneumonia   History of achalasia   Lactic acidosis  IV abx  Follow-up cultures  CAD sp CABG  Chronic systolic heart failure, EF 20-25%  Dyslipidemia  Atrial fibrillation on DOAC  Aortic stenosis, severe  Elevated troponin d/t type II MI d/t sepsis  Plavix  Metoprolol  Lipitor   Lasix   DOAC  Monitor hemodynamics  Telemetry  Cardiology consult   PVD with non-healing right foot wound  Wound care  Adrenal  insufficiency  Prednisone  Urinary retention  Voiding trial tomorrow morning  Urology consult   Acute kidney injury, resolved  GERD  Leukocytosis, resolved    Supplementary Documentation:   Quality:  DVT Prophylaxis: DOAC    At this point Mr. Guardado is expected to be discharge to: Home    Plan of care discussed with patient, family and PA.    Zackary Mcneill MD

## 2024-07-23 NOTE — PROGRESS NOTES
[facility]   part of [facility]     Hospitalist Progress Note     Alejandro Guardado Patient Status:  Inpatient    1935 MRN [id number]   Location [facility] 3NE-A Attending Dipesh Wall MD   Hosp Day # 1 PCP Stanislav Odonnell MD     Chief Complaint: confusion    Subjective:     Patient feels well. No complaints.     Objective:    Review of Systems:   ROS completed; pertinent positive and negatives stated in subjective.    Vital signs:  Temp:  [97.8 °F (36.6 °C)-98.4 °F (36.9 °C)] 97.9 °F (36.6 °C)  Pulse:  [] 115  Resp:  [18] 18  BP: (100-125)/(63-79) 125/73  SpO2:  [93 %-100 %] 93 %    Physical Exam:    General: No acute distress  Respiratory:  mild rhonchi  Cardiovascular: S1, S2. IR IR, +LE edema  Abdomen: Soft  Neuro: No new focal deficits  MSK:  RLE toe amputations/wound with bandages in place      Diagnostic Data:    Labs:  Recent Labs   Lab 24  1754 24  0547 24  0903   WBC 19.7* 13.1* 10.9   HGB 12.5* 10.1* 10.0*   MCV 86.6 84.6 83.2   .0 227.0 233.0   BAND 41  --   --    INR 1.45*  --   --        Recent Labs   Lab 24  1754 24  0547 24  1719 24  0903   * 105* 118* 103*   BUN 33* 30* 26* 19   CREATSERUM 2.57* 2.01* 1.59* 1.16   CA 9.3 8.2* 8.7 9.0   ALB 3.9  --   --   --     141 138 138   K 4.3 3.3* 3.5 3.6  3.6    109 108 109   CO2 19.0* 25.0 24.0 24.0   ALKPHO 62  --   --   --    AST 39*  --   --   --    ALT 20  --   --   --    BILT 0.6  --   --   --    TP 6.6  --   --   --        Estimated Creatinine Clearance: 47.4 mL/min (based on SCr of 1.16 mg/dL).     Recent Labs   Lab 24  1754 24  0547 24  1146   TROPHS 255* 463* 362*       Recent Labs   Lab 24   PTP 17.7*   INR 1.45*              Imaging: Imaging data reviewed in Epic.    Medications:    metoprolol tartrate  25 mg Oral 2x Daily(Beta Blocker)    atorvastatin  40 mg Oral Daily    clopidogrel  75 mg Oral Daily    finasteride  5 mg  Oral Daily    tamsulosin  0.4 mg Oral Daily    piperacillin-tazobactam  3.375 g Intravenous Q8H    apixaban  2.5 mg Oral BID    folic acid  1 mg Oral Daily    predniSONE  15 mg Oral Daily    gabapentin  200 mg Oral BID       Assessment & Plan:     # Severe sepsis  Resolving  BCX NTD    #Asp PNA  Cont. Zosyn  SLP eval: passed with precautions  Likely due to  hx of achalasia    #Achalasia  Hx of botox and surgery    #Neuropathy  Wean ivelisse given recurrent sedation issues     #ERON due to hypoperfusion, mild dehydration, urinary retention  Block in place: voiding trial prior to DC  Cont. Flomax and proscar  Hold on further fluids    #Chronic systolic heart failure, EF: 20-25%  Currently compensated  Resume PO lasix at lower dose, has developed some LE edema    #NSTEMI type II  Due to sepsis    #A. Fib  Cont. BB  Cont. OAC, dose decreased for now given age and ERON; Cr now back to baseline and will resume 5mg dose    #Moderate to Severe aortic stenosis  #CAD with prior CABG  #Dyslipidemia    #PAD with non-healing right foot wound s/p thrombectomy and revascularization/stent 12/4/23 by Dr. Steward  Pt remains non weight bearing to RLE    #GERD    #Adrenal insuff  Cont.  PO pred    #Hypokalemia  Replace and monitor    #ACP  DNAR/S    Dispo: as above. Discussed with cards. Cont. ABX.  Await BCX.  Possible DC 1-2 days if BCX neg and remains stable. Re-introduce cardiac meds. Resume lasix at lower dose given HF/ao stenosis and mild inc in LE edema.        Dipesh Wall MD    Supplementary Documentation:     Quality:    DVT Mechanical Prophylaxis:        DVT Pharmacologic Prophylaxis   Medication    apixaban (Eliquis) tab 2.5 mg                Code Status: DNAR/Selective Treatment  Block: Block catheter in place  Block Duration (in days):   Central line:    DIEGO:       Discharge is dependent on: clinical stability  At this point Mr. Guardado is expected to be discharge to: home    The 21st Century Cures Act makes medical notes like  these available to patients in the interest of transparency. Please be advised this is a medical document. Medical documents are intended to carry relevant information, facts as evident, and the clinical opinion of the practitioner. The medical note is intended as peer to peer communication and may appear blunt or direct. It is written in medical language and may contain abbreviations or verbiage that are unfamiliar.

## 2024-07-23 NOTE — PAYOR COMM NOTE
--------------  ADMISSION REVIEW     Payor: [employer] MEDICARE ADV PPO  Subscriber #:  [id number]  Authorization Number: [id number]    Admit date: 7/22/24  Admit time: 12:19 AM       REVIEW DOCUMENTATION:     ED Provider Notes          Patient Seen in: [facility] Emergency Department      History     Chief Complaint   Patient presents with    Hypotension     Stated Complaint: lethergic, hypotension    Subjective:   89-year-old male, history of A-fib on Eliquis, CAD, CHF, peripheral arterial disease with multiple stenting, right toe and foot amputations, chronic wounds and follows at the wound clinic, presents from home with lethargy.  Per EMS his blood pressure was 57 systolic upon arrival.  He been lethargic since this morning.  He does not ambulate using a walker and transfers from bed to chair which she did this morning.  Family states when he gets sick he is sick really quickly.  History of recurrent pneumonia.  Oriented to person and situation.  Lethargic but arousable.  No falls or trauma            Objective:   Past Medical History:    Amputation of right foot (HCC)    non-healing. Followed by would clinic    Atrial fibrillation (HCC)    CAD (coronary artery disease)    CHF (congestive heart failure) (HCC)    Easy bruising    Esophageal reflux    Fatigue    Hearing impairment    Hearing loss    Heart attack (HCC)    High blood pressure    High cholesterol    History of MI (myocardial infarction)    HTN (hypertension)    Hyperlipidemia    Leg swelling    Loss of appetite    PAD (peripheral artery disease) (HCC)    Peripheral vascular disease (HCC)    Uncomfortable fullness after meals    Visual impairment    glasses    Wears glasses     Review of Systems   Unable to perform ROS: Acuity of condition       Positive for stated Chief Complaint: Hypotension    Other systems are as noted in HPI.  Constitutional and vital signs reviewed.      All other systems reviewed and negative except as noted  above.    Physical Exam     ED Triage Vitals [07/21/24 1752]   BP (!) 119/99   Pulse 119   Resp (!) 28   Temp 98.3 °F (36.8 °C)   Temp src Temporal   SpO2 96 %   O2 Device None (Room air)       Current Vitals:   Vital Signs  BP: 132/50  Pulse: 101  Resp: 22  Temp: 98.3 °F (36.8 °C)  Temp src: Temporal  MAP (mmHg): 68    Oxygen Therapy  SpO2: 95 %  O2 Device: None (Room air)            Physical Exam  Vitals and nursing note reviewed.   Constitutional:       General: He is in acute distress.      Appearance: He is ill-appearing.   HENT:      Head: Normocephalic.   Cardiovascular:      Rate and Rhythm: Tachycardia present. Rhythm irregular.      Pulses: Normal pulses.      Heart sounds: Murmur heard.   Pulmonary:      Effort: Respiratory distress present.      Breath sounds: Rales present.   Abdominal:      Palpations: Abdomen is soft.      Tenderness: There is no abdominal tenderness.   Musculoskeletal:      Cervical back: Neck supple.      Right lower leg: Edema present.      Left lower leg: Edema present.   Neurological:      Mental Status: He is alert.         Small wound to the right anterior shin.  Nonhealing wounds of his amputations of his foot.  Son changed it yesterday and was in the wound clinic on Monday    ED Course     Labs Reviewed   COMP METABOLIC PANEL (14) - Abnormal; Notable for the following components:       Result Value    Glucose 134 (*)     CO2 19.0 (*)     BUN 33 (*)     Creatinine 2.57 (*)     Calculated Osmolality 301 (*)     eGFR-Cr 23 (*)     AST 39 (*)     Globulin  2.7 (*)     All other components within normal limits   LACTIC ACID, PLASMA - Abnormal; Notable for the following components:    Lactic Acid 9.9 (*)     All other components within normal limits   PROTHROMBIN TIME (PT) - Abnormal; Notable for the following components:    PT 17.7 (*)     INR 1.45 (*)     All other components within normal limits   TROPONIN I HIGH SENSITIVITY - Abnormal; Notable for the following components:     Troponin I (High Sensitivity) 255 (*)     All other components within normal limits   PRO BETA NATRIURETIC PEPTIDE - Abnormal; Notable for the following components:    Pro-Beta Natriuretic Peptide 8,007 (*)     All other components within normal limits   MANUAL DIFFERENTIAL - Abnormal; Notable for the following components:    Neutrophil Absolute Manual 15.76 (*)     Monocyte Absolute Manual 1.38 (*)     Metamyelocyte Absolute Manual 0.59 (*)     All other components within normal limits   POCT GLUCOSE - Abnormal; Notable for the following components:    POC Glucose 128 (*)     All other components within normal limits   CBC W/ DIFFERENTIAL - Abnormal; Notable for the following components:    WBC 19.7 (*)     HGB 12.5 (*)     MCHC 30.8 (*)     Neutrophil Absolute Prelim 15.80 (*)     All other components within normal limits   PTT, ACTIVATED - Normal   MAGNESIUM - Normal   LIPID PANEL - Normal   SARS-COV-2/FLU A AND B/RSV BY PCR (GENEXPERT) - Normal    Narrative:     This test is intended for the qualitative detection and differentiation of SARS-CoV-2, influenza A, influenza B, and respiratory syncytial virus (RSV) viral RNA in nasopharyngeal or nares swabs from individuals suspected of respiratory viral infection consistent with COVID-19 by their healthcare provider. Signs and symptoms of respiratory viral infection due to SARS-CoV-2, influenza, and RSV can be similar.    Test performed using the Xpert Xpress SARS-CoV-2/FLU/RSV (real time RT-PCR)  assay on the GeneXpert instrument, [employer], [place] [address].   This test is being used under the Food and Drug Administration's Emergency Use Authorization.    The authorized Fact Sheet for Healthcare Providers for this assay is available upon request from the laboratory.   CBC WITH DIFFERENTIAL WITH PLATELET    Narrative:     The following orders were created for panel order CBC With Differential With Platelet.  Procedure                               Abnormality          Status                     ---------                               -----------         ------                     CBC W/ DIFFERENTIAL[[id number]]          Abnormal            Final result                 Please view results for these tests on the individual orders.   URINALYSIS WITH CULTURE REFLEX   SCAN SLIDE   LACTIC ACID REFLEX POST POSTIVE   RAINBOW DRAW LAVENDER   RAINBOW DRAW LIGHT GREEN   RAINBOW DRAW BLUE   RAINBOW DRAW GOLD   BLOOD CULTURE   BLOOD CULTURE     EKG    Rate, intervals and axes as noted on EKG Report.  Rate: 121  Rhythm: Atrial Fibrillation  Reading: EKG A-fib  bpm.  No ST elevations.  Poor baseline secondary tears.  HERNANDEZ motion artifact.  When compared to March 2024, no significant changes are noted    Patient placed on cardiac monitor for telemetry monitoring secondary to lethargy, weakness. Interpretation at bedside by me is afib.          MDM      CT CHEST+ABDOMEN+PELVIS(CPT=71250/91763)    Result Date: 7/21/2024  CONCLUSION:   1. Extensive tree-in-bud and patchy consolidative opacities involving the lower lobes, middle lobe, lingula, and right upper lobe, features and distribution favoring aspiration pneumonia.  This is supported by fluid distention of the esophagus which may represent gastroesophageal reflux or possibly motility disorder.  2. No acute process of the abdomen or pelvis.      LOCATION:  [facility]    Dictated by (CST): Luisito Faria MD on 7/21/2024 at 8:11 PM     Finalized by (CST): Luisito Faria MD on 7/21/2024 at 8:16 PM        I independently interpreted the CT of the chest and note multifocal pneumonia most notably in the lower lobes bilaterally in the right upper lobe    Family at bedside helpful to provide information on the history presenting illness    Differential diagnosis includes, but not limited to, pneumonia, bacteremia, hypoglycemia, dehydration    External chart review demonstrates admissions for cardiac workups and for his pneumonia in the  past    89-year-old male was at home, checked by family today and lethargic.  States he gets this way when he has pneumonia.  CT does have pneumonia.  Also has renal failure likely from not drinking.  No obstructive uropathy.  Has lactic acidosis.  He was hypotensive on arrival.  He is given 1 L normal saline.  He has severe aortic stenosis.  He has a very poor EF about 24% therefore was concerning for pulmonary edema fluids were scaled back.  Broad-spectrum antibiotics were given.  He has chronic wounds to his right lower extremity.  He is select DNAR.  Blood pressures 130s over 50s, heart rate is 98.  He is now rate controlled A-fib.  Cautious to put on any sort of Cardizem drip secondary to his elevated lactate and possibility of hypotension/shock.  Not currently hypotensive.  Heart rate is improved.  Mental status is improved as well.  Admitted to Dr. Ramírez, CTU, awaiting bed assignment.  Repeat lactate pending. [facility] on consult    CRITICAL CARE:  A total of 85 minutes of critical care time (exclusive of billable procedures) was administered to manage the patient's critical lab values, critical imaging findings, unstable vital signs, respiratory instability, cardiovascular instability, neurologic instability, and metabolic instability due to his severe sepsis, A-fib with RVR, hypotension tachycardia in the setting of severe arctic stenosis but also poor ejection fraction.  Monitoring volume status.  Discussed with [facility] cardiology.  Multiple discussion with hospitalist.  Frequent assessments and reassessments.  Chart review, documentation, imaging reviewed interpretation  This involved direct patient intervention, complex decision making, and/or extensive discussions with the patient, family, and clinical staff.      Admission disposition: 7/21/2024  9:27 PM      Patient did not receive 30ml/kg of fluids despite having: elevated Lactate. The reason for doing so is: patient has heart failure. 1200mL of fluids were  given instead (1200 is the amount of fluids given).                 [facility]   [facility] of [facility]      Sepsis Reassessment Note    BP (!) 119/99   Pulse 119   Temp 98.3 °F (36.8 °C) (Temporal)   Resp (!) 28   Ht 182.9 cm (6')   Wt 90.7 kg   SpO2 96%   BMI 27.12 kg/m²      I completed the sepsis reassessment at 2100    Cardiac:  Regularity: Irregular  Rate: Tachycardic  Heart Sounds: Murmur    Lungs:   Right: Diminished  Left: Diminished    Peripheral Pulses:  Radial: Right 1+ or Left 1+      Capillary Refill:  <3 Secs    Skin:  Temp/Moisture: Warm and Dry  Color: Normal      Chon Whitehead DO  7/21/2024  6:06 PM            Medical Decision Making      Disposition and Plan     Clinical Impression:  1. Severe sepsis (HCC)    2. Multifocal pneumonia    3. Acute renal failure superimposed on chronic kidney disease, unspecified acute renal failure type, unspecified CKD stage (HCC)    4. Elevated troponin    5. NSTEMI (non-ST elevated myocardial infarction) (HCC)    6. Atrial fibrillation with RVR (HCC)         Disposition:  Admit  7/21/2024  9:27 pm       Hospital Problems       Present on Admission  Date Reviewed: 7/17/2024            ICD-10-CM Noted POA    * (Principal) Severe sepsis (HCC) A41.9, R65.20 7/21/2024 Unknown           Signed by Chon Whitehead DO on 7/21/2024  9:33 PM               History and Physical    H&P signed by Rody Herr MD at 7/22/2024  1:00 AM         [facility]  [facility]          Chief Complaint: lethargy      Subjective:    History of Present Illness:      Alejandro Guardado is a 89 year old male with CHF, CAD, A-fib on Eliquis, presented to the emergency room with lethargy over the last 24 hours.  Patient's son and daughter at bedside and providing history.  They had seen patient yesterday morning and he was eating and drinking just fine.  However patient was found in feces and looked very lethargic when found today.  Family cleaned him up and  brought him to the emergency room for further evaluation.  When he arrived in the emergency room he was very tachycardic and tachypneic, hypotensive upon arrival.  Had multiple readmissions for aspiration pneumonia in the past.    Assessment & Plan:       # Severe sepsis with tachycardia, tachypnea, lactic acidosis, leukocytosis   -blood and urine cx  - CT CAP reviewed-> aspiration pneumonia noted  - serial lactic      #ERON due to hypoperfusion, mild dehydration  - IVF given   - will hold off on further iVF  - hold nephrotoxic agents  - per course consider renal US and nephrology consult  - BMP in am   - IO     #Lactic acidosis as above   #Chronic systolic heart failure   #Severe aortic stenosis  #CAD with prior CABG  #Permenant Afib  #Dyslipidemia  #PAD with non-healing right foot wound s/p thrombectomy and revascularization/stent 12/4/23 by Dr. Steward  #GERD     CODE status: DNAR select         Plan of care discussed with patient, son and daughter, ER.      Rody Herr MD             CONSULT  URINARY RETENTION, BPH  -pena catheter in place  -hematuria after pena catheter placed in setting of blood thinner - observe.    UA does not appear infectious  CT chest/abdomen/pelvis 7/21/24: KIDNEYS: Moderate renal cortical atrophy. No obstructive uropathy of the upper tracts.  Right lower pole cortical cyst.  Prostatomegaly.  Office cystoscopy 5/30/23:   Urethra: Abnormal irritated  Prostate / Pelvic: Abnormal obstructive, bilobar, 6 cm prostate in length  Bladder: Abnormal in retention, median lobe enlarged into bladder . No tumor, stone, diverticulum, or glomerulation,  U.O's: Normal  Trabeculation: mild trabeculation      ERON  -no hydronephrosis on CT scan  -serum creat 2.57 > 2.01 > 1.59     Recommendations:  -Continue present management with tamsulosin, finasteride.    -Continue with pena catheter to gravity drainage.  Voiding trial when patient's general medical condition has improved/stronger.       Will follow  peripherally.     Above discussed patient, nurse     Thank you for allowing me to participate in the care of your patient.     Apple Cantu PA-C  [facility]  Department of Urology  7/23/2024  7:03 AM    CONSULT  Labs:         Lab Results   Component Value Date     WBC 13.1 07/22/2024     RBC 3.82 07/22/2024     HGB 10.1 07/22/2024     HCT 32.3 07/22/2024     MCV 84.6 07/22/2024     MCH 26.4 07/22/2024     MCHC 31.3 07/22/2024     RDW 17.2 07/22/2024     .0 07/22/2024            Lab Results   Component Value Date     INR 1.45 (H) 07/21/2024     INR 1.51 (H) 03/28/2024     INR 1.35 (H) 02/12/2024         Assessment:     Sepsis pneumonia  IV antibiotics/fluids  Persistent AFIB  Rates trending 100-110 bpm. Goal HR<110 in the setting of sepsis  Toprol-XL was held this morning due to hypotension. Continue to hold BB for now. Likely low dose lopressor tomorrow.   Anticoagulated with Eliquis  Elevated troponin, known CAD s/p CABG and PCI  HsTrop 255 > 463 > trend until peak and downtrend. Demand likely with hypoperfusion with sepsis lactate 9.9 on arrival and AF RVR  He is chest pain free  EKG on arrival without acute ST/T changes in AFIB RVR. Baseline TTE is abnormal 12/2023 with resting WMA and reduced EF with known ICM  Plavix/eliquis, statin. Holding BB with hypotension  HFrEF, ICM EF 25-30%  Likely dehydrated. Agree with IV fluids. Closely monitor volume status  GDMT: bb held with hypotension. No ARNI/ARB/ACE/MRA/SGLT2 with ERON  Moderate-severe aortic stenosis  Avoid intravascular depletion with preload dependence. Agree with IV fluids  PAD history of popliteal stent occlusion s/p PTA 12/2023  Eliquis/plavix, statin  ERON on CKD  Baseline normal renal function. Suspect ERON secondary to intravascular depletion and possibly ATN with hypotension/sepsis     Plan:     Hold BB for now with relative hypotension. AFIB rate goal 110 bpm or less with sepsis.  Repeat troponin now. Monitor until peak. Likely  demand with sepsis.   Continue current cardiac medications        ESPERANZA Friend  7/22/2024  11:09 AM     Patient seen and examined independently.  Note reviewed and labs reviewed. Agree with above assessment and plan.  89-year-old male with complex medical history as noted above including ischemic cardiomyopathy with LVEF of 25-30% as well as moderate to severe aortic valve stenosis.  He presented with clinical symptoms suggestive of sepsis with signs of hypoperfusion given elevated lactate.  CT imaging was completed yesterday notable for extensive tree-in-bud and patchy consolidative opacities in the lower lobes, middle lobe, right upper lobe suggestive of aspiration pneumonia process.  Given these findings, he was noted to have elevated high-sensitivity troponin.  Supply/demand mismatch ischemia is likely the etiology.  Plan recheck of high sensitive troponin today.  Hold beta-blockade for now given labile BP.  Will allow for moderate control of A-fib rates.  Will need adequate intravascular volume status given underlying moderate to severe aortic valve stenosis is preload will be important.  Continue supportive care in the interim.  Will continue to closely follow.           Mckay Campos DO  Cardiologist  [facility]  7/22/2024 12:12 PM      7/22   Chief Complaint: confusion     Subjective:      Patient feels well. No complaints.      Objective:    Review of Systems:   ROS completed; pertinent positive and negatives stated in subjective.     Vital signs:  Temp:  [97.8 °F (36.6 °C)-99.1 °F (37.3 °C)] 97.9 °F (36.6 °C)  Pulse:  [] 61  Resp:  [18-28] 18  BP: ()/(50-99) 92/58  SpO2:  [94 %-100 %] 94 %     Physical Exam:    General: No acute distress  Respiratory:  mild rhonchi  Cardiovascular: S1, S2. IR IR  Abdomen: Soft  Neuro: No new focal deficits  MSK:  RLE toe amputations        Diagnostic Data:    Labs:       Recent Labs   Lab 07/21/24  1754 07/22/24  0547   WBC 19.7* 13.1*    HGB 12.5* 10.1*   MCV 86.6 84.6   .0 227.0   BAND 41  --    INR 1.45*  --               Recent Labs   Lab 07/21/24 1754 07/22/24  0547   * 105*   BUN 33* 30*   CREATSERUM 2.57* 2.01*   CA 9.3 8.2*   ALB 3.9  --     141   K 4.3 3.3*    109   CO2 19.0* 25.0   ALKPHO 62  --    AST 39*  --    ALT 20  --    BILT 0.6  --    TP 6.6  --          Estimated Creatinine Clearance: 27.3 mL/min (A) (based on SCr of 2.01 mg/dL (H)).            Recent Labs   Lab 07/21/24 1754 07/22/24  0547 07/22/24  1146   TROPHS 255* 463* 362*             Recent Labs   Lab 07/21/24 1754   PTP 17.7*   INR 1.45*               Imaging: Imaging data reviewed in Epic.     Medications:    atorvastatin  40 mg Oral Daily    clopidogrel  75 mg Oral Daily    finasteride  5 mg Oral Daily    tamsulosin  0.4 mg Oral Daily    piperacillin-tazobactam  3.375 g Intravenous Q8H    apixaban  2.5 mg Oral BID    folic acid  1 mg Oral Daily    predniSONE  15 mg Oral Daily    gabapentin  200 mg Oral BID         Assessment & Plan:      # Severe sepsis  Resolving  BCX pending     #Asp PNA  Cont. Zosyn  SLP eval  Likely due to  hx of achalasia     #Achalasia  Hx of botox and surgery     #Neuropathy  Wean ivelisse given recurrent sedation issues     #ERON due to hypoperfusion, mild dehydration  Received septic bolus  Hold on further fluids and monitor     #Chronic systolic heart failure, EF: 20-25%  Currently compensated  Monitor     #NSTEMI type II  Due to sepsis     #A. Fib  Resume BB when able  Cont. OAC, dose decreased for now given age and ERON     #Moderate to Severe aortic stenosis  #CAD with prior CABG  #Dyslipidemia  #PAD with non-healing right foot wound s/p thrombectomy and revascularization/stent 12/4/23 by Dr. Steward  #GERD     #Adrenal insuff  Resume PO pred     #Hypokalemia  Replace and monitor     #ACP  DNAR/S     Dispo: as above. Resume PO diet.  Discussed home living situation.  Pt not interested in alternative living  arrangements.  High risk for pulm edema, re-bolus fluids as needed, hold on maintenance fluids for now.  Cont. ABX.               Dipesh Wall MD       7/23  Objective:   Temp: 97.8 °F (36.6 °C)  Pulse: 91  Resp: 18  BP: 109/77     Intake/Output:      Intake/Output Summary (Last 24 hours) at 7/23/2024 1013  Last data filed at 7/22/2024 2322      Gross per 24 hour   Intake 240 ml   Output 2000 ml   Net -1760 ml              Last 3 Weights   07/22/24 0025 199 lb 15.3 oz (90.7 kg)   07/21/24 1752 200 lb (90.7 kg)   04/29/24 1628 205 lb (93 kg)   04/18/24 0938 205 lb (93 kg)            Physical Exam:     General: Alert. No apparent distress. No respiratory or constitutional distress.  HEENT: Normocephalic, anicteric sclera, neck supple.  Neck: No JVD  Cardiac: Regular rate and rhythm. S1, S2 normal. No murmur, pericardial rub, S3.  Lungs: Coarse right side.   Abdomen: Soft, non-tender.   Extremities: Without clubbing, cyanosis. +1 edema.   Neurologic: Alert, normal affect.  Skin: Warm and dry.      Laboratory/Data:     Labs:              Recent Labs   Lab 07/21/24 1754 07/22/24  0547 07/23/24  0903   WBC 19.7* 13.1* 10.9   HGB 12.5* 10.1* 10.0*   MCV 86.6 84.6 83.2   .0 227.0 233.0   BAND 41  --   --    INR 1.45*  --   --                 Recent Labs   Lab 07/21/24  1754 07/22/24  0547 07/22/24  1719 07/23/24  0903    141 138 138   K 4.3 3.3* 3.5 3.6  3.6    109 108 109   CO2 19.0* 25.0 24.0 24.0   BUN 33* 30* 26* 19   CREATSERUM 2.57* 2.01* 1.59* 1.16   CA 9.3 8.2* 8.7 9.0   MG 1.7  --   --  1.9   * 105* 118* 103*             Recent Labs   Lab 07/21/24  1754   ALT 20   AST 39*   ALB 3.9         No results for input(s): \"TROP\" in the last 168 hours.     Allergies:        Allergies   Allergen Reactions    Heparin ANAPHYLAXIS    Hydromorphone HALLUCINATION         Medications:         Current Facility-Administered Medications   Medication Dose Route Frequency    atorvastatin (Lipitor) tab 40  mg  40 mg Oral Daily    clopidogrel (Plavix) tab 75 mg  75 mg Oral Daily    finasteride (Proscar) tab 5 mg  5 mg Oral Daily    tamsulosin (Flomax) cap 0.4 mg  0.4 mg Oral Daily    benzonatate (Tessalon) cap 200 mg  200 mg Oral TID PRN    sodium chloride (Saline Mist) 0.65 % nasal solution 1 spray  1 spray Each Nare Q3H PRN    piperacillin-tazobactam (Zosyn) 3.375 g in dextrose 5% 100 mL IVPB-ADDV  3.375 g Intravenous Q8H    acetaminophen (Tylenol Extra Strength) tab 500 mg  500 mg Oral Q4H PRN    melatonin tab 3 mg  3 mg Oral Nightly PRN    ondansetron (Zofran) 4 MG/2ML injection 4 mg  4 mg Intravenous Q6H PRN    apixaban (Eliquis) tab 2.5 mg  2.5 mg Oral BID    folic acid (Folvite) tab 1 mg  1 mg Oral Daily    predniSONE (Deltasone) tab 15 mg  15 mg Oral Daily    gabapentin (Neurontin) cap 200 mg  200 mg Oral BID            Assessment:  Sepsis pneumonia, aspiration?   Persistent AFIB  Rates trending 100-110 bpm. Goal HR<110 in the setting of sepsis  Anticoagulated with Eliquis  Elevated troponin, known CAD s/p CABG and PCI  HsTrop 255 > 463 >362. Demand likely with hypoperfusion with sepsis lactate 9.9 on arrival and AF RVR  He is chest pain free  EKG on arrival without acute ST/T changes in AFIB RVR. Baseline TTE is abnormal 12/2023 with resting WMA and reduced EF with known ICM  Plavix/eliquis, statin. Holding BB with hypotension  HFrEF, ICM EF 25-30%  Likely dehydrated. Agree with IV fluids. Closely monitor volume status  GDMT: bb held with hypotension. No ARNI/ARB/ACE/MRA/SGLT2 with ERON  Moderate-severe aortic stenosis  Avoid intravascular depletion with preload dependence. Agree with IV fluids  PAD history of popliteal stent occlusion s/p PTA 12/2023  Eliquis/plavix, statin  ERON on CKD  Baseline normal renal function. Suspect ERON secondary to intravascular depletion and possibly ATN with hypotension/sepsis     Plan:  Start metoprolol tartrate 25 mg bid   Will need resumption of home lasix po 80 mg bid soon    Continue current cardiac medications  Supportive care         Mckay Campos DO  Cardiologist  [facility]  7/23/2024 10:13 AM            MEDICATIONS ADMINISTERED IN LAST 1 DAY:  apixaban (Eliquis) tab 2.5 mg       Date Action Dose Route User    7/23/2024 0837 Given 2.5 mg Oral Kate Mock RN    7/22/2024 2101 Given 2.5 mg Oral Ella Silverio RN          atorvastatin (Lipitor) tab 40 mg       Date Action Dose Route User    7/23/2024 0837 Given 40 mg Oral Kate Mock RN          clopidogrel (Plavix) tab 75 mg       Date Action Dose Route User    7/23/2024 0837 Given 75 mg Oral Kate Mock RN          finasteride (Proscar) tab 5 mg       Date Action Dose Route User    7/23/2024 0837 Given 5 mg Oral Kate Mock RN          folic acid (Folvite) tab 1 mg       Date Action Dose Route User    7/23/2024 0837 Given 1 mg Oral Kate Mock RN          gabapentin (Neurontin) cap 200 mg       Date Action Dose Route User    7/23/2024 0837 Given 200 mg Oral Kate Mock RN    7/22/2024 2101 Given 200 mg Oral Ella Silverio RN          metoprolol tartrate (Lopressor) tab 25 mg       Date Action Dose Route User    7/23/2024 1135 Given 25 mg Oral Kate Mcok RN          piperacillin-tazobactam (Zosyn) 3.375 g in dextrose 5% 100 mL IVPB-ADDV       Date Action Dose Route User    7/23/2024 1135 New Bag 3.375 g Intravenous Kate Mock RN    7/23/2024 0200 New Bag 3.375 g Intravenous Ella Silverio RN    7/22/2024 1735 New Bag 3.375 g Intravenous Rosa Burton RN          tamsulosin (Flomax) cap 0.4 mg       Date Action Dose Route User    7/23/2024 0837 Given 0.4 mg Oral Kate Mock RN          predniSONE (Deltasone) tab 15 mg       Date Action Dose Route User    7/23/2024 0836 Given 15 mg Oral Kate Mock RN            Vitals (last day)       Date/Time Temp Pulse Resp BP SpO2 Weight O2 Device O2 Flow Rate (L/min) [facility]    07/23/24 1130 97.9 °F (36.6 °C) 115 18 125/73  93 % -- None (Room air) 0 L/min     07/23/24 0830 97.8 °F (36.6 °C) 91 18 109/77 96 % -- -- --     07/23/24 0400 98 °F (36.7 °C) 93 -- 106/71 100 % -- None (Room air) --     07/23/24 0000 98.2 °F (36.8 °C) 113 -- 104/79 94 % -- None (Room air) --     07/22/24 2322 -- 96 -- -- 99 % -- -- --     07/22/24 2000 98.4 °F (36.9 °C) 105 -- 100/63 97 % -- None (Room air) --     07/22/24 1800 -- 105 -- -- 98 % -- -- --     07/22/24 1645 -- 122 -- -- 95 % -- -- --     07/22/24 1600 -- 104 -- -- 99 % -- -- --     07/22/24 1530 97.6 °F (36.4 °C) 114 18 99/79 95 % -- None (Room air) --     07/22/24 1145 97.9 °F (36.6 °C) 61 18 92/58 94 % -- None (Room air) --     07/22/24 0745 99.1 °F (37.3 °C) 80 18 101/55 98 % -- None (Room air) --     07/22/24 0400 97.8 °F (36.6 °C) 88 20 78/52 96 % -- None (Room air) --     07/22/24 0025 -- -- -- -- -- 199 lb 15.3 oz (90.7 kg) -- -- EN

## 2024-07-23 NOTE — PHYSICAL THERAPY NOTE
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  Prior to admission, patient's baseline is mod I with use of r/w and w/c.  Patient is requiring contact guard assist and minimal assist as a result of the following impairments: decreased functional strength, impaired dynamic standing balance, medical status, and limited WB R LE . Physical Therapy will continue to follow for duration of hospitalization.      Patient will benefit from continued skilled PT Services for duration of hospitalization, however, given the patient is functioning near baseline level do not anticipate skilled therapy needs at discharge .    PLAN  PT Treatment Plan: Bed mobility;Body mechanics;Energy conservation;Patient education;Gait training;Strengthening;Transfer training;Balance training  Rehab Potential : Fair  Frequency (Obs): 3-5x/week  Number of Visits to Meet Established Goals: 2      CURRENT GOALS    Goal #1 Patient is able to demonstrate supine - sit EOB @ level: modified independent     Goal #2 Patient is able to demonstrate transfers EOB to/from Chair/Wheelchair at assistance level: modified independent     Goal #3 Patient is able to ambulate 5 feet with assist device: walker - rolling at assistance level: supervision     Goal #4    Goal #5    Goal #6    Goal Comments: Goals established on 7/23/2024      PHYSICAL THERAPY MEDICAL/SOCIAL HISTORY  History related to current admission: Patient is a 89 year old male admitted on 7/21/2024 from home when found unresponsive in w/c.  Pt diagnosed with sepsis. Pt with  chronic wounds on R LE. See Precautions/Weight Bearing Restrictions for recommendations on WB R LE.     Recent admissions:  3/28-4/3/24 for septic shock with PNA with dc to home with HHPT  3/16-18/24 for PNA with dc to home with HHPT  2/12 to 2/15/24 for septic shock, pneumonia-> home PT  2/25-28/2023 with septick shock, hypotension, PNA with dc to home with HHPT  12/1-6/2023 with PNA, a-fib, nonhealing wound R foot with dc to home with HHPT    HOME SITUATION  Type of Home: House   Home Layout: Two level;Able to live on main level  Stairs to Enter : 1  Railing: Yes  Stairs to Bedroom: 0       Lives With: Alone (family checks in daily)  Drives: No  Patient Owned Equipment: Rolling walker;Cane;Wheelchair  Patient Regularly Uses: Reading glasses    Prior Level of [place]: Pt reports he is typically mod I using walker and w/c in home. Pt denies history of recent falls in last 6 months. Pt reports he is able to dress self. Pt completes short distance gait to into bathroom from bathroom door as w/c will not fit in bathroom, completes with walker and minimal WB RLE.Pt does report shower is upstairs and he crawls up stairs and scoots down on bottom 1 x wk to access shower.  Pt and family report Podiatry has stated ok for minimal WB on R LE  with limited gait distance, see below WB Restrictions for details. Family checks in on pt daily, assists with meals and errands, chores as needed.     SUBJECTIVE  \"It's good to be out of that bed\"      OBJECTIVE  Precautions: Hard of hearing  Fall Risk: High fall risk    WEIGHT BEARING RESTRICTION  Weight Bearing Restriction: R lower extremity        R Lower Extremity:  (limited WB per Dr. Poon note, see PT note - limited gait distance with slipper)     Per Podiatry office note 7/8/24 from Dr. Poon:   \"Patient should elevate right lower extremity as frequently as possible as we cannot compress his right lower extremity due to recent stenting and chronic CHF.  Reiterated importance  of elevation to patient today   -Patient should remain minimally weightbearing to his right foot in his soft slipper.  Okay to stand for transfers and take a few steps at a time.     -Again discussed a balance between treating his ulcerations and overall mental health/quality of life.  I do agree that patient should be able to do activities that he would like to do, but I would just avoid excessive ambulation or excessive amounts of time on his feet.\"      PAIN ASSESSMENT  Rating:  (no c/o pain during session)          COGNITION  Overall Cognitive Status:  WFL - within functional limits    RANGE OF MOTION AND STRENGTH ASSESSMENT  Upper extremity ROM and strength are within functional limits     Lower extremity ROM is within functional limits , noted forefott/toe amputation R LE, bandage wrap in place.    Lower extremity strength is within functional limits      BALANCE  Static Sitting: Good  Dynamic Sitting: Not tested  Static Standing: Fair -  Dynamic Standing: Fair -    ADDITIONAL TESTS                                    ACTIVITY TOLERANCE  Pulse: 104  Heart Rate Source: Monitor                   O2 WALK  Oxygen Therapy  SPO2% on Room Air at Rest: 94    NEUROLOGICAL FINDINGS                        AM-PAC '6-Clicks' INPATIENT SHORT FORM - BASIC MOBILITY  How much difficulty does the patient currently have...  Patient Difficulty: Turning over in bed (including adjusting bedclothes, sheets and blankets)?: None   Patient Difficulty: Sitting down on and standing up from a chair with arms (e.g., wheelchair, bedside commode, etc.): A Little   Patient Difficulty: Moving from lying on back to sitting on the side of the bed?: A Little   How much help from another person does the patient currently need...   Help from Another: Moving to and from a bed to a chair (including a wheelchair)?: A Little   Help from Another: Need to walk in hospital room?: A Little   Help from Another: Climbing 3-5 steps with a railing?: A Little        AM-PAC Score:  Raw Score: 19   Approx Degree of Impairment: 41.77%   Standardized Score (AM-PAC Scale): 45.44   CMS Modifier (G-Code): CK    FUNCTIONAL ABILITY STATUS  Gait Assessment   Functional Mobility/Gait Assessment  Gait Assistance: Contact guard assist  Distance (ft): 2  Assistive Device: Rolling walker  Pattern: R Decreased stance time;Shuffle    Skilled Therapy Provided     Bed Mobility:  Rolling: NT  Supine to sit: min assist   Sit to supine: NT     Transfer Mobility:  Sit to stand: cga   Stand to sit: cga  Gait = shuffle steps with minimal wb  R foot to chair on left side, walker, no LOB    Therapist's Comments: Pt presents supine in bed, multiple family members present. RN approval for session noted. Pt agreeable to PT eval. Pt mobility as above. Educated not to hold breath during transitional movements. Cga and cueing for sequencing for sit to stand from EOB, sba for sit to stand from recliner chair with armrests. Educated on minimal wb through heel during transfer, discussed importance of being up to chair to decrease risks of immobility during admission. Pt demonstrates seated marching, TKE, ankle pumps, OH reaching, hand pumps and instructed to do throughout day to assist in maintaining strength. Instructed in importance of limiting gait distance and wb R LE per podiatry notes for wound healing. Pt verbalizes understanding.     Exercise/Education Provided:  Bed mobility  Functional activity tolerated  Posture  Transfer training    Patient End of Session: Up in chair;Needs met;Call light within reach;RN aware of session/findings;All patient questions and concerns addressed;Family present      Patient Evaluation Complexity Level:  History High - 3 or more personal factors and/or co-morbidities   Examination of body systems Low -  addressing 1-2 elements   Clinical Presentation Low- Stable   Clinical Decision Making Low Complexity       PT Session Time: 25 minutes    Therapeutic Activity: 10  minutes    Therapeutic Exercise: 5 minutes

## 2024-07-23 NOTE — OCCUPATIONAL THERAPY NOTE
OCCUPATIONAL THERAPY EVALUATION - INPATIENT    Room Number: 3612/3612-A  Evaluation Date: 7/23/2024     Type of Evaluation: Initial  Presenting Problem: Severe sepsis    Physician Order: IP Consult to Occupational Therapy  Reason for Therapy:  ADL/IADL Dysfunction and Discharge Planning      OCCUPATIONAL THERAPY ASSESSMENT   Patient is a 89 year old male admitted on 7/21/2024 with Presenting Problem: Severe sepsis. 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  Prior to admission, patient's baseline is mod I to min A d/t prolonged limited WB RLE d/t chronic wounds; pt mainly in w/c but does stand to transfers and short functional distances within home.  Patient met all OT goals at mod I to min A level.  Patient reports no further questions/concerns at this time.     Patient will benefit from continued skilled OT Services with no additional skilled services but increased support at home      WEIGHT BEARING RESTRICTION  Weight Bearing Restriction: R lower extremity        R Lower Extremity:  (limited WB per Dr. Poon note, see PT note - limited gait distance with slipper)  7/8/24 note from podiatrist, Dr. Poon:  Patient should remain minimally weightbearing to his right foot in his soft slipper.  Okay to stand for transfers and take a few steps at a time.          Recommendations for nursing staff:   Transfers: CGA and RW  Toileting location: Toilet w/ riser    EVALUATION SESSION:  Patient at start of session: supine  FUNCTIONAL TRANSFER ASSESSMENT  Sit to Stand: Edge of Bed; Chair  Edge of Bed: Contact Guard Assist  Chair: Contact Guard Assist  Commode Transfer: Contact Guard Assist    BED MOBILITY  Rolling: Supervision  Supine to Sit : Supervision  Sit to Supine (OT): Supervision  Scooting: supervision    BALANCE ASSESSMENT  Static Sitting: Modified  Independent  Static Standing: Modified Independent    FUNCTIONAL ADL ASSESSMENT  Eating: Modified Independent  Grooming Seated: Supervision  LB Dressing Seated: Minimal Assist (pt reports he usually just wears slip on shoe on left and nothing on the right or will use sock aid when donning socks)      ACTIVITY TOLERANCE: WFL                         O2 SATURATIONS       COGNITION  Overall Cognitive Status:  WFL - within functional limits  COGNITION ASSESSMENTS       Upper Extremity:   ROM: within functional limits   Strength: is within functional limits     EDUCATION PROVIDED  Patient: Role of Occupational Therapy; Plan of Care; Discharge Recommendations; Adaptive Equipment Recommendations; DME Recommendations; Functional Transfer Techniques; Fall Prevention; Weight Bear Status; Posture/Positioning; Edema Reduction; Energy Conservation; Compensatory ADL Techniques; Proper Body Mechanics  Patient's Response to Education: Verbalized Understanding; Returned Demonstration  Family/Caregiver: Role of Occupational Therapy; Plan of Care  Family/Caregiver's Response to Education: Verbalized Understanding    Patient End of Session: Up in chair;With  staff;Needs met;Call light within reach;RN aware of session/findings;All patient questions and concerns addressed;Discussed recommendations with /;Family present    OCCUPATIONAL PROFILE    HOME SITUATION  Type of Home: House  Home Layout: Two level;Able to live on main level  Lives With: Alone (family checks in daily)       Shower/Tub and Equipment: Walk-in shower;Grab bar;Shower chair  Other Equipment: Hospital bed;Reacher;Sock aid (manual w/c, RW x 4)    Occupation/Status: retired from working for [employer] company x 50 years; built his own home  Hand Dominance: Right  Drives: No  Patient Regularly Uses: Reading glasses    Prior Level of Function: Patient is able to complete transfers from bed, chair, toilet and w/c without assist.  Patient has been  using w/c as primary in the home d/t WB limitations to RLE, however does take steps with use of walker to make it in/out of the bathroom.  Patient does go up/down stairs to the shower and reports he goes up on knees and hands and down on his buttocks to avoid placing too much weight on right LE.  Patient's family is around daily to check in on patient and assist with IADLs.  Patient reports no falls in the past 6 months.    SUBJECTIVE  PAIN ASSESSMENT  [place]  Location: denies       OBJECTIVE  Precautions: Hard of hearing  Fall Risk: High fall risk    WEIGHT BEARING RESTRICTION  Weight Bearing Restriction: R lower extremity        R Lower Extremity:  (limited WB per Dr. Poon note, see PT note - limited gait distance with slipper)       AM-PAC ‘6-Clicks’ Inpatient Daily Activity Short Form  -   Putting on and taking off regular lower body clothing?: A Little  -   Bathing (including washing, rinsing, drying)?: A Little  -   Toileting, which includes using toilet, bedpan or urinal? : A Little  -   Putting on and taking off regular upper body clothing?: None  -   Taking care of personal grooming such as brushing teeth?: None  -   Eating meals?: None    AM-PAC Score:  Score: 21  Approx Degree of Impairment: 32.79%  Standardized Score (AM-PAC Scale): 44.27      ADDITIONAL TESTS     NEUROLOGICAL FINDINGS        PLAN   Patient has been evaluated and presents with no skilled Occupational Therapy needs at this time.  Patient discharged from Occupational Therapy services.  Please re-order if a new functional limitation presents during this admission.      Patient Evaluation Complexity Level:   Occupational Profile/Medical History MODERATE - Expanded review of history including review of medical or therapy record   Specific performance deficits impacting engagement in ADL/IADL MODERATE  3 - 5 performance deficits   Client Assessment/Performance Deficits MODERATE - Comorbidities and min to mod modifications of tasks     Clinical Decision Making LOW - Analysis of occupational profile, problem-focused assessments, limited treatment options    Overall Complexity LOW     OT Session Time: 30 minutes  Self-Care Home Management: 15 minutes

## 2024-07-23 NOTE — PLAN OF CARE
Pt a&ox4  High flow O2 at 6L to 7L (if not desat at 79)  Vss  Tele on afib  General diet  Block cath d/t retention as per MD   Up with one assist  Saline lock  Abx therapy  POC to start on beta blockers tomorrow  Safety and fall prec maintained  Bed at lowest position, bed alrm on          Problem: SAFETY ADULT - FALL  Goal: Free from fall injury  Description: INTERVENTIONS:  - Assess pt frequently for physical needs  - Identify cognitive and physical deficits and behaviors that affect risk of falls.  - [place] fall precautions as indicated by assessment.  - Educate pt/family on patient safety including physical limitations  - Instruct pt to call for assistance with activity based on assessment  - Modify environment to reduce risk of injury  - Provide assistive devices as appropriate  - Consider OT/PT consult to assist with strengthening/mobility  - Encourage toileting schedule  Outcome: Progressing     Problem: CARDIOVASCULAR - ADULT  Goal: Maintains optimal cardiac output and hemodynamic stability  Description: INTERVENTIONS:  - Monitor vital signs, rhythm, and trends  - Monitor for bleeding, hypotension and signs of decreased cardiac output  - Evaluate effectiveness of vasoactive medications to optimize hemodynamic stability  - Monitor arterial and/or venous puncture sites for bleeding and/or hematoma  - Assess quality of pulses, skin color and temperature  - Assess for signs of decreased coronary artery perfusion - ex. Angina  - Evaluate fluid balance, assess for edema, trend weights  Outcome: Progressing  Goal: Absence of cardiac arrhythmias or at baseline  Description: INTERVENTIONS:  - Continuous cardiac monitoring, monitor vital signs, obtain 12 lead EKG if indicated  - Evaluate effectiveness of antiarrhythmic and heart rate control medications as ordered  - Initiate emergency measures for life threatening arrhythmias  - Monitor electrolytes and administer replacement therapy as ordered  Outcome:  Progressing     Problem: Integumentary status not within defined limits  Goal: Pt's integumentary status will be adequate for discharge  Outcome: Progressing

## 2024-07-23 NOTE — PROGRESS NOTES
Cardiology Progress Note    Alejandro Guardado Patient Status:  Inpatient    1935 MRN [id number]   [facility] 3NE-A Attending Dipesh Wall MD   Hosp Day # 1 PCP Stanislav Odonnell MD       Subjective:     Patient seen and examined. States he feels better - wants to go home. No other complaints.     Objective:   Temp: 97.8 °F (36.6 °C)  Pulse: 91  Resp: 18  BP: 109/77    Intake/Output:     Intake/Output Summary (Last 24 hours) at 2024 1013  Last data filed at 2024 2322  Gross per 24 hour   Intake 240 ml   Output 2000 ml   Net -1760 ml       Last 3 Weights   24 0025 199 lb 15.3 oz (90.7 kg)   24 1752 200 lb (90.7 kg)   24 1628 205 lb (93 kg)   24 0938 205 lb (93 kg)         Physical Exam:    General: Alert. No apparent distress. No respiratory or constitutional distress.  HEENT: Normocephalic, anicteric sclera, neck supple.  Neck: No JVD  Cardiac: Regular rate and rhythm. S1, S2 normal. No murmur, pericardial rub, S3.  Lungs: Coarse right side.   Abdomen: Soft, non-tender.   Extremities: Without clubbing, cyanosis. +1 edema.   Neurologic: Alert, normal affect.  Skin: Warm and dry.     Laboratory/Data:    Labs:         Recent Labs   Lab 24  0547 24  0903   WBC 19.7* 13.1* 10.9   HGB 12.5* 10.1* 10.0*   MCV 86.6 84.6 83.2   .0 227.0 233.0   BAND 41  --   --    INR 1.45*  --   --        Recent Labs   Lab 24  0547 24  1719 24  0903    141 138 138   K 4.3 3.3* 3.5 3.6  3.6    109 108 109   CO2 19.0* 25.0 24.0 24.0   BUN 33* 30* 26* 19   CREATSERUM 2.57* 2.01* 1.59* 1.16   CA 9.3 8.2* 8.7 9.0   MG 1.7  --   --  1.9   * 105* 118* 103*       Recent Labs   Lab 24  1754   ALT 20   AST 39*   ALB 3.9       No results for input(s): \"TROP\" in the last 168 hours.    Allergies:   Allergies   Allergen Reactions    Heparin ANAPHYLAXIS    Hydromorphone HALLUCINATION       Medications:  Current  Facility-Administered Medications   Medication Dose Route Frequency    atorvastatin (Lipitor) tab 40 mg  40 mg Oral Daily    clopidogrel (Plavix) tab 75 mg  75 mg Oral Daily    finasteride (Proscar) tab 5 mg  5 mg Oral Daily    tamsulosin (Flomax) cap 0.4 mg  0.4 mg Oral Daily    benzonatate (Tessalon) cap 200 mg  200 mg Oral TID PRN    sodium chloride (Saline Mist) 0.65 % nasal solution 1 spray  1 spray Each Nare Q3H PRN    piperacillin-tazobactam (Zosyn) 3.375 g in dextrose 5% 100 mL IVPB-ADDV  3.375 g Intravenous Q8H    acetaminophen (Tylenol Extra Strength) tab 500 mg  500 mg Oral Q4H PRN    melatonin tab 3 mg  3 mg Oral Nightly PRN    ondansetron (Zofran) 4 MG/2ML injection 4 mg  4 mg Intravenous Q6H PRN    apixaban (Eliquis) tab 2.5 mg  2.5 mg Oral BID    folic acid (Folvite) tab 1 mg  1 mg Oral Daily    predniSONE (Deltasone) tab 15 mg  15 mg Oral Daily    gabapentin (Neurontin) cap 200 mg  200 mg Oral BID         Assessment:  Sepsis pneumonia, aspiration?   Persistent AFIB  Rates trending 100-110 bpm. Goal HR<110 in the setting of sepsis  Anticoagulated with Eliquis  Elevated troponin, known CAD s/p CABG and PCI  HsTrop 255 > 463 >362. Demand likely with hypoperfusion with sepsis lactate 9.9 on arrival and AF RVR  He is chest pain free  EKG on arrival without acute ST/T changes in AFIB RVR. Baseline TTE is abnormal 12/2023 with resting WMA and reduced EF with known ICM  Plavix/eliquis, statin. Holding BB with hypotension  HFrEF, ICM EF 25-30%  Likely dehydrated. Agree with IV fluids. Closely monitor volume status  GDMT: bb held with hypotension. No ARNI/ARB/ACE/MRA/SGLT2 with ERON  Moderate-severe aortic stenosis  Avoid intravascular depletion with preload dependence. Agree with IV fluids  PAD history of popliteal stent occlusion s/p PTA 12/2023  Eliquis/plavix, statin  ERON on CKD  Baseline normal renal function. Suspect ERON secondary to intravascular depletion and possibly ATN with hypotension/sepsis      Plan:  Start metoprolol tartrate 25 mg bid   Will need resumption of home lasix po 80 mg bid soon   Continue current cardiac medications  Supportive care       Mckay Campos DO  Cardiologist  [facility]  7/23/2024 10:13 AM        Note to the patient: The 21st Century Cures Act makes medical notes like these available to patients in the interest of transparency. However, be advised that this is a medical document. It is intended as peer to peer communication. It is written in medical language and may contain abbreviations or verbiage that are unfamiliar. It may appear blunt or direct. Medical documents are intended to carry relevant information, facts as evident, and clinical opinion of the practitioner.     Disclaimer: Components of this note were documented using voice recognition system and are subject to errors not corrected at proofreading. Contact the author of this note for any clarifications.

## 2024-07-23 NOTE — PLAN OF CARE
Patient alert and oriented x4.  On 2L, .  Afib on tele.  HR 90s.  Denies pain.  IV zosyn q8.  Plan for WC to see.  Block in place.  Urology following.  Resting comfortably in bed.

## 2024-07-24 LAB — POTASSIUM SERPL-SCNC: 4.3 MMOL/L (ref 3.5–5.1)

## 2024-07-24 PROCEDURE — 99232 SBSQ HOSP IP/OBS MODERATE 35: CPT | Performed by: HOSPITALIST

## 2024-07-24 RX ORDER — SENNOSIDES 8.6 MG
8.6 TABLET ORAL 2 TIMES DAILY PRN
Status: DISCONTINUED | OUTPATIENT
Start: 2024-07-24 | End: 2024-07-25

## 2024-07-24 RX ORDER — POLYETHYLENE GLYCOL 3350 17 G/17G
17 POWDER, FOR SOLUTION ORAL DAILY PRN
Status: DISCONTINUED | OUTPATIENT
Start: 2024-07-24 | End: 2024-07-25

## 2024-07-24 RX ORDER — POTASSIUM CHLORIDE 20 MEQ/1
40 TABLET, EXTENDED RELEASE ORAL EVERY 4 HOURS
Status: COMPLETED | OUTPATIENT
Start: 2024-07-24 | End: 2024-07-24

## 2024-07-24 NOTE — CM/SW NOTE
07/24/24 1200   CM/SW Referral Data   Referral Source Physician   Reason for Referral Discharge planning   Informant Patient;Son;Clinical Staff Member;EMR   Medical Hx   Does patient have an established PCP? Yes   Patient Info   Patient's Current Mental Status at Time of Assessment Oriented;Alert   Patient Communication Issues Hearing impairment  ([place])   Patient's Home Environment House   Number of Levels in Home 2   Patient lives with Alone   Patient Status Prior to Admission   Independent with ADLs and Mobility Yes   Services in place prior to admission DME/Supplies at home   Type of DME/Supplies Standard Walker;Wheelchair   Discharge Needs   Anticipated D/C needs No anticipated discharge needs     HOME SITUATION  Type of Home: House   Home Layout: Two level;Able to live on main level  Stairs to Enter : 1  Railing: Yes  Stairs to Bedroom: 0     Lives With: Alone (family checks in daily)  Drives: No  Patient Owned Equipment: Rolling walker;Cane;Wheelchair  Patient Regularly Uses: Reading glasses     Prior Level of [place] per PT eval: Pt reports he is typically mod I using walker and w/c in home. Pt denies history of recent falls in last 6 months. Pt reports he is able to dress self. Pt completes short distance gait to into bathroom from bathroom door as w/c will not fit in bathroom, completes with walker and minimal WB RLE.Pt does report shower is upstairs and he crawls up stairs and scoots down on bottom 1 x wk to access shower.  Pt and family report Podiatry has stated ok for minimal WB on R LE  with limited gait distance, see below WB Restrictions for details. Family checks in on pt daily, assists with meals and errands, chores as needed.   (Per PT eval)    SW received order for discharge planning. Pt is a 89 y/o male admitted with severe sepsis. Chart reviewed and noted PT does not anticipate skilled therapy needs for pt at discharge.   Anticipated therapy need: Home  SW met with pt and pt's son at  bedside.     Pt lives alone in a two level home and is typically independent with ADLs. Pt stated he has 4 or 5 walkers, 3 wheelchairs, and a shower chair at home. Pt stated he travels to the second floor of his home to shower. Pt's son stated he checks in on pt daily to assist with household tasks, medication management, meals, and transportation to appointments. Pt's son stated his daily visits range from a short visit or an all day visit with pt. Pt's son stated he manages pt's wound care daily and drives [facility] Monday. Pt's son stated pt's wound started in June 2023. SW offered caregiving resources, pt's son declined at this time. Pt's son stated he will transport pt home at discharge. Pt's son denied any further needs at this time.     No discharge needs identified at this time. [facility] contact information placed on AVS. SW will continue to remain available.     DERECK Chamorro  Discharge Planner

## 2024-07-24 NOTE — SLP NOTE
SPEECH DAILY NOTE - INPATIENT    ASSESSMENT & PLAN   ASSESSMENT  Pt seen for dysphagia tx to assess tolerance with recommended diet, ensure proper utilization of aspiration precautions and provide pt/family education.  Patient alert and up in bed. He reports good tolerance of breakfast. He appears to have more energy than my initial visit 7/22.  He was able to self present thin liquids by straw in sequential swallows and demonstrated no immediate or delayed overt signs of aspiration. Current diet appears appropriate given history and clinical presentation. Reinforced aspiration and reflux precautions.     Diet Recommendations - Solids: Regular  Diet Recommendations - Liquids: Thin Liquids    Compensatory Strategies Recommended: Slow rate;Small bites and sips  Aspiration Precautions: Upright position;Slow rate;Small bites and sips  Medication Administration Recommendations:  (as tolerated)    Patient Experiencing Pain: No                Treatment Plan  Treatment Plan/Recommendations: No further inpatient SLP service warranted    Interdisciplinary Communication: Disussed with other staff            GOALS  Goal #1 The patient will tolerate regular consistency and thin liquids without overt signs or symptoms of aspiration with 100 % accuracy over 1-2 session(s).  Met   Goal #2 The patient/family/caregiver will demonstrate understanding and implementation of aspiration precautions and swallow strategies independently over 1-2 session(s).     Met     FOLLOW UP  Follow Up Needed (Documentation Required): No  SLP Follow-up Date: 07/24/24  Number of Visits to Meet Established Goals: 1    Session: 1    If you have any questions, please contact Reg Sosa MS CCC-SLP  Pager [phone]

## 2024-07-24 NOTE — PROGRESS NOTES
[facility]   part of [facility]     Hospitalist Progress Note     Alejandro Guardado Patient Status:  Inpatient    1935 MRN [id number]   Location [facility] 3NE-A Attending Dr. Mcneill   Hosp Day # 2 PCP Stanislav Odonnell MD     Chief Complaint: confusion    Subjective:  Patient feels well. No complaints. Stable on RA.  Reports no issues with po intake today.  Reports he had BM yesterday.     Objective:    Review of Systems:   ROS completed; pertinent positive and negatives stated in subjective.    Vital signs:  Temp:  [97.4 °F (36.3 °C)-98.3 °F (36.8 °C)] 97.8 °F (36.6 °C)  Pulse:  [] 98  Resp:  [16-18] 17  BP: (103-135)/(67-84) 103/67  SpO2:  [93 %-96 %] 96 %    Physical Exam:    General: No acute distress 89 year old male alert and oriented x3  Respiratory:  CTA on RA  Cardiovascular: S1, S2. IR IR, 80-90s   Abdomen: Soft, nondistended +pena  Neuro: No new focal deficits  Extremities: RLE toe amputation      Diagnostic Data:    Labs:  Recent Labs   Lab 24  1754 24  0547 24  0903   WBC 19.7* 13.1* 10.9   HGB 12.5* 10.1* 10.0*   MCV 86.6 84.6 83.2   .0 227.0 233.0   BAND 41  --   --    INR 1.45*  --   --        Recent Labs   Lab 24  1754 24  0547 24  1719 24  0903   * 105* 118* 103*   BUN 33* 30* 26* 19   CREATSERUM 2.57* 2.01* 1.59* 1.16   CA 9.3 8.2* 8.7 9.0   ALB 3.9  --   --   --     141 138 138   K 4.3 3.3* 3.5 3.6  3.6    109 108 109   CO2 19.0* 25.0 24.0 24.0   ALKPHO 62  --   --   --    AST 39*  --   --   --    ALT 20  --   --   --    BILT 0.6  --   --   --    TP 6.6  --   --   --        Estimated Creatinine Clearance: 47.4 mL/min (based on SCr of 1.16 mg/dL).     Recent Labs   Lab 24  1754 24  0547 24  1146   TROPHS 255* 463* 362*       Recent Labs   Lab 24   PTP 17.7*   INR 1.45*              Imaging: Imaging data reviewed in Epic.    Medications:    potassium chloride  40 mEq Oral Q4H     metoprolol tartrate  25 mg Oral 2x Daily(Beta Blocker)    furosemide  40 mg Oral BID (Diuretic)    apixaban  5 mg Oral BID    collagenase   Topical Daily    atorvastatin  40 mg Oral Daily    clopidogrel  75 mg Oral Daily    finasteride  5 mg Oral Daily    tamsulosin  0.4 mg Oral Daily    piperacillin-tazobactam  3.375 g Intravenous Q8H    folic acid  1 mg Oral Daily    predniSONE  15 mg Oral Daily    gabapentin  200 mg Oral BID       Assessment & Plan:     #Severe sepsis, resolved  #Aspiration PNA improving  Cont. Zosyn Day 3  SLP recs regular/thins  Likely due to hx of achalasia  Stable on RA    #Achalasia  prior botox in april and surgery    #Neuropathy  Gabapentin reduced due to AMS prior, currently stable on 200mg BID     #ERON due to hypoperfusion, mild dehydration, urinary retention  Block in place: voiding trial once stronger  Flomax and proscar, urology evaluated   Good UO, BMP improved    #Chronic systolic CHF, EF: 20-25%, compensated  Good UO w/ lasix BID, monitor LE    #NSTEMI type II  Due to sepsis    #A. Fib, controlled  Lopressor BID today then toprol XL tomorrow  Eliquis    #Moderate to Severe aortic stenosis  #CAD with prior CABG  #Dyslipidemia    #PAD with non-healing right foot wound s/p thrombectomy and revascularization/stent 12/4/23 by Dr. Steward  Pt remains non weight bearing to RLE  Wound care evaluated    #GERD    #Adrenal insuff  prednisone 15mg    #Hypokalemia, resolved    Case d/w pt, RN.  Dc planning, possible dc home tomorrow?    ESPERANZA Jules  10:59 AM     Supplementary Documentation:     Quality:    DVT Mechanical Prophylaxis:        DVT Pharmacologic Prophylaxis   Medication    apixaban (Eliquis) tab 5 mg                Code Status: DNAR/Selective Treatment  Block: Block catheter in place  Block Duration (in days):   Central line:    DIEGO: 7/24/2024      Discharge is dependent on: clinical stability  At this point Mr. Guardado is expected to be discharge to: home    The 21st  Century Cures Act makes medical notes like these available to patients in the interest of transparency. Please be advised this is a medical document. Medical documents are intended to carry relevant information, facts as evident, and the clinical opinion of the practitioner. The medical note is intended as peer to peer communication and may appear blunt or direct. It is written in medical language and may contain abbreviations or verbiage that are unfamiliar.

## 2024-07-24 NOTE — PAYOR COMM NOTE
--------------  CONTINUED STAY REVIEW----REQUESTING ADDITIONAL DAY 7/24      Payor: [employer] MEDICARE ADV PPO  Subscriber #:  [id number]  Authorization Number: [id number]    Admit date: 7/22/24  Admit time: 12:19 AM    REVIEW DOCUMENTATION:  Chief Complaint: confusion     Subjective:  Patient feels well. No complaints. Stable on RA.  Reports no issues with po intake today.  Reports he had BM yesterday.      Objective:    Review of Systems:   ROS completed; pertinent positive and negatives stated in subjective.     Vital signs:  Temp:  [97.4 °F (36.3 °C)-98.3 °F (36.8 °C)] 97.8 °F (36.6 °C)  Pulse:  [] 98  Resp:  [16-18] 17  BP: (103-135)/(67-84) 103/67  SpO2:  [93 %-96 %] 96 %     Physical Exam:    General: No acute distress 89 year old male alert and oriented x3  Respiratory:  CTA on RA  Cardiovascular: S1, S2. IR IR, 80-90s   Abdomen: Soft, nondistended +pena  Neuro: No new focal deficits  Extremities: RLE toe amputation        Diagnostic Data:    Labs:        Recent Labs   Lab 07/21/24  1754 07/22/24  0547 07/23/24  0903   WBC 19.7* 13.1* 10.9   HGB 12.5* 10.1* 10.0*   MCV 86.6 84.6 83.2   .0 227.0 233.0   BAND 41  --   --    INR 1.45*  --   --                 Recent Labs   Lab 07/21/24  1754 07/22/24  0547 07/22/24  1719 07/23/24  0903   * 105* 118* 103*   BUN 33* 30* 26* 19   CREATSERUM 2.57* 2.01* 1.59* 1.16   CA 9.3 8.2* 8.7 9.0   ALB 3.9  --   --   --     141 138 138   K 4.3 3.3* 3.5 3.6  3.6    109 108 109   CO2 19.0* 25.0 24.0 24.0   ALKPHO 62  --   --   --    AST 39*  --   --   --    ALT 20  --   --   --    BILT 0.6  --   --   --    TP 6.6  --   --   --          Estimated Creatinine Clearance: 47.4 mL/min (based on SCr of 1.16 mg/dL).            Recent Labs   Lab 07/21/24  1754 07/22/24  0547 07/22/24  1146   TROPHS 255* 463* 362*             Recent Labs   Lab 07/21/24 1754   PTP 17.7*   INR 1.45*               Imaging: Imaging data reviewed in Epic.     Medications:     potassium chloride  40 mEq Oral Q4H    metoprolol tartrate  25 mg Oral 2x Daily(Beta Blocker)    furosemide  40 mg Oral BID (Diuretic)    apixaban  5 mg Oral BID    collagenase   Topical Daily    atorvastatin  40 mg Oral Daily    clopidogrel  75 mg Oral Daily    finasteride  5 mg Oral Daily    tamsulosin  0.4 mg Oral Daily    piperacillin-tazobactam  3.375 g Intravenous Q8H    folic acid  1 mg Oral Daily    predniSONE  15 mg Oral Daily    gabapentin  200 mg Oral BID         Assessment & Plan:      #Severe sepsis, resolved  #Aspiration PNA improving  Cont. Zosyn Day 3  SLP recs regular/thins  Likely due to hx of achalasia  Stable on RA     #Achalasia  prior botox in april and surgery     #Neuropathy  Gabapentin reduced due to AMS prior, currently stable on 200mg BID     #ERON due to hypoperfusion, mild dehydration, urinary retention  Block in place: voiding trial once stronger  Flomax and proscar, urology evaluated   Good UO, BMP improved     #Chronic systolic CHF, EF: 20-25%, compensated  Good UO w/ lasix BID, monitor LE     #NSTEMI type II  Due to sepsis     #A. Fib, controlled  Lopressor BID today then toprol XL tomorrow  Eliquis     #Moderate to Severe aortic stenosis  #CAD with prior CABG  #Dyslipidemia     #PAD with non-healing right foot wound s/p thrombectomy and revascularization/stent 12/4/23 by Dr. Steward  Pt remains non weight bearing to RLE  Wound care evaluated     #GERD     #Adrenal insuff  prednisone 15mg     #Hypokalemia, resolved     Case d/w pt, RN.  Dc planning, possible dc home tomorrow?     ESPERANZA Jules  10:59 AM            MEDICATIONS ADMINISTERED IN LAST 1 DAY:  apixaban (Eliquis) tab 5 mg       Date Action Dose Route User    7/24/2024 0912 Given 5 mg Oral Rosa Burton, RN    7/23/2024 2049 Given 5 mg Oral Ella Silverio RN          atorvastatin (Lipitor) tab 40 mg       Date Action Dose Route User    7/24/2024 0911 Given 40 mg Oral Rosa Burton, ANNALISE          clopidogrel  (Plavix) tab 75 mg       Date Action Dose Route User    7/24/2024 0911 Given 75 mg Oral Rosa Burton RN          finasteride (Proscar) tab 5 mg       Date Action Dose Route User    7/24/2024 0912 Given 5 mg Oral Rosa Burton RN          folic acid (Folvite) tab 1 mg       Date Action Dose Route User    7/24/2024 0912 Given 1 mg Oral Rosa Burton RN          furosemide (Lasix) tab 40 mg       Date Action Dose Route User    7/24/2024 0912 Given 40 mg Oral Rosa Burton RN    7/23/2024 1719 Given 40 mg Oral Kate Mock RN          gabapentin (Neurontin) cap 200 mg       Date Action Dose Route User    7/24/2024 0911 Given 200 mg Oral Rosa Burton RN    7/23/2024 2049 Given 200 mg Oral Ella Silverio RN          metoprolol tartrate (Lopressor) tab 25 mg       Date Action Dose Route User    7/24/2024 0528 Given 25 mg Oral Ella Silverio RN    7/23/2024 1719 Given 25 mg Oral Kate Mock RN          piperacillin-tazobactam (Zosyn) 3.375 g in dextrose 5% 100 mL IVPB-ADDV       Date Action Dose Route User    7/24/2024 0912 New Bag 3.375 g Intravenous Rosa Burton RN    7/24/2024 0200 New Bag 3.375 g Intravenous Ella Silverio RN    7/23/2024 1719 New Bag 3.375 g Intravenous Kate Mock RN          potassium chloride (Klor-Con M20) tab 40 mEq       Date Action Dose Route User    7/24/2024 1409 Given 40 mEq Oral Rosa Burton RN    7/24/2024 0912 Given 40 mEq Oral Rosa Burton RN          tamsulosin (Flomax) cap 0.4 mg       Date Action Dose Route User    7/24/2024 0912 Given 0.4 mg Oral Rosa Burton RN          predniSONE (Deltasone) tab 15 mg       Date Action Dose Route User    7/24/2024 0912 Given 15 mg Oral Alambra, Mayra, RN            Vitals (last day)       Date/Time Temp Pulse Resp BP SpO2 Weight O2 Device O2 Flow Rate (L/min) [facility]    07/24/24 1400 -- 114 -- -- -- -- -- -- ER    07/24/24 1215 97.9 °F (36.6 °C) 83 18 125/67 96 % -- None  (Room air) -- ER    07/24/24 0830 -- 98 -- -- 96 % -- -- -- ER    07/24/24 0745 97.8 °F (36.6 °C) 70 17 103/67 93 % -- None (Room air) 0 L/min ER    07/24/24 0523 97.4 °F (36.3 °C) 100 18 121/80 96 % -- None (Room air) 0 L/min AR    07/24/24 0030 97.4 °F (36.3 °C) 97 18 135/84 95 % -- None (Room air) 0 L/min AR    07/23/24 1930 98.1 °F (36.7 °C) 87 16 114/76 95 % -- None (Room air) 0 L/min AR    07/23/24 1600 98.3 °F (36.8 °C) 88 16 109/73 96 % -- None (Room air) -- ER    07/23/24 1512 -- 104 -- -- -- -- -- -- MD    07/23/24 1130 97.9 °F (36.6 °C) 115 18 125/73 93 % -- None (Room air) 0 L/min ER    07/23/24 0830 97.8 °F (36.6 °C) 91 18 109/77 96 % -- -- -- ER    07/23/24 0400 98 °F (36.7 °C) 93 -- 106/71 100 % -- None (Room air) -- AP    07/23/24 0000 98.2 °F (36.8 °C) 113 -- 104/79 94 % -- None (Room air) -- AP          CIWA Scores (since admission)       None

## 2024-07-24 NOTE — PLAN OF CARE
Assumed patient care @ 07:30.  Alert and oriented x 4.  Room air.  Afib on telemetry.  Block catheter intact draining well.  Aspiration precautions.  Wound care daily/PRN  Safety fall precautions maintained.  Cardiac electrolyte replacement.   Regular diet.  Needs attended, call light within his reach.  Bed in lowest position, bed alarm is on.  Problem: Patient/Family Goals  Goal: Patient/Family Long Term Goal  Description: Patient's Long Term Goal: Discharge to home.    Interventions:  - cards follows.    - See additional Care Plan goals for specific interventions  Outcome: Progressing  Goal: Patient/Family Short Term Goal  Description: Patient's Short Term Goal: stable health condition.    Interventions:   - continuous monitoring of vital signs, mental status.  -administer medications as ordered.  -antibiotic for aspiration PNA.  -education on aspiration precautions.  - See additional Care Plan goals for specific interventions  Outcome: Progressing

## 2024-07-24 NOTE — DISCHARGE INSTRUCTIONS
For assistance in finding in home care please call:  [facility] [phone] Email sarah@"[employer]"  [facility] [phone]  [employer]  [phone]

## 2024-07-24 NOTE — PROGRESS NOTES
Progress Note  Alejandro Guardado Patient Status:  Inpatient    1935 MRN [id number]   Location [facility] 3NE-A Attending Zackary Mcneill MD   Hosp Day # 2 PCP Stanislav Odonnell MD     Subjective:  He feels ok today. Denies chest pain. Denies shortness of breath. AFIB rates trending  bpm    Objective:  /67 (BP Location: Left arm)   Pulse 98   Temp 97.8 °F (36.6 °C) (Oral)   Resp 17   Ht 6' (1.829 m)   Wt 199 lb 15.3 oz (90.7 kg)   SpO2 96%   BMI 27.12 kg/m²     Intake/Output:    Intake/Output Summary (Last 24 hours) at 2024 1037  Last data filed at 2024 0830  Gross per 24 hour   Intake 480 ml   Output 4050 ml   Net -3570 ml       Last 3 Weights   24 0025 199 lb 15.3 oz (90.7 kg)   24 1752 200 lb (90.7 kg)   24 1628 205 lb (93 kg)   24 0938 205 lb (93 kg)       Labs:  Recent Labs   Lab 24  0547 24  1719 24  0903   * 118* 103*   BUN 30* 26* 19   CREATSERUM 2.01* 1.59* 1.16   EGFRCR 31* 41* 60   CA 8.2* 8.7 9.0    138 138   K 3.3* 3.5 3.6  3.6    108 109   CO2 25.0 24.0 24.0     Recent Labs   Lab 24  1754 24  0547 24  0903   RBC 4.69 3.82 3.76*   HGB 12.5* 10.1* 10.0*   HCT 40.6 32.3* 31.3*   MCV 86.6 84.6 83.2   MCH 26.7 26.4 26.6   MCHC 30.8* 31.3 31.9   RDW 16.9 17.2 16.6   NEPRELIM 15.80*  --  9.18*   WBC 19.7* 13.1* 10.9   .0 227.0 233.0         Recent Labs   Lab 24  1754 24  0547 24  1146   TROPHS 255* 463* 362*       Diagnostics:   Telemetry: AFIB rates averaging  bpm    Review of Systems   Cardiovascular:  Negative for chest pain.   Respiratory:  Negative for shortness of breath.        Physical Exam:  General: Alert. NAD  HEENT: Pupils equal. Mucous membranes moist.   Neck: No JVD  Cardiac:  Normal S1 S2, Regular. No murmur  Lungs: Clear without wheezes or crackles    Abdomen: Soft, non-tender, ND  Extremities: trace LE edema  Neurologic: No focal deficits. Normal  affect.  Skin: Warm and dry,    Medications:   potassium chloride  40 mEq Oral Q4H    metoprolol tartrate  25 mg Oral 2x Daily(Beta Blocker)    furosemide  40 mg Oral BID (Diuretic)    apixaban  5 mg Oral BID    collagenase   Topical Daily    atorvastatin  40 mg Oral Daily    clopidogrel  75 mg Oral Daily    finasteride  5 mg Oral Daily    tamsulosin  0.4 mg Oral Daily    piperacillin-tazobactam  3.375 g Intravenous Q8H    folic acid  1 mg Oral Daily    predniSONE  15 mg Oral Daily    gabapentin  200 mg Oral BID         Assessment:    Sepsis pneumonia  IV antibiotics. BC NGTD  Persistent AFIB  Rates relatively controlled on Lopressor 25 mg BID. Goal HR<110 in the setting of sepsis  Anticoagulated with Eliquis  Elevated troponin, known CAD s/p CABG and PCI  HsTrop 255 > 463 >362. Demand likely with hypoperfusion with sepsis lactate 9.9 on arrival and AF RVR  He is chest pain free  EKG on arrival without acute ST/T changes in AFIB RVR. Baseline TTE is abnormal 12/2023 with resting WMA and reduced EF with known ICM  Plavix/eliquis, statin. BB  HFrEF, ICM EF 25-30%  Appears euvolemic on 7/24 after IV fluids on arrival. IV fluids have been discontinued. PO lasix 40 mg BID  GDMT: bb held with hypotension. No ARNI/ARB/ACE/MRA/SGLT2 with ERON  Moderate-severe aortic stenosis  Avoid intravascular depletion with preload dependence  PAD history of popliteal stent occlusion s/p PTA 12/2023  Eliquis/plavix, statin  ERON on CKD  Resolved with IV fluids. Suspect ERON secondary to intravascular depletion and possibly ATN with hypotension/sepsis  Plan:    Continue lopressor 25 mg BID today. Plan to transition to Toprol-XL tomorrow given his reduced EF if he continues to tolerate BB from blood pressure standpoint.  Appears euvolemic. Continue PO lasix 40 mg BID today. Will likely increase to PO lasix 80 mg BID tomorrow as his sepsis continues to resolve  Continue IV antibiotics. BC NGTD.  Possible discharge tomorrow    Plan of care  discussed with patient, RN, Dr Campos.    ESPERANZA Friend  7/24/2024  10:37 AM    Note reviewed and labs reviewed. Agree with above assessment and plan.   MDM per me.  89-year-old male who presented with sepsis 2/2 aspiration pneumonia.  Has prior history of atrial fibrillation and initially, liberal heart rates were allowed.  As hemodynamic status improved, we resumed beta-blocker yesterday.  P.o. Lasix was also resumed yesterday.  Will plan for resumption of home regimen of 80 mg p.o. twice daily of Lasix tomorrow.  Continue supportive care.  Will continue follow.        Mckay Campos DO  Cardiologist  [facility]  7/24/2024 11:12 AM      Note to the patient: The 21st Century Cures Act makes medical notes like these available to patients in the interest of transparency. However, be advised that this is a medical document. It is intended as peer to peer communication. It is written in medical language and may contain abbreviations or verbiage that are unfamiliar. It may appear blunt or direct. Medical documents are intended to carry relevant information, facts as evident, and clinical opinion of the practitioner.     Disclaimer: Components of this note were documented using voice recognition system and are subject to errors not corrected at proofreading. Contact the author of this note for any clarifications.

## 2024-07-24 NOTE — PLAN OF CARE
Pt a&ox4  RA, vss,   Tele on afib controlled hr95  Denies pain  Block in place - for voiding trial prior to dc  Daily wound care  Safety and fall prec maintained  Bed at lowest position, bed alarm on  POC on going      Problem: SAFETY ADULT - FALL  Goal: Free from fall injury  Description: INTERVENTIONS:  - Assess pt frequently for physical needs  - Identify cognitive and physical deficits and behaviors that affect risk of falls.  - [place] fall precautions as indicated by assessment.  - Educate pt/family on patient safety including physical limitations  - Instruct pt to call for assistance with activity based on assessment  - Modify environment to reduce risk of injury  - Provide assistive devices as appropriate  - Consider OT/PT consult to assist with strengthening/mobility  - Encourage toileting schedule  Outcome: Progressing     Problem: CARDIOVASCULAR - ADULT  Goal: Maintains optimal cardiac output and hemodynamic stability  Description: INTERVENTIONS:  - Monitor vital signs, rhythm, and trends  - Monitor for bleeding, hypotension and signs of decreased cardiac output  - Evaluate effectiveness of vasoactive medications to optimize hemodynamic stability  - Monitor arterial and/or venous puncture sites for bleeding and/or hematoma  - Assess quality of pulses, skin color and temperature  - Assess for signs of decreased coronary artery perfusion - ex. Angina  - Evaluate fluid balance, assess for edema, trend weights  Outcome: Progressing  Goal: Absence of cardiac arrhythmias or at baseline  Description: INTERVENTIONS:  - Continuous cardiac monitoring, monitor vital signs, obtain 12 lead EKG if indicated  - Evaluate effectiveness of antiarrhythmic and heart rate control medications as ordered  - Initiate emergency measures for life threatening arrhythmias  - Monitor electrolytes and administer replacement therapy as ordered  Outcome: Progressing     Problem: Integumentary status not within defined  limits  Goal: Pt's integumentary status will be adequate for discharge  Outcome: Progressing

## 2024-07-25 VITALS
RESPIRATION RATE: 18 BRPM | OXYGEN SATURATION: 93 % | HEART RATE: 85 BPM | WEIGHT: 199.94 LBS | HEIGHT: 72 IN | TEMPERATURE: 98 F | DIASTOLIC BLOOD PRESSURE: 61 MMHG | SYSTOLIC BLOOD PRESSURE: 97 MMHG | BODY MASS INDEX: 27.08 KG/M2

## 2024-07-25 PROCEDURE — 99239 HOSP IP/OBS DSCHRG MGMT >30: CPT | Performed by: HOSPITALIST

## 2024-07-25 RX ORDER — FUROSEMIDE 40 MG/1
80 TABLET ORAL
Status: DISCONTINUED | OUTPATIENT
Start: 2024-07-25 | End: 2024-07-25

## 2024-07-25 RX ORDER — METOPROLOL SUCCINATE 50 MG/1
50 TABLET, EXTENDED RELEASE ORAL
Status: DISCONTINUED | OUTPATIENT
Start: 2024-07-25 | End: 2024-07-25

## 2024-07-25 RX ORDER — GABAPENTIN 100 MG/1
200 CAPSULE ORAL 2 TIMES DAILY
Qty: 60 CAPSULE | Refills: 0 | Status: SHIPPED | OUTPATIENT
Start: 2024-07-25

## 2024-07-25 RX ORDER — AMOXICILLIN AND CLAVULANATE POTASSIUM 875; 125 MG/1; MG/1
1 TABLET, FILM COATED ORAL 2 TIMES DAILY
Qty: 6 TABLET | Refills: 0 | Status: SHIPPED | OUTPATIENT
Start: 2024-07-25

## 2024-07-25 RX ORDER — METOPROLOL SUCCINATE 25 MG/1
50 TABLET, EXTENDED RELEASE ORAL
Qty: 60 TABLET | Refills: 0 | Status: SHIPPED | OUTPATIENT
Start: 2024-07-25

## 2024-07-25 NOTE — PROGRESS NOTES
[facility]   part of [facility]     Hospitalist Progress Note     Alejandro Guardado Patient Status:  Inpatient    1935 MRN [id number]   Location [facility] 3NE-A Attending Dr. Mcneill    Day # 3 PCP Stanislav Odonnell MD     Chief Complaint: confusion    Subjective:  Patient feels well. No complaints. Eating breakfast.  Reports his catheter was taken out last night and he is voiding today.   Reports he was up in chair yesterday and is hoping to go home today.     Objective:    Review of Systems:   ROS completed; pertinent positive and negatives stated in subjective.    Vital signs:  Temp:  [97.7 °F (36.5 °C)-98.2 °F (36.8 °C)] 97.9 °F (36.6 °C)  Pulse:  [] 90  Resp:  [16-18] 18  BP: (104-139)/(67-96) 114/78  SpO2:  [95 %-97 %] 97 %    Physical Exam:    General: No acute distress 89 year old male alert and oriented x3  Respiratory:  CTA on RA  Cardiovascular: S1, S2. IR IR, 80-90s   Abdomen: Soft, nondistended, nontender  Neuro: No new focal deficits  Extremities: RLE toe amputation      Diagnostic Data:    Labs:  Recent Labs   Lab 24  1754 24  0547 24  0903   WBC 19.7* 13.1* 10.9   HGB 12.5* 10.1* 10.0*   MCV 86.6 84.6 83.2   .0 227.0 233.0   BAND 41  --   --    INR 1.45*  --   --        Recent Labs   Lab 24  1754 24  0547 24  1719 24  0903 24  1624   * 105* 118* 103*  --    BUN 33* 30* 26* 19  --    CREATSERUM 2.57* 2.01* 1.59* 1.16  --    CA 9.3 8.2* 8.7 9.0  --    ALB 3.9  --   --   --   --     141 138 138  --    K 4.3 3.3* 3.5 3.6  3.6 4.3    109 108 109  --    CO2 19.0* 25.0 24.0 24.0  --    ALKPHO 62  --   --   --   --    AST 39*  --   --   --   --    ALT 20  --   --   --   --    BILT 0.6  --   --   --   --    TP 6.6  --   --   --   --        Estimated Creatinine Clearance: 47.4 mL/min (based on SCr of 1.16 mg/dL).     Recent Labs   Lab 24  1754 24  0547 24  1146   TROPHS 255* 463* 362*        Recent Labs   Lab 07/21/24  1754   PTP 17.7*   INR 1.45*              Imaging: Imaging data reviewed in Epic.    Medications:    metoprolol succinate ER  50 mg Oral Daily Beta Blocker    furosemide  80 mg Oral BID (Diuretic)    apixaban  5 mg Oral BID    collagenase   Topical Daily    atorvastatin  40 mg Oral Daily    clopidogrel  75 mg Oral Daily    finasteride  5 mg Oral Daily    tamsulosin  0.4 mg Oral Daily    piperacillin-tazobactam  3.375 g Intravenous Q8H    folic acid  1 mg Oral Daily    predniSONE  15 mg Oral Daily    gabapentin  200 mg Oral BID       Assessment & Plan:     #Severe sepsis, resolved  #Aspiration PNA improving  Cont. Zosyn Day 4  SLP recs regular/thins  Likely due to hx of achalasia  Stable on RA    #Achalasia  prior botox in april and surgery    #Neuropathy  Gabapentin reduced due to AMS prior, currently stable on 200mg BID     #ERON due to hypoperfusion, mild dehydration, urinary retention  Block out, check PVR today  Flomax and proscar, urology evaluated   Good UO, BMP pending    #Chronic systolic CHF, EF: 20-25%, compensated  Good UO w/ lasix BID, monitor LE    #NSTEMI type II  Due to sepsis    #A. Fib, controlled  toprol XL   Eliquis    #Moderate to Severe aortic stenosis  #CAD with prior CABG  #Dyslipidemia    #PAD with non-healing right foot wound s/p thrombectomy and revascularization/stent 12/4/23 by Dr. Steward  Minimally weight bearing, ok to stand for t/f and take a few stops  per Dr. Poon note on 7/8  Wound care evaluated    #GERD    #Adrenal insuff  prednisone 15mg    #Hypokalemia, resolved    Case d/w pt, RN.  Dc planning for home, pt eager to be discharged.  F/u on PVR and BMP.      ESPERANZA Jules  10:59 AM         The 21st Century Cures Act makes medical notes like these available to patients in the interest of transparency. Please be advised this is a medical document. Medical documents are intended to carry relevant information, facts as evident, and the clinical  opinion of the practitioner. The medical note is intended as peer to peer communication and may appear blunt or direct. It is written in medical language and may contain abbreviations or verbiage that are unfamiliar.

## 2024-07-25 NOTE — PHYSICAL THERAPY NOTE
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    Pt has achieved all goals and is currently supervision with RW. Family will be staying with pt until pt is settled, and family will be there daily to assist pt as needed. Will DC from PT.     Patient continues to benefit from continued skilled PT services: at discharge to promote prior level of function.  Anticipate patient will return home with home health PT.    PLAN  PT Treatment Plan: Bed mobility;Body mechanics;Energy conservation;Patient education;Gait training;Strengthening;Transfer training;Balance training  Rehab Potential : Fair  Frequency (Obs): 3-5x/week    CURRENT GOALS     Goal #1 Patient is able to demonstrate supine - sit EOB @ level: modified independent-Met     Goal #2 Patient is able to demonstrate transfers EOB to/from Chair/Wheelchair at assistance level: modified independent-Met     Goal #3 Patient is able to ambulate 5 feet with assist device: walker - rolling at assistance level: supervision-Met     Goal #4    Goal #5    Goal #6    Goal Comments: Goals established on 2024 all goals achieved     SUBJECTIVE  Pt is pleasant and cooperative.     OBJECTIVE  Precautions: Hard of hearing    WEIGHT BEARING RESTRICTION  Weight Bearing Restriction: R lower extremity        R Lower Extremity:  (limited WB per Dr. Poon note, see PT note - limited gait distance with slipper)       PAIN ASSESSMENT   Ratin  Location: denies pain       BALANCE                                                                                                                       Static Sitting: Good  Dynamic Sitting:  Good           Static Standing: Poor +  Dynamic Standing: Poor +    ACTIVITY TOLERANCE  Pulse: 85  Heart Rate Source: Monitor                   O2 WALK  Oxygen Therapy  SPO2% on Room Air at Rest: 95      AM-PAC '6-Clicks' INPATIENT SHORT FORM - BASIC MOBILITY  How much difficulty does the patient currently have...  Patient Difficulty: Turning over in bed (including adjusting bedclothes, sheets and blankets)?: None   Patient Difficulty: Sitting down on and standing up from a chair with arms (e.g., wheelchair, bedside commode, etc.): None   Patient Difficulty: Moving from lying on back to sitting on the side of the bed?: None   How much help from another person does the patient currently need...   Help from Another: Moving to and from a bed to a chair (including a wheelchair)?: None   Help from Another: Need to walk in hospital room?: None   Help from Another: Climbing 3-5 steps with a railing?: A Little       AM-PAC Score:  Raw Score: 23   Approx Degree of Impairment: 11.2%   Standardized Score (AM-PAC Scale): 56.93   CMS Modifier (G-Code): CI    FUNCTIONAL ABILITY STATUS  Gait Assessment   Functional Mobility/Gait Assessment  Gait Assistance: Modified independent  Distance (ft): 5  Assistive Device: Rolling walker  Pattern: R Decreased stance time    Skilled Therapy Provided    Bed Mobility:  Rolling: NT   Supine<>Sit: supervision   Sit<>Supine: supervision     Transfer Mobility:  Sit<>Stand: supervision   Stand<>Sit: supervision   Gait: supervision with RW    Therapist's Comments: Chart reviewed and RN approved PT session. Vitals assessed and WNL. Pt and family educated and instructed in minimal WB, limited gait distance with slipper. Pt in understanding. Pt instructed for hand placement with RW. Pt demonstrates good integration of RW with amb, and good safety awareness. Pt returned to bed, elevated R LE. Increased time spent on education and discussion with pt regarding the importance of safety awareness, fall  precautions, activity level and pacing. Discussed with pt the importance of safety, waiting for son to come assist with showering etc. Pt in understanding. RN notified of session.         Patient End of Session: In bed;Needs met;Call light within reach;RN aware of session/findings;All patient questions and concerns addressed;Family present    PT Session Time: 30 minutes  Gait Training: 15 minutes  Therapeutic Activity: 15 minutes  Therapeutic Exercise: 0 minutes   Neuromuscular Re-education: 0 minutes

## 2024-07-25 NOTE — DISCHARGE SUMMARY
[facility]     Alejandro Guardado Patient Status:  Inpatient    1935 MRN [id number]   Location [facility] 3NE-A Attending Zackary Mcneill MD   Hosp Day # 3 PCP Stanislav Odonnell MD     Date of Admission: 2024  Date of Discharge:   2024     Discharge Disposition: Home     Discharge Diagnosis:  #Severe sepsis, resolved  #Aspiration PNA improving  #Achalasia prior botox in april and surgery  #Neuropathy, gabapentin reduced due to fatigue  #ERON due to hypoperfusion, mild dehydration, urinary retention  #Chronic systolic CHF, EF: 20-25%, compensated  #NSTEMI type II Due to sepsis  #A. Fib, controlled  #Moderate to Severe aortic stenosis  #CAD with prior CABG  #Dyslipidemia  #PAD with non-healing right foot wound s/p thrombectomy and revascularization/stent 23   #GERD  #Adrenal insufficiency  #Hypokalemia, resolved    History of Present Illness:  Alejandro Guardado is a 89 year old male with CHF, CAD, A-fib on Eliquis, presented to the emergency room with lethargy over the last 24 hours.  Patient's son and daughter at bedside and providing history.  They had seen patient yesterday morning and he was eating and drinking just fine.  However patient was found in feces and looked very lethargic when found today.  Family cleaned him up and brought him to the emergency room for further evaluation.  When he arrived in the emergency room he was very tachycardic and tachypneic, hypotensive upon arrival.  Had multiple readmissions for aspiration pneumonia in the past.       Brief Synopsis: Patient is a 89-year-old male was found down with altered mental status.  He was brought to the ER for further workup.  He was found to have sepsis and started on broad-spectrum IV antibiotics after cultures obtained.  He received IV fluids.  He was admitted for further workup.  We suspected aspiration pneumonia due to history of achalasia.  He has had prior Botox in April per GI.  Gabapentin reduced  during the stay due to fatigue.  ERON resolved.  Block catheter removed and patient voiding.  Cardiology evaluated.  Patient back on oral Lasix, Toprol-XL.  He will continue on oral antibiotics to complete course for pneumonia.  PT OT evaluated and felt patient functioning near baseline.  He was discharged home once cleared by consultants and social work assisted with discharge planning.  He was instructed to follow-up with urology, PCP, GI, cardiology as OP.     Lace+ Score: 84  59-90 High Risk  29-58 Medium Risk  0-28   Low Risk       TCM Follow-Up Recommendation:  LACE > 58: High Risk of readmission after discharge from the hospital.      Procedures during hospitalization: none    Consultants: Cardiology, urology, wound care         Discharge Medications        START taking these medications        Instructions Prescription details   amoxicillin clavulanate 875-125 MG Tabs  Commonly known as: Augmentin      Take 1 tablet by mouth 2 (two) times daily.   Quantity: 6 tablet  Refills: 0     collagenase 250 UNIT/GM Oint  Commonly known as: Santyl      Apply 1 Application topically daily.   Quantity: 30 g  Refills: 0            CHANGE how you take these medications        Instructions Prescription details   atorvastatin 40 MG Tabs  Commonly known as: Lipitor  What changed: how much to take      Take 1 tablet (40 mg total) by mouth daily.   Quantity: 30 tablet  Refills: 0     gabapentin 100 MG Caps  Commonly known as: Neurontin  What changed:   medication strength  how much to take  when to take this      Take 2 capsules (200 mg total) by mouth in the morning and 2 capsules (200 mg total) before bedtime.   Quantity: 60 capsule  Refills: 0            CONTINUE taking these medications        Instructions Prescription details   acetaminophen 500 MG Tabs  Commonly known as: Tylenol Extra Strength      Take 2 tablets (1,000 mg total) by mouth every 8 (eight) hours.   Quantity: 21 tablet  Refills: 0     apixaban 5 MG  Tabs  Commonly known as: Eliquis      Take 1 tablet (5 mg total) by mouth 2 (two) times daily.   Quantity: 60 tablet  Refills: 3     clopidogrel 75 MG Tabs  Commonly known as: Plavix      Take 1 tablet (75 mg total) by mouth daily.   Quantity: 30 tablet  Refills: 0     docusate sodium 100 MG Caps  Commonly known as: Colace      Take 250 mg by mouth 2 (two) times daily.   Refills: 0     finasteride 5 MG Tabs  Commonly known as: Proscar      Take 1 tablet (5 mg total) by mouth daily.   Quantity: 90 tablet  Refills: 0     folic acid 1 MG Tabs  Commonly known as: Folvite      Take 1 tablet (1 mg total) by mouth daily.   Refills: 0     furosemide 40 MG Tabs  Commonly known as: Lasix      Take 2 tablets (80 mg total) by mouth 2 (two) times daily.   Refills: 0     lisinopril 2.5 MG Tabs  Commonly known as: Prinivil; Zestril      Take 1 tablet (2.5 mg total) by mouth daily.   Refills: 0     metoprolol succinate ER 25 MG Tb24  Commonly known as: Toprol XL      Take 2 tablets (50 mg total) by mouth Daily Beta Blocker.   Quantity: 60 tablet  Refills: 0     NON FORMULARY      Oxygen at 2L per NC a during sleep-uses as needed   Refills: 0     NON FORMULARY      ZOILA dressing to Right foot s/p amputation   Refills: 0     Omeprazole 40 MG Cpdr      Take 1 capsule (40 mg total) by mouth daily.   Refills: 0     predniSONE 5 MG Tabs  Commonly known as: Deltasone      Take 3 tablets (15 mg total) by mouth daily.   Refills: 0     tamsulosin 0.4 MG Caps  Commonly known as: Flomax      Take 1 capsule (0.4 mg total) by mouth daily.   Refills: 0     vitamin C 1000 MG Tabs      Take 1.5 tablets (1,500 mg total) by mouth daily.   Refills: 0            STOP taking these medications      gentamicin 0.1 % Crea  Commonly known as: Garamycin                  Where to Get Your Medications        [id number] [place] [facility] [address] [phone], [phone]  [address] [place] [address]       Phone: [phone]   amoxicillin clavulanate 875-125 MG Tabs  collagenase 250 UNIT/GM Oint  finasteride 5 MG Tabs  gabapentin 100 MG Caps  metoprolol succinate ER 25 MG Tb24         [place]    Follow-up appointment:   Moose Campos MD  [address]  [address]  [facility] [id number] [phone]    Follow up  urology follow-up within the next 1 month    Stanislav Odonnell MD  [address] [facility] [id number] [phone]    Schedule an appointment as soon as possible for a visit in 1 week(s)      [facility]  [address]  [facility] [address]  Follow up  for follow up on achalasia    [facility]    Schedule an appointment as soon as possible for a visit in 1 week(s)      Future Appointments   Date Time Provider Department Center   7/29/2024  1:00 PM Quincy Poon DPM  Wound [facility]   8/5/2024  1:00 PM Quincy Poon DPM  [facility]   8/12/2024  1:00 PM Quincy Poon DPM  [facility]   8/14/2024  1:00 PM Fortino Mo MD  W[facility]   8/19/2024  1:00 PM Quincy Poon DPM  [facility]   8/26/2024  1:00 PM Quincy Poon DPM  [facility]     -----------------------------------------------------------------------------------------------  PATIENT DISCHARGE INSTRUCTIONS: See electronic chart    ESPERANZA Jules  1:11 PM     This note is linked to that written earlier 7/25/2024  Patient seen and examined  > 40 minutes spent in discharge  Leo MONTALVO    The 21st Century Cures Act makes medical notes like these available to patients in the interest of transparency. Please be advised this is a medical document. Medical documents are intended to carry relevant information, facts as evident, and the clinical opinion of the practitioner. The medical note is intended as peer to peer communication and may appear blunt or direct. It is written in medical language and may contain abbreviations or verbiage that are unfamiliar.

## 2024-07-25 NOTE — PLAN OF CARE
Patient given order to discharge home. This RN discussed with patient at bedside the following discharge instructions including: follow up care, new Rxs for patient to fill at own pharmacy, home medications to continue, home care, and s/s of when to return to the ED/call 911- patient verbalized understanding of all instruction. Patient's IVs and cardiac monitor were removed from the patient. Patient was transported in stable condition to private vehicle at [facility] via wheelchair with COLBY Manley.

## 2024-07-25 NOTE — PROGRESS NOTES
Progress Note  Alejandro Guardado Patient Status:  Inpatient    1935 MRN [id number]   Location [facility] 3NE-A Attending Zackary Mcneill MD   Hosp Day # 3 PCP Stanislav Odonnell MD     Subjective:  He is feeling ok. Continues to have bothersome cough. He denies chest pain or shortness of breath.     Objective:  /78 (BP Location: Left arm)   Pulse 90   Temp 98.1 °F (36.7 °C) (Oral)   Resp 18   Ht 6' (1.829 m)   Wt 199 lb 15.3 oz (90.7 kg)   SpO2 98%   BMI 27.12 kg/m²     Intake/Output:    Intake/Output Summary (Last 24 hours) at 2024 0950  Last data filed at 2024 0200  Gross per 24 hour   Intake --   Output 2200 ml   Net -2200 ml       Last 3 Weights   24 0025 199 lb 15.3 oz (90.7 kg)   24 1752 200 lb (90.7 kg)   24 1628 205 lb (93 kg)   24 0938 205 lb (93 kg)       Labs:  Recent Labs   Lab 24  0547 24  1719 24  0903 24  1624   * 118* 103*  --    BUN 30* 26* 19  --    CREATSERUM 2.01* 1.59* 1.16  --    EGFRCR 31* 41* 60  --    CA 8.2* 8.7 9.0  --     138 138  --    K 3.3* 3.5 3.6  3.6 4.3    108 109  --    CO2 25.0 24.0 24.0  --      Recent Labs   Lab 24  1754 24  0547 24  0903   RBC 4.69 3.82 3.76*   HGB 12.5* 10.1* 10.0*   HCT 40.6 32.3* 31.3*   MCV 86.6 84.6 83.2   MCH 26.7 26.4 26.6   MCHC 30.8* 31.3 31.9   RDW 16.9 17.2 16.6   NEPRELIM 15.80*  --  9.18*   WBC 19.7* 13.1* 10.9   .0 227.0 233.0         Recent Labs   Lab 24  1754 24  0547 24  1146   TROPHS 255* 463* 362*       Diagnostics:   Telemetry: controlled atrial fibrillation trending 80-90 bpm    Review of Systems   Cardiovascular:  Negative for chest pain.   Respiratory:  Positive for cough. Negative for shortness of breath.        Physical Exam:  General: Alert and oriented in no apparent distress.  HEENT: Pupils equal. Mucous membranes moist.   Neck: No JVD  Cardiac:  Normal S1 S2, no murmur. Irreg irreg  Lungs:  Clear without wheezes or crackles +coughing throughout exam  Abdomen: Soft, non-tender, ND  Extremities: trace LE edema  Neurologic: No focal deficits. Normal affect.  Skin: Warm and dry,    Medications:   metoprolol succinate ER  50 mg Oral Daily Beta Blocker    furosemide  80 mg Oral BID (Diuretic)    apixaban  5 mg Oral BID    collagenase   Topical Daily    atorvastatin  40 mg Oral Daily    clopidogrel  75 mg Oral Daily    finasteride  5 mg Oral Daily    tamsulosin  0.4 mg Oral Daily    piperacillin-tazobactam  3.375 g Intravenous Q8H    folic acid  1 mg Oral Daily    predniSONE  15 mg Oral Daily    gabapentin  200 mg Oral BID         Assessment:    Sepsis pneumonia  IV antibiotics. BC NGTD  Persistent AFIB  Rates controlled. Changed lopressor to Toprol-XL 50 mg daily with stable BP  Anticoagulated with Eliquis  Elevated troponin, known CAD s/p CABG and PCI  HsTrop 255 > 463 >362. Demand likely with hypoperfusion with sepsis lactate 9.9 on arrival and AF RVR  He is chest pain free  EKG on arrival without acute ST/T changes in AFIB RVR. Baseline TTE is abnormal 12/2023 with resting WMA and reduced EF with known ICM  Plavix/eliquis, statin. BB  HFrEF, ICM EF 25-30%  Appears euvolemic on 7/24 after IV fluids on arrival. IV fluids have been discontinued. Increase PO lasix back to home dosing 80 mg BID  GDMT: bb held with hypotension. No ARNI/ARB/ACE/MRA/SGLT2 with ERON  Moderate-severe aortic stenosis  Avoid intravascular depletion with preload dependence  PAD history of popliteal stent occlusion s/p PTA 12/2023  Eliquis/plavix, statin  ERON on CKD  Resolved with IV fluids. Suspect ERON secondary to intravascular depletion and possibly ATN with hypotension/sepsis    Plan:    Changed lopressor to Toprol-XL 50 mg daily today and increased lasix 40 mg BID to 80 mg BID. He is now back on home dosing of his cardiac medications. Appears euvolemic. AFIB rates are controlled.   Stable for discharge from cardiac standpoint.  Cardiology will sign off. Please call with further questions. He follows with cardiology at [facility] and will follow with them in outpatient setting.    Plan of care discussed with patient, RN.    ESPERANZA Friend  7/25/2024  9:50 AM    Patient seen and examined independently.  Note reviewed and labs reviewed. Agree with above assessment and plan.  89-year-old male who presented with sepsis 2/2 PNA.  Has underlying persistent atrial fibrillation with elevated rates on arrival.  However, suspect this is physiologic exacerbation due to overall status.  Has been treated with antibiotics with improving symptomology.  Tolerating resumption of cardiac medication regimen including beta-blocker and p.o. diuretics.  Will plan on resuming home doses of metoprolol succinate 50 mg daily and p.o. Lasix 80 mg twice daily.  From cardiology perspective, okay to discharge.  Patient will follow-up as an outpatient.        Mckay Campos DO  Cardiologist  [facility]  7/25/2024 10:08 AM      Note to the patient: The 21st Century Cures Act makes medical notes like these available to patients in the interest of transparency. However, be advised that this is a medical document. It is intended as peer to peer communication. It is written in medical language and may contain abbreviations or verbiage that are unfamiliar. It may appear blunt or direct. Medical documents are intended to carry relevant information, facts as evident, and clinical opinion of the practitioner.     Disclaimer: Components of this note were documented using voice recognition system and are subject to errors not corrected at proofreading. Contact the author of this note for any clarifications.

## 2024-07-25 NOTE — PLAN OF CARE
Assumed care of pt at 0730 hr- pt is a&0x4, denies pain, on RA, continuous cardiac and SpO2 monitoring is in place, PIV is saline locked, up with assistance and RW Safety precautions are in place.    Isolation precautions in effect.       Problem: SAFETY ADULT - FALL  Goal: Free from fall injury  Description: INTERVENTIONS:  - Assess pt frequently for physical needs  - Identify cognitive and physical deficits and behaviors that affect risk of falls.  - [place] fall precautions as indicated by assessment.  - Educate pt/family on patient safety including physical limitations  - Instruct pt to call for assistance with activity based on assessment  - Modify environment to reduce risk of injury  - Provide assistive devices as appropriate  - Consider OT/PT consult to assist with strengthening/mobility  - Encourage toileting schedule  7/25/2024 1212 by Meagan Pearce RN  Outcome: Progressing  7/25/2024 1212 by Meagan Pearce RN  Outcome: Progressing     Problem: CARDIOVASCULAR - ADULT  Goal: Maintains optimal cardiac output and hemodynamic stability  Description: INTERVENTIONS:  - Monitor vital signs, rhythm, and trends  - Monitor for bleeding, hypotension and signs of decreased cardiac output  - Evaluate effectiveness of vasoactive medications to optimize hemodynamic stability  - Monitor arterial and/or venous puncture sites for bleeding and/or hematoma  - Assess quality of pulses, skin color and temperature  - Assess for signs of decreased coronary artery perfusion - ex. Angina  - Evaluate fluid balance, assess for edema, trend weights  7/25/2024 1212 by Meagan Pearce RN  Outcome: Progressing  7/25/2024 1212 by Meagan Pearce RN  Outcome: Progressing  Goal: Absence of cardiac arrhythmias or at baseline  Description: INTERVENTIONS:  - Continuous cardiac monitoring, monitor vital signs, obtain 12 lead EKG if indicated  - Evaluate effectiveness of antiarrhythmic and heart rate control medications as  ordered  - Initiate emergency measures for life threatening arrhythmias  - Monitor electrolytes and administer replacement therapy as ordered  7/25/2024 1212 by Meagan Pearce, RN  Outcome: Progressing  7/25/2024 1212 by Meagan Pearce, RN  Outcome: Progressing     Problem: Integumentary status not within defined limits  Goal: Pt's integumentary status will be adequate for discharge  7/25/2024 1212 by Meagan Pearce, RN  Outcome: Progressing  7/25/2024 1212 by Meagan Pearce, RN  Outcome: Progressing

## 2024-07-25 NOTE — PROGRESS NOTES
[facility]   part of [facility]     Hospitalist Progress Note     Alejandro Guardado Patient Status:  Inpatient    1935 MRN [id number]   Location [facility] 3NE-A Attending Dipesh Wall MD   Hosp Day # 3 PCP Stanislav Odonnell MD     Chief Complaint: Aspiration pneumonia     Subjective:   Patient doing well. On room air. Asking to go home.     Current medications:   metoprolol succinate ER  50 mg Oral Daily Beta Blocker    furosemide  80 mg Oral BID (Diuretic)    apixaban  5 mg Oral BID    collagenase   Topical Daily    atorvastatin  40 mg Oral Daily    clopidogrel  75 mg Oral Daily    finasteride  5 mg Oral Daily    tamsulosin  0.4 mg Oral Daily    piperacillin-tazobactam  3.375 g Intravenous Q8H    folic acid  1 mg Oral Daily    predniSONE  15 mg Oral Daily    gabapentin  200 mg Oral BID       Objective:    Review of Systems:   10 point ROS completed and was negative, except for pertinent positive and negatives stated in subjective.    Vital signs:  Temp:  [97.5 °F (36.4 °C)-98.2 °F (36.8 °C)] 97.5 °F (36.4 °C)  Pulse:  [] 84  Resp:  [16-18] 18  BP: ()/(61-96) 97/61  SpO2:  [93 %-98 %] 93 %  Patient Weight for the past 72 hrs:   Weight   24 1752 200 lb (90.7 kg)   24 0025 199 lb 15.3 oz (90.7 kg)     Physical Exam:    General: No acute distress.   Respiratory: Clear to auscultation bilaterally.   Cardiovascular: S1, S2.  Abdomen: Soft, nontender, nondistended.  Positive bowel sounds.   Extremities: Right foot wrapped  Neuro: AAOx3    Diagnostic Data:    Labs:  Recent Labs   Lab 24  1754 24  0547 24  0903   WBC 19.7* 13.1* 10.9   HGB 12.5* 10.1* 10.0*   MCV 86.6 84.6 83.2   .0 227.0 233.0   BAND 41  --   --    INR 1.45*  --   --        Recent Labs   Lab 24  1754 24  0547 24  1719 24  0903 24  1624   * 105* 118* 103*  --    BUN 33* 30* 26* 19  --    CREATSERUM 2.57* 2.01* 1.59* 1.16  --    CA 9.3 8.2* 8.7 9.0  --     ALB 3.9  --   --   --   --     141 138 138  --    K 4.3 3.3* 3.5 3.6  3.6 4.3    109 108 109  --    CO2 19.0* 25.0 24.0 24.0  --    ALKPHO 62  --   --   --   --    AST 39*  --   --   --   --    ALT 20  --   --   --   --    BILT 0.6  --   --   --   --    TP 6.6  --   --   --   --        Estimated Creatinine Clearance: 47.4 mL/min (based on SCr of 1.16 mg/dL).    Recent Labs   Lab 07/21/24 1754   PTP 17.7*   INR 1.45*            COVID-19 Lab Results    COVID-19  Lab Results   Component Value Date    COVID19 Not Detected 07/21/2024    COVID19 Not Detected 03/28/2024    COVID19 Not Detected 03/16/2024       Pro-Calcitonin  No results for input(s): \"PCT\" in the last 168 hours.    Cardiac  Recent Labs   Lab 07/21/24 1754   PBNP 8,007*       Creatinine Kinase  No results for input(s): \"CK\" in the last 168 hours.    Inflammatory Markers  No results for input(s): \"CRP\", \"KOKO\", \"LDH\", \"DDIMER\" in the last 168 hours.    Recent Labs   Lab 07/21/24  1754 07/22/24  0547 07/22/24  1146   TROPHS 255* 463* 362*       Imaging: Imaging data reviewed in Epic.    Medications:    metoprolol succinate ER  50 mg Oral Daily Beta Blocker    furosemide  80 mg Oral BID (Diuretic)    apixaban  5 mg Oral BID    collagenase   Topical Daily    atorvastatin  40 mg Oral Daily    clopidogrel  75 mg Oral Daily    finasteride  5 mg Oral Daily    tamsulosin  0.4 mg Oral Daily    piperacillin-tazobactam  3.375 g Intravenous Q8H    folic acid  1 mg Oral Daily    predniSONE  15 mg Oral Daily    gabapentin  200 mg Oral BID       Assessment & Plan:    Sepsis d/t aspiration pneumonia   History of achalasia   Lactic acidosis  IV abx > PO on DC  Follow-up cultures - NGTD  CAD sp CABG  Chronic systolic heart failure, EF 20-25%  Dyslipidemia  Atrial fibrillation on DOAC  Aortic stenosis, severe  Elevated troponin d/t type II MI d/t sepsis  Plavix  Metoprolol > change to PTA 50/d  Lipitor   Lasix > increase to PTA 80 BID  DOAC  Monitor  hemodynamics  Telemetry  Cardiology consult   PVD with non-healing right foot wound  Wound care  Adrenal insufficiency  Prednisone  Urinary retention  Voiding trial  Urology consult   Acute kidney injury, resolved  GERD  Leukocytosis, resolved    Supplementary Documentation:   Quality:  DVT Prophylaxis: DOAC    At this point Mr. Guardado is expected to be discharge to: Home    Plan of care discussed with patient, RN and PA.    Zackary Mcneill MD

## 2024-07-25 NOTE — PLAN OF CARE
Assumed pt care this evening at 1930.  Pt is A&Ox4, following commands.   Pt on room air, Afib controlled and asymptomatic  Pt denies pain.  Pt max assist  R foot amputation wrapped in cast. Clean/dry/intact  Pt on regular diet. Tolerating diet.   PIC R hand & R AC SL  Pt has good output via external cath post pena removal  Contact iso for MERSA  Bed in lowest position, call light in reach.         Problem: SAFETY ADULT - FALL  Goal: Free from fall injury  Description: INTERVENTIONS:  - Assess pt frequently for physical needs  - Identify cognitive and physical deficits and behaviors that affect risk of falls.  - [place] fall precautions as indicated by assessment.  - Educate pt/family on patient safety including physical limitations  - Instruct pt to call for assistance with activity based on assessment  - Modify environment to reduce risk of injury  - Provide assistive devices as appropriate  - Consider OT/PT consult to assist with strengthening/mobility  - Encourage toileting schedule  Outcome: Progressing     Problem: CARDIOVASCULAR - ADULT  Goal: Maintains optimal cardiac output and hemodynamic stability  Description: INTERVENTIONS:  - Monitor vital signs, rhythm, and trends  - Monitor for bleeding, hypotension and signs of decreased cardiac output  - Evaluate effectiveness of vasoactive medications to optimize hemodynamic stability  - Monitor arterial and/or venous puncture sites for bleeding and/or hematoma  - Assess quality of pulses, skin color and temperature  - Assess for signs of decreased coronary artery perfusion - ex. Angina  - Evaluate fluid balance, assess for edema, trend weights  Outcome: Progressing  Goal: Absence of cardiac arrhythmias or at baseline  Description: INTERVENTIONS:  - Continuous cardiac monitoring, monitor vital signs, obtain 12 lead EKG if indicated  - Evaluate effectiveness of antiarrhythmic and heart rate control medications as ordered  - Initiate emergency measures for life  threatening arrhythmias  - Monitor electrolytes and administer replacement therapy as ordered  Outcome: Progressing     Problem: Integumentary status not within defined limits  Goal: Pt's integumentary status will be adequate for discharge  Outcome: Progressing

## 2024-07-26 NOTE — PAYOR COMM NOTE
--------------  CONTINUED STAY REVIEW    Payor: [employer] MEDICARE ADV PPO  Subscriber #:  [id number]  Authorization Number: [id number]    Admit date: 7/22/24  Admit time: 1      Date of Admission: 7/21/2024  Date of Discharge:   7/25/2024      Discharge Disposition: Home      Discharge Diagnosis:  #Severe sepsis, resolved  #Aspiration PNA improving  #Achalasia prior botox in april and surgery  #Neuropathy, gabapentin reduced due to fatigue  #ERON due to hypoperfusion, mild dehydration, urinary retention  #Chronic systolic CHF, EF: 20-25%, compensated  #NSTEMI type II Due to sepsis  #A. Fib, controlled  #Moderate to Severe aortic stenosis  #CAD with prior CABG  #Dyslipidemia  #PAD with non-healing right foot wound s/p thrombectomy and revascularization/stent 12/4/23   #GERD  #Adrenal insufficiency  #Hypokalemia, resolved       History of Present Illness:  Alejandro Guardado is a 89 year old male with CHF, CAD, A-fib on Eliquis, presented to the emergency room with lethargy over the last 24 hours.  Patient's son and daughter at bedside and providing history.  They had seen patient yesterday morning and he was eating and drinking just fine.  However patient was found in feces and looked very lethargic when found today.  Family cleaned him up and brought him to the emergency room for further evaluation.  When he arrived in the emergency room he was very tachycardic and tachypneic, hypotensive upon arrival.  Had multiple readmissions for aspiration pneumonia in the past.        Brief Synopsis: Patient is a 89-year-old male was found down with altered mental status.  He was brought to the ER for further workup.  He was found to have sepsis and started on broad-spectrum IV antibiotics after cultures obtained.  He received IV fluids.  He was admitted for further workup.  We suspected aspiration pneumonia due to history of achalasia.  He has had prior Botox in April per GI.  Gabapentin reduced during the stay due to fatigue.  ERON  resolved.  Block catheter removed and patient voiding.  Cardiology evaluated.  Patient back on oral Lasix, Toprol-XL.  He will continue on oral antibiotics to complete course for pneumonia.  PT OT evaluated and felt patient functioning near baseline.  He was discharged home once cleared by consultants and social work assisted with discharge planning.  He was instructed to follow-up with urology, PCP, GI, cardiology as OP.      DISCHARGED 7/25/2024           Vitals (last day) before discharge       Date/Time Temp Pulse Resp BP SpO2 Weight O2 Device O2 Flow Rate (L/min) [facility]    07/25/24 1400 -- 85 -- -- -- -- -- -- CM    07/25/24 1146 97.5 °F (36.4 °C) 84 18 97/61 93 % -- None (Room air) 0 L/min EK    07/25/24 0756 98.1 °F (36.7 °C) 90 18 109/78 98 % -- None (Room air) 0 L/min EK    07/25/24 0600 97.9 °F (36.6 °C) 90 18 114/78 97 % -- None (Room air) -- NN    07/25/24 0000 98.2 °F (36.8 °C) 87 17 112/83 96 % -- None (Room air) 0 L/min NN    07/24/24 1930 97.8 °F (36.6 °C) 89 16 104/71 95 % -- None (Room air) 0 L/min NN    07/24/24 1815 -- 126 -- -- -- -- -- -- NN    07/24/24 1800 -- 111 -- -- -- -- -- -- NN    07/24/24 1745 -- 109 -- -- -- -- -- -- NN    07/24/24 1730 -- 103 -- -- -- -- -- -- NN    07/24/24 1715 -- 102 -- -- -- -- -- -- NN 07/24/24 1700 -- 103 -- -- -- -- -- -- NN    07/24/24 1645 -- 101 -- -- -- -- -- -- NN    07/24/24 1630 -- 106 -- -- -- -- -- -- NN    07/24/24 1615 -- 102 -- -- -- -- -- -- NN    07/24/24 1600 -- 99 -- -- -- -- -- -- NN    07/24/24 1545 97.7 °F (36.5 °C) 104 17 139/96 -- -- None (Room air) 0 L/min ER    07/24/24 1530 -- 104 -- -- -- -- -- -- NN    07/24/24 1515 -- 97 -- -- -- -- -- -- NN    07/24/24 1500 -- 101 -- -- -- -- -- -- NN    07/24/24 1445 -- 101 -- -- -- -- -- -- NN    07/24/24 1430 -- 112 -- -- -- -- -- -- NN    07/24/24 1415 -- 115 -- -- -- -- -- -- NN    07/24/24 1400 -- 114 -- -- -- -- -- -- ER    07/24/24 1215 97.9 °F (36.6 °C) 83 18 125/67 96 % -- None (Room air)  -- ER    07/24/24 0830 -- 98 -- -- 96 % -- -- -- ER    07/24/24 0745 97.8 °F (36.6 °C) 70 17 103/67 93 % -- None (Room air) 0 L/min ER    07/24/24 0523 97.4 °F (36.3 °C) 100 18 121/80 96 % -- None (Room air) 0 L/min AR    07/24/24 0030 97.4 °F (36.3 °C) 97 18 135/84 95 % -- None (Room air) 0 L/min AR          CIWA Scores (last 5 days) before discharge       None

## 2024-07-29 ENCOUNTER — APPOINTMENT (OUTPATIENT)
Dept: WOUND CARE | Facility: HOSPITAL | Age: 89
End: 2024-07-29
Payer: MEDICARE

## 2024-07-29 ENCOUNTER — OFFICE VISIT (OUTPATIENT)
Dept: WOUND CARE | Facility: HOSPITAL | Age: 89
End: 2024-07-29
Attending: PODIATRIST
Payer: MEDICARE

## 2024-07-29 VITALS
TEMPERATURE: 98 F | RESPIRATION RATE: 16 BRPM | HEART RATE: 99 BPM | SYSTOLIC BLOOD PRESSURE: 104 MMHG | DIASTOLIC BLOOD PRESSURE: 68 MMHG

## 2024-07-29 DIAGNOSIS — L97.512 FOOT ULCER, RIGHT, WITH FAT LAYER EXPOSED (HCC): Primary | ICD-10-CM

## 2024-07-29 DIAGNOSIS — R60.0 EDEMA OF RIGHT LOWER EXTREMITY: ICD-10-CM

## 2024-07-29 DIAGNOSIS — I73.9 PAD (PERIPHERAL ARTERY DISEASE) (HCC): ICD-10-CM

## 2024-07-29 DIAGNOSIS — Z89.439 HISTORY OF TRANSMETATARSAL AMPUTATION OF FOOT (HCC): ICD-10-CM

## 2024-07-29 DIAGNOSIS — T14.8XXA DEEP TISSUE INJURY: ICD-10-CM

## 2024-07-29 DIAGNOSIS — M79.89 RIGHT LEG SWELLING: ICD-10-CM

## 2024-07-29 PROCEDURE — 11042 DBRDMT SUBQ TIS 1ST 20SQCM/<: CPT | Performed by: PODIATRIST

## 2024-07-29 NOTE — PROGRESS NOTES
Weekly Wound Education Note    Teaching Provided To: Patient;Family  Training Topics: Discharge instructions;Cleasing and general instructions;Dressing;Off-loading  Training Method: Demonstration;Explain/Verbal;Written  Training Response: Patient responds and understands        Notes: Cleanse right foot wound with saline or wound cleanser apply endoform, cover with hydrofera ready (writing side away from wound) secure with rolled gauze and tape. Change every other day.

## 2024-07-29 NOTE — PROGRESS NOTES
Patient ID: Alejandro Guardado is a 89 year old male.    Debridement Old surgical Right Foot   Wound 08/14/23 #1- Foot Right    Performed by: Quincy Poon DPM  Authorized by: Quincy Poon DPM      Consent   Consent obtained? verbal  Consent given by: patient  Risks discussed? procedural risks discussed  Time out called at 7/29/2024 1:22 PM  Immediately prior to the procedure a time out was called and the performing provider verified the correct patient, procedure, equipment, support staff, and site/side marked as required.    Debridement Details  Performed by: physician  Debridement type: surgical  Level of debridement: subcutaneous tissue  Pain control: lidocaine 4%  Pain control administration type: topical    Pre-debridement measurements  Length (cm): 0.7  Width (cm): 2  Depth (cm): 0.1  Surface Area (cm^2): 1.4    Post-debridement measurements  Length (cm): 0.8  Width (cm): 2  Depth (cm): 0.1  Percent debrided: 100%  Surface Area (cm^2): 1.6  Area Debrided (cm^2): 1.6  Volume (cm^3): 0.16    Tissue and other material debrided: subcutaneous tissue  Devitalized tissue debrided: biofilm, fibrin and necrotic debris  Instrument(s) utilized: curette  Bleeding: small  Hemostasis obtained with: not applicable  Procedural pain (0-10): 0  Post-procedural pain: 0   Response to treatment: procedure was tolerated well

## 2024-07-29 NOTE — PROGRESS NOTES
Subjective   Alejandro Guardado is a 89 year old male.    Chief Complaint   Patient presents with    Wound Care     Patient is here for a wound care follow up. He denies any current pain to the wound.        HPI  Alejandro Guardado is a 89 year old male with PAD who is returning to clinic for recheck of right foot.  Patient is accompanied today by his son and daughter-in-law, who is returning to clinic today for recheck on right foot.  Patient continues to do well overall.  Continue to change dressings as directed with endoform to his right foot ulceration and Santyl to his right shin ulcer per Dr. Wesley's recommendations.  Denies noticing any signs of infection recently.  Denies noticing any recent drainage from previous heel wound.  No other concerns.    Review of Systems  Denies nausea, vomiting, fever, chills, shortness of breath, chest pain, and calf pain.    Objective    Physical Exam:    Derm:  Wound Assessment  Wound 08/14/23 #1- Foot Right (Active)   Wound Image   07/29/24 1324   Drainage Amount Scant 07/29/24 1307   Drainage Description Serosanguineous 07/29/24 1307   Treatments Skin Substitutes 05/29/24 1124   Wound Vac Brand KCI 10/30/23 0907   Wound Length (cm) 0.8 cm 07/29/24 1308   Wound Width (cm) 2 cm 07/29/24 1308   Wound Surface Area (cm^2) 1.6 cm^2 07/29/24 1308   Wound Depth (cm) 0.1 cm 07/29/24 1308   Wound Volume (cm^3) 0.16 cm^3 07/29/24 1308   Wound Healing % 99 07/29/24 1308   Margins Well-defined edges;Epibole (Rolled edges) 07/29/24 1307   Non-staged Wound Description Full thickness 07/29/24 1307   Peggy-wound Assessment Dry;Edema 07/29/24 1307   Wound Granulation Tissue Firm;[place] 07/29/24 1307   Wound Bed Granulation (%) 80 % 07/29/24 1307   Wound Bed Epithelium (%) 40 % 06/17/24 1342   Wound Bed Slough (%) 20 % 07/29/24 1307   Wound Bed Eschar (%) 40 % 08/14/23 1315   Wound Odor None 07/29/24 1307   Exposed Structure Bone Necrosis 05/13/24 1602   Shape 60% epifix 06/10/24 1349   Tunneling? No  07/29/24 1307   Undermining? No 07/29/24 1307   Sinus Tracts? No 07/29/24 1307       Wound 04/15/24 #2 Leg Right;Anterior (Active)   Wound Image   07/29/24 1308   Drainage Amount Moderate 07/29/24 1308   Drainage Description Yellow;Serous 07/29/24 1308   Treatments Santyl 07/08/24 1309   Wound Length (cm) 2.1 cm 07/29/24 1308   Wound Width (cm) 1.2 cm 07/29/24 1308   Wound Surface Area (cm^2) 2.52 cm^2 07/29/24 1308   Wound Depth (cm) 0.1 cm 07/29/24 1308   Wound Volume (cm^3) 0.252 cm^3 07/29/24 1308   Wound Healing % -115 07/29/24 1308   Margins Well-defined edges 07/29/24 1308   Non-staged Wound Description Full thickness 07/29/24 1308   Peggy-wound Assessment Edema;Moist 07/29/24 1308   Wound Granulation Tissue Firm;[place] 07/29/24 1308   Wound Bed Granulation (%) 5 % 07/29/24 1308   Wound Bed Slough (%) 95 % 07/29/24 1308   Wound Odor None 07/29/24 1308   Tunneling? No 07/29/24 1308   Undermining? No 07/29/24 1308   Sinus Tracts? No 07/29/24 1308       Wound 07/25/24 Arm Anterior;Lower;Proximal;Right (Active)       Vascular: DP palpable today and PT pulse not palpable today.  Continued pitting edema noted to right lower extremity.  Continued adequate refill noted to stump site and continue warmth to touch to the right foot.  Right foot is slightly cooler than left foot  Musculoskeletal: no acute deformities noted.  In a wheelchair today  Neurological: Gross sensation intact via light touch.  Protective sensation diminished via Ipswitch test.    Vital Signs  Vital Signs    07/29/24 1305   BP: 104/68   Pulse: 99   Resp: 16   Temp: 98.2 °F (36.8 °C)   PainSc: 0 - (None)         Allergies  Allergies   Allergen Reactions    Heparin ANAPHYLAXIS    Hydromorphone HALLUCINATION       Assessment    Encounter Diagnosis  1. Foot ulcer, right, with fat layer exposed ([facility])    2. Edema of right lower extremity    3. PAD (peripheral artery disease) ([facility])    4. Right leg swelling    5. History of transmetatarsal  amputation of foot ([facility])    6. Deep tissue injury        Problem List  Patient Active Problem List   Diagnosis    Closed minimally displaced zone I fracture of sacrum (HCC)    At risk for falling    CAD (coronary artery disease)    Ischemic cardiomyopathy    Azotemia    Arrhythmia    Esophageal reflux    History of MI (myocardial infarction)    Mixed hyperlipidemia    Primary hypertension    Dizziness    Medication side effect    Closed left hip fracture (HCC)  Global 6/22/2016    Status post hip surgery    Left shoulder distal clavical resection and excision of ganglion cyst of soft tissue mass   Global  09/17/2020    Orthopedic aftercare for healing traumatic hip fracture, left, closed    Closed left hip fracture, with routine healing, subsequent encounter    Osteoarthrosis, localized, primary, knee, left    Osteoarthrosis, localized, primary, knee, right    Gangrene ([facility])    PAD (peripheral artery disease) ([facility])    Benign prostatic hyperplasia with incomplete bladder emptying    Gangrene of foot ([facility])    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) ([facility])    Leukocytosis    Atrial fibrillation with RVR ([facility])    Hypokalemia    Acute kidney injury ([facility])    Hyperglycemia    Community acquired pneumonia of right lung, unspecified part of lung    Acute respiratory failure with hypoxia ([facility])    Hypotension due to hypovolemia    Sepsis due to pneumonia ([facility])    Foot ulcer with fat layer exposed, right ([facility])    Syncope, unspecified syncope type    Sepsis, due to unspecified organism, unspecified whether acute organ dysfunction present ([facility])    Shock ([facility])    Acute hypoxic respiratory failure ([facility])    Pneumonia, bacterial    Hyponatremia    Metabolic alkalosis    Recurrent pneumonia    Sepsis due to undetermined organism ([facility])    Acute on chronic congestive heart failure, unspecified heart failure type ([facility])    Acute hypoxemic respiratory failure ([facility])    ERON (acute kidney injury) ([facility])    Lactic acidosis     Leukocytosis, unspecified type    Palliative care encounter    Counseling regarding advance care planning and goals of care    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) ([facility])    Severe sepsis ([facility])    Aspiration pneumonitis ([facility])    Multifocal pneumonia    Acute renal failure superimposed on chronic kidney disease, unspecified acute renal failure type, unspecified CKD stage (HCC)    Elevated troponin    NSTEMI (non-ST elevated myocardial infarction) ([facility])       Plan  Orders:  Orders Placed This Encounter   Procedures    Debridement Old surgical Right Foot         -Patient examined, chart history reviewed.    -Patient did have an angioplasty with stenting to his right lower extremity by Dr. Alston a few months ago.       -Wound inspected today--overall stable wound today.  Dimensions improved overall.  Medial foot wound remains healed.  There is fibrogranular wound bed.  No palpable bone today.  No serous drainage with ongoing bilateral lower extremity pitting edema.  No current purulence, surrounding erythema, or other signs of infection.  Right foot remains fairly warm.  Posterior heel does have a stable subdermal hemorrhage with no current drainage or signs of infection.  Skin overall intact.     -Excisional debridement performed to the left foot ulceration utilizing curette down to and including subcutaneous tissue.  A bleeding, granular wound bed was present upon debridement of both ulcerations today.  No palpable bone today.  Overall improvements in dimensions today.  We will continue with outpatient debridements.     -Discussed treatment options.  Recommend we continue to promote healing utilizing a collagen.  Endoform and Hydrofera Blue ready placed to ulceration site today and cover with a dry dressing.  Santyl applied to right heel and right shin ulcerations per Dr. Butler's recommendation.  Will have patient's family assist with dressing changes to his foot every other day.     -Patient  is seeing Dr. Butler for right shin ulceration.  Patient's family will assist him in continuing with Santyl daily per Dr. Butler's orders.      -Patient should elevate right lower extremity as frequently as possible as we cannot compress his right lower extremity due to recent stenting and chronic CHF.  Reiterated importance of elevation to patient today     -Patient should remain minimally weightbearing to his right foot in his soft slipper.  Okay to stand for transfers and take a few steps at a time.       -Again discussed a balance between treating his ulcerations and overall mental health/quality of life.  I do agree that patient should be able to do activities that he would like to do, but I would just avoid excessive ambulation or excessive amounts of time on his feet.    -Educated on signs of infection and encouraged patient to seek immediate medical attention if noticing any of these signs.    Follow-Up  1 week    Pavan Poon DPM    7/29/2024    Dragon speech recognition software was used to prepare this note.  Errors in word recognition may occur.  Please contact me with any questions/concerns with this note.

## 2024-07-31 ENCOUNTER — APPOINTMENT (OUTPATIENT)
Dept: CARDIOLOGY | Age: 89
End: 2024-07-31

## 2024-07-31 VITALS
HEIGHT: 68 IN | SYSTOLIC BLOOD PRESSURE: 105 MMHG | BODY MASS INDEX: 30.31 KG/M2 | WEIGHT: 200 LBS | HEART RATE: 88 BPM | DIASTOLIC BLOOD PRESSURE: 67 MMHG

## 2024-07-31 DIAGNOSIS — E78.00 PURE HYPERCHOLESTEROLEMIA: ICD-10-CM

## 2024-07-31 DIAGNOSIS — Z95.1 HX OF CABG: ICD-10-CM

## 2024-07-31 DIAGNOSIS — I65.23 ASYMPTOMATIC CAROTID ARTERY STENOSIS, BILATERAL: Primary | ICD-10-CM

## 2024-07-31 DIAGNOSIS — I49.3 PVCS (PREMATURE VENTRICULAR CONTRACTIONS): ICD-10-CM

## 2024-07-31 DIAGNOSIS — I25.10 CORONARY ARTERY DISEASE INVOLVING NATIVE CORONARY ARTERY OF NATIVE HEART WITHOUT ANGINA PECTORIS: ICD-10-CM

## 2024-07-31 DIAGNOSIS — I48.11 LONGSTANDING PERSISTENT ATRIAL FIBRILLATION  (CMD): ICD-10-CM

## 2024-07-31 RX ORDER — GABAPENTIN 100 MG/1
200 CAPSULE ORAL
COMMUNITY
Start: 2024-07-25

## 2024-07-31 RX ORDER — APIXABAN 5 MG/1
5 TABLET, FILM COATED ORAL EVERY 12 HOURS SCHEDULED
COMMUNITY
Start: 2024-06-29

## 2024-07-31 RX ORDER — GENTAMICIN SULFATE 1 MG/G
CREAM TOPICAL
COMMUNITY
Start: 2024-04-25

## 2024-07-31 SDOH — HEALTH STABILITY: PHYSICAL HEALTH: ON AVERAGE, HOW MANY MINUTES DO YOU ENGAGE IN EXERCISE AT THIS LEVEL?: 0 MIN

## 2024-07-31 SDOH — HEALTH STABILITY: PHYSICAL HEALTH: ON AVERAGE, HOW MANY DAYS PER WEEK DO YOU ENGAGE IN MODERATE TO STRENUOUS EXERCISE (LIKE A BRISK WALK)?: 0 DAYS

## 2024-07-31 ASSESSMENT — PATIENT HEALTH QUESTIONNAIRE - PHQ9
1. LITTLE INTEREST OR PLEASURE IN DOING THINGS: NOT AT ALL
SUM OF ALL RESPONSES TO PHQ9 QUESTIONS 1 AND 2: 0
CLINICAL INTERPRETATION OF PHQ2 SCORE: NO FURTHER SCREENING NEEDED
2. FEELING DOWN, DEPRESSED OR HOPELESS: NOT AT ALL
SUM OF ALL RESPONSES TO PHQ9 QUESTIONS 1 AND 2: 0

## 2024-08-05 ENCOUNTER — OFFICE VISIT (OUTPATIENT)
Dept: WOUND CARE | Facility: HOSPITAL | Age: 89
End: 2024-08-05
Attending: PODIATRIST
Payer: MEDICARE

## 2024-08-05 VITALS
DIASTOLIC BLOOD PRESSURE: 70 MMHG | RESPIRATION RATE: 16 BRPM | SYSTOLIC BLOOD PRESSURE: 110 MMHG | HEART RATE: 97 BPM | TEMPERATURE: 98 F

## 2024-08-05 DIAGNOSIS — R60.0 EDEMA OF RIGHT LOWER EXTREMITY: ICD-10-CM

## 2024-08-05 DIAGNOSIS — L97.512 FOOT ULCER, RIGHT, WITH FAT LAYER EXPOSED (HCC): Primary | ICD-10-CM

## 2024-08-05 DIAGNOSIS — T14.8XXA DEEP TISSUE INJURY: ICD-10-CM

## 2024-08-05 DIAGNOSIS — I73.9 PAD (PERIPHERAL ARTERY DISEASE) (HCC): ICD-10-CM

## 2024-08-05 DIAGNOSIS — M79.89 RIGHT LEG SWELLING: ICD-10-CM

## 2024-08-05 DIAGNOSIS — Z89.439 HISTORY OF TRANSMETATARSAL AMPUTATION OF FOOT (HCC): ICD-10-CM

## 2024-08-05 PROCEDURE — 11042 DBRDMT SUBQ TIS 1ST 20SQCM/<: CPT | Performed by: PODIATRIST

## 2024-08-05 NOTE — PROGRESS NOTES
Patient ID: Alejandro Guardado is a 89 year old male.    Debridement Old surgical Right Foot   Wound 08/14/23 #1- Foot Right    Performed by: Quincy Poon DPM  Authorized by: Quincy Poon DPM      Consent   Consent obtained? verbal  Consent given by: patient  Risks discussed? procedural risks discussed  Time out called at 8/5/2024 1:22 PM  Immediately prior to the procedure a time out was called and the performing provider verified the correct patient, procedure, equipment, support staff, and site/side marked as required.    Debridement Details  Performed by: physician  Debridement type: surgical  Level of debridement: subcutaneous tissue  Pain control: lidocaine 4%  Pain control administration type: topical    Pre-debridement measurements  Length (cm): 0.6  Width (cm): 2.2  Depth (cm): 0.1  Surface Area (cm^2): 1.32    Post-debridement measurements  Length (cm): 0.7  Width (cm): 2.2  Depth (cm): 0.1  Percent debrided: 100%  Surface Area (cm^2): 1.54  Area Debrided (cm^2): 1.54  Volume (cm^3): 0.15    Tissue and other material debrided: subcutaneous tissue  Devitalized tissue debrided: biofilm, fibrin and necrotic debris  Instrument(s) utilized: curette  Bleeding: small  Hemostasis obtained with: not applicable  Procedural pain (0-10): 0  Post-procedural pain: 0   Response to treatment: procedure was tolerated well                 Yes

## 2024-08-05 NOTE — PROGRESS NOTES
Subjective   Alejandro Guardado is a 89 year old male.    Chief Complaint   Patient presents with    Wound Care     Patient is here for a wound care follow up. He denies any pain or new wound concerns.       HPI  Alejandro Guardado is a 89 year old male with PAD who is returning to clinic for recheck of right foot.  Patient is accompanied today by his son and daughter-in-law, who is returning to clinic today for recheck on right foot.  Patient continues to do well.  Denies noticing any recent signs of infection.  Continued edema to bilateral lower extremities.  Has been placing endoform on his right foot wound and Santyl to his right shin ulcer per Dr. Butler's recommendations.  No other concerns at this time.    Review of Systems  Denies nausea, vomiting, fever, chills, shortness of breath, chest pain, and calf pain.    Objective    Physical Exam:    Derm:  Wound Assessment  Wound 08/14/23 #1- Foot Right (Active)   Wound Image   08/05/24 1321   Drainage Amount Large 08/05/24 1308   Drainage Description Serous;Yellow 08/05/24 1308   Treatments Skin Substitutes 05/29/24 1124   Wound Vac Brand KCI 10/30/23 0907   Wound Length (cm) 0.7 cm 08/05/24 1309   Wound Width (cm) 2.2 cm 08/05/24 1309   Wound Surface Area (cm^2) 1.54 cm^2 08/05/24 1309   Wound Depth (cm) 0.1 cm 08/05/24 1309   Wound Volume (cm^3) 0.154 cm^3 08/05/24 1309   Wound Healing % 99 08/05/24 1309   Margins Well-defined edges;Epibole (Rolled edges) 08/05/24 1308   Non-staged Wound Description Full thickness 08/05/24 1308   Peggy-wound Assessment Moist;Maceration;Edema 08/05/24 1308   Wound Granulation Tissue Firm;Pink;Red 08/05/24 1308   Wound Bed Granulation (%) 60 % 08/05/24 1308   Wound Bed Epithelium (%) 40 % 06/17/24 1342   Wound Bed Slough (%) 40 % 08/05/24 1308   Wound Bed Eschar (%) 40 % 08/14/23 1315   Wound Odor None 08/05/24 1308   Exposed Structure Bone Necrosis 05/13/24 1602   Shape 60% epifix 06/10/24 1340   Tunneling? No 08/05/24 9393    Undermining? No 08/05/24 1308   Sinus Tracts? No 08/05/24 1308       Wound 04/15/24 #2 Leg Right;Anterior (Active)   Wound Image   08/05/24 1309   Drainage Amount Small 08/05/24 1309   Drainage Description Yellow;Serous 08/05/24 1309   Treatments Santyl 07/08/24 1309   Wound Length (cm) 1.9 cm 08/05/24 1309   Wound Width (cm) 1.3 cm 08/05/24 1309   Wound Surface Area (cm^2) 2.47 cm^2 08/05/24 1309   Wound Depth (cm) 0.1 cm 08/05/24 1309   Wound Volume (cm^3) 0.247 cm^3 08/05/24 1309   Wound Healing % -111 08/05/24 1309   Margins Well-defined edges 08/05/24 1309   Non-staged Wound Description Full thickness 08/05/24 1309   Peggy-wound Assessment Edema 08/05/24 1309   Wound Granulation Tissue Spongy;Pink;Red 08/05/24 1309   Wound Bed Granulation (%) 30 % 08/05/24 1309   Wound Bed Slough (%) 70 % 08/05/24 1309   Wound Odor None 08/05/24 1309   Tunneling? No 08/05/24 1309   Undermining? No 08/05/24 1309   Sinus Tracts? No 08/05/24 1309       Wound 07/25/24 Arm Anterior;Lower;Proximal;Right (Active)       Vascular: DP palpable today and PT pulse not palpable today.  Continued pitting edema noted to right lower extremity.  Continued adequate refill noted to stump site and continue warmth to touch to the right foot.  Right foot is slightly cooler than left foot  Musculoskeletal: no acute deformities noted.  In a wheelchair today  Neurological: Gross sensation intact via light touch.  Protective sensation diminished via Ipswitch test.    Vital Signs  Vital Signs    08/05/24 1303   BP: 110/70   Pulse: 97   Resp: 16   Temp: 98 °F (36.7 °C)   PainSc: 0 - (None)         Allergies  Allergies   Allergen Reactions    Heparin ANAPHYLAXIS    Hydromorphone HALLUCINATION       Assessment    Encounter Diagnosis  1. Foot ulcer, right, with fat layer exposed ([facility])    2. Edema of right lower extremity    3. PAD (peripheral artery disease) ([facility])    4. Right leg swelling    5. History of transmetatarsal amputation of foot (HCC)    6. Deep  tissue injury        Problem List  Patient Active Problem List   Diagnosis    Closed minimally displaced zone I fracture of sacrum (HCC)    At risk for falling    CAD (coronary artery disease)    Ischemic cardiomyopathy    Azotemia    Arrhythmia    Esophageal reflux    History of MI (myocardial infarction)    Mixed hyperlipidemia    Primary hypertension    Dizziness    Medication side effect    Closed left hip fracture (HCC)  Global 6/22/2016    Status post hip surgery    Left shoulder distal clavical resection and excision of ganglion cyst of soft tissue mass   Global  09/17/2020    Orthopedic aftercare for healing traumatic hip fracture, left, closed    Closed left hip fracture, with routine healing, subsequent encounter    Osteoarthrosis, localized, primary, knee, left    Osteoarthrosis, localized, primary, knee, right    Gangrene ([facility])    PAD (peripheral artery disease) ([facility])    Benign prostatic hyperplasia with incomplete bladder emptying    Gangrene of foot ([facility])    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) ([facility])    Leukocytosis    Atrial fibrillation with RVR ([facility])    Hypokalemia    Acute kidney injury ([facility])    Hyperglycemia    Community acquired pneumonia of right lung, unspecified part of lung    Acute respiratory failure with hypoxia ([facility])    Hypotension due to hypovolemia    Sepsis due to pneumonia ([facility])    Foot ulcer with fat layer exposed, right ([facility])    Syncope, unspecified syncope type    Sepsis, due to unspecified organism, unspecified whether acute organ dysfunction present ([facility])    Shock ([facility])    Acute hypoxic respiratory failure ([facility])    Pneumonia, bacterial    Hyponatremia    Metabolic alkalosis    Recurrent pneumonia    Sepsis due to undetermined organism ([facility])    Acute on chronic congestive heart failure, unspecified heart failure type ([facility])    Acute hypoxemic respiratory failure ([facility])    ERON (acute kidney injury) ([facility])    Lactic acidosis    Leukocytosis, unspecified type     Palliative care encounter    Counseling regarding advance care planning and goals of care    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) ([facility])    Severe sepsis ([facility])    Aspiration pneumonitis ([facility])    Multifocal pneumonia    Acute renal failure superimposed on chronic kidney disease, unspecified acute renal failure type, unspecified CKD stage ([facility])    Elevated troponin    NSTEMI (non-ST elevated myocardial infarction) ([facility])       Plan  Orders:  Orders Placed This Encounter   Procedures    Debridement Old surgical Right Foot         -Patient examined, chart history reviewed.    -Patient did have an angioplasty with stenting to his right lower extremity by Dr. Alston a few months ago.       -Wound inspected today--overall stable wound today.  Dimensions about the same today.  Medial foot wound remains healed.  There is fibrogranular wound bed to the ulceration site.  No palpable bone today.  No serous drainage with ongoing bilateral lower extremity pitting edema.  No current purulence, surrounding erythema, or other signs of infection.  Right foot remains fairly warm.  Posterior heel does have a stable subdermal hemorrhage with no current drainage or signs of infection.  Skin overall intact.  There is a new, stable serous filled bulla to right anterior shin near ulceration site.  This was left intact today and recommend monitoring.     -Excisional debridement performed to the left foot ulceration utilizing curette down to and including subcutaneous tissue.  A bleeding, granular wound bed was present upon debridement of both ulcerations today.  No palpable bone today.  Overall improvements in dimensions today.  We will continue with outpatient debridements.     -Discussed treatment options.  Recommend we continue to promote healing utilizing a collagen.  Endoform and Hydrofera Blue ready placed to ulceration site today and cover with a dry dressing.  Santyl applied to right heel and right shin ulcerations  per Dr. Butler's recommendation.  Will have patient's family assist with dressing changes to his foot every other day.     -Patient is seeing Dr. Butler for right shin ulceration.  Patient's family will assist him in continuing with Santyl daily per Dr. Butler's orders.      -Patient should elevate right lower extremity as frequently as possible as we cannot compress his right lower extremity due to recent stenting and chronic CHF.  Reiterated importance of elevation to patient today     -Patient should remain minimally weightbearing to his right foot in his soft slipper.  Okay to stand for transfers and take a few steps at a time.       -Again discussed a balance between treating his ulcerations and overall mental health/quality of life.  I do agree that patient should be able to do activities that he would like to do, but I would just avoid excessive ambulation or excessive amounts of time on his feet.    -Educated on signs of infection and encouraged patient to seek immediate medical attention if noticing any of these signs.    Follow-Up  1 week with Dr. Butler  and 2 weeks with myself    Pavan Poon DPM    8/5/2024    Dragon speech recognition software was used to prepare this note.  Errors in word recognition may occur.  Please contact me with any questions/concerns with this note.

## 2024-08-12 ENCOUNTER — APPOINTMENT (OUTPATIENT)
Dept: WOUND CARE | Facility: HOSPITAL | Age: 89
End: 2024-08-12
Payer: MEDICARE

## 2024-08-14 ENCOUNTER — OFFICE VISIT (OUTPATIENT)
Dept: WOUND CARE | Facility: HOSPITAL | Age: 89
End: 2024-08-14
Attending: INTERNAL MEDICINE
Payer: MEDICARE

## 2024-08-14 VITALS
RESPIRATION RATE: 16 BRPM | DIASTOLIC BLOOD PRESSURE: 58 MMHG | TEMPERATURE: 98 F | SYSTOLIC BLOOD PRESSURE: 103 MMHG | HEART RATE: 99 BPM

## 2024-08-14 DIAGNOSIS — M79.89 RIGHT LEG SWELLING: ICD-10-CM

## 2024-08-14 DIAGNOSIS — T14.8XXA DEEP TISSUE INJURY: ICD-10-CM

## 2024-08-14 DIAGNOSIS — R60.0 EDEMA OF RIGHT LOWER EXTREMITY: ICD-10-CM

## 2024-08-14 DIAGNOSIS — L97.212 NON-PRESSURE CHRONIC ULCER OF RIGHT CALF WITH FAT LAYER EXPOSED (HCC): ICD-10-CM

## 2024-08-14 DIAGNOSIS — L97.512 FOOT ULCER, RIGHT, WITH FAT LAYER EXPOSED (HCC): Primary | ICD-10-CM

## 2024-08-14 DIAGNOSIS — I73.9 PAD (PERIPHERAL ARTERY DISEASE) (HCC): ICD-10-CM

## 2024-08-14 DIAGNOSIS — R23.8 SLOUGHING OF WOUND: ICD-10-CM

## 2024-08-14 DIAGNOSIS — Z89.439 HISTORY OF TRANSMETATARSAL AMPUTATION OF FOOT (HCC): ICD-10-CM

## 2024-08-14 PROCEDURE — 99214 OFFICE O/P EST MOD 30 MIN: CPT

## 2024-08-14 NOTE — PATIENT INSTRUCTIONS
Continue current plan of care.   Start santyl on irght heel wound.   Continue  santyl on right leg wound for enzymatic debridement  Continue collagen / HF transfer for foot wound.   Leg elevation and low salt diet  See Dr. Poon next Monday.     Wound Cleaning and Dressings:    Wash your hands with soap and water. Always wear gloves while changing dressings. Donot touch wound / mio-wound skin with un-gloved hands. Remove old dressing, discard and place into trash.    DRESSINGS:   Right leg: santyl / HF transfer - Change dressing daily.  Right foot: collagen / HF transfer- change dressing every other day  Start santyl on right heel wound.   Offload wounded area.     Off-Loading: modified foot wear for maximal offloading.     Miscellaneous Instructions:  Supplement with a daily multivitamin   Low salt diet  Intense blood sugar control - Goal Blood sugar below 180 at all times recommended.  Increase protein intake / consider protein supplements - see below  Elevate extremities at all times when sitting / laying down.    DIETARY MODIFICATIONS TO HELP WITH WOUND HEALING:    Protein: Meats, beans, eggs, milk and yogurt particularly Greek yogurt), tofu, soy nuts, soy protein products    Vitamin C: Citrus fruits and juices, strawberries, tomatoes, tomato juice, peppers, baked potatoes, spinach, broccoli, cauliflower, [place] sprouts, cabbage    Vitamin A: Dark green, leafy vegetables, orange or yellow vegetables, cantaloupe, fortified dairy products, liver, fortified cereals    Zinc: Fortified cereals, red meats, seafood    Consider [email or web address] (These are essential branch chain amino acids that help with tissue building and wound healing) and take 2 packets/day. you can order online at [employer] or [employer]    ADDITIONAL REMINDERS:    The treatment plan has been discussed at length with you and your provider. Follow all instructions carefully, it is very important. If you do not follow all instructions, you are at   risk of your wound not healing, infection, possible loss of limb and even end of life.  Please call the clinic during regular business hours ( 7:30 AM - 5:30 PM) if you notice increased bleeding, redness, warmth, pain or pus like drainage or start running a fever greater than 100.3.    For after hour emergencies, please call your primary physician or go to the nearest emergency room.

## 2024-08-14 NOTE — PROGRESS NOTES
[facility] PROGRESS NOTE  FARZAD WILLINGHAM MD  8/14/2024    Chief Complaint:   Chief Complaint   Patient presents with    Wound Care     Pt here for follow up. He states no new concerns or pain.       HPI:   Subjective   Alejandro Guardado is a 89 year old male coming in for a follow-up visit.    HPI    New wound right heel - ? Possibly previously healed wound reopened.   No s/o infection - some slough present.     Foot Wound improved    Leg wound stable - improving - no s/o infection  RLE edematous - stable.       Review of Systems  Negative except HPI   Denies chest pain / SOB / palpitations  Denies fever.     Allergies  Allergies   Allergen Reactions    Heparin ANAPHYLAXIS    Hydromorphone HALLUCINATION       Current Meds:  Current Outpatient Medications   Medication Sig Dispense Refill    gabapentin 100 MG Oral Cap Take 2 capsules (200 mg total) by mouth in the morning and 2 capsules (200 mg total) before bedtime. 60 capsule 0    collagenase 250 UNIT/GM External Ointment Apply 1 Application topically daily. 30 g 0    amoxicillin clavulanate 875-125 MG Oral Tab Take 1 tablet by mouth 2 (two) times daily. 6 tablet 0    metoprolol succinate ER 25 MG Oral Tablet 24 Hr Take 2 tablets (50 mg total) by mouth Daily Beta Blocker. 60 tablet 0    finasteride 5 MG Oral Tab Take 1 tablet (5 mg total) by mouth daily. 90 tablet 0    furosemide 40 MG Oral Tab Take 2 tablets (80 mg total) by mouth 2 (two) times daily.      lisinopril 2.5 MG Oral Tab Take 1 tablet (2.5 mg total) by mouth daily.      Ascorbic Acid (VITAMIN C) 1000 MG Oral Tab Take 1.5 tablets (1,500 mg total) by mouth daily.      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      tamsulosin 0.4 MG Oral Cap Take 1 capsule (0.4 mg total) by mouth daily.      apixaban 5 MG Oral Tab Take 1 tablet (5 mg  total) by mouth 2 (two) times daily. 60 tablet 3    atorvastatin 40 MG Oral Tab Take 1 tablet (40 mg total) by mouth daily. 30 tablet 0    clopidogrel 75 MG Oral Tab Take 1 tablet (75 mg total) by mouth daily. 30 tablet 0    predniSONE 5 MG Oral Tab Take 3 tablets (15 mg total) by mouth daily.      folic acid 1 MG Oral Tab Take 1 tablet (1 mg total) by mouth daily.      acetaminophen 500 MG Oral Tab Take 2 tablets (1,000 mg total) by mouth every 8 (eight) hours. 21 tablet 0    Omeprazole 40 MG Oral Capsule Delayed Release Take 1 capsule (40 mg total) by mouth daily.           EXAM:   Objective   Objective    Physical Exam    Vital Signs  Vitals:    08/14/24 1100   BP: 103/58   Pulse: 99   Resp: 16   Temp: 98.4 °F (36.9 °C)       Wound Assessment  Wound 08/14/23 #1- Foot Right (Active)   Wound Image   08/14/24 1308   Drainage Amount Small 08/14/24 1308   Drainage Description Serous;Yellow 08/14/24 1308   Treatments Skin Substitutes 05/29/24 1124   Wound Vac Brand I 10/30/23 0907   Wound Length (cm) 0.6 cm 08/14/24 1308   Wound Width (cm) 1.7 cm 08/14/24 1308   Wound Surface Area (cm^2) 1.02 cm^2 08/14/24 1308   Wound Depth (cm) 0.1 cm 08/14/24 1308   Wound Volume (cm^3) 0.102 cm^3 08/14/24 1308   Wound Healing % 100 08/14/24 1308   Margins Well-defined edges;Epibole (Rolled edges) 08/14/24 1308   Non-staged Wound Description Full thickness 08/14/24 1308   Peggy-wound Assessment Moist;Maceration;Edema 08/05/24 1308   Wound Granulation Tissue Pink;Firm 08/14/24 1308   Wound Bed Granulation (%) 100 % 08/14/24 1308   Wound Bed Epithelium (%) 40 % 06/17/24 1342   Wound Bed Slough (%) 40 % 08/05/24 1308   Wound Bed Eschar (%) 40 % 08/14/23 1315   Wound Odor None 08/14/24 1308   Exposed Structure Bone Necrosis 05/13/24 1602   Shape 60% epifix 06/10/24 1349   Tunneling? No 08/05/24 1308   Undermining? No 08/05/24 1308   Sinus Tracts? No 08/05/24 1308       Wound 04/15/24 #2 Leg Right;Anterior (Active)   Wound Image    08/14/24 1309   Drainage Amount Moderate 08/14/24 1309   Drainage Description Yellow;Serous 08/14/24 1309   Treatments Santyl 07/08/24 1309   Wound Length (cm) 1.8 cm 08/14/24 1309   Wound Width (cm) 1.3 cm 08/14/24 1309   Wound Surface Area (cm^2) 2.34 cm^2 08/14/24 1309   Wound Depth (cm) 0.1 cm 08/14/24 1309   Wound Volume (cm^3) 0.234 cm^3 08/14/24 1309   Wound Healing % -100 08/14/24 1309   Margins Well-defined edges;Epibole (Rolled edges) 08/14/24 1309   Non-staged Wound Description Full thickness 08/14/24 1309   Peggy-wound Assessment Edema;Moist 08/14/24 1309   Wound Granulation Tissue Pink;Firm 08/14/24 1309   Wound Bed Granulation (%) 30 % 08/14/24 1309   Wound Bed Slough (%) 70 % 08/14/24 1309   Wound Odor None 08/14/24 1309   Tunneling? No 08/05/24 1309   Undermining? No 08/05/24 1309   Sinus Tracts? No 08/05/24 1309       Wound 08/14/24 #3 right heel Heel Right (Active)   Wound Image   08/14/24 1310   Drainage Amount Small 08/14/24 1310   Drainage Description Yellow;Serous 08/14/24 1310   Wound Length (cm) 0.8 cm 08/14/24 1310   Wound Width (cm) 1.2 cm 08/14/24 1310   Wound Surface Area (cm^2) 0.96 cm^2 08/14/24 1310   Wound Depth (cm) 0.1 cm 08/14/24 1310   Wound Volume (cm^3) 0.096 cm^3 08/14/24 1310   Margins Well-defined edges 08/14/24 1310   Peggy-wound Assessment Edema;Dry 08/14/24 1310   Wound Granulation Tissue Pink;Firm 08/14/24 1310   Wound Bed Granulation (%) 90 % 08/14/24 1310   Wound Bed Slough (%) 10 % 08/14/24 1310   Wound Odor None 08/14/24 1310   Pressure Injury Stage U 08/14/24 1310           ASSESSMENT AND PLAN:     Assessment     Encounter Diagnosis  1. Foot ulcer, right, with fat layer exposed (HCC)    2. Edema of right lower extremity    3. PAD (peripheral artery disease) (HCC)    4. Right leg swelling    5. History of transmetatarsal amputation of foot (HCC)    6. Deep tissue injury    7. 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 Sloughing of wound       PLAN OF CARE:    Continue current plan of care.   Start santyl on irght heel wound.   Continue  santyl on right leg wound for enzymatic debridement  Continue collagen / HF transfer for foot wound.   Leg elevation and low salt diet  See Dr. Poon next Monday.     Watch out for signs of early infection - counseled.   Plan of care discussed with patient in detail - All questions answered   Return in one week.     Patient Instructions     Wound Cleaning and Dressings:    Wash your hands with soap and water. Always wear gloves while changing dressings. Donot touch wound / mio-wound skin with un-gloved hands. Remove old dressing, discard and place into trash.    DRESSINGS:   Right leg: santyl / HF transfer - Change dressing daily.  Right foot: collagen / HF transfer- change dressing every other day  Start santyl on right heel wound.   Offload wounded area.     Off-Loading: modified foot wear for maximal offloading.     Miscellaneous Instructions:  Supplement with a daily multivitamin   Low salt diet  Intense blood sugar control - Goal Blood sugar below 180 at all times recommended.  Increase protein intake / consider protein supplements - see below  Elevate extremities at all times when sitting / laying down.    DIETARY MODIFICATIONS TO HELP WITH WOUND HEALING:    Protein: Meats, beans, eggs, milk and yogurt particularly Greek yogurt), tofu, soy nuts, soy protein products    Vitamin C: Citrus fruits and juices, strawberries, tomatoes, tomato juice, peppers, baked potatoes, spinach, broccoli, cauliflower, [place] sprouts, cabbage    Vitamin A: Dark green, leafy vegetables, orange or yellow vegetables, cantaloupe, fortified dairy products, liver, fortified cereals    Zinc: Fortified cereals, red meats, seafood    Consider Kwan by [employer] (These are essential branch chain amino acids that help with tissue building and wound healing) and take 2 packets/day. you can order online at [employer] or [employer]  REMINDERS:    The treatment plan has been discussed at length with you and your provider. Follow all instructions carefully, it is very important. If you do not follow all instructions, you are at  risk of your wound not healing, infection, possible loss of limb and even end of life.  Please call the clinic during regular business hours ( 7:30 AM - 5:30 PM) if you notice increased bleeding, redness, warmth, pain or pus like drainage or start running a fever greater than 100.3.    For after hour emergencies, please call your primary physician or go to the nearest emergency room.      Patient/Caregiver Education: There are no barriers to learning. Medical education for above diagnosis given.   Answered all questions.    Outcome: Patient verbalizes understanding. Patient is notified to call with any questions, complications, allergies, or worsening or changing symptoms.  Patient is to call with any side effects or complications as a result of the treatments today.      DOCUMENTATION OF TIME SPENT: Code selection for this visit was based on time spent : 35 min on date of service in preparing to see the patient, obtaining and/or reviewing separately obtained history, performing a medically appropriate examination, counseling and educating the patient/family/caregiver, ordering medications or testing, referring and communicating with other healthcare providers, documenting clinical information in the E HR, independently interpreting results and communicating results to the patient/family/caregiver and care coordination with the patient's other providers.    Followup: Return in about 1 week (around 8/21/2024) for Wound followup with Dr. Poon.      Note to Patient:  The 21st Century Cures Act makes medical notes like these available to patients in the interest of transparency. However, be advised this is a medical document and is intended as [id number] communication; it is written in medical language and may appear  blunt, direct, or contain abbreviations or verbiage that are unfamiliar. Medical documents are intended to carry relevant information, facts as evident, and the clinical opinion of the practitioner.    Also, please note that this report has been produced using speech recognition software and may contain errors related to that system including, but not limited to, errors in grammar, punctuation, and spelling, as well as words and phrases that possibly may have been recognized inappropriately.  If there are any questions or concerns, contact the dictating provider for clarification.      Fortino Butler MD  8/14/2024  1:35 PM

## 2024-08-14 NOTE — PROGRESS NOTES
Weekly Wound Education Note    Teaching Provided To: Patient  Training Topics: Cleasing and general instructions;Discharge instructions;Dressing  Training Method: Explain/Verbal  Training Response: Patient responds and understands;Reinforcement needed        Notes: New wound to right heel. Right heel and leg: santyl, saline moistened gauze, gauze, kerlix, tape. Right foot: endoform, hydrofera ready, kerlix, tape. Pt scheduled to see Dr. Poon on 8/19/24.

## 2024-08-19 ENCOUNTER — OFFICE VISIT (OUTPATIENT)
Dept: WOUND CARE | Facility: HOSPITAL | Age: 89
End: 2024-08-19
Attending: PODIATRIST
Payer: MEDICARE

## 2024-08-19 VITALS
TEMPERATURE: 99 F | SYSTOLIC BLOOD PRESSURE: 99 MMHG | HEART RATE: 105 BPM | DIASTOLIC BLOOD PRESSURE: 63 MMHG | RESPIRATION RATE: 16 BRPM

## 2024-08-19 DIAGNOSIS — M79.89 RIGHT LEG SWELLING: ICD-10-CM

## 2024-08-19 DIAGNOSIS — Z89.439 HISTORY OF TRANSMETATARSAL AMPUTATION OF FOOT (HCC): ICD-10-CM

## 2024-08-19 DIAGNOSIS — L97.412 DIABETIC ULCER OF RIGHT HEEL ASSOCIATED WITH DIABETES MELLITUS DUE TO UNDERLYING CONDITION, WITH FAT LAYER EXPOSED (HCC): ICD-10-CM

## 2024-08-19 DIAGNOSIS — E08.621 DIABETIC ULCER OF RIGHT HEEL ASSOCIATED WITH DIABETES MELLITUS DUE TO UNDERLYING CONDITION, WITH FAT LAYER EXPOSED (HCC): ICD-10-CM

## 2024-08-19 DIAGNOSIS — I73.9 PAD (PERIPHERAL ARTERY DISEASE) (HCC): ICD-10-CM

## 2024-08-19 DIAGNOSIS — L97.512 FOOT ULCER, RIGHT, WITH FAT LAYER EXPOSED (HCC): Primary | ICD-10-CM

## 2024-08-19 DIAGNOSIS — R60.0 EDEMA OF RIGHT LOWER EXTREMITY: ICD-10-CM

## 2024-08-19 PROCEDURE — 11042 DBRDMT SUBQ TIS 1ST 20SQCM/<: CPT | Performed by: PODIATRIST

## 2024-08-19 NOTE — PROGRESS NOTES
Weekly Wound Education Note    Teaching Provided To: Patient;Family  Training Topics: Discharge instructions;Cleasing and general instructions;Off-loading;Dressing  Training Method: Demonstration;Explain/Verbal;Written  Training Response: Patient responds and understands;Reinforcement needed        Notes: Cleanse Right leg wound and Right heel wound with saline or wound cleanser, apply santyl ointment, gauze and rolled gauze, Change daily. Cleanse right foot wound with saline or wound cleanser, apply Endoform and Hydrofera Ready (writing side away from wound), secure with rolled gauze. change every other day.

## 2024-08-19 NOTE — PROGRESS NOTES
Subjective   Alejandro Guardado is a 89 year old male.    Chief Complaint   Patient presents with    Wound Care     Here for follow up. Foot Dressing changed last on Saturday. Cazares dressing changed yesterday. Denies new concerns.       HPI  Alejandro Guardado is a 89 year old male with PAD who is returning to clinic for recheck of right foot.  Patient is accompanied today by his son.  Patient states he is doing well overall.  Denies noticing recent signs of infection.  Patient was seen by Dr. Butler last week.  Patient has been putting endoform on his right foot wound with assistance of family, and has now been placing Santyl on his right shin and heel wound.  No other concerns.    Review of Systems  Denies nausea, vomiting, fever, chills, shortness of breath, chest pain, and calf pain.    Objective    Physical Exam:    Derm:  Wound Assessment  Wound 08/14/23 #1- Foot Right (Active)   Wound Image   08/19/24 1327   Drainage Amount Scant 08/19/24 1309   Drainage Description Serous;Yellow 08/19/24 1309   Treatments Skin Substitutes 05/29/24 1124   Wound Vac Brand KCI 10/30/23 0907   Wound Length (cm) 0.4 cm 08/19/24 1310   Wound Width (cm) 1.7 cm 08/19/24 1310   Wound Surface Area (cm^2) 0.68 cm^2 08/19/24 1310   Wound Depth (cm) 0.1 cm 08/19/24 1310   Wound Volume (cm^3) 0.068 cm^3 08/19/24 1310   Wound Healing % 100 08/19/24 1310   Margins Well-defined edges;Epibole (Rolled edges) 08/19/24 1309   Non-staged Wound Description Full thickness 08/19/24 1309   Peggy-wound Assessment Clean 08/19/24 1309   Wound Granulation Tissue Pink;Red;Firm 08/19/24 1309   Wound Bed Granulation (%) 100 % 08/19/24 1309   Wound Bed Epithelium (%) 40 % 06/17/24 1342   Wound Bed Slough (%) 40 % 08/05/24 1308   Wound Bed Eschar (%) 40 % 08/14/23 1315   Wound Odor None 08/19/24 1309   Exposed Structure Bone Necrosis 05/13/24 1602   Shape 60% epifix 06/10/24 1349   Tunneling? No 08/19/24 1309   Undermining? No 08/19/24 1309   Sinus Tracts? No  08/19/24 1309       Wound 04/15/24 #2 Leg Right;Anterior (Active)   Wound Image   08/19/24 1308   Drainage Amount Small 08/19/24 1308   Drainage Description Serous;Clear 08/19/24 1308   Treatments Santyl 07/08/24 1309   Wound Length (cm) 1.9 cm 08/19/24 1308   Wound Width (cm) 1 cm 08/19/24 1308   Wound Surface Area (cm^2) 1.9 cm^2 08/19/24 1308   Wound Depth (cm) 0.1 cm 08/19/24 1308   Wound Volume (cm^3) 0.19 cm^3 08/19/24 1308   Wound Healing % -62 08/19/24 1308   Margins Well-defined edges 08/19/24 1308   Non-staged Wound Description Full thickness 08/19/24 1308   Peggy-wound Assessment Edema 08/19/24 1308   Wound Granulation Tissue Pink;Firm 08/19/24 1308   Wound Bed Granulation (%) 35 % 08/19/24 1308   Wound Bed Slough (%) 65 % 08/19/24 1308   Wound Odor None 08/19/24 1308   Tunneling? No 08/19/24 1308   Undermining? No 08/19/24 1308   Sinus Tracts? No 08/19/24 1308       Wound 08/14/24 #3 right heel Heel Right (Active)   Wound Image   08/19/24 1315   Drainage Amount Moderate 08/19/24 1315   Drainage Description Serosanguineous 08/19/24 1315   Wound Length (cm) 0.9 cm 08/19/24 1315   Wound Width (cm) 1 cm 08/19/24 1315   Wound Surface Area (cm^2) 0.9 cm^2 08/19/24 1315   Wound Depth (cm) 0.1 cm 08/19/24 1315   Wound Volume (cm^3) 0.09 cm^3 08/19/24 1315   Wound Healing % 6 08/19/24 1315   Margins Well-defined edges 08/19/24 1315   Non-staged Wound Description Full thickness 08/19/24 1315   Peggy-wound Assessment Edema 08/19/24 1315   Wound Granulation Tissue Pink;Firm 08/19/24 1315   Wound Bed Granulation (%) 10 % 08/19/24 1315   Wound Bed Slough (%) 90 % 08/19/24 1315   Wound Odor None 08/19/24 1315   Tunneling? No 08/19/24 1315   Undermining? No 08/19/24 1315   Sinus Tracts? No 08/19/24 1315   Pressure Injury Stage U 08/19/24 1315       Vascular: pedal pulses are palpable. CFT <3 sec to digits  Musculoskeletal: no acute deformities noted.  5/5 muscle strength to all muscles crossing ankle joint  Neurological:  Gross sensation intact via light touch.  Protective sensation diminished via Ipswitch test.    Vital Signs  Vital Signs    08/19/24 1100   BP: 99/63   Pulse: 105   Resp: 16   Temp: 98.9 °F (37.2 °C)   PainSc: 0 - (None)         Allergies  Allergies   Allergen Reactions    Heparin ANAPHYLAXIS    Hydromorphone HALLUCINATION       Assessment    Encounter Diagnosis  1. Foot ulcer, right, with fat layer exposed ([facility])    2. 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 Edema of right lower extremity    4. PAD (peripheral artery disease) ([facility])    5. Right leg swelling    6. History of transmetatarsal amputation of foot ([facility])        Problem List  Patient Active Problem List   Diagnosis    Closed minimally displaced zone I fracture of sacrum ([facility])    At risk for falling    CAD (coronary artery disease)    Ischemic cardiomyopathy    Azotemia    Arrhythmia    Esophageal reflux    History of MI (myocardial infarction)    Mixed hyperlipidemia    Primary hypertension    Dizziness    Medication side effect    Closed left hip fracture ([facility])  Global 6/22/2016    Status post hip surgery    Left shoulder distal clavical resection and excision of ganglion cyst of soft tissue mass   Global  09/17/2020    Orthopedic aftercare for healing traumatic hip fracture, left, closed    Closed left hip fracture, with routine healing, subsequent encounter    Osteoarthrosis, localized, primary, knee, left    Osteoarthrosis, localized, primary, knee, right    Gangrene ([facility])    PAD (peripheral artery disease) ([facility])    Benign prostatic hyperplasia with incomplete bladder emptying    Gangrene of foot ([facility])    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) ([facility])    Leukocytosis    Atrial fibrillation with RVR ([facility])    Hypokalemia    Acute kidney injury ([facility])    Hyperglycemia    Community acquired pneumonia of right lung, unspecified part of lung    Acute  respiratory failure with hypoxia ([facility])    Hypotension due to hypovolemia    Sepsis due to pneumonia ([facility])    Foot ulcer with fat layer exposed, right ([facility])    Syncope, unspecified syncope type    Sepsis, due to unspecified organism, unspecified whether acute organ dysfunction present ([facility])    Shock ([facility])    Acute hypoxic respiratory failure ([facility])    Pneumonia, bacterial    Hyponatremia    Metabolic alkalosis    Recurrent pneumonia    Sepsis due to undetermined organism ([facility])    Acute on chronic congestive heart failure, unspecified heart failure type ([facility])    Acute hypoxemic respiratory failure ([facility])    ERON (acute kidney injury) ([facility])    Lactic acidosis    Leukocytosis, unspecified type    Palliative care encounter    Counseling regarding advance care planning and goals of care    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) ([facility])    Severe sepsis ([facility])    Aspiration pneumonitis ([facility])    Multifocal pneumonia    Acute renal failure superimposed on chronic kidney disease, unspecified acute renal failure type, unspecified CKD stage ([facility])    Elevated troponin    NSTEMI (non-ST elevated myocardial infarction) ([facility])       Plan  Orders:  Orders Placed This Encounter   Procedures    Debridement Old surgical Right Foot         -Patient examined, chart history reviewed.    -Right lower extremity evaluated--overall stable ulceration to previous TMA site today.  Dimensions improved today.  Medial foot wound remains healed.  There is fibrogranular wound bed to the ulceration site.  No palpable bone today.  No serous drainage with ongoing bilateral lower extremity pitting edema.  No current purulence, surrounding erythema, or other signs of infection.  Right foot remains fairly warm.  Posterior heel does have a stable full-thickness ulceration with no current drainage or signs of infection.  Right lower extremity ulceration stable    -Excisional debridement performed to the left foot ulceration (TMA site) utilizing  curette down to and including subcutaneous tissue.  A bleeding, granular wound bed was present upon debridement of both ulcerations today.  No palpable bone today.  Overall improvements in dimensions today.  We will continue with outpatient debridements.  Patient's heel ulceration mechanically debrided utilizing a moistened gauze.     -Discussed treatment options.  Recommend we continue to promote healing utilizing a collagen.  Endoform and Hydrofera Blue ready placed to ulceration site today and cover with a dry dressing.  Santyl applied to right heel and right shin ulcerations per Dr. Butler's recommendation.  Will have patient's family assist with dressing changes to his foot every other day.     -Patient is seeing Dr. Butler for right shin ulceration.  Patient's family will assist him in continuing with Santyl daily per Dr. Butler's orders.      -Patient should elevate right lower extremity as frequently as possible as we cannot compress his right lower extremity due to recent stenting and chronic CHF.  Reiterated importance of elevation to patient today     -Patient should remain minimally weightbearing to his right foot in his soft slipper.  Okay to stand for transfers and take a few steps at a time.       -Again discussed a balance between treating his ulcerations and overall mental health/quality of life.  I do agree that patient should be able to do activities that he would like to do, but I would just avoid excessive ambulation or excessive amounts of time on his feet.    -Educated on signs of infection and encouraged patient to seek immediate medical attention if noticing any of these signs.    Follow-Up  1 week with me and 2 weeks with Dr. Luke Poon DPM    8/19/2024    Dragon speech recognition software was used to prepare this note.  Errors in word recognition may occur.  Please contact me with any questions/concerns with this note.

## 2024-08-26 ENCOUNTER — OFFICE VISIT (OUTPATIENT)
Dept: WOUND CARE | Facility: HOSPITAL | Age: 89
End: 2024-08-26
Attending: PODIATRIST
Payer: MEDICARE

## 2024-08-26 ENCOUNTER — APPOINTMENT (OUTPATIENT)
Dept: WOUND CARE | Facility: HOSPITAL | Age: 89
End: 2024-08-26
Payer: MEDICARE

## 2024-08-26 VITALS
DIASTOLIC BLOOD PRESSURE: 65 MMHG | HEART RATE: 88 BPM | SYSTOLIC BLOOD PRESSURE: 107 MMHG | TEMPERATURE: 98 F | RESPIRATION RATE: 16 BRPM

## 2024-08-26 DIAGNOSIS — M79.89 RIGHT LEG SWELLING: ICD-10-CM

## 2024-08-26 DIAGNOSIS — L97.412 DIABETIC ULCER OF RIGHT HEEL ASSOCIATED WITH DIABETES MELLITUS DUE TO UNDERLYING CONDITION, WITH FAT LAYER EXPOSED (HCC): ICD-10-CM

## 2024-08-26 DIAGNOSIS — R60.0 EDEMA OF RIGHT LOWER EXTREMITY: ICD-10-CM

## 2024-08-26 DIAGNOSIS — Z89.439 HISTORY OF TRANSMETATARSAL AMPUTATION OF FOOT (HCC): ICD-10-CM

## 2024-08-26 DIAGNOSIS — E08.621 DIABETIC ULCER OF RIGHT HEEL ASSOCIATED WITH DIABETES MELLITUS DUE TO UNDERLYING CONDITION, WITH FAT LAYER EXPOSED (HCC): ICD-10-CM

## 2024-08-26 DIAGNOSIS — I73.9 PAD (PERIPHERAL ARTERY DISEASE) (HCC): ICD-10-CM

## 2024-08-26 DIAGNOSIS — L97.512 FOOT ULCER, RIGHT, WITH FAT LAYER EXPOSED (HCC): Primary | ICD-10-CM

## 2024-08-26 PROCEDURE — 11042 DBRDMT SUBQ TIS 1ST 20SQCM/<: CPT | Performed by: PODIATRIST

## 2024-08-26 NOTE — PROGRESS NOTES
Subjective   Alejandro Guardado is a 89 year old male.    Chief Complaint   Patient presents with    Wound Care     Follow up for right foot and leg wounds. No complaints at this time.        HPI  Alejandro Guardado is a 89 year old male with PAD who is returning to clinic for recheck of right foot.  Patient is accompanied today by his son.  Patient continues to do well.  States that he has been trying to avoid ambulation as much as possible.  Has been having dressing changes every 2-3 days, placing endoform to the right foot wound and Santyl to the right shin and heel wounds.  Denies noticing recent signs of infection.  No other concerns at this time.       Review of Systems  Denies nausea, vomiting, fever, chills, shortness of breath, chest pain, and calf pain.    Objective    Physical Exam:    Derm:  Wound Assessment  Wound 08/14/23 #1- Foot Right (Active)   Wound Image   08/26/24 1329   Drainage Amount Small 08/26/24 1313   Drainage Description Serosanguineous 08/26/24 1313   Treatments Skin Substitutes 05/29/24 1124   Wound Vac Brand KCI 10/30/23 0907   Wound Length (cm) 0.4 cm 08/26/24 1314   Wound Width (cm) 1.1 cm 08/26/24 1314   Wound Surface Area (cm^2) 0.44 cm^2 08/26/24 1314   Wound Depth (cm) 0.1 cm 08/26/24 1314   Wound Volume (cm^3) 0.044 cm^3 08/26/24 1314   Wound Healing % 100 08/26/24 1314   Margins Well-defined edges;Epibole (Rolled edges) 08/26/24 1313   Non-staged Wound Description Full thickness 08/26/24 1313   Peggy-wound Assessment Dry;Edema 08/26/24 1313   Wound Granulation Tissue Pink;Firm 08/26/24 1313   Wound Bed Granulation (%) 100 % 08/26/24 1313   Wound Bed Epithelium (%) 40 % 06/17/24 1342   Wound Bed Slough (%) 40 % 08/05/24 1308   Wound Bed Eschar (%) 40 % 08/14/23 1315   Wound Odor None 08/26/24 1313   Exposed Structure Bone Necrosis 05/13/24 1602   Shape 60% epifix 06/10/24 1349   Tunneling? No 08/19/24 1309   Undermining? No 08/19/24 1309   Sinus Tracts? No 08/19/24 1309       Wound  04/15/24 #2 Leg Right;Anterior (Active)   Wound Image   08/26/24 1312   Drainage Amount Moderate 08/26/24 1312   Drainage Description Serosanguineous 08/26/24 1312   Treatments Santyl 07/08/24 1309   Wound Length (cm) 1.7 cm 08/26/24 1312   Wound Width (cm) 1 cm 08/26/24 1312   Wound Surface Area (cm^2) 1.7 cm^2 08/26/24 1312   Wound Depth (cm) 0.1 cm 08/26/24 1312   Wound Volume (cm^3) 0.17 cm^3 08/26/24 1312   Wound Healing % -45 08/26/24 1312   Margins Well-defined edges 08/26/24 1312   Non-staged Wound Description Full thickness 08/26/24 1312   Peggy-wound Assessment Edema 08/26/24 1312   Wound Granulation Tissue Red;Spongy 08/26/24 1312   Wound Bed Granulation (%) 60 % 08/26/24 1312   Wound Bed Slough (%) 40 % 08/26/24 1312   Wound Odor None 08/26/24 1312   Shape Hypergranular 08/26/24 1312   Tunneling? No 08/19/24 1308   Undermining? No 08/19/24 1308   Sinus Tracts? No 08/19/24 1308       Wound 08/14/24 #3 Heel Right (Active)   Wound Image   08/26/24 1330   Drainage Amount Scant 08/26/24 1314   Drainage Description Serous;Yellow 08/26/24 1314   Wound Length (cm) 0.6 cm 08/26/24 1315   Wound Width (cm) 0.9 cm 08/26/24 1315   Wound Surface Area (cm^2) 0.54 cm^2 08/26/24 1315   Wound Depth (cm) 0.1 cm 08/26/24 1315   Wound Volume (cm^3) 0.054 cm^3 08/26/24 1315   Wound Healing % 44 08/26/24 1315   Margins Well-defined edges 08/26/24 1314   Non-staged Wound Description Full thickness 08/26/24 1314   Peggy-wound Assessment Edema;Dry 08/26/24 1314   Wound Granulation Tissue Pink;Pale [place];Firm 08/26/24 1314   Wound Bed Granulation (%) 100 % 08/26/24 1314   Wound Bed Slough (%) 90 % 08/19/24 1315   Wound Odor None 08/26/24 1314   Tunneling? No 08/19/24 1315   Undermining? No 08/19/24 1315   Sinus Tracts? No 08/19/24 1315   Pressure Injury Stage 3 08/26/24 1314       Vascular: pedal pulses are faintly palpable. CFT <3 sec to digits.  Right foot itself is fairly warm with palpation.  Musculoskeletal: no acute deformities  noted.  5/5 muscle strength to all muscles crossing ankle joint  Neurological: Gross sensation intact via light touch.  Protective sensation diminished via Ipswitch test.    Vital Signs  Vital Signs    08/26/24 1100   BP: 107/65   Pulse: 88   Resp: 16   Temp: 98.1 °F (36.7 °C)   PainSc: 0 - (None)         Allergies  Allergies   Allergen Reactions    Heparin ANAPHYLAXIS    Hydromorphone HALLUCINATION       Assessment    Encounter Diagnosis  1. Foot ulcer, right, with fat layer exposed ([facility])    2. 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 Edema of right lower extremity    4. PAD (peripheral artery disease) ([facility])    5. Right leg swelling    6. History of transmetatarsal amputation of foot ([facility])        Problem List  Patient Active Problem List   Diagnosis    Closed minimally displaced zone I fracture of sacrum ([facility])    At risk for falling    CAD (coronary artery disease)    Ischemic cardiomyopathy    Azotemia    Arrhythmia    Esophageal reflux    History of MI (myocardial infarction)    Mixed hyperlipidemia    Primary hypertension    Dizziness    Medication side effect    Closed left hip fracture ([facility])  Global 6/22/2016    Status post hip surgery    Left shoulder distal clavical resection and excision of ganglion cyst of soft tissue mass   Global  09/17/2020    Orthopedic aftercare for healing traumatic hip fracture, left, closed    Closed left hip fracture, with routine healing, subsequent encounter    Osteoarthrosis, localized, primary, knee, left    Osteoarthrosis, localized, primary, knee, right    Gangrene ([facility])    PAD (peripheral artery disease) ([facility])    Benign prostatic hyperplasia with incomplete bladder emptying    Gangrene of foot ([facility])    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) ([facility])    Leukocytosis    Atrial fibrillation with RVR ([facility])    Hypokalemia    Acute kidney injury ([facility])    Hyperglycemia     Community acquired pneumonia of right lung, unspecified part of lung    Acute respiratory failure with hypoxia ([facility])    Hypotension due to hypovolemia    Sepsis due to pneumonia ([facility])    Foot ulcer with fat layer exposed, right ([facility])    Syncope, unspecified syncope type    Sepsis, due to unspecified organism, unspecified whether acute organ dysfunction present ([facility])    Shock ([facility])    Acute hypoxic respiratory failure ([facility])    Pneumonia, bacterial    Hyponatremia    Metabolic alkalosis    Recurrent pneumonia    Sepsis due to undetermined organism ([facility])    Acute on chronic congestive heart failure, unspecified heart failure type ([facility])    Acute hypoxemic respiratory failure ([facility])    ERON (acute kidney injury) ([facility])    Lactic acidosis    Leukocytosis, unspecified type    Palliative care encounter    Counseling regarding advance care planning and goals of care    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) ([facility])    Severe sepsis ([facility])    Aspiration pneumonitis ([facility])    Multifocal pneumonia    Acute renal failure superimposed on chronic kidney disease, unspecified acute renal failure type, unspecified CKD stage ([facility])    Elevated troponin    NSTEMI (non-ST elevated myocardial infarction) ([facility])       Plan  Orders:  Orders Placed This Encounter   Procedures    Debridement Old surgical Right Foot    Debridement Pressure Injury Right Heel         -Patient examined, chart history reviewed.    -Right lower extremity evaluated--overall stable ulceration to previous TMA site today.  Dimensions improved today.  Medial foot wound remains healed.  There is fibrogranular wound bed to the ulceration site.  No palpable bone today.  No serous drainage with ongoing bilateral lower extremity pitting edema.  No current purulence, surrounding erythema, or other signs of infection.  Right foot remains fairly warm.  Posterior heel does have a stable full-thickness ulceration with no current drainage or signs of infection.       -Excisional debridement performed to the left foot ulceration (TMA site) and posterior heel ulcer utilizing curette down to and including subcutaneous tissue to both locations.  A bleeding, granular wound bed was present upon debridement of both ulcerations today.  No palpable bone noted.  Overall improvements in dimensions today.  We will continue with outpatient debridements.       -Discussed treatment options.  Recommend we continue to promote healing utilizing a collagen.  Endoform and Hydrofera Blue transfer placed to ulceration site today and cover with a dry dressing.  Will switch to use of endoform and Hydrofera Blue transfer to right posterior heel wound.  Santyl applied to right shin ulcerations per Dr. Butler's recommendation.  Will have patient's family assist with dressing changes to his foot every 2-3 days.     -Patient is seeing Dr. Butler for right shin ulceration.  Patient's family will assist him in continuing with Santyl daily per Dr. Butler's orders.      -Patient should elevate right lower extremity as frequently as possible as we cannot compress his right lower extremity due to recent stenting and chronic CHF.  Reiterated importance of elevation to patient today     -Patient should remain minimally weightbearing to his right foot in his soft slipper.  Okay to stand for transfers and take a few steps at a time.       -Again discussed a balance between treating his ulcerations and overall mental health/quality of life.  I do agree that patient should be able to do activities that he would like to do, but I would just avoid excessive ambulation or excessive amounts of time on his feet.    -Educated on signs of infection and encouraged patient to seek immediate medical attention if noticing any of these signs.    Follow-Up  1 week with Dr. Butler in 2 weeks with myself    Pavan Poon DPM    8/26/2024    Dragon speech recognition software was used to prepare this note.  Errors in word  recognition may occur.  Please contact me with any questions/concerns with this note.

## 2024-08-26 NOTE — PROGRESS NOTES
Patient ID: Alejandro Guardado is a 89 year old male.          Debridement Old surgical Right Foot   Wound 08/14/23 #1- Foot Right    Performed by: Quincy Poon DPM  Authorized by: Quincy Poon DPM      Consent   Consent obtained? verbal  Consent given by: patient  Risks discussed? procedural risks discussed  Time out called at 8/26/2024 1:26 PM  Immediately prior to the procedure a time out was called and the performing provider verified the correct patient, procedure, equipment, support staff, and site/side marked as required.    Debridement Details  Performed by: physician  Debridement type: surgical  Level of debridement: subcutaneous tissue  Pain control: lidocaine 4%  Pain control administration type: topical    Pre-debridement measurements  Length (cm): 0.3  Width (cm): 1  Depth (cm): 0.1  Surface Area (cm^2): 0.3    Post-debridement measurements  Length (cm): 0.4  Width (cm): 1.1  Depth (cm): 0.1  Percent debrided: 100%  Surface Area (cm^2): 0.44  Area Debrided (cm^2): 0.44  Volume (cm^3): 0.04    Tissue and other material debrided: subcutaneous tissue  Devitalized tissue debrided: biofilm  Instrument(s) utilized: curette  Bleeding: small  Hemostasis obtained with: not applicable  Procedural pain (0-10): 0  Post-procedural pain: 0   Response to treatment: procedure was tolerated well    Debridement Pressure Injury Right Heel   Wound 08/14/24 #3 right heel Heel Right    Performed by: Quincy Poon DPM  Authorized by: Quincy Poon DPM      Consent   Consent obtained? verbal  Consent given by: patient  Risks discussed? procedural risks discussed  Time out called at 8/26/2024 1:28 PM  Immediately prior to the procedure a time out was called and the performing provider verified the correct patient, procedure, equipment, support staff, and site/side marked as required.    Debridement Details  Performed by: physician  Debridement type: surgical  Level of debridement: subcutaneous tissue  Pain control:  lidocaine 4%  Pain control administration type: topical    Pre-debridement measurements  Length (cm): 0.5  Width (cm): 0.9  Depth (cm): 0.1  Surface Area (cm^2): 0.45    Post-debridement measurements  Length (cm): 0.6  Width (cm): 0.9  Depth (cm): 0.1  Percent debrided: 100%  Surface Area (cm^2): 0.54  Area Debrided (cm^2): 0.54  Volume (cm^3): 0.05    Tissue and other material debrided: subcutaneous tissue  Devitalized tissue debrided: biofilm, callus and fibrin  Instrument(s) utilized: curette  Bleeding: small  Hemostasis obtained with: not applicable  Procedural pain (0-10): 0  Post-procedural pain: 0   Response to treatment: procedure was tolerated well

## 2024-09-04 ENCOUNTER — OFFICE VISIT (OUTPATIENT)
Dept: WOUND CARE | Facility: HOSPITAL | Age: 89
End: 2024-09-04
Attending: INTERNAL MEDICINE
Payer: MEDICARE

## 2024-09-04 VITALS
HEART RATE: 90 BPM | SYSTOLIC BLOOD PRESSURE: 100 MMHG | TEMPERATURE: 98 F | DIASTOLIC BLOOD PRESSURE: 61 MMHG | RESPIRATION RATE: 15 BRPM

## 2024-09-04 DIAGNOSIS — I73.9 PAD (PERIPHERAL ARTERY DISEASE) (HCC): ICD-10-CM

## 2024-09-04 DIAGNOSIS — M79.89 RIGHT LEG SWELLING: ICD-10-CM

## 2024-09-04 DIAGNOSIS — L97.412 DIABETIC ULCER OF RIGHT HEEL ASSOCIATED WITH DIABETES MELLITUS DUE TO UNDERLYING CONDITION, WITH FAT LAYER EXPOSED (HCC): ICD-10-CM

## 2024-09-04 DIAGNOSIS — L97.512 FOOT ULCER, RIGHT, WITH FAT LAYER EXPOSED (HCC): Primary | ICD-10-CM

## 2024-09-04 DIAGNOSIS — R60.0 EDEMA OF RIGHT LOWER EXTREMITY: ICD-10-CM

## 2024-09-04 DIAGNOSIS — E08.621 DIABETIC ULCER OF RIGHT HEEL ASSOCIATED WITH DIABETES MELLITUS DUE TO UNDERLYING CONDITION, WITH FAT LAYER EXPOSED (HCC): ICD-10-CM

## 2024-09-04 DIAGNOSIS — Z89.439 HISTORY OF TRANSMETATARSAL AMPUTATION OF FOOT (HCC): ICD-10-CM

## 2024-09-04 DIAGNOSIS — R23.8 SLOUGHING OF WOUND: ICD-10-CM

## 2024-09-04 PROCEDURE — 99214 OFFICE O/P EST MOD 30 MIN: CPT

## 2024-09-04 NOTE — PROGRESS NOTES
Weekly Wound Education Note    Teaching Provided To: Patient;Family (son)  Training Topics: Cleasing and general instructions;Discharge instructions;Dressing  Training Method: Explain/Verbal  Training Response: Patient responds and understands;Reinforcement needed        Notes: Stable. endoform - moistened with saline, hydrofera transfer, ABD pad, kerlix, tape to all wounds. Educated son on dressings. Return in 2 weeks to see Dr. Poon.

## 2024-09-04 NOTE — PATIENT INSTRUCTIONS
Apply collagen / HF transfer for all wounds  Leg elevation and low salt diet  See Dr. Poon in 2 weeks and me in 4 weeks    Wound Cleaning and Dressings:    Wash your hands with soap and water. Always wear gloves while changing dressings. Donot touch wound / mio-wound skin with un-gloved hands. Remove old dressing, discard and place into trash.    DRESSINGS:   All wounds - Endoform Collagen / HF transfer- change dressing every other day  Offload wounded area.     Off-Loading: modified foot wear for maximal offloading.     Miscellaneous Instructions:  Supplement with a daily multivitamin   Low salt diet  Intense blood sugar control - Goal Blood sugar below 180 at all times recommended.  Increase protein intake / consider protein supplements - see below  Elevate extremities at all times when sitting / laying down.    DIETARY MODIFICATIONS TO HELP WITH WOUND HEALING:    Protein: Meats, beans, eggs, milk and yogurt particularly Greek yogurt), tofu, soy nuts, soy protein products    Vitamin C: Citrus fruits and juices, strawberries, tomatoes, tomato juice, peppers, baked potatoes, spinach, broccoli, cauliflower, [place] sprouts, cabbage    Vitamin A: Dark green, leafy vegetables, orange or yellow vegetables, cantaloupe, fortified dairy products, liver, fortified cereals    Zinc: Fortified cereals, red meats, seafood    Consider Kwan [employer] (These are essential branch chain amino acids that help with tissue building and wound healing) and take 2 packets/day. you can order online at [employer] or [employer]    ADDITIONAL REMINDERS:    The treatment plan has been discussed at length with you and your provider. Follow all instructions carefully, it is very important. If you do not follow all instructions, you are at  risk of your wound not healing, infection, possible loss of limb and even end of life.  Please call the clinic during regular business hours ( 7:30 AM - 5:30 PM) if you notice increased bleeding, redness,  warmth, pain or pus like drainage or start running a fever greater than 100.3.    For after hour emergencies, please call your primary physician or go to the nearest emergency room.

## 2024-09-04 NOTE — PROGRESS NOTES
[facility] PROGRESS NOTE  FARZAD WILLINGHAM MD  9/4/2024    Chief Complaint:   Chief Complaint   Patient presents with    Wound Care     Pt here for follow up wound care visit to right anterior lower leg and right foot wound. Pt denies any concerns or issues, pt denies any pain in wound at this time.        HPI:   Subjective   Alejandro Guardado is a 89 year old male coming in for a follow-up visit.    HPI    All wounds improved and stable  Edema well controlled.   No s/o infection.   Less slough.   Son accompanying.     Review of Systems  Negative except HPI   Denies chest pain / SOB / palpitations  Denies fever.     Allergies  Allergies   Allergen Reactions    Heparin ANAPHYLAXIS    Hydromorphone HALLUCINATION       Current Meds:  Current Outpatient Medications   Medication Sig Dispense Refill    gabapentin 100 MG Oral Cap Take 2 capsules (200 mg total) by mouth in the morning and 2 capsules (200 mg total) before bedtime. 60 capsule 0    collagenase 250 UNIT/GM External Ointment Apply 1 Application topically daily. 30 g 0    amoxicillin clavulanate 875-125 MG Oral Tab Take 1 tablet by mouth 2 (two) times daily. 6 tablet 0    metoprolol succinate ER 25 MG Oral Tablet 24 Hr Take 2 tablets (50 mg total) by mouth Daily Beta Blocker. 60 tablet 0    finasteride 5 MG Oral Tab Take 1 tablet (5 mg total) by mouth daily. 90 tablet 0    furosemide 40 MG Oral Tab Take 2 tablets (80 mg total) by mouth 2 (two) times daily.      lisinopril 2.5 MG Oral Tab Take 1 tablet (2.5 mg total) by mouth daily.      Ascorbic Acid (VITAMIN C) 1000 MG Oral Tab Take 1.5 tablets (1,500 mg total) by mouth daily.      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      tamsulosin 0.4 MG Oral Cap Take 1 capsule (0.4 mg total) by mouth daily.      apixaban 5 MG Oral Tab Take 1 tablet (5  mg total) by mouth 2 (two) times daily. 60 tablet 3    atorvastatin 40 MG Oral Tab Take 1 tablet (40 mg total) by mouth daily. 30 tablet 0    clopidogrel 75 MG Oral Tab Take 1 tablet (75 mg total) by mouth daily. 30 tablet 0    predniSONE 5 MG Oral Tab Take 3 tablets (15 mg total) by mouth daily.      folic acid 1 MG Oral Tab Take 1 tablet (1 mg total) by mouth daily.      acetaminophen 500 MG Oral Tab Take 2 tablets (1,000 mg total) by mouth every 8 (eight) hours. 21 tablet 0    Omeprazole 40 MG Oral Capsule Delayed Release Take 1 capsule (40 mg total) by mouth daily.           EXAM:   Objective   Objective    Physical Exam    Vital Signs  Vitals:    09/04/24 1315   BP: 100/61   Pulse: 90   Resp:    Temp:        Wound Assessment  Wound 08/14/23 #1- Foot Right (Active)   Wound Image   09/04/24 1319   Drainage Amount Scant 09/04/24 1319   Drainage Description Serous;Yellow 09/04/24 1319   Treatments Skin Substitutes 05/29/24 1124   Wound Vac Brand KCI 10/30/23 0907   Wound Length (cm) 0.2 cm 09/04/24 1319   Wound Width (cm) 0.6 cm 09/04/24 1319   Wound Surface Area (cm^2) 0.12 cm^2 09/04/24 1319   Wound Depth (cm) 0.1 cm 09/04/24 1319   Wound Volume (cm^3) 0.012 cm^3 09/04/24 1319   Wound Healing % 100 09/04/24 1319   Margins Well-defined edges;Epibole (Rolled edges) 09/04/24 1319   Non-staged Wound Description Full thickness 09/04/24 1319   Peggy-wound Assessment Dry;Edema 09/04/24 1319   Wound Granulation Tissue Pink;Firm 09/04/24 1319   Wound Bed Granulation (%) 100 % 09/04/24 1319   Wound Bed Epithelium (%) 40 % 06/17/24 1342   Wound Bed Slough (%) 40 % 08/05/24 1308   Wound Bed Eschar (%) 40 % 08/14/23 1315   Exposed Structure Bone Necrosis 05/13/24 1602   Wound Odor None 09/04/24 1319   Shape 60% epifix 06/10/24 1349   Tunneling? No 08/19/24 1309   Undermining? No 08/19/24 1309   Sinus Tracts? No 08/19/24 1309       Wound 04/15/24 #2 Leg Right;Anterior (Active)   Wound Image   09/04/24 1319   Drainage Amount  Moderate 09/04/24 1319   Drainage Description Serous;Yellow 09/04/24 1319   Treatments Santyl 07/08/24 1309   Wound Length (cm) 1.2 cm 09/04/24 1319   Wound Width (cm) 1.3 cm 09/04/24 1319   Wound Surface Area (cm^2) 1.56 cm^2 09/04/24 1319   Wound Depth (cm) 0.1 cm 09/04/24 1319   Wound Volume (cm^3) 0.156 cm^3 09/04/24 1319   Wound Healing % -33 09/04/24 1319   Margins Well-defined edges 09/04/24 1319   Non-staged Wound Description Full thickness 09/04/24 1319   Peggy-wound Assessment Edema 09/04/24 1319   Wound Granulation Tissue Pink;Firm 09/04/24 1319   Wound Bed Granulation (%) 50 % 09/04/24 1319   Wound Bed Slough (%) 50 % 09/04/24 1319   Wound Odor None 09/04/24 1319   Shape Hypergranular 08/26/24 1312   Tunneling? No 08/19/24 1308   Undermining? No 08/19/24 1308   Sinus Tracts? No 08/19/24 1308       Wound 08/14/24 #3 Heel Right (Active)   Wound Image    09/04/24 1317   Drainage Amount Small 09/04/24 1317   Drainage Description Serous;Yellow 09/04/24 1317   Wound Length (cm) 1 cm 09/04/24 1317   Wound Width (cm) 0.8 cm 09/04/24 1317   Wound Surface Area (cm^2) 0.8 cm^2 09/04/24 1317   Wound Depth (cm) 0.1 cm 09/04/24 1317   Wound Volume (cm^3) 0.08 cm^3 09/04/24 1317   Wound Healing % 17 09/04/24 1317   Margins Well-defined edges 09/04/24 1317   Non-staged Wound Description Full thickness 09/04/24 1317   Peggy-wound Assessment Edema;Dry 09/04/24 1317   Wound Granulation Tissue Pale Grey;Firm 09/04/24 1317   Wound Bed Granulation (%) 100 % 09/04/24 1317   Wound Bed Slough (%) 90 % 08/19/24 1315   Wound Odor None 09/04/24 1317   Tunneling? No 08/19/24 1315   Undermining? No 08/19/24 1315   Sinus Tracts? No 08/19/24 1315   Pressure Injury Stage 3 09/04/24 1317           ASSESSMENT AND PLAN:     Assessment     Encounter Diagnosis  1. Foot ulcer, right, with fat layer exposed (HCC)    2. 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  Edema of right lower extremity    4. PAD (peripheral artery disease) ([facility]ly [facility])        PLAN OF CARE:    Start collagen / HF transfer on all wounds.   Dressing changes Q 2-3 weeks.   See me in 4 weeks and See Dr. Poon podiatrist in 2 weeks  Decrease appointment frequency to every 2 weeks instead of every week as wounds are stable.   Watch out for signs of early infection - counseled.   Plan of care discussed with patient in detail - All questions answered   Pts son knows ro schedule a sooner appointment incase of any concern.   Return in 2 weeks    RN note - endoform - moistened with saline, hydrofera transfer, ABD pad, kerlix, tape to all wounds. Educated son on dressings. Return in 2 weeks to see Dr. Poon.     Patient Instructions     Apply collagen / HF transfer for all wounds  Leg elevation and low salt diet  See Dr. Poon in 2 weeks and me in 4 weeks    Wound Cleaning and Dressings:    Wash your hands with soap and water. Always wear gloves while changing dressings. Donot touch wound / mio-wound skin with un-gloved hands. Remove old dressing, discard and place into trash.    DRESSINGS:   All wounds - Endoform Collagen / HF transfer- change dressing every other day  Offload wounded area.     Off-Loading: modified foot wear for maximal offloading.     Miscellaneous Instructions:  Supplement with a daily multivitamin   Low salt diet  Intense blood sugar control - Goal Blood sugar below 180 at all times recommended.  Increase protein intake / consider protein supplements - see below  Elevate extremities at all times when sitting / laying down.    DIETARY MODIFICATIONS TO HELP WITH WOUND HEALING:    Protein: Meats, beans, eggs, milk and yogurt particularly Greek yogurt), tofu, soy nuts, soy protein products    Vitamin C: Citrus fruits and juices, strawberries, tomatoes, tomato juice, peppers, baked potatoes, spinach, broccoli, cauliflower, [place] sprouts, cabbage    Vitamin A: Dark green, leafy vegetables, orange or  yellow vegetables, cantaloupe, fortified dairy products, liver, fortified cereals    Zinc: Fortified cereals, red meats, seafood    Consider [employer] (These are essential branch chain amino acids that help with tissue building and wound healing) and take 2 packets/day. you can order online at [employer] or [employer]    ADDITIONAL REMINDERS:    The treatment plan has been discussed at length with you and your provider. Follow all instructions carefully, it is very important. If you do not follow all instructions, you are at  risk of your wound not healing, infection, possible loss of limb and even end of life.  Please call the clinic during regular business hours ( 7:30 AM - 5:30 PM) if you notice increased bleeding, redness, warmth, pain or pus like drainage or start running a fever greater than 100.3.    For after hour emergencies, please call your primary physician or go to the nearest emergency room.      Patient/Caregiver Education: There are no barriers to learning. Medical education for above diagnosis given.   Answered all questions.    Outcome: Patient verbalizes understanding. Patient is notified to call with any questions, complications, allergies, or worsening or changing symptoms.  Patient is to call with any side effects or complications as a result of the treatments today.      DOCUMENTATION OF TIME SPENT: Code selection for this visit was based on time spent : 35 min on date of service in preparing to see the patient, obtaining and/or reviewing separately obtained history, performing a medically appropriate examination, counseling and educating the patient/family/caregiver, ordering medications or testing, referring and communicating with other healthcare providers, documenting clinical information in the E HR, independently interpreting results and communicating results to the patient/family/caregiver and care coordination with the patient's other providers.    Followup: Return in about 12 days  (around 9/16/2024) for Wound followup with Dr. Poon.      Note to Patient:  The 21st Century Cures Act makes medical notes like these available to patients in the interest of transparency. However, be advised this is a medical document and is intended as [id number] communication; it is written in medical language and may appear blunt, direct, or contain abbreviations or verbiage that are unfamiliar. Medical documents are intended to carry relevant information, facts as evident, and the clinical opinion of the practitioner.    Also, please note that this report has been produced using speech recognition software and may contain errors related to that system including, but not limited to, errors in grammar, punctuation, and spelling, as well as words and phrases that possibly may have been recognized inappropriately.  If there are any questions or concerns, contact the dictating provider for clarification.      Fortino Butler MD  9/4/2024  2:07 PM

## 2024-09-09 ENCOUNTER — APPOINTMENT (OUTPATIENT)
Dept: WOUND CARE | Facility: HOSPITAL | Age: 89
End: 2024-09-09
Attending: PODIATRIST
Payer: MEDICARE

## 2024-09-16 ENCOUNTER — OFFICE VISIT (OUTPATIENT)
Dept: WOUND CARE | Facility: HOSPITAL | Age: 89
End: 2024-09-16
Attending: PODIATRIST
Payer: MEDICARE

## 2024-09-16 VITALS
DIASTOLIC BLOOD PRESSURE: 69 MMHG | TEMPERATURE: 98 F | RESPIRATION RATE: 16 BRPM | HEART RATE: 102 BPM | SYSTOLIC BLOOD PRESSURE: 104 MMHG

## 2024-09-16 DIAGNOSIS — Z89.439 HISTORY OF TRANSMETATARSAL AMPUTATION OF FOOT (HCC): ICD-10-CM

## 2024-09-16 DIAGNOSIS — M79.89 RIGHT LEG SWELLING: ICD-10-CM

## 2024-09-16 DIAGNOSIS — L97.412 DIABETIC ULCER OF RIGHT HEEL ASSOCIATED WITH DIABETES MELLITUS DUE TO UNDERLYING CONDITION, WITH FAT LAYER EXPOSED (HCC): Primary | ICD-10-CM

## 2024-09-16 DIAGNOSIS — R60.0 EDEMA OF RIGHT LOWER EXTREMITY: ICD-10-CM

## 2024-09-16 DIAGNOSIS — I73.9 PAD (PERIPHERAL ARTERY DISEASE) (HCC): ICD-10-CM

## 2024-09-16 DIAGNOSIS — E08.621 DIABETIC ULCER OF RIGHT HEEL ASSOCIATED WITH DIABETES MELLITUS DUE TO UNDERLYING CONDITION, WITH FAT LAYER EXPOSED (HCC): Primary | ICD-10-CM

## 2024-09-16 PROCEDURE — 11042 DBRDMT SUBQ TIS 1ST 20SQCM/<: CPT | Performed by: PODIATRIST

## 2024-09-16 NOTE — PROGRESS NOTES
Subjective   Alejandro Guardado is a 89 year old male.    Chief Complaint   Patient presents with    Wound Recheck     Pt arrives for wound care follow up, no pain, no new concerns.       HPI  Alejandro Guardado is a 89 year old male with PAD who is returning to clinic for recheck of right foot.  Patient is accompanied today by his son.  Patient continues to do well.  Patient's son believes that the ulceration at the previous TMA site has healed.  They have been placing endoform with Hydrofera Blue and dry dressings to all wound sites.  He has been doing the same to his right lower extremity ulceration per Dr. Butler's recommendations.  Denies noticing any recent signs of infection.  Patient continues to avoid ambulation is much as possible.  He has also been trying to keep pressure off of the back of his heels.  No other concerns.    Review of Systems  Denies nausea, vomiting, fever, chills, shortness of breath, chest pain, and calf pain.    Objective    Physical Exam:    Derm:  Wound Assessment  Wound 08/14/23 #1- Foot Right (Active)   Date First Assessed/Time First Assessed: 08/14/23 1309    Wound Number (Wound Clinic Only): #1-  Primary Wound Type: Diabetic Ulcer  Location: Foot  Wound Location Orientation: Right  Wound Description (Comments): Amputation site      Assessments 9/16/2024  1:08 PM   Wound Image     Drainage Amount None   Wound Length (cm) 0.1 cm   Wound Width (cm) 0.1 cm   Wound Surface Area (cm^2) 0.01 cm^2   Wound Depth (cm) 0.1 cm   Wound Volume (cm^3) 0.001 cm^3   Wound Healing % 100   Margins Flat and Intact   Peggy-wound Assessment Dry   Wound Bed Epithelium (%) 100 %   Wound Odor None   Tunneling? No   Undermining? No   Sinus Tracts? No   Barrios Scale Grade 3       Inactive Orders   Date Order Priority Status Authorizing Provider   08/26/24 1326 Debridement Old surgical Right Foot Routine Completed Quincy Poon DPM   08/19/24 1325 Debridement Old surgical Right Foot Routine Completed Cleve  JAN Mathew   08/05/24 1322 Debridement Old surgical Right Foot Routine Completed Quincy Poon DPM   07/29/24 1322 Debridement Old surgical Right Foot Routine Completed Quincy Poon DPM   07/22/24 0756 Consult to Wound Ostomy Routine Completed Rody Herr MD     - Reason for Consult:    Wound Care     - Wound Care Reason for Consult:    chronic wound/ R w foot   07/08/24 1325 Debridement Old surgical Right Foot Routine Completed Quincy Poon DPM   07/01/24 1336 Debridement Old surgical Right Foot Routine Completed Quincy Poon DPM   06/24/24 1354 Debridement Old surgical Right Foot Routine Completed Quincy Poon DPM   06/17/24 1358 Debridement Old surgical Right Foot Routine Completed Quincy Poon DPM   06/10/24 1434 Cellular tissue product application Old surgical Right Foot Routine Completed Quincy Poon DPM   06/10/24 1418 Debridement Old surgical Right Foot Routine Completed Quincy Poon DPM   06/03/24 1344 Cellular tissue product application Old surgical Right Foot Routine Completed Quincy Poon DPM   06/03/24 1332 Debridement Old surgical Right Foot Routine Completed Quincy Poon DPM   05/29/24 1152 Cellular tissue product application Old surgical Right Foot Routine Completed Fortino Mo MD   05/20/24 1204 Cellular tissue product application Old surgical Right Foot Routine Completed Fortino Mo MD   05/20/24 1202 Debridement Old surgical Right Foot Routine Completed Fortino Mo MD   05/06/24 1332 Cellular tissue product application Old surgical Right Foot Routine Completed Quincy Poon DPM   05/06/24 1318 Debridement Old surgical Right Foot Routine Completed Quincy Poon DPM   04/15/24 1637 Debridement Old surgical Right Foot Routine Completed Fortino Mo MD   04/08/24 1601 Debridement Old surgical Right Foot Routine Completed Quincy Poon DPM   03/25/24 1151 Debridement Old surgical Right Foot Routine Completed Quincy Poon DPM    03/18/24 1638 Wound care Routine Discontinued Albert Lee, DO   03/16/24 1801 Consult to Wound Ostomy Routine Completed Susu Flores APRN     - Reason for Consult:    Wound Care     - Wound Care Reason for Consult:    wound consult   03/11/24 1433 Debridement Old surgical Right Foot Routine Completed Quincy Poon DPM   02/26/24 1424 Debridement Old surgical Right Foot Routine Completed Quincy Poon DPM   02/19/24 1507 Debridement Old surgical Right Foot Routine Completed Quincy Poon DPM   02/13/24 1244 Wound care Routine Discontinued Andre Campos, DO   02/12/24 1531 Consult to Wound Ostomy Routine Completed Andre Campos DO     - Reason for Consult:    Wound Care     - Wound Care Reason for Consult:    Right foot wound   01/29/24 1520 Debridement Old surgical Right Foot Routine Completed Quincy Poon DPM   01/15/24 1512 Debridement Old surgical Right Foot Routine Completed Quincy Poon DPM   01/08/24 1621 Debridement Old surgical Right Foot Routine Completed Quincy Poon DPM   12/27/23 1707 Wound care Routine Discontinued Fortino Mo MD   12/27/23 1429 Wound care Routine Discontinued Carley Sullivan, DO   12/18/23 1449 Debridement Old surgical Right Foot Routine Completed Quincy Poon DPM   12/11/23 1618 Debridement Old surgical Right Foot Routine Completed Quincy Poon DPM   12/01/23 1228 Consult to Wound Ostomy Routine Completed Susu Flores APRN     - Reason for Consult:    Wound Care     - Wound Care Reason for Consult:    wound care   11/27/23 1018 Debridement Old surgical Right Foot Routine Completed Quincy Poon DPM   11/20/23 1600 Debridement Old surgical Right Foot Routine Completed Quincy Poon DPM   11/13/23 1636 Debridement Old surgical Right Foot Routine Completed Quincy Poon DPM   11/06/23 1538 Debridement Old surgical Right Foot Routine Completed Quincy Poon DPM   10/30/23 0915 Debridement Old surgical Right Foot Routine Completed Cleve  JAN Mathew   10/23/23 0851 Debridement Old surgical Right Foot Routine Completed Quincy Poon DPM   10/16/23 0909 Debridement Old surgical Right Foot Routine Completed Quincy Poon DPM   10/09/23 1318 Debridement Old surgical Right Foot Routine Completed Quincy Poon, JAN   09/25/23 1336 Debridement Old surgical Right Foot Routine Completed Quincy Poon DPM   09/11/23 1348 Debridement Routine Completed Brian Moreno DPM   08/28/23 1317 Debridement Routine Completed Brian Moreno, NENOM   08/14/23 1327 Debridement Routine Completed Brian Moreno, JAN       Wound 04/15/24 #2 Leg Right;Anterior (Active)   Date First Assessed/Time First Assessed: 04/15/24 1611    Wound Number (Wound Clinic Only): #2  Primary Wound Type: Venous Ulcer  Location: Leg  Wound Location Orientation: Right;Anterior      Assessments 9/16/2024  1:08 PM   Wound Image     Drainage Amount Moderate   Drainage Description Serosanguineous   Wound Length (cm) 1.5 cm   Wound Width (cm) 1.1 cm   Wound Surface Area (cm^2) 1.65 cm^2   Wound Depth (cm) 31 cm   Wound Volume (cm^3) 51.15 cm^3   Wound Healing % [id number]   Margins Well-defined edges   Non-staged Wound Description Full thickness   Peggy-wound Assessment Edema;Moist   Wound Granulation Tissue Red;Spongy   Wound Bed Granulation (%) 60 %   Wound Bed Slough (%) 40 %   Wound Odor None   Tunneling? No   Undermining? No   Sinus Tracts? No       Inactive Orders   Date Order Priority Status Authorizing Provider   07/17/24 1614 Debridement Venous Ulcer Right;Anterior Leg Routine Completed Fortino Mo MD       Wound 08/14/24 #3 Heel Right (Active)   Date First Assessed/Time First Assessed: 08/14/24 1306    Wound Number (Wound Clinic Only): #3  Primary Wound Type: Pressure Injury  Location: Heel  Wound Location Orientation: Right      Assessments 9/16/2024  1:09 PM 9/16/2024  1:35 PM   Wound Image       Drainage Amount Small --   Drainage Description Serous;Yellow --   Wound  Length (cm) 0.5 cm --   Wound Width (cm) 0.6 cm --   Wound Surface Area (cm^2) 0.3 cm^2 --   Wound Depth (cm) 0.1 cm --   Wound Volume (cm^3) 0.03 cm^3 --   Wound Healing % 69 --   Margins Well-defined edges --   Non-staged Wound Description Full thickness --   Peggy-wound Assessment Dry --   Wound Granulation Tissue Pink;Firm --   Wound Bed Granulation (%) 100 % --   Wound Odor None --   Tunneling? No --   Undermining? No --   Sinus Tracts? No --   Pressure Injury Stage Stage 3 --       Active Orders   Date Order Priority Status Authorizing Provider   09/16/24 1334 Debridement Pressure Injury Right Heel Routine Active Quincy Poon DPM       Inactive Orders   Date Order Priority Status Authorizing Provider   08/26/24 1328 Debridement Pressure Injury Right Heel Routine Completed Quincy Poon DPM       Vascular: pedal pulses are faintly palpable. CFT <3 sec to digits.  Right foot itself is cooler with palpation in comparison to the left foot   Musculoskeletal: no acute deformities noted.  5/5 muscle strength to all muscles crossing ankle joint  Neurological: Gross sensation intact via light touch.  Protective sensation diminished via Ipswitch test.    Vital Signs  Vital Signs    09/16/24 1304   BP: 104/69   Pulse: 102   Resp: 16   Temp: 98.2 °F (36.8 °C)   PainSc: 0 - (None)         Allergies  Allergies   Allergen Reactions    Heparin ANAPHYLAXIS    Hydromorphone HALLUCINATION       Assessment    Encounter Diagnosis  1. 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)    2. Edema of right lower extremity    3. PAD (peripheral artery disease) ([facility])    4. Right leg swelling    5. History of transmetatarsal amputation of foot ([facility])        Problem List  Patient Active Problem List   Diagnosis    Closed minimally displaced zone I fracture of sacrum ([facility])    At risk for falling    CAD (coronary artery disease)    Ischemic cardiomyopathy    Azotemia     Arrhythmia    Esophageal reflux    History of MI (myocardial infarction)    Mixed hyperlipidemia    Primary hypertension    Dizziness    Medication side effect    Closed left hip fracture (HCC)  Global 6/22/2016    Status post hip surgery    Left shoulder distal clavical resection and excision of ganglion cyst of soft tissue mass   Global  09/17/2020    Orthopedic aftercare for healing traumatic hip fracture, left, closed    Closed left hip fracture, with routine healing, subsequent encounter    Osteoarthrosis, localized, primary, knee, left    Osteoarthrosis, localized, primary, knee, right    Gangrene ([facility])    PAD (peripheral artery disease) ([facility])    Benign prostatic hyperplasia with incomplete bladder emptying    Gangrene of foot ([facility])    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) ([facility])    Leukocytosis    Atrial fibrillation with RVR ([facility])    Hypokalemia    Acute kidney injury ([facility])    Hyperglycemia    Community acquired pneumonia of right lung, unspecified part of lung    Acute respiratory failure with hypoxia ([facility])    Hypotension due to hypovolemia    Sepsis due to pneumonia ([facility])    Foot ulcer with fat layer exposed, right ([facility])    Syncope, unspecified syncope type    Sepsis, due to unspecified organism, unspecified whether acute organ dysfunction present ([facility])    Shock ([facility])    Acute hypoxic respiratory failure ([facility])    Pneumonia, bacterial    Hyponatremia    Metabolic alkalosis    Recurrent pneumonia    Sepsis due to undetermined organism ([facility])    Acute on chronic congestive heart failure, unspecified heart failure type ([facility])    Acute hypoxemic respiratory failure ([facility])    ERON (acute kidney injury) ([facility])    Lactic acidosis    Leukocytosis, unspecified type    Palliative care encounter    Counseling regarding advance care planning and goals of care    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) ([facility])    Severe sepsis ([facility])    Aspiration pneumonitis ([facility])    Multifocal pneumonia     Acute renal failure superimposed on chronic kidney disease, unspecified acute renal failure type, unspecified CKD stage ([facility])    Elevated troponin    NSTEMI (non-ST elevated myocardial infarction) ([facility])       Plan  Orders:  Orders Placed This Encounter   Procedures    Debridement Pressure Injury Right Heel         -Patient examined, chart history reviewed.    -Right lower extremity evaluated--ulceration to previous TMA site appears epithelialized at this time.  No current drainage or signs of infection.  Medial foot wound remains healed.  There is fibrogranular wound bed to the posterior heel ulceration site.  No palpable bone today.  No serous drainage with ongoing bilateral lower extremity pitting edema.  No current purulence, surrounding erythema, or other signs of infection.  Right foot slightly cooler today.       -Excisional debridement performed to the left foot ulceration (TMA site) and posterior heel ulcer utilizing curette down to and including subcutaneous tissue to both locations.  A bleeding, granular wound bed was present upon debridement of both ulcerations today.  No palpable bone noted.  Overall improvements in dimensions today.  We will continue with outpatient debridements.       -Discussed treatment options.  Recommend we continue to promote healing utilizing a collagen.  Endoform and Hydrofera Blue transfer placed to heel ulceration site today and cover with a dry dressing.  Endoform with hydrofera blue applied to right shin ulcerations per Dr. Butler's recommendation.  Will have patient's family assist with dressing changes to his foot every 2-3 days.     -Patient is seeing Dr. Butler for right shin ulceration.  Patient's family will assist him in continuing with dressing changes per Dr. Butler's orders.      -Patient should elevate right lower extremity as frequently as possible as we cannot compress his right lower extremity due to recent stenting and chronic CHF.  Reiterated  importance of elevation to patient today     -Patient should remain minimally weightbearing to his right foot in his soft slipper.  Okay to stand for transfers and take a few steps at a time.  Should focus on pressure off of his posterior heels at all times.     -Again discussed a balance between treating his ulcerations and overall mental health/quality of life.  I do agree that patient should be able to do activities that he would like to do, but I would just avoid excessive ambulation or excessive amounts of time on his feet.    -Educated on signs of infection and encouraged patient to seek immediate medical attention if noticing any of these signs.    Follow-Up  2 weeks with Dr. Luke Poon DPM    9/16/2024    Dragon speech recognition software was used to prepare this note.  Errors in word recognition may occur.  Please contact me with any questions/concerns with this note.

## 2024-09-16 NOTE — PROGRESS NOTES
Weekly Wound Education Note    Teaching Provided To: Patient;Family  Training Topics: Discharge instructions;Cleasing and general instructions;Off-loading;Dressing  Training Method: Demonstration;Explain/Verbal;Written  Training Response: Patient responds and understands        Notes: Cleanse  Right heel and right leg wounds with saline or wound cleanser apply Endoform and hydrofera ready (writing side away from wound) secure with agd and kerlix, Change every 2-3 days. Continue to offload heel  as much as possible.

## 2024-09-16 NOTE — PROGRESS NOTES
Patient ID: Alejandro Guardado is a 89 year old male.    Debridement Pressure Injury Right Heel   Wound 08/14/24 #3 Heel Right    Performed by: Quincy Poon DPM  Authorized by: Quincy Poon DPM      Consent   Consent obtained? verbal  Consent given by: patient  Risks discussed? procedural risks discussed  Time out called at 9/16/2024 1:34 PM  Immediately prior to the procedure a time out was called and the performing provider verified the correct patient, procedure, equipment, support staff, and site/side marked as required.    Debridement Details  Performed by: physician  Debridement type: surgical  Level of debridement: subcutaneous tissue  Pain control: lidocaine 4%  Pain control administration type: topical    Pre-debridement measurements  Length (cm): 0.5  Width (cm): 0.6  Depth (cm): 0.1  Surface Area (cm^2): 0.3    Post-debridement measurements  Length (cm): 0.6  Width (cm): 0.6  Depth (cm): 0.1  Percent debrided: 100%  Surface Area (cm^2): 0.36  Area Debrided (cm^2): 0.36  Volume (cm^3): 0.04    Tissue and other material debrided: subcutaneous tissue  Devitalized tissue debrided: biofilm, callus, fibrin and necrotic debris  Instrument(s) utilized: curette  Bleeding: small  Hemostasis obtained with: not applicable  Procedural pain (0-10): 0  Post-procedural pain: 0   Response to treatment: procedure was tolerated well

## 2024-09-23 ENCOUNTER — APPOINTMENT (OUTPATIENT)
Dept: WOUND CARE | Facility: HOSPITAL | Age: 89
End: 2024-09-23
Payer: MEDICARE

## 2024-09-25 ENCOUNTER — APPOINTMENT (OUTPATIENT)
Dept: WOUND CARE | Facility: HOSPITAL | Age: 89
End: 2024-09-25
Attending: INTERNAL MEDICINE
Payer: MEDICARE

## 2024-09-26 ENCOUNTER — APPOINTMENT (OUTPATIENT)
Dept: GENERAL RADIOLOGY | Facility: HOSPITAL | Age: 89
End: 2024-09-26
Attending: EMERGENCY MEDICINE
Payer: MEDICARE

## 2024-09-26 ENCOUNTER — HOSPITAL ENCOUNTER (INPATIENT)
Facility: HOSPITAL | Age: 89
LOS: 2 days | Discharge: HOME | End: 2024-09-28
Attending: EMERGENCY MEDICINE | Admitting: HOSPITALIST
Payer: MEDICARE

## 2024-09-26 ENCOUNTER — APPOINTMENT (OUTPATIENT)
Dept: CT IMAGING | Facility: HOSPITAL | Age: 89
End: 2024-09-26
Attending: EMERGENCY MEDICINE
Payer: MEDICARE

## 2024-09-26 DIAGNOSIS — J18.9 MULTIFOCAL PNEUMONIA: ICD-10-CM

## 2024-09-26 DIAGNOSIS — G93.40 ENCEPHALOPATHY ACUTE: ICD-10-CM

## 2024-09-26 DIAGNOSIS — L97.412 DIABETIC ULCER OF RIGHT HEEL ASSOCIATED WITH DIABETES MELLITUS DUE TO UNDERLYING CONDITION, WITH FAT LAYER EXPOSED (HCC): ICD-10-CM

## 2024-09-26 DIAGNOSIS — R65.10 SIRS (SYSTEMIC INFLAMMATORY RESPONSE SYNDROME) (HCC): Primary | ICD-10-CM

## 2024-09-26 DIAGNOSIS — E08.621 DIABETIC ULCER OF RIGHT HEEL ASSOCIATED WITH DIABETES MELLITUS DUE TO UNDERLYING CONDITION, WITH FAT LAYER EXPOSED (HCC): ICD-10-CM

## 2024-09-26 DIAGNOSIS — N30.00 ACUTE CYSTITIS WITHOUT HEMATURIA: ICD-10-CM

## 2024-09-26 PROBLEM — D64.9 ANEMIA: Status: ACTIVE | Noted: 2024-09-26

## 2024-09-26 LAB
ALBUMIN SERPL-MCNC: 3.5 G/DL (ref 3.2–4.8)
ALBUMIN/GLOB SERPL: 1.5 {RATIO} (ref 1–2)
ALP LIVER SERPL-CCNC: 61 U/L
ALT SERPL-CCNC: 13 U/L
ANION GAP SERPL CALC-SCNC: 7 MMOL/L (ref 0–18)
AST SERPL-CCNC: 15 U/L (ref ?–34)
BASOPHILS # BLD AUTO: 0.03 X10(3) UL (ref 0–0.2)
BASOPHILS NFR BLD AUTO: 0.2 %
BILIRUB SERPL-MCNC: 0.9 MG/DL (ref 0.2–1.1)
BILIRUB UR QL STRIP.AUTO: NEGATIVE
BUN BLD-MCNC: 28 MG/DL (ref 9–23)
CALCIUM BLD-MCNC: 8.7 MG/DL (ref 8.7–10.4)
CHLORIDE SERPL-SCNC: 102 MMOL/L (ref 98–112)
CLARITY UR REFRACT.AUTO: CLEAR
CO2 SERPL-SCNC: 26 MMOL/L (ref 21–32)
COLOR UR AUTO: YELLOW
CREAT BLD-MCNC: 1.58 MG/DL
EGFRCR SERPLBLD CKD-EPI 2021: 42 ML/MIN/1.73M2 (ref 60–?)
EOSINOPHIL # BLD AUTO: 0.02 X10(3) UL (ref 0–0.7)
EOSINOPHIL NFR BLD AUTO: 0.1 %
ERYTHROCYTE [DISTWIDTH] IN BLOOD BY AUTOMATED COUNT: 15.9 %
GLOBULIN PLAS-MCNC: 2.3 G/DL (ref 2–3.5)
GLUCOSE BLD-MCNC: 133 MG/DL (ref 70–99)
GLUCOSE BLD-MCNC: 140 MG/DL (ref 70–99)
GLUCOSE UR STRIP.AUTO-MCNC: NORMAL MG/DL
HCT VFR BLD AUTO: 33 %
HGB BLD-MCNC: 10.5 G/DL
HYALINE CASTS #/AREA URNS AUTO: PRESENT /LPF
IMM GRANULOCYTES # BLD AUTO: 0.07 X10(3) UL (ref 0–1)
IMM GRANULOCYTES NFR BLD: 0.5 %
KETONES UR STRIP.AUTO-MCNC: NEGATIVE MG/DL
LACTATE SERPL-SCNC: 1.3 MMOL/L (ref 0.5–2)
LEUKOCYTE ESTERASE UR QL STRIP.AUTO: 250
LYMPHOCYTES # BLD AUTO: 0.79 X10(3) UL (ref 1–4)
LYMPHOCYTES NFR BLD AUTO: 5.5 %
MCH RBC QN AUTO: 26.1 PG (ref 26–34)
MCHC RBC AUTO-ENTMCNC: 31.8 G/DL (ref 31–37)
MCV RBC AUTO: 81.9 FL
MONOCYTES # BLD AUTO: 1.29 X10(3) UL (ref 0.1–1)
MONOCYTES NFR BLD AUTO: 8.9 %
NEUTROPHILS # BLD AUTO: 12.28 X10 (3) UL (ref 1.5–7.7)
NEUTROPHILS # BLD AUTO: 12.28 X10(3) UL (ref 1.5–7.7)
NEUTROPHILS NFR BLD AUTO: 84.8 %
NITRITE UR QL STRIP.AUTO: NEGATIVE
OSMOLALITY SERPL CALC.SUM OF ELEC: 287 MOSM/KG (ref 275–295)
PH UR STRIP.AUTO: 5.5 [PH] (ref 5–8)
PLATELET # BLD AUTO: 268 10(3)UL (ref 150–450)
POTASSIUM SERPL-SCNC: 3.7 MMOL/L (ref 3.5–5.1)
PROT SERPL-MCNC: 5.8 G/DL (ref 5.7–8.2)
PROT UR STRIP.AUTO-MCNC: NEGATIVE MG/DL
RBC # BLD AUTO: 4.03 X10(6)UL
RBC UR QL AUTO: NEGATIVE
SODIUM SERPL-SCNC: 135 MMOL/L (ref 136–145)
SP GR UR STRIP.AUTO: 1.01 (ref 1–1.03)
TROPONIN I SERPL HS-MCNC: 14 NG/L
UROBILINOGEN UR STRIP.AUTO-MCNC: NORMAL MG/DL
WBC # BLD AUTO: 14.5 X10(3) UL (ref 4–11)

## 2024-09-26 PROCEDURE — 99223 1ST HOSP IP/OBS HIGH 75: CPT | Performed by: HOSPITALIST

## 2024-09-26 PROCEDURE — 71045 X-RAY EXAM CHEST 1 VIEW: CPT | Performed by: EMERGENCY MEDICINE

## 2024-09-26 PROCEDURE — 99497 ADVNCD CARE PLAN 30 MIN: CPT | Performed by: HOSPITALIST

## 2024-09-26 PROCEDURE — 70450 CT HEAD/BRAIN W/O DYE: CPT | Performed by: EMERGENCY MEDICINE

## 2024-09-26 PROCEDURE — 3E033XZ INTRODUCTION OF VASOPRESSOR INTO PERIPHERAL VEIN, PERCUTANEOUS APPROACH: ICD-10-PCS | Performed by: EMERGENCY MEDICINE

## 2024-09-26 RX ORDER — PANTOPRAZOLE SODIUM 40 MG/1
40 TABLET, DELAYED RELEASE ORAL
Status: DISCONTINUED | OUTPATIENT
Start: 2024-09-27 | End: 2024-09-28

## 2024-09-26 RX ORDER — ONDANSETRON 2 MG/ML
4 INJECTION INTRAMUSCULAR; INTRAVENOUS EVERY 4 HOURS PRN
Status: DISCONTINUED | OUTPATIENT
Start: 2024-09-26 | End: 2024-09-27

## 2024-09-26 RX ORDER — METOCLOPRAMIDE HYDROCHLORIDE 5 MG/ML
5 INJECTION INTRAMUSCULAR; INTRAVENOUS EVERY 8 HOURS PRN
Status: DISCONTINUED | OUTPATIENT
Start: 2024-09-26 | End: 2024-09-28

## 2024-09-26 RX ORDER — POLYETHYLENE GLYCOL 3350 17 G/17G
17 POWDER, FOR SOLUTION ORAL DAILY PRN
Status: DISCONTINUED | OUTPATIENT
Start: 2024-09-26 | End: 2024-09-28

## 2024-09-26 RX ORDER — CLOPIDOGREL BISULFATE 75 MG/1
75 TABLET ORAL DAILY
Status: DISCONTINUED | OUTPATIENT
Start: 2024-09-26 | End: 2024-09-28

## 2024-09-26 RX ORDER — FINASTERIDE 5 MG/1
5 TABLET, FILM COATED ORAL DAILY
Status: DISCONTINUED | OUTPATIENT
Start: 2024-09-26 | End: 2024-09-28

## 2024-09-26 RX ORDER — SENNOSIDES 8.6 MG
17.2 TABLET ORAL NIGHTLY PRN
Status: DISCONTINUED | OUTPATIENT
Start: 2024-09-26 | End: 2024-09-28

## 2024-09-26 RX ORDER — ACETAMINOPHEN 500 MG
500 TABLET ORAL EVERY 4 HOURS PRN
Status: DISCONTINUED | OUTPATIENT
Start: 2024-09-26 | End: 2024-09-28

## 2024-09-26 RX ORDER — ONDANSETRON 2 MG/ML
4 INJECTION INTRAMUSCULAR; INTRAVENOUS EVERY 6 HOURS PRN
Status: DISCONTINUED | OUTPATIENT
Start: 2024-09-26 | End: 2024-09-28

## 2024-09-26 RX ORDER — SODIUM PHOSPHATE, DIBASIC AND SODIUM PHOSPHATE, MONOBASIC 7; 19 G/230ML; G/230ML
1 ENEMA RECTAL ONCE AS NEEDED
Status: DISCONTINUED | OUTPATIENT
Start: 2024-09-26 | End: 2024-09-28

## 2024-09-26 RX ORDER — SODIUM CHLORIDE 9 MG/ML
INJECTION, SOLUTION INTRAVENOUS CONTINUOUS
Status: ACTIVE | OUTPATIENT
Start: 2024-09-26 | End: 2024-09-27

## 2024-09-26 RX ORDER — MELATONIN
3 NIGHTLY PRN
Status: DISCONTINUED | OUTPATIENT
Start: 2024-09-26 | End: 2024-09-28

## 2024-09-26 RX ORDER — BISACODYL 10 MG
10 SUPPOSITORY, RECTAL RECTAL
Status: DISCONTINUED | OUTPATIENT
Start: 2024-09-26 | End: 2024-09-28

## 2024-09-26 RX ORDER — TAMSULOSIN HYDROCHLORIDE 0.4 MG/1
0.4 CAPSULE ORAL DAILY
Status: DISCONTINUED | OUTPATIENT
Start: 2024-09-26 | End: 2024-09-28

## 2024-09-26 RX ORDER — FOLIC ACID 1 MG/1
1 TABLET ORAL DAILY
Status: DISCONTINUED | OUTPATIENT
Start: 2024-09-26 | End: 2024-09-28

## 2024-09-26 RX ORDER — ATORVASTATIN CALCIUM 40 MG/1
40 TABLET, FILM COATED ORAL DAILY
Status: DISCONTINUED | OUTPATIENT
Start: 2024-09-26 | End: 2024-09-28

## 2024-09-26 RX ORDER — SODIUM CHLORIDE 9 MG/ML
INJECTION, SOLUTION INTRAVENOUS CONTINUOUS
Status: DISCONTINUED | OUTPATIENT
Start: 2024-09-26 | End: 2024-09-27

## 2024-09-26 NOTE — ED QUICK NOTES
Rounding Completed    Plan of Care reviewed. Waiting for Labs and MD Tucker.  Elimination needs assessed.  Provided Comfort measures.    Bed is locked and in lowest position. Call light within reach.

## 2024-09-26 NOTE — ED INITIAL ASSESSMENT (HPI)
Pt to ED from home for weakness, Dizziness, and hypotension which started today. Daughter checked on pt and that that he wasn't acting himself. Pt is Ax4

## 2024-09-27 PROBLEM — N17.9 SEPSIS WITH ACUTE RENAL FAILURE AND SEPTIC SHOCK (HCC): Status: ACTIVE | Noted: 2024-07-21

## 2024-09-27 PROBLEM — R65.21 SEPSIS WITH ACUTE RENAL FAILURE AND SEPTIC SHOCK (HCC): Status: ACTIVE | Noted: 2024-07-21

## 2024-09-27 PROBLEM — A41.9 SEPSIS WITH ACUTE RENAL FAILURE AND SEPTIC SHOCK (HCC): Status: ACTIVE | Noted: 2024-07-21

## 2024-09-27 PROBLEM — E27.1 ADRENAL INSUFFICIENCY (ADDISON'S DISEASE) (HCC): Status: ACTIVE | Noted: 2024-09-27

## 2024-09-27 PROBLEM — R40.4 TRANSIENT ALTERATION OF AWARENESS: Status: ACTIVE | Noted: 2024-09-27

## 2024-09-27 LAB
ALBUMIN SERPL-MCNC: 3.5 G/DL (ref 3.2–4.8)
ALBUMIN/GLOB SERPL: 1.6 {RATIO} (ref 1–2)
ALP LIVER SERPL-CCNC: 59 U/L
ALT SERPL-CCNC: 13 U/L
ANION GAP SERPL CALC-SCNC: 9 MMOL/L (ref 0–18)
AST SERPL-CCNC: 11 U/L (ref ?–34)
BASOPHILS # BLD AUTO: 0.02 X10(3) UL (ref 0–0.2)
BASOPHILS NFR BLD AUTO: 0.2 %
BILIRUB SERPL-MCNC: 0.6 MG/DL (ref 0.2–1.1)
BUN BLD-MCNC: 20 MG/DL (ref 9–23)
CALCIUM BLD-MCNC: 8.6 MG/DL (ref 8.7–10.4)
CHLORIDE SERPL-SCNC: 107 MMOL/L (ref 98–112)
CO2 SERPL-SCNC: 24 MMOL/L (ref 21–32)
CREAT BLD-MCNC: 1.2 MG/DL
EGFRCR SERPLBLD CKD-EPI 2021: 58 ML/MIN/1.73M2 (ref 60–?)
EOSINOPHIL # BLD AUTO: 0.01 X10(3) UL (ref 0–0.7)
EOSINOPHIL NFR BLD AUTO: 0.1 %
ERYTHROCYTE [DISTWIDTH] IN BLOOD BY AUTOMATED COUNT: 15.9 %
GLOBULIN PLAS-MCNC: 2.2 G/DL (ref 2–3.5)
GLUCOSE BLD-MCNC: 154 MG/DL (ref 70–99)
HCT VFR BLD AUTO: 31.9 %
HGB BLD-MCNC: 10.2 G/DL
IMM GRANULOCYTES # BLD AUTO: 0.06 X10(3) UL (ref 0–1)
IMM GRANULOCYTES NFR BLD: 0.5 %
LYMPHOCYTES # BLD AUTO: 0.59 X10(3) UL (ref 1–4)
LYMPHOCYTES NFR BLD AUTO: 5.3 %
MAGNESIUM SERPL-MCNC: 2 MG/DL (ref 1.6–2.6)
MCH RBC QN AUTO: 26 PG (ref 26–34)
MCHC RBC AUTO-ENTMCNC: 32 G/DL (ref 31–37)
MCV RBC AUTO: 81.2 FL
MONOCYTES # BLD AUTO: 0.46 X10(3) UL (ref 0.1–1)
MONOCYTES NFR BLD AUTO: 4.1 %
MRSA DNA SPEC QL NAA+PROBE: NEGATIVE
NEUTROPHILS # BLD AUTO: 10.05 X10 (3) UL (ref 1.5–7.7)
NEUTROPHILS # BLD AUTO: 10.05 X10(3) UL (ref 1.5–7.7)
NEUTROPHILS NFR BLD AUTO: 89.8 %
OSMOLALITY SERPL CALC.SUM OF ELEC: 296 MOSM/KG (ref 275–295)
PLATELET # BLD AUTO: 231 10(3)UL (ref 150–450)
POTASSIUM SERPL-SCNC: 3.5 MMOL/L (ref 3.5–5.1)
PROCALCITONIN SERPL-MCNC: 0.97 NG/ML (ref ?–0.05)
PROT SERPL-MCNC: 5.7 G/DL (ref 5.7–8.2)
Q-T INTERVAL: 362 MS
QRS DURATION: 104 MS
QTC CALCULATION (BEZET): 459 MS
R AXIS: -16 DEGREES
RBC # BLD AUTO: 3.93 X10(6)UL
SODIUM SERPL-SCNC: 140 MMOL/L (ref 136–145)
T AXIS: 208 DEGREES
VENTRICULAR RATE: 97 BPM
WBC # BLD AUTO: 11.2 X10(3) UL (ref 4–11)

## 2024-09-27 PROCEDURE — 99232 SBSQ HOSP IP/OBS MODERATE 35: CPT | Performed by: HOSPITALIST

## 2024-09-27 PROCEDURE — 99291 CRITICAL CARE FIRST HOUR: CPT | Performed by: NURSE PRACTITIONER

## 2024-09-27 PROCEDURE — 99291 CRITICAL CARE FIRST HOUR: CPT | Performed by: INTERNAL MEDICINE

## 2024-09-27 NOTE — PLAN OF CARE
Report receivd from ER RN. Pt A&O x 3.  Pt denies any pain/CP or SOB. Pt on RA. Levo gtt and able to titrate down for a goal of SBP >90 or MAP >65. Cardiac monitor Afib control. NPO. Plan for swallow eval and wound consult.  Right foot pictures taken and changed dressings .  Pt desating  O2 at 2 L for SATs >90%

## 2024-09-27 NOTE — PAYOR COMM NOTE
ADMISSION REVIEW     Payor: [employer] MEDICARE ADV PPO  Subscriber #:  [id number]  Authorization Number: [id number]    Admit date: 9/26/24  Admit time: 11:16 PM       REVIEW DOCUMENTATION:     ED Provider Notes        ED Provider Notes signed by Moris Felipe MD at 9/26/2024 10:02 PM       Author: Moris Felipe MD Service: -- Author Type: Physician    Filed: 9/26/2024 10:02 PM Date of Service: 9/26/2024  9:40 PM Status: Signed    : Moris Felipe MD (Physician)           Patient Seen in: [facility] Emergency Department      History     Chief Complaint   Patient presents with    Fatigue     Stated Complaint: Weakness    Subjective:   HPI    89-year-old male who presents here to the emergency department due to report of altered mental status and weakness.  Family says they noticed that the patient was not acting himself earlier today.  They also noticed his blood pressure was very low.  Reviewing the patient's records he was seen here on 7/21/2024 for severe sepsis.  The patient says that he was feeling fine yesterday but then started feeling very ill since early this morning.  No vomiting or diarrhea.  No chest pain or shortness of breath.  He denies any fevers.  Family says he has been coughing and they are concerned he may have aspirated.  He lives alone.    Objective:   Past Medical History:    Amputation of right foot (HCC)    non-healing. [facility]ed by [facility]    Anemia    Atrial fibrillation (HCC)    CAD (coronary artery disease)    CHF (congestive heart failure) (HCC)    Easy bruising    Esophageal reflux    Fatigue    Hearing impairment    Hearing loss    Heart attack (HCC)    High blood pressure    High cholesterol    History of MI (myocardial infarction)    HTN (hypertension)    Hyperlipidemia    Leg swelling    Loss of appetite    PAD (peripheral artery disease) (HCC)    Peripheral vascular disease (HCC)    Uncomfortable fullness after meals    Visual impairment    glasses    Wears  glasses              Past Surgical History:   Procedure Laterality Date    Angiogram      Angioplasty (coronary)      Cabg      Fracture surgery Left     left hip pinning    Other surgical history Left 01/01/1977    knee surgery    Other surgical history  03/25/2016    Left hip ORIF pinning    Tonsillectomy                  Social History     Socioeconomic History    Marital status:    Tobacco Use    Smoking status: Never    Smokeless tobacco: Never   Vaping Use    Vaping status: Never Used   Substance and Sexual Activity    Alcohol use: Never    Drug use: Never     Social Determinants of Health     Financial Resource Strain: Low Risk  (11/7/2023)    Received from [facility]    Financial Resource Strain     In the past year, have you or any family members you live with been unable to get any of the following when it was really needed? Check all that apply.: None   Food Insecurity: No Food Insecurity (7/22/2024)    Food Insecurity     Food Insecurity: Never true   Transportation Needs: No Transportation Needs (7/22/2024)    Transportation Needs     Lack of Transportation: No   Physical Activity: High Risk (7/31/2024)    Received from [facility]    Exercise Vital Sign     On average, how many days per week do you engage in moderate to strenuous exercise (like a brisk walk)?: 0 days     On average, how many minutes do you engage in exercise at this level?: 0 min   Social Connections: Low Risk  (11/7/2023)    Received from [facility]    Social Connections     How often do you see or talk to people that you care about and feel close to? (For example: talking to friends on the phone, visiting friends or family, going to [employer]oro[employer] or club meetings): 5 or more times a week   Housing Stability: Low Risk  (7/22/2024)    Housing Stability     Housing Instability: No              Review of Systems    Positive for stated Chief Complaint:  Fatigue    Other systems are as noted in HPI.  Constitutional and vital signs reviewed.      All other systems reviewed and negative except as noted above.    Physical Exam     ED Triage Vitals   BP 09/26/24 1743 (!) 81/52   Pulse 09/26/24 1743 96   Resp 09/26/24 1743 20   Temp 09/26/24 1743 97.9 °F (36.6 °C)   Temp src 09/26/24 1743 Temporal   SpO2 09/26/24 1748 94 %   O2 Device 09/26/24 1748 None (Room air)       Current Vitals:   Vital Signs  BP: (!) 80/51  Pulse: 74  Resp: 16  Temp: 97.9 °F (36.6 °C)  Temp src: Temporal  MAP (mmHg): (!) 62    Oxygen Therapy  SpO2: 95 %  O2 Device: None (Room air)            Physical Exam  General: 89-year-old male who appears tired but he is answering questions appropriately.  HEENT: His pupils react to light extract motions are intact.  Oral mucosa is moist tongue is midline.  Neck is supple.  Lungs: Crackles in the lung bases noted with good air entry noted.  Cardiac: Heart rate of 90 normal S1 and 2 with occasional ectopy.  Midline thoracic scar from previous cardiac history   abdomen: Soft without tenderness.  Extremities: He does have edema left lower extremity greater than right.    ED Course     Labs Reviewed   COMP METABOLIC PANEL (14) - Abnormal; Notable for the following components:       Result Value    Glucose 133 (*)     Sodium 135 (*)     BUN 28 (*)     Creatinine 1.58 (*)     eGFR-Cr 42 (*)     All other components within normal limits   CBC WITH DIFFERENTIAL WITH PLATELET - Abnormal; Notable for the following components:    WBC 14.5 (*)     HGB 10.5 (*)     HCT 33.0 (*)     Neutrophil Absolute Prelim 12.28 (*)     Neutrophil Absolute 12.28 (*)     Lymphocyte Absolute 0.79 (*)     Monocyte Absolute 1.29 (*)     All other components within normal limits   URINALYSIS WITH CULTURE REFLEX - Abnormal; Notable for the following components:    Leukocyte Esterase Urine 250 (*)     WBC Urine 21-50 (*)     RBC Urine 3-5 (*)     Squamous Epi. Cells Few (*)     Hyaline Casts  Present (*)     All other components within normal limits   POCT GLUCOSE - Abnormal; Notable for the following components:    POC Glucose 140 (*)     All other components within normal limits   TROPONIN I HIGH SENSITIVITY - Normal   LACTIC ACID, PLASMA - Normal   RAINBOW DRAW LAVENDER   RAINBOW DRAW LIGHT GREEN   RAINBOW DRAW BLUE   BLOOD CULTURE   BLOOD CULTURE   URINE CULTURE, ROUTINE     EKG    Rate, intervals and axes as noted on EKG Report.  Rate: 97  Rhythm: Atrial Fibrillation  Reading: Atrial fibrillation with premature ventricular complexes.  Nonspecific ST-T wave changes         Narrative  PROCEDURE:  XR CHEST AP PORTABLE  (CPT=71045)     TECHNIQUE:  AP chest radiograph was obtained.     COMPARISON:  [facility] , XR, XR CHEST AP PORTABLE  (CPT=71045), 3/28/2024, 11:21 AM.     INDICATIONS:  Weakness     PATIENT STATED HISTORY: (As transcribed by Technologist)  Patients family member states he has fatigue and a low blood pressure today.         FINDINGS:  There is patchy airspace consolidation in the mid to upper right lung and to a lesser extent left lower lung that is concerning for multifocal pneumonia.  Clinical correlation recommended along with follow-up until complete resolution.  Median   sternotomy changes noted.  Cardiomegaly with normal pulmonary vascularity.  Degenerative changes the spine with rightward curvature.  No pleural effusion or pneumothorax.                  Impression  CONCLUSION:  There is patchy airspace consolidation in the mid to upper right lung and to a lesser extent left lower lung that is concerning for multifocal pneumonia.  Clinical correlation recommended along with follow-up until complete resolution.          LOCATION:  [id number]                 Dictated by (CST): Delfin Trujillo MD on 9/26/2024 at 7:25 PM      Finalized by (CST): Delfin Trujillo MD on 9/26/2024 at 7:26 PM        Narrative  PROCEDURE:  CT BRAIN OR HEAD (14819)     COMPARISON:  [facility] , CT, CT BRAIN OR HEAD  (00610), 9/28/2015, 9:09 AM.     INDICATIONS:  Weakness     TECHNIQUE:  Noncontrast CT scanning is performed through the brain. Dose reduction techniques were used. Dose information is transmitted to the ACR (American College of Radiology) NRDR (National Radiology Data Registry) which includes the Dose Index  Registry.     PATIENT STATED HISTORY: (As transcribed by Technologist)  Patient with weakness and headache.      FINDINGS:  Mild global brain parenchymal volume loss without overt hydrocephalus.  There is no midline shift or mass-effect.  The basal cisterns are patent.  The gray-white matter differentiation is intact.     There is no acute intracranial hemorrhage or extra-axial fluid collection.  No evidence of acute territorial infarction.  Mild age-indeterminate microvascular ischemic changes in the cerebral white matter.     There is no evident fracture.  Mild ethmoid and trace maxillary mucosal thickening.  Small right maxillary retention cyst.  The mastoid air cells are unremarkable.  Degenerative pannus formation along the C1-2 articulation.                     Impression  CONCLUSION:  No acute intracranial abnormality identified.  Mild age-indeterminate microvascular ischemic changes in the cerebral white matter. If there is clinical concern for acute ischemia/infarction, an MRI of the brain would be recommended for  further evaluation.           LOCATION:  [id number]        Dictated by (CST): Delfin Trujillo MD on 9/26/2024 at 8:51 PM      Finalized by (CST): Delfin Trujillo MD on 9/26/2024 at 8:53 PM            MDM    Differential diagnosis includes but is not limited to intracranial bleed, sepsis from pneumonia, sepsis from urinary tract infection, electrolyte abnormality, acute kidney injury, hypoglycemia.  The patient had laboratories that were sent.  Lactic acid was 1.3.  White cell count of 14,500 hemoglobin of 10.5 hematocrit of 33.  Urinalysis 21-50 white blood cells leukocyte esterase but no  bacteria seen.  Glucose of 133 sodium of 135.  BUN of 28 creatinine of 1.5.  Troponin of 14.  Admission disposition: 9/26/2024  9:57 PM       Chest x-ray performed  I reviewed the x-rays and agree with the radiologist report that showed there is patchy airspace consolidation in the mid to upper right lung and to a lesser extent left lower lung that is concerning for multifocal pneumonia.  CT scan of the brain performed  I reviewed the x-rays and agree with the radiologist report that showed no acute intracranial abnormality identified.  Mild age-indeterminate microvascular ischemic changes in the cerebral white matter.  The patient received IV fluids.  He does have a history of congestive heart failure.    Patient did not receive 30ml/kg of fluids despite having: hypotension. The reason for doing so is: patient has heart failure. 2000mL of fluids were given instead (2000ml is the amount of fluids given).   The patient continued to have labile blood pressures so we did elect to start the patient on Levophed.  He received azithromycin and ceftriaxone.  Looks like there is a combination of both pneumonia and urinary tract infection leading to the patient's encephalopathy.  Spoke to the hospitalist service as well as the ICU service the patient was admitted for continued care.     A total of 60 minutes of critical care time (exclusive of billable procedures) was administered to manage the patient's unstable vital signs due to his labile blood pressures from his SIRS syndrome due to pneumonia and urinary tract infection.  This involved direct patient intervention, complex decision making, and/or extensive discussions with the patient, family, and clinical staff.                Medical Decision Making      Disposition and Plan     Clinical Impression:  1. SIRS (systemic inflammatory response syndrome) (HCC)    2. Encephalopathy acute    3. Multifocal pneumonia    4. Acute cystitis without hematuria          Disposition:  Admit  9/26/2024  9:57 pm         Hospital Problems       Present on Admission  Date Reviewed: 9/16/2024            ICD-10-CM Noted POA    * (Principal) SIRS (systemic inflammatory response syndrome) (HCC) R65.10 9/26/2024 Unknown    Anemia D64.9 9/26/2024 Yes             Signed by Moris Felipe MD on 9/26/2024 10:02 PM         MEDICATIONS ADMINISTERED IN LAST 1 DAY:  apixaban (Eliquis) tab 5 mg       Date Action Dose Route User    9/27/2024 0000 Given 5 mg Oral Ade Weller RN          azithromycin (Zithromax) 500 mg in sodium chloride 0.9% 250mL IVPB premix       Date Action Dose Route User    9/26/2024 2040 New Bag 500 mg Intravenous Riki Ribeiro RN          cefTRIAXone (Rocephin) 2 g in sodium chloride 0.9% 100 mL IVPB-ADDV       Date Action Dose Route User    9/26/2024 1922 New Bag 2 g Intravenous Riki Ribeiro RN          hydrocortisone Na succinate PF (Solu-CORTEF) injection 100 mg       Date Action Dose Route User    9/26/2024 2253 Given 100 mg Intravenous Riki Ribeiro RN          norepinephrine (Levophed) 4 mg/250mL infusion premix       Date Action Dose Route User    9/27/2024 0600 Rate/Dose Change 1 mcg/min Intravenous Ade Weller RN    9/26/2024 2330 Rate/Dose Change 2 mcg/min Intravenous Ade Weller RN    9/26/2024 2315 Rate/Dose Change 3 mcg/min Intravenous Ade Weller RN    9/26/2024 2300 Rate/Dose Change 4 mcg/min Intravenous Ade Weller RN    9/26/2024 2139 New Bag 2 mcg/min Intravenous Riki Ribeiro RN          piperacillin-tazobactam (Zosyn) 3.375 g in dextrose 5% 100 mL IVPB-ADDV       Date Action Dose Route User    9/27/2024 0000 New Bag 3.375 g Intravenous Ade Weller RN          sodium chloride 0.9% infusion       Date Action Dose Route User    9/27/2024 0000 New Bag (none) Intravenous Ade Weller, RN          sodium chloride 0.9 % IV bolus 1,000 mL       Date Action Dose Route User     9/26/2024 1831 New Bag 1,000 mL Intravenous Riki Ribeiro RN          sodium chloride 0.9 % IV bolus 1,000 mL       Date Action Dose Route User    9/26/2024 1954 New Bag 1,000 mL Intravenous Riki Ribeiro RN            Vitals (last day)       Date/Time Temp Pulse Resp BP SpO2 Weight O2 Device O2 Flow Rate (L/min) [facility]    09/27/24 0730 -- 74 13 103/54 95 % -- -- -- SP    09/27/24 0715 -- 83 16 95/52 96 % -- -- -- SP    09/27/24 0700 -- 75 12 117/71 97 % -- -- -- SP    09/27/24 0645 -- 80 17 113/64 97 % -- -- -- SP    09/27/24 0630 -- 81 11 117/64 97 % -- -- -- SP    09/27/24 0615 -- 85 11 118/67 98 % -- -- -- SP    09/27/24 0600 -- 78 8 114/64 94 % -- Nasal cannula 2 L/min SP    09/27/24 0545 -- 81 10 110/57 97 % -- -- -- SP    09/27/24 0530 -- 79 12 108/55 98 % -- -- -- SP    09/27/24 0515 -- 82 12 107/62 96 % -- -- -- SP    09/27/24 0500 -- 71 11 108/58 94 % -- -- -- SP    09/27/24 0445 -- 74 10 108/51 97 % -- -- -- SP    09/27/24 0430 -- 77 15 116/69 97 % -- -- -- SP    09/27/24 0415 -- 75 13 99/58 93 % -- -- -- SP    09/27/24 0400 98.2 °F (36.8 °C) 70 9 98/64 97 % -- -- -- SP    09/27/24 0345 -- 63 12 103/51 94 % -- -- -- SP    09/27/24 0330 -- 73 12 109/61 98 % -- -- -- SP    09/27/24 0315 -- 73 13 113/66 98 % -- -- -- SP    09/27/24 0300 -- 75 14 115/64 98 % -- -- -- SP    09/27/24 0245 -- 71 15 110/70 93 % -- -- -- SP    09/27/24 0230 -- 81 14 109/58 96 % -- -- -- SP    09/27/24 0215 -- 81 14 104/54 97 % -- -- -- SP    09/27/24 0200 -- 81 12 107/56 96 % -- Nasal cannula -- SP    09/27/24 0145 -- 82 15 85/49 95 % -- -- -- SP    09/27/24 0130 -- 75 12 96/59 96 % -- -- -- SP    09/27/24 0115 -- 79 12 111/76 97 % -- -- -- SP    09/27/24 0100 -- 71 13 108/58 98 % -- -- -- SP    09/27/24 0045 -- 80 12 101/48 97 % -- -- -- SP    09/27/24 0030 -- 80 14 97/55 94 % -- -- -- SP    09/27/24 0015 -- 81 10 99/46 86 % -- Nasal cannula 2 L/min SP    09/27/24 0000 98 °F (36.7 °C) 82 11 104/63 91 % -- -- -- SP    09/26/24 7802  -- 84 20 101/57 -- -- -- -- SP    09/26/24 2330 97.9 °F (36.6 °C) 92 18 118/94 91 % 209 lb 14.1 oz (95.2 kg) -- -- SP    09/26/24 2326 -- 81 16 110/72 95 % -- None (Room air) -- LP    09/26/24 2315 -- 80 15 -- 94 % -- -- -- SP    09/26/24 2300 -- 77 15 106/63 91 % -- -- -- SP    09/26/24 2250 -- 81 16 110/72 92 % -- -- -- LP    09/26/24 2245 -- 85 14 109/72 94 % -- -- -- LP    09/26/24 2240 -- 76 18 104/56 93 % -- -- -- LP    09/26/24 2235 -- 74 12 105/66 98 % -- -- -- LP    09/26/24 2230 -- 68 14 104/77 97 % -- -- -- LP    09/26/24 2225 -- 82 15 108/65 94 % -- -- -- LP    09/26/24 2220 -- 87 14 100/63 98 % -- -- -- LP    09/26/24 2215 -- 84 14 104/65 97 % -- None (Room air) -- LP    09/26/24 2159 -- 80 18 88/58 95 % -- None (Room air) -- LP    09/26/24 2130 -- 74 16 80/51 95 % -- None (Room air) -- LP    09/26/24 2115 -- 86 12 82/56 97 % -- -- -- LP    09/26/24 2100 -- 81 14 80/55 93 % -- -- -- LP    09/26/24 2045 -- 79 13 92/57 91 % -- -- -- LP    09/26/24 2030 -- 81 12 99/59 95 % -- -- -- LP    09/26/24 2015 -- 92 14 100/68 95 % -- None (Room air) -- LP    09/26/24 2000 -- 70 15 93/58 95 % -- None (Room air) -- LP    09/26/24 1956 -- 84 18 85/69 95 % -- None (Room air) -- LP    09/26/24 1915 -- 85 17 92/56 91 % -- None (Room air) -- LP    09/26/24 1900 -- 85 17 88/61 94 % -- None (Room air) -- LP    09/26/24 1748 -- -- -- -- 94 % -- None (Room air) --     09/26/24 1743 97.9 °F (36.6 °C) 96 20 81/52 -- -- -- -- LP             9/26/2024 H&P    Chief Complaint: AMS / weakness        Subjective:  History of Present Illness:      Alejandro Guardado is a 89 year old male with presents to the hospital with weakness and altered mental status.  Patient lives at home by himself.  Patient's daughter at the bedside help with HPI.  Patient reportedly was in his normal state of health 2 days ago.  Patient had multiple admissions for recurrent aspiration pneumonia.  Patient over the last 1 day has gotten profoundly weak and has  started to cough.  Daughter states this is always the case when he gets \"aspiration pneumonia\".  No overt fevers or chills nausea vomiting.  No chest pain.              History/Other:  Past Medical History:  Past Medical History       Past Medical History:    Amputation of right foot (HCC)     non-healing. [facility]    Atrial fibrillation (HCC)    CAD (coronary artery disease)    CHF (congestive heart failure) (HCC)    Easy bruising    Esophageal reflux    Fatigue    Hearing impairment    Hearing loss    Heart attack (HCC)    High blood pressure    High cholesterol    History of MI (myocardial infarction)    HTN (hypertension)    Hyperlipidemia    Leg swelling    Loss of appetite    PAD (peripheral artery disease) (HCC)    Peripheral vascular disease (HCC)    Uncomfortable fullness after meals    Visual impairment     glasses    Wears glasses        Past Surgical History:   Past Surgical History         Past Surgical History:   Procedure Laterality Date    Angiogram        Angioplasty (coronary)        Cabg        Fracture surgery Left       left hip pinning    Other surgical history Left 01/01/1977     knee surgery    Other surgical history   03/25/2016     Left hip ORIF pinning    Tonsillectomy             Family History:   Family History         Family History   Problem Relation Age of Onset    Cancer Father          Social History:    reports that he has never smoked. He has never used smokeless tobacco. He reports that he does not drink alcohol and does not use drugs.      Allergies:   Allergies        Allergies   Allergen Reactions    Heparin ANAPHYLAXIS    Hydromorphone HALLUCINATION            Medications:    Medications Ordered Prior to Encounter             Current Facility-Administered Medications on File Prior to Encounter   Medication Dose Route Frequency Provider Last Rate Last Admin    [COMPLETED] potassium chloride (Klor-Con M20) tab 40 mEq  40 mEq Oral Q4H Zackary Mcneill MD   40 mEq  at 07/24/24 1409    [COMPLETED] sodium chloride 0.9 % IV bolus 500 mL  500 mL Intravenous Once Alejandro Novoa  mL/hr at 07/22/24 0500 500 mL at 07/22/24 0500    [COMPLETED] potassium chloride (Klor-Con M20) tab 40 mEq  40 mEq Oral Once Sherwin Samuel MD   40 mEq at 07/22/24 0857    [COMPLETED] magnesium oxide (Mag-Ox) tab 400 mg  400 mg Oral Once Sherwin Samuel MD   400 mg at 07/22/24 0857    [COMPLETED] sodium chloride 0.9 % IV bolus 2,721 mL  30 mL/kg Intravenous Once Chon Whitehead DO   Stopped at 07/21/24 2235    [COMPLETED] piperacillin-tazobactam (Zosyn) 4.5 g in dextrose 5% 100 mL IVPB-ADDV  4.5 g Intravenous Once Chon Whitehead DO   Stopped at 07/21/24 2044             Current Outpatient Medications on File Prior to Encounter   Medication Sig Dispense Refill    gabapentin 100 MG Oral Cap Take 2 capsules (200 mg total) by mouth in the morning and 2 capsules (200 mg total) before bedtime. 60 capsule 0    collagenase 250 UNIT/GM External Ointment Apply 1 Application topically daily. 30 g 0    amoxicillin clavulanate 875-125 MG Oral Tab Take 1 tablet by mouth 2 (two) times daily. 6 tablet 0    metoprolol succinate ER 25 MG Oral Tablet 24 Hr Take 2 tablets (50 mg total) by mouth Daily Beta Blocker. 60 tablet 0    finasteride 5 MG Oral Tab Take 1 tablet (5 mg total) by mouth daily. 90 tablet 0    furosemide 40 MG Oral Tab Take 2 tablets (80 mg total) by mouth 2 (two) times daily.        lisinopril 2.5 MG Oral Tab Take 1 tablet (2.5 mg total) by mouth daily.        Ascorbic Acid (VITAMIN C) 1000 MG Oral Tab Take 1.5 tablets (1,500 mg total) by mouth daily.        NON FORMULARY Oxygen at 2L per NC a during sleep-uses as needed        NON FORMULARY ZOILA dressing to Right foot s/p amputation        docusate sodium 100 MG Oral Cap Take 250 mg by mouth 2 (two) times daily.        tamsulosin 0.4 MG Oral Cap Take 1 capsule (0.4 mg total) by mouth daily.        apixaban 5 MG Oral Tab Take 1  tablet (5 mg total) by mouth 2 (two) times daily. 60 tablet 3    atorvastatin 40 MG Oral Tab Take 1 tablet (40 mg total) by mouth daily. 30 tablet 0    clopidogrel 75 MG Oral Tab Take 1 tablet (75 mg total) by mouth daily. 30 tablet 0    predniSONE 5 MG Oral Tab Take 3 tablets (15 mg total) by mouth daily.        folic acid 1 MG Oral Tab Take 1 tablet (1 mg total) by mouth daily.        acetaminophen 500 MG Oral Tab Take 2 tablets (1,000 mg total) by mouth every 8 (eight) hours. 21 tablet 0    Omeprazole 40 MG Oral Capsule Delayed Release Take 1 capsule (40 mg total) by mouth daily.                Review of Systems:   A comprehensive review of systems was completed.    Pertinent positives and negatives noted in the HPI.           Objective:  Physical Exam:    /68   Pulse 92   Temp 97.9 °F (36.6 °C) (Temporal)   Resp 14   SpO2 95%   General: No acute distress, Axox1-2  Respiratory: No rhonchi, no wheezes  Cardiovascular: S1, S2. Regular rate and rhythm  Abdomen: Soft, Non-tender, non-distended, positive bowel sounds  Neuro: No new focal deficits  Extremities: No edema              Results:  Labs:        Labs Last 24 Hours:         Recent Labs   Lab 09/26/24 1744   RBC 4.03   HGB 10.5*   HCT 33.0*   MCV 81.9   MCH 26.1   MCHC 31.8   RDW 15.9   NEPRELIM 12.28*   WBC 14.5*   .0             Recent Labs   Lab 09/26/24 1744   *   BUN 28*   CREATSERUM 1.58*   EGFRCR 42*   CA 8.7   ALB 3.5   *   K 3.7      CO2 26.0   ALKPHO 61   AST 15   ALT 13   BILT 0.9   TP 5.8               Lab Results   Component Value Date     INR 1.45 (H) 07/21/2024     INR 1.51 (H) 03/28/2024     INR 1.35 (H) 02/12/2024             Recent Labs   Lab 09/26/24  1744   TROPHS 14         No results for input(s): \"TROP\", \"PBNP\" in the last 168 hours.     No results for input(s): \"PCT\" in the last 168 hours.     Imaging: Imaging data reviewed in Epic.           Assessment & Plan:  #Recurrent multifocal pna  #Aspiration  PNA  #Complicated UTI  -IV Zosyn (-)  -Follow-up blood/urine/sputum culture  -Pulmonary to see  -ST to see  -soft bp, stress dose steroids for now     #ERON  #Hypovolemic hyponatremia  -ivf x10 hrs, pt has lvef 20-25% and clinically has LE edema, on RA     #Acute metabolic encephalopathy  -Secondary to above  -Therapies to see  lives at home by himself     #Chronic HFrEF  #Ischemic cardiomyopathy  -LVEF 20-25%     #Secondary adrenal insufficiency     #Atrial fibrillation, paroxysmal     #Coronary artery disease s/p CABG     #Hyperlipidemia     #BPH     #Peripheral arterial disease with history of nonhealing right foot wound s/p thrombectomy and revascularization/stent in 2023     #ACP  -DNR, 18 mins spent face ot face w/ patient and patient's family at the bedside. This was a voluntary conversation. We reviewed terms like cardiac arrest and the use of acls/cpr. Order updated on [facility].   -recurrent hospitalizations, palliative care team to see           Plan of care discussed with ER physician and patient/patient's family     Albert Lee DO       2024 Critical Care Consult    ICU  Critical Care APRN Consult Note     NAME: Alejandro Guardado - ROOM: [facility] - MRN: [id number] - Age: 89 year old - :1935     History Of Present Illness:  Alejandro Guardado is a 89 year old male with PMHx significant for achalasia s/p Toupet fundoplication and esophagomyotomy, recurrent aspiration pneumonia, CAD s/p CABG, Afib on Eliquis, HFrEF, PAD s/p stent , angioplasty 2024, and nonhealing wound, HTN, HLD, adrenal insufficiency who presented with confusion and weakness that started this morning.  Per the patient's daughter he was at his baseline mental status when she spoke with him on the phone yesterday evening.  This morning he called and said he was sitting on the floor and couldn't get up.  He normally uses a wheelchair to get around the house and is able to transfer independently.  He does not usually  require help with his ADLs.  Daughter was able to get him up and give him some food and drink but he became weaker and unable to walk so paramedics were called.  She states he has been coughing today which is usually the symptom when he has an aspiration pneumonia.  SBP was low on admit so pressors were started along with a smaller sepsis bolus of 2L IVF.  CXR showed bilateral multifocal pneumonia.  CT head was negative for acute findings.  On assessment pt is lethargic but easily awakens and follows commands.  He remains on pressors to elevate his SBP.       Past Medical History:  Past Medical History       Past Medical History:    Amputation of right foot (HCC)     non-healing. Followed by [facility]    Anemia    Atrial fibrillation (HCC)    CAD (coronary artery disease)    CHF (congestive heart failure) ([facility])    Easy bruising    Encephalopathy acute    Esophageal reflux    Fatigue    Hearing impairment    Hearing loss    Heart attack ([facility])    High blood pressure    High cholesterol    History of MI (myocardial infarction)    HTN (hypertension)    Hyperlipidemia    Leg swelling    Loss of appetite    PAD (peripheral artery disease) (HCC)    Peripheral vascular disease (HCC)    Uncomfortable fullness after meals    Visual impairment     glasses    Wears glasses        Past Surgical History:   Past Surgical History         Past Surgical History:   Procedure Laterality Date    Angiogram        Angioplasty (coronary)        Cabg        Fracture surgery Left       left hip pinning    Other surgical history Left 01/01/1977     knee surgery    Other surgical history   03/25/2016     Left hip ORIF pinning    Tonsillectomy             Family History:   Family History         Family History   Problem Relation Age of Onset    Cancer Father          Social History:    reports that he has never smoked. He has never used smokeless tobacco. He reports that he does not drink alcohol and does not use drugs.      Review of  Systems:   A comprehensive 10 point review of systems was completed.  Pertinent positives and negatives noted in the HPI.     Current Hospital Medications          Current Facility-Administered Medications   Medication Dose Route Frequency    hydrocortisone Na succinate PF (Solu-CORTEF) injection 60 mg  60 mg Intravenous Daily    piperacillin-tazobactam (Zosyn) 3.375 g in dextrose 5% 100 mL IVPB-ADDV  3.375 g Intravenous Q8H    sodium chloride 0.9% infusion   Intravenous Continuous    acetaminophen (Tylenol Extra Strength) tab 500 mg  500 mg Oral Q4H PRN    melatonin tab 3 mg  3 mg Oral Nightly PRN    ondansetron (Zofran) 4 MG/2ML injection 4 mg  4 mg Intravenous Q6H PRN    metoclopramide (Reglan) 5 mg/mL injection 5 mg  5 mg Intravenous Q8H PRN    guaiFENesin (Robitussin) 100 MG/5 ML oral liquid 200 mg  200 mg Oral Q4H PRN    polyethylene glycol (PEG 3350) (Miralax) 17 g oral packet 17 g  17 g Oral Daily PRN    sennosides (Senokot) tab 17.2 mg  17.2 mg Oral Nightly PRN    bisacodyl (Dulcolax) 10 MG rectal suppository 10 mg  10 mg Rectal Daily PRN    fleet enema (Fleet) rectal enema 133 mL  1 enema Rectal Once PRN    apixaban (Eliquis) tab 5 mg  5 mg Oral BID    atorvastatin (Lipitor) tab 40 mg  40 mg Oral Daily    clopidogrel (Plavix) tab 75 mg  75 mg Oral Daily    finasteride (Proscar) tab 5 mg  5 mg Oral Daily    folic acid (Folvite) tab 1 mg  1 mg Oral Daily    pantoprazole (Protonix) DR tab 40 mg  40 mg Oral QAM AC    tamsulosin (Flomax) cap 0.4 mg  0.4 mg Oral Daily    norepinephrine (Levophed) 4 mg/250mL infusion premix  0.5-30 mcg/min Intravenous Continuous         OBJECTIVE  Vitals:  BP 96/59   Pulse 75   Temp 98 °F (36.7 °C) (Temporal)   Resp 12   Wt 209 lb 14.1 oz (95.2 kg)   SpO2 96%   BMI 28.46 kg/m²              Physical Exam:    General Appearance:  Lethargic, [place], cooperative, no acute distress, appears stated age  Neck: No JVD  Lungs: Clear to auscultation bilaterally, respirations  unlabored  Heart: Irregular rate and rhythm, afib, S1 and S2 normal, no murmur, rub or gallop  Abdomen: Soft, non-tender, bowel sounds active all four quadrants, no masses, no organomegaly  Extremities: no cyanosis, 2+ pitting LE edema, capillary refill <3 sec, RLE with toes amputation, healing heel and amputation site wound, shin wound with pale skin surrounding small open area.    Pulses: 2+ and symmetric all extremities  Skin: Skin color, texture, turgor normal for ethnicity, no rashes or lesions, warm and dry  Neurologic: CNII-XII intact, normal strength     Data this admission:  CT BRAIN OR HEAD (CPT=70450)     Result Date: 9/26/2024  CONCLUSION:  No acute intracranial abnormality identified.  Mild age-indeterminate microvascular ischemic changes in the cerebral white matter. If there is clinical concern for acute ischemia/infarction, an MRI of the brain would be recommended for further evaluation.    LOCATION:  [id number]   Dictated by (CST): Delfin Trujillo MD on 9/26/2024 at 8:51 PM     Finalized by (CST): Delfin Trujillo MD on 9/26/2024 at 8:53 PM        XR CHEST AP PORTABLE  (CPT=71045)     Result Date: 9/26/2024  CONCLUSION:  There is patchy airspace consolidation in the mid to upper right lung and to a lesser extent left lower lung that is concerning for multifocal pneumonia.  Clinical correlation recommended along with follow-up until complete resolution.    LOCATION:  [id number]      Dictated by (CST): Delfin Trujillo MD on 9/26/2024 at 7:25 PM     Finalized by (CST): Delfin Trujillo MD on 9/26/2024 at 7:26 PM        Labs:        Lab Results   Component Value Date     WBC 14.5 09/26/2024     HGB 10.5 09/26/2024     HCT 33.0 09/26/2024     .0 09/26/2024     CREATSERUM 1.58 09/26/2024     BUN 28 09/26/2024      09/26/2024     K 3.7 09/26/2024      09/26/2024     CO2 26.0 09/26/2024      09/26/2024     CA 8.7 09/26/2024     ALB 3.5 09/26/2024     ALKPHO 61 09/26/2024     BILT 0.9  09/26/2024     TP 5.8 09/26/2024     AST 15 09/26/2024     ALT 13 09/26/2024         Assessment/Plan:  Sepsis with shock due to Pneumonia and UTI  -Blood urine and sputum cultures pending  -CXR with multifocal pneumonia  -UA with leukocyte esterase and WBC but no bacteria  -PCT added to labs  -Zosyn  -Given 2L IVF bolus in ED, full sepsis bolus held due to HFrEF  -Continue gentle hydration with 83/h NS, monitor closely for overload  -Pressors to maintain SBP above 90 and MAP above 65  -Speech to evaluate pt given history of achalasia and chronic aspiration with frequent aspiration pneumonia (most recently 7/2024)     AMS  -Due to above  -CT head negative  -Baseline able to perform independent ADL and care for self in own home     ERON  -Gentle hydration with NS at 83/hr  -Monitor uop and labs     Adrenal insufficiency  -Solucortef 100 mg given in ED, continue with 60 mg/d in place of Prednisone 15mg daily home medication  -Escalate steroid dose if becomes more hypotensive     PAD s/p right toes amputation, s/p stent 2023, angioplasty 5/2024, and nonhealing wounds  -Wound care consult  -Wound pictures in Media in Epic  -Plavix     Afib- controlled rate  -Eliquis     HFrEF, CAD, HTN, HLD  -EF 20-25%  -Hold home medications given hypotension     Dispo:     Code Status: DNAR/Selective Treatment  -ICU for close monitoring     Plan of care discussed with intensivist, Dr. Campos.       Annmarie PHELPS  Critical Care Nurse Practitioner      Medications 09/25/24 09/26/24 09/27/24   apixaban (Eliquis) tab 5 mg  Dose: 5 mg  Freq: 2 times daily Route: OR  Start: 09/26/24 2330      0000 SP-Given   0900   2100        atorvastatin (Lipitor) tab 40 mg  Dose: 40 mg  Freq: Daily Route: OR  Start: 09/26/24 2330     (2330 SP)-Not Given        0930          azithromycin (Zithromax) 500 mg in sodium chloride 0.9% 250mL IVPB premix  Dose: 500 mg  Freq: Once Route: IV  Last Dose: Stopped (09/26/24 2140)  Start: 09/26/24 1948 End: 09/26/24  2140   Admin Instructions:   Consider alternative antibiotic if baseline QTc >500 ms   Order specific questions:        2040 LP-New Bag   2140 LP-Stopped          cefTRIAXone (Rocephin) 2 g in sodium chloride 0.9% 100 mL IVPB-ADDV  Dose: 2 g  Freq: Once Route: IV  Last Dose: Stopped (09/26/24 1954)  Start: 09/26/24 1906 End: 09/26/24 1954   Admin Instructions:   Ceftriaxone must NOT be administered simultaneously with calcium containing IV solutions. Includes Y-site as well.  In patients other than neonates ceftriaxone and calcium containing products may administered sequentially, provided the line is flushed in between administrations.   Order specific questions:        1922 LP-New Bag   1954 LP-Stopped          clopidogrel (Plavix) tab 75 mg  Dose: 75 mg  Freq: Daily Route: OR  Start: 09/26/24 2330     (2330 SP)-Not Given        0930          finasteride (Proscar) tab 5 mg  Dose: 5 mg  Freq: Daily Route: OR  Start: 09/26/24 2330   Admin Instructions:   CAUTION: REPRODUCTIVE RISK. Do not crush     (2330 SP)-Not Given        0930          folic acid (Folvite) tab 1 mg  Dose: 1 mg  Freq: Daily Route: OR  Start: 09/26/24 2330     (2330 SP)-Not Given        0930          hydrocortisone Na succinate PF (Solu-CORTEF) injection 100 mg  Dose: 100 mg  Freq: Once Route: IV  Start: 09/26/24 2119 End: 09/26/24 2253   Admin Instructions:   Reconstitute with 2ml sterile water or saline     2253 LP-Given           hydrocortisone Na succinate PF (Solu-CORTEF) injection 100 mg  Dose: 100 mg  Freq: Every 8 hours Route: IV  Start: 09/27/24 0700 End: 09/27/24 0142   Admin Instructions:   Reconstitute with 2ml sterile water or saline      0142-D/C'd        hydrocortisone Na succinate PF (Solu-CORTEF) injection 60 mg  Dose: 60 mg  Freq: Daily Route: IV  Start: 09/27/24 0930   Admin Instructions:   Reconstitute with 2ml sterile water or saline      0930          pantoprazole (Protonix) DR tab 40 mg  Dose: 40 mg  Freq: Every morning  before breakfast Route: OR  Start: 09/27/24 0700   Admin Instructions:   Do not crush      (0700 SP)-Not Given          piperacillin-tazobactam (Zosyn) 3.375 g in dextrose 5% 100 mL IVPB-ADDV  Dose: 3.375 g  Freq: Every 8 hours Route: IV  Last Dose: 3.375 g (09/27/24 0000)  Start: 09/27/24 0000   Admin Instructions:   Incompatible with Lactated Ringers (LR)   Order specific questions:         0000 SP-New Bag   0800   1600        sodium chloride 0.9 % IV bolus 1,000 mL  Dose: 1,000 mL  Freq: Once Route: IV  Last Dose: Stopped (09/26/24 2040)  Start: 09/26/24 1952 End: 09/26/24 2040 1954 LP-New Bag   2040 LP-Stopped          sodium chloride 0.9 % IV bolus 1,000 mL  Dose: 1,000 mL  Freq: Once Route: IV  Last Dose: Stopped (09/26/24 1954)  Start: 09/26/24 1811 End: 09/26/24 1954 1831 LP-New Bag   1954 LP-Stopped          tamsulosin (Flomax) cap 0.4 mg  Dose: 0.4 mg  Freq: Daily Route: OR  Start: 09/26/24 2330   Admin Instructions:   Give on an empty stomach. Hold tube feedings 1 hour before AND after.     (2330 SP)-Not Given        0930                User Information   Initial Name Initial Name   Riki Sy RN  [[id number]] Ade Arteaga RN  [[id number]]         Continuous Meds Sorted by Name  for Alejandro Guardado as of 9/25/24 through 9/27/24    1 Day 3 Days 7 Days 10 Days < Today >   Legend:       Medications 09/25/24 09/26/24 09/27/24   norepinephrine (Levophed) 4 mg/250mL infusion premix  Rate: 1.9-112.5 mL/hr Dose: 0.5-30 mcg/min  Freq: Continuous Route: IV  Last Dose: Stopped (09/27/24 0745)  Start: 09/26/24 2123   Admin Instructions:   Target systolic BP greater than or equal to 90; or MAP greater than or equal to 60.  Notify prescriber if maximum dose rate is reached and patient is still not at goal.   Order specific questions:        2139 LP-New Bag   2300 SP-Rate/Dose Change   2315 SP-Rate/Dose Change     2327 LP-Handoff   2330 SP-Rate/Dose Change       0600 SP-Rate/Dose Change   0745  NG-Stopped         sodium chloride 0.9% infusion  Rate: 125 mL/hr  Freq: Continuous Route: IV  Start: 09/26/24 2330 End: 09/27/24 0125   Admin Instructions:   ED holding order only  Cancel if other admission orders exist!     (2330 SP)-Not Given        0125-D/C'd        sodium chloride 0.9% infusion  Rate: 83 mL/hr  Freq: Continuous Route: IV  Start: 09/26/24 2330 End: 09/27/24 0959      0000 SP-New Bag

## 2024-09-27 NOTE — ED PROVIDER NOTES
Patient Seen in: [facility] Emergency Department      History     Chief Complaint   Patient presents with    Fatigue     Stated Complaint: Weakness    Subjective:   HPI    89-year-old male who presents here to the emergency department due to report of altered mental status and weakness.  Family says they noticed that the patient was not acting himself earlier today.  They also noticed his blood pressure was very low.  Reviewing the patient's records he was seen here on 7/21/2024 for severe sepsis.  The patient says that he was feeling fine yesterday but then started feeling very ill since early this morning.  No vomiting or diarrhea.  No chest pain or shortness of breath.  He denies any fevers.  Family says he has been coughing and they are concerned he may have aspirated.  He lives alone.    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 Followed by would clinic    Anemia    Atrial fibrillation (HCC)    CAD (coronary artery disease)    CHF (congestive heart failure) ([facility])    Easy bruising    Esophageal reflux    Fatigue    Hearing impairment    Hearing loss    Heart attack ([facility])    High blood pressure    High cholesterol    History of MI (myocardial infarction)    HTN (hypertension)    Hyperlipidemia    Leg swelling    Loss of appetite    PAD (peripheral artery disease) ([facility])    Peripheral vascular disease ([facility])    Uncomfortable fullness after meals    Visual impairment    glasses    Wears glasses              Past Surgical History:   Procedure Laterality Date    Angiogram      Angioplasty (coronary)      Cabg      Fracture surgery Left     left hip pinning    Other surgical history Left 01/01/1977    knee surgery    Other surgical history  03/25/2016    Left hip ORIF pinning    Tonsillectomy                  Social History     Socioeconomic History    Marital status:    Tobacco Use    Smoking status: Never    Smokeless tobacco: Never   Vaping Use    Vaping  status: Never Used   Substance and Sexual Activity    Alcohol use: Never    Drug use: Never     Social Determinants of Health     Financial Resource Strain: Low Risk  (11/7/2023)    Received from [facility] [employer], [facility]    Financial Resource Strain     In the past year, have you or any family members you live with been unable to get any of the following when it was really needed? Check all that apply.: None   Food Insecurity: No Food Insecurity (7/22/2024)    Food Insecurity     Food Insecurity: Never true   Transportation Needs: No Transportation Needs (7/22/2024)    Transportation Needs     Lack of Transportation: No   Physical Activity: High Risk (7/31/2024)    Received from [facility]    Exercise Vital Sign     On average, how many days per week do you engage in moderate to strenuous exercise (like a brisk walk)?: 0 days     On average, how many minutes do you engage in exercise at this level?: 0 min   Social Connections: Low Risk  (11/7/2023)    Received from [facility] [employer], [facility]    Social Connections     How often do you see or talk to people that you care about and feel close to? (For example: talking to friends on the phone, visiting friends or family, going to [employer] or club meetings): 5 or more times a week   Housing Stability: Low Risk  (7/22/2024)    Housing Stability     Housing Instability: No              Review of Systems    Positive for stated Chief Complaint: Fatigue    Other systems are as noted in HPI.  Constitutional and vital signs reviewed.      All other systems reviewed and negative except as noted above.    Physical Exam     ED Triage Vitals   BP 09/26/24 1743 (!) 81/52   Pulse 09/26/24 1743 96   Resp 09/26/24 1743 20   Temp 09/26/24 1743 97.9 °F (36.6 °C)   Temp src 09/26/24 1743 Temporal   SpO2 09/26/24 1748 94 %   O2 Device 09/26/24 1748 None (Room air)       Current Vitals:   Vital Signs  BP: (!) 80/51  Pulse: 74  Resp:  16  Temp: 97.9 °F (36.6 °C)  Temp src: Temporal  MAP (mmHg): (!) 62    Oxygen Therapy  SpO2: 95 %  O2 Device: None (Room air)            Physical Exam  General: 89-year-old male who appears tired but he is answering questions appropriately.  HEENT: His pupils react to light extract motions are intact.  Oral mucosa is moist tongue is midline.  Neck is supple.  Lungs: Crackles in the lung bases noted with good air entry noted.  Cardiac: Heart rate of 90 normal S1 and 2 with occasional ectopy.  Midline thoracic scar from previous cardiac history   abdomen: Soft without tenderness.  Extremities: He does have edema left lower extremity greater than right.    ED Course     Labs Reviewed   COMP METABOLIC PANEL (14) - Abnormal; Notable for the following components:       Result Value    Glucose 133 (*)     Sodium 135 (*)     BUN 28 (*)     Creatinine 1.58 (*)     eGFR-Cr 42 (*)     All other components within normal limits   CBC WITH DIFFERENTIAL WITH PLATELET - Abnormal; Notable for the following components:    WBC 14.5 (*)     HGB 10.5 (*)     HCT 33.0 (*)     Neutrophil Absolute Prelim 12.28 (*)     Neutrophil Absolute 12.28 (*)     Lymphocyte Absolute 0.79 (*)     Monocyte Absolute 1.29 (*)     All other components within normal limits   URINALYSIS WITH CULTURE REFLEX - Abnormal; Notable for the following components:    Leukocyte Esterase Urine 250 (*)     WBC Urine 21-50 (*)     RBC Urine 3-5 (*)     Squamous Epi. Cells Few (*)     Hyaline Casts Present (*)     All other components within normal limits   POCT GLUCOSE - Abnormal; Notable for the following components:    POC Glucose 140 (*)     All other components within normal limits   TROPONIN I HIGH SENSITIVITY - Normal   LACTIC ACID, PLASMA - Normal   RAINBOW DRAW LAVENDER   RAINBOW DRAW LIGHT GREEN   RAINBOW DRAW BLUE   BLOOD CULTURE   BLOOD CULTURE   URINE CULTURE, ROUTINE     EKG    Rate, intervals and axes as noted on EKG Report.  Rate: 97  Rhythm: Atrial  Fibrillation  Reading: Atrial fibrillation with premature ventricular complexes.  Nonspecific ST-T wave changes         Narrative  PROCEDURE:  XR CHEST AP PORTABLE  (CPT=71045)     TECHNIQUE:  AP chest radiograph was obtained.     COMPARISON:  [facility] , XR, XR CHEST AP PORTABLE  (CPT=71045), 3/28/2024, 11:21 AM.     INDICATIONS:  Weakness     PATIENT STATED HISTORY: (As transcribed by Technologist)  Patients family member states he has fatigue and a low blood pressure today.         FINDINGS:  There is patchy airspace consolidation in the mid to upper right lung and to a lesser extent left lower lung that is concerning for multifocal pneumonia.  Clinical correlation recommended along with follow-up until complete resolution.  Median   sternotomy changes noted.  Cardiomegaly with normal pulmonary vascularity.  Degenerative changes the spine with rightward curvature.  No pleural effusion or pneumothorax.                  Impression  CONCLUSION:  There is patchy airspace consolidation in the mid to upper right lung and to a lesser extent left lower lung that is concerning for multifocal pneumonia.  Clinical correlation recommended along with follow-up until complete resolution.          LOCATION:  [id number]                 Dictated by (CST): Delfin Trujillo MD on 9/26/2024 at 7:25 PM      Finalized by (CST): Delfin Trujillo MD on 9/26/2024 at 7:26 PM        Narrative  PROCEDURE:  CT BRAIN OR HEAD (80030)     COMPARISON:  [facility] , CT, CT BRAIN OR HEAD (13310), 9/28/2015, 9:09 AM.     INDICATIONS:  Weakness     TECHNIQUE:  Noncontrast CT scanning is performed through the brain. Dose reduction techniques were used. Dose information is transmitted to the ACR (American College of Radiology) NRDR (National Radiology Data Registry) which includes the Dose Index  Registry.     PATIENT STATED HISTORY: (As transcribed by Technologist)  Patient with weakness and headache.      FINDINGS:  Mild global brain parenchymal volume loss  without overt hydrocephalus.  There is no midline shift or mass-effect.  The basal cisterns are patent.  The gray-white matter differentiation is intact.     There is no acute intracranial hemorrhage or extra-axial fluid collection.  No evidence of acute territorial infarction.  Mild age-indeterminate microvascular ischemic changes in the cerebral white matter.     There is no evident fracture.  Mild ethmoid and trace maxillary mucosal thickening.  Small right maxillary retention cyst.  The mastoid air cells are unremarkable.  Degenerative pannus formation along the C1-2 articulation.                     Impression  CONCLUSION:  No acute intracranial abnormality identified.  Mild age-indeterminate microvascular ischemic changes in the cerebral white matter. If there is clinical concern for acute ischemia/infarction, an MRI of the brain would be recommended for  further evaluation.           LOCATION:  [facility]        Dictated by (CST): Delfin Trujillo MD on 9/26/2024 at 8:51 PM      Finalized by (CST): Delfin Trujillo MD on 9/26/2024 at 8:53 PM             MDM    Differential diagnosis includes but is not limited to intracranial bleed, sepsis from pneumonia, sepsis from urinary tract infection, electrolyte abnormality, acute kidney injury, hypoglycemia.  The patient had laboratories that were sent.  Lactic acid was 1.3.  White cell count of 14,500 hemoglobin of 10.5 hematocrit of 33.  Urinalysis 21-50 white blood cells leukocyte esterase but no bacteria seen.  Glucose of 133 sodium of 135.  BUN of 28 creatinine of 1.5.  Troponin of 14.  Admission disposition: 9/26/2024  9:57 PM       Chest x-ray performed  I reviewed the x-rays and agree with the radiologist report that showed there is patchy airspace consolidation in the mid to upper right lung and to a lesser extent left lower lung that is concerning for multifocal pneumonia.  CT scan of the brain performed  I reviewed the x-rays and agree with the radiologist  report that showed no acute intracranial abnormality identified.  Mild age-indeterminate microvascular ischemic changes in the cerebral white matter.  The patient received IV fluids.  He does have a history of congestive heart failure.    Patient did not receive 30ml/kg of fluids despite having: hypotension. The reason for doing so is: patient has heart failure. 2000mL of fluids were given instead (2000ml is the amount of fluids given).   The patient continued to have labile blood pressures so we did elect to start the patient on Levophed.  He received azithromycin and ceftriaxone.  Looks like there is a combination of both pneumonia and urinary tract infection leading to the patient's encephalopathy.  Spoke to the hospitalist service as well as the ICU service the patient was admitted for continued care.     A total of 60 minutes of critical care time (exclusive of billable procedures) was administered to manage the patient's unstable vital signs due to his labile blood pressures from his SIRS syndrome due to pneumonia and urinary tract infection.  This involved direct patient intervention, complex decision making, and/or extensive discussions with the patient, family, and clinical staff.                Medical Decision Making      Disposition and Plan     Clinical Impression:  1. SIRS (systemic inflammatory response syndrome) (HCC)    2. Encephalopathy acute    3. Multifocal pneumonia    4. Acute cystitis without hematuria         Disposition:  Admit  9/26/2024  9:57 pm    Follow-up:  No follow-up provider specified.        Medications Prescribed:  Current Discharge Medication List                            Hospital Problems       Present on Admission  Date Reviewed: 9/16/2024            ICD-10-CM Noted POA    * (Principal) SIRS (systemic inflammatory response syndrome) (HCC) R65.10 9/26/2024 Unknown    Anemia D64.9 9/26/2024 Yes

## 2024-09-27 NOTE — PHYSICAL THERAPY NOTE
PHYSICAL THERAPY EVALUATION - INPATIENT     Room Number: 466/466-A  Evaluation Date: 9/27/2024  Type of Evaluation: Initial  Physician Order: PT Eval and Treat    Presenting Problem: weakness, sepsis, pneumonia  Co-Morbidities : nonhealing R foot surgical wound, CAD s/p CABG, Afib, CHF, achalasia, PAD  Reason for Therapy: Mobility Dysfunction and Discharge Planning    PHYSICAL THERAPY ASSESSMENT   Patient is currently functioning near baseline with bed mobility, transfers, and gait.  Prior to admission, patient's baseline is MOD I.  Patient is requiring contact guard assist as a result of the following impairments: impaired standing balance, decreased muscular endurance, medical status, and hypotension . Physical Therapy will continue to follow for duration of hospitalization.      Patient will benefit from continued skilled PT Services at discharge to promote prior level of function and safety with additional support and return home with home health PT.    PLAN  PT Treatment Plan: Bed mobility;Endurance;Energy conservation;Patient education;Gait training;Strengthening;Balance training;Transfer training  Rehab Potential : Fair  Frequency (Obs): 3-5x/week  Number of Visits to Meet Established Goals: 5      CURRENT GOALS    Goal #1 Pt will ascend/descend 1 flight of stairs safely with supervision     Goal #2 Patient is able to demonstrate transfers EOB to/from C at assistance level: supervision     Goal #3 Patient is able to ambulate 25 feet with assist device: walker - rolling at assistance level: supervision     Goal #4    Goal #5    Goal #6    Goal Comments: Goals established on 9/27/2024      PHYSICAL THERAPY MEDICAL/SOCIAL HISTORY  History related to current admission: Patient is a 89 year old male admitted on 9/26/2024 from home for AMS and weakness.  Pt diagnosed with sepsis secondary to UTI and pneumonia.    Recent admissions:  7/22-7/25/24: sepsis (from home and [place] home)  3/28-4/3/24: sepsis, pneumonia  (from home and [place] home)    HOME SITUATION  Type of Home: House   Home Layout: Two level                Lives With: Alone  Drives: No  Patient Owned Equipment: Rolling walker;Wheelchair       Prior Level of [place]: Pt lives alone in 2 story home. Pt independent with ADL and mobility. Pt ambulates household distances with RW and also uses W/C. Pt reports sleeping in adjustable bed at home. Pt recently purchased sit-stand recliner lift chair which he plans on sleeping in. Pt ascend/descends stairs every other day to shower. Pts family visits most days.     SUBJECTIVE  \"I feel fine. I will be fine at home.\"       OBJECTIVE  Precautions:  (hypotensive)  Fall Risk: High fall risk    WEIGHT BEARING RESTRICTION  Weight Bearing Restriction: R lower extremity        R Lower Extremity:  (minimal WB/limit ambulation distance)       PAIN ASSESSMENT  Ratin          COGNITION  Overall Cognitive Status:  WFL - within functional limits  Safety Judgement:  decreased awareness of need for safety  Awareness of Errors:  decreased awareness of errors     RANGE OF MOTION AND STRENGTH ASSESSMENT  Upper extremity ROM and strength are within functional limits     Lower extremity ROM is within functional limits     Lower extremity strength is within functional limits       BALANCE  Static Sitting: Good  Dynamic Sitting: Good  Static Standing: Fair -  Dynamic Standing: Poor +    ADDITIONAL TESTS                                    ACTIVITY TOLERANCE      BP sittin/54   BP standin/58  Denies dizziness                  O2 WALK       NEUROLOGICAL FINDINGS                        AM-PAC '6-Clicks' INPATIENT SHORT FORM - BASIC MOBILITY  How much difficulty does the patient currently have...  Patient Difficulty: Turning over in bed (including adjusting bedclothes, sheets and blankets)?: A Little   Patient Difficulty: Sitting down on and standing up from a chair with arms (e.g., wheelchair, bedside commode, etc.): A Little   Patient  Difficulty: Moving from lying on back to sitting on the side of the bed?: A Little   How much help from another person does the patient currently need...   Help from Another: Moving to and from a bed to a chair (including a wheelchair)?: A Little   Help from Another: Need to walk in hospital room?: A Little   Help from Another: Climbing 3-5 steps with a railing?: A Little       AM-PAC Score:  Raw Score: 18   Approx Degree of Impairment: 46.58%   Standardized Score (AM-PAC Scale): 43.63   CMS Modifier (G-Code): CK    FUNCTIONAL ABILITY STATUS  Gait Assessment   Functional Mobility/Gait Assessment  Distance (ft): 15  Assistive Device: Rolling walker    Skilled Therapy Provided   Sit-stand from chair with RW and CGA for safety.   Ambulatory ~ 15ft with RW and CGA for safety.   Ambulates with excessive kyphosis, step to pattern, and decreased stance time on R LE.   VC for sequencing with RW and posture.   Returned to sitting in bedside recliner.     Bed Mobility:  Rolling: NT  Supine to sit: NT   Sit to supine: NT     Transfer Mobility:  Sit to stand: CGA   Stand to sit: CGA  Gait = CGA    Therapist's Comments: Reviewed activity recommendations. Discussed home safety incluing life alert button, having family present with performing stair negotiation and showering, and HHPT. Pt declines need for any of the above recommendations.     Exercise/Education Provided:  Energy conservation  Functional activity tolerated  Gait training  Posture  Strengthening  Transfer training    Patient End of Session: Up in chair;Needs met;Call light within reach;RN aware of session/findings;All patient questions and concerns addressed      Patient Evaluation Complexity Level:  History High - 3 or more personal factors and/or co-morbidities   Examination of body systems Moderate - addressing a total of 3 or more elements   Clinical Presentation  Moderate - Evolving   Clinical Decision Making Moderate Complexity       PT Session Time: 30  minutes  Gait Training: 10 minutes  Therapeutic Activity:  minutes  Neuromuscular Re-education:  minutes  Therapeutic Exercise:  minutes

## 2024-09-27 NOTE — PLAN OF CARE
GCS 15. VSS on room air. Afebrile. Weaned off levo gtt. External catheter in place, UOP QS. IS encouraged, patient achieving goal. Comfort and safety maintained.    Problem: CARDIOVASCULAR - ADULT  Goal: Maintains optimal cardiac output and hemodynamic stability  Description: INTERVENTIONS:  - Monitor vital signs, rhythm, and trends  - Monitor for bleeding, hypotension and signs of decreased cardiac output  - Evaluate effectiveness of vasoactive medications to optimize hemodynamic stability  - Monitor arterial and/or venous puncture sites for bleeding and/or hematoma  - Assess quality of pulses, skin color and temperature  - Assess for signs of decreased coronary artery perfusion - ex. Angina  - Evaluate fluid balance, assess for edema, trend weights  Outcome: Progressing  Goal: Absence of cardiac arrhythmias or at baseline  Description: INTERVENTIONS:  - Continuous cardiac monitoring, monitor vital signs, obtain 12 lead EKG if indicated  - Evaluate effectiveness of antiarrhythmic and heart rate control medications as ordered  - Initiate emergency measures for life threatening arrhythmias  - Monitor electrolytes and administer replacement therapy as ordered  Outcome: Progressing     Problem: RESPIRATORY - ADULT  Goal: Achieves optimal ventilation and oxygenation  Description: INTERVENTIONS:  - Assess for changes in respiratory status  - Assess for changes in mentation and behavior  - Position to facilitate oxygenation and minimize respiratory effort  - Oxygen supplementation based on oxygen saturation or ABGs  - Provide Smoking Cessation handout, if applicable  - Encourage broncho-pulmonary hygiene including cough, deep breathe, Incentive Spirometry  - Assess the need for suctioning and perform as needed  - Assess and instruct to report SOB or any respiratory difficulty  - Respiratory Therapy support as indicated  - Manage/alleviate anxiety  - Monitor for signs/symptoms of CO2 retention  Outcome: Progressing      Problem: GASTROINTESTINAL - ADULT  Goal: Minimal or absence of nausea and vomiting  Description: INTERVENTIONS:  - Maintain adequate hydration with IV or PO as ordered and tolerated  - Nasogastric tube to low intermittent suction as ordered  - Evaluate effectiveness of ordered antiemetic medications  - Provide nonpharmacologic comfort measures as appropriate  - Advance diet as tolerated, if ordered  - Obtain nutritional consult as needed  - Evaluate fluid balance  Outcome: Progressing     Problem: GENITOURINARY - ADULT  Goal: Absence of urinary retention  Description: INTERVENTIONS:  - Assess patient’s ability to void and empty bladder  - Monitor intake/output and perform bladder scan as needed  - Follow urinary retention protocol/standard of care  - Consider collaborating with pharmacy to review patient's medication profile  - Implement strategies to promote bladder emptying  Outcome: Progressing     Problem: METABOLIC/FLUID AND ELECTROLYTES - ADULT  Goal: Glucose maintained within prescribed range  Description: INTERVENTIONS:  - Monitor Blood Glucose as ordered  - Assess for signs and symptoms of hyperglycemia and hypoglycemia  - Administer ordered medications to maintain glucose within target range  - Assess barriers to adequate nutritional intake and initiate nutrition consult as needed  - Instruct patient on self management of diabetes  Outcome: Progressing  Goal: Electrolytes maintained within normal limits  Description: INTERVENTIONS:  - Monitor labs and rhythm and assess patient for signs and symptoms of electrolyte imbalances  - Administer electrolyte replacement as ordered  - Monitor response to electrolyte replacements, including rhythm and repeat lab results as appropriate  - Fluid restriction as ordered  - Instruct patient on fluid and nutrition restrictions as appropriate  Outcome: Progressing  Goal: Hemodynamic stability and optimal renal function maintained  Description: INTERVENTIONS:  - Monitor  labs and assess for signs and symptoms of volume excess or deficit  - Monitor intake, output and patient weight  - Monitor urine specific gravity, serum osmolarity and serum sodium as indicated or ordered  - Monitor response to interventions for patient's volume status, including labs, urine output, blood pressure (other measures as available)  - Encourage oral intake as appropriate  - Instruct patient on fluid and nutrition restrictions as appropriate  Outcome: Progressing     Problem: SKIN/TISSUE INTEGRITY - ADULT  Goal: Skin integrity remains intact  Description: INTERVENTIONS  - Assess and document risk factors for pressure ulcer development  - Assess and document skin integrity  - Monitor for areas of redness and/or skin breakdown  - Initiate interventions, skin care algorithm/standards of care as needed  Outcome: Progressing     Problem: HEMATOLOGIC - ADULT  Goal: Maintains hematologic stability  Description: INTERVENTIONS  - Assess for signs and symptoms of bleeding or hemorrhage  - Monitor labs and vital signs for trends  - Administer supportive blood products/factors, fluids and medications as ordered and appropriate  - Administer supportive blood products/factors as ordered and appropriate  Outcome: Progressing     Problem: NEUROLOGICAL - ADULT  Goal: Achieves stable or improved neurological status  Description: INTERVENTIONS  - Assess for and report changes in neurological status  - Initiate measures to prevent increased intracranial pressure  - Maintain blood pressure and fluid volume within ordered parameters to optimize cerebral perfusion and minimize risk of hemorrhage  - Monitor temperature, glucose, and sodium. Initiate appropriate interventions as ordered  Outcome: Progressing

## 2024-09-27 NOTE — H&P
[facility]     Alejandro Guardado Patient Status:  Emergency    1935 MRN [id number]   [facility] EMERGENCY DEPARTMENT Attending Moris Felipe MD   Hosp Day # 0 PCP Stanislav Odonnell MD     Chief Complaint: AMS / weakness    Subjective:    History of Present Illness:     Alejandro Guardado is a 89 year old male with presents to the hospital with weakness and altered mental status.  Patient lives at home by himself.  Patient's daughter at the bedside help with HPI.  Patient reportedly was in his normal state of health 2 days ago.  Patient had multiple admissions for recurrent aspiration pneumonia.  Patient over the last 1 day has gotten profoundly weak and has started to cough.  Daughter states this is always the case when he gets \"aspiration pneumonia\".  No overt fevers or chills nausea vomiting.  No chest pain.      History/Other:    Past Medical History:  Past Medical History:    Amputation of right foot (HCC)    non-healing. Followed by [facility] clinic    Atrial fibrillation (HCC)    CAD (coronary artery disease)    CHF (congestive heart failure) (HCC)    Easy bruising    Esophageal reflux    Fatigue    Hearing impairment    Hearing loss    Heart attack (HCC)    High blood pressure    High cholesterol    History of MI (myocardial infarction)    HTN (hypertension)    Hyperlipidemia    Leg swelling    Loss of appetite    PAD (peripheral artery disease) (HCC)    Peripheral vascular disease (HCC)    Uncomfortable fullness after meals    Visual impairment    glasses    Wears glasses     Past Surgical History:   Past Surgical History:   Procedure Laterality Date    Angiogram      Angioplasty (coronary)      Cabg      Fracture surgery Left     left hip pinning    Other surgical history Left 1977    knee surgery    Other surgical history  2016    Left hip ORIF pinning    Tonsillectomy        Family History:   Family History   Problem Relation Age of Onset    Cancer  Father      Social History:    reports that he has never smoked. He has never used smokeless tobacco. He reports that he does not drink alcohol and does not use drugs.     Allergies:   Allergies   Allergen Reactions    Heparin ANAPHYLAXIS    Hydromorphone HALLUCINATION       Medications:    Current Facility-Administered Medications on File Prior to Encounter   Medication Dose Route Frequency Provider Last Rate Last Admin    [COMPLETED] potassium chloride (Klor-Con M20) tab 40 mEq  40 mEq Oral Q4H Zackary Mcneill MD   40 mEq at 07/24/24 1409    [COMPLETED] sodium chloride 0.9 % IV bolus 500 mL  500 mL Intravenous Once Alejandro Novoa,  500 mL/hr at 07/22/24 0500 500 mL at 07/22/24 0500    [COMPLETED] potassium chloride (Klor-Con M20) tab 40 mEq  40 mEq Oral Once Sherwin Samuel MD   40 mEq at 07/22/24 0857    [COMPLETED] magnesium oxide (Mag-Ox) tab 400 mg  400 mg Oral Once Sherwin Samuel MD   400 mg at 07/22/24 0857    [COMPLETED] sodium chloride 0.9 % IV bolus 2,721 mL  30 mL/kg Intravenous Once Chon Whitehead DO   Stopped at 07/21/24 2235    [COMPLETED] piperacillin-tazobactam (Zosyn) 4.5 g in dextrose 5% 100 mL IVPB-ADDV  4.5 g Intravenous Once Chon Whitehead DO   Stopped at 07/21/24 2044     Current Outpatient Medications on File Prior to Encounter   Medication Sig Dispense Refill    gabapentin 100 MG Oral Cap Take 2 capsules (200 mg total) by mouth in the morning and 2 capsules (200 mg total) before bedtime. 60 capsule 0    collagenase 250 UNIT/GM External Ointment Apply 1 Application topically daily. 30 g 0    amoxicillin clavulanate 875-125 MG Oral Tab Take 1 tablet by mouth 2 (two) times daily. 6 tablet 0    metoprolol succinate ER 25 MG Oral Tablet 24 Hr Take 2 tablets (50 mg total) by mouth Daily Beta Blocker. 60 tablet 0    finasteride 5 MG Oral Tab Take 1 tablet (5 mg total) by mouth daily. 90 tablet 0    furosemide 40 MG Oral Tab Take 2 tablets (80 mg total) by mouth 2 (two) times  daily.      lisinopril 2.5 MG Oral Tab Take 1 tablet (2.5 mg total) by mouth daily.      Ascorbic Acid (VITAMIN C) 1000 MG Oral Tab Take 1.5 tablets (1,500 mg total) by mouth daily.      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      tamsulosin 0.4 MG Oral Cap Take 1 capsule (0.4 mg total) by mouth daily.      apixaban 5 MG Oral Tab Take 1 tablet (5 mg total) by mouth 2 (two) times daily. 60 tablet 3    atorvastatin 40 MG Oral Tab Take 1 tablet (40 mg total) by mouth daily. 30 tablet 0    clopidogrel 75 MG Oral Tab Take 1 tablet (75 mg total) by mouth daily. 30 tablet 0    predniSONE 5 MG Oral Tab Take 3 tablets (15 mg total) by mouth daily.      folic acid 1 MG Oral Tab Take 1 tablet (1 mg total) by mouth daily.      acetaminophen 500 MG Oral Tab Take 2 tablets (1,000 mg total) by mouth every 8 (eight) hours. 21 tablet 0    Omeprazole 40 MG Oral Capsule Delayed Release Take 1 capsule (40 mg total) by mouth daily.         Review of Systems:   A comprehensive review of systems was completed.    Pertinent positives and negatives noted in the HPI.    Objective:   Physical Exam:    /68   Pulse 92   Temp 97.9 °F (36.6 °C) (Temporal)   Resp 14   SpO2 95%   General: No acute distress, Axox1-2  Respiratory: No rhonchi, no wheezes  Cardiovascular: S1, S2. Regular rate and rhythm  Abdomen: Soft, Non-tender, non-distended, positive bowel sounds  Neuro: No new focal deficits  Extremities: No edema      Results:    Labs:      Labs Last 24 Hours:    Recent Labs   Lab 09/26/24  1744   RBC 4.03   HGB 10.5*   HCT 33.0*   MCV 81.9   MCH 26.1   MCHC 31.8   RDW 15.9   NEPRELIM 12.28*   WBC 14.5*   .0       Recent Labs   Lab 09/26/24  1744   *   BUN 28*   CREATSERUM 1.58*   EGFRCR 42*   CA 8.7   ALB 3.5   *   K 3.7      CO2 26.0   ALKPHO  61   AST 15   ALT 13   BILT 0.9   TP 5.8       Lab Results   Component Value Date    INR 1.45 (H) 07/21/2024    INR 1.51 (H) 03/28/2024    INR 1.35 (H) 02/12/2024       Recent Labs   Lab 09/26/24  1744   TROPHS 14       No results for input(s): \"TROP\", \"PBNP\" in the last 168 hours.    No results for input(s): \"PCT\" in the last 168 hours.    Imaging: Imaging data reviewed in Epic.    Assessment & Plan:      #Recurrent multifocal pna  #Aspiration PNA  #Complicated UTI  -IV Zosyn (9/26-)  -Follow-up blood/urine/sputum culture  -Pulmonary to see  -ST to see  -soft bp, stress dose steroids for now    #ERON  #Hypovolemic hyponatremia  -ivf x10 hrs, pt has lvef 20-25% and clinically has LE edema, on RA    #Acute metabolic encephalopathy  -Secondary to above  -Therapies to see  lives at home by himself    #Chronic HFrEF  #Ischemic cardiomyopathy  -LVEF 20-25%    #Secondary adrenal insufficiency    #Atrial fibrillation, paroxysmal    #Coronary artery disease s/p CABG    #Hyperlipidemia    #BPH    #Peripheral arterial disease with history of nonhealing right foot wound s/p thrombectomy and revascularization/stent in December 2023    #ACP  -DNR, 18 mins spent face ot face w/ patient and patient's family at the bedside. This was a voluntary conversation. We reviewed terms like cardiac arrest and the use of acls/cpr. Order updated on [facility].   -recurrent hospitalizations, palliative care team to see        Plan of care discussed with ER physician and patient/patient's family    Albert Lee DO    Supplementary Documentation:     The 21st Century Cures Act makes medical notes like these available to patients in the interest of transparency. Please be advised this is a medical document. Medical documents are intended to carry relevant information, facts as evident, and the clinical opinion of the practitioner. The medical note is intended as peer to peer communication and may appear blunt or direct. It is written in medical language  and may contain abbreviations or verbiage that are unfamiliar.

## 2024-09-27 NOTE — SLP NOTE
ADULT SWALLOWING EVALUATION    ASSESSMENT    ASSESSMENT/OVERALL IMPRESSION:  Order received, chart reviewed. Patient admitted with recurrent multifocal PNA and sepsis. WBC and procalcitonin elevated. CXR reported consolidation in mid to upper right lung and left lower lung multifocal PNA.   Patient lives alone however daughter nearby to assist. Patient with significant medical history including achalasia, recurrent pneumonias, prior laparoscopic esophagectomy + Toupet fundoplication many years ago.  Patient remains in ICU and currently NPO.  RN requested for SLP to proceed.     Patient received awake, alert, and pleasant. Seated upright in bedside chair. Patient recounting recent history however ?reliability. He reported he sleeps flat but should probably sleep sitting up.  Patient reported no difficulty swallowing.  Oral motor exam WFL. Vocal quality clear.     Patient exhibited no overt clinical s/s aspiration or oropharyngeal dysphagia.  Previous VFSS completed Feb 2024 with functional oropharyngeal swallow, and recommended regular diet with thin liquids.  Multiple bedside swallow evaluations since then also recommended the same.   Consider GI consult for further evaluation of lower esophagus/GI function as well as ?r/o TE fistula given complicated GI history.  D/W ICU APN.        RECOMMENDATIONS   Diet Recommendations - Solids: Regular  Diet Recommendations - Liquids: Thin Liquids  Compensatory Strategies Recommended: Small bites and sips  Aspiration Precautions: Upright position,  Small bites and sips, Small meals at a time  Medication Administration Recommendations: No restrictions  Treatment Plan/Recommendations:  (Consider follow-up GI service? for further direction given GI history)    HISTORY   MEDICAL HISTORY  Reason for Referral: R/O aspiration    Problem List  Principal Problem:    SIRS (systemic inflammatory response syndrome) (HCC)  Active Problems:    Sepsis with acute renal failure and septic shock  (HCC)    Multifocal pneumonia    Anemia    Encephalopathy acute    Acute cystitis without hematuria    HCAP (healthcare-associated pneumonia)    Transient alteration of awareness    Adrenal insufficiency ([place] disease) (HCC)      Past Medical History  Past Medical History:    Amputation of right foot (HCC)    non-healing. Followed by [facility] clinic    Anemia    Atrial fibrillation (HCC)    CAD (coronary artery disease)    CHF (congestive heart failure) (HCC)    Easy bruising    Encephalopathy acute    Esophageal reflux    Fatigue    Hearing impairment    Hearing loss    Heart attack (HCC)    High blood pressure    High cholesterol    History of MI (myocardial infarction)    HTN (hypertension)    Hyperlipidemia    Leg swelling    Loss of appetite    PAD (peripheral artery disease) (HCC)    Peripheral vascular disease (HCC)    Uncomfortable fullness after meals    Visual impairment    glasses    Wears glasses       Prior Living Situation: Home alone  Diet Prior to Admission: Regular;Thin liquids       Patient/Family Goals: to eat and drink    SWALLOWING HISTORY  Current Diet Consistency: NPO    Dysphagia History: as per above    Imaging Results: CXR  There is patchy airspace consolidation in the mid to upper right lung and to a lesser extent left lower lung that is concerning for multifocal pneumonia.  Clinical correlation recommended along with follow-up until complete resolution.  Median    sternotomy changes noted.  Cardiomegaly with normal pulmonary vascularity.  Degenerative changes the spine with rightward curvature.  No pleural effusion or pneumothorax.     OBJECTIVE   ORAL MOTOR EXAMINATION  Dentition: Functional  Symmetry: Within Functional Limits  Strength: Within Functional Limits  Tone: Within Functional Limits  Range of Motion: Within Functional Limits  Rate of Motion: Within Functional Limits    Voice Quality: Clear  Respiratory Status: Unlabored  Consistencies Trialed: Thin liquids;Puree;Hard  solid  Method of Presentation: Self presentation  Patient Positioning: Upright;Midline    Oral Phase of Swallow: Within Functional Limits     Pharyngeal Phase of Swallow: Within Functional Limits           (Please note: Silent aspiration cannot be evaluated clinically. Videofluoroscopic Swallow Study is required to rule-out silent aspiration.)    Esophageal Phase of Swallow: No complaints consistent with possible esophageal involvement              FOLLOW UP  Treatment Plan/Recommendations:  (Consider follow-up GI service? for further direction given GI history; R/O TE Fistula?)     Follow Up Needed (Documentation Required): No       Thank you for your referral.   If you have any questions, please contact Joy Cole, SLP  Joy Cole, MS CCC-SLP/L, pager [phone]  Speech-LanguagePathologist

## 2024-09-27 NOTE — PROGRESS NOTES
CRITICAL CARE            S: He denies shortness of breath or cough. He was weaned off norepinephrine this am.     Meds:   hydrocortisone sodium succinate  60 mg Intravenous Daily    piperacillin-tazobactam  3.375 g Intravenous Q8H    apixaban  5 mg Oral BID    atorvastatin  40 mg Oral Daily    clopidogrel  75 mg Oral Daily    finasteride  5 mg Oral Daily    folic acid  1 mg Oral Daily    pantoprazole  40 mg Oral QAM AC    tamsulosin  0.4 mg Oral Daily       Prn Meds:    acetaminophen    melatonin    ondansetron    metoclopramide    guaiFENesin    polyethylene glycol (PEG 3350)    sennosides    bisacodyl    fleet enema    Infusions:   sodium chloride 83 mL/hr at 09/27/24 0000    norepinephrine 2 mcg/min (09/26/24 2330)       OBJECTIVE:  Vitals:    09/27/24 0300 09/27/24 0315 09/27/24 0330 09/27/24 0345   BP: 115/64 113/66 109/61 103/51   Pulse: 75 73 73 63   Resp: 14 13 12 12   Temp:       TempSrc:       SpO2: 98% 98% 98% 94%   Weight:         O2: 2L        Gen - Alert, oriented x 3, in no apparent distress  Lungs - CTAB  CV - regular rate & rhythm. Normal S1, S2. No murmurs, rubs, or gallops appreciated.  Abdomen - soft, nontender to palpation   Extremities - + bilateral lower ext edema. Right leg bandaged at ankle        Labs:  Recent Labs   Lab 09/26/24 1744 09/27/24  0456   WBC 14.5* 11.2*   HGB 10.5* 10.2*   .0 231.0     Recent Labs   Lab 09/26/24  1744 09/26/24  1902 09/27/24  0456   *  --  140   K 3.7  --  3.5     --  107   CO2 26.0  --  24.0   BUN 28*  --  20   CREATSERUM 1.58*  --  1.20   *  --  154*   ANIONGAP 7  --  9   ALB 3.5  --  3.5   CA 8.7  --  8.6*   MG  --   --  2.0   ALKPHO 61  --  59   AST 15  --  11   ALT 13  --  13   BILT 0.9  --  0.6   TP 5.8  --  5.7   LACTI  --  1.3  --      Recent Labs   Lab 09/26/24  1744   TROPHS 14     Recent Labs   Lab 09/26/24 1744   PCT 0.97*     Imaging reviewed    ASSESSMENT AND PLAN      Acute hypoxemic respiratory failure - due to  aspiration pneumonia. This is his 5th admission this year for pneumonia   -wean O2  -antibiotics as below  -swallow study noted  -will need follow up with GI given hx of achalasia  Septic shock - due to aspiration pneumonia +/- UTI. Improved, off pressors  -monitor bp  -IV Zosyn  -discontinue stress dose steroids, resume home prednisone  Encephalopathy - due to infection. CT brain showed only chronic changes  -supportive care  ERON - improved  -IVF  -monitor labs, urine output   History of adrenal insufficiency  -stop stress dose steroids  -resume home prednisone  History of PAD - s/p stent, right foot transmetatarsal amputation, lower ext wounds  -wound care  Atrial fib   -anticoagulated w/ eliquis  -eventually resume home metoprolol  HFrEF (EF 20-25%), hx of CAD/CABG, Aortic stenosis  -home meds on hold due to hypotension, restart as able  FEN  -fluids: 0.9 83/h  -electrolytes: monitor electrolytes and replace prn  -nutrition: cardiac diet  Proph  -eliquis  Dispo  -DNAR/S  -Palliative care consulted. This is his 5th admission this year for pneumonia   -ICU; hopefully to floor soon        35 min critical care time     Scar Guerrero M.D.  Pulmonary/Critical Care

## 2024-09-27 NOTE — ED QUICK NOTES
Orders for admission, patient is aware of plan and ready to go upstairs. Any questions, please call ED RN Riki at extension [phone].     Patient Covid vaccination status: Fully vaccinated     COVID Test Ordered in ED: None    COVID Suspicion at Admission: N/A    Running Infusions:    norepinephrine 2 mcg/min (09/26/24 2139)        Mental Status/LOC at time of transport: x4    Other pertinent information:   CIWA score: N/A   NIH score:  N/A

## 2024-09-27 NOTE — CONSULTS
[facility]   part of [facility]  Palliative Care Initial Consult    Alejandro Guardado Patient Status:  Inpatient    1935 MRN [id number]   Location [facility] 4SW-A Attending Belen Flores MD   Hosp Day # 1 PCP Stanislav Odonnell MD   466/466-A     Date of Consult: 24       History of Present Illness:        Alejandro Guardado is a 89 year old male with PMH significant for HFrEF (EF 20-25%) ischemic cardiomyopathy, mod-severe aortic stenosis, PAD w chronic wounds, CAD s/p CABG, HTN, Afib, achalasia s/p Toupet fundoplication and esophagomyotomy w recurrent aspiration PNA, hearing and visual impairments in addition to the other conditions noted below, presented to ED on 2024 with CC of weakness, dizziness, AMS. He was found to be hypotensive and workup revealed recurrent multifocal PNA possibly 2/2 aspiration complicated UTI, ERON, hypovolemia. He initially required pressors in ICU, now off.  Participated in case review during ICU rounds, noting SLP clears for general diet, thin liquids and c/f h/o achalasia being considered - case d/w SLP.    Our palliative service was asked by Dr. Lee to evaluate for palliative care needs and support with Goals of care discussion.    He is known to our service as we followed for GOC on 2024 hospital stay.    Today is day #1 of hospital stay. This is the 3rd hospitalization in the past 6 months.    Medical History: obtained from [facility]  Past Medical History:    Amputation of right foot (HCC)    non-healing. Followed by [facility] clinic    Anemia    Atrial fibrillation (HCC)    CAD (coronary artery disease)    CHF (congestive heart failure) (HCC)    Easy bruising    Encephalopathy acute    Esophageal reflux    Fatigue    Hearing impairment    Hearing loss    Heart attack (HCC)    High blood pressure    High cholesterol    History of MI (myocardial infarction)    HTN (hypertension)    Hyperlipidemia    Leg swelling    Loss of appetite    PAD (peripheral artery  disease) (HCC)    Peripheral vascular disease (HCC)    Uncomfortable fullness after meals    Visual impairment    glasses    Wears glasses     Past Surgical History:   Procedure Laterality Date    Angiogram      Angioplasty (coronary)      Cabg      Fracture surgery Left     left hip pinning    Other surgical history Left 01/01/1977    knee surgery    Other surgical history  03/25/2016    Left hip ORIF pinning    Tonsillectomy         Allergies:  Allergies   Allergen Reactions    Heparin ANAPHYLAXIS    Hydromorphone HALLUCINATION       Medications:     Current Facility-Administered Medications:     hydrocortisone Na succinate PF (Solu-CORTEF) injection 60 mg, 60 mg, Intravenous, Daily    piperacillin-tazobactam (Zosyn) 3.375 g in dextrose 5% 100 mL IVPB-ADDV, 3.375 g, Intravenous, Q8H    sodium chloride 0.9% infusion, , Intravenous, Continuous    acetaminophen (Tylenol Extra Strength) tab 500 mg, 500 mg, Oral, Q4H PRN    melatonin tab 3 mg, 3 mg, Oral, Nightly PRN    ondansetron (Zofran) 4 MG/2ML injection 4 mg, 4 mg, Intravenous, Q6H PRN    metoclopramide (Reglan) 5 mg/mL injection 5 mg, 5 mg, Intravenous, Q8H PRN    guaiFENesin (Robitussin) 100 MG/5 ML oral liquid 200 mg, 200 mg, Oral, Q4H PRN    polyethylene glycol (PEG 3350) (Miralax) 17 g oral packet 17 g, 17 g, Oral, Daily PRN    sennosides (Senokot) tab 17.2 mg, 17.2 mg, Oral, Nightly PRN    bisacodyl (Dulcolax) 10 MG rectal suppository 10 mg, 10 mg, Rectal, Daily PRN    fleet enema (Fleet) rectal enema 133 mL, 1 enema, Rectal, Once PRN    apixaban (Eliquis) tab 5 mg, 5 mg, Oral, BID    atorvastatin (Lipitor) tab 40 mg, 40 mg, Oral, Daily    clopidogrel (Plavix) tab 75 mg, 75 mg, Oral, Daily    finasteride (Proscar) tab 5 mg, 5 mg, Oral, Daily    folic acid (Folvite) tab 1 mg, 1 mg, Oral, Daily    pantoprazole (Protonix) DR tab 40 mg, 40 mg, Oral, QAM AC    tamsulosin (Flomax) cap 0.4 mg, 0.4 mg, Oral, Daily    norepinephrine (Levophed) 4 mg/250mL infusion  premix, 0.5-30 mcg/min, Intravenous, Continuous  No current outpatient medications on file.       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 He has never used smokeless tobacco.  reports no history of alcohol use.  reports no history of drug use.      Spiritual Assessment:   Restorationist - Parish Not Listed    HPI obtained from Epic and Evan.    SUBJECTIVE  Review of Systems - Palliative Care Symptom Assessment     Evan reports fatigue but he otherwise feels good.        Dyspnea: denies  Nausea: denies  Appetite: has been good lately     OBJECTIVE       Medications:    Current Facility-Administered Medications:     hydrocortisone Na succinate PF (Solu-CORTEF) injection 60 mg, 60 mg, Intravenous, Daily    piperacillin-tazobactam (Zosyn) 3.375 g in dextrose 5% 100 mL IVPB-ADDV, 3.375 g, Intravenous, Q8H    sodium chloride 0.9% infusion, , Intravenous, Continuous    acetaminophen (Tylenol Extra Strength) tab 500 mg, 500 mg, Oral, Q4H PRN    melatonin tab 3 mg, 3 mg, Oral, Nightly PRN    ondansetron (Zofran) 4 MG/2ML injection 4 mg, 4 mg, Intravenous, Q6H PRN    metoclopramide (Reglan) 5 mg/mL injection 5 mg, 5 mg,  Intravenous, Q8H PRN    guaiFENesin (Robitussin) 100 MG/5 ML oral liquid 200 mg, 200 mg, Oral, Q4H PRN    polyethylene glycol (PEG 3350) (Miralax) 17 g oral packet 17 g, 17 g, Oral, Daily PRN    sennosides (Senokot) tab 17.2 mg, 17.2 mg, Oral, Nightly PRN    bisacodyl (Dulcolax) 10 MG rectal suppository 10 mg, 10 mg, Rectal, Daily PRN    fleet enema (Fleet) rectal enema 133 mL, 1 enema, Rectal, Once PRN    apixaban (Eliquis) tab 5 mg, 5 mg, Oral, BID    atorvastatin (Lipitor) tab 40 mg, 40 mg, Oral, Daily    clopidogrel (Plavix) tab 75 mg, 75 mg, Oral, Daily    finasteride (Proscar) tab 5 mg, 5 mg, Oral, Daily    folic acid (Folvite) tab 1 mg, 1 mg, Oral, Daily    pantoprazole (Protonix) DR tab 40 mg, 40 mg, Oral, QAM AC    tamsulosin (Flomax) cap 0.4 mg, 0.4 mg, Oral, Daily    norepinephrine (Levophed) 4 mg/250mL infusion premix, 0.5-30 mcg/min, Intravenous, Continuous    Hematology:   Lab Results   Component Value Date    WBC 11.2 (H) 09/27/2024    HGB 10.2 (L) 09/27/2024    HCT 31.9 (L) 09/27/2024    .0 09/27/2024       Chemistry:  Lab Results   Component Value Date    CREATSERUM 1.20 09/27/2024    BUN 20 09/27/2024     09/27/2024    K 3.5 09/27/2024     09/27/2024    CO2 24.0 09/27/2024     (H) 09/27/2024    CA 8.6 (L) 09/27/2024    ALB 3.5 09/27/2024    ALKPHO 59 09/27/2024    BILT 0.6 09/27/2024    TP 5.7 09/27/2024    AST 11 09/27/2024    ALT 13 09/27/2024    MG 2.0 09/27/2024    PHOS 2.6 12/02/2023       Imaging:  CT BRAIN OR HEAD (CPT=70450)    Result Date: 9/26/2024  CONCLUSION:  No acute intracranial abnormality identified.  Mild age-indeterminate microvascular ischemic changes in the cerebral white matter. If there is clinical concern for acute ischemia/infarction, an MRI of the brain would be recommended for further evaluation.    LOCATION:  [id number]   Dictated by (CST): Delfin Trujillo MD on 9/26/2024 at 8:51 PM     Finalized by (CST): Delfin Trujillo MD on 9/26/2024 at 8:53 PM        XR CHEST AP PORTABLE  (CPT=71045)    Result Date: 9/26/2024  CONCLUSION:  There is patchy airspace consolidation in the mid to upper right lung and to a lesser extent left lower lung that is concerning for multifocal pneumonia.  Clinical correlation recommended along with follow-up until complete resolution.    LOCATION:  [id number]      Dictated by (CST): Delfin Trujillo MD on 9/26/2024 at 7:25 PM     Finalized by (CST): Delfin Trujillo MD on 9/26/2024 at 7:26 PM        Vital Signs:  Blood pressure 117/67, pulse 85, temperature 97.8 °F (36.6 °C), temperature source Temporal, resp. rate 13, weight 209 lb 14.1 oz (95.2 kg), SpO2 94%.      Supplemental O2:       Physical Exam:       GENERAL: Alert in chair. Appears stated age. NAD.  BODY HABITUS:  Body mass index is 28.46 kg/m².  HEENT: No focal deficits.   CARDIAC: HR 80s, occ pvcs per tele  RESPIRATORY: no increased effort at rest.  EXTREMITIES: + dressing to L foot wound c/w recent h/o amputation  NEUROLOGIC: alert and oriented x4. RUSS.  PSYCHIATRIC:  pleasant.  SKIN: Warm and dry.     Pre-hospital Palliative Performance Scale: (pt/family reported/per chart review): 70 %  Current Palliative Performance Scale:  70%    Palliative Performance Scale   % Ambulation Activity Level Self-Care Intake Consciousness   100 Full Normal Full   Normal Full   90 Full No disease  Normal Full Normal Full   80 Full Some disease  Normal w/effort Full Normal or  Reduced Full   70 Reduced Some disease  Can't perform job Full Normal or   Reduced Full   60 Reduced Significant disease  Can't perform hobby Occasional  Assist Normal or   Reduced Full or confused   50 Mainly sit/lie Extensive Disease  Can't do any work Partial Assist Normal or Reduced Full or confused   40 Mainly in bed Extensive Disease  Can't do any work Mainly Assist Normal or Reduced Full or confused   30 Bedbound Extensive Disease  Can't do any work Max Assist  Total Care Reduced Drowsy/confused   20 Bedbound Extensive  Disease  Can't do any work Max Assist  Total Care Minimal Drowsy/confused   10 Bedbound/coma Extensive Disease  Can't do any work  coma  Max Assist  Total Care Mouth care Drowsy or coma   0 Death     Palliative Care Assessment       Goals of Care:      Provided introduction to Palliative Care and reason for consultation to Evan - noting we have been involved in his care during 4/2024 hospital stay. Discussion included the benefits of palliative care to provide extra layer of support to patient/family who wish to continue curative or restorative medical therapies. Palliative care was differentiated from hospice philosophy and model of care by reviewing the treatment-focus with palliative care, versus comfort-focused care with hospice. I also informed that having palliative care support does not limit medical treatment options or decisions.     We reviewed palliative role in exploring QOL as defined by Evan. Evan was tearful throughout discussion today reflecting on his wife's passing 5 years ago from cancer. This came up frequently during conversation. He noted he is independent at home and wants to be able to return to his home as soon as possible. He reflects on turning 89 soon, and that he is happy w his current QOL. He tends to prefer not to be hospitalized, but he is not bothered by current hosptial stay and workup. Attempted to explain pressors, but he did not feel this was invasive or bothersome at this time. Not able to ascertain if he would want to be supported w pressors again if he was hospitalized again in the future.     Diagnostic understanding and Prognostic Awareness:  He did not share diagnostic understanding of condition in detail, though he does understand that achalasia history contributes to recurrent PNA. He thought he was doing better in recent months, but on Wednesday he ate 1 or more buckets of [facility] chicken and began to cough frequently later that day and into the next AM. After he drank almost 2  raghav, the coughing stopped.     He did indicate that he sleeps in hospital bed reclined w HOB at about 30 degrees. He is considering getting a chair to sleep more upright in.      Hopes / Goals:  Prevent further episodes of PNA.  To sleep upright in a chair he plans to buy.  To return to his home where he hopes to remain independent.  Continue spending time with family.      Concerns / Fears:  None voiced.  Advance Care Planning:    Code Status:  DNAR/Selective Treatment.  A voluntary discussion surrounding resuscitation and LST took place with Evna who confirms DNAR, DNI and no feeding tubes. I attempted x2 to discuss current low dose pressor support and to explore if he would want that done if he was hospitalized again, but Evan focuses on getting better during today's discussion. POLST on file.    HCPOA:  Document on file. Son Brandt is POA, daughter Randi is currently local and supports with decisions.    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  Evan prefers to avoid hospital stays and invasive / aggressive workup and treatment. He states he is getting better and plans to get back home by tomorrow.  He plans to get a chair to sleep in as he feels he may recline too much in his hospital bed at home, and this may lead to development of PNA.    Code Status: DNAR/Selective Treatment.  Confirmed w Evan 9/27 as  well as wishes against feeding tubes. POLST on file.     HCPOA: Son, Brandt [phone]     Psychosocial:  Emotional support provided.    Disposition:  Anticipating back home at [place].      A total of 75 minutes were spent on this consult, which included all of the following:  Chart review, direct face to face contact, history taking, physical examination, counseling and coordinating care, and documentation.     I reviewed the above with patient's RN and primary APRN.    Thank you for inviting Palliative Care Service to participate in the care of Alejandro Guardado and family.       Will follow up on Monday if pt remains hospitalized.      MATTIE Bartholomew  Palliative Care    9/27/2024  11:30 A      The 21st Century Cures Act makes medical notes like these available to patients in the interest of transparency. Please be advised this is a medical document. Medical documents are intended to carry relevant information, facts as evident, and the clinical opinion of the practitioner. The medical note is intended as a peer to peer communication, and may appear blunt or direct. It is written in medical language and may contain abbreviations or verbiage that are unfamiliar.

## 2024-09-27 NOTE — OCCUPATIONAL THERAPY NOTE
OCCUPATIONAL THERAPY EVALUATION - INPATIENT     Room Number: 466/466-A  Evaluation Date: 9/27/2024  Type of Evaluation: Initial  Presenting Problem: SIRS    Physician Order: IP Consult to Occupational Therapy  Reason for Therapy: ADL/IADL Dysfunction and Discharge Planning    OCCUPATIONAL THERAPY ASSESSMENT   Patient is currently functioning near baseline with toileting, upper body dressing, lower body dressing, grooming, eating, transfers, static standing balance, dynamic standing balance, functional standing tolerance, and energy conservation strategies. Prior to admission, patient's baseline is modified independent with self care and mobility in a two level house, goes up/down the stairs every other day either on backside or using railings in order to use shower.  Patient is requiring minimum assistance as a result of the following impairments: decreased functional reach, decreased endurance, impaired standing balance, and limited duration for weightbearing allowed on R foot  . Occupational Therapy will continue to follow for duration of hospitalization.    Recommended patient use lift alert versus smart watch with fall detection due to high fall risk and living alone. Patient refused. Recommended he call family to let them know when he is taking showers. He refused .Recommend [facility], patient also refused.     Patient will benefit from continued skilled OT Services at discharge to promote prior level of function.  Anticipate patient will return home with home health OT     History Related to Current Admission: Patient is a 89 year old male admitted on 9/26/2024 with Presenting Problem: SIRS. Co-Morbidities : nonhealing R foot surgical wound, CAD s/p CABG, Afib, CHF, achalasia, PAD    Patient admitted from home with septic shock    WEIGHT BEARING RESTRICTION  Weight Bearing Restriction: R lower extremity        R Lower Extremity:  (minimal WB/limit ambulation distance)       Recommendations for nursing staff:    Transfers: 1 person, RW  Toileting location: bathroom     EVALUATION SESSION:  Patient Start of Session: seated in bedside chair  FUNCTIONAL TRANSFER ASSESSMENT  Sit to Stand: Chair  Chair: Contact Guard Assist    BED MOBILITY     BALANCE ASSESSMENT     FUNCTIONAL ADL ASSESSMENT       ACTIVITY TOLERANCE:  BP 94/60 at rest in chair, 115/65 in standing   Pulse: 95        BP: 115/65  BP Location: Left arm  BP Method: Automatic  Patient Position: Standing    O2 SATURATIONS  Oxygen Therapy  SPO2% on Room Air at Rest: 92    COGNITION  Overall Cognitive Status:  WFL - within functional limits  NT; clearly able to make needs known and describe situation, carry conversation, appears to be WFL and at baseline    Upper Extremity   ROM: within functional limits   Strength: within functional limits   Coordination  Gross motor: wfl  Fine motor: wfl  Sensation: denied deficits    EDUCATION PROVIDED  Patient: Role of Occupational Therapy; Plan of Care; Discharge Recommendations; Fall Prevention; DME Recommendations; Compensatory ADL Techniques  Patient's Response to Education: Verbalized Understanding    Equipment used: rw, gait belt  Demonstrates functional use, Would benefit from additional trial yes     Therapist comments: Patient assisted with transfer in room and functional mobility with RW for approx 12 feet with CGA. He reported that he has sock aide that he uses \"sometimes\". OT educated patient on safety strategies and equipment he can implement at home, he refused them all. Patient expressed some sadness over the loss of his wife; emotional support given. Patient  left in chair with needs met, call light in reach    Patient End of Session: All patient questions and concerns addressed;RN aware of session/findings;Call light within reach;Needs met;Up in chair    OCCUPATIONAL PROFILE    HOME SITUATION  Type of Home: House  Home Layout: Two level  Lives With: Alone    Toilet and Equipment: Grab bar;Comfort height  toilet  Shower/Tub and Equipment: Tub-shower combo;Walk-in shower;Shower chair;Grab bar  Other Equipment: Sock aid;Reacher;Other (Comment) (WC x 3, several RW)    Occupation/Status: retired construction     Drives: No       Prior Level of Function: Modified independent in self care and mobility. Uses RW to walk short distances most of the time; mostly uses WC. Goes up/down stairs every other day to shower. Son drives and assists with homemaking and medications    SUBJECTIVE   Participatory   \"I was on there floor for about 4 hours until he came home. It 's like my body was paralyzed, I just couldn't move!\"  ( Described how he slid onto floor at home )       PAIN ASSESSMENT  Rating: Other (Comment) (no pain reported)          OBJECTIVE  Precautions:  (hypotensive)  Fall Risk: High fall risk      ASSESSMENTS    AM-PAC ‘6-Clicks’ Inpatient Daily Activity Short Form  -   Putting on and taking off regular lower body clothing?: A Lot  -   Bathing (including washing, rinsing, drying)?: A Little  -   Toileting, which includes using toilet, bedpan or urinal? : A Little  -   Putting on and taking off regular upper body clothing?: A Little  -   Taking care of personal grooming such as brushing teeth?: None  -   Eating meals?: None    AM-PAC Score:  Score: 19  Approx Degree of Impairment: 42.8%  Standardized Score (AM-PAC Scale): 40.22    ADDITIONAL TESTS     NEUROLOGICAL FINDINGS      COGNITION ASSESSMENTS       PLAN  OT Treatment Plan: Functional transfer training;ADL training;Energy conservation/work simplification techniques;Patient/Family training;Equipment eval/education;Patient/Family education;Endurance training;Compensatory technique education  Rehab Potential : Fair  Frequency: 3-5x/week  Number of Visits to Meet Established Goals: 3    ADL Goals   Patient will perform grooming: with stand by assist and while standing at sink  Patient will perform upper body dressing:  with setup, with supervision, and while in  chair  Patient will perform lower body dressing:  with setup, with supervision, and with adaptive equipment PRN  Patient will perform toileting: with stand by assist    Functional Transfer Goals  Patient will transfer to toilet:  with supervision      Patient Evaluation Complexity Level:   Occupational Profile/Medical History MODERATE - Expanded review of history including review of medical or therapy record   Specific performance deficits impacting engagement in ADL/IADL MODERATE  3 - 5 performance deficits   Client Assessment/Performance Deficits MODERATE - Comorbidities and min to mod modifications of tasks    Clinical Decision Making LOW - Analysis of occupational profile, problem-focused assessments, limited treatment options    Overall Complexity LOW     OT Session Time: 23 minutes  Self-Care Home Management: 8 minutes  Therapeutic Activity: 10 minutes

## 2024-09-27 NOTE — CONSULTS
[facility]  Report of Inpatient Wound Care Consultation    Alejandro Guardado Patient Status:  Inpatient    1935 MRN [id number]   Location [facility] 4SW-A Attending Belen Flores MD   Hosp Day # 1 PCP Stanislav Odonnell MD     Reason for Consultation:  wound care     History of Present Illness:  Alejandro Guardado is a a(n) 89 year old male.  Patient with multiple comorbidities, with skin breakdown described below.     Subjective: \"I am going home tomorrow\"    History:  Past Medical History:    Amputation of right foot (HCC)    non-healing. Followed by [facility] clinic    Anemia    Atrial fibrillation (HCC)    CAD (coronary artery disease)    CHF (congestive heart failure) (HCC)    Easy bruising    Encephalopathy acute    Esophageal reflux    Fatigue    Hearing impairment    Hearing loss    Heart attack (HCC)    High blood pressure    High cholesterol    History of MI (myocardial infarction)    HTN (hypertension)    Hyperlipidemia    Leg swelling    Loss of appetite    PAD (peripheral artery disease) (HCC)    Peripheral vascular disease (HCC)    Uncomfortable fullness after meals    Visual impairment    glasses    Wears glasses     Past Surgical History:   Procedure Laterality Date    Angiogram      Angioplasty (coronary)      Cabg      Fracture surgery Left     left hip pinning    Other surgical history Left 1977    knee surgery    Other surgical history  2016    Left hip ORIF pinning    Tonsillectomy        reports that he has never smoked. He has never used smokeless tobacco. He reports that he does not drink alcohol and does not use drugs.    Allergies:  @ALLERGY    Laboratory Data:    Recent Labs   Lab 24  1744 24  0456   WBC 14.5* 11.2*   HGB 10.5* 10.2*   HCT 33.0* 31.9*   .0 231.0   CREATSERUM 1.58* 1.20   BUN 28* 20   * 154*   CA 8.7 8.6*   ALB 3.5 3.5   TP 5.8 5.7   PGLU 140*  --          ASSESSMENT:  Wound 23 #1- Foot Right (Active)   Date First  Assessed/Time First Assessed: 08/14/23 1309    Wound Number (Wound Clinic Only): #1-  Primary Wound Type: Diabetic Ulcer  Location: Foot  Wound Location Orientation: Right  Wound Description (Comments): Amputation site      Assessments 9/27/2024  3:27 PM   Wound Image     Wound Length (cm) 0 cm   Wound Width (cm) 0 cm   Wound Surface Area (cm^2) 0 cm^2   Wound Depth (cm) 0 cm   Wound Volume (cm^3) 0 cm^3   Wound Healing % 100       Wound 04/15/24 #2 Leg Right;Anterior (Active)   Date First Assessed/Time First Assessed: 04/15/24 1611    Wound Number (Wound Clinic Only): #2  Primary Wound Type: Venous Ulcer  Location: Leg  Wound Location Orientation: Right;Anterior      Assessments 9/27/2024  3:27 PM   Wound Image     Site Assessment Moist;Pink;Yellow   Closure None   Drainage Amount Small   Drainage Description Serosanguineous   Treatments Cleansed   Dressing Mepilex   Dressing Changed Changed   Dressing Status Clean;Dry;Intact   Wound Length (cm) 1.5 cm   Wound Width (cm) 1.2 cm   Wound Surface Area (cm^2) 1.8 cm^2   Wound Depth (cm) 0.1 cm   Wound Volume (cm^3) 0.18 cm^3   Wound Healing % -54   Wound Granulation Tissue Pink;Firm   Wound Bed Granulation (%) 70 %   Wound Bed Slough (%) 30 %   Wound Odor None       Wound 08/14/24 #3 Heel Right (Active)   Date First Assessed/Time First Assessed: 08/14/24 1306    Wound Number (Wound Clinic Only): #3  Primary Wound Type: Pressure Injury  Location: Heel  Wound Location Orientation: Right      Assessments 9/27/2024  3:28 PM   Wound Image     Site Assessment Yellow   Closure None   Drainage Amount Small   Drainage Description Serous   Treatments Cleansed   Dressing Mepilex   Dressing Changed Changed   Dressing Status Clean;Dry;Intact   Wound Length (cm) 0.6 cm   Wound Width (cm) 1 cm   Wound Surface Area (cm^2) 0.6 cm^2   Wound Depth (cm) 0.1 cm   Wound Volume (cm^3) 0.06 cm^3   Wound Healing % 38   Peggy-wound Assessment Callous   Wound Bed Granulation (%) 10 %   Wound Bed  Slough (%) 90 %   Wound Odor None       Wound 09/27/24 Arm Anterior;Left;Upper (Active)   Date First Assessed/Time First Assessed: 09/27/24 0015   Present on Original Admission: Yes  Primary Wound Type: Skin Tear  Location: Arm  Wound Location Orientation: Anterior;Left;Upper      Assessments 9/27/2024  3:40 PM   Wound Image     Site Assessment Red;Painful   Closure None   Drainage Amount Small   Drainage Description Serosanguineous   Treatments Cleansed   Dressing Xeroform;Gauze   Dressing Changed Changed   Dressing Status Clean;Dry;Intact   Wound Length (cm) 1.5 cm   Wound Width (cm) 3 cm   Wound Surface Area (cm^2) 4.5 cm^2   Wound Depth (cm) 0.1 cm   Wound Volume (cm^3) 0.45 cm^3   Margins Well-defined edges   Non-staged Wound Description Partial thickness   Wound Odor None   Shape red non granular tissue        Edema : mild pitting  Pulse: RLE post tibial doppler  Lower Extremity Temp: cool    Wound Cleaning and Dressings:  Right anterior leg, right heel  Wound cleansing:  normal saline  Wound cleaning frequency: Daily and PRN  Wound product: silver foam  Dressing change frequency:  Change dressing daily and/or PRN    Wound Cleaning and Dressings:  Left upper arm  Wound cleansing:  normal saline  Wound cleaning frequency: Daily and PRN  Wound product: xeroform and gauze, paper tape  Dressing change frequency:  Change dressing daily and/or PRN    ALL WOUND CARE SUPPLIES CAN BE OBTAINED FROM [facility]     Miscellaneous/Additional Orders: offload heels by using pillows.     If patient is Diabetic: want to make sure blood sugars are within a controled range for wound healing     Protein intake: depending on providers recommendations and patients kidney functions - if kidneys are good then recommend patient to increase protein intake (Boost, Kwan, Ensure, Premiere Protein)       Thank you for this consultation and for allowing me to participate in the care of your patient.  Please call [phone] if you have  any questions about this consultation and plan of care.     Time Spent 30 Minutes.    Thank you,  Bryant Driver RN  Wound/Ostomy/Continence nurse    9/27/2024  4:32 PM

## 2024-09-27 NOTE — CONSULTS
ICU  Critical Care APRN Consult Note    NAME: Alejandro Guardado - ROOM: 466/466-A - MRN: [id number] - Age: 89 year old - :1935    History Of Present Illness:  Alejandro Guardado is a 89 year old male with PMHx significant for achalasia s/p Toupet fundoplication and esophagomyotomy, recurrent aspiration pneumonia, CAD s/p CABG, Afib on Eliquis, HFrEF, PAD s/p stent , angioplasty 2024, and nonhealing wound, HTN, HLD, adrenal insufficiency who presented with confusion and weakness that started this morning.  Per the patient's daughter he was at his baseline mental status when she spoke with him on the phone yesterday evening.  This morning he called and said he was sitting on the floor and couldn't get up.  He normally uses a wheelchair to get around the house and is able to transfer independently.  He does not usually require help with his ADLs.  Daughter was able to get him up and give him some food and drink but he became weaker and unable to walk so paramedics were called.  She states he has been coughing today which is usually the symptom when he has an aspiration pneumonia.  SBP was low on admit so pressors were started along with a smaller sepsis bolus of 2L IVF.  CXR showed bilateral multifocal pneumonia.  CT head was negative for acute findings.  On assessment pt is lethargic but easily awakens and follows commands.  He remains on pressors to elevate his SBP.      Past Medical History:  Past Medical History:    Amputation of right foot (HCC)    non-healing. Followed by [facility] clinic    Anemia    Atrial fibrillation (HCC)    CAD (coronary artery disease)    CHF (congestive heart failure) (HCC)    Easy bruising    Encephalopathy acute    Esophageal reflux    Fatigue    Hearing impairment    Hearing loss    Heart attack (HCC)    High blood pressure    High cholesterol    History of MI (myocardial infarction)    HTN (hypertension)    Hyperlipidemia    Leg swelling    Loss of appetite    PAD (peripheral artery  disease) (HCC)    Peripheral vascular disease (HCC)    Uncomfortable fullness after meals    Visual impairment    glasses    Wears glasses     Past Surgical History:   Past Surgical History:   Procedure Laterality Date    Angiogram      Angioplasty (coronary)      Cabg      Fracture surgery Left     left hip pinning    Other surgical history Left 01/01/1977    knee surgery    Other surgical history  03/25/2016    Left hip ORIF pinning    Tonsillectomy        Family History:   Family History   Problem Relation Age of Onset    Cancer Father      Social History:    reports that he has never smoked. He has never used smokeless tobacco. He reports that he does not drink alcohol and does not use drugs.     Review of Systems:   A comprehensive 10 point review of systems was completed.  Pertinent positives and negatives noted in the HPI.    Current Facility-Administered Medications   Medication Dose Route Frequency    hydrocortisone Na succinate PF (Solu-CORTEF) injection 60 mg  60 mg Intravenous Daily    piperacillin-tazobactam (Zosyn) 3.375 g in dextrose 5% 100 mL IVPB-ADDV  3.375 g Intravenous Q8H    sodium chloride 0.9% infusion   Intravenous Continuous    acetaminophen (Tylenol Extra Strength) tab 500 mg  500 mg Oral Q4H PRN    melatonin tab 3 mg  3 mg Oral Nightly PRN    ondansetron (Zofran) 4 MG/2ML injection 4 mg  4 mg Intravenous Q6H PRN    metoclopramide (Reglan) 5 mg/mL injection 5 mg  5 mg Intravenous Q8H PRN    guaiFENesin (Robitussin) 100 MG/5 ML oral liquid 200 mg  200 mg Oral Q4H PRN    polyethylene glycol (PEG 3350) (Miralax) 17 g oral packet 17 g  17 g Oral Daily PRN    sennosides (Senokot) tab 17.2 mg  17.2 mg Oral Nightly PRN    bisacodyl (Dulcolax) 10 MG rectal suppository 10 mg  10 mg Rectal Daily PRN    fleet enema (Fleet) rectal enema 133 mL  1 enema Rectal Once PRN    apixaban (Eliquis) tab 5 mg  5 mg Oral BID    atorvastatin (Lipitor) tab 40 mg  40 mg Oral Daily    clopidogrel (Plavix) tab 75 mg   75 mg Oral Daily    finasteride (Proscar) tab 5 mg  5 mg Oral Daily    folic acid (Folvite) tab 1 mg  1 mg Oral Daily    pantoprazole (Protonix) DR tab 40 mg  40 mg Oral QAM AC    tamsulosin (Flomax) cap 0.4 mg  0.4 mg Oral Daily    norepinephrine (Levophed) 4 mg/250mL infusion premix  0.5-30 mcg/min Intravenous Continuous     OBJECTIVE  Vitals:  BP 96/59   Pulse 75   Temp 98 °F (36.7 °C) (Temporal)   Resp 12   Wt 209 lb 14.1 oz (95.2 kg)   SpO2 96%   BMI 28.46 kg/m²             Physical Exam:    General Appearance:  Lethargic, [place], cooperative, no acute distress, appears stated age  Neck: No JVD  Lungs: Clear to auscultation bilaterally, respirations unlabored  Heart: Irregular rate and rhythm, afib, S1 and S2 normal, no murmur, rub or gallop  Abdomen: Soft, non-tender, bowel sounds active all four quadrants, no masses, no organomegaly  Extremities: no cyanosis, 2+ pitting LE edema, capillary refill <3 sec, RLE with toes amputation, healing heel and amputation site wound, shin wound with pale skin surrounding small open area.    Pulses: 2+ and symmetric all extremities  Skin: Skin color, texture, turgor normal for ethnicity, no rashes or lesions, warm and dry  Neurologic: CNII-XII intact, normal strength    Data this admission:  CT BRAIN OR HEAD (CPT=70450)    Result Date: 9/26/2024  CONCLUSION:  No acute intracranial abnormality identified.  Mild age-indeterminate microvascular ischemic changes in the cerebral white matter. If there is clinical concern for acute ischemia/infarction, an MRI of the brain would be recommended for further evaluation.    LOCATION:  [id number]   Dictated by (CST): Delfin Trujillo MD on 9/26/2024 at 8:51 PM     Finalized by (CST): Delfin Trujillo MD on 9/26/2024 at 8:53 PM       XR CHEST AP PORTABLE  (CPT=71045)    Result Date: 9/26/2024  CONCLUSION:  There is patchy airspace consolidation in the mid to upper right lung and to a lesser extent left lower lung that is concerning for  multifocal pneumonia.  Clinical correlation recommended along with follow-up until complete resolution.    LOCATION:  [facility]      Dictated by (CST): Delfin Trujillo MD on 9/26/2024 at 7:25 PM     Finalized by (CST): Delfin Trujillo MD on 9/26/2024 at 7:26 PM       Labs:  Lab Results   Component Value Date    WBC 14.5 09/26/2024    HGB 10.5 09/26/2024    HCT 33.0 09/26/2024    .0 09/26/2024    CREATSERUM 1.58 09/26/2024    BUN 28 09/26/2024     09/26/2024    K 3.7 09/26/2024     09/26/2024    CO2 26.0 09/26/2024     09/26/2024    CA 8.7 09/26/2024    ALB 3.5 09/26/2024    ALKPHO 61 09/26/2024    BILT 0.9 09/26/2024    TP 5.8 09/26/2024    AST 15 09/26/2024    ALT 13 09/26/2024       Assessment/Plan:  Sepsis with shock due to Pneumonia and UTI  -Blood urine and sputum cultures pending  -CXR with multifocal pneumonia  -UA with leukocyte esterase and WBC but no bacteria  -PCT added to labs  -Zosyn  -Given 2L IVF bolus in ED, full sepsis bolus held due to HFrEF  -Continue gentle hydration with 83/h NS, monitor closely for overload  -Pressors to maintain SBP above 90 and MAP above 65  -Speech to evaluate pt given history of achalasia and chronic aspiration with frequent aspiration pneumonia (most recently 7/2024)    AMS  -Due to above  -CT head negative  -Baseline able to perform independent ADL and care for self in own home    ERON  -Gentle hydration with NS at 83/hr  -Monitor uop and labs    Adrenal insufficiency  -Solucortef 100 mg given in ED, continue with 60 mg/d in place of Prednisone 15mg daily home medication  -Escalate steroid dose if becomes more hypotensive    PAD s/p right toes amputation, s/p stent 2023, angioplasty 5/2024, and nonhealing wounds  -Wound care consult  -Wound pictures in Media in Epic  -Plavix    Afib- controlled rate  -Eliquis    HFrEF, CAD, HTN, HLD  -EF 20-25%  -Hold home medications given hypotension    Dispo:     Code Status: DNAR/Selective Treatment  -ICU for  close monitoring    Plan of care discussed with intensivist, Dr. Campos.      Annmarie PHELPS  Critical Care Nurse Practitioner  Spec [id number]    A total of 35 minutes of critical care time (exclusive of billable procedures) was administered. This involved direct patient intervention, complex decision making, and/or extensive discussions (>50% face to face time) with the patient, family, and clinical staff.    The 21st Century Cures Act makes medical notes like these available to patients in the interest of transparency. Please be advised this is a medical document. Medical documents are intended to carry relevant information, facts as evident, and the clinical opinion of the practitioner. The medical note is intended as peer to peer communication and may appear blunt or direct. It is written in medical language and may contain abbreviations or verbiage that are unfamiliar.

## 2024-09-27 NOTE — PROGRESS NOTES
[facility]     Hospitalist Progress Note     Alejandro Guardado Patient Status:  Inpatient    1935 MRN [id number]   Location [facility] 4SW-A Attending Belen Flores MD   Hosp Day # 1 PCP Stanislav Odonnell MD     Chief Complaint: AMS, weakness     Subjective:     Patient   says felt good Wed  then yesterday started coughing drink 3 power aides    then came in here  Awake alert  denies cough SOB     Objective:    Review of Systems:   A comprehensive review of systems was completed; pertinent positive and negatives stated in subjective.    Vital signs:  Temp:  [97.9 °F (36.6 °C)-98.2 °F (36.8 °C)] 98.2 °F (36.8 °C)  Pulse:  [63-96] 74  Resp:  [8-20] 13  BP: ()/(46-94) 103/54  SpO2:  [86 %-98 %] 95 %    Physical Exam:    General: No acute distress AO   Respiratory: No wheezes, no rhonchi sl course on NC   Cardiovascular: S1, S2, irregular rate and rhythm  AF w/ occ OVC's   Abdomen: Soft, Non-tender, non-distended, hypo  bowel sounds  Neuro: No new focal deficits.   Extremities: No edema  R toe amputation healed  healing blister on r heal and nickel size wound r shine  no redness drainage     Diagnostic Data:    Labs:  Recent Labs   Lab 24  1744 24  0456   WBC 14.5* 11.2*   HGB 10.5* 10.2*   MCV 81.9 81.2   .0 231.0       Recent Labs   Lab 24  1744 24  0456   * 154*   BUN 28* 20   CREATSERUM 1.58* 1.20   CA 8.7 8.6*   ALB 3.5 3.5   * 140   K 3.7 3.5    107   CO2 26.0 24.0   ALKPHO 61 59   AST 15 11   ALT 13 13   BILT 0.9 0.6   TP 5.8 5.7       Estimated Creatinine Clearance: 45.8 mL/min (based on SCr of 1.2 mg/dL).    Recent Labs   Lab 24  1744   TROPHS 14       No results for input(s): \"PTP\", \"INR\" in the last 168 hours.           Microbiology    No results found for this visit on 24.      Imaging: Reviewed in Epic.    Medications:    hydrocortisone sodium succinate  60 mg Intravenous Daily     piperacillin-tazobactam  3.375 g Intravenous Q8H    apixaban  5 mg Oral BID    atorvastatin  40 mg Oral Daily    clopidogrel  75 mg Oral Daily    finasteride  5 mg Oral Daily    folic acid  1 mg Oral Daily    pantoprazole  40 mg Oral QAM AC    tamsulosin  0.4 mg Oral Daily       Assessment & Plan:      #Recurrent multifocal pna  #Aspiration PNA  #Complicated UTI  -IV Zosyn (9/26-)  -Follow-up blood/urine/sputum culture  -Pulmonary to see  -ST to see  -soft bp, stress dose steroids for now  Wean levo  1 mcg   Na 140  better  Stop IVF when started po intake       # Sepsis / Hypotension  Ccpulm following  Wean levo   Follow cxs  blood/ U cxs   WBC lower 11.2   Zosyn     #ERON  better  #Hypovolemic hyponatremia  -ivf x10 hrs, pt has lvef 20-25% and clinically has LE edema, on RA  Stop IVF when taking po      #Acute metabolic encephalopathy  better  -Secondary to above  -Therapies to see  lives at home by himself   - CTH chronic changes     #Chronic HFrEF  #Ischemic cardiomyopathy  -LVEF 20-25%     #Secondary adrenal insufficiency   stress steroids     #Atrial fibrillation, paroxysmal   eliquis   BB on hold 2/2 hypotension   Ace on hold     #Coronary artery disease s/p CABG   plavix   #Hyperlipidemia     #BPH cont home meds     #Peripheral arterial disease with history of nonhealing right foot wound s/p thrombectomy and revascularization/stent in December 2023   wounds don't look infected- healing  Elevate r heel off bed    POC  Cont abx  follow cxs  Speech to see  BB/ACE on hold   Levo 1 mcg   Add IS   Up to chair   Ccpulm to see  Da not at bedside uet this am   Pt feeling better   Discussed w/ pt, RN Dr Guerrero   Called da this am- has received  Botox  into vocal cords - this summer  - also shared that his memory is poor and makes up some stories -  Ana Taveras NP    Supplementary Documentation:     Quality:  DVT Mechanical Prophylaxis:   SCDs,    DVT Pharmacologic Prophylaxis   Medication    apixaban (Eliquis) tab  5 mg                Code Status: DNAR/Selective Treatment  Block: External urinary catheter in place  Block Duration (in days):   Central line:    DIEGO:     Discharge is dependent on: clinical course  At this point Mr. Guardado is expected to be discharge to: ? Home lives alone     The 21st Century Cures Act makes medical notes like these available to patients in the interest of transparency. Please be advised this is a medical document. Medical documents are intended to carry relevant information, facts as evident, and the clinical opinion of the practitioner. The medical note is intended as peer to peer communication and may appear blunt or direct. It is written in medical language and may contain abbreviations or verbiage that are unfamiliar.        Agree with above    General- NAD  Chest- CTAB  CVS- RRR  Abdo-soft, NT, BS+    Plan  Continue Abx  Off pressors now  Follow Cx results    Belen Flores M.D.  [facility]

## 2024-09-28 VITALS
HEART RATE: 86 BPM | BODY MASS INDEX: 29 KG/M2 | TEMPERATURE: 98 F | OXYGEN SATURATION: 94 % | SYSTOLIC BLOOD PRESSURE: 139 MMHG | WEIGHT: 211.44 LBS | RESPIRATION RATE: 16 BRPM | DIASTOLIC BLOOD PRESSURE: 90 MMHG

## 2024-09-28 LAB
ANION GAP SERPL CALC-SCNC: 6 MMOL/L (ref 0–18)
BASOPHILS # BLD AUTO: 0.01 X10(3) UL (ref 0–0.2)
BASOPHILS NFR BLD AUTO: 0.1 %
BUN BLD-MCNC: 17 MG/DL (ref 9–23)
CALCIUM BLD-MCNC: 9.1 MG/DL (ref 8.7–10.4)
CHLORIDE SERPL-SCNC: 106 MMOL/L (ref 98–112)
CO2 SERPL-SCNC: 26 MMOL/L (ref 21–32)
CREAT BLD-MCNC: 1 MG/DL
EGFRCR SERPLBLD CKD-EPI 2021: 72 ML/MIN/1.73M2 (ref 60–?)
EOSINOPHIL # BLD AUTO: 0.06 X10(3) UL (ref 0–0.7)
EOSINOPHIL NFR BLD AUTO: 0.6 %
ERYTHROCYTE [DISTWIDTH] IN BLOOD BY AUTOMATED COUNT: 15.9 %
GLUCOSE BLD-MCNC: 127 MG/DL (ref 70–99)
HCT VFR BLD AUTO: 30.4 %
HGB BLD-MCNC: 9.9 G/DL
IMM GRANULOCYTES # BLD AUTO: 0.05 X10(3) UL (ref 0–1)
IMM GRANULOCYTES NFR BLD: 0.5 %
LYMPHOCYTES # BLD AUTO: 0.81 X10(3) UL (ref 1–4)
LYMPHOCYTES NFR BLD AUTO: 8.7 %
MCH RBC QN AUTO: 26.1 PG (ref 26–34)
MCHC RBC AUTO-ENTMCNC: 32.6 G/DL (ref 31–37)
MCV RBC AUTO: 80 FL
MONOCYTES # BLD AUTO: 0.98 X10(3) UL (ref 0.1–1)
MONOCYTES NFR BLD AUTO: 10.6 %
NEUTROPHILS # BLD AUTO: 7.35 X10 (3) UL (ref 1.5–7.7)
NEUTROPHILS # BLD AUTO: 7.35 X10(3) UL (ref 1.5–7.7)
NEUTROPHILS NFR BLD AUTO: 79.5 %
OSMOLALITY SERPL CALC.SUM OF ELEC: 289 MOSM/KG (ref 275–295)
PLATELET # BLD AUTO: 248 10(3)UL (ref 150–450)
POTASSIUM SERPL-SCNC: 3.3 MMOL/L (ref 3.5–5.1)
RBC # BLD AUTO: 3.8 X10(6)UL
SODIUM SERPL-SCNC: 138 MMOL/L (ref 136–145)
WBC # BLD AUTO: 9.3 X10(3) UL (ref 4–11)

## 2024-09-28 PROCEDURE — 99233 SBSQ HOSP IP/OBS HIGH 50: CPT | Performed by: INTERNAL MEDICINE

## 2024-09-28 PROCEDURE — 99239 HOSP IP/OBS DSCHRG MGMT >30: CPT | Performed by: HOSPITALIST

## 2024-09-28 RX ORDER — METOPROLOL SUCCINATE 50 MG/1
50 TABLET, EXTENDED RELEASE ORAL
Status: DISCONTINUED | OUTPATIENT
Start: 2024-09-28 | End: 2024-09-28

## 2024-09-28 RX ORDER — LISINOPRIL 2.5 MG/1
2.5 TABLET ORAL DAILY
Status: DISCONTINUED | OUTPATIENT
Start: 2024-09-28 | End: 2024-09-28

## 2024-09-28 RX ORDER — POTASSIUM CHLORIDE 1.5 G/1.58G
40 POWDER, FOR SOLUTION ORAL ONCE
Status: COMPLETED | OUTPATIENT
Start: 2024-09-28 | End: 2024-09-28

## 2024-09-28 NOTE — DISCHARGE SUMMARY
[facility]     Alejandro Guardado Patient Status:  Inpatient    1935 MRN [id number]   Location [facility] 4SW-A Attending Belen Flores MD   Hosp Day # 2 PCP Stanislav Odonnell MD     Date of Admission: 2024  Date of Discharge:   ***    Discharge Disposition: Home or Self Care    Discharge Diagnosis:  ***    History of Present Illness: ***    Brief Synopsis: ***    Lace+ Score: 82  59-90 High Risk  29-58 Medium Risk  0-28   Low Risk       TCM Follow-Up Recommendation:  {Care Managers will evaluate the need for follow-up for all patients ages 50+, and high/moderate risk patients ages 25-49. Low risk patients (LACE < 29) will only be evaluated if the \"Still recommend for TCM follow-up\" option is selected from this list.:[id number]}      Procedures during hospitalization:   ***    Incidental or significant findings and recommendations (brief descriptions):  ***    Lab/Test results pending at Discharge:   ***    Consultants:  ***    Discharge Medication List:     Discharge Medications        CONTINUE taking these medications        Instructions Prescription details   acetaminophen 500 MG Tabs  Commonly known as: Tylenol Extra Strength      Take 2 tablets (1,000 mg total) by mouth every 8 (eight) hours.   Quantity: 21 tablet  Refills: 0     amoxicillin clavulanate 875-125 MG Tabs  Commonly known as: Augmentin      Take 1 tablet by mouth 2 (two) times daily.   Quantity: 7 tablet  Refills: 0     apixaban 5 MG Tabs  Commonly known as: Eliquis      Take 1 tablet (5 mg total) by mouth 2 (two) times daily.   Quantity: 60 tablet  Refills: 3     atorvastatin 40 MG Tabs  Commonly known as: Lipitor      Take 1 tablet (40 mg total) by mouth daily.   Quantity: 30 tablet  Refills: 0     clopidogrel 75 MG Tabs  Commonly known as: Plavix      Take 1 tablet (75 mg total) by mouth daily.   Quantity: 30 tablet  Refills: 0     collagenase 250 UNIT/GM Oint  Commonly known as: Santyl      Apply 1 Application  topically daily.   Quantity: 30 g  Refills: 0     docusate sodium 100 MG Caps  Commonly known as: Colace      Take 250 mg by mouth 2 (two) times daily.   Refills: 0     finasteride 5 MG Tabs  Commonly known as: Proscar      Take 1 tablet (5 mg total) by mouth daily.   Quantity: 90 tablet  Refills: 0     folic acid 1 MG Tabs  Commonly known as: Folvite      Take 1 tablet (1 mg total) by mouth daily.   Refills: 0     furosemide 40 MG Tabs  Commonly known as: Lasix      Take 2 tablets (80 mg total) by mouth 2 (two) times daily.   Refills: 0     gabapentin 100 MG Caps  Commonly known as: Neurontin      Take 2 capsules (200 mg total) by mouth in the morning and 2 capsules (200 mg total) before bedtime.   Quantity: 60 capsule  Refills: 0     lisinopril 2.5 MG Tabs  Commonly known as: Prinivil; Zestril      Take 1 tablet (2.5 mg total) by mouth daily.   Refills: 0     metoprolol succinate ER 25 MG Tb24  Commonly known as: Toprol XL      Take 2 tablets (50 mg total) by mouth Daily Beta Blocker.   Quantity: 60 tablet  Refills: 0     NON FORMULARY      Oxygen at 2L per NC a during sleep-uses as needed   Refills: 0     NON FORMULARY      ZOILA dressing to Right foot s/p amputation   Refills: 0     Omeprazole 40 MG Cpdr      Take 1 capsule (40 mg total) by mouth daily.   Refills: 0     predniSONE 5 MG Tabs  Commonly known as: Deltasone      Take 3 tablets (15 mg total) by mouth daily.   Refills: 0     tamsulosin 0.4 MG Caps  Commonly known as: Flomax      Take 1 capsule (0.4 mg total) by mouth daily.   Refills: 0     vitamin C 1000 MG Tabs      Take 1.5 tablets (1,500 mg total) by mouth daily.   Refills: 0               Where to Get Your Medications        These medications were sent to [id number] - [place], [place] - [address] [phone], [phone]  [address] [place] [address]      Phone: [phone]   amoxicillin clavulanate 875-125 MG Tabs         [place] reviewed: ***    Follow-up appointment:   No  follow-up provider specified.  Appointments for Next 30 Days 9/28/2024 - 10/28/2024        Date Arrival Time Visit Type Length Department Provider     9/30/2024  1:00 PM  WC FOLLOW UP [4497] 15 min [facility] Fortino Mo MD    Patient Instructions:         Location Instructions:     Your appointment is scheduled at [facility]. The address is&nbsp; [address] [place] [address]. To reach Registration, park in the [facility]. Go through the entrance doors located on the ground floor. Veer left past the Information Desk and proceed to the Wound Care Center.  Masks are optional for all patients and visitors, unless otherwise indicated.               10/14/2024  1:00 PM  WC FOLLOW UP [4597] 15 min [facility] Fortino Mo MD    Patient Instructions:         Location Instructions:     Your appointment is scheduled at [facility]. The address is&nbsp; [address] [place] [address]. To reach Registration, park in the [facility]. Go through the entrance doors located on the ground floor. Veer left past the Information Desk and proceed to the Wound Care Center.  Masks are optional for all patients and visitors, unless otherwise indicated.                      Vital signs:  Temp:  [97.9 °F (36.6 °C)-98.4 °F (36.9 °C)] 97.9 °F (36.6 °C)  Pulse:  [76-98] 89  Resp:  [11-25] 12  BP: ()/(50-82) 109/82  SpO2:  [90 %-99 %] 97 %    Physical Exam:    General: No acute distress   Lungs: clear to auscultation  Cardiovascular: S1, S2  Abdomen: Soft  ***    -----------------------------------------------------------------------------------------------  PATIENT DISCHARGE INSTRUCTIONS: See electronic chart    Belen Flores MD    Total time spent on discharge planning:  *** minutes     The 21st Century Cures Act makes medical notes like these available to patients in the interest of transparency. Please be advised this is a medical  document. Medical documents are intended to carry relevant information, facts as evident, and the clinical opinion of the practitioner. The medical note is intended as peer to peer communication and may appear blunt or direct. It is written in medical language and may contain abbreviations or verbiage that are unfamiliar.

## 2024-09-28 NOTE — PROGRESS NOTES
Per son, patient takes 900 mg of Gabapentin a day. Order on discharge paperwork error. Spoke to Dr. Flores; stated that OK to resume home medication as prescribed.

## 2024-09-28 NOTE — PROGRESS NOTES
CRITICAL CARE            S: He is doing well. He denies shortness of breath. He has a mild cough. He says he was able to walk with a walker. He wants to know if he can go home.     Meds:   potassium chloride  40 mEq Oral Once    predniSONE  15 mg Oral Daily    piperacillin-tazobactam  3.375 g Intravenous Q8H    apixaban  5 mg Oral BID    atorvastatin  40 mg Oral Daily    clopidogrel  75 mg Oral Daily    finasteride  5 mg Oral Daily    folic acid  1 mg Oral Daily    pantoprazole  40 mg Oral QAM AC    tamsulosin  0.4 mg Oral Daily       Prn Meds:    acetaminophen    melatonin    ondansetron    metoclopramide    guaiFENesin    polyethylene glycol (PEG 3350)    sennosides    bisacodyl    fleet enema    Infusions:   norepinephrine Stopped (09/27/24 0900)       OBJECTIVE:  Vitals:    09/28/24 0300 09/28/24 0400 09/28/24 0500 09/28/24 0600   BP: 149/69 133/71  137/80   Pulse: 78 87 85 76   Resp: 16 17 17 11   Temp:  97.9 °F (36.6 °C)     TempSrc:  Temporal     SpO2: 95% 96% 97% 93%   Weight:         O2: room air        Gen - Alert, oriented x 3, in no apparent distress  Lungs - CTAB  CV - regular rate & rhythm. Normal S1, S2. No murmurs, rubs, or gallops appreciated.  Abdomen - soft, nontender to palpation   Extremities - + lower ext edema, especially left ankle        Labs:  Recent Labs   Lab 09/26/24  1744 09/27/24  0456 09/28/24  0459   WBC 14.5* 11.2* 9.3   HGB 10.5* 10.2* 9.9*   .0 231.0 248.0     Recent Labs   Lab 09/26/24  1744 09/26/24  1902 09/27/24  0456 09/28/24  0459   *  --  140 138   K 3.7  --  3.5 3.3*     --  107 106   CO2 26.0  --  24.0 26.0   BUN 28*  --  20 17   CREATSERUM 1.58*  --  1.20 1.00   *  --  154* 127*   ANIONGAP 7  --  9 6   ALB 3.5  --  3.5  --    CA 8.7  --  8.6* 9.1   MG  --   --  2.0  --    ALKPHO 61  --  59  --    AST 15  --  11  --    ALT 13  --  13  --    BILT 0.9  --  0.6  --    TP 5.8  --  5.7  --    LACTI  --  1.3  --   --      Recent Labs   Lab  09/26/24  1744   TROPHS 14     Recent Labs   Lab 09/26/24 1744   PCT 0.97*     Imaging reviewed    ASSESSMENT AND PLAN      Acute hypoxemic respiratory failure - due to aspiration pneumonia. This is his 5th admission this year for pneumonia   -supplemental oxygen prn - now on room air  -antibiotics as below  -swallow study noted  -will need follow up with GI given hx of achalasia  Septic shock - due to aspiration pneumonia +/- UTI. Resolved, off pressors  -monitor bp  -continue Zosyn  Encephalopathy - due to infection. CT brain showed only chronic changes  -supportive care  ERON - improved  -monitor labs, urine output   History of adrenal insufficiency  -continue home prednisone  History of PAD - s/p stent, right foot transmetatarsal amputation, lower ext wounds  -wound care  Atrial fib   -anticoagulated w/ eliquis  -resume home metoprolol  HFrEF (EF 20-25%), hx of CAD/CABG, Aortic stenosis  -resume home lisinopril, metoprolol  FEN  -fluids: none  -electrolytes: monitor electrolytes and replace prn  -nutrition: cardiac diet  Proph  -eliquis  Dispo  -DNAR/S  -Palliative care consulted. This is his 5th admission this year for pneumonia   -transfer to floor or discharge home.      Scar Guerrero M.D.  Pulmonary/Critical Care

## 2024-09-28 NOTE — PLAN OF CARE
GCS 15. VSS on room air. Afebrile. External catheter in place, UOP QS. Comfort and safety maintained. Patient discharged home with son/POA.    Problem: CARDIOVASCULAR - ADULT  Goal: Maintains optimal cardiac output and hemodynamic stability  Description: INTERVENTIONS:  - Monitor vital signs, rhythm, and trends  - Monitor for bleeding, hypotension and signs of decreased cardiac output  - Evaluate effectiveness of vasoactive medications to optimize hemodynamic stability  - Monitor arterial and/or venous puncture sites for bleeding and/or hematoma  - Assess quality of pulses, skin color and temperature  - Assess for signs of decreased coronary artery perfusion - ex. Angina  - Evaluate fluid balance, assess for edema, trend weights  Outcome: Progressing  Goal: Absence of cardiac arrhythmias or at baseline  Description: INTERVENTIONS:  - Continuous cardiac monitoring, monitor vital signs, obtain 12 lead EKG if indicated  - Evaluate effectiveness of antiarrhythmic and heart rate control medications as ordered  - Initiate emergency measures for life threatening arrhythmias  - Monitor electrolytes and administer replacement therapy as ordered  Outcome: Progressing     Problem: RESPIRATORY - ADULT  Goal: Achieves optimal ventilation and oxygenation  Description: INTERVENTIONS:  - Assess for changes in respiratory status  - Assess for changes in mentation and behavior  - Position to facilitate oxygenation and minimize respiratory effort  - Oxygen supplementation based on oxygen saturation or ABGs  - Provide Smoking Cessation handout, if applicable  - Encourage broncho-pulmonary hygiene including cough, deep breathe, Incentive Spirometry  - Assess the need for suctioning and perform as needed  - Assess and instruct to report SOB or any respiratory difficulty  - Respiratory Therapy support as indicated  - Manage/alleviate anxiety  - Monitor for signs/symptoms of CO2 retention  Outcome: Progressing     Problem: GASTROINTESTINAL  - ADULT  Goal: Minimal or absence of nausea and vomiting  Description: INTERVENTIONS:  - Maintain adequate hydration with IV or PO as ordered and tolerated  - Nasogastric tube to low intermittent suction as ordered  - Evaluate effectiveness of ordered antiemetic medications  - Provide nonpharmacologic comfort measures as appropriate  - Advance diet as tolerated, if ordered  - Obtain nutritional consult as needed  - Evaluate fluid balance  Outcome: Progressing     Problem: GENITOURINARY - ADULT  Goal: Absence of urinary retention  Description: INTERVENTIONS:  - Assess patient’s ability to void and empty bladder  - Monitor intake/output and perform bladder scan as needed  - Follow urinary retention protocol/standard of care  - Consider collaborating with pharmacy to review patient's medication profile  - Implement strategies to promote bladder emptying  Outcome: Progressing     Problem: METABOLIC/FLUID AND ELECTROLYTES - ADULT  Goal: Glucose maintained within prescribed range  Description: INTERVENTIONS:  - Monitor Blood Glucose as ordered  - Assess for signs and symptoms of hyperglycemia and hypoglycemia  - Administer ordered medications to maintain glucose within target range  - Assess barriers to adequate nutritional intake and initiate nutrition consult as needed  - Instruct patient on self management of diabetes  Outcome: Progressing  Goal: Electrolytes maintained within normal limits  Description: INTERVENTIONS:  - Monitor labs and rhythm and assess patient for signs and symptoms of electrolyte imbalances  - Administer electrolyte replacement as ordered  - Monitor response to electrolyte replacements, including rhythm and repeat lab results as appropriate  - Fluid restriction as ordered  - Instruct patient on fluid and nutrition restrictions as appropriate  Outcome: Progressing  Goal: Hemodynamic stability and optimal renal function maintained  Description: INTERVENTIONS:  - Monitor labs and assess for signs and  symptoms of volume excess or deficit  - Monitor intake, output and patient weight  - Monitor urine specific gravity, serum osmolarity and serum sodium as indicated or ordered  - Monitor response to interventions for patient's volume status, including labs, urine output, blood pressure (other measures as available)  - Encourage oral intake as appropriate  - Instruct patient on fluid and nutrition restrictions as appropriate  Outcome: Progressing     Problem: SKIN/TISSUE INTEGRITY - ADULT  Goal: Skin integrity remains intact  Description: INTERVENTIONS  - Assess and document risk factors for pressure ulcer development  - Assess and document skin integrity  - Monitor for areas of redness and/or skin breakdown  - Initiate interventions, skin care algorithm/standards of care as needed  Outcome: Progressing     Problem: HEMATOLOGIC - ADULT  Goal: Maintains hematologic stability  Description: INTERVENTIONS  - Assess for signs and symptoms of bleeding or hemorrhage  - Monitor labs and vital signs for trends  - Administer supportive blood products/factors, fluids and medications as ordered and appropriate  - Administer supportive blood products/factors as ordered and appropriate  Outcome: Progressing     Problem: NEUROLOGICAL - ADULT  Goal: Achieves stable or improved neurological status  Description: INTERVENTIONS  - Assess for and report changes in neurological status  - Initiate measures to prevent increased intracranial pressure  - Maintain blood pressure and fluid volume within ordered parameters to optimize cerebral perfusion and minimize risk of hemorrhage  - Monitor temperature, glucose, and sodium. Initiate appropriate interventions as ordered  Outcome: Progressing

## 2024-09-28 NOTE — PROGRESS NOTES
[facility]   part of [facility]     Hospitalist Progress Note     Alejandro Guardado Patient Status:  Inpatient    1935 MRN [id number]   Location [facility] 4SW-A Attending Belen Flores MD   Hosp Day # 2 PCP Stanislav Odonnell MD     Chief Complaint: AMS, weakness     Subjective:     Feels well, eager to go home    Objective:    Review of Systems:   A comprehensive review of systems was completed; pertinent positive and negatives stated in subjective.    Vital signs:  Temp:  [97.9 °F (36.6 °C)-98.4 °F (36.9 °C)] 97.9 °F (36.6 °C)  Pulse:  [76-98] 89  Resp:  [11-25] 12  BP: ()/(50-82) 109/82  SpO2:  [90 %-99 %] 97 %    Physical Exam:    General: No acute distress AO   Respiratory: No wheezes, no rhonchi sl course on NC   Cardiovascular: S1, S2, irregular rate and rhythm  AF w/ occ OVC's   Abdomen: Soft, Non-tender, non-distended, hypo  bowel sounds  Neuro: No new focal deficits.   Extremities: No edema  R toe amputation healed  healing blister on r heal and nickel size wound r shine  no redness drainage     Diagnostic Data:    Labs:  Recent Labs   Lab 246 24  0459   WBC 14.5* 11.2* 9.3   HGB 10.5* 10.2* 9.9*   MCV 81.9 81.2 80.0   .0 231.0 248.0       Recent Labs   Lab 246 24  0459   * 154* 127*   BUN 28* 20 17   CREATSERUM 1.58* 1.20 1.00   CA 8.7 8.6* 9.1   ALB 3.5 3.5  --    * 140 138   K 3.7 3.5 3.3*    107 106   CO2 26.0 24.0 26.0   ALKPHO 61 59  --    AST 15 11  --    ALT 13 13  --    BILT 0.9 0.6  --    TP 5.8 5.7  --        Estimated Creatinine Clearance: 55 mL/min (based on SCr of 1 mg/dL).    Recent Labs   Lab 24  1744   TROPHS 14       No results for input(s): \"PTP\", \"INR\" in the last 168 hours.           Microbiology    Hospital Encounter on 24   1. Blood Culture     Status: None (Preliminary result)    Collection Time: 24  7:02 PM    Specimen: Blood,peripheral   Result Value  Ref Range    Blood Culture Result No Growth 1 Day N/A   2. Urine Culture, Routine     Status: None    Collection Time: 09/26/24  6:26 PM    Specimen: Urine, clean catch   Result Value Ref Range    Urine Culture No Growth at 18-24 hrs. N/A         Imaging: Reviewed in Epic.    Medications:    lisinopril  2.5 mg Oral Daily    metoprolol succinate ER  50 mg Oral Daily Beta Blocker    predniSONE  15 mg Oral Daily    piperacillin-tazobactam  3.375 g Intravenous Q8H    apixaban  5 mg Oral BID    atorvastatin  40 mg Oral Daily    clopidogrel  75 mg Oral Daily    finasteride  5 mg Oral Daily    folic acid  1 mg Oral Daily    pantoprazole  40 mg Oral QAM AC    tamsulosin  0.4 mg Oral Daily       Assessment & Plan:      #Recurrent multifocal pna  #Aspiration PNA  #Complicated UTI  -IV Zosyn -transition to Augmentin on dc  -Pulmonary on consult    # Sepsis / Hypotension  -resolved     #ERON  -improved   #Hypovolemic hyponatremia  -resolved     #Acute metabolic encephalopathy -resolved  -Secondary to above    #Chronic HFrEF  #Ischemic cardiomyopathy  -LVEF 20-25%     #Secondary adrenal insufficiency    #Atrial fibrillation, paroxysmal  eliquis     #Coronary artery disease s/p CABG   plavix     #Hyperlipidemia     #BPH cont home meds     #Peripheral arterial disease with history of nonhealing right foot wound s/p thrombectomy and revascularization/stent in December 2023   wounds don't look infected- healing  Elevate r heel off bed    Dc home today to complete abx    Supplementary Documentation:     Quality:  DVT Mechanical Prophylaxis:   SCDs,    DVT Pharmacologic Prophylaxis   Medication    apixaban (Eliquis) tab 5 mg                Code Status: DNAR/Selective Treatment  Block: External urinary catheter in place  Block Duration (in days):   Central line:    DIEOG: 9/28/2024    Discharge is dependent on: clinical course  At this point Mr. Guardado is expected to be discharge to: ? Home lives alone     The 21st Century Cures Act makes  medical notes like these available to patients in the interest of transparency. Please be advised this is a medical document. Medical documents are intended to carry relevant information, facts as evident, and the clinical opinion of the practitioner. The medical note is intended as peer to peer communication and may appear blunt or direct. It is written in medical language and may contain abbreviations or verbiage that are unfamiliar.        Agree with above    General- NAD  Chest- CTAB  CVS- RRR  Abdo-soft, NT, BS+    Plan  Continue Abx  Off pressors now  Follow Cx results    Belen Flores M.D.  [place] Hospitalist

## 2024-09-28 NOTE — PLAN OF CARE
Report received from previous RN. Pt denies any pain. Pt A&O x 4.  Pt on RA. Pt with stable BP. Pt able to transferred trom chair to bed with a walker with one assist and did very well.  Please see flowsheet for further assessment

## 2024-09-30 ENCOUNTER — APPOINTMENT (OUTPATIENT)
Dept: WOUND CARE | Facility: HOSPITAL | Age: 89
End: 2024-09-30
Attending: INTERNAL MEDICINE
Payer: MEDICARE

## 2024-09-30 VITALS
DIASTOLIC BLOOD PRESSURE: 66 MMHG | HEART RATE: 97 BPM | RESPIRATION RATE: 14 BRPM | SYSTOLIC BLOOD PRESSURE: 116 MMHG | TEMPERATURE: 98 F

## 2024-09-30 DIAGNOSIS — L97.412 DIABETIC ULCER OF RIGHT HEEL ASSOCIATED WITH DIABETES MELLITUS DUE TO UNDERLYING CONDITION, WITH FAT LAYER EXPOSED (HCC): Primary | ICD-10-CM

## 2024-09-30 DIAGNOSIS — L97.212 NON-PRESSURE CHRONIC ULCER OF RIGHT CALF WITH FAT LAYER EXPOSED (HCC): ICD-10-CM

## 2024-09-30 DIAGNOSIS — R26.9 GAIT DIFFICULTY: ICD-10-CM

## 2024-09-30 DIAGNOSIS — S90.821A BLISTER OF RIGHT FOOT, INITIAL ENCOUNTER: ICD-10-CM

## 2024-09-30 DIAGNOSIS — R60.0 EDEMA OF RIGHT LOWER EXTREMITY: ICD-10-CM

## 2024-09-30 DIAGNOSIS — E08.621 DIABETIC ULCER OF RIGHT HEEL ASSOCIATED WITH DIABETES MELLITUS DUE TO UNDERLYING CONDITION, WITH FAT LAYER EXPOSED (HCC): Primary | ICD-10-CM

## 2024-09-30 DIAGNOSIS — I73.9 PAD (PERIPHERAL ARTERY DISEASE) (HCC): ICD-10-CM

## 2024-09-30 LAB
EST. AVERAGE GLUCOSE BLD GHB EST-MCNC: 134 MG/DL (ref 68–126)
HBA1C MFR BLD: 6.3 % (ref ?–5.7)

## 2024-09-30 PROCEDURE — 99214 OFFICE O/P EST MOD 30 MIN: CPT

## 2024-09-30 NOTE — PROGRESS NOTES
Weekly Wound Education Note    Teaching Provided To: Patient;Family  Training Topics: Cleasing and general instructions;Discharge instructions;Dressing;Off-loading  Training Method: Explain/Verbal  Training Response: Patient responds and understands;Reinforcement needed        Notes: Stable. Thick foam to foot for protection. Heel and leg: endoform, hydrofera transfer, ABD pad, conforming gauze, tape. Off-load heel with pillows.

## 2024-09-30 NOTE — PROGRESS NOTES
I will discuss results with the patient in person at the next visit- Appt scheduled - date verified by me. Fortino Butler,

## 2024-09-30 NOTE — PATIENT INSTRUCTIONS
Apply collagen / HF transfer ON leg and heel wounds  Protect previously healed wound on foot with a foam dressing as there is a small blister in that area.   Leg elevation and low salt diet  See me in 2 weeks.     Wound Cleaning and Dressings:    Wash your hands with soap and water. Always wear gloves while changing dressings. Donot touch wound / mio-wound skin with un-gloved hands. Remove old dressing, discard and place into trash.    DRESSINGS:   All wounds - Endoform Collagen / HF transfer- change dressing every other day  Protect previously healed wound on foot with a foam dressing as there is a small blister in that area.   Offload wounded area.     Off-Loading: modified foot wear for maximal offloading.     Miscellaneous Instructions:  Supplement with a daily multivitamin   Low salt diet  Intense blood sugar control - Goal Blood sugar below 180 at all times recommended.  Increase protein intake / consider protein supplements - see below  Elevate extremities at all times when sitting / laying down.    DIETARY MODIFICATIONS TO HELP WITH WOUND HEALING:    Protein: Meats, beans, eggs, milk and yogurt particularly Greek yogurt), tofu, soy nuts, soy protein products    Vitamin C: Citrus fruits and juices, strawberries, tomatoes, tomato juice, peppers, baked potatoes, spinach, broccoli, cauliflower, [place] sprouts, cabbage    Vitamin A: Dark green, leafy vegetables, orange or yellow vegetables, cantaloupe, fortified dairy products, liver, fortified cereals    Zinc: Fortified cereals, red meats, seafood    Consider Kwan by [employer] (These are essential branch chain amino acids that help with tissue building and wound healing) and take 2 packets/day. you can order online at [employer] or [employer]    ADDITIONAL REMINDERS:    The treatment plan has been discussed at length with you and your provider. Follow all instructions carefully, it is very important. If you do not follow all instructions, you are at  risk of  your wound not healing, infection, possible loss of limb and even end of life.  Please call the clinic during regular business hours ( 7:30 AM - 5:30 PM) if you notice increased bleeding, redness, warmth, pain or pus like drainage or start running a fever greater than 100.3.    For after hour emergencies, please call your primary physician or go to the nearest emergency room.

## 2024-09-30 NOTE — PROGRESS NOTES
[facility] PROGRESS NOTE  FARZAD WILLINGHAM MD  9/30/2024    Chief Complaint:   Chief Complaint   Patient presents with    Wound Care     Follow up for Right leg wounds. No complaints at this time.        HPI:   Subjective   Alejandro Guardado is a 89 year old male coming in for a follow-up visit.    HPI    Wounds all stable.   Right leg wound and heel wound improving.     Healed foot wound - has a blister in that area.     No s/o active infection.     Recently hospitalized for pneumonia / uti - he only had 7 tabs of augmentin instead of 14 - would like it refilled. Sent to [place] in [place].     D/w family in detail.       Review of Systems  Negative except HPI   Denies chest pain / SOB / palpitations  Denies fever.     Allergies  Allergies   Allergen Reactions    Heparin ANAPHYLAXIS    Hydromorphone HALLUCINATION       Current Meds:  Current Outpatient Medications   Medication Sig Dispense Refill    amoxicillin clavulanate 875-125 MG Oral Tab Take 1 tablet by mouth 2 (two) times daily. 7 tablet 0    gabapentin 100 MG Oral Cap Take 2 capsules (200 mg total) by mouth in the morning and 2 capsules (200 mg total) before bedtime. 60 capsule 0    collagenase 250 UNIT/GM External Ointment Apply 1 Application topically daily. 30 g 0    metoprolol succinate ER 25 MG Oral Tablet 24 Hr Take 2 tablets (50 mg total) by mouth Daily Beta Blocker. 60 tablet 0    finasteride 5 MG Oral Tab Take 1 tablet (5 mg total) by mouth daily. 90 tablet 0    furosemide 40 MG Oral Tab Take 2 tablets (80 mg total) by mouth 2 (two) times daily.      lisinopril 2.5 MG Oral Tab Take 1 tablet (2.5 mg total) by mouth daily.      Ascorbic Acid (VITAMIN C) 1000 MG Oral Tab Take 1.5 tablets (1,500 mg total) by mouth daily.      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      tamsulosin  0.4 MG Oral Cap Take 1 capsule (0.4 mg total) by mouth daily.      apixaban 5 MG Oral Tab Take 1 tablet (5 mg total) by mouth 2 (two) times daily. 60 tablet 3    atorvastatin 40 MG Oral Tab Take 1 tablet (40 mg total) by mouth daily. 30 tablet 0    clopidogrel 75 MG Oral Tab Take 1 tablet (75 mg total) by mouth daily. 30 tablet 0    predniSONE 5 MG Oral Tab Take 3 tablets (15 mg total) by mouth daily.      folic acid 1 MG Oral Tab Take 1 tablet (1 mg total) by mouth daily.      acetaminophen 500 MG Oral Tab Take 2 tablets (1,000 mg total) by mouth every 8 (eight) hours. 21 tablet 0    Omeprazole 40 MG Oral Capsule Delayed Release Take 1 capsule (40 mg total) by mouth daily.           EXAM:   Objective   Objective    Physical Exam    Vital Signs  Vitals:    09/30/24 1100   BP: 116/66   Pulse: 97   Resp: 14   Temp: 98.3 °F (36.8 °C)       Wound Assessment  Wound 08/14/23 #1- Foot Right (Active)   Date First Assessed/Time First Assessed: 08/14/23 1309    Wound Number (Wound Clinic Only): #1-  Primary Wound Type: Venous Ulcer  Location: Foot  Wound Location Orientation: Right  Wound Description (Comments): Amputation site      Assessments 8/14/2023  1:15 PM 9/30/2024  1:12 PM   Wound Image        Drainage Amount -- None   Drainage Description Serous;Yellow --   Wound Length (cm) 2.6 cm 0.8 cm   Wound Width (cm) 7 cm 2 cm   Wound Surface Area (cm^2) 18.2 cm^2 1.6 cm^2   Wound Depth (cm) 1.3 cm 0.1 cm   Wound Volume (cm^3) 23.66 cm^3 0.16 cm^3   Wound Healing % -- 99   Margins Well-defined edges Flat and Intact   Non-staged Wound Description Full thickness Full thickness   Peggy-wound Assessment Edema Dry   Wound Granulation Tissue Pink;Firm --   Wound Bed Granulation (%) 10 % --   Wound Bed Epithelium (%) 10 % 100 %   Wound Bed Slough (%) 40 % --   Wound Bed Eschar (%) 40 % --   Wound Odor None None   Shape clustered 100% intact blister   Tunneling? -- No   Undermining? -- No   Sinus Tracts? -- No       Inactive Orders    Date Order Priority Status Authorizing Provider   08/26/24 1326 Debridement Old surgical Right Foot Routine Completed Quincy Poon DPM   08/19/24 1325 Debridement Old surgical Right Foot Routine Completed Quincy Poon DPM   08/05/24 1322 Debridement Old surgical Right Foot Routine Completed Quincy Poon DPM   07/29/24 1322 Debridement Old surgical Right Foot Routine Completed Quincy Poon DPM   07/22/24 0756 Consult to Wound Ostomy Routine Completed Rody Herr MD     - Reason for Consult:    Wound Care     - Wound Care Reason for Consult:    chronic wound/ R w foot   07/08/24 1325 Debridement Old surgical Right Foot Routine Completed Quincy Poon DPM   07/01/24 1336 Debridement Old surgical Right Foot Routine Completed Quincy Poon DPM   06/24/24 1354 Debridement Old surgical Right Foot Routine Completed Quincy Poon DPM   06/17/24 1358 Debridement Old surgical Right Foot Routine Completed Quincy Poon DPM   06/10/24 1434 Cellular tissue product application Old surgical Right Foot Routine Completed Quincy Poon DPM   06/10/24 1418 Debridement Old surgical Right Foot Routine Completed Quincy Poon DPM   06/03/24 1344 Cellular tissue product application Old surgical Right Foot Routine Completed Quincy Poon DPM   06/03/24 1332 Debridement Old surgical Right Foot Routine Completed Quincy Poon DPM   05/29/24 1152 Cellular tissue product application Old surgical Right Foot Routine Completed Fortino Mo MD   05/20/24 1204 Cellular tissue product application Old surgical Right Foot Routine Completed Fortino Mo MD   05/20/24 1202 Debridement Old surgical Right Foot Routine Completed Fortino Mo MD   05/06/24 1332 Cellular tissue product application Old surgical Right Foot Routine Completed Quincy Poon DPM   05/06/24 1318 Debridement Old surgical Right Foot Routine Completed Quincy Poon DPM   04/15/24 1637 Debridement Old surgical Right Foot  Routine Completed Fortino Mo MD   04/08/24 1601 Debridement Old surgical Right Foot Routine Completed Quincy Poon DPM   03/25/24 1151 Debridement Old surgical Right Foot Routine Completed Quincy Poon DPM   03/18/24 1638 Wound care Routine Discontinued Albert Lee, DO   03/16/24 1801 Consult to Wound Ostomy Routine Completed Susu Flores, APRN     - Reason for Consult:    Wound Care     - Wound Care Reason for Consult:    wound consult   03/11/24 1433 Debridement Old surgical Right Foot Routine Completed Quincy Poon, DPM   02/26/24 1424 Debridement Old surgical Right Foot Routine Completed Quincy Poon DPM   02/19/24 1507 Debridement Old surgical Right Foot Routine Completed Quincy Poon DPM   02/13/24 1244 Wound care Routine Discontinued Andre Campos, DO   02/12/24 1531 Consult to Wound Ostomy Routine Completed Andre Campos DO     - Reason for Consult:    Wound Care     - Wound Care Reason for Consult:    Right foot wound   01/29/24 1520 Debridement Old surgical Right Foot Routine Completed Quincy Poon, DPM   01/15/24 1512 Debridement Old surgical Right Foot Routine Completed Quincy Poon DPM   01/08/24 1621 Debridement Old surgical Right Foot Routine Completed Quincy Poon DPM   12/27/23 1707 Wound care Routine Discontinued Fortino Mo MD   12/27/23 1429 Wound care Routine Discontinued Carley Sullivan, DO   12/18/23 1449 Debridement Old surgical Right Foot Routine Completed Quincy Poon DPM   12/11/23 1618 Debridement Old surgical Right Foot Routine Completed Quincy Poon DPM   12/01/23 1228 Consult to Wound Ostomy Routine Completed Susu Flores, APRN     - Reason for Consult:    Wound Care     - Wound Care Reason for Consult:    wound care   11/27/23 1018 Debridement Old surgical Right Foot Routine Completed Quincy Poon DPM   11/20/23 1600 Debridement Old surgical Right Foot Routine Completed Quincy Poon DPM   11/13/23 1636 Debridement Old  surgical Right Foot Routine Completed Quincy Poon, JAN   11/06/23 1538 Debridement Old surgical Right Foot Routine Completed Quincy Poon DPM   10/30/23 0915 Debridement Old surgical Right Foot Routine Completed Quincy Poon DPM   10/23/23 0851 Debridement Old surgical Right Foot Routine Completed Quincy Poon, JAN   10/16/23 0909 Debridement Old surgical Right Foot Routine Completed Quincy Poon DPM   10/09/23 1318 Debridement Old surgical Right Foot Routine Completed Quincy Poon DPM   09/25/23 1336 Debridement Old surgical Right Foot Routine Completed Quincy Poon DPM   09/11/23 1348 Debridement Routine Completed Brian Moreno DPM   08/28/23 1317 Debridement Routine Completed Brian Moreno, JAN   08/14/23 1327 Debridement Routine Completed Brian Moreno, JAN       Wound 04/15/24 #2 Leg Right;Anterior (Active)   Date First Assessed/Time First Assessed: 04/15/24 1611    Wound Number (Wound Clinic Only): #2  Primary Wound Type: Venous Ulcer  Location: Leg  Wound Location Orientation: Right;Anterior      Assessments 4/15/2024  4:12 PM 9/30/2024  1:10 PM   Wound Image       Drainage Amount -- Moderate   Drainage Description -- Serous;Clear   Wound Length (cm) -- 1.5 cm   Wound Width (cm) -- 1.1 cm   Wound Surface Area (cm^2) -- 1.65 cm^2   Wound Depth (cm) -- 0.1 cm   Wound Volume (cm^3) -- 0.165 cm^3   Wound Healing % -- -41   Margins -- Well-defined edges   Non-staged Wound Description -- Full thickness   Peggy-wound Assessment -- Edema;Moist   Wound Granulation Tissue -- Pink;Firm   Wound Bed Granulation (%) -- 70 %   Wound Bed Slough (%) -- 30 %   Wound Odor -- None       Inactive Orders   Date Order Priority Status Authorizing Provider   09/27/24 1657 Wound care Routine Discontinued Belen Flores MD   09/27/24 0212 Consult to Wound Ostomy Routine Completed Vivian Way APRN     - Reason for Consult:    Wound Care     - Wound Care Reason for Consult:    wound care   07/17/24  1614 Debridement Venous Ulcer Right;Anterior Leg Routine Completed Fortino Mo MD       Wound 08/14/24 #3 Heel Right (Active)   Date First Assessed/Time First Assessed: 08/14/24 1306    Wound Number (Wound Clinic Only): #3  Primary Wound Type: Pressure Injury  Location: Heel  Wound Location Orientation: Right      Assessments 8/14/2024  1:10 PM 9/30/2024  1:11 PM   Wound Image       Drainage Amount Small Small   Drainage Description Yellow;Serous Serous;Yellow   Wound Length (cm) 0.8 cm 0.5 cm   Wound Width (cm) 1.2 cm 0.6 cm   Wound Surface Area (cm^2) 0.96 cm^2 0.3 cm^2   Wound Depth (cm) 0.1 cm 0.1 cm   Wound Volume (cm^3) 0.096 cm^3 0.03 cm^3   Wound Healing % -- 69   Margins Well-defined edges Well-defined edges   Non-staged Wound Description -- Full thickness   Peggy-wound Assessment Edema;Dry Dry;Edema;Callous   Wound Granulation Tissue Pink;Firm Pink;Firm   Wound Bed Granulation (%) 90 % 100 %   Wound Bed Slough (%) 10 % --   Wound Odor None None   Pressure Injury Stage Unstageable Stage 3       Inactive Orders   Date Order Priority Status Authorizing Provider   09/27/24 1657 Wound care Routine Discontinued Belen Flores MD   09/16/24 1334 Debridement Pressure Injury Right Heel Routine Completed Quinyc Poon DPM   08/26/24 1328 Debridement Pressure Injury Right Heel Routine Completed Quincy Poon DPM       Wound 09/27/24 Arm Anterior;Left;Upper (Active)   Date First Assessed/Time First Assessed: 09/27/24 0015   Present on Original Admission: Yes  Primary Wound Type: Skin Tear  Location: Arm  Wound Location Orientation: Anterior;Left;Upper      Assessments 9/27/2024 12:15 AM 9/28/2024  8:00 AM   Wound Image      Site Assessment Bleeding;Fragile Unable to assess   Drainage Amount Small None   Drainage Description Serosanguineous --   Treatments Cleansed --   Dressing Mepilex Xeroform;Gauze   Dressing Changed New --   Dressing Status Clean;Dry;Intact Clean;Dry;Intact       Inactive Orders   Date  Order Priority Status Authorizing Provider   09/27/24 5738 Wound care Routine Discontinued Belen Flores MD                ASSESSMENT AND PLAN:     Assessment     Encounter Diagnosis  1. 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 Blister of right foot, initial encounter    3. 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 Edema of right lower extremity    5. PAD (peripheral artery disease) (HCC)    6. Gait difficulty        PLAN OF CARE:    Check A1c   Refill augmentin 7 more tabs as requested.   Protect area of blister with foam dressing.   Watch out for signs of early infection - counseled.   Plan of care discussed with patient in detail - All questions answered   Return in 2 week.     Orders  Orders Placed This Encounter   Procedures    Hemoglobin A1C       Meds & Refills for this Visit:  Requested Prescriptions     Signed Prescriptions Disp Refills    amoxicillin clavulanate 875-125 MG Oral Tab 7 tablet 0     Sig: Take 1 tablet by mouth 2 (two) times daily.         Patient Instructions     Apply collagen / HF transfer ON leg and heel wounds  Protect previously healed wound on foot with a foam dressing as there is a small blister in that area.   Leg elevation and low salt diet  See me in 2 weeks.     Wound Cleaning and Dressings:    Wash your hands with soap and water. Always wear gloves while changing dressings. Donot touch wound / mio-wound skin with un-gloved hands. Remove old dressing, discard and place into trash.    DRESSINGS:   All wounds - Endoform Collagen / HF transfer- change dressing every other day  Protect previously healed wound on foot with a foam dressing as there is a small blister in that area.   Offload wounded area.     Off-Loading: modified foot wear for maximal offloading.     Miscellaneous Instructions:  Supplement with a daily multivitamin   Low salt diet  Intense blood  sugar control - Goal Blood sugar below 180 at all times recommended.  Increase protein intake / consider protein supplements - see below  Elevate extremities at all times when sitting / laying down.    DIETARY MODIFICATIONS TO HELP WITH WOUND HEALING:    Protein: Meats, beans, eggs, milk and yogurt particularly Greek yogurt), tofu, soy nuts, soy protein products    Vitamin C: Citrus fruits and juices, strawberries, tomatoes, tomato juice, peppers, baked potatoes, spinach, broccoli, cauliflower, Langtry sprouts, cabbage    Vitamin A: Dark green, leafy vegetables, orange or yellow vegetables, cantaloupe, fortified dairy products, liver, fortified cereals    Zinc: Fortified cereals, red meats, seafood    Consider Kwan [employer] (These are essential branch chain amino acids that help with tissue building and wound healing) and take 2 packets/day. you can order online at [employer] or [employer]    ADDITIONAL REMINDERS:    The treatment plan has been discussed at length with you and your provider. Follow all instructions carefully, it is very important. If you do not follow all instructions, you are at  risk of your wound not healing, infection, possible loss of limb and even end of life.  Please call the clinic during regular business hours ( 7:30 AM - 5:30 PM) if you notice increased bleeding, redness, warmth, pain or pus like drainage or start running a fever greater than 100.3.    For after hour emergencies, please call your primary physician or go to the nearest emergency room.      Patient/Caregiver Education: There are no barriers to learning. Medical education for above diagnosis given.   Answered all questions.    Outcome: Patient verbalizes understanding. Patient is notified to call with any questions, complications, allergies, or worsening or changing symptoms.  Patient is to call with any side effects or complications as a result of the treatments today.      DOCUMENTATION OF TIME SPENT: Code selection for  this visit was based on time spent : 35 min on date of service in preparing to see the patient, obtaining and/or reviewing separately obtained history, performing a medically appropriate examination, counseling and educating the patient/family/caregiver, ordering medications or testing, referring and communicating with other healthcare providers, documenting clinical information in the E HR, independently interpreting results and communicating results to the patient/family/caregiver and care coordination with the patient's other providers.    Followup: Return in about 2 weeks (around 10/14/2024) for Wound followup.      Note to Patient:  The 21st Century Cures Act makes medical notes like these available to patients in the interest of transparency. However, be advised this is a medical document and is intended as [id number] communication; it is written in medical language and may appear blunt, direct, or contain abbreviations or verbiage that are unfamiliar. Medical documents are intended to carry relevant information, facts as evident, and the clinical opinion of the practitioner.    Also, please note that this report has been produced using speech recognition software and may contain errors related to that system including, but not limited to, errors in grammar, punctuation, and spelling, as well as words and phrases that possibly may have been recognized inappropriately.  If there are any questions or concerns, contact the dictating provider for clarification.      Fortino Butler MD  9/30/2024  1:31 PM

## 2024-10-01 ENCOUNTER — E-ADVICE (OUTPATIENT)
Dept: CARDIOLOGY | Age: 89
End: 2024-10-01

## 2024-10-01 DIAGNOSIS — I48.11 LONGSTANDING PERSISTENT ATRIAL FIBRILLATION  (CMD): Primary | ICD-10-CM

## 2024-10-01 RX ORDER — METOPROLOL SUCCINATE 50 MG/1
50 TABLET, EXTENDED RELEASE ORAL DAILY
Qty: 90 TABLET | Refills: 3 | Status: SHIPPED | OUTPATIENT
Start: 2024-10-01

## 2024-10-01 NOTE — PAYOR COMM NOTE
Discharge Notification    Patient Name: DARELL SIMS  Payor: [employer] MEDICARE ADV PPO  Subscriber #: [id number]  Authorization Number: [id number]  Admit Date/Time: 9/26/2024 5:33 PM  Discharge Date/Time: 9/28/2024 5:00 PM

## 2024-10-07 ENCOUNTER — E-ADVICE (OUTPATIENT)
Dept: CARDIOLOGY | Age: 89
End: 2024-10-07

## 2024-10-07 RX ORDER — APIXABAN 5 MG/1
5 TABLET, FILM COATED ORAL EVERY 12 HOURS SCHEDULED
Qty: 90 TABLET | Refills: 3 | Status: SHIPPED | OUTPATIENT
Start: 2024-10-07

## 2024-10-14 ENCOUNTER — OFFICE VISIT (OUTPATIENT)
Dept: WOUND CARE | Facility: HOSPITAL | Age: 89
End: 2024-10-14
Attending: INTERNAL MEDICINE
Payer: MEDICARE

## 2024-10-14 VITALS
DIASTOLIC BLOOD PRESSURE: 69 MMHG | TEMPERATURE: 98 F | RESPIRATION RATE: 14 BRPM | HEART RATE: 88 BPM | SYSTOLIC BLOOD PRESSURE: 112 MMHG

## 2024-10-14 DIAGNOSIS — R60.0 EDEMA OF RIGHT LOWER EXTREMITY: ICD-10-CM

## 2024-10-14 DIAGNOSIS — I73.9 PERIPHERAL ARTERIAL DISEASE (HCC): ICD-10-CM

## 2024-10-14 DIAGNOSIS — L97.212 NON-PRESSURE CHRONIC ULCER OF RIGHT CALF WITH FAT LAYER EXPOSED (HCC): Primary | ICD-10-CM

## 2024-10-14 DIAGNOSIS — E08.621 DIABETIC ULCER OF RIGHT HEEL ASSOCIATED WITH DIABETES MELLITUS DUE TO UNDERLYING CONDITION, WITH FAT LAYER EXPOSED (HCC): ICD-10-CM

## 2024-10-14 DIAGNOSIS — R23.8 SLOUGHING OF WOUND: ICD-10-CM

## 2024-10-14 DIAGNOSIS — L97.412 DIABETIC ULCER OF RIGHT HEEL ASSOCIATED WITH DIABETES MELLITUS DUE TO UNDERLYING CONDITION, WITH FAT LAYER EXPOSED (HCC): ICD-10-CM

## 2024-10-14 DIAGNOSIS — I73.9 PAD (PERIPHERAL ARTERY DISEASE) (HCC): ICD-10-CM

## 2024-10-14 DIAGNOSIS — M79.89 LEFT LEG SWELLING: ICD-10-CM

## 2024-10-14 DIAGNOSIS — M79.89 RIGHT LEG SWELLING: ICD-10-CM

## 2024-10-14 DIAGNOSIS — R26.9 GAIT DIFFICULTY: ICD-10-CM

## 2024-10-14 PROCEDURE — 99214 OFFICE O/P EST MOD 30 MIN: CPT

## 2024-10-14 NOTE — PROGRESS NOTES
[facility] PROGRESS NOTE  FARZAD WILLINGHAM MD  10/14/2024    Chief Complaint:   Chief Complaint   Patient presents with    Wound Care     Pt here for follow up wound care visit to right heel, right anterior lower leg, and right foot. Pt denies any concerns or issues. Pt denies any pain.        HPI:   Subjective   Alejandro Guardado is a 89 year old male coming in for a follow-up visit.    HPI    Right heel wound - closed with a scab - scab partly removed by me - immature epithelium underneath.     Right leg wound Improved - more granulation .    Left leg - new wound - etiology unclear - possibly a cat jumped on him.     No s/o active infection.   Edema both legs +    Review of Systems  Negative except HPI   Denies chest pain / SOB / palpitations  Denies fever.     Allergies  Allergies[1]    Current Meds:  Current Outpatient Medications   Medication Sig Dispense Refill    amoxicillin clavulanate 875-125 MG Oral Tab Take 1 tablet by mouth 2 (two) times daily. 7 tablet 0    gabapentin 100 MG Oral Cap Take 2 capsules (200 mg total) by mouth in the morning and 2 capsules (200 mg total) before bedtime. 60 capsule 0    collagenase 250 UNIT/GM External Ointment Apply 1 Application topically daily. 30 g 0    metoprolol succinate ER 25 MG Oral Tablet 24 Hr Take 2 tablets (50 mg total) by mouth Daily Beta Blocker. 60 tablet 0    finasteride 5 MG Oral Tab Take 1 tablet (5 mg total) by mouth daily. 90 tablet 0    furosemide 40 MG Oral Tab Take 2 tablets (80 mg total) by mouth 2 (two) times daily.      lisinopril 2.5 MG Oral Tab Take 1 tablet (2.5 mg total) by mouth daily.      Ascorbic Acid (VITAMIN C) 1000 MG Oral Tab Take 1.5 tablets (1,500 mg total) by mouth daily.      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      tamsulosin 0.4 MG Oral Cap Take 1 capsule  (0.4 mg total) by mouth daily.      apixaban 5 MG Oral Tab Take 1 tablet (5 mg total) by mouth 2 (two) times daily. 60 tablet 3    atorvastatin 40 MG Oral Tab Take 1 tablet (40 mg total) by mouth daily. 30 tablet 0    clopidogrel 75 MG Oral Tab Take 1 tablet (75 mg total) by mouth daily. 30 tablet 0    predniSONE 5 MG Oral Tab Take 3 tablets (15 mg total) by mouth daily.      folic acid 1 MG Oral Tab Take 1 tablet (1 mg total) by mouth daily.      acetaminophen 500 MG Oral Tab Take 2 tablets (1,000 mg total) by mouth every 8 (eight) hours. 21 tablet 0    Omeprazole 40 MG Oral Capsule Delayed Release Take 1 capsule (40 mg total) by mouth daily.           EXAM:   Objective   Objective    Physical Exam    Vital Signs  Vitals:    10/14/24 1100   BP: 112/69   Pulse: 88   Resp: 14   Temp: 98 °F (36.7 °C)       Wound Assessment  Wound 08/14/23 #1- Foot Right (Active)   Date First Assessed/Time First Assessed: 08/14/23 1309    Wound Number (Wound Clinic Only): #1-  Primary Wound Type: Venous Ulcer  Location: Foot  Wound Location Orientation: Right  Wound Description (Comments): Amputation site      Assessments 8/14/2023  1:15 PM 10/14/2024  1:16 PM   Wound Image        Drainage Amount -- None   Drainage Description Serous;Yellow --   Wound Length (cm) 2.6 cm 0.1 cm   Wound Width (cm) 7 cm 0.1 cm   Wound Surface Area (cm^2) 18.2 cm^2 0.01 cm^2   Wound Depth (cm) 1.3 cm 0.1 cm   Wound Volume (cm^3) 23.66 cm^3 0.001 cm^3   Wound Healing % -- 100   Margins Well-defined edges Flat and Intact   Non-staged Wound Description Full thickness Full thickness   Peggy-wound Assessment Edema Dry   Wound Granulation Tissue Pink;Firm Pink;Firm   Wound Bed Granulation (%) 10 % 100 %   Wound Bed Epithelium (%) 10 % --   Wound Bed Slough (%) 40 % --   Wound Bed Eschar (%) 40 % --   Wound Odor None None   Shape clustered --   Tunneling? -- No   Undermining? -- No   Sinus Tracts? -- No       Inactive Orders   Date Order Priority Status  Authorizing Provider   08/26/24 1326 Debridement Old surgical Right Foot Routine Completed Quincy Poon DPM   08/19/24 1325 Debridement Old surgical Right Foot Routine Completed Quincy Poon DPM   08/05/24 1322 Debridement Old surgical Right Foot Routine Completed Quincy Poon DPM   07/29/24 1322 Debridement Old surgical Right Foot Routine Completed Quincy Poon DPM   07/22/24 0756 Consult to Wound Ostomy Routine Completed Rody Herr MD     - Reason for Consult:    Wound Care     - Wound Care Reason for Consult:    chronic wound/ R w foot   07/08/24 1325 Debridement Old surgical Right Foot Routine Completed Quincy Poon DPM   07/01/24 1336 Debridement Old surgical Right Foot Routine Completed Quincy Poon DPM   06/24/24 1354 Debridement Old surgical Right Foot Routine Completed Quincy Poon DPM   06/17/24 1358 Debridement Old surgical Right Foot Routine Completed Quincy Poon DPM   06/10/24 1434 Cellular tissue product application Old surgical Right Foot Routine Completed Quincy Poon DPM   06/10/24 1418 Debridement Old surgical Right Foot Routine Completed Quincy Poon DPM   06/03/24 1344 Cellular tissue product application Old surgical Right Foot Routine Completed Quincy Poon DPM   06/03/24 1332 Debridement Old surgical Right Foot Routine Completed Quincy Poon DPM   05/29/24 1152 Cellular tissue product application Old surgical Right Foot Routine Completed Fortino Mo MD   05/20/24 1204 Cellular tissue product application Old surgical Right Foot Routine Completed Fortino Mo MD   05/20/24 1202 Debridement Old surgical Right Foot Routine Completed Fortino Mo MD   05/06/24 1332 Cellular tissue product application Old surgical Right Foot Routine Completed Quincy Poon DPM   05/06/24 1318 Debridement Old surgical Right Foot Routine Completed Quincy Poon DPM   04/15/24 1637 Debridement Old surgical Right Foot Routine Completed Luke DOMINGO  MD Fortino   04/08/24 1601 Debridement Old surgical Right Foot Routine Completed Quincy Poon DPM   03/25/24 1151 Debridement Old surgical Right Foot Routine Completed Quincy Poon DPM   03/18/24 1638 Wound care Routine Discontinued Albert Lee, DO   03/16/24 1801 Consult to Wound Ostomy Routine Completed Susu Flores APRN     - Reason for Consult:    Wound Care     - Wound Care Reason for Consult:    wound consult   03/11/24 1433 Debridement Old surgical Right Foot Routine Completed Quincy Poon DPM   02/26/24 1424 Debridement Old surgical Right Foot Routine Completed Quincy Poon DPM   02/19/24 1507 Debridement Old surgical Right Foot Routine Completed Quincy Poon DPM   02/13/24 1244 Wound care Routine Discontinued Andre Campos, DO   02/12/24 1531 Consult to Wound Ostomy Routine Completed Andre Campos DO     - Reason for Consult:    Wound Care     - Wound Care Reason for Consult:    Right foot wound   01/29/24 1520 Debridement Old surgical Right Foot Routine Completed Quincy Poon DPM   01/15/24 1512 Debridement Old surgical Right Foot Routine Completed Quincy Poon DPM   01/08/24 1621 Debridement Old surgical Right Foot Routine Completed Quincy Poon DPM   12/27/23 1707 Wound care Routine Discontinued Fortino Mo MD   12/27/23 1429 Wound care Routine Discontinued Carley Sullivan, DO   12/18/23 1449 Debridement Old surgical Right Foot Routine Completed Quincy Poon DPM   12/11/23 1618 Debridement Old surgical Right Foot Routine Completed Quincy Poon DPM   12/01/23 1228 Consult to Wound Ostomy Routine Completed Susu Flores APRN     - Reason for Consult:    Wound Care     - Wound Care Reason for Consult:    wound care   11/27/23 1018 Debridement Old surgical Right Foot Routine Completed Quincy Poon DPM   11/20/23 1600 Debridement Old surgical Right Foot Routine Completed Quincy Poon DPM   11/13/23 1636 Debridement Old surgical Right Foot Routine  Completed Quincy Poon, JAN   11/06/23 1538 Debridement Old surgical Right Foot Routine Completed Quincy Poon, JAN   10/30/23 0915 Debridement Old surgical Right Foot Routine Completed Quincy Poon DPM   10/23/23 0851 Debridement Old surgical Right Foot Routine Completed Quincy Poon, DPM   10/16/23 0909 Debridement Old surgical Right Foot Routine Completed Quincy Poon DPM   10/09/23 1318 Debridement Old surgical Right Foot Routine Completed Quincy Poon DPM   09/25/23 1336 Debridement Old surgical Right Foot Routine Completed Quincy Poon, NENOM   09/11/23 1348 Debridement Routine Completed Brian Moreno, JAN   08/28/23 1317 Debridement Routine Completed Brian Moreno, DPM   08/14/23 1327 Debridement Routine Completed Brian Moreno, NENO       Wound 04/15/24 #2 Leg Right;Anterior (Active)   Date First Assessed/Time First Assessed: 04/15/24 1611    Wound Number (Wound Clinic Only): #2  Primary Wound Type: Venous Ulcer  Location: Leg  Wound Location Orientation: Right;Anterior      Assessments 4/15/2024  4:12 PM 10/14/2024  1:15 PM   Wound Image       Drainage Amount -- Scant   Drainage Description -- Serosanguineous   Wound Length (cm) -- 1.3 cm   Wound Width (cm) -- 0.8 cm   Wound Surface Area (cm^2) -- 1.04 cm^2   Wound Depth (cm) -- 0.1 cm   Wound Volume (cm^3) -- 0.104 cm^3   Wound Healing % -- 11   Margins -- Well-defined edges   Non-staged Wound Description -- Full thickness   Peggy-wound Assessment -- Edema;Clean;Fragile   Wound Granulation Tissue -- Pink;Red;Spongy   Wound Bed Granulation (%) -- 70 %   Wound Bed Slough (%) -- 30 %   Wound Odor -- None   Tunneling? -- No   Undermining? -- No   Sinus Tracts? -- No       Inactive Orders   Date Order Priority Status Authorizing Provider   09/27/24 1657 Wound care Routine Discontinued Belen Flores MD   09/27/24 0212 Consult to Wound Ostomy Routine Completed Vivian Way APRN     - Reason for Consult:    Wound Care     - Wound  Care Reason for Consult:    wound care   07/17/24 1614 Debridement Venous Ulcer Right;Anterior Leg Routine Completed Fortino Mo MD       Wound 08/14/24 #3 Heel Right (Active)   Date First Assessed/Time First Assessed: 08/14/24 1306    Wound Number (Wound Clinic Only): #3  Primary Wound Type: Pressure Injury  Location: Heel  Wound Location Orientation: Right      Assessments 8/14/2024  1:10 PM 10/14/2024  1:19 PM   Wound Image       Drainage Amount Small None   Drainage Description Yellow;Serous --   Wound Length (cm) 0.8 cm 0.1 cm   Wound Width (cm) 1.2 cm 0.1 cm   Wound Surface Area (cm^2) 0.96 cm^2 0.01 cm^2   Wound Depth (cm) 0.1 cm 0.1 cm   Wound Volume (cm^3) 0.096 cm^3 0.001 cm^3   Wound Healing % -- 99   Margins Well-defined edges Well-defined edges   Non-staged Wound Description -- Full thickness   Peggy-wound Assessment Edema;Dry Dry;Callous   Wound Granulation Tissue Pink;Firm Pink;Firm   Wound Bed Granulation (%) 90 % 100 %   Wound Bed Slough (%) 10 % --   Wound Odor None None   Tunneling? -- No   Undermining? -- No   Sinus Tracts? -- No   Pressure Injury Stage Unstageable Stage 3       Inactive Orders   Date Order Priority Status Authorizing Provider   09/27/24 1657 Wound care Routine Discontinued Belen Flores MD   09/16/24 1334 Debridement Pressure Injury Right Heel Routine Completed Quincy Poon DPM   08/26/24 1328 Debridement Pressure Injury Right Heel Routine Completed Quincy Poon DPM       Wound 09/27/24 Arm Anterior;Left;Upper (Active)   Date First Assessed/Time First Assessed: 09/27/24 0015   Present on Original Admission: Yes  Primary Wound Type: Skin Tear  Location: Arm  Wound Location Orientation: Anterior;Left;Upper      Assessments 9/27/2024 12:15 AM 9/28/2024  8:00 AM   Wound Image      Site Assessment Bleeding;Fragile Unable to assess   Drainage Amount Small None   Drainage Description Serosanguineous --   Treatments Cleansed --   Dressing Mepilex Xeroform;Gauze    Dressing Changed New --   Dressing Status Clean;Dry;Intact Clean;Dry;Intact       Inactive Orders   Date Order Priority Status Authorizing Provider   09/27/24 1222 Wound care Routine Discontinued Belen Flores MD       Wound 10/14/24 #4 Left leg Leg Left (Active)   Date First Assessed: 10/14/24    Wound Number (Wound Clinic Only): #4 Left leg  Primary Wound Type: Edema  Location: Leg  Wound Location Orientation: Left      Assessments 10/14/2024  1:17 PM   Wound Image     Drainage Amount Unable to assess   Wound Length (cm) 0.7 cm   Wound Width (cm) 4 cm (angled)   Wound Surface Area (cm^2) 2.8 cm^2   Wound Depth (cm) 0.1 cm   Wound Volume (cm^3) 0.28 cm^3   Margins Well-defined edges   Non-staged Wound Description Full thickness   Peggy-wound Assessment Edema   Wound Granulation Tissue Pink;Firm   Wound Bed Granulation (%) 5 %   Wound Bed Epithelium (%) 70 %   Shape 20% scabbed, clustered   Tunneling? No   Undermining? No   Sinus Tracts? No       No associated orders.          ASSESSMENT AND PLAN:     Assessment     Encounter Diagnosis  1. 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 Edema of right lower extremity    4. PAD (peripheral artery disease) (HCC)    5. Gait difficulty    6. Right leg swelling    7. Left leg swelling    8. Peripheral arterial disease (HCC)    9. Sloughing of wound      PLAN OF CARE:    Start HF transfer on left leg wound.   Continue collagen and HF transfer on right leg wound.   Keep heel wound right covered with HF transfer for protection.   Watch out for signs of early infection - counseled.   Plan of care discussed with patient in detail - All questions answered   Return in one week.     Patient Instructions     Leg elevation and low salt diet  See me in 2 weeks.     Wound Cleaning and Dressings:    Wash your hands with soap and  water. Always wear gloves while changing dressings. Donot touch wound / mio-wound skin with un-gloved hands. Remove old dressing, discard and place into trash.    DRESSINGS:   Right leg wound - Endoform Collagen / HF transfer- change dressing every other day  HF transfer on right heel and left leg wound.   Offload wounded area.     Off-Loading: modified foot wear for maximal offloading.     Miscellaneous Instructions:  Supplement with a daily multivitamin   Low salt diet  Intense blood sugar control - Goal Blood sugar below 180 at all times recommended.  Increase protein intake / consider protein supplements - see below  Elevate extremities at all times when sitting / laying down.    DIETARY MODIFICATIONS TO HELP WITH WOUND HEALING:    Protein: Meats, beans, eggs, milk and yogurt particularly Greek yogurt), tofu, soy nuts, soy protein products    Vitamin C: Citrus fruits and juices, strawberries, tomatoes, tomato juice, peppers, baked potatoes, spinach, broccoli, cauliflower, [place] sprouts, cabbage    Vitamin A: Dark green, leafy vegetables, orange or yellow vegetables, cantaloupe, fortified dairy products, liver, fortified cereals    Zinc: Fortified cereals, red meats, seafood    Consider Kwan by [employer] (These are essential branch chain amino acids that help with tissue building and wound healing) and take 2 packets/day. you can order online at [employer] or [employer]    ADDITIONAL REMINDERS:    The treatment plan has been discussed at length with you and your provider. Follow all instructions carefully, it is very important. If you do not follow all instructions, you are at  risk of your wound not healing, infection, possible loss of limb and even end of life.  Please call the clinic during regular business hours ( 7:30 AM - 5:30 PM) if you notice increased bleeding, redness, warmth, pain or pus like drainage or start running a fever greater than 100.3.    For after hour emergencies, please call your primary  physician or go to the nearest emergency room.      Patient/Caregiver Education: There are no barriers to learning. Medical education for above diagnosis given.   Answered all questions.    Outcome: Patient verbalizes understanding. Patient is notified to call with any questions, complications, allergies, or worsening or changing symptoms.  Patient is to call with any side effects or complications as a result of the treatments today.      DOCUMENTATION OF TIME SPENT: Code selection for this visit was based on time spent : 35 min on date of service in preparing to see the patient, obtaining and/or reviewing separately obtained history, performing a medically appropriate examination, counseling and educating the patient/family/caregiver, ordering medications or testing, referring and communicating with other healthcare providers, documenting clinical information in the E HR, independently interpreting results and communicating results to the patient/family/caregiver and care coordination with the patient's other providers.    Followup: Return in about 2 weeks (around 10/28/2024) for Wound followup.      Note to Patient:  The 21st Century Cures Act makes medical notes like these available to patients in the interest of transparency. However, be advised this is a medical document and is intended as [id number] communication; it is written in medical language and may appear blunt, direct, or contain abbreviations or verbiage that are unfamiliar. Medical documents are intended to carry relevant information, facts as evident, and the clinical opinion of the practitioner.    Also, please note that this report has been produced using speech recognition software and may contain errors related to that system including, but not limited to, errors in grammar, punctuation, and spelling, as well as words and phrases that possibly may have been recognized inappropriately.  If there are any questions or concerns, contact the  dictating provider for clarification.      Fortino Butler MD  10/14/2024  1:24 PM                      [1]   Allergies  Allergen Reactions    Heparin ANAPHYLAXIS    Hydromorphone HALLUCINATION

## 2024-10-14 NOTE — PROGRESS NOTES
.Weekly Wound Education Note    Teaching Provided To: Patient;Family  Training Topics: Dressing;Cleasing and general instructions;Discharge instructions;Edema control;Compression  Training Method: Explain/Verbal;Written  Training Response: Patient responds and understands;Reinforcement needed            New wound to left leg noted, patient states his cat got him.  Left leg +2 pitting edema.  Endoform, hydrofera transfer to right leg.  Hydrofera transfer, to heel and left leg wound.  Spanda  size F to LLE.  Patient to follow up in 2 weeks.

## 2024-10-14 NOTE — PATIENT INSTRUCTIONS
Leg elevation and low salt diet  See me in 2 weeks.     Wound Cleaning and Dressings:    Wash your hands with soap and water. Always wear gloves while changing dressings. Donot touch wound / mio-wound skin with un-gloved hands. Remove old dressing, discard and place into trash.    DRESSINGS:   Right leg wound - Endoform Collagen / HF transfer- change dressing every other day  HF transfer on right heel and left leg wound.   Offload wounded area.     Off-Loading: modified foot wear for maximal offloading.     Miscellaneous Instructions:  Supplement with a daily multivitamin   Low salt diet  Intense blood sugar control - Goal Blood sugar below 180 at all times recommended.  Increase protein intake / consider protein supplements - see below  Elevate extremities at all times when sitting / laying down.    DIETARY MODIFICATIONS TO HELP WITH WOUND HEALING:    Protein: Meats, beans, eggs, milk and yogurt particularly Greek yogurt), tofu, soy nuts, soy protein products    Vitamin C: Citrus fruits and juices, strawberries, tomatoes, tomato juice, peppers, baked potatoes, spinach, broccoli, cauliflower, [place] sprouts, cabbage    Vitamin A: Dark green, leafy vegetables, orange or yellow vegetables, cantaloupe, fortified dairy products, liver, fortified cereals    Zinc: Fortified cereals, red meats, seafood    Consider Kwan by [employer] (These are essential branch chain amino acids that help with tissue building and wound healing) and take 2 packets/day. you can order online at [employer] or [employer]    ADDITIONAL REMINDERS:    The treatment plan has been discussed at length with you and your provider. Follow all instructions carefully, it is very important. If you do not follow all instructions, you are at  risk of your wound not healing, infection, possible loss of limb and even end of life.  Please call the clinic during regular business hours ( 7:30 AM - 5:30 PM) if you notice increased bleeding, redness, warmth, pain or  pus like drainage or start running a fever greater than 100.3.    For after hour emergencies, please call your primary physician or go to the nearest emergency room.

## 2024-10-28 ENCOUNTER — OFFICE VISIT (OUTPATIENT)
Dept: WOUND CARE | Facility: HOSPITAL | Age: 89
End: 2024-10-28
Attending: INTERNAL MEDICINE
Payer: MEDICARE

## 2024-10-28 VITALS
HEART RATE: 89 BPM | TEMPERATURE: 98 F | DIASTOLIC BLOOD PRESSURE: 57 MMHG | RESPIRATION RATE: 16 BRPM | SYSTOLIC BLOOD PRESSURE: 88 MMHG

## 2024-10-28 DIAGNOSIS — L97.212 NON-PRESSURE CHRONIC ULCER OF RIGHT CALF WITH FAT LAYER EXPOSED (HCC): Primary | ICD-10-CM

## 2024-10-28 DIAGNOSIS — R23.8 SLOUGHING OF WOUND: ICD-10-CM

## 2024-10-28 DIAGNOSIS — I73.9 PAD (PERIPHERAL ARTERY DISEASE) (HCC): ICD-10-CM

## 2024-10-28 DIAGNOSIS — I73.9 PERIPHERAL ARTERIAL DISEASE (HCC): ICD-10-CM

## 2024-10-28 DIAGNOSIS — E08.621 DIABETIC ULCER OF RIGHT HEEL ASSOCIATED WITH DIABETES MELLITUS DUE TO UNDERLYING CONDITION, WITH FAT LAYER EXPOSED (HCC): ICD-10-CM

## 2024-10-28 DIAGNOSIS — L97.412 DIABETIC ULCER OF RIGHT HEEL ASSOCIATED WITH DIABETES MELLITUS DUE TO UNDERLYING CONDITION, WITH FAT LAYER EXPOSED (HCC): ICD-10-CM

## 2024-10-28 DIAGNOSIS — M79.89 RIGHT LEG SWELLING: ICD-10-CM

## 2024-10-28 DIAGNOSIS — R26.9 GAIT DIFFICULTY: ICD-10-CM

## 2024-10-28 DIAGNOSIS — L97.512 FOOT ULCER, RIGHT, WITH FAT LAYER EXPOSED (HCC): ICD-10-CM

## 2024-10-28 DIAGNOSIS — M79.89 LEFT LEG SWELLING: ICD-10-CM

## 2024-10-28 DIAGNOSIS — Z89.439 HISTORY OF TRANSMETATARSAL AMPUTATION OF FOOT (HCC): ICD-10-CM

## 2024-10-28 PROCEDURE — 99214 OFFICE O/P EST MOD 30 MIN: CPT

## 2024-10-28 NOTE — PROGRESS NOTES
.Weekly Wound Education Note    Teaching Provided To: Patient;Family  Training Topics: Dressing;Edema control;Discharge instructions;Compression;Cleasing and general instructions;Off-loading  Training Method: Explain/Verbal;Written  Training Response: Patient responds and understands;Reinforcement needed            Continue endoform, hydrofera transfer, gauze to right heel and leg wound.  Silver foam to left leg wound.  Spanda  size F to left leg.  Continue to offload as much as possible.  Will return in 2 weeks.

## 2024-10-28 NOTE — PROGRESS NOTES
[facility] PROGRESS NOTE  FARZAD WILLINGHAM MD  10/28/2024    Chief Complaint:   Chief Complaint   Patient presents with    Wound Care     Arrives for follow-up.Denies new concerns       HPI:   Subjective   Alejandro Guardado is a 89 year old male coming in for a follow-up visit.    HPI    Right leg ulcer improving - more granulation - smaller dimensions.  Left leg wound improving as wlel.   Heel wound very close to full closure  Foot wound stays closed.     No s/o infection any of the wounds      Review of Systems  Negative except HPI   Denies chest pain / SOB / palpitations  Denies fever.     Allergies  Allergies[1]    Current Meds:  Current Outpatient Medications   Medication Sig Dispense Refill    amoxicillin clavulanate 875-125 MG Oral Tab Take 1 tablet by mouth 2 (two) times daily. 7 tablet 0    gabapentin 100 MG Oral Cap Take 2 capsules (200 mg total) by mouth in the morning and 2 capsules (200 mg total) before bedtime. 60 capsule 0    collagenase 250 UNIT/GM External Ointment Apply 1 Application topically daily. 30 g 0    metoprolol succinate ER 25 MG Oral Tablet 24 Hr Take 2 tablets (50 mg total) by mouth Daily Beta Blocker. 60 tablet 0    finasteride 5 MG Oral Tab Take 1 tablet (5 mg total) by mouth daily. 90 tablet 0    furosemide 40 MG Oral Tab Take 2 tablets (80 mg total) by mouth 2 (two) times daily.      lisinopril 2.5 MG Oral Tab Take 1 tablet (2.5 mg total) by mouth daily.      Ascorbic Acid (VITAMIN C) 1000 MG Oral Tab Take 1.5 tablets (1,500 mg total) by mouth daily.      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      tamsulosin 0.4 MG Oral Cap Take 1 capsule (0.4 mg total) by mouth daily.      apixaban 5 MG Oral Tab Take 1 tablet (5 mg total) by mouth 2 (two) times daily. 60 tablet 3    atorvastatin 40 MG Oral Tab Take 1 tablet  (40 mg total) by mouth daily. 30 tablet 0    clopidogrel 75 MG Oral Tab Take 1 tablet (75 mg total) by mouth daily. 30 tablet 0    predniSONE 5 MG Oral Tab Take 3 tablets (15 mg total) by mouth daily.      folic acid 1 MG Oral Tab Take 1 tablet (1 mg total) by mouth daily.      acetaminophen 500 MG Oral Tab Take 2 tablets (1,000 mg total) by mouth every 8 (eight) hours. 21 tablet 0    Omeprazole 40 MG Oral Capsule Delayed Release Take 1 capsule (40 mg total) by mouth daily.           EXAM:   Objective   Objective    Physical Exam    Vital Signs  Vitals:    10/28/24 1306   BP: (!) 88/57   Pulse: 89   Resp: 16   Temp: 98.2 °F (36.8 °C)       Wound Assessment  Wound 08/14/23 #1- Foot Right (Active)   Date First Assessed/Time First Assessed: 08/14/23 1309    Wound Number (Wound Clinic Only): #1-  Primary Wound Type: Venous Ulcer  Location: Foot  Wound Location Orientation: Right  Wound Description (Comments): Amputation site      Assessments 8/14/2023  1:15 PM 10/28/2024  1:11 PM   Wound Image        Drainage Amount -- None   Drainage Description Serous;Yellow --   Wound Length (cm) 2.6 cm 0.1 cm   Wound Width (cm) 7 cm 0.1 cm   Wound Surface Area (cm^2) 18.2 cm^2 0.01 cm^2   Wound Depth (cm) 1.3 cm 0 cm   Wound Volume (cm^3) 23.66 cm^3 0 cm^3   Wound Healing % -- 100   Margins Well-defined edges Flat and Intact   Non-staged Wound Description Full thickness Full thickness   Peggy-wound Assessment Edema Dry   Wound Granulation Tissue Pink;Firm --   Wound Bed Granulation (%) 10 % --   Wound Bed Epithelium (%) 10 % --   Wound Bed Slough (%) 40 % --   Wound Bed Eschar (%) 40 % --   Wound Odor None --   Shape clustered 100% scab   Tunneling? -- No   Undermining? -- No   Sinus Tracts? -- No       Inactive Orders   Date Order Priority Status Authorizing Provider   08/26/24 1326 Debridement Old surgical Right Foot Routine Completed Quincy Poon DPM   08/19/24 1325 Debridement Old surgical Right Foot Routine Completed Cleve  JAN Mathew   08/05/24 1322 Debridement Old surgical Right Foot Routine Completed Quincy Poon DPM   07/29/24 1322 Debridement Old surgical Right Foot Routine Completed Quincy Poon DPM   07/22/24 0756 Consult to Wound Ostomy Routine Completed Rody Herr MD     - Reason for Consult:    Wound Care     - Wound Care Reason for Consult:    chronic wound/ R w foot   07/08/24 1325 Debridement Old surgical Right Foot Routine Completed Quincy Poon DPM   07/01/24 1336 Debridement Old surgical Right Foot Routine Completed Quincy Poon DPM   06/24/24 1354 Debridement Old surgical Right Foot Routine Completed Quincy Poon DPM   06/17/24 1358 Debridement Old surgical Right Foot Routine Completed Quincy Poon DPM   06/10/24 1434 Cellular tissue product application Old surgical Right Foot Routine Completed Quincy Poon DPM   06/10/24 1418 Debridement Old surgical Right Foot Routine Completed Quincy Poon DPM   06/03/24 1344 Cellular tissue product application Old surgical Right Foot Routine Completed Quincy Poon DPM   06/03/24 1332 Debridement Old surgical Right Foot Routine Completed Quincy Poon DPM   05/29/24 1152 Cellular tissue product application Old surgical Right Foot Routine Completed Fortino Mo MD   05/20/24 1204 Cellular tissue product application Old surgical Right Foot Routine Completed Fortino Mo MD   05/20/24 1202 Debridement Old surgical Right Foot Routine Completed Fortino Mo MD   05/06/24 1332 Cellular tissue product application Old surgical Right Foot Routine Completed Quincy Poon DPM   05/06/24 1318 Debridement Old surgical Right Foot Routine Completed Quincy Poon DPM   04/15/24 1637 Debridement Old surgical Right Foot Routine Completed Fortino Mo MD   04/08/24 1601 Debridement Old surgical Right Foot Routine Completed Quincy Poon DPM   03/25/24 1151 Debridement Old surgical Right Foot Routine Completed Quincy Poon DPM    03/18/24 1638 Wound care Routine Discontinued Albert Lee, DO   03/16/24 1801 Consult to Wound Ostomy Routine Completed Susu Flores APRN     - Reason for Consult:    Wound Care     - Wound Care Reason for Consult:    wound consult   03/11/24 1433 Debridement Old surgical Right Foot Routine Completed Quincy Poon DPM   02/26/24 1424 Debridement Old surgical Right Foot Routine Completed Quincy Poon DPM   02/19/24 1507 Debridement Old surgical Right Foot Routine Completed Quincy Poon DPM   02/13/24 1244 Wound care Routine Discontinued Andre Campos, DO   02/12/24 1531 Consult to Wound Ostomy Routine Completed Andre Campos DO     - Reason for Consult:    Wound Care     - Wound Care Reason for Consult:    Right foot wound   01/29/24 1520 Debridement Old surgical Right Foot Routine Completed Quincy Poon DPM   01/15/24 1512 Debridement Old surgical Right Foot Routine Completed Quincy Poon DPM   01/08/24 1621 Debridement Old surgical Right Foot Routine Completed Quincy Poon DPM   12/27/23 1707 Wound care Routine Discontinued Fortino Mo MD   12/27/23 1429 Wound care Routine Discontinued Carley Sullivan, DO   12/18/23 1449 Debridement Old surgical Right Foot Routine Completed Quincy Poon DPM   12/11/23 1618 Debridement Old surgical Right Foot Routine Completed Quincy Poon DPM   12/01/23 1228 Consult to Wound Ostomy Routine Completed Susu Flores APRN     - Reason for Consult:    Wound Care     - Wound Care Reason for Consult:    wound care   11/27/23 1018 Debridement Old surgical Right Foot Routine Completed Quincy Poon DPM   11/20/23 1600 Debridement Old surgical Right Foot Routine Completed Quincy Poon DPM   11/13/23 1636 Debridement Old surgical Right Foot Routine Completed Quincy Poon DPM   11/06/23 1538 Debridement Old surgical Right Foot Routine Completed Quincy Poon DPM   10/30/23 0915 Debridement Old surgical Right Foot Routine Completed Cleve  JAN Mathew   10/23/23 0851 Debridement Old surgical Right Foot Routine Completed Quincy Poon DPM   10/16/23 0909 Debridement Old surgical Right Foot Routine Completed Quincy Poon DPM   10/09/23 1318 Debridement Old surgical Right Foot Routine Completed Quincy Poon DPM   09/25/23 1336 Debridement Old surgical Right Foot Routine Completed Quincy Poon DPM   09/11/23 1348 Debridement Routine Completed Brian Moreno DPM   08/28/23 1317 Debridement Routine Completed Brian Moreno DPM   08/14/23 1327 Debridement Routine Completed Brian Moreno DPM       Wound 04/15/24 #2 Leg Right;Anterior (Active)   Date First Assessed/Time First Assessed: 04/15/24 1611    Wound Number (Wound Clinic Only): #2  Primary Wound Type: Venous Ulcer  Location: Leg  Wound Location Orientation: Right;Anterior      Assessments 4/15/2024  4:12 PM 10/28/2024  1:08 PM   Wound Image       Drainage Amount -- Small   Drainage Description -- Serous;Yellow   Wound Length (cm) -- 1.2 cm   Wound Width (cm) -- 0.4 cm   Wound Surface Area (cm^2) -- 0.48 cm^2   Wound Depth (cm) -- 0.1 cm   Wound Volume (cm^3) -- 0.048 cm^3   Wound Healing % -- 59   Margins -- Well-defined edges   Non-staged Wound Description -- Full thickness   Peggy-wound Assessment -- Edema;Clean;Fragile   Wound Granulation Tissue -- Pink;Red;Firm   Wound Bed Granulation (%) -- 95 %   Wound Bed Slough (%) -- 5 %   Wound Odor -- None   Tunneling? -- No   Undermining? -- No   Sinus Tracts? -- No       Inactive Orders   Date Order Priority Status Authorizing Provider   09/27/24 1657 Wound care Routine Discontinued Belen Flores MD   09/27/24 0212 Consult to Wound Ostomy Routine Completed Vivian Way APRN     - Reason for Consult:    Wound Care     - Wound Care Reason for Consult:    wound care   07/17/24 1614 Debridement Venous Ulcer Right;Anterior Leg Routine Completed Fortino Mo MD       Wound 08/14/24 #3 Heel Right (Active)   Date First  Assessed/Time First Assessed: 08/14/24 1306    Wound Number (Wound Clinic Only): #3  Primary Wound Type: Pressure Injury  Location: Heel  Wound Location Orientation: Right      Assessments 8/14/2024  1:10 PM 10/28/2024  1:11 PM   Wound Image       Drainage Amount Small Scant   Drainage Description Yellow;Serous Serous;Yellow   Wound Length (cm) 0.8 cm 0.2 cm   Wound Width (cm) 1.2 cm 0.3 cm   Wound Surface Area (cm^2) 0.96 cm^2 0.06 cm^2   Wound Depth (cm) 0.1 cm 0.1 cm   Wound Volume (cm^3) 0.096 cm^3 0.006 cm^3   Wound Healing % -- 94   Margins Well-defined edges Well-defined edges   Non-staged Wound Description -- Full thickness   Peggy-wound Assessment Edema;Dry Dry;Callous   Wound Granulation Tissue Pink;Firm Pink;Firm   Wound Bed Granulation (%) 90 % 100 %   Wound Bed Slough (%) 10 % --   Wound Odor None None   Tunneling? -- No   Undermining? -- No   Sinus Tracts? -- No   Pressure Injury Stage Unstageable Stage 3       Inactive Orders   Date Order Priority Status Authorizing Provider   09/27/24 1657 Wound care Routine Discontinued Belen Flores MD   09/16/24 1334 Debridement Pressure Injury Right Heel Routine Completed Quincy Poon DPM   08/26/24 1328 Debridement Pressure Injury Right Heel Routine Completed Quincy Poon DPM       Wound 09/27/24 Arm Anterior;Left;Upper (Active)   Date First Assessed/Time First Assessed: 09/27/24 0015   Present on Original Admission: Yes  Primary Wound Type: Skin Tear  Location: Arm  Wound Location Orientation: Anterior;Left;Upper      Assessments 9/27/2024 12:15 AM 9/28/2024  8:00 AM   Wound Image      Site Assessment Bleeding;Fragile Unable to assess   Drainage Amount Small None   Drainage Description Serosanguineous --   Treatments Cleansed --   Dressing Mepilex Xeroform;Gauze   Dressing Changed New --   Dressing Status Clean;Dry;Intact Clean;Dry;Intact       Inactive Orders   Date Order Priority Status Authorizing Provider   09/27/24 1657 Wound care Routine  Discontinued Belen Flores MD       Wound 10/14/24 #4 Left leg Leg Left (Active)   Date First Assessed: 10/14/24    Wound Number (Wound Clinic Only): #4 Left leg  Primary Wound Type: Edema  Location: Leg  Wound Location Orientation: Left      Assessments 10/14/2024  1:17 PM 10/28/2024  1:09 PM   Wound Image       Drainage Amount Unable to assess None   Treatments Compression (spanda  size F) --   Wound Length (cm) 0.7 cm 0.4 cm   Wound Width (cm) 4 cm (angled) 0.3 cm   Wound Surface Area (cm^2) 2.8 cm^2 0.12 cm^2   Wound Depth (cm) 0.1 cm 0.1 cm   Wound Volume (cm^3) 0.28 cm^3 0.012 cm^3   Wound Healing % -- 96   Margins Well-defined edges Well-defined edges   Non-staged Wound Description Full thickness Full thickness   Peggy-wound Assessment Edema Edema;Dry   Wound Granulation Tissue Pink;Firm Pink;Firm   Wound Bed Granulation (%) 5 % 100 %   Wound Bed Epithelium (%) 70 % --   Wound Odor -- None   Shape 20% scabbed, clustered --   Tunneling? No No   Undermining? No No   Sinus Tracts? No No       No associated orders.                ASSESSMENT AND PLAN:     Assessment     Encounter Diagnosis  1. Non-pressure chronic ulcer of right calf with fat layer exposed ([facility])    2. Foot ulcer, right, with fat layer exposed ([facility])    3. 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 PAD (peripheral artery disease) ([facility])    5. Gait difficulty    6. Right leg swelling    7. Left leg swelling    8. Peripheral arterial disease (HCC)    9. Sloughing of wound    10. History of transmetatarsal amputation of foot ([facility])      PLAN OF CARE:    Continue endoform, hydrofera transfer, gauze to right heel and leg wound.   Silver foam to left leg wound. Spanda  size F to left leg.   Continue to offload as much as possible.    Watch out for signs of early infection - counseled.   Plan of care discussed with patient in detail - All questions answered   Return  in 2 weeks.     Patient Instructions     Leg elevation and low salt diet  See me in 2 weeks.     Wound Cleaning and Dressings:    Wash your hands with soap and water. Always wear gloves while changing dressings. Donot touch wound / mio-wound skin with un-gloved hands. Remove old dressing, discard and place into trash.    DRESSINGS:   Right leg wound - Endoform Collagen / HF transfer- change dressing every other day  HF transfer on right heel   Silver foam on  left leg wound.   Offload wounded area.     Off-Loading: modified foot wear for maximal offloading.     Miscellaneous Instructions:  Supplement with a daily multivitamin   Low salt diet  Intense blood sugar control - Goal Blood sugar below 180 at all times recommended.  Increase protein intake / consider protein supplements - see below  Elevate extremities at all times when sitting / laying down.    DIETARY MODIFICATIONS TO HELP WITH WOUND HEALING:    Protein: Meats, beans, eggs, milk and yogurt particularly Greek yogurt), tofu, soy nuts, soy protein products    Vitamin C: Citrus fruits and juices, strawberries, tomatoes, tomato juice, peppers, baked potatoes, spinach, broccoli, cauliflower, [place] sprouts, cabbage    Vitamin A: Dark green, leafy vegetables, orange or yellow vegetables, cantaloupe, fortified dairy products, liver, fortified cereals    Zinc: Fortified cereals, red meats, seafood    Consider Kwan by [employer] (These are essential branch chain amino acids that help with tissue building and wound healing) and take 2 packets/day. you can order online at [employer] or [employer]    ADDITIONAL REMINDERS:    The treatment plan has been discussed at length with you and your provider. Follow all instructions carefully, it is very important. If you do not follow all instructions, you are at  risk of your wound not healing, infection, possible loss of limb and even end of life.  Please call the clinic during regular business hours ( 7:30 AM - 5:30 PM) if you  notice increased bleeding, redness, warmth, pain or pus like drainage or start running a fever greater than 100.3.    For after hour emergencies, please call your primary physician or go to the nearest emergency room.      Patient/Caregiver Education: There are no barriers to learning. Medical education for above diagnosis given.   Answered all questions.    Outcome: Patient verbalizes understanding. Patient is notified to call with any questions, complications, allergies, or worsening or changing symptoms.  Patient is to call with any side effects or complications as a result of the treatments today.      DOCUMENTATION OF TIME SPENT: Code selection for this visit was based on time spent : 35 min on date of service in preparing to see the patient, obtaining and/or reviewing separately obtained history, performing a medically appropriate examination, counseling and educating the patient/family/caregiver, ordering medications or testing, referring and communicating with other healthcare providers, documenting clinical information in the E HR, independently interpreting results and communicating results to the patient/family/caregiver and care coordination with the patient's other providers.    Followup: Return in about 2 weeks (around 11/11/2024) for Wound followup.      Note to Patient:  The 21st Century Cures Act makes medical notes like these available to patients in the interest of transparency. However, be advised this is a medical document and is intended as [id number] communication; it is written in medical language and may appear blunt, direct, or contain abbreviations or verbiage that are unfamiliar. Medical documents are intended to carry relevant information, facts as evident, and the clinical opinion of the practitioner.    Also, please note that this report has been produced using speech recognition software and may contain errors related to that system including, but not limited to, errors in grammar,  punctuation, and spelling, as well as words and phrases that possibly may have been recognized inappropriately.  If there are any questions or concerns, contact the dictating provider for clarification.      Fortino Butler MD  10/28/2024  1:35 PM                    [1]   Allergies  Allergen Reactions    Heparin ANAPHYLAXIS    Hydromorphone HALLUCINATION

## 2024-10-28 NOTE — PATIENT INSTRUCTIONS
Leg elevation and low salt diet  See me in 2 weeks.     Wound Cleaning and Dressings:    Wash your hands with soap and water. Always wear gloves while changing dressings. Donot touch wound / mio-wound skin with un-gloved hands. Remove old dressing, discard and place into trash.    DRESSINGS:   Right leg wound - Endoform Collagen / HF transfer- change dressing every other day  HF transfer on right heel   Silver foam on  left leg wound.   Offload wounded area.     Off-Loading: modified foot wear for maximal offloading.     Miscellaneous Instructions:  Supplement with a daily multivitamin   Low salt diet  Intense blood sugar control - Goal Blood sugar below 180 at all times recommended.  Increase protein intake / consider protein supplements - see below  Elevate extremities at all times when sitting / laying down.    DIETARY MODIFICATIONS TO HELP WITH WOUND HEALING:    Protein: Meats, beans, eggs, milk and yogurt particularly Greek yogurt), tofu, soy nuts, soy protein products    Vitamin C: Citrus fruits and juices, strawberries, tomatoes, tomato juice, peppers, baked potatoes, spinach, broccoli, cauliflower, [place] sprouts, cabbage    Vitamin A: Dark green, leafy vegetables, orange or yellow vegetables, cantaloupe, fortified dairy products, liver, fortified cereals    Zinc: Fortified cereals, red meats, seafood    Consider Kwan by [employer] (These are essential branch chain amino acids that help with tissue building and wound healing) and take 2 packets/day. you can order online at [employer] or [employer]    ADDITIONAL REMINDERS:    The treatment plan has been discussed at length with you and your provider. Follow all instructions carefully, it is very important. If you do not follow all instructions, you are at  risk of your wound not healing, infection, possible loss of limb and even end of life.  Please call the clinic during regular business hours ( 7:30 AM - 5:30 PM) if you notice increased bleeding, redness,  warmth, pain or pus like drainage or start running a fever greater than 100.3.    For after hour emergencies, please call your primary physician or go to the nearest emergency room.

## 2024-11-01 ENCOUNTER — APPOINTMENT (OUTPATIENT)
Dept: CARDIOLOGY | Age: 89
End: 2024-11-01

## 2024-11-11 ENCOUNTER — OFFICE VISIT (OUTPATIENT)
Dept: WOUND CARE | Facility: HOSPITAL | Age: 89
End: 2024-11-11
Attending: INTERNAL MEDICINE
Payer: MEDICARE

## 2024-11-11 VITALS
TEMPERATURE: 98 F | DIASTOLIC BLOOD PRESSURE: 52 MMHG | SYSTOLIC BLOOD PRESSURE: 89 MMHG | RESPIRATION RATE: 17 BRPM | HEART RATE: 88 BPM

## 2024-11-11 DIAGNOSIS — M79.89 LEFT LEG SWELLING: ICD-10-CM

## 2024-11-11 DIAGNOSIS — E08.621 DIABETIC ULCER OF RIGHT HEEL ASSOCIATED WITH DIABETES MELLITUS DUE TO UNDERLYING CONDITION, WITH FAT LAYER EXPOSED (HCC): ICD-10-CM

## 2024-11-11 DIAGNOSIS — I73.9 PAD (PERIPHERAL ARTERY DISEASE) (HCC): ICD-10-CM

## 2024-11-11 DIAGNOSIS — Z89.439 HISTORY OF TRANSMETATARSAL AMPUTATION OF FOOT (HCC): ICD-10-CM

## 2024-11-11 DIAGNOSIS — L97.511 RIGHT FOOT ULCER, LIMITED TO BREAKDOWN OF SKIN (HCC): ICD-10-CM

## 2024-11-11 DIAGNOSIS — L97.212 NON-PRESSURE CHRONIC ULCER OF RIGHT CALF WITH FAT LAYER EXPOSED (HCC): Primary | ICD-10-CM

## 2024-11-11 DIAGNOSIS — I73.9 PERIPHERAL ARTERIAL DISEASE (HCC): ICD-10-CM

## 2024-11-11 DIAGNOSIS — L97.512 FOOT ULCER, RIGHT, WITH FAT LAYER EXPOSED (HCC): ICD-10-CM

## 2024-11-11 DIAGNOSIS — R26.9 GAIT DIFFICULTY: ICD-10-CM

## 2024-11-11 DIAGNOSIS — L97.412 DIABETIC ULCER OF RIGHT HEEL ASSOCIATED WITH DIABETES MELLITUS DUE TO UNDERLYING CONDITION, WITH FAT LAYER EXPOSED (HCC): ICD-10-CM

## 2024-11-11 DIAGNOSIS — M79.89 RIGHT LEG SWELLING: ICD-10-CM

## 2024-11-11 PROCEDURE — 99214 OFFICE O/P EST MOD 30 MIN: CPT

## 2024-11-11 NOTE — PATIENT INSTRUCTIONS
Leg elevation and low salt diet  See me in 2 weeks.     Wound Cleaning and Dressings:    Wash your hands with soap and water. Always wear gloves while changing dressings. Donot touch wound / mio-wound skin with un-gloved hands. Remove old dressing, discard and place into trash.    DRESSINGS:   Right leg wound and new right foot wound - Endoform Collagen / HF transfer- change dressing every other day  HF transfer on right heel   Offload wounded area.     Off-Loading: modified foot wear for maximal offloading.     Miscellaneous Instructions:  Supplement with a daily multivitamin   Low salt diet  Intense blood sugar control - Goal Blood sugar below 180 at all times recommended.  Increase protein intake / consider protein supplements - see below  Elevate extremities at all times when sitting / laying down.    DIETARY MODIFICATIONS TO HELP WITH WOUND HEALING:    Protein: Meats, beans, eggs, milk and yogurt particularly Greek yogurt), tofu, soy nuts, soy protein products    Vitamin C: Citrus fruits and juices, strawberries, tomatoes, tomato juice, peppers, baked potatoes, spinach, broccoli, cauliflower, [place] sprouts, cabbage    Vitamin A: Dark green, leafy vegetables, orange or yellow vegetables, cantaloupe, fortified dairy products, liver, fortified cereals    Zinc: Fortified cereals, red meats, seafood    Consider Kwan by [employer] (These are essential branch chain amino acids that help with tissue building and wound healing) and take 2 packets/day. you can order online at [employer] or [employer]    ADDITIONAL REMINDERS:    The treatment plan has been discussed at length with you and your provider. Follow all instructions carefully, it is very important. If you do not follow all instructions, you are at  risk of your wound not healing, infection, possible loss of limb and even end of life.  Please call the clinic during regular business hours ( 7:30 AM - 5:30 PM) if you notice increased bleeding, redness, warmth,  pain or pus like drainage or start running a fever greater than 100.3.    For after hour emergencies, please call your primary physician or go to the nearest emergency room.

## 2024-11-11 NOTE — PROGRESS NOTES
[place] [facility] PROGRESS NOTE  FARZAD WILLINGHAM MD  11/11/2024    Chief Complaint:   Chief Complaint   Patient presents with    Wound Care     Follow up for for bilateral leg wounds. No complaints at this time.        HPI:   Subjective   Alejandro Guardado is a 89 year old male coming in for a follow-up visit.    HPI    New wound dorsum right foot from a new footwear. Superficial- only skin breakdown. No s/o infection.     Right leg wound improved dimensions and tissue quality.     Right distal foot wound closed - has a scab in place - not removed.     Left leg wound closed.     Right heel wound stays closed.     Accompanied by family.     Review of Systems  Negative except HPI   Denies chest pain / SOB / palpitations  Denies fever.     Allergies  Allergies[1]    Current Meds:  Current Outpatient Medications   Medication Sig Dispense Refill    amoxicillin clavulanate 875-125 MG Oral Tab Take 1 tablet by mouth 2 (two) times daily. 7 tablet 0    gabapentin 100 MG Oral Cap Take 2 capsules (200 mg total) by mouth in the morning and 2 capsules (200 mg total) before bedtime. 60 capsule 0    collagenase 250 UNIT/GM External Ointment Apply 1 Application topically daily. 30 g 0    metoprolol succinate ER 25 MG Oral Tablet 24 Hr Take 2 tablets (50 mg total) by mouth Daily Beta Blocker. 60 tablet 0    finasteride 5 MG Oral Tab Take 1 tablet (5 mg total) by mouth daily. 90 tablet 0    furosemide 40 MG Oral Tab Take 2 tablets (80 mg total) by mouth 2 (two) times daily.      lisinopril 2.5 MG Oral Tab Take 1 tablet (2.5 mg total) by mouth daily.      Ascorbic Acid (VITAMIN C) 1000 MG Oral Tab Take 1.5 tablets (1,500 mg total) by mouth daily.      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      tamsulosin 0.4 MG Oral Cap Take 1 capsule (0.4 mg total) by mouth daily.       apixaban 5 MG Oral Tab Take 1 tablet (5 mg total) by mouth 2 (two) times daily. 60 tablet 3    atorvastatin 40 MG Oral Tab Take 1 tablet (40 mg total) by mouth daily. 30 tablet 0    clopidogrel 75 MG Oral Tab Take 1 tablet (75 mg total) by mouth daily. 30 tablet 0    predniSONE 5 MG Oral Tab Take 3 tablets (15 mg total) by mouth daily.      folic acid 1 MG Oral Tab Take 1 tablet (1 mg total) by mouth daily.      acetaminophen 500 MG Oral Tab Take 2 tablets (1,000 mg total) by mouth every 8 (eight) hours. 21 tablet 0    Omeprazole 40 MG Oral Capsule Delayed Release Take 1 capsule (40 mg total) by mouth daily.           EXAM:   Objective   Objective    Physical Exam    Vital Signs  Vitals:    11/11/24 1318   BP: (!) 89/52   Pulse: 88   Resp: 17   Temp:        Wound Assessment  Wound 08/14/23 #1- Foot Right (Active)   Date First Assessed/Time First Assessed: 08/14/23 1309    Wound Number (Wound Clinic Only): #1-  Primary Wound Type: Venous Ulcer  Location: Foot  Wound Location Orientation: Right  Wound Description (Comments): Amputation site      Assessments 8/14/2023  1:15 PM 11/11/2024  1:12 PM   Wound Image        Drainage Amount -- None   Drainage Description Serous;Yellow --   Wound Length (cm) 2.6 cm 0.1 cm   Wound Width (cm) 7 cm 0.1 cm   Wound Surface Area (cm^2) 18.2 cm^2 0.01 cm^2   Wound Depth (cm) 1.3 cm 0 cm   Wound Volume (cm^3) 23.66 cm^3 0 cm^3   Wound Healing % -- 100   Margins Well-defined edges Unable to assess   Non-staged Wound Description Full thickness Full thickness   Peggy-wound Assessment Edema Dry   Wound Granulation Tissue Pink;Firm --   Wound Bed Granulation (%) 10 % --   Wound Bed Epithelium (%) 10 % --   Wound Bed Slough (%) 40 % --   Wound Bed Eschar (%) 40 % --   Wound Odor None None   Shape clustered 100% scab   Barrios Scale -- Grade 3       Inactive Orders   Date Order Priority Status Authorizing Provider   08/26/24 1326 Debridement Old surgical Right Foot Routine Completed Cleve  JAN Mathew   08/19/24 1325 Debridement Old surgical Right Foot Routine Completed Quincy Poon DPM   08/05/24 1322 Debridement Old surgical Right Foot Routine Completed Quincy Poon DPM   07/29/24 1322 Debridement Old surgical Right Foot Routine Completed Quincy Poon DPM   07/22/24 0756 Consult to Wound Ostomy Routine Completed Rody Herr MD     - Reason for Consult:    Wound Care     - Wound Care Reason for Consult:    chronic wound/ R w foot   07/08/24 1325 Debridement Old surgical Right Foot Routine Completed Quincy Poon DPM   07/01/24 1336 Debridement Old surgical Right Foot Routine Completed Quincy Poon DPM   06/24/24 1354 Debridement Old surgical Right Foot Routine Completed Quincy Poon DPM   06/17/24 1358 Debridement Old surgical Right Foot Routine Completed Quincy Poon DPM   06/10/24 1434 Cellular tissue product application Old surgical Right Foot Routine Completed Quincy Poon DPM   06/10/24 1418 Debridement Old surgical Right Foot Routine Completed Quincy Poon DPM   06/03/24 1344 Cellular tissue product application Old surgical Right Foot Routine Completed Quincy Poon DPM   06/03/24 1332 Debridement Old surgical Right Foot Routine Completed Quincy Poon DPM   05/29/24 1152 Cellular tissue product application Old surgical Right Foot Routine Completed Fortino Mo MD   05/20/24 1204 Cellular tissue product application Old surgical Right Foot Routine Completed Fortino Mo MD   05/20/24 1202 Debridement Old surgical Right Foot Routine Completed Fortino Mo MD   05/06/24 1332 Cellular tissue product application Old surgical Right Foot Routine Completed Quincy Poon DPM   05/06/24 1318 Debridement Old surgical Right Foot Routine Completed Quincy Poon DPM   04/15/24 1637 Debridement Old surgical Right Foot Routine Completed Fortino Mo MD   04/08/24 1601 Debridement Old surgical Right Foot Routine Completed Quincy Poon DPM    03/25/24 1151 Debridement Old surgical Right Foot Routine Completed Quincy Poon, DPM   03/18/24 1638 Wound care Routine Discontinued Albert Lee, DO   03/16/24 1801 Consult to Wound Ostomy Routine Completed Susu Flores APRN     - Reason for Consult:    Wound Care     - Wound Care Reason for Consult:    wound consult   03/11/24 1433 Debridement Old surgical Right Foot Routine Completed Quincy Poon DPM   02/26/24 1424 Debridement Old surgical Right Foot Routine Completed Quincy Poon DPM   02/19/24 1507 Debridement Old surgical Right Foot Routine Completed Quincy Poon DPM   02/13/24 1244 Wound care Routine Discontinued Andre Campos, DO   02/12/24 1531 Consult to Wound Ostomy Routine Completed Andre Campos DO     - Reason for Consult:    Wound Care     - Wound Care Reason for Consult:    Right foot wound   01/29/24 1520 Debridement Old surgical Right Foot Routine Completed Quincy Poon DPM   01/15/24 1512 Debridement Old surgical Right Foot Routine Completed Quincy Poon DPM   01/08/24 1621 Debridement Old surgical Right Foot Routine Completed Quincy Poon DPM   12/27/23 1707 Wound care Routine Discontinued Fortino Mo MD   12/27/23 1429 Wound care Routine Discontinued Carley Sullivan, DO   12/18/23 1449 Debridement Old surgical Right Foot Routine Completed Quincy Poon DPM   12/11/23 1618 Debridement Old surgical Right Foot Routine Completed Quincy Poon DPM   12/01/23 1228 Consult to Wound Ostomy Routine Completed Susu Flores APRN     - Reason for Consult:    Wound Care     - Wound Care Reason for Consult:    wound care   11/27/23 1018 Debridement Old surgical Right Foot Routine Completed Quincy Poon DPM   11/20/23 1600 Debridement Old surgical Right Foot Routine Completed Quincy Poon DPM   11/13/23 1636 Debridement Old surgical Right Foot Routine Completed Quincy Poon DPM   11/06/23 1538 Debridement Old surgical Right Foot Routine Completed Cleve  JAN Mathew   10/30/23 0915 Debridement Old surgical Right Foot Routine Completed Quincy Poon DPM   10/23/23 0851 Debridement Old surgical Right Foot Routine Completed Quincy Poon DPM   10/16/23 0909 Debridement Old surgical Right Foot Routine Completed Quincy Poon DPM   10/09/23 1318 Debridement Old surgical Right Foot Routine Completed Quincy Poon DPM   09/25/23 1336 Debridement Old surgical Right Foot Routine Completed Quincy Poon DPM   09/11/23 1348 Debridement Routine Completed Brian Moreno, AJN   08/28/23 1317 Debridement Routine Completed Brian Moreno, NENOM   08/14/23 1327 Debridement Routine Completed Brian Moreno DPM       Wound 04/15/24 #2 Leg Right;Anterior (Active)   Date First Assessed/Time First Assessed: 04/15/24 1611    Wound Number (Wound Clinic Only): #2  Primary Wound Type: Venous Ulcer  Location: Leg  Wound Location Orientation: Right;Anterior      Assessments 4/15/2024  4:12 PM 11/11/2024  1:10 PM   Wound Image       Drainage Amount -- Small   Drainage Description -- Serous;Yellow   Wound Length (cm) -- 0.8 cm   Wound Width (cm) -- 0.2 cm   Wound Surface Area (cm^2) -- 0.16 cm^2   Wound Depth (cm) -- 0.1 cm   Wound Volume (cm^3) -- 0.016 cm^3   Wound Healing % -- 86   Margins -- Well-defined edges   Non-staged Wound Description -- Full thickness   Peggy-wound Assessment -- Dry   Wound Granulation Tissue -- Red;Firm   Wound Bed Granulation (%) -- 95 %   Wound Bed Slough (%) -- 5 %   Wound Odor -- None       Inactive Orders   Date Order Priority Status Authorizing Provider   09/27/24 1657 Wound care Routine Discontinued Belen Flores MD   09/27/24 0212 Consult to Wound Ostomy Routine Completed Vivian Way APRN     - Reason for Consult:    Wound Care     - Wound Care Reason for Consult:    wound care   07/17/24 1614 Debridement Venous Ulcer Right;Anterior Leg Routine Completed Fortino Mo MD       Wound 08/14/24 #3 Heel Right (Active)   Date  First Assessed/Time First Assessed: 08/14/24 1306    Wound Number (Wound Clinic Only): #3  Primary Wound Type: Pressure Injury  Location: Heel  Wound Location Orientation: Right      Assessments 8/14/2024  1:10 PM 11/11/2024  1:13 PM   Wound Image       Drainage Amount Small None   Drainage Description Yellow;Serous --   Wound Length (cm) 0.8 cm 0.1 cm   Wound Width (cm) 1.2 cm 0.1 cm   Wound Surface Area (cm^2) 0.96 cm^2 0.01 cm^2   Wound Depth (cm) 0.1 cm 0.1 cm   Wound Volume (cm^3) 0.096 cm^3 0.001 cm^3   Wound Healing % -- 99   Margins Well-defined edges Well-defined edges   Non-staged Wound Description -- Full thickness   Peggy-wound Assessment Edema;Dry Dry;Callous   Wound Granulation Tissue Pink;Firm --   Wound Bed Granulation (%) 90 % --   Wound Bed Slough (%) 10 % --   Wound Odor None None   Shape -- 100% scabbed   Pressure Injury Stage Unstageable Stage 3       Inactive Orders   Date Order Priority Status Authorizing Provider   09/27/24 1657 Wound care Routine Discontinued Belen Flores MD   09/16/24 1334 Debridement Pressure Injury Right Heel Routine Completed Quincy Poon DPM   08/26/24 1328 Debridement Pressure Injury Right Heel Routine Completed Quincy Poon DPM       Wound 10/14/24 #4 Left leg Leg Left (Active)   Date First Assessed: 10/14/24    Wound Number (Wound Clinic Only): #4 Left leg  Primary Wound Type: Edema  Location: Leg  Wound Location Orientation: Left      Assessments 10/14/2024  1:17 PM 11/11/2024  1:14 PM   Wound Image       Drainage Amount Unable to assess None   Treatments Compression (spanda  size F) --   Wound Length (cm) 0.7 cm 0 cm   Wound Width (cm) 4 cm (angled) 0 cm   Wound Surface Area (cm^2) 2.8 cm^2 0 cm^2   Wound Depth (cm) 0.1 cm 0 cm   Wound Volume (cm^3) 0.28 cm^3 0 cm^3   Wound Healing % -- 100   Margins Well-defined edges Flat and Intact   Non-staged Wound Description Full thickness Full thickness   Peggy-wound Assessment Edema Edema;Dry   Wound Granulation  Tissue Pink;Firm --   Wound Bed Granulation (%) 5 % --   Wound Bed Epithelium (%) 70 % 100 %   Wound Odor -- None   Shape 20% scabbed, clustered --   Tunneling? No --   Undermining? No --   Sinus Tracts? No --       No associated orders.       Wound 11/11/24 #5 Right Dorsal Foot Foot Dorsal;Right (Active)   Date First Assessed/Time First Assessed: 11/11/24 1307    Wound Number (Wound Clinic Only): #5 Right Dorsal Foot  Primary Wound Type: Friction/Shear  Location: Foot  Wound Location Orientation: Dorsal;Right      Assessments 11/11/2024  1:11 PM   Wound Image     Drainage Amount Moderate   Drainage Description Serosanguineous   Wound Length (cm) 2.8 cm   Wound Width (cm) 1.6 cm   Wound Surface Area (cm^2) 4.48 cm^2   Wound Depth (cm) 0.1 cm   Wound Volume (cm^3) 0.448 cm^3   Margins Well-defined edges   Non-staged Wound Description Full thickness   Peggy-wound Assessment Blanchable erythema;Moist   Wound Granulation Tissue Pink;Firm   Wound Bed Granulation (%) 100 %   Wound Odor None       No associated orders.                ASSESSMENT AND PLAN:     Assessment     Encounter Diagnosis  1. Non-pressure chronic ulcer of right calf with fat layer exposed ([facility])    2. Right foot ulcer, limited to breakdown of skin ([facility])    3. Foot ulcer, right, with fat layer exposed ([facility])    4. 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 PAD (peripheral artery disease) ([facility])    6. Gait difficulty    7. Right leg swelling    8. Left leg swelling    9. Peripheral arterial disease ([facility])    10. History of transmetatarsal amputation of foot ([facility])      PLAN OF CARE:    Collagen / HF transfer on open wounds.   Silver foam for protection on closed areas  Continue spandagrip LLE.   Continue offloading  Low salt diet  Leg elevation.  Watch out for signs of early infection - counseled.   Plan of care discussed with patient in detail - All questions answered   Return  in one week.     Patient Instructions     Leg elevation and low salt diet  See me in 2 weeks.     Wound Cleaning and Dressings:    Wash your hands with soap and water. Always wear gloves while changing dressings. Donot touch wound / mio-wound skin with un-gloved hands. Remove old dressing, discard and place into trash.    DRESSINGS:   Right leg wound and new right foot wound - Endoform Collagen / HF transfer- change dressing every other day  HF transfer on right heel   Offload wounded area.     Off-Loading: modified foot wear for maximal offloading.     Miscellaneous Instructions:  Supplement with a daily multivitamin   Low salt diet  Intense blood sugar control - Goal Blood sugar below 180 at all times recommended.  Increase protein intake / consider protein supplements - see below  Elevate extremities at all times when sitting / laying down.    DIETARY MODIFICATIONS TO HELP WITH WOUND HEALING:    Protein: Meats, beans, eggs, milk and yogurt particularly Greek yogurt), tofu, soy nuts, soy protein products    Vitamin C: Citrus fruits and juices, strawberries, tomatoes, tomato juice, peppers, baked potatoes, spinach, broccoli, cauliflower, [place] sprouts, cabbage    Vitamin A: Dark green, leafy vegetables, orange or yellow vegetables, cantaloupe, fortified dairy products, liver, fortified cereals    Zinc: Fortified cereals, red meats, seafood    Consider Kwan [employer] (These are essential branch chain amino acids that help with tissue building and wound healing) and take 2 packets/day. you can order online at [employer] or [employer]    ADDITIONAL REMINDERS:    The treatment plan has been discussed at length with you and your provider. Follow all instructions carefully, it is very important. If you do not follow all instructions, you are at  risk of your wound not healing, infection, possible loss of limb and even end of life.  Please call the clinic during regular business hours ( 7:30 AM - 5:30 PM) if you notice  increased bleeding, redness, warmth, pain or pus like drainage or start running a fever greater than 100.3.    For after hour emergencies, please call your primary physician or go to the nearest emergency room.      Patient/Caregiver Education: There are no barriers to learning. Medical education for above diagnosis given.   Answered all questions.    Outcome: Patient verbalizes understanding. Patient is notified to call with any questions, complications, allergies, or worsening or changing symptoms.  Patient is to call with any side effects or complications as a result of the treatments today.      DOCUMENTATION OF TIME SPENT: Code selection for this visit was based on time spent : 35 min on date of service in preparing to see the patient, obtaining and/or reviewing separately obtained history, performing a medically appropriate examination, counseling and educating the patient/family/caregiver, ordering medications or testing, referring and communicating with other healthcare providers, documenting clinical information in the E HR, independently interpreting results and communicating results to the patient/family/caregiver and care coordination with the patient's other providers.    Followup: Return in about 2 weeks (around 11/25/2024) for Wound followup.      Note to Patient:  The 21st Century Cures Act makes medical notes like these available to patients in the interest of transparency. However, be advised this is a medical document and is intended as [id number] communication; it is written in medical language and may appear blunt, direct, or contain abbreviations or verbiage that are unfamiliar. Medical documents are intended to carry relevant information, facts as evident, and the clinical opinion of the practitioner.    Also, please note that this report has been produced using speech recognition software and may contain errors related to that system including, but not limited to, errors in grammar,  punctuation, and spelling, as well as words and phrases that possibly may have been recognized inappropriately.  If there are any questions or concerns, contact the dictating provider for clarification.      Fortino Butler MD  11/11/2024  1:42 PM                    [1]   Allergies  Allergen Reactions    Heparin ANAPHYLAXIS    Hydromorphone HALLUCINATION

## 2024-11-11 NOTE — PROGRESS NOTES
.Weekly Wound Education Note    Teaching Provided To: Patient;Family  Training Topics: Dressing;Edema control;Discharge instructions;Compression;Cleasing and general instructions;Off-loading  Training Method: Explain/Verbal;Written  Training Response: Patient responds and understands;Reinforcement needed            Left leg is healed, spanda  size F.  Josi Ag, hydrofera transfer to foot and leg wound, cover with conforming gauze roll.

## 2024-11-25 ENCOUNTER — OFFICE VISIT (OUTPATIENT)
Dept: WOUND CARE | Facility: HOSPITAL | Age: 89
End: 2024-11-25
Attending: INTERNAL MEDICINE
Payer: MEDICARE

## 2024-11-25 VITALS
RESPIRATION RATE: 16 BRPM | SYSTOLIC BLOOD PRESSURE: 112 MMHG | DIASTOLIC BLOOD PRESSURE: 71 MMHG | TEMPERATURE: 98 F | HEART RATE: 50 BPM

## 2024-11-25 DIAGNOSIS — M79.89 LEFT LEG SWELLING: ICD-10-CM

## 2024-11-25 DIAGNOSIS — L97.412 DIABETIC ULCER OF RIGHT HEEL ASSOCIATED WITH DIABETES MELLITUS DUE TO UNDERLYING CONDITION, WITH FAT LAYER EXPOSED (HCC): ICD-10-CM

## 2024-11-25 DIAGNOSIS — E08.621 DIABETIC ULCER OF RIGHT HEEL ASSOCIATED WITH DIABETES MELLITUS DUE TO UNDERLYING CONDITION, WITH FAT LAYER EXPOSED (HCC): ICD-10-CM

## 2024-11-25 DIAGNOSIS — M79.89 RIGHT LEG SWELLING: ICD-10-CM

## 2024-11-25 DIAGNOSIS — R26.9 GAIT DIFFICULTY: ICD-10-CM

## 2024-11-25 DIAGNOSIS — I73.9 PAD (PERIPHERAL ARTERY DISEASE) (HCC): ICD-10-CM

## 2024-11-25 DIAGNOSIS — I73.9 PERIPHERAL ARTERIAL DISEASE (HCC): ICD-10-CM

## 2024-11-25 DIAGNOSIS — R23.8 SLOUGHING OF WOUND: ICD-10-CM

## 2024-11-25 DIAGNOSIS — L97.512 FOOT ULCER, RIGHT, WITH FAT LAYER EXPOSED (HCC): ICD-10-CM

## 2024-11-25 DIAGNOSIS — L97.212 NON-PRESSURE CHRONIC ULCER OF RIGHT CALF WITH FAT LAYER EXPOSED (HCC): Primary | ICD-10-CM

## 2024-11-25 PROCEDURE — 99214 OFFICE O/P EST MOD 30 MIN: CPT

## 2024-11-25 NOTE — PROGRESS NOTES
[facility] PROGRESS NOTE  FARZAD WILLINGHAM MD  11/25/2024    Chief Complaint:   Chief Complaint   Patient presents with    Wound Care     Follow-up for wounds to RLE. Denies pain or concerns at this time.        HPI:   Subjective   lAejandro Guardado is a 89 year old male coming in for a follow-up visit.    HPI    Wounds improved.   Dimensions and tissue quality improved.   No s/o infection.   Edema well controlled.   No open wounds left leg.     Review of Systems  Negative except HPI   Denies chest pain / SOB / palpitations  Denies fever.     Allergies  Allergies[1]    Current Meds:  Current Outpatient Medications   Medication Sig Dispense Refill    amoxicillin clavulanate 875-125 MG Oral Tab Take 1 tablet by mouth 2 (two) times daily. 7 tablet 0    gabapentin 100 MG Oral Cap Take 2 capsules (200 mg total) by mouth in the morning and 2 capsules (200 mg total) before bedtime. 60 capsule 0    collagenase 250 UNIT/GM External Ointment Apply 1 Application topically daily. 30 g 0    metoprolol succinate ER 25 MG Oral Tablet 24 Hr Take 2 tablets (50 mg total) by mouth Daily Beta Blocker. 60 tablet 0    finasteride 5 MG Oral Tab Take 1 tablet (5 mg total) by mouth daily. 90 tablet 0    furosemide 40 MG Oral Tab Take 2 tablets (80 mg total) by mouth 2 (two) times daily.      lisinopril 2.5 MG Oral Tab Take 1 tablet (2.5 mg total) by mouth daily.      Ascorbic Acid (VITAMIN C) 1000 MG Oral Tab Take 1.5 tablets (1,500 mg total) by mouth daily.      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      tamsulosin 0.4 MG Oral Cap Take 1 capsule (0.4 mg total) by mouth daily.      apixaban 5 MG Oral Tab Take 1 tablet (5 mg total) by mouth 2 (two) times daily. 60 tablet 3    atorvastatin 40 MG Oral Tab Take 1 tablet (40 mg total) by mouth daily. 30 tablet 0     clopidogrel 75 MG Oral Tab Take 1 tablet (75 mg total) by mouth daily. 30 tablet 0    predniSONE 5 MG Oral Tab Take 3 tablets (15 mg total) by mouth daily.      folic acid 1 MG Oral Tab Take 1 tablet (1 mg total) by mouth daily.      acetaminophen 500 MG Oral Tab Take 2 tablets (1,000 mg total) by mouth every 8 (eight) hours. 21 tablet 0    Omeprazole 40 MG Oral Capsule Delayed Release Take 1 capsule (40 mg total) by mouth daily.           EXAM:   Objective   Objective    Physical Exam    Vital Signs  Vitals:    11/25/24 1311   BP: 112/71   Pulse: 50   Resp: 16   Temp: 98.2 °F (36.8 °C)       Wound Assessment  Wound 08/14/23 #1- Foot Right (Active)   Date First Assessed/Time First Assessed: 08/14/23 1309    Wound Number (Wound Clinic Only): #1-  Primary Wound Type: Venous Ulcer  Location: Foot  Wound Location Orientation: Right  Wound Description (Comments): Amputation site      Assessments 8/14/2023  1:15 PM 11/25/2024  1:07 PM   Wound Image        Drainage Amount -- None   Drainage Description Serous;Yellow --   Wound Length (cm) 2.6 cm 0.1 cm   Wound Width (cm) 7 cm 0.1 cm   Wound Surface Area (cm^2) 18.2 cm^2 0.01 cm^2   Wound Depth (cm) 1.3 cm 0 cm   Wound Volume (cm^3) 23.66 cm^3 0 cm^3   Wound Healing % -- 100   Margins Well-defined edges Unable to assess   Non-staged Wound Description Full thickness Full thickness   Peggy-wound Assessment Edema Dry   Wound Granulation Tissue Pink;Firm --   Wound Bed Granulation (%) 10 % --   Wound Bed Epithelium (%) 10 % --   Wound Bed Slough (%) 40 % --   Wound Bed Eschar (%) 40 % --   Wound Odor None None   Shape clustered 100% scab   Tunneling? -- No   Undermining? -- No   Sinus Tracts? -- No   Barrios Scale -- Grade 3       Inactive Orders   Date Order Priority Status Authorizing Provider   08/26/24 1326 Debridement Old surgical Right Foot Routine Completed Quincy Poon DPM   08/19/24 1325 Debridement Old surgical Right Foot Routine Completed Quincy Poon DPM    08/05/24 1322 Debridement Old surgical Right Foot Routine Completed Quincy Poon DPM   07/29/24 1322 Debridement Old surgical Right Foot Routine Completed Quincy Poon DPM   07/22/24 0756 Consult to Wound Ostomy Routine Completed Rody Herr MD     - Reason for Consult:    Wound Care     - Wound Care Reason for Consult:    chronic wound/ R w foot   07/08/24 1325 Debridement Old surgical Right Foot Routine Completed Quincy Poon DPM   07/01/24 1336 Debridement Old surgical Right Foot Routine Completed Quincy Poon DPM   06/24/24 1354 Debridement Old surgical Right Foot Routine Completed Quincy Poon DPM   06/17/24 1358 Debridement Old surgical Right Foot Routine Completed Quincy Poon DPM   06/10/24 1434 Cellular tissue product application Old surgical Right Foot Routine Completed Quincy Poon DPM   06/10/24 1418 Debridement Old surgical Right Foot Routine Completed Quincy Poon DPM   06/03/24 1344 Cellular tissue product application Old surgical Right Foot Routine Completed Quincy Poon DPM   06/03/24 1332 Debridement Old surgical Right Foot Routine Completed Quincy Poon DPM   05/29/24 1152 Cellular tissue product application Old surgical Right Foot Routine Completed Frotino Mo MD   05/20/24 1204 Cellular tissue product application Old surgical Right Foot Routine Completed Fortino Mo MD   05/20/24 1202 Debridement Old surgical Right Foot Routine Completed Fortino Mo MD   05/06/24 1332 Cellular tissue product application Old surgical Right Foot Routine Completed Quincy Poon DPM   05/06/24 1318 Debridement Old surgical Right Foot Routine Completed Quincy Poon DPM   04/15/24 1637 Debridement Old surgical Right Foot Routine Completed Fortino Mo MD   04/08/24 1601 Debridement Old surgical Right Foot Routine Completed Quincy Poon DPM   03/25/24 1151 Debridement Old surgical Right Foot Routine Completed Quincy Poon DPM   03/18/24  1638 Wound care Routine Discontinued Albert Lee, DO   03/16/24 1801 Consult to Wound Ostomy Routine Completed Susu Flores APRN     - Reason for Consult:    Wound Care     - Wound Care Reason for Consult:    wound consult   03/11/24 1433 Debridement Old surgical Right Foot Routine Completed Quincy Poon DPM   02/26/24 1424 Debridement Old surgical Right Foot Routine Completed Quincy Poon DPM   02/19/24 1507 Debridement Old surgical Right Foot Routine Completed Quincy Poon DPM   02/13/24 1244 Wound care Routine Discontinued Andre Campos, DO   02/12/24 1531 Consult to Wound Ostomy Routine Completed Andre Campos, DO     - Reason for Consult:    Wound Care     - Wound Care Reason for Consult:    Right foot wound   01/29/24 1520 Debridement Old surgical Right Foot Routine Completed Quincy Poon DPM   01/15/24 1512 Debridement Old surgical Right Foot Routine Completed Quincy Poon DPM   01/08/24 1621 Debridement Old surgical Right Foot Routine Completed Quincy Poon DPM   12/27/23 1707 Wound care Routine Discontinued Fortino Mo MD   12/27/23 1429 Wound care Routine Discontinued Carley Sullivan, DO   12/18/23 1449 Debridement Old surgical Right Foot Routine Completed Quincy Poon DPM   12/11/23 1618 Debridement Old surgical Right Foot Routine Completed Quincy Poon DPM   12/01/23 1228 Consult to Wound Ostomy Routine Completed Susu Flores APRN     - Reason for Consult:    Wound Care     - Wound Care Reason for Consult:    wound care   11/27/23 1018 Debridement Old surgical Right Foot Routine Completed Quincy Poon DPM   11/20/23 1600 Debridement Old surgical Right Foot Routine Completed Quincy Poon DPM   11/13/23 1636 Debridement Old surgical Right Foot Routine Completed Quincy Poon DPM   11/06/23 1538 Debridement Old surgical Right Foot Routine Completed Quincy Poon DPM   10/30/23 0915 Debridement Old surgical Right Foot Routine Completed Quincy Poon, NENOM    10/23/23 0851 Debridement Old surgical Right Foot Routine Completed Quincy Poon DPM   10/16/23 0909 Debridement Old surgical Right Foot Routine Completed Quincy Poon DPM   10/09/23 1318 Debridement Old surgical Right Foot Routine Completed Quincy Poon DPM   09/25/23 1336 Debridement Old surgical Right Foot Routine Completed Quincy Poon DPM   09/11/23 1348 Debridement Routine Completed Brian Moreno DPM   08/28/23 1317 Debridement Routine Completed Brian Moreno DPM   08/14/23 1327 Debridement Routine Completed Brian Moreno DPM       Wound 04/15/24 #2 Leg Right;Anterior (Active)   Date First Assessed/Time First Assessed: 04/15/24 1611    Wound Number (Wound Clinic Only): #2  Primary Wound Type: Venous Ulcer  Location: Leg  Wound Location Orientation: Right;Anterior      Assessments 4/15/2024  4:12 PM 11/25/2024  1:07 PM   Wound Image       Drainage Amount -- Scant   Drainage Description -- Serous;Yellow   Wound Length (cm) -- 0.2 cm   Wound Width (cm) -- 0.2 cm   Wound Surface Area (cm^2) -- 0.04 cm^2   Wound Depth (cm) -- 0.1 cm   Wound Volume (cm^3) -- 0.004 cm^3   Wound Healing % -- 97   Margins -- Well-defined edges   Non-staged Wound Description -- Full thickness   Peggy-wound Assessment -- Dry   Wound Granulation Tissue -- Pink;Firm   Wound Bed Granulation (%) -- 100 %   Wound Odor -- None   Tunneling? -- No   Undermining? -- No   Sinus Tracts? -- No       Inactive Orders   Date Order Priority Status Authorizing Provider   09/27/24 1657 Wound care Routine Discontinued Belen Flores MD   09/27/24 0212 Consult to Wound Ostomy Routine Completed Vivian Way APRN     - Reason for Consult:    Wound Care     - Wound Care Reason for Consult:    wound care   07/17/24 1614 Debridement Venous Ulcer Right;Anterior Leg Routine Completed Fortino Mo MD       Wound 11/11/24 #5 Right Dorsal Foot Foot Dorsal;Right (Active)   Date First Assessed/Time First Assessed: 11/11/24  1307    Wound Number (Wound Clinic Only): #5 Right Dorsal Foot  Primary Wound Type: Friction/Shear  Location: Foot  Wound Location Orientation: Dorsal;Right      Assessments 11/11/2024  1:11 PM 11/25/2024  1:08 PM   Wound Image       Drainage Amount Moderate Scant   Drainage Description Serosanguineous Serosanguineous   Wound Length (cm) 2.8 cm 1.2 cm   Wound Width (cm) 1.6 cm 1 cm   Wound Surface Area (cm^2) 4.48 cm^2 1.2 cm^2   Wound Depth (cm) 0.1 cm 0.1 cm   Wound Volume (cm^3) 0.448 cm^3 0.12 cm^3   Wound Healing % -- 73   Margins Well-defined edges Well-defined edges   Non-staged Wound Description Full thickness Full thickness   Peggy-wound Assessment Blanchable erythema;Moist Dry   Wound Granulation Tissue Pink;Firm Red;Firm;Pink   Wound Bed Granulation (%) 100 % 100 %   Wound Odor None None   Tunneling? -- No   Undermining? -- No   Sinus Tracts? -- No       No associated orders.          ASSESSMENT AND PLAN:     Assessment     Encounter Diagnosis  1. 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 Foot ulcer, right, with fat layer exposed (HCC)    4. PAD (peripheral artery disease) (HCC)    5. Right leg swelling    6. Left leg swelling    7. Peripheral arterial disease (HCC)    8. Sloughing of wound    9. Gait difficulty      PLAN OF CARE:    HF ready or transfer on open wounds.   Spandagrip on LLE.   Monitor closed heel wound and distal foot wound.   Watch out for signs of early infection - counseled.   Plan of care discussed with patient and son in detail - All questions answered   F/up with podiatrist also as recommended.   Return in one week.     Patient Instructions     Continue current dressings.   Leg elevation and low salt diet  See me in 2 weeks.     Wound Cleaning and Dressings:    Wash your hands with soap and water. Always wear gloves while  changing dressings. Donot touch wound / mio-wound skin with un-gloved hands. Remove old dressing, discard and place into trash.    DRESSINGS:   Right leg wound and right foot wound - HF transfer- change dressing weekly.   Offload wounded area.     Off-Loading: modified foot wear for maximal offloading.     Miscellaneous Instructions:  Supplement with a daily multivitamin   Low salt diet  Intense blood sugar control - Goal Blood sugar below 180 at all times recommended.  Increase protein intake / consider protein supplements - see below  Elevate extremities at all times when sitting / laying down.    DIETARY MODIFICATIONS TO HELP WITH WOUND HEALING:    Protein: Meats, beans, eggs, milk and yogurt particularly Greek yogurt), tofu, soy nuts, soy protein products    Vitamin C: Citrus fruits and juices, strawberries, tomatoes, tomato juice, peppers, baked potatoes, spinach, broccoli, cauliflower, [place] sprouts, cabbage    Vitamin A: Dark green, leafy vegetables, orange or yellow vegetables, cantaloupe, fortified dairy products, liver, fortified cereals    Zinc: Fortified cereals, red meats, seafood    Consider Kwan by [employer] (These are essential branch chain amino acids that help with tissue building and wound healing) and take 2 packets/day. you can order online at [employer] or [employer]    ADDITIONAL REMINDERS:    The treatment plan has been discussed at length with you and your provider. Follow all instructions carefully, it is very important. If you do not follow all instructions, you are at  risk of your wound not healing, infection, possible loss of limb and even end of life.  Please call the clinic during regular business hours ( 7:30 AM - 5:30 PM) if you notice increased bleeding, redness, warmth, pain or pus like drainage or start running a fever greater than 100.3.    For after hour emergencies, please call your primary physician or go to the nearest emergency room.      Patient/Caregiver Education: There  are no barriers to learning. Medical education for above diagnosis given.   Answered all questions.    Outcome: Patient verbalizes understanding. Patient is notified to call with any questions, complications, allergies, or worsening or changing symptoms.  Patient is to call with any side effects or complications as a result of the treatments today.      DOCUMENTATION OF TIME SPENT: Code selection for this visit was based on time spent : 30 min on date of service in preparing to see the patient, obtaining and/or reviewing separately obtained history, performing a medically appropriate examination, counseling and educating the patient/family/caregiver, ordering medications or testing, referring and communicating with other healthcare providers, documenting clinical information in the E HR, independently interpreting results and communicating results to the patient/family/caregiver and care coordination with the patient's other providers.    Followup: Return in about 2 weeks (around 12/9/2024) for Wound followup.      Note to Patient:  The 21st Century Cures Act makes medical notes like these available to patients in the interest of transparency. However, be advised this is a medical document and is intended as [id number]-fikk communication; it is written in medical language and may appear blunt, direct, or contain abbreviations or verbiage that are unfamiliar. Medical documents are intended to carry relevant information, facts as evident, and the clinical opinion of the practitioner.    Also, please note that this report has been produced using speech recognition software and may contain errors related to that system including, but not limited to, errors in grammar, punctuation, and spelling, as well as words and phrases that possibly may have been recognized inappropriately.  If there are any questions or concerns, contact the dictating provider for clarification.      Fortino Butler MD  11/25/2024  1:36 PM                       [1]   Allergies  Allergen Reactions    Heparin ANAPHYLAXIS    Hydromorphone HALLUCINATION

## 2024-11-25 NOTE — PATIENT INSTRUCTIONS
Continue current dressings.   Leg elevation and low salt diet  See me in 2 weeks.     Wound Cleaning and Dressings:    Wash your hands with soap and water. Always wear gloves while changing dressings. Donot touch wound / mio-wound skin with un-gloved hands. Remove old dressing, discard and place into trash.    DRESSINGS:   Right leg wound and right foot wound - HF transfer- change dressing weekly.   Offload wounded area.     Off-Loading: modified foot wear for maximal offloading.     Miscellaneous Instructions:  Supplement with a daily multivitamin   Low salt diet  Intense blood sugar control - Goal Blood sugar below 180 at all times recommended.  Increase protein intake / consider protein supplements - see below  Elevate extremities at all times when sitting / laying down.    DIETARY MODIFICATIONS TO HELP WITH WOUND HEALING:    Protein: Meats, beans, eggs, milk and yogurt particularly Greek yogurt), tofu, soy nuts, soy protein products    Vitamin C: Citrus fruits and juices, strawberries, tomatoes, tomato juice, peppers, baked potatoes, spinach, broccoli, cauliflower, [place] sprouts, cabbage    Vitamin A: Dark green, leafy vegetables, orange or yellow vegetables, cantaloupe, fortified dairy products, liver, fortified cereals    Zinc: Fortified cereals, red meats, seafood    Consider Kwan by [employer] (These are essential branch chain amino acids that help with tissue building and wound healing) and take 2 packets/day. you can order online at [employer] or [employer]    ADDITIONAL REMINDERS:    The treatment plan has been discussed at length with you and your provider. Follow all instructions carefully, it is very important. If you do not follow all instructions, you are at  risk of your wound not healing, infection, possible loss of limb and even end of life.  Please call the clinic during regular business hours ( 7:30 AM - 5:30 PM) if you notice increased bleeding, redness, warmth, pain or pus like drainage or  start running a fever greater than 100.3.    For after hour emergencies, please call your primary physician or go to the nearest emergency room.

## 2024-11-25 NOTE — PROGRESS NOTES
.Weekly Wound Education Note    Teaching Provided To: Family;Patient  Training Topics: Dressing;Discharge instructions;Cleasing and general instructions;Edema control  Training Method: Explain/Verbal;Written  Training Response: Patient responds and understands;Reinforcement needed            Hydrofera ready to both wounds, wrapped with conforming gauze roll.  May leave dressings on all week or change if needed.  Spanda  size F to LLE.

## 2024-11-29 ENCOUNTER — APPOINTMENT (OUTPATIENT)
Dept: CARDIOLOGY | Age: 89
End: 2024-11-29

## 2024-12-09 ENCOUNTER — APPOINTMENT (OUTPATIENT)
Dept: WOUND CARE | Facility: HOSPITAL | Age: 89
End: 2024-12-09
Attending: INTERNAL MEDICINE
Payer: MEDICARE

## 2024-12-09 ENCOUNTER — TELEPHONE (OUTPATIENT)
Dept: WOUND CARE | Facility: HOSPITAL | Age: 89
End: 2024-12-09

## 2024-12-09 VITALS
SYSTOLIC BLOOD PRESSURE: 109 MMHG | HEART RATE: 92 BPM | DIASTOLIC BLOOD PRESSURE: 60 MMHG | TEMPERATURE: 98 F | RESPIRATION RATE: 14 BRPM

## 2024-12-09 DIAGNOSIS — R23.8 SLOUGHING OF WOUND: ICD-10-CM

## 2024-12-09 DIAGNOSIS — S61.411A LACERATION OF RIGHT HAND WITHOUT FOREIGN BODY, INITIAL ENCOUNTER: Primary | ICD-10-CM

## 2024-12-09 DIAGNOSIS — E08.621 DIABETIC ULCER OF RIGHT HEEL ASSOCIATED WITH DIABETES MELLITUS DUE TO UNDERLYING CONDITION, WITH FAT LAYER EXPOSED (HCC): ICD-10-CM
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
## 2024-12-09 DIAGNOSIS — S81.001A OPEN WOUND OF RIGHT KNEE, INITIAL ENCOUNTER: ICD-10-CM

## 2024-12-09 DIAGNOSIS — S61.501A OPEN WOUND OF RIGHT WRIST, INITIAL ENCOUNTER: ICD-10-CM

## 2024-12-09 DIAGNOSIS — R26.9 GAIT DIFFICULTY: ICD-10-CM

## 2024-12-09 DIAGNOSIS — I73.9 PERIPHERAL ARTERIAL DISEASE (HCC): ICD-10-CM

## 2024-12-09 DIAGNOSIS — I73.9 PAD (PERIPHERAL ARTERY DISEASE) (HCC): ICD-10-CM

## 2024-12-09 DIAGNOSIS — L97.412 DIABETIC ULCER OF RIGHT HEEL ASSOCIATED WITH DIABETES MELLITUS DUE TO UNDERLYING CONDITION, WITH FAT LAYER EXPOSED (HCC): ICD-10-CM

## 2024-12-09 DIAGNOSIS — L97.212 NON-PRESSURE CHRONIC ULCER OF RIGHT CALF WITH FAT LAYER EXPOSED (HCC): ICD-10-CM

## 2024-12-09 DIAGNOSIS — L97.512 FOOT ULCER, RIGHT, WITH FAT LAYER EXPOSED (HCC): ICD-10-CM

## 2024-12-09 PROCEDURE — 99214 OFFICE O/P EST MOD 30 MIN: CPT

## 2024-12-09 NOTE — PROGRESS NOTES
.Weekly Wound Education Note    Teaching Provided To: Patient;Family  Training Topics: Dressing;Discharge instructions;Cleasing and general instructions;Off-loading;Edema control  Training Method: Explain/Verbal;Written  Training Response: Patient responds and understands;Reinforcement needed            Folded xeroform to hand, wrist and right leg wounds, cover with dry dressings.  Hydrofera ready to lateral leg abrasion.  Extra supplies provided for a dressing change on Saturday.  Patient refused to go to ED for xray of right hand and wrist.

## 2024-12-09 NOTE — PROGRESS NOTES
[facility] PROGRESS NOTE  FARZAD WILLINGHAM MD  12/9/2024    Chief Complaint:   Chief Complaint   Patient presents with    Wound Care     Follow-up for wounds to left foot/leg. New wounds to right hand and knee. Family states he fell this morning, refused to go to ER.        HPI:   Subjective   Alejandro Guardado is a 89 year old male coming in for a follow-up visit.    HPI    Pt missed his regular appt time as he sustained a fall - mechanical fall - was on the floor - not able to get up for some time - need ER evaluation - pt refused.     He looks weak overall - but cognitively at baseline - refuses ER  visit, which strongly recommended.     He has new wounds on his hand / wrist / lateral knee - brought in to us for wound evaluation.     Skin tear on hand and wrist with significant tissue displacement - ? Nonviable flap     Abrasion lateral right knee- dont look too bad.     Right shin wound stable - ? Was previously closed - now open.     Foot wound and heel wound stays closed.           Review of Systems  Negative except HPI   Denies chest pain / SOB / palpitations  Denies fever.     Allergies  Allergies[1]    Current Meds:  Current Outpatient Medications   Medication Sig Dispense Refill    amoxicillin clavulanate 875-125 MG Oral Tab Take 1 tablet by mouth 2 (two) times daily. 7 tablet 0    gabapentin 100 MG Oral Cap Take 2 capsules (200 mg total) by mouth in the morning and 2 capsules (200 mg total) before bedtime. 60 capsule 0    collagenase 250 UNIT/GM External Ointment Apply 1 Application topically daily. 30 g 0    metoprolol succinate ER 25 MG Oral Tablet 24 Hr Take 2 tablets (50 mg total) by mouth Daily Beta Blocker. 60 tablet 0    finasteride 5 MG Oral Tab Take 1 tablet (5 mg total) by mouth daily. 90 tablet 0    furosemide 40 MG Oral Tab Take 2 tablets (80 mg total) by mouth 2 (two) times daily.      lisinopril 2.5 MG Oral Tab Take 1 tablet (2.5 mg total) by mouth daily.      Ascorbic Acid (VITAMIN C)  1000 MG Oral Tab Take 1.5 tablets (1,500 mg total) by mouth daily.      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      tamsulosin 0.4 MG Oral Cap Take 1 capsule (0.4 mg total) by mouth daily.      apixaban 5 MG Oral Tab Take 1 tablet (5 mg total) by mouth 2 (two) times daily. 60 tablet 3    atorvastatin 40 MG Oral Tab Take 1 tablet (40 mg total) by mouth daily. 30 tablet 0    clopidogrel 75 MG Oral Tab Take 1 tablet (75 mg total) by mouth daily. 30 tablet 0    predniSONE 5 MG Oral Tab Take 3 tablets (15 mg total) by mouth daily.      folic acid 1 MG Oral Tab Take 1 tablet (1 mg total) by mouth daily.      acetaminophen 500 MG Oral Tab Take 2 tablets (1,000 mg total) by mouth every 8 (eight) hours. 21 tablet 0    Omeprazole 40 MG Oral Capsule Delayed Release Take 1 capsule (40 mg total) by mouth daily.           EXAM:   Objective   Objective    Physical Exam    Vital Signs  Vitals:    12/09/24 1500   BP: 109/60   Pulse: 92   Resp: 14   Temp: 98.3 °F (36.8 °C)       Wound Assessment  Wound 04/15/24 #2 Leg Right;Anterior (Active)   Date First Assessed/Time First Assessed: 04/15/24 1611    Wound Number (Wound Clinic Only): #2  Primary Wound Type: Venous Ulcer  Location: Leg  Wound Location Orientation: Right;Anterior      Assessments 4/15/2024  4:12 PM 12/9/2024  3:55 PM   Wound Image       Drainage Amount -- Unable to assess   Wound Length (cm) -- 0.4 cm   Wound Width (cm) -- 0.2 cm   Wound Surface Area (cm^2) -- 0.08 cm^2   Wound Depth (cm) -- 0 cm   Wound Volume (cm^3) -- 0 cm^3   Wound Healing % -- 100   Margins -- Well-defined edges   Non-staged Wound Description -- Full thickness   Peggy-wound Assessment -- Moist   Wound Granulation Tissue -- Pink;Firm   Wound Bed Granulation (%) -- 100 %   Wound Odor -- None   Tunneling? -- No   Undermining? -- No    Sinus Tracts? -- No       Inactive Orders   Date Order Priority Status Authorizing Provider   09/27/24 1657 Wound care Routine Discontinued Belen Flores MD   09/27/24 0212 Consult to Wound Ostomy Routine Completed Vivian Way APRN     - Reason for Consult:    Wound Care     - Wound Care Reason for Consult:    wound care   07/17/24 1614 Debridement Venous Ulcer Right;Anterior Leg Routine Completed Fortino Mo MD       Wound 11/11/24 #5 Right Dorsal Foot Foot Dorsal;Right (Active)   Date First Assessed/Time First Assessed: 11/11/24 1307    Wound Number (Wound Clinic Only): #5 Right Dorsal Foot  Primary Wound Type: Friction/Shear  Location: Foot  Wound Location Orientation: Dorsal;Right      Assessments 11/11/2024  1:11 PM 12/9/2024  4:05 PM   Wound Image       Drainage Amount Moderate --   Drainage Description Serosanguineous --   Wound Length (cm) 2.8 cm --   Wound Width (cm) 1.6 cm --   Wound Surface Area (cm^2) 4.48 cm^2 --   Wound Depth (cm) 0.1 cm --   Wound Volume (cm^3) 0.448 cm^3 --   Margins Well-defined edges --   Non-staged Wound Description Full thickness --   Peggy-wound Assessment Blanchable erythema;Moist --   Wound Granulation Tissue Pink;Firm --   Wound Bed Granulation (%) 100 % --   Wound Odor None --       No associated orders.       Wound 12/09/24 #6 Right lateral knee Knee Right;Lateral (Active)   Date First Assessed: 12/09/24    Wound Number (Wound Clinic Only): #6 Right lateral knee  Primary Wound Type: Traumatic  Location: Knee  Wound Location Orientation: Right;Lateral      Assessments 12/9/2024  3:57 PM   Wound Image     Drainage Amount None   Wound Length (cm) 3.2 cm   Wound Width (cm) 1.5 cm   Wound Surface Area (cm^2) 4.8 cm^2   Wound Depth (cm) 0 cm   Wound Volume (cm^3) 0 cm^3   Margins Well-defined edges   Non-staged Wound Description Partial thickness   Peggy-wound Assessment Ecchymosis;Pink   Wound Granulation Tissue Pink;Firm   Wound Bed Granulation (%) 10 %    Wound Bed Epithelium (%) 90 %   Wound Odor None   Tunneling? No   Undermining? No   Sinus Tracts? No       No associated orders.       Wound 12/09/24 #7 Right hand Hand Right (Active)   Date First Assessed: 12/09/24    Wound Number (Wound Clinic Only): #7 Right hand  Primary Wound Type: Traumatic  Location: Hand  Wound Location Orientation: Right      Assessments 12/9/2024  3:58 PM   Wound Image     Drainage Amount Small   Drainage Description Sanguineous   Wound Length (cm) 1.5 cm   Wound Width (cm) 4.6 cm   Wound Surface Area (cm^2) 6.9 cm^2   Wound Depth (cm) 0.1 cm   Wound Volume (cm^3) 0.69 cm^3   Margins Well-defined edges   Non-staged Wound Description Full thickness   Peggy-wound Assessment Ecchymosis   Wound Granulation Tissue Pink;Firm   Wound Bed Granulation (%) 100 %   Wound Odor None   Shape Skin flap noted   Tunneling? No   Undermining? No   Sinus Tracts? No       No associated orders.       Wound 12/09/24 #8 Wrist Right;Lateral (Active)   Date First Assessed: 12/09/24    Wound Number (Wound Clinic Only): #8  Primary Wound Type: Traumatic  Location: Wrist  Wound Location Orientation: Right;Lateral      Assessments 12/9/2024  3:58 PM   Wound Image     Drainage Amount Small   Drainage Description Sanguineous   Wound Length (cm) 4.1 cm   Wound Width (cm) 2.1 cm   Wound Surface Area (cm^2) 8.61 cm^2   Wound Depth (cm) 0.1 cm   Wound Volume (cm^3) 0.861 cm^3   Margins Well-defined edges   Non-staged Wound Description Full thickness   Peggy-wound Assessment Ecchymosis;Clean   Wound Granulation Tissue Red;Pink;Firm   Wound Bed Granulation (%) 90 %   Wound Bed Epithelium (%) 10 %   Wound Odor None   Tunneling? No   Undermining? No   Sinus Tracts? No       No associated orders.          ASSESSMENT AND PLAN:     Assessment     Encounter Diagnosis  1. Laceration of right hand without foreign body, initial encounter    2. Open wound of right wrist, initial encounter    3. Open wound of right knee, initial  encounter    4. Fall, initial encounter    5. 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 Foot ulcer, right, with fat layer exposed (HCC)    7. 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 PAD (peripheral artery disease) (HCC)    9. Peripheral arterial disease (HCC)    10. Sloughing of wound    11. Gait difficulty        PLAN OF CARE:    Xeroform and bordered foam on all wounds.   Strongly recommend go to ER  Recommend 24 hour supervision due to fall risk - d/w family.   Call PCP for further recs  Recommend PT eval.   Edema control  Watch out for signs of early infection - counseled.   Plan of care discussed with patient in detail - All questions answered   Return in one week.         Patient Instructions     Recommend Go to ER for fall eval and workup   Need close monitoring and 24 hours supervision until cleat  Contact PCP to get evaluated / PT order.   Fall prevention - d/w pt and family in detail  See me in 1 week.     Wound Cleaning and Dressings:    Wash your hands with soap and water. Always wear gloves while changing dressings. Donot touch wound / mio-wound skin with un-gloved hands. Remove old dressing, discard and place into trash.    DRESSINGS:   Xeroform and bordered foam on all wounds.   Offload wounded area.   Keep wounds dry.     Off-Loading: modified foot wear for maximal offloading.     Miscellaneous Instructions:  Supplement with a daily multivitamin   Low salt diet  Intense blood sugar control - Goal Blood sugar below 180 at all times recommended.  Increase protein intake / consider protein supplements - see below  Elevate extremities at all times when sitting / laying down.    DIETARY MODIFICATIONS TO HELP WITH WOUND HEALING:    Protein: Meats, beans, eggs, milk and yogurt particularly Greek yogurt), tofu, soy nuts, soy protein products    Vitamin C: Citrus fruits and juices,  strawberries, tomatoes, tomato juice, peppers, baked potatoes, spinach, broccoli, cauliflower, [place] sprouts, cabbage    Vitamin A: Dark green, leafy vegetables, orange or yellow vegetables, cantaloupe, fortified dairy products, liver, fortified cereals    Zinc: Fortified cereals, red meats, seafood    Consider Kwan by [employer] (These are essential branch chain amino acids that help with tissue building and wound healing) and take 2 packets/day. you can order online at [employer] or [employer]    ADDITIONAL REMINDERS:    The treatment plan has been discussed at length with you and your provider. Follow all instructions carefully, it is very important. If you do not follow all instructions, you are at  risk of your wound not healing, infection, possible loss of limb and even end of life.  Please call the clinic during regular business hours ( 7:30 AM - 5:30 PM) if you notice increased bleeding, redness, warmth, pain or pus like drainage or start running a fever greater than 100.3.    For after hour emergencies, please call your primary physician or go to the nearest emergency room.      Patient/Caregiver Education: There are no barriers to learning. Medical education for above diagnosis given.   Answered all questions.    Outcome: Patient verbalizes understanding. Patient is notified to call with any questions, complications, allergies, or worsening or changing symptoms.  Patient is to call with any side effects or complications as a result of the treatments today.      DOCUMENTATION OF TIME SPENT: Code selection for this visit was based on time spent : 45 min on date of service in preparing to see the patient, obtaining and/or reviewing separately obtained history, performing a medically appropriate examination, counseling and educating the patient/family/caregiver, ordering medications or testing, referring and communicating with other healthcare providers, documenting clinical information in the E HR, independently  interpreting results and communicating results to the patient/family/caregiver and care coordination with the patient's other providers.    Followup: Return in about 1 week (around 12/16/2024) for Wound followup.      Note to Patient:  The 21st Century Cures Act makes medical notes like these available to patients in the interest of transparency. However, be advised this is a medical document and is intended as [id number] communication; it is written in medical language and may appear blunt, direct, or contain abbreviations or verbiage that are unfamiliar. Medical documents are intended to carry relevant information, facts as evident, and the clinical opinion of the practitioner.    Also, please note that this report has been produced using speech recognition software and may contain errors related to that system including, but not limited to, errors in grammar, punctuation, and spelling, as well as words and phrases that possibly may have been recognized inappropriately.  If there are any questions or concerns, contact the dictating provider for clarification.      Fortino Butelr MD  12/9/2024  4:52 PM                      [1]   Allergies  Allergen Reactions    Heparin ANAPHYLAXIS    Hydromorphone HALLUCINATION

## 2024-12-09 NOTE — PATIENT INSTRUCTIONS
Recommend Go to ER for fall eval and workup   Need close monitoring and 24 hours supervision until cleat  Contact PCP to get evaluated / PT order.   Fall prevention - d/w pt and family in detail  See me in 1 week.     Wound Cleaning and Dressings:    Wash your hands with soap and water. Always wear gloves while changing dressings. Donot touch wound / mio-wound skin with un-gloved hands. Remove old dressing, discard and place into trash.    DRESSINGS:   Xeroform and bordered foam on all wounds.   Offload wounded area.   Keep wounds dry.     Off-Loading: modified foot wear for maximal offloading.     Miscellaneous Instructions:  Supplement with a daily multivitamin   Low salt diet  Intense blood sugar control - Goal Blood sugar below 180 at all times recommended.  Increase protein intake / consider protein supplements - see below  Elevate extremities at all times when sitting / laying down.    DIETARY MODIFICATIONS TO HELP WITH WOUND HEALING:    Protein: Meats, beans, eggs, milk and yogurt particularly Greek yogurt), tofu, soy nuts, soy protein products    Vitamin C: Citrus fruits and juices, strawberries, tomatoes, tomato juice, peppers, baked potatoes, spinach, broccoli, cauliflower, [place] sprouts, cabbage    Vitamin A: Dark green, leafy vegetables, orange or yellow vegetables, cantaloupe, fortified dairy products, liver, fortified cereals    Zinc: Fortified cereals, red meats, seafood    Consider Kwan by [employer] (These are essential branch chain amino acids that help with tissue building and wound healing) and take 2 packets/day. you can order online at [employer] or [employer]    ADDITIONAL REMINDERS:    The treatment plan has been discussed at length with you and your provider. Follow all instructions carefully, it is very important. If you do not follow all instructions, you are at  risk of your wound not healing, infection, possible loss of limb and even end of life.  Please call the clinic during regular  business hours ( 7:30 AM - 5:30 PM) if you notice increased bleeding, redness, warmth, pain or pus like drainage or start running a fever greater than 100.3.    For after hour emergencies, please call your primary physician or go to the nearest emergency room.

## 2024-12-16 ENCOUNTER — OFFICE VISIT (OUTPATIENT)
Dept: WOUND CARE | Facility: HOSPITAL | Age: 89
End: 2024-12-16
Attending: INTERNAL MEDICINE
Payer: MEDICARE

## 2024-12-16 VITALS
SYSTOLIC BLOOD PRESSURE: 99 MMHG | TEMPERATURE: 98 F | HEART RATE: 92 BPM | DIASTOLIC BLOOD PRESSURE: 68 MMHG | RESPIRATION RATE: 16 BRPM

## 2024-12-16 DIAGNOSIS — L97.212 NON-PRESSURE CHRONIC ULCER OF RIGHT CALF WITH FAT LAYER EXPOSED (HCC): ICD-10-CM

## 2024-12-16 DIAGNOSIS — S61.501D OPEN WOUND OF RIGHT WRIST, SUBSEQUENT ENCOUNTER: Primary | ICD-10-CM

## 2024-12-16 DIAGNOSIS — R60.0 EDEMA OF RIGHT LOWER EXTREMITY: ICD-10-CM

## 2024-12-16 DIAGNOSIS — I73.9 PERIPHERAL ARTERIAL DISEASE (HCC): ICD-10-CM

## 2024-12-16 DIAGNOSIS — S61.401D OPEN WOUND OF RIGHT HAND, SUBSEQUENT ENCOUNTER: ICD-10-CM

## 2024-12-16 DIAGNOSIS — E08.621 DIABETIC ULCER OF RIGHT HEEL ASSOCIATED WITH DIABETES MELLITUS DUE TO UNDERLYING CONDITION, WITH FAT LAYER EXPOSED (HCC): ICD-10-CM

## 2024-12-16 DIAGNOSIS — L97.512 FOOT ULCER, RIGHT, WITH FAT LAYER EXPOSED (HCC): ICD-10-CM

## 2024-12-16 DIAGNOSIS — L97.412 DIABETIC ULCER OF RIGHT HEEL ASSOCIATED WITH DIABETES MELLITUS DUE TO UNDERLYING CONDITION, WITH FAT LAYER EXPOSED (HCC): ICD-10-CM

## 2024-12-16 DIAGNOSIS — R23.8 SLOUGHING OF WOUND: ICD-10-CM

## 2024-12-16 DIAGNOSIS — R26.9 GAIT DIFFICULTY: ICD-10-CM

## 2024-12-16 DIAGNOSIS — S81.001D OPEN WOUND OF RIGHT KNEE, SUBSEQUENT ENCOUNTER: ICD-10-CM

## 2024-12-16 PROCEDURE — 99214 OFFICE O/P EST MOD 30 MIN: CPT

## 2024-12-16 NOTE — PATIENT INSTRUCTIONS
Fall prevention  See me in 2 weeks.     Wound Cleaning and Dressings:    Wash your hands with soap and water. Always wear gloves while changing dressings. Donot touch wound / mio-wound skin with un-gloved hands. Remove old dressing, discard and place into trash.      DRESSINGS:   Honey gel and Xeroform  on wrist / hand wounds.   HF transfer on leg and lateral knee wounds  Offload wounded area.   Keep wounds dry.     Off-Loading: modified foot wear for maximal offloading.     Miscellaneous Instructions:  Supplement with a daily multivitamin   Low salt diet  Intense blood sugar control - Goal Blood sugar below 180 at all times recommended.  Increase protein intake / consider protein supplements - see below  Elevate extremities at all times when sitting / laying down.    DIETARY MODIFICATIONS TO HELP WITH WOUND HEALING:    Protein: Meats, beans, eggs, milk and yogurt particularly Greek yogurt), tofu, soy nuts, soy protein products    Vitamin C: Citrus fruits and juices, strawberries, tomatoes, tomato juice, peppers, baked potatoes, spinach, broccoli, cauliflower, [place] sprouts, cabbage    Vitamin A: Dark green, leafy vegetables, orange or yellow vegetables, cantaloupe, fortified dairy products, liver, fortified cereals    Zinc: Fortified cereals, red meats, seafood    Consider Kwan by [employer] (These are essential branch chain amino acids that help with tissue building and wound healing) and take 2 packets/day. you can order online at [employer] or [employer]    ADDITIONAL REMINDERS:    The treatment plan has been discussed at length with you and your provider. Follow all instructions carefully, it is very important. If you do not follow all instructions, you are at  risk of your wound not healing, infection, possible loss of limb and even end of life.  Please call the clinic during regular business hours ( 7:30 AM - 5:30 PM) if you notice increased bleeding, redness, warmth, pain or pus like drainage or start  running a fever greater than 100.3.    For after hour emergencies, please call your primary physician or go to the nearest emergency room.

## 2024-12-16 NOTE — PROGRESS NOTES
Weekly Wound Education Note    Teaching Provided To: Patient  Training Topics: Cleasing and general instructions;Compression;Discharge instructions;Dressing;Edema control  Training Method: Explain/Verbal  Training Response: Patient responds and understands;Reinforcement needed        Notes: Stable. Right hand/wrist: honey gel, xeroform, gauze, conforming gauze, tape. Right knee/leg: hydrofera ready, conforming gauze, paper tape. Family educated on dressings.

## 2024-12-16 NOTE — PROGRESS NOTES
[facility] PROGRESS NOTE  FARZAD WILLINGHAM MD  12/16/2024    Chief Complaint:   Chief Complaint   Patient presents with    Wound Care     Patient is here for a wound care follow up. He denies any pain to the wounds.        HPI:   Subjective   Alejandro Guardado is a 89 year old male coming in for a follow-up visit.    HPI    Doing OK overall.   Wounds all stable and improving.   Dimensions and tissue quality improving.   No s/o infection.   Edema persists in lower extremities.   However foot wound and heel wounds stay closed.     Review of Systems  Negative except HPI   Denies chest pain / SOB / palpitations  Denies fever.     Allergies  Allergies[1]    Current Meds:  Current Outpatient Medications   Medication Sig Dispense Refill    amoxicillin clavulanate 875-125 MG Oral Tab Take 1 tablet by mouth 2 (two) times daily. 7 tablet 0    gabapentin 100 MG Oral Cap Take 2 capsules (200 mg total) by mouth in the morning and 2 capsules (200 mg total) before bedtime. 60 capsule 0    collagenase 250 UNIT/GM External Ointment Apply 1 Application topically daily. 30 g 0    metoprolol succinate ER 25 MG Oral Tablet 24 Hr Take 2 tablets (50 mg total) by mouth Daily Beta Blocker. 60 tablet 0    finasteride 5 MG Oral Tab Take 1 tablet (5 mg total) by mouth daily. 90 tablet 0    furosemide 40 MG Oral Tab Take 2 tablets (80 mg total) by mouth 2 (two) times daily.      lisinopril 2.5 MG Oral Tab Take 1 tablet (2.5 mg total) by mouth daily.      Ascorbic Acid (VITAMIN C) 1000 MG Oral Tab Take 1.5 tablets (1,500 mg total) by mouth daily.      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      tamsulosin 0.4 MG Oral Cap Take 1 capsule (0.4 mg total) by mouth daily.      apixaban 5 MG Oral Tab Take 1 tablet (5 mg total) by mouth 2 (two) times daily. 60 tablet 3     atorvastatin 40 MG Oral Tab Take 1 tablet (40 mg total) by mouth daily. 30 tablet 0    clopidogrel 75 MG Oral Tab Take 1 tablet (75 mg total) by mouth daily. 30 tablet 0    predniSONE 5 MG Oral Tab Take 3 tablets (15 mg total) by mouth daily.      folic acid 1 MG Oral Tab Take 1 tablet (1 mg total) by mouth daily.      acetaminophen 500 MG Oral Tab Take 2 tablets (1,000 mg total) by mouth every 8 (eight) hours. 21 tablet 0    Omeprazole 40 MG Oral Capsule Delayed Release Take 1 capsule (40 mg total) by mouth daily.           EXAM:   Objective   Objective    Physical Exam    Vital Signs  Vitals:    12/16/24 1500   BP: 99/68   Pulse: 92   Resp: 16   Temp: 98 °F (36.7 °C)       Wound Assessment  Wound 04/15/24 #2 Leg Right;Anterior (Active)   Date First Assessed/Time First Assessed: 04/15/24 1611    Wound Number (Wound Clinic Only): #2  Primary Wound Type: Venous Ulcer  Location: Leg  Wound Location Orientation: Right;Anterior      Assessments 4/15/2024  4:12 PM 12/16/2024  3:11 PM   Wound Image       Drainage Amount -- Scant   Drainage Description -- Serosanguineous   Wound Length (cm) -- 0.7 cm   Wound Width (cm) -- 0.4 cm   Wound Surface Area (cm^2) -- 0.28 cm^2   Wound Depth (cm) -- 0.1 cm   Wound Volume (cm^3) -- 0.028 cm^3   Wound Healing % -- 76   Margins -- Well-defined edges   Non-staged Wound Description -- Full thickness   Peggy-wound Assessment -- Clean;Edema   Wound Granulation Tissue -- Firm;Pink   Wound Bed Granulation (%) -- 100 %   Wound Odor -- None   Tunneling? -- No   Undermining? -- No   Sinus Tracts? -- No       Inactive Orders   Date Order Priority Status Authorizing Provider   09/27/24 1657 Wound care Routine Discontinued Belen Flores MD   09/27/24 0212 Consult to Wound Ostomy Routine Completed Vivian Way APRN     - Reason for Consult:    Wound Care     - Wound Care Reason for Consult:    wound care   07/17/24 1614 Debridement Venous Ulcer Right;Anterior Leg Routine Completed Luke  Fortino DOMINGO MD       Wound 11/11/24 #5 Right Dorsal Foot Foot Dorsal;Right (Active)   Date First Assessed/Time First Assessed: 11/11/24 1307    Wound Number (Wound Clinic Only): #5 Right Dorsal Foot  Primary Wound Type: Friction/Shear  Location: Foot  Wound Location Orientation: Dorsal;Right      Assessments 11/11/2024  1:11 PM 12/16/2024  3:12 PM   Wound Image       Drainage Amount Moderate None   Drainage Description Serosanguineous --   Wound Length (cm) 2.8 cm 0.1 cm   Wound Width (cm) 1.6 cm 0.1 cm   Wound Surface Area (cm^2) 4.48 cm^2 0.01 cm^2   Wound Depth (cm) 0.1 cm 0 cm   Wound Volume (cm^3) 0.448 cm^3 0 cm^3   Wound Healing % -- 100   Margins Well-defined edges Well-defined edges   Non-staged Wound Description Full thickness Full thickness   Peggy-wound Assessment Blanchable erythema;Moist Clean;Edema   Wound Granulation Tissue Pink;Firm Firm;Pink   Wound Bed Granulation (%) 100 % 100 %   Wound Odor None None   Tunneling? -- No   Undermining? -- No   Sinus Tracts? -- No       No associated orders.       Wound 12/09/24 #6 Right lateral knee Knee Right;Lateral (Active)   Date First Assessed: 12/09/24    Wound Number (Wound Clinic Only): #6 Right lateral knee  Primary Wound Type: Traumatic  Location: Knee  Wound Location Orientation: Right;Lateral      Assessments 12/9/2024  3:57 PM 12/16/2024  3:13 PM   Wound Image       Drainage Amount None Small   Drainage Description -- Serosanguineous   Wound Length (cm) 3.2 cm 6.7 cm   Wound Width (cm) 1.5 cm 2 cm   Wound Surface Area (cm^2) 4.8 cm^2 13.4 cm^2   Wound Depth (cm) 0 cm 0 cm   Wound Volume (cm^3) 0 cm^3 0 cm^3   Margins Well-defined edges Poorly defined   Non-staged Wound Description Partial thickness Full thickness   Peggy-wound Assessment Ecchymosis;Pink Dry exudate;Edema   Wound Granulation Tissue Pink;Firm Firm;Pale Grey;Pink   Wound Bed Granulation (%) 10 % 25 %   Wound Bed Epithelium (%) 90 % 60 %   Wound Bed Slough (%) -- 15 %   Wound Odor None None    Tunneling? No No   Undermining? No No   Sinus Tracts? No No       No associated orders.       Wound 12/09/24 #7 Right hand Hand Right (Active)   Date First Assessed: 12/09/24    Wound Number (Wound Clinic Only): #7 Right hand  Primary Wound Type: Traumatic  Location: Hand  Wound Location Orientation: Right      Assessments 12/9/2024  3:58 PM 12/16/2024  3:15 PM   Wound Image       Drainage Amount Small Scant   Drainage Description Sanguineous Serosanguineous   Wound Length (cm) 1.5 cm 0.7 cm   Wound Width (cm) 4.6 cm 4.4 cm   Wound Surface Area (cm^2) 6.9 cm^2 3.08 cm^2   Wound Depth (cm) 0.1 cm 0.1 cm   Wound Volume (cm^3) 0.69 cm^3 0.308 cm^3   Wound Healing % -- 55   Margins Well-defined edges Well-defined edges   Non-staged Wound Description Full thickness Full thickness   Peggy-wound Assessment Ecchymosis Ecchymosis;Dry exudate   Wound Granulation Tissue Pink;Firm Firm;Pink;Red   Wound Bed Granulation (%) 100 % 30 %   Wound Bed Slough (%) -- 70 %   Wound Odor None None   Shape Skin flap noted --   Tunneling? No No   Undermining? No No   Sinus Tracts? No No       No associated orders.       Wound 12/09/24 #8 Wrist Right;Lateral (Active)   Date First Assessed: 12/09/24    Wound Number (Wound Clinic Only): #8  Primary Wound Type: Traumatic  Location: Wrist  Wound Location Orientation: Right;Lateral      Assessments 12/9/2024  3:58 PM 12/16/2024  3:16 PM   Wound Image       Drainage Amount Small Scant   Drainage Description Sanguineous Serosanguineous   Wound Length (cm) 4.1 cm 2.4 cm   Wound Width (cm) 2.1 cm 1.6 cm   Wound Surface Area (cm^2) 8.61 cm^2 3.84 cm^2   Wound Depth (cm) 0.1 cm 0.1 cm   Wound Volume (cm^3) 0.861 cm^3 0.384 cm^3   Wound Healing % -- 55   Margins Well-defined edges Well-defined edges   Non-staged Wound Description Full thickness Full thickness   Peggy-wound Assessment Ecchymosis;Clean Dark edges;Ecchymosis   Wound Granulation Tissue Red;Pink;Firm Firm;Pale Grey;Pink   Wound Bed  Granulation (%) 90 % 40 %   Wound Bed Epithelium (%) 10 % 20 %   Wound Bed Slough (%) -- 40 %   Wound Odor None None   Tunneling? No No   Undermining? No No   Sinus Tracts? No No       No associated orders.          ASSESSMENT AND PLAN:     Assessment     Encounter Diagnosis  1. Open wound of right wrist, subsequent encounter    2. Open wound of right hand, subsequent encounter    3. Open wound of right knee, subsequent encounter    4. 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 Foot ulcer, right, with fat layer exposed (HCC)    6. Edema of right lower extremity    7. Gait difficulty    8. Sloughing of wound    9. Peripheral arterial disease (HCC)    10. 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   No instruments used due to pain      PLAN OF CARE:    Start honey gel for enzymatic debridements on hand and wrist wound.   Serial debridements In clinic to hasten healing.   Xeroform on UE wounds and HF transfer on leg and knee wounds.   Fall prevention discussed  Edema management discussed  Watch out for signs of early infection - counseled.   Plan of care discussed with patient in detail - All questions answered   D/w family  Return in 2 week.     Patient Instructions     Fall prevention  See me in 2 weeks.     Wound Cleaning and Dressings:    Wash your hands with soap and water. Always wear gloves while changing dressings. Donot touch wound / mio-wound skin with un-gloved hands. Remove old dressing, discard and place into trash.      DRESSINGS:   Honey gel and Xeroform  on wrist / hand wounds.   HF transfer on leg and lateral knee wounds  Offload wounded area.   Keep wounds dry.     Off-Loading: modified foot wear for maximal offloading.     Miscellaneous Instructions:  Supplement with  a daily multivitamin   Low salt diet  Intense blood sugar control - Goal Blood sugar below 180 at all times recommended.  Increase protein intake / consider protein supplements - see below  Elevate extremities at all times when sitting / laying down.    DIETARY MODIFICATIONS TO HELP WITH WOUND HEALING:    Protein: Meats, beans, eggs, milk and yogurt particularly Greek yogurt), tofu, soy nuts, soy protein products    Vitamin C: Citrus fruits and juices, strawberries, tomatoes, tomato juice, peppers, baked potatoes, spinach, broccoli, cauliflower, [place] sprouts, cabbage    Vitamin A: Dark green, leafy vegetables, orange or yellow vegetables, cantaloupe, fortified dairy products, liver, fortified cereals    Zinc: Fortified cereals, red meats, seafood    Consider Kwan by [employer] (These are essential branch chain amino acids that help with tissue building and wound healing) and take 2 packets/day. you can order online at [employer] or [employer]    ADDITIONAL REMINDERS:    The treatment plan has been discussed at length with you and your provider. Follow all instructions carefully, it is very important. If you do not follow all instructions, you are at  risk of your wound not healing, infection, possible loss of limb and even end of life.  Please call the clinic during regular business hours ( 7:30 AM - 5:30 PM) if you notice increased bleeding, redness, warmth, pain or pus like drainage or start running a fever greater than 100.3.    For after hour emergencies, please call your primary physician or go to the nearest emergency room.      Patient/Caregiver Education: There are no barriers to learning. Medical education for above diagnosis given.   Answered all questions.    Outcome: Patient verbalizes understanding. Patient is notified to call with any questions, complications, allergies, or worsening or changing symptoms.  Patient is to call with any side effects or complications as a result of the treatments  today.      DOCUMENTATION OF TIME SPENT: Code selection for this visit was based on time spent : 40 min on date of service in preparing to see the patient, obtaining and/or reviewing separately obtained history, performing a medically appropriate examination, counseling and educating the patient/family/caregiver, ordering medications or testing, referring and communicating with other healthcare providers, documenting clinical information in the E HR, independently interpreting results and communicating results to the patient/family/caregiver and care coordination with the patient's other providers.    Followup: Return in about 2 weeks (around 12/30/2024) for Wound followup.      Note to Patient:  The 21st Century Cures Act makes medical notes like these available to patients in the interest of transparency. However, be advised this is a medical document and is intended as [id number] communication; it is written in medical language and may appear blunt, direct, or contain abbreviations or verbiage that are unfamiliar. Medical documents are intended to carry relevant information, facts as evident, and the clinical opinion of the practitioner.    Also, please note that this report has been produced using speech recognition software and may contain errors related to that system including, but not limited to, errors in grammar, punctuation, and spelling, as well as words and phrases that possibly may have been recognized inappropriately.  If there are any questions or concerns, contact the dictating provider for clarification.      Fortino Butler MD  12/16/2024  3:35 PM                      [1]   Allergies  Allergen Reactions    Heparin ANAPHYLAXIS    Hydromorphone HALLUCINATION

## 2024-12-23 ENCOUNTER — APPOINTMENT (OUTPATIENT)
Dept: WOUND CARE | Facility: HOSPITAL | Age: 89
End: 2024-12-23
Attending: INTERNAL MEDICINE
Payer: MEDICARE

## 2024-12-27 ENCOUNTER — APPOINTMENT (OUTPATIENT)
Dept: CARDIOLOGY | Age: 89
End: 2024-12-27

## 2024-12-27 VITALS
BODY MASS INDEX: 28.79 KG/M2 | HEIGHT: 68 IN | WEIGHT: 190 LBS | DIASTOLIC BLOOD PRESSURE: 77 MMHG | HEART RATE: 99 BPM | SYSTOLIC BLOOD PRESSURE: 112 MMHG

## 2024-12-27 DIAGNOSIS — I65.23 ASYMPTOMATIC CAROTID ARTERY STENOSIS, BILATERAL: ICD-10-CM

## 2024-12-27 DIAGNOSIS — I49.3 PVCS (PREMATURE VENTRICULAR CONTRACTIONS): Primary | ICD-10-CM

## 2024-12-27 DIAGNOSIS — Z95.1 HX OF CABG: ICD-10-CM

## 2024-12-27 DIAGNOSIS — I25.5 ISCHEMIC CARDIOMYOPATHY: ICD-10-CM

## 2024-12-27 DIAGNOSIS — I25.10 CORONARY ARTERY DISEASE INVOLVING NATIVE CORONARY ARTERY OF NATIVE HEART WITHOUT ANGINA PECTORIS: ICD-10-CM

## 2024-12-27 DIAGNOSIS — I48.11 LONGSTANDING PERSISTENT ATRIAL FIBRILLATION  (CMD): ICD-10-CM

## 2024-12-27 DIAGNOSIS — E78.00 PURE HYPERCHOLESTEROLEMIA: ICD-10-CM

## 2024-12-27 RX ORDER — SERTRALINE HCL 25 MG
1 TABLET ORAL DAILY
COMMUNITY
Start: 2024-12-16 | End: 2025-03-16

## 2024-12-27 SDOH — HEALTH STABILITY: PHYSICAL HEALTH: ON AVERAGE, HOW MANY DAYS PER WEEK DO YOU ENGAGE IN MODERATE TO STRENUOUS EXERCISE (LIKE A BRISK WALK)?: 0 DAYS

## 2024-12-27 SDOH — HEALTH STABILITY: PHYSICAL HEALTH: ON AVERAGE, HOW MANY MINUTES DO YOU ENGAGE IN EXERCISE AT THIS LEVEL?: 0 MIN

## 2024-12-27 ASSESSMENT — PATIENT HEALTH QUESTIONNAIRE - PHQ9
2. FEELING DOWN, DEPRESSED OR HOPELESS: NOT AT ALL
1. LITTLE INTEREST OR PLEASURE IN DOING THINGS: NOT AT ALL
CLINICAL INTERPRETATION OF PHQ2 SCORE: NO FURTHER SCREENING NEEDED
SUM OF ALL RESPONSES TO PHQ9 QUESTIONS 1 AND 2: 0
SUM OF ALL RESPONSES TO PHQ9 QUESTIONS 1 AND 2: 0

## 2024-12-30 ENCOUNTER — OFFICE VISIT (OUTPATIENT)
Dept: WOUND CARE | Facility: HOSPITAL | Age: 89
End: 2024-12-30
Attending: INTERNAL MEDICINE
Payer: MEDICARE

## 2024-12-30 VITALS
RESPIRATION RATE: 16 BRPM | SYSTOLIC BLOOD PRESSURE: 108 MMHG | DIASTOLIC BLOOD PRESSURE: 71 MMHG | HEART RATE: 71 BPM | TEMPERATURE: 98 F

## 2024-12-30 DIAGNOSIS — L97.412 DIABETIC ULCER OF RIGHT HEEL ASSOCIATED WITH DIABETES MELLITUS DUE TO UNDERLYING CONDITION, WITH FAT LAYER EXPOSED (HCC): ICD-10-CM

## 2024-12-30 DIAGNOSIS — L97.512 FOOT ULCER, RIGHT, WITH FAT LAYER EXPOSED (HCC): ICD-10-CM

## 2024-12-30 DIAGNOSIS — R26.9 GAIT DIFFICULTY: ICD-10-CM

## 2024-12-30 DIAGNOSIS — S61.401D OPEN WOUND OF RIGHT HAND, SUBSEQUENT ENCOUNTER: Primary | ICD-10-CM

## 2024-12-30 DIAGNOSIS — R23.8 SLOUGHING OF WOUND: ICD-10-CM

## 2024-12-30 DIAGNOSIS — R60.0 EDEMA OF RIGHT LOWER EXTREMITY: ICD-10-CM

## 2024-12-30 DIAGNOSIS — I73.9 PERIPHERAL ARTERIAL DISEASE (HCC): ICD-10-CM

## 2024-12-30 DIAGNOSIS — S61.501D OPEN WOUND OF RIGHT WRIST, SUBSEQUENT ENCOUNTER: ICD-10-CM

## 2024-12-30 DIAGNOSIS — E08.621 DIABETIC ULCER OF RIGHT HEEL ASSOCIATED WITH DIABETES MELLITUS DUE TO UNDERLYING CONDITION, WITH FAT LAYER EXPOSED (HCC): ICD-10-CM

## 2024-12-30 DIAGNOSIS — S81.001D OPEN WOUND OF RIGHT KNEE, SUBSEQUENT ENCOUNTER: ICD-10-CM

## 2024-12-30 DIAGNOSIS — L97.212 NON-PRESSURE CHRONIC ULCER OF RIGHT CALF WITH FAT LAYER EXPOSED (HCC): ICD-10-CM

## 2024-12-30 DIAGNOSIS — S60.359A WOOD SPLINTER IN THUMB: ICD-10-CM

## 2024-12-30 PROCEDURE — 99214 OFFICE O/P EST MOD 30 MIN: CPT

## 2024-12-30 NOTE — PROGRESS NOTES
.Weekly Wound Education Note    Teaching Provided To: Patient;Family  Training Topics: Dressing;Discharge instructions;Cleasing and general instructions  Training Method: Explain/Verbal;Written  Training Response: Patient responds and understands;Reinforcement needed            Hand and wrist are healed, moisturize daily.  Honey gel, hydrofera transfer to knee wound.  Hydrofera transfer to leg wound.  Dressings secured with surgilast.

## 2024-12-31 NOTE — PATIENT INSTRUCTIONS
See me in 2 weeks.     Wound Cleaning and Dressings:    Wash your hands with soap and water. Always wear gloves while changing dressings. Donot touch wound / mio-wound skin with un-gloved hands. Remove old dressing, discard and place into trash.      DRESSINGS:   Honey gel and Xeroform  on lateral knee wound   HF transfer on all other OPEN areas.   Offload wounded area.   Keep wounds dry.     Off-Loading: modified foot wear for maximal offloading.     Miscellaneous Instructions:  Supplement with a daily multivitamin   Low salt diet  Intense blood sugar control - Goal Blood sugar below 180 at all times recommended.  Increase protein intake / consider protein supplements - see below  Elevate extremities at all times when sitting / laying down.    DIETARY MODIFICATIONS TO HELP WITH WOUND HEALING:    Protein: Meats, beans, eggs, milk and yogurt particularly Greek yogurt), tofu, soy nuts, soy protein products    Vitamin C: Citrus fruits and juices, strawberries, tomatoes, tomato juice, peppers, baked potatoes, spinach, broccoli, cauliflower, [place] sprouts, cabbage    Vitamin A: Dark green, leafy vegetables, orange or yellow vegetables, cantaloupe, fortified dairy products, liver, fortified cereals    Zinc: Fortified cereals, red meats, seafood    Consider Kwan by [employer] (These are essential branch chain amino acids that help with tissue building and wound healing) and take 2 packets/day. you can order online at [employer] or [employer]    ADDITIONAL REMINDERS:    The treatment plan has been discussed at length with you and your provider. Follow all instructions carefully, it is very important. If you do not follow all instructions, you are at  risk of your wound not healing, infection, possible loss of limb and even end of life.  Please call the clinic during regular business hours ( 7:30 AM - 5:30 PM) if you notice increased bleeding, redness, warmth, pain or pus like drainage or start running a fever greater than  100.3.    For after hour emergencies, please call your primary physician or go to the nearest emergency room.

## 2024-12-31 NOTE — PROGRESS NOTES
[facility] PROGRESS NOTE  FARZAD WILLINGHAM MD  12/30/2024    Chief Complaint:   Chief Complaint   Patient presents with    Wound Care     Patients is here for a follow up. Patients stated no issues at this moment        HPI:   Subjective   Alejandro Guardado is a 89 year old male coming in for a follow-up visit.    HPI    Pt is doing OK overall.   No recent falls or injuries.    However, new wood splinter on thumb skin.     Also, hand / wrist wounds closed.     Scab on foot wound removed.     Heel wound still scabbed     Leg wound closed - but another raw area present.     Lateral knee wound with slough.     No s/o infection    Edema well controlled.     Son present for appt.   Review of Systems  Negative except HPI   Denies chest pain / SOB / palpitations  Denies fever.     Allergies  Allergies[1]    Current Meds:  Current Outpatient Medications   Medication Sig Dispense Refill    amoxicillin clavulanate 875-125 MG Oral Tab Take 1 tablet by mouth 2 (two) times daily. 7 tablet 0    gabapentin 100 MG Oral Cap Take 2 capsules (200 mg total) by mouth in the morning and 2 capsules (200 mg total) before bedtime. 60 capsule 0    collagenase 250 UNIT/GM External Ointment Apply 1 Application topically daily. 30 g 0    metoprolol succinate ER 25 MG Oral Tablet 24 Hr Take 2 tablets (50 mg total) by mouth Daily Beta Blocker. 60 tablet 0    finasteride 5 MG Oral Tab Take 1 tablet (5 mg total) by mouth daily. 90 tablet 0    furosemide 40 MG Oral Tab Take 2 tablets (80 mg total) by mouth 2 (two) times daily.      lisinopril 2.5 MG Oral Tab Take 1 tablet (2.5 mg total) by mouth daily.      Ascorbic Acid (VITAMIN C) 1000 MG Oral Tab Take 1.5 tablets (1,500 mg total) by mouth daily.      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      tamsulosin 0.4 MG Oral Cap Take  1 capsule (0.4 mg total) by mouth daily.      apixaban 5 MG Oral Tab Take 1 tablet (5 mg total) by mouth 2 (two) times daily. 60 tablet 3    atorvastatin 40 MG Oral Tab Take 1 tablet (40 mg total) by mouth daily. 30 tablet 0    clopidogrel 75 MG Oral Tab Take 1 tablet (75 mg total) by mouth daily. 30 tablet 0    predniSONE 5 MG Oral Tab Take 3 tablets (15 mg total) by mouth daily.      folic acid 1 MG Oral Tab Take 1 tablet (1 mg total) by mouth daily.      acetaminophen 500 MG Oral Tab Take 2 tablets (1,000 mg total) by mouth every 8 (eight) hours. 21 tablet 0    Omeprazole 40 MG Oral Capsule Delayed Release Take 1 capsule (40 mg total) by mouth daily.           EXAM:   Objective   Objective    Physical Exam    Vital Signs  Vitals:    12/30/24 1100   BP: 108/71   Pulse: 71   Resp: 16   Temp: 97.6 °F (36.4 °C)       Wound Assessment  Wound 04/15/24 #2 Leg Right;Anterior (Active)   Date First Assessed/Time First Assessed: 04/15/24 1611    Wound Number (Wound Clinic Only): #2  Primary Wound Type: Venous Ulcer  Location: Leg  Wound Location Orientation: Right;Anterior      Assessments 4/15/2024  4:12 PM 12/30/2024  1:42 PM   Wound Image       Drainage Amount -- None   Wound Length (cm) -- 0.1 cm   Wound Width (cm) -- 0.1 cm   Wound Surface Area (cm^2) -- 0.01 cm^2   Wound Depth (cm) -- 0 cm   Wound Volume (cm^3) -- 0 cm^3   Wound Healing % -- 100   Margins -- Well-defined edges   Non-staged Wound Description -- Full thickness   Peggy-wound Assessment -- Dry;Edema   Wound Granulation Tissue -- Firm;Pink   Wound Bed Granulation (%) -- 100 %   Wound Odor -- None       Inactive Orders   Date Order Priority Status Authorizing Provider   09/27/24 1657 Wound care Routine Discontinued Belen Flores MD   09/27/24 0212 Consult to Wound Ostomy Routine Completed Vivian Way APRN     - Reason for Consult:    Wound Care     - Wound Care Reason for Consult:    wound care   07/17/24 1614 Debridement Venous Ulcer Right;Anterior  Leg Routine Completed Fortino Mo MD       Wound 11/11/24 #5 Right Dorsal Foot Foot Dorsal;Right (Active)   Date First Assessed/Time First Assessed: 11/11/24 1307    Wound Number (Wound Clinic Only): #5 Right Dorsal Foot  Primary Wound Type: Friction/Shear  Location: Foot  Wound Location Orientation: Dorsal;Right      Assessments 11/11/2024  1:11 PM 12/30/2024  1:43 PM   Wound Image       Drainage Amount Moderate None   Drainage Description Serosanguineous --   Wound Length (cm) 2.8 cm 0.1 cm   Wound Width (cm) 1.6 cm 0.1 cm   Wound Surface Area (cm^2) 4.48 cm^2 0.01 cm^2   Wound Depth (cm) 0.1 cm 0 cm   Wound Volume (cm^3) 0.448 cm^3 0 cm^3   Wound Healing % -- 100   Margins Well-defined edges Well-defined edges   Non-staged Wound Description Full thickness Full thickness   Peggy-wound Assessment Blanchable erythema;Moist Clean;Edema   Wound Granulation Tissue Pink;Firm --   Wound Bed Granulation (%) 100 % --   Wound Odor None --   Shape -- 100% scabbed       No associated orders.       Wound 12/09/24 #6 Right lateral knee Knee Right;Lateral (Active)   Date First Assessed: 12/09/24    Wound Number (Wound Clinic Only): #6 Right lateral knee  Primary Wound Type: Traumatic  Location: Knee  Wound Location Orientation: Right;Lateral      Assessments 12/9/2024  3:57 PM 12/30/2024  1:41 PM   Wound Image       Drainage Amount None Unable to assess   Wound Length (cm) 3.2 cm 2.3 cm   Wound Width (cm) 1.5 cm 0.9 cm   Wound Surface Area (cm^2) 4.8 cm^2 2.07 cm^2   Wound Depth (cm) 0 cm 0.1 cm   Wound Volume (cm^3) 0 cm^3 0.207 cm^3   Margins Well-defined edges Well-defined edges   Non-staged Wound Description Partial thickness Full thickness   Peggy-wound Assessment Ecchymosis;Pink Dry   Wound Granulation Tissue Pink;Firm Firm;Pink   Wound Bed Granulation (%) 10 % 25 %   Wound Bed Epithelium (%) 90 % --   Wound Bed Slough (%) -- 75 %   Wound Odor None None   Tunneling? No --   Undermining? No --   Sinus Tracts? No --        No associated orders.       Wound 12/09/24 #7 Right hand Hand Right (Active)   Date First Assessed: 12/09/24    Wound Number (Wound Clinic Only): #7 Right hand  Primary Wound Type: Traumatic  Location: Hand  Wound Location Orientation: Right      Assessments 12/9/2024  3:58 PM 12/30/2024  1:39 PM   Wound Image       Drainage Amount Small None   Drainage Description Sanguineous --   Wound Length (cm) 1.5 cm 0.2 cm   Wound Width (cm) 4.6 cm 1 cm   Wound Surface Area (cm^2) 6.9 cm^2 0.2 cm^2   Wound Depth (cm) 0.1 cm 0.1 cm   Wound Volume (cm^3) 0.69 cm^3 0.02 cm^3   Wound Healing % -- 97   Margins Well-defined edges Well-defined edges   Non-staged Wound Description Full thickness Full thickness   Peggy-wound Assessment Ecchymosis Dry   Wound Granulation Tissue Pink;Firm --   Wound Bed Granulation (%) 100 % --   Wound Bed Epithelium (%) -- 50 %   Wound Odor None None   Shape Skin flap noted 50% scabbed   Tunneling? No --   Undermining? No --   Sinus Tracts? No --       No associated orders.       Wound 12/09/24 #8 Wrist Right;Lateral (Active)   Date First Assessed: 12/09/24    Wound Number (Wound Clinic Only): #8  Primary Wound Type: Traumatic  Location: Wrist  Wound Location Orientation: Right;Lateral      Assessments 12/9/2024  3:58 PM 12/30/2024  1:39 PM   Wound Image       Drainage Amount Small None   Drainage Description Sanguineous --   Wound Length (cm) 4.1 cm 0 cm   Wound Width (cm) 2.1 cm 0 cm   Wound Surface Area (cm^2) 8.61 cm^2 0 cm^2   Wound Depth (cm) 0.1 cm 0 cm   Wound Volume (cm^3) 0.861 cm^3 0 cm^3   Wound Healing % -- 100   Margins Well-defined edges Flat and Intact   Non-staged Wound Description Full thickness Full thickness   Peggy-wound Assessment Ecchymosis;Clean Dry   Wound Granulation Tissue Red;Pink;Firm --   Wound Bed Granulation (%) 90 % --   Wound Bed Epithelium (%) 10 % 100 %   Wound Odor None None   Tunneling? No --   Undermining? No --   Sinus Tracts? No --       No associated  orders.                ASSESSMENT AND PLAN:     Assessment     Encounter Diagnosis  1. Open wound of right hand, subsequent encounter    2. Wood splinter in thumb    3. Open wound of right wrist, subsequent encounter    4. Open wound of right knee, subsequent encounter    5. 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 Foot ulcer, right, with fat layer exposed (HCC)    7. Edema of right lower extremity    8. Gait difficulty    9. Sloughing of wound    10. Peripheral arterial disease (HCC)    11. 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       PLAN OF CARE:    Honey gel for enzymatic debridement lateral right knee wound.  Keep all other open areas covered with Hydrofera Blue transfer.  Apply moisturizer on all other areas with previously healed wounds.  Focus on edema reduction with low-salt diet/leg elevation/avoid prolonged standing.  Watch out for signs of early infection - counseled.   Plan of care discussed with patient in detail - All questions answered   Return in 2 week.   Discussed with son in detail.    Patient Instructions     See me in 2 weeks.     Wound Cleaning and Dressings:    Wash your hands with soap and water. Always wear gloves while changing dressings. Donot touch wound / mio-wound skin with un-gloved hands. Remove old dressing, discard and place into trash.      DRESSINGS:   Honey gel and Xeroform  on lateral knee wound   HF transfer on all other OPEN areas.   Offload wounded area.   Keep wounds dry.     Off-Loading: modified foot wear for maximal offloading.     Miscellaneous Instructions:  Supplement with a daily multivitamin   Low salt diet  Intense blood sugar control - Goal Blood sugar below 180 at all times recommended.  Increase protein intake / consider protein supplements - see below  Elevate extremities at all times when  sitting / laying down.    DIETARY MODIFICATIONS TO HELP WITH WOUND HEALING:    Protein: Meats, beans, eggs, milk and yogurt particularly Greek yogurt), tofu, soy nuts, soy protein products    Vitamin C: Citrus fruits and juices, strawberries, tomatoes, tomato juice, peppers, baked potatoes, spinach, broccoli, cauliflower, [place] sprouts, cabbage    Vitamin A: Dark green, leafy vegetables, orange or yellow vegetables, cantaloupe, fortified dairy products, liver, fortified cereals    Zinc: Fortified cereals, red meats, seafood    Consider Kwan by [employer] (These are essential branch chain amino acids that help with tissue building and wound healing) and take 2 packets/day. you can order online at [employer] or [employer]    ADDITIONAL REMINDERS:    The treatment plan has been discussed at length with you and your provider. Follow all instructions carefully, it is very important. If you do not follow all instructions, you are at  risk of your wound not healing, infection, possible loss of limb and even end of life.  Please call the clinic during regular business hours ( 7:30 AM - 5:30 PM) if you notice increased bleeding, redness, warmth, pain or pus like drainage or start running a fever greater than 100.3.    For after hour emergencies, please call your primary physician or go to the nearest emergency room.      Patient/Caregiver Education: There are no barriers to learning. Medical education for above diagnosis given.   Answered all questions.    Outcome: Patient verbalizes understanding. Patient is notified to call with any questions, complications, allergies, or worsening or changing symptoms.  Patient is to call with any side effects or complications as a result of the treatments today.      DOCUMENTATION OF TIME SPENT: Code selection for this visit was based on time spent : 35 min on date of service in preparing to see the patient, obtaining and/or reviewing separately obtained history, performing a medically  appropriate examination, counseling and educating the patient/family/caregiver, ordering medications or testing, referring and communicating with other healthcare providers, documenting clinical information in the E HR, independently interpreting results and communicating results to the patient/family/caregiver and care coordination with the patient's other providers.    Followup: Return in about 2 weeks (around 1/13/2025) for Wound followup.      Note to Patient:  The 21st Century Cures Act makes medical notes like these available to patients in the interest of transparency. However, be advised this is a medical document and is intended as [id number]-pu-[id number] communication; it is written in medical language and may appear blunt, direct, or contain abbreviations or verbiage that are unfamiliar. Medical documents are intended to carry relevant information, facts as evident, and the clinical opinion of the practitioner.    Also, please note that this report has been produced using speech recognition software and may contain errors related to that system including, but not limited to, errors in grammar, punctuation, and spelling, as well as words and phrases that possibly may have been recognized inappropriately.  If there are any questions or concerns, contact the dictating provider for clarification.      Fortino Butler MD  12/30/2024  6:26 PM                      [1]   Allergies  Allergen Reactions    Heparin ANAPHYLAXIS    Hydromorphone HALLUCINATION

## 2025-01-06 ENCOUNTER — APPOINTMENT (OUTPATIENT)
Dept: WOUND CARE | Facility: HOSPITAL | Age: OVER 89
End: 2025-01-06
Attending: INTERNAL MEDICINE
Payer: MEDICARE

## 2025-01-08 ENCOUNTER — APPOINTMENT (OUTPATIENT)
Dept: WOUND CARE | Facility: HOSPITAL | Age: OVER 89
End: 2025-01-08
Attending: INTERNAL MEDICINE
Payer: MEDICARE

## 2025-01-13 ENCOUNTER — APPOINTMENT (OUTPATIENT)
Dept: WOUND CARE | Facility: HOSPITAL | Age: OVER 89
End: 2025-01-13
Attending: INTERNAL MEDICINE
Payer: MEDICARE

## 2025-01-15 ENCOUNTER — OFFICE VISIT (OUTPATIENT)
Dept: WOUND CARE | Facility: HOSPITAL | Age: OVER 89
End: 2025-01-15
Attending: INTERNAL MEDICINE
Payer: MEDICARE

## 2025-01-15 VITALS
TEMPERATURE: 97 F | HEART RATE: 90 BPM | RESPIRATION RATE: 16 BRPM | DIASTOLIC BLOOD PRESSURE: 57 MMHG | SYSTOLIC BLOOD PRESSURE: 90 MMHG

## 2025-01-15 DIAGNOSIS — R60.0 EDEMA OF RIGHT LOWER EXTREMITY: ICD-10-CM

## 2025-01-15 DIAGNOSIS — I73.9 PERIPHERAL ARTERIAL DISEASE (HCC): ICD-10-CM

## 2025-01-15 DIAGNOSIS — S61.501D OPEN WOUND OF RIGHT WRIST, SUBSEQUENT ENCOUNTER: ICD-10-CM

## 2025-01-15 DIAGNOSIS — L97.512 FOOT ULCER, RIGHT, WITH FAT LAYER EXPOSED (HCC): ICD-10-CM

## 2025-01-15 DIAGNOSIS — L97.212 NON-PRESSURE CHRONIC ULCER OF RIGHT CALF WITH FAT LAYER EXPOSED (HCC): ICD-10-CM

## 2025-01-15 DIAGNOSIS — R26.9 GAIT DIFFICULTY: ICD-10-CM

## 2025-01-15 DIAGNOSIS — S81.001D OPEN WOUND OF RIGHT KNEE, SUBSEQUENT ENCOUNTER: ICD-10-CM

## 2025-01-15 DIAGNOSIS — S61.401D OPEN WOUND OF RIGHT HAND, SUBSEQUENT ENCOUNTER: Primary | ICD-10-CM

## 2025-01-15 PROCEDURE — 99214 OFFICE O/P EST MOD 30 MIN: CPT

## 2025-01-15 NOTE — PATIENT INSTRUCTIONS
See me in 3 weeks.     Wound Cleaning and Dressings:    Wash your hands with soap and water. Always wear gloves while changing dressings. Donot touch wound / mio-wound skin with un-gloved hands. Remove old dressing, discard and place into trash.      DRESSINGS:   HF transfer  on lateral knee wound   Moisturize previously wounded areas.   Offload wounded area.   Keep wounds dry.     Off-Loading: modified foot wear for maximal offloading.     Miscellaneous Instructions:  Supplement with a daily multivitamin   Low salt diet  Intense blood sugar control - Goal Blood sugar below 180 at all times recommended.  Increase protein intake / consider protein supplements - see below  Elevate extremities at all times when sitting / laying down.    DIETARY MODIFICATIONS TO HELP WITH WOUND HEALING:    Protein: Meats, beans, eggs, milk and yogurt particularly Greek yogurt), tofu, soy nuts, soy protein products    Vitamin C: Citrus fruits and juices, strawberries, tomatoes, tomato juice, peppers, baked potatoes, spinach, broccoli, cauliflower, [place] sprouts, cabbage    Vitamin A: Dark green, leafy vegetables, orange or yellow vegetables, cantaloupe, fortified dairy products, liver, fortified cereals    Zinc: Fortified cereals, red meats, seafood    Consider Kwan by [employer] (These are essential branch chain amino acids that help with tissue building and wound healing) and take 2 packets/day. you can order online at [employer] or [employer]    ADDITIONAL REMINDERS:    The treatment plan has been discussed at length with you and your provider. Follow all instructions carefully, it is very important. If you do not follow all instructions, you are at  risk of your wound not healing, infection, possible loss of limb and even end of life.  Please call the clinic during regular business hours ( 7:30 AM - 5:30 PM) if you notice increased bleeding, redness, warmth, pain or pus like drainage or start running a fever greater than 100.3.     For after hour emergencies, please call your primary physician or go to the nearest emergency room.

## 2025-01-15 NOTE — PROGRESS NOTES
Weekly Wound Education Note    Teaching Provided To: Patient  Training Topics: Cleasing and general instructions;Discharge instructions;Dressing  Training Method: Explain/Verbal  Training Response: Patient responds and understands        Notes: Improving. Per provider right foot, leg, hand, and wrist wounds are healed. Right knee: hydrofera ready, conforming gauze, and tape.

## 2025-01-15 NOTE — PROGRESS NOTES
[facility] PROGRESS NOTE  FARZAD WILLINGHAM MD  1/15/2025    Chief Complaint:   Chief Complaint   Patient presents with    Wound Care     Patient is here for a wound care follow up. He denies any new wound concerns.       HPI:   Subjective   Alejandro Guardado is a 89 year old male coming in for a follow-up visit.    HPI    Right knee wound improved - less slough - more granulation  No s/o infection.     Right leg ulcer scabbed. Does not seem to be open underneath.     Right foot wound and heel wound stays closed.    Hand / wrist wound closed.     Edema well controlled.     Review of Systems  Negative except HPI   Denies chest pain / SOB / palpitations  Denies fever.     Allergies  Allergies[1]    Current Meds:  Current Outpatient Medications   Medication Sig Dispense Refill    amoxicillin clavulanate 875-125 MG Oral Tab Take 1 tablet by mouth 2 (two) times daily. 7 tablet 0    gabapentin 100 MG Oral Cap Take 2 capsules (200 mg total) by mouth in the morning and 2 capsules (200 mg total) before bedtime. 60 capsule 0    collagenase 250 UNIT/GM External Ointment Apply 1 Application topically daily. 30 g 0    metoprolol succinate ER 25 MG Oral Tablet 24 Hr Take 2 tablets (50 mg total) by mouth Daily Beta Blocker. 60 tablet 0    finasteride 5 MG Oral Tab Take 1 tablet (5 mg total) by mouth daily. 90 tablet 0    furosemide 40 MG Oral Tab Take 2 tablets (80 mg total) by mouth 2 (two) times daily.      lisinopril 2.5 MG Oral Tab Take 1 tablet (2.5 mg total) by mouth daily.      Ascorbic Acid (VITAMIN C) 1000 MG Oral Tab Take 1.5 tablets (1,500 mg total) by mouth daily.      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      tamsulosin 0.4 MG Oral Cap Take 1 capsule (0.4 mg total) by mouth daily.      apixaban 5 MG Oral Tab Take 1 tablet (5 mg total) by mouth 2  (two) times daily. 60 tablet 3    atorvastatin 40 MG Oral Tab Take 1 tablet (40 mg total) by mouth daily. 30 tablet 0    clopidogrel 75 MG Oral Tab Take 1 tablet (75 mg total) by mouth daily. 30 tablet 0    predniSONE 5 MG Oral Tab Take 3 tablets (15 mg total) by mouth daily.      folic acid 1 MG Oral Tab Take 1 tablet (1 mg total) by mouth daily.      acetaminophen 500 MG Oral Tab Take 2 tablets (1,000 mg total) by mouth every 8 (eight) hours. 21 tablet 0    Omeprazole 40 MG Oral Capsule Delayed Release Take 1 capsule (40 mg total) by mouth daily.           EXAM:   Objective   Objective    Physical Exam    Vital Signs  Vitals:    01/15/25 1312   BP: 90/57   Pulse: 90   Resp: 16   Temp: 97.2 °F (36.2 °C)       Wound Assessment  Wound 04/15/24 #2 Leg Right;Anterior (Active)   Date First Assessed/Time First Assessed: 04/15/24 1611    Wound Number (Wound Clinic Only): #2  Primary Wound Type: Venous Ulcer  Location: Leg  Wound Location Orientation: Right;Anterior      Assessments 4/15/2024  4:12 PM 1/15/2025  1:10 PM   Wound Image       Drainage Amount -- None   Wound Length (cm) -- 0 cm   Wound Width (cm) -- 0 cm   Wound Surface Area (cm^2) -- 0 cm^2   Wound Depth (cm) -- 0 cm   Wound Volume (cm^3) -- 0 cm^3   Wound Healing % -- 100   Margins -- Well-defined edges   Non-staged Wound Description -- Full thickness   Peggy-wound Assessment -- Clean   Wound Bed Epithelium (%) -- 100 %   Wound Odor -- None   Tunneling? -- No   Undermining? -- No   Sinus Tracts? -- No       Inactive Orders   Date Order Priority Status Authorizing Provider   09/27/24 1657 Wound care Routine Discontinued Belen Flores MD   09/27/24 0212 Consult to Wound Ostomy Routine Completed Vivian Way APRN     - Reason for Consult:    Wound Care     - Wound Care Reason for Consult:    wound care   07/17/24 1614 Debridement Venous Ulcer Right;Anterior Leg Routine Completed Fortino Mo MD       Wound 11/11/24 #5 Right Dorsal Foot Foot  Dorsal;Right (Active)   Date First Assessed/Time First Assessed: 11/11/24 1307    Wound Number (Wound Clinic Only): #5 Right Dorsal Foot  Primary Wound Type: Friction/Shear  Location: Foot  Wound Location Orientation: Dorsal;Right      Assessments 11/11/2024  1:11 PM 1/15/2025  1:10 PM   Wound Image       Drainage Amount Moderate None   Drainage Description Serosanguineous --   Wound Length (cm) 2.8 cm 0 cm   Wound Width (cm) 1.6 cm 0 cm   Wound Surface Area (cm^2) 4.48 cm^2 0 cm^2   Wound Depth (cm) 0.1 cm 0 cm   Wound Volume (cm^3) 0.448 cm^3 0 cm^3   Wound Healing % -- 100   Margins Well-defined edges Well-defined edges   Non-staged Wound Description Full thickness Full thickness   Peggy-wound Assessment Blanchable erythema;Moist Clean   Wound Granulation Tissue Pink;Firm --   Wound Bed Granulation (%) 100 % --   Wound Bed Epithelium (%) -- 100 %   Wound Odor None None   Tunneling? -- No   Undermining? -- No   Sinus Tracts? -- No       No associated orders.       Wound 12/09/24 #6 Right lateral knee Knee Right;Lateral (Active)   Date First Assessed: 12/09/24    Wound Number (Wound Clinic Only): #6 Right lateral knee  Primary Wound Type: Traumatic  Location: Knee  Wound Location Orientation: Right;Lateral      Assessments 12/9/2024  3:57 PM 1/15/2025  1:09 PM   Wound Image       Drainage Amount None Scant   Drainage Description -- Serous;Yellow   Wound Length (cm) 3.2 cm 0.9 cm   Wound Width (cm) 1.5 cm 0.5 cm   Wound Surface Area (cm^2) 4.8 cm^2 0.45 cm^2   Wound Depth (cm) 0 cm 0.1 cm   Wound Volume (cm^3) 0 cm^3 0.045 cm^3   Margins Well-defined edges Well-defined edges   Non-staged Wound Description Partial thickness Full thickness   Peggy-wound Assessment Ecchymosis;Pink Blanchable erythema;Dry   Wound Granulation Tissue Pink;Firm Firm;Pink   Wound Bed Granulation (%) 10 % 10 %   Wound Bed Epithelium (%) 90 % --   Wound Bed Slough (%) -- 90 %   Wound Odor None None   Tunneling? No No   Undermining? No No    Sinus Tracts? No No       No associated orders.       Wound 12/09/24 #7 Right hand Hand Right (Active)   Date First Assessed: 12/09/24    Wound Number (Wound Clinic Only): #7 Right hand  Primary Wound Type: Traumatic  Location: Hand  Wound Location Orientation: Right      Assessments 12/9/2024  3:58 PM 1/15/2025  1:11 PM   Wound Image       Drainage Amount Small None   Drainage Description Sanguineous --   Wound Length (cm) 1.5 cm 0 cm   Wound Width (cm) 4.6 cm 0 cm   Wound Surface Area (cm^2) 6.9 cm^2 0 cm^2   Wound Depth (cm) 0.1 cm 0 cm   Wound Volume (cm^3) 0.69 cm^3 0 cm^3   Wound Healing % -- 100   Margins Well-defined edges Well-defined edges   Non-staged Wound Description Full thickness Full thickness   Peggy-wound Assessment Ecchymosis Clean   Wound Granulation Tissue Pink;Firm --   Wound Bed Granulation (%) 100 % --   Wound Bed Epithelium (%) -- 100 %   Wound Odor None None   Shape Skin flap noted --   Tunneling? No No   Undermining? No No   Sinus Tracts? No No       No associated orders.       Wound 12/09/24 #8 Wrist Right;Lateral (Active)   Date First Assessed: 12/09/24    Wound Number (Wound Clinic Only): #8  Primary Wound Type: Traumatic  Location: Wrist  Wound Location Orientation: Right;Lateral      Assessments 12/9/2024  3:58 PM 1/15/2025  1:11 PM   Wound Image       Drainage Amount Small None   Drainage Description Sanguineous --   Wound Length (cm) 4.1 cm 0 cm   Wound Width (cm) 2.1 cm 0 cm   Wound Surface Area (cm^2) 8.61 cm^2 0 cm^2   Wound Depth (cm) 0.1 cm 0 cm   Wound Volume (cm^3) 0.861 cm^3 0 cm^3   Wound Healing % -- 100   Margins Well-defined edges Well-defined edges   Non-staged Wound Description Full thickness Full thickness   Peggy-wound Assessment Ecchymosis;Clean Clean   Wound Granulation Tissue Red;Pink;Firm --   Wound Bed Granulation (%) 90 % --   Wound Bed Epithelium (%) 10 % 100 %   Wound Odor None None   Tunneling? No No   Undermining? No No   Sinus Tracts? No No       No  associated orders.        ASSESSMENT AND PLAN:     Assessment     Encounter Diagnosis  1. Open wound of right hand, subsequent encounter    2. Open wound of right knee, subsequent encounter    3. 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 Foot ulcer, right, with fat layer exposed (HCC)    5. Edema of right lower extremity    6. Open wound of right wrist, subsequent encounter    7. Gait difficulty    8. Peripheral arterial disease (HCC)      PLAN OF CARE:    HF transfer for right knee wound   Moisturize both legs regularly  Edema management - re strict salt - avoid prolonged standing.   Leg elevation.   Watch out for signs of early infection - counseled.   Plan of care discussed with patient in detail - All questions answered   Return in 3 weeks.    Patient Instructions     See me in 3 weeks.     Wound Cleaning and Dressings:    Wash your hands with soap and water. Always wear gloves while changing dressings. Donot touch wound / mio-wound skin with un-gloved hands. Remove old dressing, discard and place into trash.      DRESSINGS:   HF transfer  on lateral knee wound   Moisturize previously wounded areas.   Offload wounded area.   Keep wounds dry.     Off-Loading: modified foot wear for maximal offloading.     Miscellaneous Instructions:  Supplement with a daily multivitamin   Low salt diet  Intense blood sugar control - Goal Blood sugar below 180 at all times recommended.  Increase protein intake / consider protein supplements - see below  Elevate extremities at all times when sitting / laying down.    DIETARY MODIFICATIONS TO HELP WITH WOUND HEALING:    Protein: Meats, beans, eggs, milk and yogurt particularly Greek yogurt), tofu, soy nuts, soy protein products    Vitamin C: Citrus fruits and juices, strawberries, tomatoes, tomato juice, peppers, baked potatoes, spinach, broccoli, cauliflower, [place] sprouts, cabbage    Vitamin A: Dark green, leafy vegetables, orange or yellow  vegetables, cantaloupe, fortified dairy products, liver, fortified cereals    Zinc: Fortified cereals, red meats, seafood    Consider Kwan by [employer] (These are essential branch chain amino acids that help with tissue building and wound healing) and take 2 packets/day. you can order online at [employer] or "[employer]    ADDITIONAL REMINDERS:    The treatment plan has been discussed at length with you and your provider. Follow all instructions carefully, it is very important. If you do not follow all instructions, you are at  risk of your wound not healing, infection, possible loss of limb and even end of life.  Please call the clinic during regular business hours ( 7:30 AM - 5:30 PM) if you notice increased bleeding, redness, warmth, pain or pus like drainage or start running a fever greater than 100.3.    For after hour emergencies, please call your primary physician or go to the nearest emergency room.      Patient/Caregiver Education: There are no barriers to learning. Medical education for above diagnosis given.   Answered all questions.    Outcome: Patient verbalizes understanding. Patient is notified to call with any questions, complications, allergies, or worsening or changing symptoms.  Patient is to call with any side effects or complications as a result of the treatments today.      DOCUMENTATION OF TIME SPENT: Code selection for this visit was based on time spent : 30 min on date of service in preparing to see the patient, obtaining and/or reviewing separately obtained history, performing a medically appropriate examination, counseling and educating the patient/family/caregiver, ordering medications or testing, referring and communicating with other healthcare providers, documenting clinical information in the E HR, independently interpreting results and communicating results to the patient/family/caregiver and care coordination with the patient's other providers.    Followup: Return in about 3 weeks (around  2/5/2025) for Wound followup.      Note to Patient:  The 21st Century Cures Act makes medical notes like these available to patients in the interest of transparency. However, be advised this is a medical document and is intended as [id number]-zk-niwn communication; it is written in medical language and may appear blunt, direct, or contain abbreviations or verbiage that are unfamiliar. Medical documents are intended to carry relevant information, facts as evident, and the clinical opinion of the practitioner.    Also, please note that this report has been produced using speech recognition software and may contain errors related to that system including, but not limited to, errors in grammar, punctuation, and spelling, as well as words and phrases that possibly may have been recognized inappropriately.  If there are any questions or concerns, contact the dictating provider for clarification.      Fortino Butler MD  1/15/2025  1:26 PM                      [1]   Allergies  Allergen Reactions    Heparin ANAPHYLAXIS    Hydromorphone HALLUCINATION

## 2025-01-20 ENCOUNTER — APPOINTMENT (OUTPATIENT)
Dept: WOUND CARE | Facility: HOSPITAL | Age: OVER 89
End: 2025-01-20
Attending: INTERNAL MEDICINE
Payer: MEDICARE

## 2025-01-22 ENCOUNTER — APPOINTMENT (OUTPATIENT)
Dept: WOUND CARE | Facility: HOSPITAL | Age: OVER 89
End: 2025-01-22
Attending: INTERNAL MEDICINE
Payer: MEDICARE

## 2025-01-27 ENCOUNTER — TELEPHONE (OUTPATIENT)
Dept: WOUND CARE | Facility: HOSPITAL | Age: OVER 89
End: 2025-01-27

## 2025-01-27 ENCOUNTER — APPOINTMENT (OUTPATIENT)
Dept: WOUND CARE | Facility: HOSPITAL | Age: OVER 89
End: 2025-01-27
Attending: INTERNAL MEDICINE
Payer: MEDICARE

## 2025-01-29 ENCOUNTER — APPOINTMENT (OUTPATIENT)
Dept: WOUND CARE | Facility: HOSPITAL | Age: OVER 89
End: 2025-01-29
Attending: INTERNAL MEDICINE
Payer: MEDICARE

## 2025-02-03 ENCOUNTER — OFFICE VISIT (OUTPATIENT)
Dept: WOUND CARE | Facility: HOSPITAL | Age: OVER 89
End: 2025-02-03
Attending: INTERNAL MEDICINE
Payer: MEDICARE

## 2025-02-03 VITALS
RESPIRATION RATE: 14 BRPM | SYSTOLIC BLOOD PRESSURE: 112 MMHG | TEMPERATURE: 98 F | HEART RATE: 84 BPM | DIASTOLIC BLOOD PRESSURE: 75 MMHG

## 2025-02-03 DIAGNOSIS — I73.9 PERIPHERAL ARTERIAL DISEASE: ICD-10-CM

## 2025-02-03 DIAGNOSIS — S81.001D OPEN WOUND OF RIGHT KNEE, SUBSEQUENT ENCOUNTER: Primary | ICD-10-CM

## 2025-02-03 DIAGNOSIS — R26.9 GAIT DIFFICULTY: ICD-10-CM

## 2025-02-03 DIAGNOSIS — R60.0 EDEMA OF RIGHT LOWER EXTREMITY: ICD-10-CM

## 2025-02-03 DIAGNOSIS — L97.512 FOOT ULCER, RIGHT, WITH FAT LAYER EXPOSED (HCC): ICD-10-CM

## 2025-02-03 DIAGNOSIS — L97.212 NON-PRESSURE CHRONIC ULCER OF RIGHT CALF WITH FAT LAYER EXPOSED (HCC): ICD-10-CM

## 2025-02-03 PROCEDURE — 99213 OFFICE O/P EST LOW 20 MIN: CPT

## 2025-02-03 NOTE — PROGRESS NOTES
[facility] PROGRESS NOTE  FARZAD WILLINGHAM MD  2/3/2025    Chief Complaint:   Chief Complaint   Patient presents with    Wound Care     Follow-up for wound to right knee. Denies pain or concerns at this time.        HPI:   Subjective   Alejandro Guardado is a 89 year old male coming in for a follow-up visit.    HPI    All Wounds closed.   There is a small raw area on lateral right knee ( which opened up as a scab was being cleaned).     Edema down.     Family present - no concerns.   They report that they are ready to be discharged as all wounds had closed and they can monitor skin integrity closely.     Review of Systems  Negative except HPI   Denies chest pain / SOB / palpitations  Denies fever.     Allergies  Allergies[1]    Current Meds:  Current Outpatient Medications   Medication Sig Dispense Refill    amoxicillin clavulanate 875-125 MG Oral Tab Take 1 tablet by mouth 2 (two) times daily. 7 tablet 0    gabapentin 100 MG Oral Cap Take 2 capsules (200 mg total) by mouth in the morning and 2 capsules (200 mg total) before bedtime. 60 capsule 0    collagenase 250 UNIT/GM External Ointment Apply 1 Application topically daily. 30 g 0    metoprolol succinate ER 25 MG Oral Tablet 24 Hr Take 2 tablets (50 mg total) by mouth Daily Beta Blocker. 60 tablet 0    finasteride 5 MG Oral Tab Take 1 tablet (5 mg total) by mouth daily. 90 tablet 0    furosemide 40 MG Oral Tab Take 2 tablets (80 mg total) by mouth 2 (two) times daily.      lisinopril 2.5 MG Oral Tab Take 1 tablet (2.5 mg total) by mouth daily.      Ascorbic Acid (VITAMIN C) 1000 MG Oral Tab Take 1.5 tablets (1,500 mg total) by mouth daily.      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      tamsulosin 0.4 MG Oral Cap Take 1 capsule (0.4 mg total) by mouth daily.      apixaban 5 MG Oral Tab Take 1  tablet (5 mg total) by mouth 2 (two) times daily. 60 tablet 3    atorvastatin 40 MG Oral Tab Take 1 tablet (40 mg total) by mouth daily. 30 tablet 0    clopidogrel 75 MG Oral Tab Take 1 tablet (75 mg total) by mouth daily. 30 tablet 0    predniSONE 5 MG Oral Tab Take 3 tablets (15 mg total) by mouth daily.      folic acid 1 MG Oral Tab Take 1 tablet (1 mg total) by mouth daily.      acetaminophen 500 MG Oral Tab Take 2 tablets (1,000 mg total) by mouth every 8 (eight) hours. 21 tablet 0    Omeprazole 40 MG Oral Capsule Delayed Release Take 1 capsule (40 mg total) by mouth daily.           EXAM:   Objective   Objective    Physical Exam    Vital Signs  Vitals:    02/03/25 1100   BP: 112/75   Pulse: 84   Resp: 14   Temp: 97.5 °F (36.4 °C)       Wound Assessment  Wound 12/09/24 #6 Right lateral knee Knee Right;Lateral (Active)   Date First Assessed: 12/09/24    Wound Number (Wound Clinic Only): #6 Right lateral knee  Primary Wound Type: Traumatic  Location: Knee  Wound Location Orientation: Right;Lateral      Assessments 12/9/2024  3:57 PM 2/3/2025  1:10 PM   Wound Image       Drainage Amount None None   Wound Length (cm) 3.2 cm 0.1 cm   Wound Width (cm) 1.5 cm 0.1 cm   Wound Surface Area (cm^2) 4.8 cm^2 0.01 cm^2   Wound Depth (cm) 0 cm 0 cm   Wound Volume (cm^3) 0 cm^3 0 cm^3   Margins Well-defined edges Well-defined edges   Non-staged Wound Description Partial thickness Full thickness   Peggy-wound Assessment Ecchymosis;Pink Dry   Wound Granulation Tissue Pink;Firm Firm;Pink   Wound Bed Granulation (%) 10 % 100 %   Wound Bed Epithelium (%) 90 % --   Wound Odor None None   Tunneling? No No   Undermining? No No   Sinus Tracts? No No       No associated orders.                ASSESSMENT AND PLAN:     Assessment     Encounter Diagnosis  1. Open wound of right knee, subsequent encounter    2. 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 Foot ulcer, right, with fat layer exposed (HCC)    4.  Edema of right lower extremity    5. Gait difficulty    6. Peripheral arterial disease      PLAN OF CARE:    Keep raw areas covered with any dressing of your choice - xeroform applied today.   Edema management stressed  Watch out for signs of early infection - counseled.   Plan of care discussed with patient in detail - All questions answered   D/w family.   Return in one week.         Patient Instructions     Moisturize both legs.   Keep any open area covered with band-aid.   Edema reduction--> compression socks  Low salt diet  Leg elevation.   Return as needed      Patient/Caregiver Education: There are no barriers to learning. Medical education for above diagnosis given.   Answered all questions.    Outcome: Patient verbalizes understanding. Patient is notified to call with any questions, complications, allergies, or worsening or changing symptoms.  Patient is to call with any side effects or complications as a result of the treatments today.      DOCUMENTATION OF TIME SPENT: Code selection for this visit was based on time spent : 25 min on date of service in preparing to see the patient, obtaining and/or reviewing separately obtained history, performing a medically appropriate examination, counseling and educating the patient/family/caregiver, ordering medications or testing, referring and communicating with other healthcare providers, documenting clinical information in the E HR, independently interpreting results and communicating results to the patient/family/caregiver and care coordination with the patient's other providers.    Followup: Return if symptoms worsen or fail to improve, for if wound reopens - Discharge from Wound clinic..      Note to Patient:  The 21st Century Cures Act makes medical notes like these available to patients in the interest of transparency. However, be advised this is a medical document and is intended as bfaf-kz-aeja communication; it is written in medical language and may appear  blunt, direct, or contain abbreviations or verbiage that are unfamiliar. Medical documents are intended to carry relevant information, facts as evident, and the clinical opinion of the practitioner.    Also, please note that this report has been produced using speech recognition software and may contain errors related to that system including, but not limited to, errors in grammar, punctuation, and spelling, as well as words and phrases that possibly may have been recognized inappropriately.  If there are any questions or concerns, contact the dictating provider for clarification.      Fortino Butler MD  2/3/2025  4:44 PM                      [1]   Allergies  Allergen Reactions    Heparin ANAPHYLAXIS    Hydromorphone HALLUCINATION

## 2025-02-03 NOTE — PROGRESS NOTES
Weekly Wound Education Note    Teaching Provided To: Patient  Training Topics: Dressing;Discharge instructions;Cleasing and general instructions;Edema control  Training Method: Explain/Verbal;Written  Training Response: Patient responds and understands;Reinforcement needed            Wound is fragilely healed.  Apply folded xeroform and cover with dry dressing daily.  Discharged from clinic.  Call if needed or any concerns.

## 2025-02-03 NOTE — PATIENT INSTRUCTIONS
Moisturize both legs.   Keep any open area covered with band-aid.   Edema reduction--> compression socks  Low salt diet  Leg elevation.   Return as needed

## 2025-02-05 ENCOUNTER — APPOINTMENT (OUTPATIENT)
Dept: WOUND CARE | Facility: HOSPITAL | Age: OVER 89
End: 2025-02-05
Attending: INTERNAL MEDICINE
Payer: MEDICARE

## 2025-03-17 ENCOUNTER — OFFICE VISIT (OUTPATIENT)
Dept: PODIATRY CLINIC | Facility: CLINIC | Age: OVER 89
End: 2025-03-17

## 2025-03-17 DIAGNOSIS — I73.9 PAD (PERIPHERAL ARTERY DISEASE): ICD-10-CM

## 2025-03-17 DIAGNOSIS — Z89.439 HISTORY OF TRANSMETATARSAL AMPUTATION OF FOOT (HCC): ICD-10-CM

## 2025-03-17 DIAGNOSIS — M20.42 HAMMER TOE OF LEFT FOOT: ICD-10-CM

## 2025-03-17 DIAGNOSIS — B35.1 ONYCHOMYCOSIS: Primary | ICD-10-CM

## 2025-03-17 RX ORDER — SERTRALINE HYDROCHLORIDE 25 MG/1
25 TABLET, FILM COATED ORAL DAILY
COMMUNITY
Start: 2024-12-16

## 2025-03-17 NOTE — PROGRESS NOTES
[facility]  Progress Note    Alejandro Guardado is a 89 year old male.   Chief Complaint   Patient presents with    Toenail Care     Here for nail care left foot.          HPI:     Patient is a pleasant 89-year-old male with past medical history of PAD who presents to clinic for foot exam.  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  Today he presents for nail care to his left foot.  He has elongated and thickened nails he has difficulty trimming on his own.  No other complaints are mentioned.  Past medical history, medications, and allergies reviewed.      Allergies: Heparin and Hydromorphone   Current Outpatient Medications   Medication Sig Dispense Refill    sertraline 25 MG Oral Tab Take 1 tablet (25 mg total) by mouth daily.      amoxicillin clavulanate 875-125 MG Oral Tab Take 1 tablet by mouth 2 (two) times daily. 7 tablet 0    gabapentin 100 MG Oral Cap Take 2 capsules (200 mg total) by mouth in the morning and 2 capsules (200 mg total) before bedtime. 60 capsule 0    collagenase 250 UNIT/GM External Ointment Apply 1 Application topically daily. 30 g 0    metoprolol succinate ER 25 MG Oral Tablet 24 Hr Take 2 tablets (50 mg total) by mouth Daily Beta Blocker. 60 tablet 0    finasteride 5 MG Oral Tab Take 1 tablet (5 mg total) by mouth daily. 90 tablet 0    furosemide 40 MG Oral Tab Take 2 tablets (80 mg total) by mouth 2 (two) times daily.      lisinopril 2.5 MG Oral Tab Take 1 tablet (2.5 mg total) by mouth daily.      Ascorbic Acid (VITAMIN C) 1000 MG Oral Tab Take 1.5 tablets (1,500 mg total) by mouth daily.      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      tamsulosin 0.4 MG Oral Cap Take 1 capsule (0.4 mg total) by mouth daily.      apixaban 5 MG  Oral Tab Take 1 tablet (5 mg total) by mouth 2 (two) times daily. 60 tablet 3    atorvastatin 40 MG Oral Tab Take 1 tablet (40 mg total) by mouth daily. 30 tablet 0    clopidogrel 75 MG Oral Tab Take 1 tablet (75 mg total) by mouth daily. 30 tablet 0    predniSONE 5 MG Oral Tab Take 3 tablets (15 mg total) by mouth daily.      folic acid 1 MG Oral Tab Take 1 tablet (1 mg total) by mouth daily.      acetaminophen 500 MG Oral Tab Take 2 tablets (1,000 mg total) by mouth every 8 (eight) hours. 21 tablet 0    Omeprazole 40 MG Oral Capsule Delayed Release Take 1 capsule (40 mg total) by mouth daily.        Past Medical History:    Amputation of right foot (HCC)    non-healing. Followed by [facility]    Anemia    Atrial fibrillation (HCC)    CAD (coronary artery disease)    CHF (congestive heart failure) (HCC)    Easy bruising    Encephalopathy acute    Esophageal reflux    Fatigue    Hearing impairment    Hearing loss    Heart attack (HCC)    High blood pressure    High cholesterol    History of MI (myocardial infarction)    HTN (hypertension)    Hyperlipidemia    Leg swelling    Loss of appetite    PAD (peripheral artery disease)    Peripheral vascular disease    Uncomfortable fullness after meals    Visual impairment    glasses    Wears glasses      Past Surgical History:   Procedure Laterality Date    Angiogram      Angioplasty (coronary)      Cabg      Fracture surgery Left     left hip pinning    Other surgical history Left 01/01/1977    knee surgery    Other surgical history  03/25/2016    Left hip ORIF pinning    Tonsillectomy        Family History   Problem Relation Age of Onset    Cancer Father       Social History     Socioeconomic History    Marital status:    Tobacco Use    Smoking status: Never    Smokeless tobacco: Never   Vaping Use    Vaping status: Never Used   Substance and Sexual Activity    Alcohol use: Never    Drug use: Never           REVIEW OF SYSTEMS:     No n/v/f/c.      EXAM:   There  were no vitals taken for this visit.  GENERAL: well developed, well nourished, in no apparent distress  EXTREMITIES:   1. Integument: Normal skin temperature and turgor.  Transmetatarsal amputation site of right foot is well-healed.  Nails x 5 are elongated and thickened the left foot.    2. Vascular: Unable to palpate pedal pulses.   3. Musculoskeletal: All muscle groups are graded 5 out of 5 in the foot and ankle.  History of transmetatarsal amputation to right foot.     4. Neurological: Normal sharp dull sensation; reflexes normal.  Decreased protective sensation noted to lower extremities.        ASSESSMENT AND PLAN:   Diagnoses and all orders for this visit:    Onychomycosis    History of transmetatarsal amputation of foot (HCC)    PAD (peripheral artery disease)    Hammer toe of left foot        Plan:    -Patient examined, chart history reviewed.  -Discussed importance of proper pedal hygiene, regular foot checks.  -Sharply debrided nails x5 with a sterile nail nipper achieving a 20% reduction in thickness and length, without incident. Nails further smoothed with dremel.  -Ambulate with supportive shoes and inserts and avoid walking barefoot.  Offered order for custom accommodative inserts with toe filler but patient deferred at this time.  -Educated patient on acute signs of infection advised patient to seek immediate medical attention if symptoms arise.  RTC 2 to 3 months.      The patient indicates understanding of these issues and agrees to the plan.        JAN Hay speech recognition software was used to prepare this note.  Errors in word recognition may occur.  Please contact me with any questions/concerns with this note.

## 2025-06-27 ENCOUNTER — APPOINTMENT (OUTPATIENT)
Dept: CARDIOLOGY | Age: OVER 89
End: 2025-06-27

## 2025-06-27 RX ORDER — APIXABAN 5 MG/1
5 TABLET, FILM COATED ORAL EVERY 12 HOURS
Qty: 180 TABLET | Refills: 3 | Status: SHIPPED | OUTPATIENT
Start: 2025-06-27

## 2025-07-03 ENCOUNTER — APPOINTMENT (OUTPATIENT)
Dept: GENERAL RADIOLOGY | Facility: HOSPITAL | Age: OVER 89
End: 2025-07-03
Attending: EMERGENCY MEDICINE
Payer: MEDICARE

## 2025-07-03 ENCOUNTER — HOSPITAL ENCOUNTER (INPATIENT)
Facility: HOSPITAL | Age: OVER 89
LOS: 5 days | Discharge: NURSING, REHAB OR LONG-TERM CARE | End: 2025-07-08
Attending: EMERGENCY MEDICINE | Admitting: INTERNAL MEDICINE
Payer: MEDICARE

## 2025-07-03 ENCOUNTER — APPOINTMENT (OUTPATIENT)
Dept: CT IMAGING | Facility: HOSPITAL | Age: OVER 89
End: 2025-07-03
Attending: EMERGENCY MEDICINE
Payer: MEDICARE

## 2025-07-03 DIAGNOSIS — N17.9 AKI (ACUTE KIDNEY INJURY): ICD-10-CM

## 2025-07-03 DIAGNOSIS — I50.9 ACUTE ON CHRONIC CONGESTIVE HEART FAILURE, UNSPECIFIED HEART FAILURE TYPE (HCC): ICD-10-CM

## 2025-07-03 DIAGNOSIS — J18.9 COMMUNITY ACQUIRED PNEUMONIA, UNSPECIFIED LATERALITY: Primary | ICD-10-CM

## 2025-07-03 LAB
ALBUMIN SERPL-MCNC: 4.3 G/DL (ref 3.2–4.8)
ALBUMIN/GLOB SERPL: 1.5 {RATIO} (ref 1–2)
ALP LIVER SERPL-CCNC: 63 U/L (ref 45–117)
ALT SERPL-CCNC: 35 U/L (ref 10–49)
ANION GAP SERPL CALC-SCNC: 12 MMOL/L (ref 0–18)
AST SERPL-CCNC: 23 U/L (ref ?–34)
BASOPHILS # BLD AUTO: 0.05 X10(3) UL (ref 0–0.2)
BASOPHILS NFR BLD AUTO: 0.4 %
BILIRUB SERPL-MCNC: 0.5 MG/DL (ref 0.2–0.9)
BILIRUB UR QL STRIP.AUTO: NEGATIVE
BUN BLD-MCNC: 35 MG/DL (ref 9–23)
CALCIUM BLD-MCNC: 9.1 MG/DL (ref 8.7–10.6)
CHLORIDE SERPL-SCNC: 103 MMOL/L (ref 98–112)
CLARITY UR REFRACT.AUTO: CLEAR
CO2 SERPL-SCNC: 26 MMOL/L (ref 21–32)
CREAT BLD-MCNC: 1.85 MG/DL (ref 0.7–1.3)
EGFRCR SERPLBLD CKD-EPI 2021: 34 ML/MIN/1.73M2 (ref 60–?)
EOSINOPHIL # BLD AUTO: 0.05 X10(3) UL (ref 0–0.7)
EOSINOPHIL NFR BLD AUTO: 0.4 %
ERYTHROCYTE [DISTWIDTH] IN BLOOD BY AUTOMATED COUNT: 17.5 %
GLOBULIN PLAS-MCNC: 2.8 G/DL (ref 2–3.5)
GLUCOSE BLD-MCNC: 128 MG/DL (ref 70–99)
GLUCOSE UR STRIP.AUTO-MCNC: NORMAL MG/DL
HCT VFR BLD AUTO: 35.6 % (ref 39–53)
HGB BLD-MCNC: 11.4 G/DL (ref 13–17.5)
IMM GRANULOCYTES # BLD AUTO: 0.18 X10(3) UL (ref 0–1)
IMM GRANULOCYTES NFR BLD: 1.4 %
KETONES UR STRIP.AUTO-MCNC: NEGATIVE MG/DL
LEUKOCYTE ESTERASE UR QL STRIP.AUTO: 75
LYMPHOCYTES # BLD AUTO: 0.98 X10(3) UL (ref 1–4)
LYMPHOCYTES NFR BLD AUTO: 7.4 %
MCH RBC QN AUTO: 26.6 PG (ref 26–34)
MCHC RBC AUTO-ENTMCNC: 32 G/DL (ref 31–37)
MCV RBC AUTO: 83 FL (ref 80–100)
MONOCYTES # BLD AUTO: 2.19 X10(3) UL (ref 0.1–1)
MONOCYTES NFR BLD AUTO: 16.6 %
NEUTROPHILS # BLD AUTO: 9.72 X10 (3) UL (ref 1.5–7.7)
NEUTROPHILS # BLD AUTO: 9.72 X10(3) UL (ref 1.5–7.7)
NEUTROPHILS NFR BLD AUTO: 73.8 %
NITRITE UR QL STRIP.AUTO: NEGATIVE
NT-PROBNP SERPL-MCNC: 3006 PG/ML (ref ?–450)
OSMOLALITY SERPL CALC.SUM OF ELEC: 302 MOSM/KG (ref 275–295)
PH UR STRIP.AUTO: 5.5 [PH] (ref 5–8)
PLATELET # BLD AUTO: 254 10(3)UL (ref 150–450)
PLATELET MORPHOLOGY: NORMAL
POTASSIUM SERPL-SCNC: 4 MMOL/L (ref 3.5–5.1)
PROT SERPL-MCNC: 7.1 G/DL (ref 5.7–8.2)
PROT UR STRIP.AUTO-MCNC: NEGATIVE MG/DL
RBC # BLD AUTO: 4.29 X10(6)UL (ref 3.8–5.8)
RBC UR QL AUTO: NEGATIVE
SODIUM SERPL-SCNC: 141 MMOL/L (ref 136–145)
SP GR UR STRIP.AUTO: 1.01 (ref 1–1.03)
TROPONIN I SERPL HS-MCNC: 11 NG/L (ref ?–53)
UROBILINOGEN UR STRIP.AUTO-MCNC: NORMAL MG/DL
WBC # BLD AUTO: 13.2 X10(3) UL (ref 4–11)

## 2025-07-03 PROCEDURE — 99223 1ST HOSP IP/OBS HIGH 75: CPT | Performed by: HOSPITALIST

## 2025-07-03 PROCEDURE — 73560 X-RAY EXAM OF KNEE 1 OR 2: CPT | Performed by: EMERGENCY MEDICINE

## 2025-07-03 PROCEDURE — 71045 X-RAY EXAM CHEST 1 VIEW: CPT | Performed by: EMERGENCY MEDICINE

## 2025-07-03 PROCEDURE — 70450 CT HEAD/BRAIN W/O DYE: CPT | Performed by: EMERGENCY MEDICINE

## 2025-07-03 RX ORDER — SODIUM CHLORIDE 9 MG/ML
INJECTION, SOLUTION INTRAVENOUS CONTINUOUS
Status: DISCONTINUED | OUTPATIENT
Start: 2025-07-03 | End: 2025-07-04

## 2025-07-03 RX ORDER — FINASTERIDE 5 MG/1
5 TABLET, FILM COATED ORAL DAILY
Status: DISCONTINUED | OUTPATIENT
Start: 2025-07-03 | End: 2025-07-08

## 2025-07-03 RX ORDER — CLOPIDOGREL BISULFATE 75 MG/1
75 TABLET ORAL DAILY
Status: DISCONTINUED | OUTPATIENT
Start: 2025-07-03 | End: 2025-07-08

## 2025-07-03 RX ORDER — METOCLOPRAMIDE HYDROCHLORIDE 5 MG/ML
5 INJECTION INTRAMUSCULAR; INTRAVENOUS EVERY 8 HOURS PRN
Status: DISCONTINUED | OUTPATIENT
Start: 2025-07-03 | End: 2025-07-08

## 2025-07-03 RX ORDER — POLYETHYLENE GLYCOL 3350 17 G/17G
17 POWDER, FOR SOLUTION ORAL DAILY PRN
Status: DISCONTINUED | OUTPATIENT
Start: 2025-07-03 | End: 2025-07-08

## 2025-07-03 RX ORDER — GABAPENTIN 300 MG/1
300 CAPSULE ORAL EVERY MORNING
COMMUNITY

## 2025-07-03 RX ORDER — METOPROLOL SUCCINATE 50 MG/1
50 TABLET, EXTENDED RELEASE ORAL
Status: DISCONTINUED | OUTPATIENT
Start: 2025-07-03 | End: 2025-07-08

## 2025-07-03 RX ORDER — GABAPENTIN 300 MG/1
600 CAPSULE ORAL EVERY EVENING
COMMUNITY

## 2025-07-03 RX ORDER — SENNOSIDES 8.6 MG
17.2 TABLET ORAL NIGHTLY PRN
Status: DISCONTINUED | OUTPATIENT
Start: 2025-07-03 | End: 2025-07-08

## 2025-07-03 RX ORDER — TAMSULOSIN HYDROCHLORIDE 0.4 MG/1
0.4 CAPSULE ORAL DAILY
Status: DISCONTINUED | OUTPATIENT
Start: 2025-07-03 | End: 2025-07-08

## 2025-07-03 RX ORDER — METOPROLOL SUCCINATE 50 MG/1
50 TABLET, EXTENDED RELEASE ORAL DAILY
COMMUNITY

## 2025-07-03 RX ORDER — FUROSEMIDE 10 MG/ML
40 INJECTION INTRAMUSCULAR; INTRAVENOUS ONCE
Status: COMPLETED | OUTPATIENT
Start: 2025-07-03 | End: 2025-07-03

## 2025-07-03 RX ORDER — GABAPENTIN 300 MG/1
600 CAPSULE ORAL EVERY EVENING
Status: DISCONTINUED | OUTPATIENT
Start: 2025-07-03 | End: 2025-07-08

## 2025-07-03 RX ORDER — BISACODYL 10 MG
10 SUPPOSITORY, RECTAL RECTAL
Status: DISCONTINUED | OUTPATIENT
Start: 2025-07-03 | End: 2025-07-08

## 2025-07-03 RX ORDER — GABAPENTIN 300 MG/1
300 CAPSULE ORAL EVERY MORNING
Status: DISCONTINUED | OUTPATIENT
Start: 2025-07-03 | End: 2025-07-08

## 2025-07-03 RX ORDER — ONDANSETRON 2 MG/ML
4 INJECTION INTRAMUSCULAR; INTRAVENOUS EVERY 6 HOURS PRN
Status: DISCONTINUED | OUTPATIENT
Start: 2025-07-03 | End: 2025-07-08

## 2025-07-03 NOTE — ED QUICK NOTES
Orders for admission, patient is aware of plan and ready to go upstairs. Any questions, please call ED RN ayana at extension [phone].     Patient Covid vaccination status: Fully vaccinated     COVID Test Ordered in ED: None    COVID Suspicion at Admission: N/A    Running Infusions: Medication Infusions[1]     Mental Status/LOC at time of transport: alert but confused at times  lots of pain to knee when moved x rays neg per son will need rehab for knee and not to go home where he lives by himself    Other pertinent information:   CIWA score: N/A   NIH score:  N/A             [1]

## 2025-07-03 NOTE — PLAN OF CARE
AAOx3- forgetful. [place]. Impaired vision. RA. Tele NS. Eliquis. Brief and primofit in place. Up with walker/wheelchair at baseline- PT/OT to see. 1:1 feed. Regular diet. 0.9 @ 75/hr. Patient rounded on routinely and updated on plan of care.     Problem: PAIN - ADULT  Goal: Verbalizes/displays adequate comfort level or patient's stated pain goal  Description: INTERVENTIONS:  - Encourage pt to monitor pain and request assistance  - Assess pain using appropriate pain scale  - Administer analgesics based on type and severity of pain and evaluate response  - Implement non-pharmacological measures as appropriate and evaluate response  - Consider cultural and social influences on pain and pain management  - Manage/alleviate anxiety  - Utilize distraction and/or relaxation techniques  - Monitor for opioid side effects  - Notify MD/LIP if interventions unsuccessful or patient reports new pain  - Anticipate increased pain with activity and pre-medicate as appropriate  Outcome: Progressing     Problem: RISK FOR INFECTION - ADULT  Goal: Absence of fever/infection during anticipated neutropenic period  Description: INTERVENTIONS  - Monitor WBC  - Administer growth factors as ordered  - Implement neutropenic guidelines  Outcome: Progressing     Problem: SAFETY ADULT - FALL  Goal: Free from fall injury  Description: INTERVENTIONS:  - Assess pt frequently for physical needs  - Identify cognitive and physical deficits and behaviors that affect risk of falls.  - [place] fall precautions as indicated by assessment.  - Educate pt/family on patient safety including physical limitations  - Instruct pt to call for assistance with activity based on assessment  - Modify environment to reduce risk of injury  - Provide assistive devices as appropriate  - Consider OT/PT consult to assist with strengthening/mobility  - Encourage toileting schedule  Outcome: Progressing     Problem: DISCHARGE PLANNING  Goal: Discharge to home or other facility  with appropriate resources  Description: INTERVENTIONS:  - Identify barriers to discharge w/pt and caregiver  - Include patient/family/discharge partner in discharge planning  - Arrange for needed discharge resources and transportation as appropriate  - Identify discharge learning needs (meds, wound care, etc)  - Arrange for interpreters to assist at discharge as needed  - Consider post-discharge preferences of patient/family/discharge partner  - Complete POLST form as appropriate  - Assess patient's ability to be responsible for managing their own health  - Refer to Case Management Department for coordinating discharge planning if the patient needs post-hospital services based on physician/LIP order or complex needs related to functional status, cognitive ability or social support system  Outcome: Progressing     Problem: Altered Communication/Language Barrier  Goal: Patient/Family is able to understand and participate in their care  Description: Interventions:  - Assess communication ability and preferred communication style  - Implement communication aides and strategies  - Use visual cues when possible  - Listen attentively, be patient, do not interrupt  - Minimize distractions  - Allow time for understanding and response  - Establish method for patient to ask for assistance (call light)  - Provide an  as needed  - Communicate barriers and strategies to overcome with those who interact with patient  Outcome: Progressing

## 2025-07-03 NOTE — PROGRESS NOTES
NURSING ADMISSION NOTE      Patient admitted via Cart  Oriented to room.  Safety precautions initiated.  Bed in low position.  Call light in reach.    Admission completed. Pt from home alone. Pt AO x3. Pain to left knee. Primo fit. Recent fall a few days ago. Takes eliquis. Son at bedside

## 2025-07-03 NOTE — PAYOR COMM NOTE
--------------  ADMISSION REVIEW     Payor: [employer] MED[employer]ARE ADV PPO  Subscriber #:  [id number]  Authorization Number: [id number]    Admit date: 7/3/25  Admit time:  9:00 AM       REVIEW DOCUMENTATION:     ED Provider Notes        ED Provider Notes signed by Arnaud Henson MD at 7/3/2025  7:01 AM       Author: Arnaud Henson MD Service: Emergency Medicine Author Type: Physician    Filed: 7/3/2025  7:01 AM Date of Service: 7/3/2025  5:10 AM Status: Signed    : Arnaud Henson MD (Physician)           Patient Seen in: [facility] Emergency Department        History  Chief Complaint   Patient presents with    Altered Mental Status     Stated Complaint: per family, pt has been having hallucinations over last couple days.    Subjective:   HPI            89-year-old male past medical history as below presents with his son for hallucinations.  Twice in the last week he has called his son late in the night with hallucinations.  This morning at 3 AM he called his son and said that there were people in his house hauling things out and boxes.  Several nights ago he called stating that there are people knocking on his windows.  Both times his son checked and there was nothing there and nothing wrong.  He did have a recent fall injuring his left knee.  Denied hitting his head at the time.  No fevers.  No chest pain.  No shortness of breath.      Objective:     Past Medical History:    Amputation of right foot (HCC)    non-healing. Followed by [facility]    Anemia    Atrial fibrillation (HCC)    CAD (coronary artery disease)    CHF (congestive heart failure) (HCC)    Easy bruising    Encephalopathy acute    Esophageal reflux    Fatigue    Hearing impairment    Hearing loss    Heart attack (HCC)    High blood pressure    High cholesterol    History of MI (myocardial infarction)    HTN (hypertension)    Hyperlipidemia    Leg swelling    Loss of appetite    PAD (peripheral artery disease)    Peripheral vascular disease     Uncomfortable fullness after meals    Visual impairment    glasses    Wears glasses              Past Surgical History:   Procedure Laterality Date    Angiogram      Angioplasty (coronary)      Cabg      Fracture surgery Left     left hip pinning    Other surgical history Left 01/01/1977    knee surgery    Other surgical history  03/25/2016    Left hip ORIF pinning    Tonsillectomy                  Social History     Socioeconomic History    Marital status:    Tobacco Use    Smoking status: Never    Smokeless tobacco: Never   Vaping Use    Vaping status: Never Used   Substance and Sexual Activity    Alcohol use: Never    Drug use: Never     Social Drivers of Health     Food Insecurity: No Food Insecurity (9/27/2024)    Food Insecurity     Food Insecurity: Never true   Transportation Needs: No Transportation Needs (9/27/2024)    Transportation Needs     Lack of Transportation: No   Housing Stability: Low Risk  (9/27/2024)    Housing Stability     Housing Instability: No                                Physical Exam    ED Triage Vitals [07/03/25 0444]   /59   Pulse 97   Resp 18   Temp 98.3 °F (36.8 °C)   Temp src Temporal   SpO2 98 %   O2 Device None (Room air)       Current Vitals:   Vital Signs  BP: 103/59  Pulse: 97  Resp: 18  Temp: 98.3 °F (36.8 °C)  Temp src: Temporal    Oxygen Therapy  SpO2: 98 %  O2 Device: None (Room air)            Physical Exam  Constitutional:       General: Is not in acute distress.     Appearance: Is not ill-appearing.   HENT:      Head: Normocephalic and atraumatic.      Mouth/Throat:      Mouth: Mucous membranes are moist.   Eyes:      Conjunctiva/sclera: Conjunctivae normal.      Pupils: Pupils are equal, round, and reactive to light.   Cardiovascular:      Rate and Rhythm: Normal rate and regular rhythm.   Pulmonary:      Effort: Pulmonary effort is normal. No respiratory distress.      Breath sounds: Normal breath sounds.   Abdominal:      General: Abdomen is flat. There  is no distension.      Tenderness: There is no abdominal tenderness.   Musculoskeletal: 2+ pitting edema bilateral lower extremities  Skin:     General: Skin is warm and dry.   Neurological:      General: No focal deficit present.      Mental Status: Is alert.           ED Course  Labs Reviewed   CBC WITH DIFFERENTIAL WITH PLATELET - Abnormal; Notable for the following components:       Result Value    WBC 13.2 (*)     HGB 11.4 (*)     HCT 35.6 (*)     Neutrophil Absolute Prelim 9.72 (*)     Neutrophil Absolute 9.72 (*)     Lymphocyte Absolute 0.98 (*)     Monocyte Absolute 2.19 (*)     All other components within normal limits   COMP METABOLIC PANEL (14) - Abnormal; Notable for the following components:    Glucose 128 (*)     BUN 35 (*)     Creatinine 1.85 (*)     Calculated Osmolality 302 (*)     eGFR-Cr 34 (*)     All other components within normal limits   PRO BETA NATRIURETIC PEPTIDE - Abnormal; Notable for the following components:    Pro-Beta Natriuretic Peptide 3,006 (*)     All other components within normal limits   RBC MORPHOLOGY SCAN - Abnormal; Notable for the following components:    RBC Morphology See morphology below (*)     Acanthocytes, Spur Cells 2+ (*)     All other components within normal limits   TROPONIN I HIGH SENSITIVITY - Normal   SCAN SLIDE   URINALYSIS WITH CULTURE REFLEX   RAINBOW DRAW LAVENDER   RAINBOW DRAW LIGHT GREEN   RAINBOW DRAW BLUE   RAINBOW DRAW GOLD   BLOOD CULTURE   BLOOD CULTURE     EKG    Rate, intervals and axes as noted on EKG Report.  Rate: 98  Rhythm: Atrial fibrillation  Reading: No ST elevation or depression                               MDM     Considered all emergent etiologies, differential includes but is not limited to: CHF exacerbation, ERON, dehydration, electrolyte abnormality, infection     I have independently visualized the radiology images, my focus/limited interpretation: Chest x-ray with pulmonary edema and right upper lobe pneumonia     Defer to  radiologist for other/incidental findings    Labs notable for leukocytosis, ERON, elevated BNP.  Chest x-ray concerning for infection as well as CHF exacerbation.  Started on antibiotics for pneumonia.  Previous discharge weight around 90 EKG, 95 kg today and on examination appears to be significantly overloaded.  ERON likely cardiorenal.  Will start diuresis and antibiotics.  Discussed with Dr. Borden for admission.     Admission disposition: 7/3/2025  7:00 AM           Medical Decision Making      Disposition and Plan     Clinical Impression:  1. Community acquired pneumonia, unspecified laterality    2. ERON (acute kidney injury)    3. Acute on chronic congestive heart failure, unspecified heart failure type (HCC)         Disposition:  Admit  7/3/2025  7:00 am    Follow-up:  No follow-up provider specified.        Medications Prescribed:  Current Discharge Medication List                Supplementary Documentation:         Hospital Problems       Present on Admission  Date Reviewed: 3/17/2025          ICD-10-CM Noted POA    * (Principal) Community acquired pneumonia, unspecified laterality J18.9 7/3/2025 Unknown                                                                Signed by Arnaud Henson MD on 7/3/2025  7:01 AM         MEDICATIONS ADMINISTERED IN LAST 1 DAY:  apixaban (Eliquis) tab 5 mg       Date Action Dose Route User    7/3/2025 1213 Given 5 mg Oral Selene Hampton RN          azithromycin (Zithromax) 500 mg in sodium chloride 0.9% 250mL IVPB premix       Date Action Dose Route User    7/3/2025 0753 New Bag 500 mg Intravenous Kaylyn Shelton RN          cefTRIAXone (Rocephin) 1 g in sodium chloride 0.9% 100 mL IVPB-ADDV       Date Action Dose Route User    7/3/2025 0729 New Bag 1 g Intravenous Kaylyn Shelton RN          clopidogrel (Plavix) tab 75 mg       Date Action Dose Route User    7/3/2025 1213 Given 75 mg Oral Selene Hampton RN          finasteride (Proscar) tab 5 mg       Date Action  Dose Route User    7/3/2025 1212 Given 5 mg Oral Selene Hampton RN          furosemide (Lasix) 10 mg/mL injection 40 mg       Date Action Dose Route User    7/3/2025 0721 Given 40 mg Intravenous (Right Antecubital) Kaylyn Shelton RN          gabapentin (Neurontin) cap 300 mg       Date Action Dose Route User    7/3/2025 1212 Given 300 mg Oral Selene Hampton RN          metoprolol succinate ER (Toprol XL) 24 hr tab 50 mg       Date Action Dose Route User    7/3/2025 1213 Given 50 mg Oral Selene Hampton RN          sertraline (Zoloft) tab 50 mg       Date Action Dose Route User    7/3/2025 1213 Given 50 mg Oral Selene Hampton RN          sodium chloride 0.9% infusion       Date Action Dose Route User    7/3/2025 1212 New Bag (none) Intravenous Selene Hampton RN          tamsulosin (Flomax) cap 0.4 mg       Date Action Dose Route User    7/3/2025 1213 Given 0.4 mg Oral Selene Hampton RN          predniSONE (Deltasone) tab 15 mg       Date Action Dose Route User    7/3/2025 1212 Given 15 mg Oral Selene Hampton RN            Vitals (last day)       Date/Time Temp Pulse Resp BP SpO2 Weight O2 Device O2 Flow Rate (L/min) [facility]    07/03/25 1225 -- -- -- -- -- 210 lb (95.3 kg) -- --     07/03/25 1202 -- 81 -- 114/68 95 % -- -- --     07/03/25 1202 98.8 °F (37.1 °C) -- 19 -- -- -- None (Room air) -- TJ    07/03/25 0914 -- 87 -- 100/69 90 % -- -- -- JN    07/03/25 0728 -- 95 18 108/72 98 % -- None (Room air) -- LB    07/03/25 0448 -- -- -- -- -- -- None (Room air) -- JM    07/03/25 0444 98.3 °F (36.8 °C) 97 18 103/59 98 % 210 lb (95.3 kg) None (Room air) -- JM          CIWA Scores (since admission)       None

## 2025-07-03 NOTE — ED PROVIDER NOTES
Patient Seen in: [facility] Emergency Department        History  Chief Complaint   Patient presents with    Altered Mental Status     Stated Complaint: per family, pt has been having hallucinations over last couple days.    Subjective:   HPI            89-year-old male past medical history as below presents with his son for hallucinations.  Twice in the last week he has called his son late in the night with hallucinations.  This morning at 3 AM he called his son and said that there were people in his house hauling things out and boxes.  Several nights ago he called stating that there are people knocking on his windows.  Both times his son checked and there was nothing there and nothing wrong.  He did have a recent fall injuring his left knee.  Denied hitting his head at the time.  No fevers.  No chest pain.  No shortness of breath.      Objective:     Past Medical History:    Amputation of right foot (HCC)    non-healing. Followed by [facility]    Anemia    Atrial fibrillation (HCC)    CAD (coronary artery disease)    CHF (congestive heart failure) (HCC)    Easy bruising    Encephalopathy acute    Esophageal reflux    Fatigue    Hearing impairment    Hearing loss    Heart attack (HCC)    High blood pressure    High cholesterol    History of MI (myocardial infarction)    HTN (hypertension)    Hyperlipidemia    Leg swelling    Loss of appetite    PAD (peripheral artery disease)    Peripheral vascular disease    Uncomfortable fullness after meals    Visual impairment    glasses    Wears glasses              Past Surgical History:   Procedure Laterality Date    Angiogram      Angioplasty (coronary)      Cabg      Fracture surgery Left     left hip pinning    Other surgical history Left 01/01/1977    knee surgery    Other surgical history  03/25/2016    Left hip ORIF pinning    Tonsillectomy                  Social History     Socioeconomic History    Marital status:    Tobacco Use    Smoking status:  Never    Smokeless tobacco: Never   Vaping Use    Vaping status: Never Used   Substance and Sexual Activity    Alcohol use: Never    Drug use: Never     Social Drivers of Health     Food Insecurity: No Food Insecurity (9/27/2024)    Food Insecurity     Food Insecurity: Never true   Transportation Needs: No Transportation Needs (9/27/2024)    Transportation Needs     Lack of Transportation: No   Housing Stability: Low Risk  (9/27/2024)    Housing Stability     Housing Instability: No                                Physical Exam    ED Triage Vitals [07/03/25 0444]   /59   Pulse 97   Resp 18   Temp 98.3 °F (36.8 °C)   Temp src Temporal   SpO2 98 %   O2 Device None (Room air)       Current Vitals:   Vital Signs  BP: 103/59  Pulse: 97  Resp: 18  Temp: 98.3 °F (36.8 °C)  Temp src: Temporal    Oxygen Therapy  SpO2: 98 %  O2 Device: None (Room air)            Physical Exam  Constitutional:       General: Is not in acute distress.     Appearance: Is not ill-appearing.   HENT:      Head: Normocephalic and atraumatic.      Mouth/Throat:      Mouth: Mucous membranes are moist.   Eyes:      Conjunctiva/sclera: Conjunctivae normal.      Pupils: Pupils are equal, round, and reactive to light.   Cardiovascular:      Rate and Rhythm: Normal rate and regular rhythm.   Pulmonary:      Effort: Pulmonary effort is normal. No respiratory distress.      Breath sounds: Normal breath sounds.   Abdominal:      General: Abdomen is flat. There is no distension.      Tenderness: There is no abdominal tenderness.   Musculoskeletal: 2+ pitting edema bilateral lower extremities  Skin:     General: Skin is warm and dry.   Neurological:      General: No focal deficit present.      Mental Status: Is alert.           ED Course  Labs Reviewed   CBC WITH DIFFERENTIAL WITH PLATELET - Abnormal; Notable for the following components:       Result Value    WBC 13.2 (*)     HGB 11.4 (*)     HCT 35.6 (*)     Neutrophil Absolute Prelim 9.72 (*)      Neutrophil Absolute 9.72 (*)     Lymphocyte Absolute 0.98 (*)     Monocyte Absolute 2.19 (*)     All other components within normal limits   COMP METABOLIC PANEL (14) - Abnormal; Notable for the following components:    Glucose 128 (*)     BUN 35 (*)     Creatinine 1.85 (*)     Calculated Osmolality 302 (*)     eGFR-Cr 34 (*)     All other components within normal limits   PRO BETA NATRIURETIC PEPTIDE - Abnormal; Notable for the following components:    Pro-Beta Natriuretic Peptide 3,006 (*)     All other components within normal limits   RBC MORPHOLOGY SCAN - Abnormal; Notable for the following components:    RBC Morphology See morphology below (*)     Acanthocytes, Spur Cells 2+ (*)     All other components within normal limits   TROPONIN I HIGH SENSITIVITY - Normal   SCAN SLIDE   URINALYSIS WITH CULTURE REFLEX   RAINBOW DRAW LAVENDER   RAINBOW DRAW LIGHT GREEN   RAINBOW DRAW BLUE   RAINBOW DRAW GOLD   BLOOD CULTURE   BLOOD CULTURE     EKG    Rate, intervals and axes as noted on EKG Report.  Rate: 98  Rhythm: Atrial fibrillation  Reading: No ST elevation or depression                                MDM     Considered all emergent etiologies, differential includes but is not limited to: CHF exacerbation, ERON, dehydration, electrolyte abnormality, infection     I have independently visualized the radiology images, my focus/limited interpretation: Chest x-ray with pulmonary edema and right upper lobe pneumonia     Defer to radiologist for other/incidental findings    Labs notable for leukocytosis, ERON, elevated BNP.  Chest x-ray concerning for infection as well as CHF exacerbation.  Started on antibiotics for pneumonia.  Previous discharge weight around 90 EKG, 95 kg today and on examination appears to be significantly overloaded.  EORN likely cardiorenal.  Will start diuresis and antibiotics.  Discussed with Dr. Borden for admission.     Admission disposition: 7/3/2025  7:00 AM           Medical Decision  Making      Disposition and Plan     Clinical Impression:  1. Community acquired pneumonia, unspecified laterality    2. ERON (acute kidney injury)    3. Acute on chronic congestive heart failure, unspecified heart failure type (HCC)         Disposition:  Admit  7/3/2025  7:00 am    Follow-up:  No follow-up provider specified.        Medications Prescribed:  Current Discharge Medication List                Supplementary Documentation:         Hospital Problems       Present on Admission  Date Reviewed: 3/17/2025          ICD-10-CM Noted POA    * (Principal) Community acquired pneumonia, unspecified laterality J18.9 7/3/2025 Unknown

## 2025-07-03 NOTE — H&P
[facility]     Alejandro Guardado Patient Status:  Inpatient    1935 MRN [id number]   Location [facility] 5NW-A Attending Wendy Borden MD   Hosp Day # 0 PCP Stanislav Odonnell MD     Chief Complaint: visual hallucinations    Subjective:    History of Present Illness:     Alejandro Guardado is a 89 year old male with hx of cad and afib who lives alone and called his son at 3 am complaining of people in his house.  Patient is quite independent he lives by himself.  He is hard of hearing and legally blind and he has 2 children who live nearby and help him as needed.  He has history of CAD status post CABG also hyperlipidemia carotid disease chronic diastolic CHF peripheral vascular disease.  His left ventricular EF is known to be 35% and he has refused further ischemic evaluation.  Patient had polio as a child and then gangrenous toes and he is now status post metatarsal amputation on the right foot.  He is on Plavix and Eliquis.    History/Other:    Past Medical History:  Past Medical History[1]  Past Surgical History:   Past Surgical History[2]   Family History:   Family History[3]  Social History:    reports that he has never smoked. He has never used smokeless tobacco. He reports that he does not drink alcohol and does not use drugs.     Allergies: Allergies[4]    Medications:  Medications Ordered Prior to Encounter[5]    Review of Systems:   A comprehensive review of systems was completed.    Pertinent positives and negatives noted in the HPI.    Objective:   Physical Exam:    /72   Pulse 95   Temp 98.3 °F (36.8 °C) (Temporal)   Resp 18   Ht 5' 7\" (1.702 m)   Wt 210 lb (95.3 kg)   SpO2 98%   BMI 32.89 kg/m²   General: No acute distress, Alert  Respiratory: No rhonchi, no wheezes  Cardiovascular: S1, S2. Regular rate and rhythm  Abdomen: Soft, Non-tender, non-distended, positive bowel sounds  Neuro: No new focal deficits  Extremities: Lower extremities pitting  edema      Results:    Labs:      Labs Last 24 Hours:    Recent Labs   Lab 07/03/25 0448   RBC 4.29   HGB 11.4*   HCT 35.6*   MCV 83.0   MCH 26.6   MCHC 32.0   RDW 17.5   NEPRELIM 9.72*   WBC 13.2*   .0       Recent Labs   Lab 07/03/25 0448   *   BUN 35*   CREATSERUM 1.85*   EGFRCR 34*   CA 9.1   ALB 4.3      K 4.0      CO2 26.0   ALKPHO 63   AST 23   ALT 35   BILT 0.5   TP 7.1       Estimated Glomerular Filtration Rate: 34 mL/min/1.73m2 (A) (result from lab).    Lab Results   Component Value Date    INR 1.45 (H) 07/21/2024    INR 1.51 (H) 03/28/2024    INR 1.35 (H) 02/12/2024       Recent Labs   Lab 07/03/25  0448   TROPHS 11       Recent Labs   Lab 07/03/25 0448   PBNP 3,006*       No results for input(s): \"PCT\" in the last 168 hours.    Imaging: Imaging data reviewed in Epic.    Assessment & Plan:      #pneumonia  Sepsis with leukocytosis WBC 13.2  -Monitor urine sputum and blood cultures  -Started on ceftriaxone and Zithromax in emergency room  ERON  -Creatinine 1.8 and baseline creatinine 1.2  -Gentle hydration  Acute metabolic encephalopathy  -With visual hallucinations due to infection  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  -Monitor clinically  Secondary adrenal insufficiency  -Continue steroids  Atrial fibrillation, paroxysmal  -On Eliquis and beta-blocker  CAD status post CABG  -On Plavix  Hyperlipidemia  BPH  Mild anemia        Plan of care discussed with patient his son and emergency room physician    Lis Loza MD    Supplementary Documentation:     The 21st Century Cures Act makes medical notes like these available to patients in the interest of transparency. Please be advised this is a medical document. Medical documents are intended to carry relevant information, facts as evident, and the clinical opinion of the practitioner. The medical note is intended as peer to peer communication and may appear blunt or direct. It is written in medical language and may  contain abbreviations or verbiage that are unfamiliar.               **Certification      PHYSICIAN Certification of Need for Inpatient Hospitalization - Initial Certification    Patient will require inpatient services that will reasonably be expected to span two midnight's based on the clinical documentation in H+P.   Based on patients current state of illness, I anticipate that, after discharge, patient will require TBD.                        [1]   Past Medical History:   Amputation of right foot (HCC)    non-healing. Followed by [facility]    Anemia    Atrial fibrillation (HCC)    CAD (coronary artery disease)    CHF (congestive heart failure) (HCC)    Easy bruising    Encephalopathy acute    Esophageal reflux    Fatigue    Hearing impairment    Hearing loss    Heart attack (HCC)    High blood pressure    High cholesterol    History of MI (myocardial infarction)    HTN (hypertension)    Hyperlipidemia    Leg swelling    Loss of appetite    PAD (peripheral artery disease)    Peripheral vascular disease    Uncomfortable fullness after meals    Visual impairment    glasses    Wears glasses   [2]   Past Surgical History:  Procedure Laterality Date    Angiogram      Angioplasty (coronary)      Cabg      Fracture surgery Left     left hip pinning    Other surgical history Left 01/01/1977    knee surgery    Other surgical history  03/25/2016    Left hip ORIF pinning    Tonsillectomy     [3]   Family History  Problem Relation Age of Onset    Cancer Father    [4]   Allergies  Allergen Reactions    Heparin ANAPHYLAXIS    Hydromorphone HALLUCINATION   [5]   No current facility-administered medications on file prior to encounter.     Current Outpatient Medications on File Prior to Encounter   Medication Sig Dispense Refill    metoprolol succinate ER 50 MG Oral Tablet 24 Hr Take 1 tablet (50 mg total) by mouth in the morning.      gabapentin 300 MG Oral Cap Take 1 capsule (300 mg total) by mouth every morning.       gabapentin 300 MG Oral Cap Take 2 capsules (600 mg total) by mouth every evening.      sertraline 50 MG Oral Tab Take 1 tablet (50 mg total) by mouth in the morning.      finasteride 5 MG Oral Tab Take 1 tablet (5 mg total) by mouth daily. 90 tablet 0    furosemide 40 MG Oral Tab Take 1 tablet (40 mg total) by mouth in the morning and 1 tablet (40 mg total) before bedtime.      lisinopril 2.5 MG Oral Tab Take 1 tablet (2.5 mg total) by mouth in the evening.      Ascorbic Acid (VITAMIN C) 1000 MG Oral Tab Take 1 tablet (1,000 mg total) by mouth in the morning.      tamsulosin 0.4 MG Oral Cap Take 1 capsule (0.4 mg total) by mouth in the morning.      apixaban 5 MG Oral Tab Take 1 tablet (5 mg total) by mouth 2 (two) times daily. 60 tablet 3    atorvastatin 40 MG Oral Tab Take 1 tablet (40 mg total) by mouth daily. 30 tablet 0    clopidogrel 75 MG Oral Tab Take 1 tablet (75 mg total) by mouth daily. 30 tablet 0    predniSONE 5 MG Oral Tab Take 3 tablets (15 mg total) by mouth in the morning.      folic acid 1 MG Oral Tab Take 1 tablet (1 mg total) by mouth in the morning.      Omeprazole 40 MG Oral Capsule Delayed Release Take 1 capsule (40 mg total) by mouth in the morning.

## 2025-07-04 LAB
ANION GAP SERPL CALC-SCNC: 7 MMOL/L (ref 0–18)
BUN BLD-MCNC: 26 MG/DL (ref 9–23)
CALCIUM BLD-MCNC: 8.5 MG/DL (ref 8.7–10.6)
CHLORIDE SERPL-SCNC: 106 MMOL/L (ref 98–112)
CO2 SERPL-SCNC: 28 MMOL/L (ref 21–32)
CREAT BLD-MCNC: 1.35 MG/DL (ref 0.7–1.3)
EGFRCR SERPLBLD CKD-EPI 2021: 50 ML/MIN/1.73M2 (ref 60–?)
ERYTHROCYTE [DISTWIDTH] IN BLOOD BY AUTOMATED COUNT: 17.4 %
GLUCOSE BLD-MCNC: 100 MG/DL (ref 70–99)
HCT VFR BLD AUTO: 31 % (ref 39–53)
HGB BLD-MCNC: 9.6 G/DL (ref 13–17.5)
L PNEUMO AG UR QL: NEGATIVE
MCH RBC QN AUTO: 26.4 PG (ref 26–34)
MCHC RBC AUTO-ENTMCNC: 31 G/DL (ref 31–37)
MCV RBC AUTO: 85.2 FL (ref 80–100)
OSMOLALITY SERPL CALC.SUM OF ELEC: 297 MOSM/KG (ref 275–295)
PLATELET # BLD AUTO: 199 10(3)UL (ref 150–450)
POTASSIUM SERPL-SCNC: 4 MMOL/L (ref 3.5–5.1)
Q-T INTERVAL: 354 MS
QRS DURATION: 104 MS
QTC CALCULATION (BEZET): 451 MS
R AXIS: -16 DEGREES
RBC # BLD AUTO: 3.64 X10(6)UL (ref 3.8–5.8)
SODIUM SERPL-SCNC: 141 MMOL/L (ref 136–145)
T AXIS: 250 DEGREES
VANCOMYCIN SERPL-MCNC: <3 UG/ML (ref ?–40)
VENTRICULAR RATE: 98 BPM
WBC # BLD AUTO: 8.5 X10(3) UL (ref 4–11)

## 2025-07-04 PROCEDURE — 99233 SBSQ HOSP IP/OBS HIGH 50: CPT | Performed by: HOSPITALIST

## 2025-07-04 RX ORDER — VANCOMYCIN HYDROCHLORIDE
15 ONCE
Status: COMPLETED | OUTPATIENT
Start: 2025-07-04 | End: 2025-07-04

## 2025-07-04 RX ORDER — VANCOMYCIN HYDROCHLORIDE
15
Status: DISCONTINUED | OUTPATIENT
Start: 2025-07-06 | End: 2025-07-05

## 2025-07-04 NOTE — PHYSICAL THERAPY NOTE
PHYSICAL THERAPY EVALUATION - INPATIENT     Room Number: 531/531-A  Evaluation Date: 7/4/2025  Type of Evaluation: Initial  Physician Order: PT Eval and Treat    Presenting Problem: visual hallucinations, L knee pain  Co-Morbidities : cad and afib  Reason for Therapy: Mobility Dysfunction and Discharge Planning    Recent Admissions:   9/26-9/28/24: weakness, sepsis, pneumonia > HHC    IMAGING:  XRAY L KNEE:    Severe tricompartment osteoarthritis. Chondrocalcinosis. Large joint effusion is noted. No definitive acute fracture identified.  CT BRAIN (-) for acute processes     PHYSICAL THERAPY ASSESSMENT   Patient is a 89 year old male admitted 7/3/2025 for visual hallucinations.  Prior to admission, patient's baseline is [place] with RW or wheelchair.  Patient is currently functioning below baseline with bed mobility, transfers, gait, stair negotiation, maintaining seated position, and standing prolonged periods.  Patient is requiring moderate assist as a result of the following impairments: decreased functional strength, decreased endurance/aerobic capacity, impaired dynamic standing balance, decreased muscular endurance, cognitive deficits (decr awareness of need for safety and awareness), and medical status.  Physical Therapy will continue to follow for duration of hospitalization.    Patient will benefit from continued skilled PT Services to promote return to prior level of function and safety with continuous assistance and gradual rehabilitative therapy .    PLAN DURING HOSPITALIZATION  Nursing Mobility Recommendation : 1 Assist  PT Device Recommendation: Rolling walker  PT Treatment Plan: Bed mobility, Body mechanics, Coordination, Endurance, Energy conservation, Patient education, Gait training, Family education, Neuromuscular re-educate, Range of motion, Strengthening, Stoop training, Balance training, Transfer training, Stair training  Rehab Potential : Fair  Frequency (Obs): 3-5x/week     CURRENT GOALS  Goal #1  Patient is able to demonstrate supine - sit EOB @ level: supervision     Goal #2 Patient is able to demonstrate transfers Sit to/from Stand at assistance level: supervision     Goal #3 Patient is able to ambulate 50 feet with assist device: walker - rolling at assistance level: supervision     Goal #4    Goal #5    Goal #6    Goal Comments: Goals established on 2025    PHYSICAL THERAPY MEDICAL/SOCIAL HISTORY  History related to current admission: Patient is a 89 year old male admitted on 7/3/2025 from home for visual hallucinations, L knee pain.  Pt diagnosed with pneumonia.    HOME SITUATION  Type of Home: House  Home Layout: Two level, Able to live on main level, Bed/bath upstairs (+ basement as well)  Stairs to Enter : 1   Railing: No    Stairs to Bedroom: 13    Railing: Yes    Lives With: Alone (cat- Che)    Drives: No   Patient Regularly Uses: Rolling walker, Wheelchair (lift recliner, adjustable bed)      Prior Level of [place]:   Per pt - lives alone in two story home with cat, Che. Ambulates with RW (has 4 of them) and uses wheelchair (has 2-3 of them) as well. Sleeps in either adjustable bed or lift recliner chair on main level of home. Goes up/down stairs every other day to take a shower. No longer drives due to low vision. Son, daughter, and grandson live by and check in frequently. Reports no falls. States he tries to go downstairs to the basement once a day to do some walking - states he has a long hallway down there (walks 700- 800 feet?). Grandson is RN [employer] with wound care.     SUBJECTIVE  \"I try to use the walker more because it forces me to use my muscles.\"    OBJECTIVE  Precautions: Bed/chair alarm, Hard of hearing, Low vision  Fall Risk: High fall risk    WEIGHT BEARING RESTRICTION     PAIN ASSESSMENT  Ratin  Location: pt denies pain at this time       COGNITION  Orientation Level:  oriented x4  Memory:  impaired working memory and decreased recall of recent  events  Following Commands:  follows one step commands with repetition  Perseveration: perseverates during conversation and perseverating on \"I need to go home.\"  Safety Judgement:  decreased awareness of need for assistance and decreased awareness of need for safety  Awareness of Errors:  decreased awareness of errors   Awareness of Deficits:  decreased awareness of deficits    RANGE OF MOTION AND STRENGTH ASSESSMENT  Upper extremity ROM and strength are within functional limits   Lower extremity ROM is within functional limits   Lower extremity strength is within functional limits     BALANCE  Static Sitting: Fair  Dynamic Sitting: Fair -  Static Standing: Poor +  Dynamic Standing: Poor    ADDITIONAL TESTS                                    ACTIVITY TOLERANCE                         O2 WALK       NEUROLOGICAL FINDINGS                        AM-PAC '6-Clicks' INPATIENT SHORT FORM - BASIC MOBILITY  How much difficulty does the patient currently have...  Patient Difficulty: Turning over in bed (including adjusting bedclothes, sheets and blankets)?: A Little   Patient Difficulty: Sitting down on and standing up from a chair with arms (e.g., wheelchair, bedside commode, etc.): A Lot   Patient Difficulty: Moving from lying on back to sitting on the side of the bed?: A Little   How much help from another person does the patient currently need...   Help from Another: Moving to and from a bed to a chair (including a wheelchair)?: A Lot   Help from Another: Need to walk in hospital room?: A Lot   Help from Another: Climbing 3-5 steps with a railing?: A Lot     AM-PAC Score:  Raw Score: 14   Approx Degree of Impairment: 61.29%   Standardized Score (AM-PAC Scale): 38.1   CMS Modifier (G-Code): CL    FUNCTIONAL ABILITY STATUS  Gait Assessment   Functional Mobility/Gait Assessment  Gait Assistance: Not tested    Skilled Therapy Provided   Educated on importance of continued out of bed mobility with assistance from nursing staff  and use of RW- recommend use of Margy steady due to unsteadiness   Encouraged up to chair 3x per day     Pt alert and oriented x 4 (\"[facility]\", July 2025, here because of knee pain and I was seeing things\") However, he did think it was Sunday.     Perseverating on \"I need to go home\" throughout entirety of session    Bed mobility w/ HOB elevated> totalA to don socks> sit to stand to RW with height of bed elevated> lateral steps with RW to bedside chair> upright in chair at end of session     Bed Mobility:  Rolling: nT  Supine to sit: Michael with HOB elevated- assist at trunk to bring self forward and to maintain static sitting balance    Sit to supine: NT     Transfer Mobility:  Sit to stand: modA to RW with height of bed elevated - vc for hand placement   Stand to sit: modA- poor eccentric control  Lateral steps with RW- modA  Gait = NT    Therapist's Comments:   RN gave clearance to see patient. Discussed role and goal of physical therapy in hospital setting. Pt in agreement to session.     Exercise/Education Provided:  Bed mobility  Body mechanics  Energy conservation  Functional activity tolerated  Posture  Transfer training    Patient End of Session: Up in chair, Needs met, Call light within reach, RN aware of session/findings, All patient questions and concerns addressed, Hospital anti-slip socks, Alarm set, SCDs in place, Discussed recommendations with /    Patient Evaluation Complexity Level:  History Moderate - 1 or 2 personal factors and/or co-morbidities   Examination of body systems Low -  addressing 1-2 elements   Clinical Presentation Low- Stable   Clinical Decision Making Low Complexity     PT Session Time: 30 minutes  Therapeutic Activity: 15 minutes

## 2025-07-04 NOTE — CONSULTS
[facility]   part of [facility] ID CONSULT NOTE    Alejandro Guardado Patient Status:  Inpatient    1935 MRN [id number]   Location [facility] 5NW-A Attending Wendy Borden MD   Hosp Day # 1 PCP Stanislav Odonnell MD       Reason for Consultation:  positive blood cultures         Antibiotics:     ASSESSMENT:    # streptococcus species bacteremia, 1 out of 2. Contaminate? 2/2 aspiration?   # community acquired pneumonia off o2. No other localizing symptoms  # RLE edema, mild erythema but dependent, nontender. No concern fro ssti   # AMS, hallucinations -septic encephalopathy? Improved.     PLAN:      Reordered ceftriaxone  Await repeat culture, formal ID   Mrsa nares, can stop vanc if negative. Urine legionella    -  Follow fever curve, wbc  -  Reviewed labs, micro, imaging reports, available old records, discussed with pt    Thank you for allowing us to participate in the care of this patient. Please do not hesitate to call if you have any questions.   We will continue to follow with you and will make further recommendations based on his progress.    History of Present Illness:  Alejandro Guardado is a a(n) 89 year old male.  With a past medical history of right foot amputation, A-fib, CHF, CAD, visual impairment who presented with hallucinations.  On discussion with pt he reports feeling completely normal and wanting to go home today.  On room air, no fevers no sustained hypotension.  Leukocytosis to 13 improved however all counts decreased.  Blood cultures positive for 1 out of 2 gram-positive cocci in chains.  Streptococcus by PCR.       History:  Past Medical History[1]  Past Surgical History[2]  Family History[3]   reports that he has never smoked. He has never used smokeless tobacco. He reports that he does not drink alcohol and does not use drugs.    Allergies:  Allergies[4]    Medications:  Current Hospital Medications[5]    Review of Systems:  CONSTITUTIONAL:  No weight loss, weakness  or fatigue.  HEENT:  Eyes:  No visual loss, blurred vision, double vision or yellow sclerae. Ears, Nose, Throat:  No hearing loss, sneezing, congestion, runny nose or sore throat.  SKIN:  No rash or itching.  CARDIOVASCULAR:  No chest pain, chest pressure or chest discomfort  RESPIRATORY:  No shortness of breath, cough or sputum.  GASTROINTESTINAL:  No anorexia, nausea, vomiting or diarrhea. No abdominal pain or blood.  GENITOURINARY:  No Burning on urination.   NEUROLOGICAL:  No headache, dizziness, syncope, paralysis, ataxia, numbness or tingling in the extremities.  MUSCULOSKELETAL:  No muscle, back pain, joint pain or stiffness.    Physical Exam:  Vital signs: Blood pressure 107/69, pulse 73, temperature 97.8 °F (36.6 °C), temperature source Oral, resp. rate 19, height 170.2 cm (5' 7\"), weight 209 lb (94.8 kg), SpO2 96%.    General: Alert, oriented, NAD  HEENT: Moist mucous membranes. EOMI  Neck: No lymphadenopathy.  Supple.  Cardiovascular: RRR  Respiratory: Clear to auscultation bilaterally.  No wheezes. No rhonchi.  Abdomen: Soft, nontender, nondistended  Integument: RLE 2+ edema, dependant erythema. Not warm, non tender.     Laboratory Data:  Recent Labs   Lab 07/03/25  0448 07/04/25  0857   RBC 4.29 3.64*   HGB 11.4* 9.6*   HCT 35.6* 31.0*   MCV 83.0 85.2   MCH 26.6 26.4   MCHC 32.0 31.0   RDW 17.5 17.4   NEPRELIM 9.72*  --    WBC 13.2* 8.5   .0 199.0     Recent Labs   Lab 07/03/25  0448 07/04/25  0857   * 100*   BUN 35* 26*   CREATSERUM 1.85* 1.35*   CA 9.1 8.5*   ALB 4.3  --     141   K 4.0 4.0    106   CO2 26.0 28.0   ALKPHO 63  --    AST 23  --    ALT 35  --    BILT 0.5  --    TP 7.1  --        Microbiology: Reviewed in EMR    Radiology: Reviewed      Aidan Brown MD   [facility]  [phone]  7/4/2025         [1]   Past Medical History:   Amputation of right foot (HCC)    non-healing. Followed by [facility]    Anemia    Atrial fibrillation (HCC)     CAD (coronary artery disease)    CHF (congestive heart failure) (HCC)    Easy bruising    Encephalopathy acute    Esophageal reflux    Fatigue    Hearing impairment    Hearing loss    Heart attack (HCC)    High blood pressure    High cholesterol    History of MI (myocardial infarction)    HTN (hypertension)    Hyperlipidemia    Leg swelling    Loss of appetite    PAD (peripheral artery disease)    Peripheral vascular disease    Uncomfortable fullness after meals    Visual impairment    glasses    Wears glasses   [2]   Past Surgical History:  Procedure Laterality Date    Angiogram      Angioplasty (coronary)      Cabg      Fracture surgery Left     left hip pinning    Other surgical history Left 01/01/1977    knee surgery    Other surgical history  03/25/2016    Left hip ORIF pinning    Tonsillectomy     [3]   Family History  Problem Relation Age of Onset    Cancer Father    [4]   Allergies  Allergen Reactions    Heparin ANAPHYLAXIS    Hydromorphone HALLUCINATION   [5]   Current Facility-Administered Medications:     piperacillin-tazobactam (Zosyn) 3.375 g in dextrose 5% 100 mL IVPB-ADDV, 3.375 g, Intravenous, Q8H    vancomycin (Vancocin) 1.5 g in sodium chloride 0.9% 250mL IVPB premix, 15 mg/kg, Intravenous, Q12H    apixaban (Eliquis) tab 5 mg, 5 mg, Oral, BID    finasteride (Proscar) tab 5 mg, 5 mg, Oral, Daily    gabapentin (Neurontin) cap 300 mg, 300 mg, Oral, QAM    gabapentin (Neurontin) cap 600 mg, 600 mg, Oral, QPM    metoprolol succinate ER (Toprol XL) 24 hr tab 50 mg, 50 mg, Oral, Daily Beta Blocker    predniSONE (Deltasone) tab 15 mg, 15 mg, Oral, Daily with breakfast    sertraline (Zoloft) tab 50 mg, 50 mg, Oral, Daily    tamsulosin (Flomax) cap 0.4 mg, 0.4 mg, Oral, Daily    clopidogrel (Plavix) tab 75 mg, 75 mg, Oral, Daily    sodium chloride 0.9% infusion, , Intravenous, Continuous    ondansetron (Zofran) 4 MG/2ML injection 4 mg, 4 mg, Intravenous, Q6H PRN    metoclopramide (Reglan) 5 mg/mL  injection 5 mg, 5 mg, Intravenous, Q8H PRN    polyethylene glycol (PEG 3350) (Miralax) 17 g oral packet 17 g, 17 g, Oral, Daily PRN    sennosides (Senokot) tab 17.2 mg, 17.2 mg, Oral, Nightly PRN    bisacodyl (Dulcolax) 10 MG rectal suppository 10 mg, 10 mg, Rectal, Daily PRN

## 2025-07-04 NOTE — PROGRESS NOTES
Received pt A&Ox3 forgetful/confused at times.  on RA. NSR/Afib on tele. Eliquis for VTE prophylaxis. Meds per MAR. IVF. Tolerating diet 1:1 feed. Voids via primofit. Denies Pain. Plan of care continues, no further needs at this time.

## 2025-07-04 NOTE — PLAN OF CARE
AAOx3- forgetful. [place]. Legally blind. RA. Tele NS/A.fib. Eliquis. Brief and primofit in place. Up fernando steady. 1:1 feed. Regular diet. 0.9 @ 75/hr. Patient rounded on routinely and updated on plan of care.    Problem: PAIN - ADULT  Goal: Verbalizes/displays adequate comfort level or patient's stated pain goal  Description: INTERVENTIONS:  - Encourage pt to monitor pain and request assistance  - Assess pain using appropriate pain scale  - Administer analgesics based on type and severity of pain and evaluate response  - Implement non-pharmacological measures as appropriate and evaluate response  - Consider cultural and social influences on pain and pain management  - Manage/alleviate anxiety  - Utilize distraction and/or relaxation techniques  - Monitor for opioid side effects  - Notify MD/LIP if interventions unsuccessful or patient reports new pain  - Anticipate increased pain with activity and pre-medicate as appropriate  Outcome: Progressing     Problem: RISK FOR INFECTION - ADULT  Goal: Absence of fever/infection during anticipated neutropenic period  Description: INTERVENTIONS  - Monitor WBC  - Administer growth factors as ordered  - Implement neutropenic guidelines  Outcome: Progressing     Problem: SAFETY ADULT - FALL  Goal: Free from fall injury  Description: INTERVENTIONS:  - Assess pt frequently for physical needs  - Identify cognitive and physical deficits and behaviors that affect risk of falls.  - [place] fall precautions as indicated by assessment.  - Educate pt/family on patient safety including physical limitations  - Instruct pt to call for assistance with activity based on assessment  - Modify environment to reduce risk of injury  - Provide assistive devices as appropriate  - Consider OT/PT consult to assist with strengthening/mobility  - Encourage toileting schedule  Outcome: Progressing     Problem: DISCHARGE PLANNING  Goal: Discharge to home or other facility with appropriate  resources  Description: INTERVENTIONS:  - Identify barriers to discharge w/pt and caregiver  - Include patient/family/discharge partner in discharge planning  - Arrange for needed discharge resources and transportation as appropriate  - Identify discharge learning needs (meds, wound care, etc)  - Arrange for interpreters to assist at discharge as needed  - Consider post-discharge preferences of patient/family/discharge partner  - Complete POLST form as appropriate  - Assess patient's ability to be responsible for managing their own health  - Refer to Case Management Department for coordinating discharge planning if the patient needs post-hospital services based on physician/LIP order or complex needs related to functional status, cognitive ability or social support system  Outcome: Progressing     Problem: Altered Communication/Language Barrier  Goal: Patient/Family is able to understand and participate in their care  Description: Interventions:  - Assess communication ability and preferred communication style  - Implement communication aides and strategies  - Use visual cues when possible  - Listen attentively, be patient, do not interrupt  - Minimize distractions  - Allow time for understanding and response  - Establish method for patient to ask for assistance (call light)  - Provide an  as needed  - Communicate barriers and strategies to overcome with those who interact with patient  Outcome: Progressing

## 2025-07-04 NOTE — CONSULTS
[facility] Pharmacy Dosing Service      Initial Pharmacokinetic Consult for Vancomycin Dosing     Alejandro Guarddao is a 89 year old male who is being initiated on vancomycin therapy for pneumonia.  Pharmacy has been asked to dose vancomycin by Dr. Loza. The initial treatment and monitoring approach will be non-AUC strategy.        Weight and Temperature:    Wt Readings from Last 1 Encounters:   25 94.8 kg (209 lb)        Temp Readings from Last 1 Encounters:   25 98.1 °F (36.7 °C) (Oral)      Labs:   Recent Labs   Lab 25  0448 25  0857   CREATSERUM 1.85* 1.35*      Estimated Creatinine Clearance: 34 mL/min (A) (based on SCr of 1.35 mg/dL (H)).     Recent Labs   Lab 25  0448 25  0857   WBC 13.2* 8.5          The Pharmacokinetic Target is:    Trough/random 10-15 mg/L    Renal Dosing Considerations:    ERON/ARF     Assessment/Plan:   Initial/Loading dose: Will receive 1500 mg IV (15 mg/kg, capped at 2250 mg) x 1 initial dose.      Maintenance dose: Pharmacy will dose vancomycin at 1500 mg IV every 36 hours    Monitorin) Plan for vancomycin trough to be obtained at steady state    2) Pharmacy will order SCr as clinically indicated to assess renal function.    3) Pharmacy will monitor for toxicity and efficacy, adjust vancomycin dose and/or frequency, and order vancomycin levels as appropriate per the Pharmacy and Therapeutics Committee approved protocol until discontinuation of the medication.       We appreciate the opportunity to assist in the care of this patient.     Gabriella Fraga, PharmD  2025  2:00 PM  [facility]  Pharmacy Extension: [phone]

## 2025-07-04 NOTE — CM/SW NOTE
PT recs DONOVAN- referral in aidin- needs PASSR/CLRC and assessment.    Sherrell Murphy, RACHELEW  /Discharge Planner

## 2025-07-05 PROCEDURE — 99233 SBSQ HOSP IP/OBS HIGH 50: CPT | Performed by: HOSPITALIST

## 2025-07-05 NOTE — OCCUPATIONAL THERAPY NOTE
OCCUPATIONAL THERAPY EVALUATION - INPATIENT     Room Number: 531/531-A  Evaluation Date: 7/5/2025  Type of Evaluation: Initial  Presenting Problem: PNA, hallucinations, L knee pain    Physician Order: IP Consult to Occupational Therapy  Reason for Therapy: ADL/IADL Dysfunction and Discharge Planning    OCCUPATIONAL THERAPY ASSESSMENT   Patient is currently functioning below baseline with toileting, bathing, lower body dressing, grooming, bed mobility, and transfers. Prior to admission, patient's baseline is mod indep with walker at home alone.  Patient is requiring moderate assistance as a result of the following impairments: decreased functional strength, decreased functional reach, decreased endurance, cognitive deficits (EF, memory), and medical status. Occupational Therapy will continue to follow for duration of hospitalization.    Patient will benefit from continued skilled OT Services to promote return to prior level of function and safety with continuous assistance and gradual rehabilitative therapy    History Related to Current Admission: Patient is a 89 year old male admitted on 7/3/2025 with Presenting Problem: PNA, hallucinations, L knee pain. Co-Morbidities : cad and afib    WEIGHT BEARING RESTRICTION       Recommendations for nursing staff:   Transfers: x1 with walker  Toileting location: bathroom    EVALUATION SESSION:  Patient Start of Session: bed    FUNCTIONAL TRANSFER ASSESSMENT  Sit to Stand: Edge of Bed  Edge of Bed: Minimal Assist  Toilet Transfer: Minimal Assist    BED MOBILITY  Supine to Sit : Minimal Assist    BALANCE ASSESSMENT     FUNCTIONAL ADL ASSESSMENT  LB Dressing Seated: Maximum Assist  LB Dressing Standing: Maximum Assist  Toileting Standing: Moderate Assist    ACTIVITY TOLERANCE: good                         O2 SATURATIONS       COGNITION  Overall Cognitive Status:  Impaired  Attention Span:  difficulty dividing attention  Following Commands:  follows one step commands with  increased time and follows one step commands with repetition  Safety Judgement:  decreased awareness of need for safety    Upper Extremity   ROM: within functional limits   Strength: within functional limits   Coordination  Gross motor: intact  Fine motor: intact  Sensation: Light touch:  intact    EDUCATION PROVIDED  Patient Education : Plan of Care; Role of Occupational Therapy; Functional Transfer Techniques; Discharge Recommendations; Posture/Positioning; Edema Reduction; Energy Conservation; Proper Body Mechanics  Patient's Response to Education: Verbalized Understanding    Equipment used: walker  Demonstrates functional use, Would benefit from additional trial      Therapist comments: Received pt supine in bed, Michael bed mobility, maxA LB dressing sitting EOB due to impaired functional reach. Michael sit to stand tx and CGA ambulation to bathroom. Michael toilet tx, maxA toileting and LB dressing. CGA grooming at sink. Returned to bedside chair.     Patient End of Session: Up in chair, Needs met, Call light within reach, RN aware of session/findings, All patient questions and concerns addressed, Hospital anti-slip socks, Alarm set    OCCUPATIONAL PROFILE    HOME SITUATION  Type of Home: House  Home Layout: Two level, Able to live on main level, Bed/bath upstairs (+ basement as well)  Lives With: Alone (cat- Che)    Toilet and Equipment: Toilet riser with arms  Shower/Tub and Equipment: Shower chair, Walk-in shower             Drives: No  Patient Regularly Uses: Rolling walker, Wheelchair (lift recliner, adjustable bed)    Prior Level of Function: mod indep at home alone, up stairs every other day to show    SUBJECTIVE   Pleasant and cooperative     PAIN ASSESSMENT  Ratin          OBJECTIVE  Precautions: Bed/chair alarm, Hard of hearing, Low vision  Fall Risk: High fall risk    ASSESSMENTS    AM-PAC ‘6-Clicks’ Inpatient Daily Activity Short Form  -   Putting on and taking off regular lower body clothing?: A  Lot  -   Bathing (including washing, rinsing, drying)?: A Lot  -   Toileting, which includes using toilet, bedpan or urinal? : A Lot  -   Putting on and taking off regular upper body clothing?: A Little  -   Taking care of personal grooming such as brushing teeth?: A Little  -   Eating meals?: None    AM-PAC Score:  Score: 16  Approx Degree of Impairment: 53.32%  Standardized Score (AM-PAC Scale): 35.96    ADDITIONAL TESTS     NEUROLOGICAL FINDINGS      COGNITION ASSESSMENTS     PLAN     OT Treatment Plan: Balance activities, Energy conservation/work simplification techniques, ADL training, Functional transfer training, UE strengthening/ROM, Patient/Family training, Patient/Family education, Compensatory technique education, Endurance training  Rehab Potential : Fair  Frequency: 3-5x/week  Number of Visits to Meet Established Goals: 7    ADL Goals   Patient will perform lower body dressing:  with supervision  Patient will perform toileting: with supervision    Functional Transfer Goals  Patient will transfer to toilet:  with supervision      Patient Evaluation Complexity Level:   Occupational Profile/Medical History LOW - Brief history including review of medical or therapy records    Specific performance deficits impacting engagement in ADL/IADL LOW  1 - 3 performance deficits    Client Assessment/Performance Deficits LOW - No comorbidities nor modifications of tasks    Clinical Decision Making LOW - Analysis of occupational profile, problem-focused assessments, limited treatment options    Overall Complexity LOW     OT Session Time: 30 minutes  Self-Care Home Management: 23 minutes  Therapeutic Activity:  minutes  Neuromuscular Re-education:  minutes  Therapeutic Exercise:  minutes  Cognitive Skills:  minutes  Sensory Integrative: minutes  Orthotic Management and Training:  minutes  Can add/delete any of these

## 2025-07-05 NOTE — PROGRESS NOTES
INFECTIOUS DISEASE  PROGRESS NOTE            [facility]    Alejandro Guardado Patient Status:  Inpatient    1935 MRN [id number]   [facility] 5NW-A Attending Wendy Borden MD   Hosp Day # 2 PCP Stanislav Odonnell MD     Antibiotics: Ceftriaxone #2  Vanc -dc    Subjective:  : resting comfortable    Objective:  Temp:  [97.3 °F (36.3 °C)-98.1 °F (36.7 °C)] 97.7 °F (36.5 °C)  Pulse:  [64-79] 79  Resp:  [16-18] 16  BP: (109-125)/(52-79) 125/79  SpO2:  [85 %-97 %] 97 %    General: awake alert  Vital signs:Temp:  [97.3 °F (36.3 °C)-98.1 °F (36.7 °C)] 97.7 °F (36.5 °C)  Pulse:  [64-79] 79  Resp:  [16-18] 16  BP: (109-125)/(52-79) 125/79  SpO2:  [85 %-97 %] 97 %  HEENT: Moist mucous membranes. Extraocular muscles are intact.  Neck: supple no masses  Respiratory: Non labored, symmetric excursion, normal respirations  Cardiovascular: no irregularities in rhythm  Abdomen: Soft, nontender, nondistended.   Musculoskeletal: joints: no swelling   Integument: No lesions. No erythema. No open wounds.  Labs:   C-Reactive Protein (mg/dL)   Date Value   2023 1.37 (H)        Recent Labs   Lab 25  0448 25  0857   RBC 4.29 3.64*   HGB 11.4* 9.6*   HCT 35.6* 31.0*   MCV 83.0 85.2   MCH 26.6 26.4   MCHC 32.0 31.0   RDW 17.5 17.4   NEPRELIM 9.72*  --    WBC 13.2* 8.5   .0 199.0         Recent Labs   Lab 25  0448 25  0857   * 100*   BUN 35* 26*   CREATSERUM 1.85* 1.35*   CA 9.1 8.5*   ALB 4.3  --     141   K 4.0 4.0    106   CO2 26.0 28.0   ALKPHO 63  --    AST 23  --    ALT 35  --    BILT 0.5  --    TP 7.1  --        No results found for: \"VANCT\"  Microbiology    Hospital Encounter on 25   1. Urine Culture, Routine     Status: None    Collection Time: 25 12:07 PM    Specimen: Urine, clean catch   Result Value Ref Range    Urine Culture No Growth at 18-24 hrs. N/A   2. Blood Culture     Status: None (Preliminary result)    Collection Time: 25   7:35 AM    Specimen: Blood,peripheral   Result Value Ref Range    Blood Culture Result No Growth 1 Day N/A           Problem list reviewed:  Problem List[1]          ASSESSMENT/PLAN:  1. RUL pneumonia  Leg UAG negative  Alpha strep in 1 blood culture ( see below)      Continue Ceftriaxone, azithromycin x 3 days  DC vanc    2. Non hemolytic strep isolated in anaerobic bottle from 1 of 2 sets  -unclear if contaminant or related to pneumonia   Endocarditis unlikely with only 1 bottle positive, ECHO pending      Reg Rogers MD, MD [facility]  [phone]         [1]   Patient Active Problem List  Diagnosis    Closed minimally displaced zone I fracture of sacrum (HCC)    At risk for falling    CAD (coronary artery disease)    Ischemic cardiomyopathy    Azotemia    Arrhythmia    Esophageal reflux    History of MI (myocardial infarction)    Mixed hyperlipidemia    Primary hypertension    Dizziness    Medication side effect    Closed left hip fracture (HCC)  Global 6/22/2016    Status post hip surgery    Left shoulder distal clavical resection and excision of ganglion cyst of soft tissue mass   Global  09/17/2020    Orthopedic aftercare for healing traumatic hip fracture, left, closed    Closed left hip fracture, with routine healing, subsequent encounter    Osteoarthrosis, localized, primary, knee, left    Osteoarthrosis, localized, primary, knee, right    Gangrene (HCC)    PAD (peripheral artery disease)    Benign prostatic hyperplasia with incomplete bladder emptying    Gangrene of foot (HCC)    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) ([facility])    Leukocytosis    Atrial fibrillation with RVR ([facility])    Hypokalemia    Acute kidney injury    Hyperglycemia    Community acquired pneumonia of right lung, unspecified part of lung    Acute respiratory failure with hypoxia (HCC)    Hypotension due to hypovolemia    Sepsis due to pneumonia ([facility])    Foot ulcer with fat layer  exposed, right ([facility])    Syncope, unspecified syncope type    Sepsis, due to unspecified organism, unspecified whether acute organ dysfunction present ([facility])    Shock ([facility])    Acute hypoxic respiratory failure ([facility])    Pneumonia, bacterial    Hyponatremia    Metabolic alkalosis    Recurrent pneumonia    Sepsis due to undetermined organism ([facility])    Acute on chronic congestive heart failure, unspecified heart failure type ([facility])    Acute hypoxemic respiratory failure ([facility])    ERON (acute kidney injury)    Lactic acidosis    Leukocytosis, unspecified type    Palliative care encounter    Counseling regarding advance care planning and goals of care    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) ([facility])    Sepsis with acute renal failure and septic shock ([facility])    Aspiration pneumonitis ([facility])    Multifocal pneumonia    Acute renal failure superimposed on chronic kidney disease, unspecified acute renal failure type, unspecified CKD stage    Elevated troponin    NSTEMI (non-ST elevated myocardial infarction) ([facility])    Anemia    SIRS (systemic inflammatory response syndrome) ([facility])    Encephalopathy acute    Acute cystitis without hematuria    HCAP (healthcare-associated pneumonia)    Transient alteration of awareness    Adrenal insufficiency (Luis's disease) ([facility])    Community acquired pneumonia, unspecified laterality

## 2025-07-05 NOTE — PLAN OF CARE
Patient Aox2-3. Forgetful. Hard of hearing, legally blind. Upper and lower dentures. RA. Hx of jagruti, no cpap. Afib on tele. Lovenox. Primo and brief. Incontinent of bowel and bladder. BM today. 1:1 feed, regular diet. PRN meds per MAR. Iv abx, PO prednisone. Up x1 with walker. IV saline locked. Plan for echo. Patient and family updated on POC, no further questions at this time.    Problem: PAIN - ADULT  Goal: Verbalizes/displays adequate comfort level or patient's stated pain goal  Description: INTERVENTIONS:  - Encourage pt to monitor pain and request assistance  - Assess pain using appropriate pain scale  - Administer analgesics based on type and severity of pain and evaluate response  - Implement non-pharmacological measures as appropriate and evaluate response  - Consider cultural and social influences on pain and pain management  - Manage/alleviate anxiety  - Utilize distraction and/or relaxation techniques  - Monitor for opioid side effects  - Notify MD/LIP if interventions unsuccessful or patient reports new pain  - Anticipate increased pain with activity and pre-medicate as appropriate  Outcome: Progressing     Problem: RISK FOR INFECTION - ADULT  Goal: Absence of fever/infection during anticipated neutropenic period  Description: INTERVENTIONS  - Monitor WBC  - Administer growth factors as ordered  - Implement neutropenic guidelines  Outcome: Progressing     Problem: SAFETY ADULT - FALL  Goal: Free from fall injury  Description: INTERVENTIONS:  - Assess pt frequently for physical needs  - Identify cognitive and physical deficits and behaviors that affect risk of falls.  - [place] fall precautions as indicated by assessment.  - Educate pt/family on patient safety including physical limitations  - Instruct pt to call for assistance with activity based on assessment  - Modify environment to reduce risk of injury  - Provide assistive devices as appropriate  - Consider OT/PT consult to assist with  strengthening/mobility  - Encourage toileting schedule  Outcome: Progressing     Problem: DISCHARGE PLANNING  Goal: Discharge to home or other facility with appropriate resources  Description: INTERVENTIONS:  - Identify barriers to discharge w/pt and caregiver  - Include patient/family/discharge partner in discharge planning  - Arrange for needed discharge resources and transportation as appropriate  - Identify discharge learning needs (meds, wound care, etc)  - Arrange for interpreters to assist at discharge as needed  - Consider post-discharge preferences of patient/family/discharge partner  - Complete POLST form as appropriate  - Assess patient's ability to be responsible for managing their own health  - Refer to Case Management Department for coordinating discharge planning if the patient needs post-hospital services based on physician/LIP order or complex needs related to functional status, cognitive ability or social support system  Outcome: Progressing     Problem: Altered Communication/Language Barrier  Goal: Patient/Family is able to understand and participate in their care  Description: Interventions:  - Assess communication ability and preferred communication style  - Implement communication aides and strategies  - Use visual cues when possible  - Listen attentively, be patient, do not interrupt  - Minimize distractions  - Allow time for understanding and response  - Establish method for patient to ask for assistance (call light)  - Provide an  as needed  - Communicate barriers and strategies to overcome with those who interact with patient  Outcome: Progressing

## 2025-07-05 NOTE — PROGRESS NOTES
[facility]   part of [facility]     Hospitalist Progress Note     Alejandro Guardado Patient Status:  Inpatient    1935 MRN [id number]   Location [facility] 5NW-A Attending Wendy Borden MD   Hosp Day # 2 PCP Stanislav Odonnell MD     Chief Complaint: sob confusion    Subjective:     Patient denies sob  Less confusion    Objective:    Review of Systems:   A comprehensive review of systems was completed; pertinent positive and negatives stated in subjective.    Vital signs:  Temp:  [97.3 °F (36.3 °C)-98.4 °F (36.9 °C)] 98.4 °F (36.9 °C)  Pulse:  [64-79] 78  Resp:  [16-19] 19  BP: (109-125)/(51-79) 112/51  SpO2:  [85 %-97 %] 97 %    Physical Exam:    General: No acute distress  Respiratory: No wheezes, no rhonchi  Cardiovascular: S1, S2, regular rate and rhythm  Abdomen: Soft, Non-tender, non-distended, positive bowel sounds  Neuro: No new focal deficits.   Extremities: No edema      Diagnostic Data:    Labs:  Recent Labs   Lab 258 25  0857   WBC 13.2* 8.5   HGB 11.4* 9.6*   MCV 83.0 85.2   .0 199.0       Recent Labs   Lab 258 25  0857   * 100*   BUN 35* 26*   CREATSERUM 1.85* 1.35*   CA 9.1 8.5*   ALB 4.3  --     141   K 4.0 4.0    106   CO2 26.0 28.0   ALKPHO 63  --    AST 23  --    ALT 35  --    BILT 0.5  --    TP 7.1  --        Estimated Glomerular Filtration Rate: 50 mL/min/1.73m2 (A) (result from lab).    Recent Labs   Lab 25  0448   TROPHS 11       No results for input(s): \"PTP\", \"INR\" in the last 168 hours.               Microbiology    Hospital Encounter on 25   1. Urine Culture, Routine     Status: None    Collection Time: 25 12:07 PM    Specimen: Urine, clean catch   Result Value Ref Range    Urine Culture No Growth at 18-24 hrs. N/A   2. Blood Culture     Status: None (Preliminary result)    Collection Time: 25  7:35 AM    Specimen: Blood,peripheral   Result Value Ref Range    Blood Culture Result No Growth  1 Day N/A         Imaging: Reviewed in Epic.    Medications: Scheduled Medications[1]    Assessment & Plan:      #pneumonia  Last admission September 2024  Sepsis with leukocytosis WBC 13.2  -Monitor urine sputum and blood cultures  -on ceftriaxone and Zithromax  -echo today  ERON  -Creatinine 1.8 and baseline creatinine 1.2  -Gentle hydration  Acute metabolic encephalopathy  -With visual hallucinations due to infection  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  -Monitor clinically  Secondary adrenal insufficiency  -Continue steroids  Atrial fibrillation, paroxysmal  -On Eliquis and beta-blocker  CAD status post CABG  -On Plavix  Hyperlipidemia  BPH  Mild anemia      Lis Loza MD    Supplementary Documentation:     Quality:  DVT Mechanical Prophylaxis:   SCDs, Early ambuation  DVT Pharmacologic Prophylaxis   Medication    apixaban (Eliquis) tab 5 mg                Code Status: DNAR/Selective Treatment  Block: No urinary catheter in place  Block Duration (in days):   Central line:    DIEGO:     Discharge is dependent on: progress  At this point Mr. Guardado is expected to be discharge to: home    The 21st Century Cures Act makes medical notes like these available to patients in the interest of transparency. Please be advised this is a medical document. Medical documents are intended to carry relevant information, facts as evident, and the clinical opinion of the practitioner. The medical note is intended as peer to peer communication and may appear blunt or direct. It is written in medical language and may contain abbreviations or verbiage that are unfamiliar.                         [1]    cefTRIAXone  2 g Intravenous Q24H    apixaban  5 mg Oral BID    finasteride  5 mg Oral Daily    gabapentin  300 mg Oral QAM    gabapentin  600 mg Oral QPM    metoprolol succinate ER  50 mg Oral Daily Beta Blocker    predniSONE  15 mg Oral Daily with breakfast    sertraline  50 mg Oral Daily    tamsulosin  0.4 mg Oral Daily     clopidogrel  75 mg Oral Daily

## 2025-07-05 NOTE — PROGRESS NOTES
[facility]   part of [facility]     Hospitalist Progress Note     Alejandro Guardado Patient Status:  Inpatient    1935 MRN [id number]   Location [facility] 5NW-A Attending Wendy Borden MD   Hosp Day # 2 PCP Stanislav Odonnell MD     Chief Complaint: sob confusion    Subjective:     Patient denies sob    Objective:    Review of Systems:   A comprehensive review of systems was completed; pertinent positive and negatives stated in subjective.    Vital signs:  Temp:  [97.3 °F (36.3 °C)-98.4 °F (36.9 °C)] 98.4 °F (36.9 °C)  Pulse:  [64-79] 78  Resp:  [16-19] 19  BP: (109-125)/(51-79) 112/51  SpO2:  [85 %-97 %] 97 %    Physical Exam:    General: No acute distress  Respiratory: No wheezes, no rhonchi  Cardiovascular: S1, S2, regular rate and rhythm  Abdomen: Soft, Non-tender, non-distended, positive bowel sounds  Neuro: No new focal deficits.   Extremities: No edema      Diagnostic Data:    Labs:  Recent Labs   Lab 258 25  0857   WBC 13.2* 8.5   HGB 11.4* 9.6*   MCV 83.0 85.2   .0 199.0       Recent Labs   Lab 258 25  0857   * 100*   BUN 35* 26*   CREATSERUM 1.85* 1.35*   CA 9.1 8.5*   ALB 4.3  --     141   K 4.0 4.0    106   CO2 26.0 28.0   ALKPHO 63  --    AST 23  --    ALT 35  --    BILT 0.5  --    TP 7.1  --        Estimated Glomerular Filtration Rate: 50 mL/min/1.73m2 (A) (result from lab).    Recent Labs   Lab 25  0448   TROPHS 11       No results for input(s): \"PTP\", \"INR\" in the last 168 hours.               Microbiology    Hospital Encounter on 25   1. Urine Culture, Routine     Status: None    Collection Time: 25 12:07 PM    Specimen: Urine, clean catch   Result Value Ref Range    Urine Culture No Growth at 18-24 hrs. N/A   2. Blood Culture     Status: None (Preliminary result)    Collection Time: 25  7:35 AM    Specimen: Blood,peripheral   Result Value Ref Range    Blood Culture Result No Growth 1 Day N/A          Imaging: Reviewed in Epic.    Medications: Scheduled Medications[1]    Assessment & Plan:      #pneumonia  Sepsis with leukocytosis WBC 13.2  -Monitor urine sputum and blood cultures  -on ceftriaxone and Zithromax  -echo today  ERON  -Creatinine 1.8 and baseline creatinine 1.2  -Gentle hydration  Acute metabolic encephalopathy  -With visual hallucinations due to infection  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  -Monitor clinically  Secondary adrenal insufficiency  -Continue steroids  Atrial fibrillation, paroxysmal  -On Eliquis and beta-blocker  CAD status post CABG  -On Plavix  Hyperlipidemia  BPH  Mild anemia      Lis Loza MD    Supplementary Documentation:     Quality:  DVT Mechanical Prophylaxis:   SCDs, Early ambuation  DVT Pharmacologic Prophylaxis   Medication    apixaban (Eliquis) tab 5 mg                Code Status: DNAR/Selective Treatment  Block: No urinary catheter in place  Block Duration (in days):   Central line:    DIEGO:     Discharge is dependent on: progress  At this point Mr. Guardado is expected to be discharge to: home    The 21st Century Cures Act makes medical notes like these available to patients in the interest of transparency. Please be advised this is a medical document. Medical documents are intended to carry relevant information, facts as evident, and the clinical opinion of the practitioner. The medical note is intended as peer to peer communication and may appear blunt or direct. It is written in medical language and may contain abbreviations or verbiage that are unfamiliar.                         [1]    cefTRIAXone  2 g Intravenous Q24H    apixaban  5 mg Oral BID    finasteride  5 mg Oral Daily    gabapentin  300 mg Oral QAM    gabapentin  600 mg Oral QPM    metoprolol succinate ER  50 mg Oral Daily Beta Blocker    predniSONE  15 mg Oral Daily with breakfast    sertraline  50 mg Oral Daily    tamsulosin  0.4 mg Oral Daily    clopidogrel  75 mg Oral Daily

## 2025-07-05 NOTE — PLAN OF CARE
Patient A&ox2-3, forgetful. [place], legally blind. , room air. PO steroids. Tele NS/ Afib. On eliquis. Lovneox subcutaneous for DVT PROPHYLAXIS. 1:1 Feed, Regular diet. Incx2, primo in place. IV rocephin, IV Vanco. Margy steady. Saline locked. No further needs at this time, call light within reach.  Problem: PAIN - ADULT  Goal: Verbalizes/displays adequate comfort level or patient's stated pain goal  Description: INTERVENTIONS:  - Encourage pt to monitor pain and request assistance  - Assess pain using appropriate pain scale  - Administer analgesics based on type and severity of pain and evaluate response  - Implement non-pharmacological measures as appropriate and evaluate response  - Consider cultural and social influences on pain and pain management  - Manage/alleviate anxiety  - Utilize distraction and/or relaxation techniques  - Monitor for opioid side effects  - Notify MD/LIP if interventions unsuccessful or patient reports new pain  - Anticipate increased pain with activity and pre-medicate as appropriate  7/4/2025 2159 by Kirstie Edwards RN  Outcome: Progressing  7/4/2025 2158 by Kirstie Edwards RN  Outcome: Progressing     Problem: RISK FOR INFECTION - ADULT  Goal: Absence of fever/infection during anticipated neutropenic period  Description: INTERVENTIONS  - Monitor WBC  - Administer growth factors as ordered  - Implement neutropenic guidelines  7/4/2025 2159 by Kirstie Edwards, RN  Outcome: Progressing  7/4/2025 2158 by Kirstie Edwards, RN  Outcome: Progressing     Problem: SAFETY ADULT - FALL  Goal: Free from fall injury  Description: INTERVENTIONS:  - Assess pt frequently for physical needs  - Identify cognitive and physical deficits and behaviors that affect risk of falls.  - [place] fall precautions as indicated by assessment.  - Educate pt/family on patient safety including physical limitations  - Instruct pt to call for assistance with activity based on assessment  - Modify environment to  reduce risk of injury  - Provide assistive devices as appropriate  - Consider OT/PT consult to assist with strengthening/mobility  - Encourage toileting schedule  7/4/2025 2159 by Kirstie Edwards RN  Outcome: Progressing  7/4/2025 2158 by Kirstie Edwards RN  Outcome: Progressing     Problem: DISCHARGE PLANNING  Goal: Discharge to home or other facility with appropriate resources  Description: INTERVENTIONS:  - Identify barriers to discharge w/pt and caregiver  - Include patient/family/discharge partner in discharge planning  - Arrange for needed discharge resources and transportation as appropriate  - Identify discharge learning needs (meds, wound care, etc)  - Arrange for interpreters to assist at discharge as needed  - Consider post-discharge preferences of patient/family/discharge partner  - Complete POLST form as appropriate  - Assess patient's ability to be responsible for managing their own health  - Refer to Case Management Department for coordinating discharge planning if the patient needs post-hospital services based on physician/LIP order or complex needs related to functional status, cognitive ability or social support system  7/4/2025 2159 by Kirstie Edwards, RN  Outcome: Progressing  7/4/2025 2158 by Kirstie Edwards, RN  Outcome: Progressing     Problem: Altered Communication/Language Barrier  Goal: Patient/Family is able to understand and participate in their care  Description: Interventions:  - Assess communication ability and preferred communication style  - Implement communication aides and strategies  - Use visual cues when possible  - Listen attentively, be patient, do not interrupt  - Minimize distractions  - Allow time for understanding and response  - Establish method for patient to ask for assistance (call light)  - Provide an  as needed  - Communicate barriers and strategies to overcome with those who interact with patient  7/4/2025 2159 by Kirstie Edwards, RN  Outcome:  Progressing  7/4/2025 2158 by Kirstie Edwards, RN  Outcome: Progressing

## 2025-07-06 ENCOUNTER — APPOINTMENT (OUTPATIENT)
Dept: CV DIAGNOSTICS | Facility: HOSPITAL | Age: OVER 89
End: 2025-07-06
Attending: INTERNAL MEDICINE
Payer: MEDICARE

## 2025-07-06 LAB
AMMONIA PLAS-MCNC: 25 UMOL/L (ref 11–32)
MAGNESIUM SERPL-MCNC: 2.1 MG/DL (ref 1.6–2.6)
PHOSPHATE SERPL-MCNC: 2.3 MG/DL (ref 2.4–5.1)
STREPTOCOCCUS DNA BLD POS NAA+NON-PROBE: DETECTED

## 2025-07-06 PROCEDURE — 99232 SBSQ HOSP IP/OBS MODERATE 35: CPT | Performed by: HOSPITALIST

## 2025-07-06 PROCEDURE — 93306 TTE W/DOPPLER COMPLETE: CPT | Performed by: INTERNAL MEDICINE

## 2025-07-06 RX ORDER — ACETAMINOPHEN 500 MG
1000 TABLET ORAL 3 TIMES DAILY PRN
Status: DISCONTINUED | OUTPATIENT
Start: 2025-07-06 | End: 2025-07-08

## 2025-07-06 RX ORDER — QUETIAPINE FUMARATE 25 MG/1
25 TABLET, FILM COATED ORAL NIGHTLY
Status: DISCONTINUED | OUTPATIENT
Start: 2025-07-06 | End: 2025-07-08

## 2025-07-06 NOTE — PROGRESS NOTES
Telemetry monitor tech called to report that patient is having runs of PVCs - couplets and triplets.  VSS, although he remains confused and hallucinating - seeing birds, bears and and elephants in his room. He denies SOB/chest pain.  He had no labs ordered for this morning.  Page sent to Dr Borden.

## 2025-07-06 NOTE — PLAN OF CARE
Patient Aox2-3. Increase in confusion and hallucinations compared to yesterday. Psych paged. Seroquel scheduled for evening. Forgetful. Legally blind, [place]. VSS. Upper and lower dentures. Afib on tele. Lovenox. Primo and brief. Incontinent. 1:1 Feed, regular diet. PRN meds per MAR. Iv abx. PO prednisone. Echo completed today. Patient and family updated on POC, no further questions at this time.    Problem: PAIN - ADULT  Goal: Verbalizes/displays adequate comfort level or patient's stated pain goal  Description: INTERVENTIONS:  - Encourage pt to monitor pain and request assistance  - Assess pain using appropriate pain scale  - Administer analgesics based on type and severity of pain and evaluate response  - Implement non-pharmacological measures as appropriate and evaluate response  - Consider cultural and social influences on pain and pain management  - Manage/alleviate anxiety  - Utilize distraction and/or relaxation techniques  - Monitor for opioid side effects  - Notify MD/LIP if interventions unsuccessful or patient reports new pain  - Anticipate increased pain with activity and pre-medicate as appropriate  Outcome: Progressing     Problem: RISK FOR INFECTION - ADULT  Goal: Absence of fever/infection during anticipated neutropenic period  Description: INTERVENTIONS  - Monitor WBC  - Administer growth factors as ordered  - Implement neutropenic guidelines  Outcome: Progressing     Problem: SAFETY ADULT - FALL  Goal: Free from fall injury  Description: INTERVENTIONS:  - Assess pt frequently for physical needs  - Identify cognitive and physical deficits and behaviors that affect risk of falls.  - [place] fall precautions as indicated by assessment.  - Educate pt/family on patient safety including physical limitations  - Instruct pt to call for assistance with activity based on assessment  - Modify environment to reduce risk of injury  - Provide assistive devices as appropriate  - Consider OT/PT consult to assist  with strengthening/mobility  - Encourage toileting schedule  Outcome: Progressing     Problem: DISCHARGE PLANNING  Goal: Discharge to home or other facility with appropriate resources  Description: INTERVENTIONS:  - Identify barriers to discharge w/pt and caregiver  - Include patient/family/discharge partner in discharge planning  - Arrange for needed discharge resources and transportation as appropriate  - Identify discharge learning needs (meds, wound care, etc)  - Arrange for interpreters to assist at discharge as needed  - Consider post-discharge preferences of patient/family/discharge partner  - Complete POLST form as appropriate  - Assess patient's ability to be responsible for managing their own health  - Refer to Case Management Department for coordinating discharge planning if the patient needs post-hospital services based on physician/LIP order or complex needs related to functional status, cognitive ability or social support system  Outcome: Progressing     Problem: Altered Communication/Language Barrier  Goal: Patient/Family is able to understand and participate in their care  Description: Interventions:  - Assess communication ability and preferred communication style  - Implement communication aides and strategies  - Use visual cues when possible  - Listen attentively, be patient, do not interrupt  - Minimize distractions  - Allow time for understanding and response  - Establish method for patient to ask for assistance (call light)  - Provide an  as needed  - Communicate barriers and strategies to overcome with those who interact with patient  Outcome: Progressing

## 2025-07-06 NOTE — PROGRESS NOTES
INFECTIOUS DISEASE  PROGRESS NOTE            [facility]    Alejandro Guardado Patient Status:  Inpatient    1935 MRN [id number]   [facility] 5NW-A Attending Wendy Borden MD   Hosp Day # 3 PCP Stanislav Odonnell MD     Antibiotics: Ceftriaxone #3    Subjective:  : resting comfortable    Objective:  Temp:  [98.1 °F (36.7 °C)-98.4 °F (36.9 °C)] 98.4 °F (36.9 °C)  Pulse:  [72-87] 86  Resp:  [16-20] 16  BP: (129-137)/(74-86) 137/74  SpO2:  [96 %-97 %] 97 %    General: awake alert  Vital signs:Temp:  [98.1 °F (36.7 °C)-98.4 °F (36.9 °C)] 98.4 °F (36.9 °C)  Pulse:  [72-87] 86  Resp:  [16-20] 16  BP: (129-137)/(74-86) 137/74  SpO2:  [96 %-97 %] 97 %  HEENT: Moist mucous membranes. Extraocular muscles are intact.  Neck: supple no masses  Respiratory: Non labored, symmetric excursion, normal respirations  Cardiovascular: no irregularities in rhythm  Abdomen: Soft, nontender, nondistended.   Musculoskeletal: joints: no swelling   Integument: No lesions. No erythema. No open wounds.  Labs:   C-Reactive Protein (mg/dL)   Date Value   2023 1.37 (H)        Recent Labs   Lab 25  0448 25  0857   RBC 4.29 3.64*   HGB 11.4* 9.6*   HCT 35.6* 31.0*   MCV 83.0 85.2   MCH 26.6 26.4   MCHC 32.0 31.0   RDW 17.5 17.4   NEPRELIM 9.72*  --    WBC 13.2* 8.5   .0 199.0         Recent Labs   Lab 25  0448 25  0857   * 100*   BUN 35* 26*   CREATSERUM 1.85* 1.35*   CA 9.1 8.5*   ALB 4.3  --     141   K 4.0 4.0    106   CO2 26.0 28.0   ALKPHO 63  --    AST 23  --    ALT 35  --    BILT 0.5  --    TP 7.1  --        No results found for: \"VANCT\"  Microbiology    Hospital Encounter on 25   1. Urine Culture, Routine     Status: None    Collection Time: 25 12:07 PM    Specimen: Urine, clean catch   Result Value Ref Range    Urine Culture No Growth at 18-24 hrs. N/A   2. Blood Culture     Status: None (Preliminary result)    Collection Time: 25  7:35 AM     Specimen: Blood,peripheral   Result Value Ref Range    Blood Culture Result No Growth 2 Days N/A           Problem list reviewed:  Problem List[1]          ASSESSMENT/PLAN:  1. RUL pneumonia  Leg UAG negative  Alpha strep in 1 blood culture ( see below)      Continue Ceftriaxone, azithromycin x 3 days  DC vanc    2. Non hemolytic strep isolated in anaerobic bottle from 1 of 2 sets  -unclear if contaminant or related to pneumonia   Endocarditis unlikely with only 1 bottle positive, ECHO pending      Reg Rogers MD, MD [facility]  [phone]         [1]   Patient Active Problem List  Diagnosis    Closed minimally displaced zone I fracture of sacrum (HCC)    At risk for falling    CAD (coronary artery disease)    Ischemic cardiomyopathy    Azotemia    Arrhythmia    Esophageal reflux    History of MI (myocardial infarction)    Mixed hyperlipidemia    Primary hypertension    Dizziness    Medication side effect    Closed left hip fracture (HCC)  Global 6/22/2016    Status post hip surgery    Left shoulder distal clavical resection and excision of ganglion cyst of soft tissue mass   Global  09/17/2020    Orthopedic aftercare for healing traumatic hip fracture, left, closed    Closed left hip fracture, with routine healing, subsequent encounter    Osteoarthrosis, localized, primary, knee, left    Osteoarthrosis, localized, primary, knee, right    Gangrene (HCC)    PAD (peripheral artery disease)    Benign prostatic hyperplasia with incomplete bladder emptying    Gangrene of foot (HCC)    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) ([facility])    Leukocytosis    Atrial fibrillation with RVR ([facility])    Hypokalemia    Acute kidney injury    Hyperglycemia    Community acquired pneumonia of right lung, unspecified part of lung    Acute respiratory failure with hypoxia ([facility])    Hypotension due to hypovolemia    Sepsis due to pneumonia ([facility])    Foot ulcer with fat layer exposed, right  ([facility])    Syncope, unspecified syncope type    Sepsis, due to unspecified organism, unspecified whether acute organ dysfunction present ([facility])    Shock ([facility])    Acute hypoxic respiratory failure ([facility])    Pneumonia, bacterial    Hyponatremia    Metabolic alkalosis    Recurrent pneumonia    Sepsis due to undetermined organism ([facility])    Acute on chronic congestive heart failure, unspecified heart failure type ([facility])    Acute hypoxemic respiratory failure ([facility])    ERON (acute kidney injury)    Lactic acidosis    Leukocytosis, unspecified type    Palliative care encounter    Counseling regarding advance care planning and goals of care    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) ([facility])    Sepsis with acute renal failure and septic shock ([facility])    Aspiration pneumonitis ([facility])    Multifocal pneumonia    Acute renal failure superimposed on chronic kidney disease, unspecified acute renal failure type, unspecified CKD stage    Elevated troponin    NSTEMI (non-ST elevated myocardial infarction) ([facility])    Anemia    SIRS (systemic inflammatory response syndrome) ([facility])    Encephalopathy acute    Acute cystitis without hematuria    HCAP (healthcare-associated pneumonia)    Transient alteration of awareness    Adrenal insufficiency (Luis's disease) ([facility])    Community acquired pneumonia, unspecified laterality

## 2025-07-06 NOTE — PLAN OF CARE
Pt is A&Ox2-3, increase confusion overnight. VSS, afebrile and denies any pain. SPO2 maintained on room air. Continuous pulse ox. Denies any SOB, cough. Tele/afib. Eliquis. Reg diet. Denies any n/v/d. Incontinent, primo in place. Up x1 w/walker. IV ABX. Safety precautions in place. Pt is updated with plan of care.    Problem: PAIN - ADULT  Goal: Verbalizes/displays adequate comfort level or patient's stated pain goal  Description: INTERVENTIONS:  - Encourage pt to monitor pain and request assistance  - Assess pain using appropriate pain scale  - Administer analgesics based on type and severity of pain and evaluate response  - Implement non-pharmacological measures as appropriate and evaluate response  - Consider cultural and social influences on pain and pain management  - Manage/alleviate anxiety  - Utilize distraction and/or relaxation techniques  - Monitor for opioid side effects  - Notify MD/LIP if interventions unsuccessful or patient reports new pain  - Anticipate increased pain with activity and pre-medicate as appropriate  Outcome: Progressing     Problem: RISK FOR INFECTION - ADULT  Goal: Absence of fever/infection during anticipated neutropenic period  Description: INTERVENTIONS  - Monitor WBC  - Administer growth factors as ordered  - Implement neutropenic guidelines  Outcome: Progressing

## 2025-07-06 NOTE — PROGRESS NOTES
[facility]   part of [facility]     Hospitalist Progress Note     Alejandro Guardado Patient Status:  Inpatient    1935 MRN [id number]   Location [facility] 5NW-A Attending Wendy Borden MD   Hosp Day # 3 PCP Stanislav Odonnell MD     Chief Complaint: sob confusion    Subjective:     Patient denies sob  Less confusion yesterday, didn't sleep last night, now with visual hallucinations    Objective:    Review of Systems:   A comprehensive review of systems was completed; pertinent positive and negatives stated in subjective.    Vital signs:  Temp:  [98.1 °F (36.7 °C)-98.4 °F (36.9 °C)] 98.4 °F (36.9 °C)  Pulse:  [72-87] 86  Resp:  [16-20] 16  BP: (129-137)/(74-86) 137/74  SpO2:  [96 %-97 %] 97 %    Physical Exam:    General: No acute distress  Respiratory: No wheezes, no rhonchi  Cardiovascular: S1, S2, regular rate and rhythm  Abdomen: Soft, Non-tender, non-distended, positive bowel sounds  Neuro: No new focal deficits.   Extremities: No edema      Diagnostic Data:    Labs:  Recent Labs   Lab 258 25  0857   WBC 13.2* 8.5   HGB 11.4* 9.6*   MCV 83.0 85.2   .0 199.0       Recent Labs   Lab 25  0448 25  0857   * 100*   BUN 35* 26*   CREATSERUM 1.85* 1.35*   CA 9.1 8.5*   ALB 4.3  --     141   K 4.0 4.0    106   CO2 26.0 28.0   ALKPHO 63  --    AST 23  --    ALT 35  --    BILT 0.5  --    TP 7.1  --        Estimated Glomerular Filtration Rate: 50 mL/min/1.73m2 (A) (result from lab).    Recent Labs   Lab 25  0448   TROPHS 11       No results for input(s): \"PTP\", \"INR\" in the last 168 hours.               Microbiology    Hospital Encounter on 25   1. Urine Culture, Routine     Status: None    Collection Time: 25 12:07 PM    Specimen: Urine, clean catch   Result Value Ref Range    Urine Culture No Growth at 18-24 hrs. N/A   2. Blood Culture     Status: None (Preliminary result)    Collection Time: 25  7:35 AM    Specimen:  Blood,peripheral   Result Value Ref Range    Blood Culture Result No Growth 2 Days N/A         Imaging: Reviewed in Epic.    Medications: Scheduled Medications[1]    Assessment & Plan:      #pneumonia  Last admission September 2024  Sepsis with leukocytosis WBC 13.2  -Monitor urine sputum and blood cultures  -on ceftriaxone and Zithromax  -echo today  ERON  -Creatinine 1.8 and baseline creatinine 1.2  -Gentle hydration  Acute metabolic encephalopathy  -With visual hallucinations due to infection and lack of sleep  -seroquel tonight  -psych eval  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  -Monitor clinically  Secondary adrenal insufficiency  -Continue steroids  Atrial fibrillation, paroxysmal  -On Eliquis and beta-blocker  CAD status post CABG  -On Plavix  Hyperlipidemia  BPH  Mild anemia      Lis Loza MD    Supplementary Documentation:     Quality:  DVT Mechanical Prophylaxis:   SCDs, Early ambuation  DVT Pharmacologic Prophylaxis   Medication    apixaban (Eliquis) tab 5 mg                Code Status: DNAR/Selective Treatment  Block: No urinary catheter in place  Block Duration (in days):   Central line:    DIEGO:     Discharge is dependent on: progress  At this point Mr. Guardado is expected to be discharge to: home    The 21st Century Cures Act makes medical notes like these available to patients in the interest of transparency. Please be advised this is a medical document. Medical documents are intended to carry relevant information, facts as evident, and the clinical opinion of the practitioner. The medical note is intended as peer to peer communication and may appear blunt or direct. It is written in medical language and may contain abbreviations or verbiage that are unfamiliar.                         [1]    cefTRIAXone  2 g Intravenous Q24H    apixaban  5 mg Oral BID    finasteride  5 mg Oral Daily    gabapentin  300 mg Oral QAM    gabapentin  600 mg Oral QPM    metoprolol succinate ER  50 mg Oral  Daily Beta Blocker    predniSONE  15 mg Oral Daily with breakfast    sertraline  50 mg Oral Daily    tamsulosin  0.4 mg Oral Daily    clopidogrel  75 mg Oral Daily

## 2025-07-07 PROBLEM — F05 DELIRIUM DUE TO ANOTHER MEDICAL CONDITION: Status: ACTIVE | Noted: 2025-07-07

## 2025-07-07 PROBLEM — F32.A DEPRESSIVE DISORDER: Status: ACTIVE | Noted: 2025-07-07

## 2025-07-07 LAB
ANION GAP SERPL CALC-SCNC: 8 MMOL/L (ref 0–18)
BUN BLD-MCNC: 20 MG/DL (ref 9–23)
CALCIUM BLD-MCNC: 9 MG/DL (ref 8.7–10.6)
CHLORIDE SERPL-SCNC: 105 MMOL/L (ref 98–112)
CO2 SERPL-SCNC: 28 MMOL/L (ref 21–32)
CREAT BLD-MCNC: 1.06 MG/DL (ref 0.7–1.3)
EGFRCR SERPLBLD CKD-EPI 2021: 67 ML/MIN/1.73M2 (ref 60–?)
GLUCOSE BLD-MCNC: 101 MG/DL (ref 70–99)
GLUCOSE BLD-MCNC: 91 MG/DL (ref 70–99)
GLUCOSE BLD-MCNC: 97 MG/DL (ref 70–99)
OSMOLALITY SERPL CALC.SUM OF ELEC: 295 MOSM/KG (ref 275–295)
POTASSIUM SERPL-SCNC: 3.9 MMOL/L (ref 3.5–5.1)
SODIUM SERPL-SCNC: 141 MMOL/L (ref 136–145)

## 2025-07-07 PROCEDURE — 90792 PSYCH DIAG EVAL W/MED SRVCS: CPT

## 2025-07-07 PROCEDURE — 99231 SBSQ HOSP IP/OBS SF/LOW 25: CPT | Performed by: HOSPITALIST

## 2025-07-07 PROCEDURE — 99499 UNLISTED E&M SERVICE: CPT | Performed by: HOSPITALIST

## 2025-07-07 RX ORDER — QUETIAPINE FUMARATE 25 MG/1
25 TABLET, FILM COATED ORAL NIGHTLY
Qty: 14 TABLET | Refills: 0 | Status: SHIPPED | OUTPATIENT
Start: 2025-07-07

## 2025-07-07 RX ORDER — PREDNISONE 10 MG/1
10 TABLET ORAL
Status: DISCONTINUED | OUTPATIENT
Start: 2025-07-08 | End: 2025-07-08

## 2025-07-07 RX ORDER — FUROSEMIDE 40 MG/1
40 TABLET ORAL 2 TIMES DAILY
Status: DISCONTINUED | OUTPATIENT
Start: 2025-07-07 | End: 2025-07-08

## 2025-07-07 RX ORDER — SERTRALINE HYDROCHLORIDE 25 MG/1
25 TABLET, FILM COATED ORAL DAILY
Status: DISCONTINUED | OUTPATIENT
Start: 2025-07-08 | End: 2025-07-08

## 2025-07-07 RX ORDER — PREDNISONE 10 MG/1
5 TABLET ORAL DAILY
Qty: 3 TABLET | Refills: 0 | Status: SHIPPED | OUTPATIENT
Start: 2025-07-07 | End: 2025-07-07

## 2025-07-07 NOTE — PROGRESS NOTES
INFECTIOUS DISEASE  PROGRESS NOTE            [facility]    Alejandro Guardado Patient Status:  Inpatient    1935 MRN [id number]   [facility] 5NW-A Attending Wendy Borden MD   Hosp Day # 4 PCP Stanislav Odonnell MD     Antibiotics: Ceftriaxone #4    Subjective:  : resting comfortable    Objective:  Temp:  [97.4 °F (36.3 °C)-98.4 °F (36.9 °C)] 97.4 °F (36.3 °C)  Pulse:  [72-94] 83  Resp:  [17-19] 19  BP: (115-130)/(59-90) 130/90  SpO2:  [96 %-99 %] 99 %    General: awake alert  Vital signs:Temp:  [97.4 °F (36.3 °C)-98.4 °F (36.9 °C)] 97.4 °F (36.3 °C)  Pulse:  [72-94] 83  Resp:  [17-19] 19  BP: (115-130)/(59-90) 130/90  SpO2:  [96 %-99 %] 99 %  HEENT: Moist mucous membranes. Extraocular muscles are intact.  Neck: supple no masses  Respiratory: Non labored, symmetric excursion, normal respirations  Cardiovascular: no irregularities in rhythm  Abdomen: Soft, nontender, nondistended.   Musculoskeletal: joints: no swelling   Integument: No lesions. No erythema. No open wounds.  Labs:   C-Reactive Protein (mg/dL)   Date Value   2023 1.37 (H)        Recent Labs   Lab 258 25  0857   RBC 4.29 3.64*   HGB 11.4* 9.6*   HCT 35.6* 31.0*   MCV 83.0 85.2   MCH 26.6 26.4   MCHC 32.0 31.0   RDW 17.5 17.4   NEPRELIM 9.72*  --    WBC 13.2* 8.5   .0 199.0         Recent Labs   Lab 25  0448 25  0857 25  0607   * 100* 97   BUN 35* 26* 20   CREATSERUM 1.85* 1.35* 1.06   CA 9.1 8.5* 9.0   ALB 4.3  --   --     141 141   K 4.0 4.0 3.9    106 105   CO2 26.0 28.0 28.0   ALKPHO 63  --   --    AST 23  --   --    ALT 35  --   --    BILT 0.5  --   --    TP 7.1  --   --        No results found for: \"VANCT\"  Microbiology    Hospital Encounter on 25   1. Urine Culture, Routine     Status: None    Collection Time: 25 12:07 PM    Specimen: Urine, clean catch   Result Value Ref Range    Urine Culture No Growth at 18-24 hrs. N/A   2. Blood Culture      Status: None (Preliminary result)    Collection Time: 07/03/25  7:35 AM    Specimen: Blood,peripheral   Result Value Ref Range    Blood Culture Result No Growth 3 Days N/A           Problem list reviewed:  Problem List[1]          ASSESSMENT/PLAN:  1. RUL pneumonia  Leg UAG negative  Alpha strep in 1 blood culture ( see below)      Continue Ceftriaxone x 5-7 d, azithromycin x 3 days  DC vanc    2. Non hemolytic strep isolated in anaerobic bottle from 1 of 2 sets  -unclear if contaminant or related to pneumonia   Endocarditis unlikely with only 1 bottle positive, ECHO pending      Reg Rogers MD, MD [facility]  [phone]         [1]   Patient Active Problem List  Diagnosis    Closed minimally displaced zone I fracture of sacrum (HCC)    At risk for falling    CAD (coronary artery disease)    Ischemic cardiomyopathy    Azotemia    Arrhythmia    Esophageal reflux    History of MI (myocardial infarction)    Mixed hyperlipidemia    Primary hypertension    Dizziness    Medication side effect    Closed left hip fracture (HCC)  Global 6/22/2016    Status post hip surgery    Left shoulder distal clavical resection and excision of ganglion cyst of soft tissue mass   Global  09/17/2020    Orthopedic aftercare for healing traumatic hip fracture, left, closed    Closed left hip fracture, with routine healing, subsequent encounter    Osteoarthrosis, localized, primary, knee, left    Osteoarthrosis, localized, primary, knee, right    Gangrene ([facility])    PAD (peripheral artery disease)    Benign prostatic hyperplasia with incomplete bladder emptying    Gangrene of foot ([facility])    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) ([facility])    Leukocytosis    Atrial fibrillation with RVR ([facility])    Hypokalemia    Acute kidney injury    Hyperglycemia    Community acquired pneumonia of right lung, unspecified part of lung    Acute respiratory failure with hypoxia ([facility])    Hypotension due to  hypovolemia    Sepsis due to pneumonia ([facility])    Foot ulcer with fat layer exposed, right ([facility])    Syncope, unspecified syncope type    Sepsis, due to unspecified organism, unspecified whether acute organ dysfunction present ([facility])    Shock ([facility])    Acute hypoxic respiratory failure ([facility])    Pneumonia, bacterial    Hyponatremia    Metabolic alkalosis    Recurrent pneumonia    Sepsis due to undetermined organism ([facility])    Acute on chronic congestive heart failure, unspecified heart failure type ([facility])    Acute hypoxemic respiratory failure ([facility])    ERON (acute kidney injury)    Lactic acidosis    Leukocytosis, unspecified type    Palliative care encounter    Counseling regarding advance care planning and goals of care    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) ([facility])    Sepsis with acute renal failure and septic shock ([facility])    Aspiration pneumonitis ([facility])    Multifocal pneumonia    Acute renal failure superimposed on chronic kidney disease, unspecified acute renal failure type, unspecified CKD stage    Elevated troponin    NSTEMI (non-ST elevated myocardial infarction) ([facility])    Anemia    SIRS (systemic inflammatory response syndrome) ([facility])    Encephalopathy acute    Acute cystitis without hematuria    HCAP (healthcare-associated pneumonia)    Transient alteration of awareness    Adrenal insufficiency (Luis's disease) ([facility])    Community acquired pneumonia, unspecified laterality

## 2025-07-07 NOTE — PLAN OF CARE
Pt from home alone   Aox2  Hard of hearing; Legally blind   Upper and lower dentures  VSS on RA  afebrile  Afib on tele, receiving Eliquis   Electrolyte cardiac replacement  Incontinent - Brief and primofit in place  Bed alarm on and call light within reach  Regular diet  Receiving IV antibiotics  Medications administered per the MAR   Pts needs attended    Problem: PAIN - ADULT  Goal: Verbalizes/displays adequate comfort level or patient's stated pain goal  Description: INTERVENTIONS:  - Encourage pt to monitor pain and request assistance  - Assess pain using appropriate pain scale  - Administer analgesics based on type and severity of pain and evaluate response  - Implement non-pharmacological measures as appropriate and evaluate response  - Consider cultural and social influences on pain and pain management  - Manage/alleviate anxiety  - Utilize distraction and/or relaxation techniques  - Monitor for opioid side effects  - Notify MD/LIP if interventions unsuccessful or patient reports new pain  - Anticipate increased pain with activity and pre-medicate as appropriate  Outcome: Progressing     Problem: RISK FOR INFECTION - ADULT  Goal: Absence of fever/infection during anticipated neutropenic period  Description: INTERVENTIONS  - Monitor WBC  - Administer growth factors as ordered  - Implement neutropenic guidelines  Outcome: Progressing     Problem: SAFETY ADULT - FALL  Goal: Free from fall injury  Description: INTERVENTIONS:  - Assess pt frequently for physical needs  - Identify cognitive and physical deficits and behaviors that affect risk of falls.  - [place] fall precautions as indicated by assessment.  - Educate pt/family on patient safety including physical limitations  - Instruct pt to call for assistance with activity based on assessment  - Modify environment to reduce risk of injury  - Provide assistive devices as appropriate  - Consider OT/PT consult to assist with strengthening/mobility  - Encourage  toileting schedule  Outcome: Progressing     Problem: DISCHARGE PLANNING  Goal: Discharge to home or other facility with appropriate resources  Description: INTERVENTIONS:  - Identify barriers to discharge w/pt and caregiver  - Include patient/family/discharge partner in discharge planning  - Arrange for needed discharge resources and transportation as appropriate  - Identify discharge learning needs (meds, wound care, etc)  - Arrange for interpreters to assist at discharge as needed  - Consider post-discharge preferences of patient/family/discharge partner  - Complete POLST form as appropriate  - Assess patient's ability to be responsible for managing their own health  - Refer to Case Management Department for coordinating discharge planning if the patient needs post-hospital services based on physician/LIP order or complex needs related to functional status, cognitive ability or social support system  Outcome: Progressing     Problem: Altered Communication/Language Barrier  Goal: Patient/Family is able to understand and participate in their care  Description: Interventions:  - Assess communication ability and preferred communication style  - Implement communication aides and strategies  - Use visual cues when possible  - Listen attentively, be patient, do not interrupt  - Minimize distractions  - Allow time for understanding and response  - Establish method for patient to ask for assistance (call light)  - Provide an  as needed  - Communicate barriers and strategies to overcome with those who interact with patient  Outcome: Progressing

## 2025-07-07 NOTE — PLAN OF CARE
Problem: PAIN - ADULT  Goal: Verbalizes/displays adequate comfort level or patient's stated pain goal  Description: INTERVENTIONS:  - Encourage pt to monitor pain and request assistance  - Assess pain using appropriate pain scale  - Administer analgesics based on type and severity of pain and evaluate response  - Implement non-pharmacological measures as appropriate and evaluate response  - Consider cultural and social influences on pain and pain management  - Manage/alleviate anxiety  - Utilize distraction and/or relaxation techniques  - Monitor for opioid side effects  - Notify MD/LIP if interventions unsuccessful or patient reports new pain  - Anticipate increased pain with activity and pre-medicate as appropriate  Outcome: Progressing     Problem: RISK FOR INFECTION - ADULT  Goal: Absence of fever/infection during anticipated neutropenic period  Description: INTERVENTIONS  - Monitor WBC  - Administer growth factors as ordered  - Implement neutropenic guidelines  Outcome: Progressing     Problem: SAFETY ADULT - FALL  Goal: Free from fall injury  Description: INTERVENTIONS:  - Assess pt frequently for physical needs  - Identify cognitive and physical deficits and behaviors that affect risk of falls.  - [place] fall precautions as indicated by assessment.  - Educate pt/family on patient safety including physical limitations  - Instruct pt to call for assistance with activity based on assessment  - Modify environment to reduce risk of injury  - Provide assistive devices as appropriate  - Consider OT/PT consult to assist with strengthening/mobility  - Encourage toileting schedule  Outcome: Progressing     Problem: DISCHARGE PLANNING  Goal: Discharge to home or other facility with appropriate resources  Description: INTERVENTIONS:  - Identify barriers to discharge w/pt and caregiver  - Include patient/family/discharge partner in discharge planning  - Arrange for needed discharge resources and transportation as  appropriate  - Identify discharge learning needs (meds, wound care, etc)  - Arrange for interpreters to assist at discharge as needed  - Consider post-discharge preferences of patient/family/discharge partner  - Complete POLST form as appropriate  - Assess patient's ability to be responsible for managing their own health  - Refer to Case Management Department for coordinating discharge planning if the patient needs post-hospital services based on physician/LIP order or complex needs related to functional status, cognitive ability or social support system  Outcome: Progressing     Problem: Altered Communication/Language Barrier  Goal: Patient/Family is able to understand and participate in their care  Description: Interventions:  - Assess communication ability and preferred communication style  - Implement communication aides and strategies  - Use visual cues when possible  - Listen attentively, be patient, do not interrupt  - Minimize distractions  - Allow time for understanding and response  - Establish method for patient to ask for assistance (call light)  - Provide an  as needed  - Communicate barriers and strategies to overcome with those who interact with patient  Outcome: Progressing

## 2025-07-07 NOTE — PHYSICAL THERAPY NOTE
PHYSICAL THERAPY TREATMENT NOTE - INPATIENT    Room Number: 531/531-A     Session: 1     Number of Visits to Meet Established Goals: 5    Presenting Problem: visual hallucinations, L knee pain  Co-Morbidities : cad and afib    PHYSICAL THERAPY ASSESSMENT   Patient demonstrates limited progress this session, goals  remain in progress.      Patient is requiring moderate assist and maximum assist as a result of the following impairments: decreased functional strength, decreased endurance/aerobic capacity, pain, impaired dynamic balance, decreased muscular endurance, and medical status.     Patient continues to function below baseline with bed mobility, transfers, gait, and stair negotiation.  Next session anticipate patient to progress bed mobility, transfers, and gait.  Physical Therapy will continue to follow patient for duration of hospitalization.    Patient continues to benefit from continued skilled PT services: to promote return to prior level of function and safety with continuous assistance and gradual rehabilitative therapy .    PLAN DURING HOSPITALIZATION  Nursing Mobility Recommendation : 1 Assist  PT Device Recommendation: Rolling walker  PT Treatment Plan: Bed mobility, Body mechanics, Coordination, Endurance, Energy conservation, Patient education, Gait training, Family education, Neuromuscular re-educate, Range of motion, Strengthening, Stoop training, Balance training, Transfer training, Stair training  Frequency (Obs): 3-5x/week     CURRENT GOALS     Goal #1 Patient is able to demonstrate supine - sit EOB @ level: supervision     Goal #2 Patient is able to demonstrate transfers Sit to/from Stand at assistance level: supervision     Goal #3 Patient is able to ambulate 50 feet with assist device: walker - rolling at assistance level: supervision     Goal #4    Goal #5    Goal #6    Goal Comments: Goals established on 7/4/2025 7/7/2025 all goals ongoing.    SUBJECTIVE  \" My shoulder  hurts.\"    OBJECTIVE  Precautions: Bed/chair alarm, Hard of hearing, Low vision    WEIGHT BEARING RESTRICTION     PAIN ASSESSMENT   Rating: Unable to rate  Location: R shoulder and L knee  Management Techniques: Repositioning    BALANCE                                                                                                                       Static Sitting: Fair  Dynamic Sitting: Fair -           Static Standing: Poor +  Dynamic Standing: Poor +    ACTIVITY TOLERANCE                         O2 WALK       AM-PAC '6-Clicks' INPATIENT SHORT FORM - BASIC MOBILITY  How much difficulty does the patient currently have...  Patient Difficulty: Turning over in bed (including adjusting bedclothes, sheets and blankets)?: A Little   Patient Difficulty: Sitting down on and standing up from a chair with arms (e.g., wheelchair, bedside commode, etc.): A Lot   Patient Difficulty: Moving from lying on back to sitting on the side of the bed?: A Little   How much help from another person does the patient currently need...   Help from Another: Moving to and from a bed to a chair (including a wheelchair)?: A Lot   Help from Another: Need to walk in hospital room?: A Lot   Help from Another: Climbing 3-5 steps with a railing?: Total     AM-PAC Score:  Raw Score: 13   Approx Degree of Impairment: 64.91%   Standardized Score (AM-PAC Scale): 36.74   CMS Modifier (G-Code): CL    FUNCTIONAL ABILITY STATUS  Gait Assessment   Functional Mobility/Gait Assessment  Gait Assistance: Moderate assistance  Distance (ft): 10  Assistive Device: Rolling walker  Pattern: Shuffle (jen knee and trunk flexed.)    Skilled Therapy Provided    Bed Mobility:  Rolling: NT   Supine<>Sit: NT   Sit<>Supine: NT     Transfer Mobility:  Sit<>Stand: min assist of one and cga of second person from low chair, increased posture instability upon standing and longer time to attain upright posture. Patient maintained ue support on arm rest of the chair for 1 min  then able to  upright posture.    Stand<>Sit: cga   Gait: RW and min assist of two. Patient with flexed posture, jen knees slightly flexed and dec wb on RUE due to pain in R shoulder.  Chair follow.   Distance limited due to fatigue and pain.     Therapist's Comments: RN approved session. Epic messaged RN regarding pain in R shoulder. RN confirmed that she will message MD.       THERAPEUTIC EXERCISES  Lower Extremity Ankle pumps  Hip AB/AD  Heel slides  LAQ  SLR     Upper Extremity Elbow flex/ext,  - open/close, and unable to tolerate R shoulder flex due to pain.      Position Sitting     Repetitions   10   Sets   2     Patient End of Session: Up in chair, Needs met, Call light within reach, RN aware of session/findings, All patient questions and concerns addressed, Hospital anti-slip socks, Alarm set, SCDs in place, Discussed recommendations with /    PT Session Time: 30 minutes  Gait Trainin minutes  Therapeutic Activity: 25 minutes  T

## 2025-07-07 NOTE — CM/SW NOTE
Prior Authorization - Destination  Destination Type: Skilled nursing facility  Service Provider: PAC  Payer Communication Destination Comments: JESSIE [id number]  Prior Authorization Status: Submitted/Pending     Celestina Munoz  DSC

## 2025-07-07 NOTE — BH PROGRESS NOTE
Resources for outpatient psychiatry as requested by MATTIE Sam provided in discharge instructions.

## 2025-07-07 NOTE — CONSULTS
[facility]  Report of Psychiatric Consultation    Alejandro Guardado Patient Status:  Inpatient    1935 MRN [id number]   Location [facility] 5NW-A Attending Wendy Borden MD   Hosp Day # 4 PCP Stanislav Odonnell MD     Date of Admission: 7/3/2025  Date of Consult: 2025  Reason for Consultation: delirium    Impression:    Primary Psychiatric Diagnosis: Delirium     Secondary Psychiatric Diagnoses: MDD with psychotic features     Rule Out Diagnoses: Dementia, Bipolar depression    Personality Traits:     Pertinent Medical Diagnoses: PNA, Acute on chronic CHF, ERON      Recommendations:   1) Continue seroquel 25 mg nightly  2) Decrease zoloft to 25 mg daily  3) Contact family for collateral information  4) Continue inpatient antibiotics  5) Re-evalate steroid course  6) Start Delerium protocol      History of Present Illness:   Patient seen today sitting in his chair taking a nap. 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 Patient is a 89 year old male retired  with a  history of depression who lives alone with his cat who was admitted after family became concerned when he was describing hallucinations at 0300 on .     The patient lives alone with his cat. His wife of 60 years passed away 6 years ago due to cancer. His son visits him regularly to help him with his medications and ADL's including cooking, cleaning and shopping. The patient tripped on his cat and injured his knee 2 days prior to this admission. During this initial assessment, the patient stated that the knee injury was what brought him in. He denies having hallucinations or stating that he did. He did describe hallucinations later in the assessment. He thought he went downstairs last night to watch a movie with a strange man he did not know. He also described another room here that was cluttered with filing cabinets and other messes. \"These people here are nuts, I'm not paying for that room.\"     The  patient is alert and oriented x 3. Able to state the correct date, name and place. Able to recite the days of the week backwards starting at Monday with ease and speed. He engages in conversation easily and is pleasurable to speak with. The more he spoke, the more he elucidated and confirmed his hallucinations.     The patient denies any history of depression, bipolar, anxiety, or familial history. He also denies any history of dementia or family history of. He denies having suicidal, homicidal, or self harm thoughts. Denies anxiety or any symptoms including racing thoughts, palpitations, sweating, impending doom, pressured speech. Denies any depression. States his mood today is \"fine.\" States he slept but not well. Patient was started on 25 mg of seroquel last night. Denies symptoms of and none observed of akathisia, tremors, drooling, dysarthria.      Son Bradley was called for collateral. He answered and this call was placed on speaker so his wife, Sherrell the daughter in law, could participate. The following information is based on the conversation. Evan has been on zoloft 25 for some time and he recently was increased to 50 due to increased depressive symptoms. He has made statements such as \"I'm ready to go\" and \"Its about time.\" He will avoid family gatherings because he is self conscious about using his wheelchair and walker in public. He is also paranoid about using the bathroom in public settings because he is worried people are watching him. He has not been eating and drinking at times. Family states they monitor his intake by assessing his garbage and recycling bins.     The patient appeared to be minimizing his symptoms and contradicting himself. He was calm and pleasant to speak who even made an occasional joke. He stated his mood was fine. His affect was flat and blunted. He stated he did not and has not had hallucinations yet continued to describe them. Family is concerned about his moods, continued  hallucinations, and safety at home. Patient has demonstrated an immediate compromise to care for himself at home.     Plan is to continue nightly seroquel, decrease the zoloft, continue inpatient medical treatment. Continue reassessments. Continue safety precautions. Start delirium protocol - Continue non-pharm delirium care- orientation, cognitive stimulation when hypoactive, decreased stimulation when agitated, family visits, lights on and blinds open in daytime and quiet and dark at night and PT when appropriate. Avoid/limit sedating medications if possible. Document sleep, naps, and activities.       Past Psychiatric History: Depression    Substance Use History: Past alcohol use    Psych Family History: Denies    Social and Developmental History:   Currently lives alone with cat. Has children. Retired constructions worker. Wife passed about 6 years ago. Needs some assistance with ADLs. No known legal issues.     Past Medical History[1]  Past Surgical History[2]  Family History[3]   reports that he has never smoked. He has never used smokeless tobacco. He reports that he does not drink alcohol and does not use drugs.    Allergies:  Allergies   Allergen Reactions    Heparin ANAPHYLAXIS    Hydromorphone HALLUCINATION       Medications:   Current Facility-Administered Medications:     [START ON 7/8/2025] predniSONE (Deltasone) tab 10 mg, 10 mg, Oral, Daily with breakfast    furosemide (Lasix) tab 40 mg, 40 mg, Oral, BID    [START ON 7/8/2025] sertraline (Zoloft) tab 25 mg, 25 mg, Oral, Daily    acetaminophen (Tylenol Extra Strength) tab 1,000 mg, 1,000 mg, Oral, TID PRN    QUEtiapine (SEROquel) tab 25 mg, 25 mg, Oral, Nightly    cefTRIAXone (Rocephin) 2 g in sodium chloride 0.9% 100mL IVPB-MARGARET, 2 g, Intravenous, Q24H    apixaban (Eliquis) tab 5 mg, 5 mg, Oral, BID    finasteride (Proscar) tab 5 mg, 5 mg, Oral, Daily    gabapentin (Neurontin) cap 300 mg, 300 mg, Oral, QAM    gabapentin (Neurontin) cap 600 mg, 600 mg,  Oral, QPM    metoprolol succinate ER (Toprol XL) 24 hr tab 50 mg, 50 mg, Oral, Daily Beta Blocker    tamsulosin (Flomax) cap 0.4 mg, 0.4 mg, Oral, Daily    clopidogrel (Plavix) tab 75 mg, 75 mg, Oral, Daily    ondansetron (Zofran) 4 MG/2ML injection 4 mg, 4 mg, Intravenous, Q6H PRN    metoclopramide (Reglan) 5 mg/mL injection 5 mg, 5 mg, Intravenous, Q8H PRN    polyethylene glycol (PEG 3350) (Miralax) 17 g oral packet 17 g, 17 g, Oral, Daily PRN    sennosides (Senokot) tab 17.2 mg, 17.2 mg, Oral, Nightly PRN    bisacodyl (Dulcolax) 10 MG rectal suppository 10 mg, 10 mg, Rectal, Daily PRN    ROS - unable to complete accurately due to current delirium. Reports recently feeling weak and that he has had some falls.     Psychiatry Review Systems: No voices or visions. No elevated mood, racing thoughts, decreased need for sleep, grandiose thoughts, increased participation in risky behaviors, or history of trauma.     Mental Status Exam:   Risk Assessment  Suicidal ideation: no suicidal ideation  Homicidal ideation: Denied    Appearance: normal grooming and well groomed and neatly dressed  Behavior: psychomotor retarded  Attitude: cooperative and consistent  Gait: Normal    Speech: soft and slow    Mood: \"Fine\"  Affect: Incongruent, Blunted, and Flat    Thought process: sequential and tangential  Thought content: no delusions, obsessions, or other thought abnormalities  Perceptions: visual hallucinations  Associations: Intact    Orientation: Alert and oriented x 3, Alert, oriented person, oriented place, and oriented time  Attention and Concentration:   focused and attentive  Memory:  intact remote, impaired recent, and as evidenced by ability to present consistent history  Language: Intact naming and repetition  Fund of Knowledge: Able to recite name of [place] president    Insight: impaired as evidenced by presence of hallucinations  Judgment: severaly impaired as evidenced by belief in hallucinations    Objective    Vitals:     07/07/25 0729   BP: 130/90   Pulse: 83   Resp: 19   Temp: 97.4 °F (36.3 °C)     Patient Strengths/Assets:  Supportive family     Suicide Risk Assessments:  Overall level of suicide risk is low     Risk Factors: hallucinations, lives alone     Environmental Factors:     Protective Factors: strong family support    Laboratory Data:  Lab Results   Component Value Date    CREATSERUM 1.06 07/07/2025    BUN 20 07/07/2025     07/07/2025    K 3.9 07/07/2025     07/07/2025    CO2 28.0 07/07/2025    GLU 97 07/07/2025    CA 9.0 07/07/2025       STUDIES:    Hakan Holt PMHNP-BC      This note was prepared using Dragon Medical voice recognition dictation software. As a result errors may occur. When identified to these areas have been corrected. While every attempt is made to correct errors during dictation discrepancies may still exist. Please contact if there are any errors.          [1]   Past Medical History:   Amputation of right foot (HCC)    non-healing. Followed by [facility]    Anemia    Atrial fibrillation (HCC)    CAD (coronary artery disease)    CHF (congestive heart failure) (HCC)    Easy bruising    Encephalopathy acute    Esophageal reflux    Fatigue    Hearing impairment    Hearing loss    Heart attack (HCC)    High blood pressure    High cholesterol    History of MI (myocardial infarction)    HTN (hypertension)    Hyperlipidemia    Leg swelling    Loss of appetite    PAD (peripheral artery disease)    Peripheral vascular disease    Uncomfortable fullness after meals    Visual impairment    glasses    Wears glasses   [2]   Past Surgical History:  Procedure Laterality Date    Angiogram      Angioplasty (coronary)      Cabg      Fracture surgery Left     left hip pinning    Other surgical history Left 01/01/1977    knee surgery    Other surgical history  03/25/2016    Left hip ORIF pinning    Tonsillectomy     [3]   Family History  Problem Relation Age of Onset    Cancer Father

## 2025-07-07 NOTE — DISCHARGE SUMMARY
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    Brief Synopsis: Patient was found to have Streptococcus pneumonia and he improved slowly with antibiotics and supportive care.  Lasix was initially on hold and he was gently hydrated IV.  He is ready for discharge today Lasix is resumed and he will be continued on all home medicines including prednisone for adrenal insufficiency.    Lace+ Score: 72  59-90 High Risk  29-58 Medium Risk  0-28   Low Risk       TCM Follow-Up Recommendation:  LACE > 58: High Risk of readmission after discharge from the hospital.      Procedures during hospitalization:       Incidental or significant findings and recommendations (brief descriptions):      Lab/Test results pending at Discharge:       Consultants:      Discharge Medication List:     Discharge Medications        CHANGE how you take these medications        Instructions Prescription details   predniSONE 10 MG Tabs  Commonly known as: Deltasone  What changed:   medication strength  how much to take      Take 0.5 tablets (5 mg total) by mouth daily for 5 days.   Stop taking on: 2025  Quantity: 3 tablet  Refills: 0            CONTINUE taking these medications        Instructions Prescription details   apixaban 5 MG Tabs  Commonly known as: Eliquis       Take 1 tablet (5 mg total) by mouth 2 (two) times daily.   Quantity: 60 tablet  Refills: 3     atorvastatin 40 MG Tabs  Commonly known as: Lipitor      Take 1 tablet (40 mg total) by mouth daily.   Quantity: 30 tablet  Refills: 0     clopidogrel 75 MG Tabs  Commonly known as: Plavix      Take 1 tablet (75 mg total) by mouth daily.   Quantity: 30 tablet  Refills: 0     finasteride 5 MG Tabs  Commonly known as: Proscar      Take 1 tablet (5 mg total) by mouth daily.   Quantity: 90 tablet  Refills: 0     folic acid 1 MG Tabs  Commonly known as: Folvite      Take 1 tablet (1 mg total) by mouth in the morning.   Refills: 0     furosemide 40 MG Tabs  Commonly known as: Lasix      Take 1 tablet (40 mg total) by mouth in the morning and 1 tablet (40 mg total) before bedtime.   Refills: 0     gabapentin 300 MG Caps  Commonly known as: Neurontin      Take 1 capsule (300 mg total) by mouth every morning.   Refills: 0     gabapentin 300 MG Caps  Commonly known as: Neurontin      Take 2 capsules (600 mg total) by mouth every evening.   Refills: 0     metoprolol succinate ER 50 MG Tb24  Commonly known as: Toprol XL      Take 1 tablet (50 mg total) by mouth in the morning.   Refills: 0     Omeprazole 40 MG Cpdr      Take 1 capsule (40 mg total) by mouth in the morning.   Refills: 0     sertraline 50 MG Tabs  Commonly known as: Zoloft      Take 1 tablet (50 mg total) by mouth in the morning.   Refills: 0     tamsulosin 0.4 MG Caps  Commonly known as: Flomax      Take 1 capsule (0.4 mg total) by mouth in the morning.   Refills: 0     vitamin C 1000 MG Tabs      Take 1 tablet (1,000 mg total) by mouth in the morning.   Refills: 0            STOP taking these medications      lisinopril 2.5 MG Tabs  Commonly known as: Prinivil; Zestril                  Where to Get Your Medications        Please  your prescriptions at the location directed by your doctor or nurse    Bring a paper prescription for each of these  medications  predniSONE 10 MG Tabs         [place] reviewed[place]     Follow-up appointment:   No follow-up provider specified.  Appointments for Next 30 Days 2025 - 2025      None            Vital signs:  Temp:  [97.4 °F (36.3 °C)-98.4 °F (36.9 °C)] 97.4 °F (36.3 °C)  Pulse:  [72-94] 83  Resp:  [17-19] 19  BP: (115-130)/(59-90) 130/90  SpO2:  [96 %-99 %] 99 %    Physical Exam:    General: No acute distress   Lungs: clear to auscultation  Cardiovascular: S1, S2  Abdomen: Soft      -----------------------------------------------------------------------------------------------  PATIENT DISCHARGE INSTRUCTIONS: See electronic chart    Lis Loza MD    Total time spent on discharge plannin minutes     The  [employer] Cures Act makes medical notes like these available to patients in the interest of transparency. Please be advised this is a medical document. Medical documents are intended to carry relevant information, facts as evident, and the clinical opinion of the practitioner. The medical note is intended as peer to peer communication and may appear blunt or direct. It is written in medical language and may contain abbreviations or verbiage that are unfamiliar.

## 2025-07-07 NOTE — DISCHARGE INSTRUCTIONS
[facility]  [address] [place] [address]   [phone]  [facility]  [address] [place] [address]   [phone]  [facility]   [address] [place].[address]          [phone]  [facility]              [address] [place] [address]   [phone]  [facility]  [address] [place] [address]   [facility]  [address]  [place] [address]  [phone]  [facility]  [address] [place] [address]   [phone]      Lab: please draw 2 sets of blood cultures in 2 weeks on 7/22/25

## 2025-07-07 NOTE — CM/SW NOTE
Spoke with son Brandt by phone who is in agreement with [facility]. Son will be at bedside in an hour, DONOVAN list left for him to review. Discussed LTC when pt returns home from [facility]. Will follow up for choice.     12:30  Met with son and dil at bedside to provide DONOVAN choice. [facility]i [facility] #1 and [place] [facility] #2. Padmini reserved. ARI queued for review.     13:00  DSC is working on [employer] auth. JESSIE [id number]     LEXY Young RN, CM  [phone]

## 2025-07-08 VITALS
TEMPERATURE: 99 F | HEART RATE: 89 BPM | OXYGEN SATURATION: 98 % | BODY MASS INDEX: 33.59 KG/M2 | DIASTOLIC BLOOD PRESSURE: 68 MMHG | RESPIRATION RATE: 16 BRPM | WEIGHT: 214 LBS | HEIGHT: 67 IN | SYSTOLIC BLOOD PRESSURE: 103 MMHG

## 2025-07-08 PROCEDURE — 99232 SBSQ HOSP IP/OBS MODERATE 35: CPT

## 2025-07-08 PROCEDURE — 99238 HOSP IP/OBS DSCHRG MGMT 30/<: CPT | Performed by: HOSPITALIST

## 2025-07-08 NOTE — CM/SW NOTE
Prior Authorization - Destination  Destination Type: Skilled nursing facility  Service Provider: PAC  Payer Communication Destination Comments: JESSIE [id number]  Prior Authorization Status: Approved  Prior Authorization Start Date: 07/08/25  Prior Authorization Number: [id number]     Celestina Munoz DSC

## 2025-07-08 NOTE — PROGRESS NOTES
INFECTIOUS DISEASE  PROGRESS NOTE            [facility]    Alejandro Guardado Patient Status:  Inpatient    1935 MRN [id number]   Formerly [facility] 5NW-A Attending Wendy Borden MD   Hosp Day # 5 PCP Stanislav Odonnell MD     Antibiotics: Ceftriaxone #5 -dc    Subjective:  : resting comfortable    Objective:  Temp:  [97.7 °F (36.5 °C)-98.5 °F (36.9 °C)] 98.5 °F (36.9 °C)  Pulse:  [49-89] 89  Resp:  [15-18] 16  BP: ()/(57-70) 103/68  SpO2:  [98 %] 98 %    General: awake alert  Vital signs:Temp:  [97.7 °F (36.5 °C)-98.5 °F (36.9 °C)] 98.5 °F (36.9 °C)  Pulse:  [49-89] 89  Resp:  [15-18] 16  BP: ()/(57-70) 103/68  SpO2:  [98 %] 98 %  HEENT: Moist mucous membranes. Extraocular muscles are intact.  Neck: supple no masses  Respiratory: Non labored, symmetric excursion, normal respirations  Cardiovascular: no irregularities in rhythm  Abdomen: Soft, nontender, nondistended.   Musculoskeletal: joints: no swelling   Integument: No lesions. No erythema. No open wounds.  Labs:   C-Reactive Protein (mg/dL)   Date Value   2023 1.37 (H)        Recent Labs   Lab 258 25  0857   RBC 4.29 3.64*   HGB 11.4* 9.6*   HCT 35.6* 31.0*   MCV 83.0 85.2   MCH 26.6 26.4   MCHC 32.0 31.0   RDW 17.5 17.4   NEPRELIM 9.72*  --    WBC 13.2* 8.5   .0 199.0         Recent Labs   Lab 25  0448 25  0857 25  0607   * 100* 97   BUN 35* 26* 20   CREATSERUM 1.85* 1.35* 1.06   CA 9.1 8.5* 9.0   ALB 4.3  --   --     141 141   K 4.0 4.0 3.9    106 105   CO2 26.0 28.0 28.0   ALKPHO 63  --   --    AST 23  --   --    ALT 35  --   --    BILT 0.5  --   --    TP 7.1  --   --        No results found for: \"VANCT\"  Microbiology    Hospital Encounter on 25   1. Urine Culture, Routine     Status: None    Collection Time: 25 12:07 PM    Specimen: Urine, clean catch   Result Value Ref Range    Urine Culture No Growth at 18-24 hrs. N/A   2. Blood Culture     Status:  None (Preliminary result)    Collection Time: 07/03/25  7:35 AM    Specimen: Blood,peripheral   Result Value Ref Range    Blood Culture Result No Growth 4 Days N/A           Problem list reviewed:  Problem List[1]          ASSESSMENT/PLAN:  1. RUL pneumonia  Leg UAG negative  Alpha strep in 1 blood culture ( see below)  Complete antibiotic x 5 day    2. Non hemolytic strep isolated in anaerobic bottle from 1 of 2 sets  -unclear if contaminant or related to pneumonia   Endocarditis unlikely with only 1 bottle positive      Repeat blood cultures off antibiotics in 2 weeks      Reg Rogers MD, MD [facility]  [phone]         [1]   Patient Active Problem List  Diagnosis    Closed minimally displaced zone I fracture of sacrum (HCC)    At risk for falling    CAD (coronary artery disease)    Ischemic cardiomyopathy    Azotemia    Arrhythmia    Esophageal reflux    History of MI (myocardial infarction)    Mixed hyperlipidemia    Primary hypertension    Dizziness    Medication side effect    Closed left hip fracture (HCC)  Global 6/22/2016    Status post hip surgery    Left shoulder distal clavical resection and excision of ganglion cyst of soft tissue mass   Global  09/17/2020    Orthopedic aftercare for healing traumatic hip fracture, left, closed    Closed left hip fracture, with routine healing, subsequent encounter    Osteoarthrosis, localized, primary, knee, left    Osteoarthrosis, localized, primary, knee, right    Gangrene ([facility])    PAD (peripheral artery disease)    Benign prostatic hyperplasia with incomplete bladder emptying    Gangrene of foot ([facility])    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) ([facility])    Leukocytosis    Atrial fibrillation with RVR ([facility])    Hypokalemia    Acute kidney injury    Hyperglycemia    Community acquired pneumonia of right lung, unspecified part of lung    Acute respiratory failure with hypoxia ([facility])    Hypotension due to hypovolemia     Sepsis due to pneumonia ([facility])    Foot ulcer with fat layer exposed, right ([facility])    Syncope, unspecified syncope type    Sepsis, due to unspecified organism, unspecified whether acute organ dysfunction present ([facility])    Shock ([facility])    Acute hypoxic respiratory failure ([facility])    Pneumonia, bacterial    Hyponatremia    Metabolic alkalosis    Recurrent pneumonia    Sepsis due to undetermined organism ([facility])    Acute on chronic congestive heart failure, unspecified heart failure type ([facility])    Acute hypoxemic respiratory failure ([facility])    ERON (acute kidney injury)    Lactic acidosis    Leukocytosis, unspecified type    Palliative care encounter    Counseling regarding advance care planning and goals of care    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) ([facility])    Sepsis with acute renal failure and septic shock ([facility])    Aspiration pneumonitis ([facility])    Multifocal pneumonia    Acute renal failure superimposed on chronic kidney disease, unspecified acute renal failure type, unspecified CKD stage    Elevated troponin    NSTEMI (non-ST elevated myocardial infarction) ([facility])    Anemia    SIRS (systemic inflammatory response syndrome) ([facility])    Encephalopathy acute    Acute cystitis without hematuria    HCAP (healthcare-associated pneumonia)    Transient alteration of awareness    Adrenal insufficiency (Luis's disease) ([facility])    Community acquired pneumonia, unspecified laterality    Delirium due to another medical condition    Depressive disorder

## 2025-07-08 NOTE — PROGRESS NOTES
NURSING DISCHARGE NOTE    Discharged Rehab facility via Ambulance.  Accompanied by Family member  Belongings Taken by patient/family.

## 2025-07-08 NOTE — PLAN OF CARE
Pt from home alone   Aox2 - more confused overnight  Hard of hearing; Legally blind   Upper and lower dentures  VSS on RA  afebrile  Afib on tele, receiving Eliquis   Electrolyte cardiac replacement  Incontinent - Brief and primofit in place  Bed alarm on and call light within reach  Regular diet  Receiving IV antibiotics; PO prednisone and lasix  Medications administered per the MAR   Pts needs attended    Problem: PAIN - ADULT  Goal: Verbalizes/displays adequate comfort level or patient's stated pain goal  Description: INTERVENTIONS:  - Encourage pt to monitor pain and request assistance  - Assess pain using appropriate pain scale  - Administer analgesics based on type and severity of pain and evaluate response  - Implement non-pharmacological measures as appropriate and evaluate response  - Consider cultural and social influences on pain and pain management  - Manage/alleviate anxiety  - Utilize distraction and/or relaxation techniques  - Monitor for opioid side effects  - Notify MD/LIP if interventions unsuccessful or patient reports new pain  - Anticipate increased pain with activity and pre-medicate as appropriate  Outcome: Progressing     Problem: RISK FOR INFECTION - ADULT  Goal: Absence of fever/infection during anticipated neutropenic period  Description: INTERVENTIONS  - Monitor WBC  - Administer growth factors as ordered  - Implement neutropenic guidelines  Outcome: Progressing     Problem: SAFETY ADULT - FALL  Goal: Free from fall injury  Description: INTERVENTIONS:  - Assess pt frequently for physical needs  - Identify cognitive and physical deficits and behaviors that affect risk of falls.  - Maynard fall precautions as indicated by assessment.  - Educate pt/family on patient safety including physical limitations  - Instruct pt to call for assistance with activity based on assessment  - Modify environment to reduce risk of injury  - Provide assistive devices as appropriate  - Consider OT/PT consult  to assist with strengthening/mobility  - Encourage toileting schedule  Outcome: Progressing     Problem: DISCHARGE PLANNING  Goal: Discharge to home or other facility with appropriate resources  Description: INTERVENTIONS:  - Identify barriers to discharge w/pt and caregiver  - Include patient/family/discharge partner in discharge planning  - Arrange for needed discharge resources and transportation as appropriate  - Identify discharge learning needs (meds, wound care, etc)  - Arrange for interpreters to assist at discharge as needed  - Consider post-discharge preferences of patient/family/discharge partner  - Complete POLST form as appropriate  - Assess patient's ability to be responsible for managing their own health  - Refer to Case Management Department for coordinating discharge planning if the patient needs post-hospital services based on physician/LIP order or complex needs related to functional status, cognitive ability or social support system  Outcome: Progressing     Problem: Altered Communication/Language Barrier  Goal: Patient/Family is able to understand and participate in their care  Description: Interventions:  - Assess communication ability and preferred communication style  - Implement communication aides and strategies  - Use visual cues when possible  - Listen attentively, be patient, do not interrupt  - Minimize distractions  - Allow time for understanding and response  - Establish method for patient to ask for assistance (call light)  - Provide an  as needed  - Communicate barriers and strategies to overcome with those who interact with patient  Outcome: Progressing

## 2025-07-08 NOTE — CM/SW NOTE
07/08/25 1113   Discharge disposition   Expected discharge disposition subacute   Post Acute Care Provider [facility]   Discharge transportation [facility]     Informed by RN that patient is medically cleared for discharge. Insurance auth approved. Spoke with Gilberto from [facility] who confirmed bed available today. Spoke with [facility] and scheduled  for 2:30pm today. PCS form completed and available for RN to print. Spoke with son and DIL who are agreeable with discharge. SW will remain available.    [facility]  [phone]    [facility]  [phone] or [phone]      Taylor BENNETT LCSW  Discharge Planner

## 2025-07-08 NOTE — DISCHARGE SUMMARY
[facility]     Alejandro Guardado Patient Status:  Inpatient    1935 MRN [id number]   Location 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    Brief Synopsis: Patient was found to have Streptococcus pneumonia and he improved slowly with antibiotics and supportive care.  Lasix was initially on hold and he was gently hydrated IV.  He is ready for discharge today Lasix is resumed and he will be continued on all home medicines including prednisone for adrenal insufficiency.    Lace+ Score: 73  59-90 High Risk  29-58 Medium Risk  0-28   Low Risk       TCM Follow-Up Recommendation:  LACE > 58: High Risk of readmission after discharge from the hospital.      Procedures during hospitalization:       Incidental or significant findings and recommendations (brief descriptions):      Lab/Test results pending at Discharge:       Consultants:      Discharge Medication List:     Discharge Medications        START taking these medications        Instructions Prescription details   QUEtiapine 25 MG Tabs  Commonly known as: SEROquel      Take 1 tablet (25 mg total) by mouth nightly.   Quantity: 14 tablet  Refills: 0            CHANGE how you take these medications        Instructions Prescription details   sertraline 50 MG Tabs  Commonly known as: Zoloft  What changed: how much to take      Take 0.5 tablets (25 mg total) by mouth in the morning.   Refills:  0            CONTINUE taking these medications        Instructions Prescription details   apixaban 5 MG Tabs  Commonly known as: Eliquis      Take 1 tablet (5 mg total) by mouth 2 (two) times daily.   Quantity: 60 tablet  Refills: 3     atorvastatin 40 MG Tabs  Commonly known as: Lipitor      Take 1 tablet (40 mg total) by mouth daily.   Quantity: 30 tablet  Refills: 0     clopidogrel 75 MG Tabs  Commonly known as: Plavix      Take 1 tablet (75 mg total) by mouth daily.   Quantity: 30 tablet  Refills: 0     finasteride 5 MG Tabs  Commonly known as: Proscar      Take 1 tablet (5 mg total) by mouth daily.   Quantity: 90 tablet  Refills: 0     folic acid 1 MG Tabs  Commonly known as: Folvite      Take 1 tablet (1 mg total) by mouth in the morning.   Refills: 0     furosemide 40 MG Tabs  Commonly known as: Lasix      Take 1 tablet (40 mg total) by mouth in the morning and 1 tablet (40 mg total) before bedtime.   Refills: 0     gabapentin 300 MG Caps  Commonly known as: Neurontin      Take 1 capsule (300 mg total) by mouth every morning.   Refills: 0     gabapentin 300 MG Caps  Commonly known as: Neurontin      Take 2 capsules (600 mg total) by mouth every evening.   Refills: 0     metoprolol succinate ER 50 MG Tb24  Commonly known as: Toprol XL      Take 1 tablet (50 mg total) by mouth in the morning.   Refills: 0     Omeprazole 40 MG Cpdr      Take 1 capsule (40 mg total) by mouth in the morning.   Refills: 0     predniSONE 5 MG Tabs  Commonly known as: Deltasone      Take 3 tablets (15 mg total) by mouth in the morning.   Refills: 0     tamsulosin 0.4 MG Caps  Commonly known as: Flomax      Take 1 capsule (0.4 mg total) by mouth in the morning.   Refills: 0     vitamin C 1000 MG Tabs      Take 1 tablet (1,000 mg total) by mouth in the morning.   Refills: 0            STOP taking these medications      lisinopril 2.5 MG Tabs  Commonly known as: Prinivil; Zestril                  Where to Get Your Medications         Please  your prescriptions at the location directed by your doctor or nurse    Bring a paper prescription for each of these medications  QUEtiapine 25 MG Tabs         [place]     Follow-up appointment:   Stanislav Odonnell MD  [address] [facility] [address]  [phone]    Follow up in 1 week(s)  Follow up    Appointments for Next 30 Days 2025 - 2025      None            Vital signs:  Temp:  [97.7 °F (36.5 °C)-98.4 °F (36.9 °C)] 97.7 °F (36.5 °C)  Pulse:  [49-83] 80  Resp:  [15-18] 16  BP: ()/(57-70) 99/64  SpO2:  [98 %] 98 %    Physical Exam:    General: No acute distress   Lungs: clear to auscultation  Cardiovascular: S1, S2  Abdomen: Soft      -----------------------------------------------------------------------------------------------  PATIENT DISCHARGE INSTRUCTIONS: See electronic chart    Lis Loza MD    Total time spent on discharge plannin minutes     The  [employer] Cures Act makes medical notes like these available to patients in the interest of transparency. Please be advised this is a medical document. Medical documents are intended to carry relevant information, facts as evident, and the clinical opinion of the practitioner. The medical note is intended as peer to peer communication and may appear blunt or direct. It is written in medical language and may contain abbreviations or verbiage that are unfamiliar.

## 2025-07-08 NOTE — PROGRESS NOTES
[facility]  Report of Psychiatric Progress Note    Alejandro Guardado Patient Status:  Inpatient    1935 MRN [id number]   Location [facility] 5NW-A Attending Wendy Borden MD   Hosp Day # 5 PCP Stanislav Odonnell MD     Date of Admission: 7/3/2025  Date of Service: 2025  Reason for Consultation: delirium     Impression:  Primary Psychiatric Diagnosis:   Delirium due to medical condition     Secondary Psychiatric Diagnoses:  Depressive disorder, unspecified     Rule Out Diagnoses:   Dementia  MDD  Prolonged grief disorder  Bipolar     Personality Traits:  Deferred     Pertinent Medical Diagnoses:   PNA, Acute on chronic CHF, AKII      Recommendations:   Continue seroquel 25 mg nightly  Decreased zoloft to 25 mg daily  Contact family for collateral information  Continue medical management of infection per ID and Hospitalist  Delirium protocol:  Continue non-pharm delirium care- orientation, cognitive stimulation when hypoactive, decreased stimulation when agitated, family visits, lights on and blinds open in daytime and quiet and dark at night and PT when appropriate. Avoid/limit sedating medications if possible.  EKG on 7/3/2025 with QTc of 451 ms  Please document sleep and behaviors  [facility]  Cleared to discharge from psychiatric perspective.      MATTIE Arceo FNP-C PMHNP-BC    Subjective:  Chart reviewed. Spoke with RN. Patient was noted to be laying in bed with eyes closed. Quickly awaking to his name being said. Reports that he does not believe he slept well last night. Did message NOC RN and PCT to discuss how patient's night was. It was reported that patient did not fall asleep till around 1 AM but then was able to sleep. He continued to report hallucinations but did have some insight into the fact that staff was not seeing the same things that he was seeing. There was no noted aggression. He has been compliant with care. Plan  for patient to discharge to [facility]. Agree that having patient transition out of hospital can help resolve some delirium. Would continue on Seroquel for now to help aid in patient getting adequate sleep. Cleared to discharge from psychiatric perspective.     Past Medical History[1]  Past Surgical History[2]  Family History[3]   reports that he has never smoked. He has never used smokeless tobacco. He reports that he does not drink alcohol and does not use drugs.    Allergies:  Allergies[4]    Medications:Current Hospital Medications[5]    Review of Systems   Unable to perform ROS: Psychiatric disorder (delirium)     Mental Status Exam:   Risk Assessment  Suicidal ideation: no suicidal ideation  Homicidal ideation: None noted    Appearance: fair grooming  Behavior: mildly sleepy  Attitude: cooperative and pleasant  Gait: Not observed    Speech: normal rate, rhythm and volume    Mood: euthymic  Affect: Congruent    Thought process: logical  Thought content: delusional at times.   Perceptions: was responding to internal stimuli earlier in the morning and overnight  Associations: Intact    Orientation: Alert and oriented x 4  Attention and Concentration:   fair  Memory:  intact immediate, recent, remote  Language: Intact naming and repetition  Fund of Knowledge: Able to recite name of [place] president    Insight: Limited   Judgment: Limited     Objective    Vitals:    07/08/25 0730   BP: 99/64   Pulse: 80   Resp: 16   Temp: 97.7 °F (36.5 °C)     Suicide Risk Assessments:  Overall level of suicide risk is low     Risk Factors: hallucinations, lives alone     Environmental Factors:      Protective Factors: strong family support    Laboratory Data:  Lab Results   Component Value Date    PGLU 91 07/07/2025       STUDIES:    Batool Lennon APRN FNP-C PMHNP-BC  X 10450         [1]   Past Medical History:   Amputation of right foot (HCC)    non-healing. Followed by [facility]    Anemia    Atrial fibrillation (HCC)    CAD (coronary  artery disease)    CHF (congestive heart failure) (HCC)    Depressive disorder    Easy bruising    Encephalopathy acute    Esophageal reflux    Fatigue    Hearing impairment    Hearing loss    Heart attack (HCC)    High blood pressure    High cholesterol    History of MI (myocardial infarction)    HTN (hypertension)    Hyperlipidemia    Leg swelling    Loss of appetite    PAD (peripheral artery disease)    Peripheral vascular disease    Uncomfortable fullness after meals    Visual impairment    glasses    Wears glasses   [2]   Past Surgical History:  Procedure Laterality Date    Angiogram      Angioplasty (coronary)      Cabg      Fracture surgery Left     left hip pinning    Other surgical history Left 01/01/1977    knee surgery    Other surgical history  03/25/2016    Left hip ORIF pinning    Tonsillectomy     [3]   Family History  Problem Relation Age of Onset    Cancer Father    [4]   Allergies  Allergen Reactions    Heparin ANAPHYLAXIS    Hydromorphone HALLUCINATION   [5]   Current Facility-Administered Medications:     predniSONE (Deltasone) tab 10 mg, 10 mg, Oral, Daily with breakfast    furosemide (Lasix) tab 40 mg, 40 mg, Oral, BID    sertraline (Zoloft) tab 25 mg, 25 mg, Oral, Daily    acetaminophen (Tylenol Extra Strength) tab 1,000 mg, 1,000 mg, Oral, TID PRN    QUEtiapine (SEROquel) tab 25 mg, 25 mg, Oral, Nightly    cefTRIAXone (Rocephin) 2 g in sodium chloride 0.9% 100mL IVPB-MARGARET, 2 g, Intravenous, Q24H    apixaban (Eliquis) tab 5 mg, 5 mg, Oral, BID    finasteride (Proscar) tab 5 mg, 5 mg, Oral, Daily    gabapentin (Neurontin) cap 300 mg, 300 mg, Oral, QAM    gabapentin (Neurontin) cap 600 mg, 600 mg, Oral, QPM    metoprolol succinate ER (Toprol XL) 24 hr tab 50 mg, 50 mg, Oral, Daily Beta Blocker    tamsulosin (Flomax) cap 0.4 mg, 0.4 mg, Oral, Daily    clopidogrel (Plavix) tab 75 mg, 75 mg, Oral, Daily    ondansetron (Zofran) 4 MG/2ML injection 4 mg, 4 mg, Intravenous, Q6H PRN    metoclopramide  (Reglan) 5 mg/mL injection 5 mg, 5 mg, Intravenous, Q8H PRN    polyethylene glycol (PEG 3350) (Miralax) 17 g oral packet 17 g, 17 g, Oral, Daily PRN    sennosides (Senokot) tab 17.2 mg, 17.2 mg, Oral, Nightly PRN    bisacodyl (Dulcolax) 10 MG rectal suppository 10 mg, 10 mg, Rectal, Daily PRN

## 2025-07-09 LAB — BACTERIA BLD CULT: POSITIVE

## 2025-07-10 NOTE — PROGRESS NOTES
Provider Clarification   Additional information on the status of the infection.  Sepsis confirmed   This note is part of the patient's medical record.

## 2025-07-23 DIAGNOSIS — J18.9 PNEUMONIA DUE TO INFECTIOUS ORGANISM, UNSPECIFIED LATERALITY, UNSPECIFIED PART OF LUNG: Primary | ICD-10-CM

## 2025-07-23 DIAGNOSIS — N17.9 AKI (ACUTE KIDNEY INJURY): ICD-10-CM

## 2025-07-23 NOTE — PAYOR COMM NOTE
--------------  DISCHARGE REVIEW    Payor: BC[employer] MEDICARE ADV PPO  Subscriber #:  [id number]  Authorization Number: [id number]    Admit date: 7/3/25  Admit time:   9:00 AM  Discharge Date: 2025  2:30 PM     Admitting Physician: Wendy Borden MD  Attending Physician:  No att. providers found  Primary Care Physician: Stanislav Odonnell MD          Discharge Summary Notes        Discharge Summary signed by Lis Loza MD at 2025 10:28 AM       Author: Lis Loza MD Specialty: HOSPITALIST, Internal Medicine Author Type: Physician    Filed: 2025 10:28 AM Date of Service: 2025 10:28 AM Status: Signed    : Lis Loza MD (Physician)           [facility]  DISCHARGE SUMMARY     Alejandro Guardado Patient Status:  Inpatient    1935 MRN [id number]   Location [facility] 5NW-A Attending Wnedy Borden MD   Hosp Day # 5 PCP Stanislav Odonnell MD     Date of Admission: 7/3/2025  Date of Discharge:   2025    Discharge Disposition: Home or Self Care    Discharge Diagnosis:  Pneumonia Streptococcus pneumonia  Legally blind and hard of hearing  ERON  Acute metabolic encephalopathy visual hallucinations  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    Brief Synopsis: Patient was found to have Streptococcus pneumonia and he improved slowly with antibiotics and supportive care.  Lasix was initially on hold and he was gently hydrated IV.  He is ready for discharge today Lasix is resumed and he will be continued on all home medicines including prednisone for adrenal insufficiency.    Lace+ Score: 73  59-90 High Risk  29-58 Medium Risk  0-28   Low Risk       TCM Follow-Up Recommendation:  LACE > 58: High Risk of readmission after discharge from the hospital.      Procedures during  hospitalization:       Incidental or significant findings and recommendations (brief descriptions):      Lab/Test results pending at Discharge:       Consultants:      Discharge Medication List:     Discharge Medications        START taking these medications        Instructions Prescription details   QUEtiapine 25 MG Tabs  Commonly known as: SEROquel      Take 1 tablet (25 mg total) by mouth nightly.   Quantity: 14 tablet  Refills: 0            CHANGE how you take these medications        Instructions Prescription details   sertraline 50 MG Tabs  Commonly known as: Zoloft  What changed: how much to take      Take 0.5 tablets (25 mg total) by mouth in the morning.   Refills: 0            CONTINUE taking these medications        Instructions Prescription details   apixaban 5 MG Tabs  Commonly known as: Eliquis      Take 1 tablet (5 mg total) by mouth 2 (two) times daily.   Quantity: 60 tablet  Refills: 3     atorvastatin 40 MG Tabs  Commonly known as: Lipitor      Take 1 tablet (40 mg total) by mouth daily.   Quantity: 30 tablet  Refills: 0     clopidogrel 75 MG Tabs  Commonly known as: Plavix      Take 1 tablet (75 mg total) by mouth daily.   Quantity: 30 tablet  Refills: 0     finasteride 5 MG Tabs  Commonly known as: Proscar      Take 1 tablet (5 mg total) by mouth daily.   Quantity: 90 tablet  Refills: 0     folic acid 1 MG Tabs  Commonly known as: Folvite      Take 1 tablet (1 mg total) by mouth in the morning.   Refills: 0     furosemide 40 MG Tabs  Commonly known as: Lasix      Take 1 tablet (40 mg total) by mouth in the morning and 1 tablet (40 mg total) before bedtime.   Refills: 0     gabapentin 300 MG Caps  Commonly known as: Neurontin      Take 1 capsule (300 mg total) by mouth every morning.   Refills: 0     gabapentin 300 MG Caps  Commonly known as: Neurontin      Take 2 capsules (600 mg total) by mouth every evening.   Refills: 0     metoprolol succinate ER 50 MG Tb24  Commonly known as: Toprol XL       Take 1 tablet (50 mg total) by mouth in the morning.   Refills: 0     Omeprazole 40 MG Cpdr      Take 1 capsule (40 mg total) by mouth in the morning.   Refills: 0     predniSONE 5 MG Tabs  Commonly known as: Deltasone      Take 3 tablets (15 mg total) by mouth in the morning.   Refills: 0     tamsulosin 0.4 MG Caps  Commonly known as: Flomax      Take 1 capsule (0.4 mg total) by mouth in the morning.   Refills: 0     vitamin C 1000 MG Tabs      Take 1 tablet (1,000 mg total) by mouth in the morning.   Refills: 0            STOP taking these medications      lisinopril 2.5 MG Tabs  Commonly known as: Prinivil; Zestril                  Where to Get Your Medications        Please  your prescriptions at the location directed by your doctor or nurse    Bring a paper prescription for each of these medications  QUEtiapine 25 MG Tabs         [facility]     Follow-up appointment:   Stanislav Odonnell MD  [facility] [id number]  [phone]    Follow up in 1 week(s)  Follow up    Appointments for Next 30 Days 2025 - 2025      None            Vital signs:  Temp:  [97.7 °F (36.5 °C)-98.4 °F (36.9 °C)] 97.7 °F (36.5 °C)  Pulse:  [49-83] 80  Resp:  [15-18] 16  BP: ()/(57-70) 99/64  SpO2:  [98 %] 98 %    Physical Exam:    General: No acute distress   Lungs: clear to auscultation  Cardiovascular: S1, S2  Abdomen: Soft      -----------------------------------------------------------------------------------------------  PATIENT DISCHARGE INSTRUCTIONS: See electronic chart    Lis Loza MD    Total time spent on discharge plannin minutes         Electronically signed by Lis Loza MD on 2025 10:28 AM         Antibiotics: Ceftriaxone #4     Subjective:  : resting comfortable     Objective:  Temp:  [97.4 °F (36.3 °C)-98.4 °F (36.9 °C)] 97.4 °F (36.3 °C)  Pulse:  [72-94] 83  Resp:  [17-19] 19  BP: (115-130)/(59-90) 130/90  SpO2:  [96 %-99 %] 99 %     General: awake alert  Vital  signs:Temp:  [97.4 °F (36.3 °C)-98.4 °F (36.9 °C)] 97.4 °F (36.3 °C)  Pulse:  [72-94] 83  Resp:  [17-19] 19  BP: (115-130)/(59-90) 130/90  SpO2:  [96 %-99 %] 99 %  HEENT: Moist mucous membranes. Extraocular muscles are intact.  Neck: supple no masses  Respiratory: Non labored, symmetric excursion, normal respirations  Cardiovascular: no irregularities in rhythm  Abdomen: Soft, nontender, nondistended.   Musculoskeletal: joints: no swelling   Integument: No lesions. No erythema. No open wounds.  Labs:       C-Reactive Protein (mg/dL)   Date Value   04/26/2023 1.37 (H)              Recent Labs   Lab 07/03/25 0448 07/04/25  0857   RBC 4.29 3.64*   HGB 11.4* 9.6*   HCT 35.6* 31.0*   MCV 83.0 85.2   MCH 26.6 26.4   MCHC 32.0 31.0   RDW 17.5 17.4   NEPRELIM 9.72*  --    WBC 13.2* 8.5   .0 199.0                  Recent Labs   Lab 07/03/25  0448 07/04/25  0857 07/07/25  0607   * 100* 97   BUN 35* 26* 20   CREATSERUM 1.85* 1.35* 1.06   CA 9.1 8.5* 9.0   ALB 4.3  --   --     141 141   K 4.0 4.0 3.9    106 105   CO2 26.0 28.0 28.0   ALKPHO 63  --   --    AST 23  --   --    ALT 35  --   --    BILT 0.5  --   --    TP 7.1  --   --          No results found for: \"VANCT\"  Microbiology           Hospital Encounter on 07/03/25   1. Urine Culture, Routine     Status: None     Collection Time: 07/03/25 12:07 PM     Specimen: Urine, clean catch   Result Value Ref Range     Urine Culture No Growth at 18-24 hrs. N/A   2. Blood Culture     Status: None (Preliminary result)     Collection Time: 07/03/25  7:35 AM     Specimen: Blood,peripheral   Result Value Ref Range     Blood Culture Result No Growth 3 Days N/A               Problem list reviewed:  [Problem List]    [Problem List]  Patient Active Problem List      Diagnosis    Closed minimally displaced zone I fracture of sacrum (HCC)    At risk for falling    CAD (coronary artery disease)    Ischemic cardiomyopathy    Azotemia    Arrhythmia    Esophageal reflux     History of MI (myocardial infarction)    Mixed hyperlipidemia    Primary hypertension    Dizziness    Medication side effect    Closed left hip fracture (HCC)  Global 6/22/2016    Status post hip surgery    Left shoulder distal clavical resection and excision of ganglion cyst of soft tissue mass   Global  09/17/2020    Orthopedic aftercare for healing traumatic hip fracture, left, closed    Closed left hip fracture, with routine healing, subsequent encounter    Osteoarthrosis, localized, primary, knee, left    Osteoarthrosis, localized, primary, knee, right    Gangrene ([facility])    PAD (peripheral artery disease)    Benign prostatic hyperplasia with incomplete bladder emptying    Gangrene of foot ([facility])    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) ([facility])    Leukocytosis    Atrial fibrillation with RVR ([facility])    Hypokalemia    Acute kidney injury    Hyperglycemia    Community acquired pneumonia of right lung, unspecified part of lung    Acute respiratory failure with hypoxia ([facility])    Hypotension due to hypovolemia    Sepsis due to pneumonia ([facility])    Foot ulcer with fat layer exposed, right ([facility])    Syncope, unspecified syncope type    Sepsis, due to unspecified organism, unspecified whether acute organ dysfunction present ([facility])    Shock ([facility])    Acute hypoxic respiratory failure ([facility])    Pneumonia, bacterial    Hyponatremia    Metabolic alkalosis    Recurrent pneumonia    Sepsis due to undetermined organism ([facility])    Acute on chronic congestive heart failure, unspecified heart failure type ([facility])    Acute hypoxemic respiratory failure ([facility])    ERON (acute kidney injury)    Lactic acidosis    Leukocytosis, unspecified type    Palliative care encounter    Counseling regarding advance care planning and goals of care    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) ([facility])    Sepsis with acute renal failure and septic shock ([facility])    Aspiration pneumonitis ([facility])    Multifocal pneumonia    Acute renal  failure superimposed on chronic kidney disease, unspecified acute renal failure type, unspecified CKD stage    Elevated troponin    NSTEMI (non-ST elevated myocardial infarction) ([facility])    Anemia    SIRS (systemic inflammatory response syndrome) ([facility])    Encephalopathy acute    Acute cystitis without hematuria    HCAP (healthcare-associated pneumonia)    Transient alteration of awareness    Adrenal insufficiency (Luis's disease) ([facility])    Community acquired pneumonia, unspecified laterality                 ASSESSMENT/PLAN:  1. RUL pneumonia  Leg UAG negative  Alpha strep in 1 blood culture ( see below)        Continue Ceftriaxone x 5-7 d, azithromycin x 3 days  DC vanc     2. Non hemolytic strep isolated in anaerobic bottle from 1 of 2 sets  -unclear if contaminant or related to pneumonia   Endocarditis unlikely with only 1 bottle positive, ECHO pending        Reg Rogers MD, MD  [facility]  [phone]               Electronically signed by Reg Rogers MD at 7/7/2025 10:02 AM    7/6    Chief Complaint: sob confusion     Subjective:  Patient denies sob  Less confusion yesterday, didn't sleep last night, now with visual hallucinations     Objective:  Review of Systems:   A comprehensive review of systems was completed; pertinent positive and negatives stated in subjective.     Vital signs:  Temp:  [98.1 °F (36.7 °C)-98.4 °F (36.9 °C)] 98.4 °F (36.9 °C)  Pulse:  [72-87] 86  Resp:  [16-20] 16  BP: (129-137)/(74-86) 137/74  SpO2:  [96 %-97 %] 97 %     Physical Exam:    General: No acute distress  Respiratory: No wheezes, no rhonchi  Cardiovascular: S1, S2, regular rate and rhythm  Abdomen: Soft, Non-tender, non-distended, positive bowel sounds  Neuro: No new focal deficits.   Extremities: No edema        Diagnostic Data:    Labs:       Recent Labs   Lab 07/03/25  0448 07/04/25  0857   WBC 13.2* 8.5   HGB 11.4* 9.6*   MCV 83.0 85.2   .0 199.0              Recent Labs    Lab 07/03/25 0448 07/04/25  0857   * 100*   BUN 35* 26*   CREATSERUM 1.85* 1.35*   CA 9.1 8.5*   ALB 4.3  --     141   K 4.0 4.0    106   CO2 26.0 28.0   ALKPHO 63  --    AST 23  --    ALT 35  --    BILT 0.5  --    TP 7.1  --          Estimated Glomerular Filtration Rate: 50 mL/min/1.73m2 (A) (result from lab).         Recent Labs   Lab 07/03/25  0448   TROPHS 11         No results for input(s): \"PTP\", \"INR\" in the last 168 hours.                 Microbiology           Hospital Encounter on 07/03/25   1. Urine Culture, Routine     Status: None     Collection Time: 07/03/25 12:07 PM     Specimen: Urine, clean catch   Result Value Ref Range     Urine Culture No Growth at 18-24 hrs. N/A   2. Blood Culture     Status: None (Preliminary result)     Collection Time: 07/03/25  7:35 AM     Specimen: Blood,peripheral   Result Value Ref Range     Blood Culture Result No Growth 2 Days N/A            Imaging: Reviewed in Epic.     Medications: [Scheduled Medications]    [Scheduled Medications]   cefTRIAXone  2 g Intravenous Q24H    apixaban  5 mg Oral BID    finasteride  5 mg Oral Daily    gabapentin  300 mg Oral QAM    gabapentin  600 mg Oral QPM    metoprolol succinate ER  50 mg Oral Daily Beta Blocker    predniSONE  15 mg Oral Daily with breakfast    sertraline  50 mg Oral Daily    tamsulosin  0.4 mg Oral Daily    clopidogrel  75 mg Oral Daily        Assessment & Plan:  #pneumonia  Last admission September 2024  Sepsis with leukocytosis WBC 13.2  -Monitor urine sputum and blood cultures  -on ceftriaxone and Zithromax  -echo today  ERON  -Creatinine 1.8 and baseline creatinine 1.2  -Gentle hydration  Acute metabolic encephalopathy  -With visual hallucinations due to infection and lack of sleep  -seroquel tonight  -psych eval  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  -Monitor clinically  Secondary adrenal insufficiency  -Continue steroids  Atrial fibrillation, paroxysmal  -On Eliquis and  beta-blocker  CAD status post CABG  -On Plavix  Hyperlipidemia  BPH  Mild anemia        Lis Loza MD     Supplementary Documentation:  Quality:  DVT Mechanical Prophylaxis:   SCDs, Early ambuation      DVT Pharmacologic Prophylaxis   Medication    apixaban (Eliquis) tab 5 mg                 Code Status: DNAR/Selective Treatment  Block: No urinary catheter in place  Block Duration (in days):   Central line:    DIEGO:      Discharge is dependent on: progress  At this point Mr. Guardado is expected to be discharge to: home                     Electronically signed by Lis Loza MD at 7/6/2025 12:25 PM

## 2025-08-01 ENCOUNTER — APPOINTMENT (OUTPATIENT)
Dept: CARDIOLOGY | Age: OVER 89
End: 2025-08-01

## 2025-08-01 VITALS
WEIGHT: 204 LBS | BODY MASS INDEX: 30.92 KG/M2 | HEART RATE: 81 BPM | SYSTOLIC BLOOD PRESSURE: 87 MMHG | DIASTOLIC BLOOD PRESSURE: 57 MMHG | HEIGHT: 68 IN

## 2025-08-01 DIAGNOSIS — I48.11 LONGSTANDING PERSISTENT ATRIAL FIBRILLATION  (CMD): ICD-10-CM

## 2025-08-01 DIAGNOSIS — I49.3 PVCS (PREMATURE VENTRICULAR CONTRACTIONS): ICD-10-CM

## 2025-08-01 DIAGNOSIS — I65.23 ASYMPTOMATIC CAROTID ARTERY STENOSIS, BILATERAL: ICD-10-CM

## 2025-08-01 DIAGNOSIS — I25.10 CORONARY ARTERY DISEASE INVOLVING NATIVE CORONARY ARTERY OF NATIVE HEART WITHOUT ANGINA PECTORIS: ICD-10-CM

## 2025-08-01 DIAGNOSIS — Z95.1 HX OF CABG: Primary | ICD-10-CM

## 2025-08-01 DIAGNOSIS — I77.811 ECTATIC ABDOMINAL AORTA (CMD): ICD-10-CM

## 2025-08-01 DIAGNOSIS — E78.00 PURE HYPERCHOLESTEROLEMIA: ICD-10-CM

## 2025-08-01 DIAGNOSIS — I25.5 ISCHEMIC CARDIOMYOPATHY: ICD-10-CM

## 2025-08-01 RX ORDER — QUETIAPINE FUMARATE 25 MG/1
25 TABLET, FILM COATED ORAL EVERY 24 HOURS
COMMUNITY
Start: 2025-07-07

## 2025-08-04 ENCOUNTER — HOSPITAL ENCOUNTER (OUTPATIENT)
Dept: GENERAL RADIOLOGY | Facility: HOSPITAL | Age: OVER 89
Discharge: HOME | End: 2025-08-04
Attending: STUDENT IN AN ORGANIZED HEALTH CARE EDUCATION/TRAINING PROGRAM

## 2025-08-04 ENCOUNTER — LAB ENCOUNTER (OUTPATIENT)
Dept: LAB | Facility: HOSPITAL | Age: OVER 89
End: 2025-08-04
Attending: STUDENT IN AN ORGANIZED HEALTH CARE EDUCATION/TRAINING PROGRAM

## 2025-08-04 DIAGNOSIS — J18.9 PNEUMONIA DUE TO INFECTIOUS ORGANISM, UNSPECIFIED LATERALITY, UNSPECIFIED PART OF LUNG: ICD-10-CM

## 2025-08-04 DIAGNOSIS — N17.9 AKI (ACUTE KIDNEY INJURY): ICD-10-CM

## 2025-08-04 LAB
ANION GAP SERPL CALC-SCNC: 10 MMOL/L (ref 0–18)
BUN BLD-MCNC: 20 MG/DL (ref 9–23)
CALCIUM BLD-MCNC: 9.2 MG/DL (ref 8.7–10.6)
CHLORIDE SERPL-SCNC: 102 MMOL/L (ref 98–112)
CO2 SERPL-SCNC: 30 MMOL/L (ref 21–32)
CREAT BLD-MCNC: 1.29 MG/DL (ref 0.7–1.3)
EGFRCR SERPLBLD CKD-EPI 2021: 53 ML/MIN/1.73M2 (ref 60–?)
FASTING STATUS PATIENT QL REPORTED: NO
GLUCOSE BLD-MCNC: 99 MG/DL (ref 70–99)
OSMOLALITY SERPL CALC.SUM OF ELEC: 297 MOSM/KG (ref 275–295)
POTASSIUM SERPL-SCNC: 4.2 MMOL/L (ref 3.5–5.1)
SODIUM SERPL-SCNC: 142 MMOL/L (ref 136–145)

## 2025-08-04 PROCEDURE — 71046 X-RAY EXAM CHEST 2 VIEWS: CPT | Performed by: STUDENT IN AN ORGANIZED HEALTH CARE EDUCATION/TRAINING PROGRAM

## 2025-08-04 PROCEDURE — 80048 BASIC METABOLIC PNL TOTAL CA: CPT

## 2025-08-04 PROCEDURE — 36415 COLL VENOUS BLD VENIPUNCTURE: CPT

## 2025-08-27 ENCOUNTER — HOSPITAL ENCOUNTER (INPATIENT)
Facility: HOSPITAL | Age: OVER 89
LOS: 2 days | Discharge: HOME | End: 2025-08-29
Attending: EMERGENCY MEDICINE | Admitting: HOSPITALIST

## 2025-08-27 DIAGNOSIS — L03.115 CELLULITIS OF RIGHT LEG: Primary | ICD-10-CM

## 2025-08-27 LAB
ALBUMIN SERPL-MCNC: 4 G/DL (ref 3.2–4.8)
ALBUMIN/GLOB SERPL: 1.4 (ref 1–2)
ALP LIVER SERPL-CCNC: 64 U/L (ref 45–117)
ALT SERPL-CCNC: 26 U/L (ref 10–49)
ANION GAP SERPL CALC-SCNC: 11 MMOL/L (ref 0–18)
AST SERPL-CCNC: 23 U/L (ref ?–34)
BASOPHILS # BLD AUTO: 0.04 X10(3) UL (ref 0–0.2)
BASOPHILS NFR BLD AUTO: 0.4 %
BILIRUB SERPL-MCNC: 0.6 MG/DL (ref 0.2–0.9)
BUN BLD-MCNC: 30 MG/DL (ref 9–23)
CALCIUM BLD-MCNC: 9.4 MG/DL (ref 8.7–10.6)
CHLORIDE SERPL-SCNC: 104 MMOL/L (ref 98–112)
CO2 SERPL-SCNC: 28 MMOL/L (ref 21–32)
CREAT BLD-MCNC: 1.4 MG/DL (ref 0.7–1.3)
EGFRCR SERPLBLD CKD-EPI 2021: 48 ML/MIN/1.73M2 (ref 60–?)
EOSINOPHIL # BLD AUTO: 0.04 X10(3) UL (ref 0–0.7)
EOSINOPHIL NFR BLD AUTO: 0.4 %
ERYTHROCYTE [DISTWIDTH] IN BLOOD BY AUTOMATED COUNT: 16.8 %
GLOBULIN PLAS-MCNC: 2.9 G/DL (ref 2–3.5)
GLUCOSE BLD-MCNC: 124 MG/DL (ref 70–99)
HCT VFR BLD AUTO: 37.1 % (ref 39–53)
HGB BLD-MCNC: 11.5 G/DL (ref 13–17.5)
IMM GRANULOCYTES # BLD AUTO: 0.17 X10(3) UL (ref 0–1)
IMM GRANULOCYTES NFR BLD: 1.6 %
LACTATE SERPL-SCNC: 2 MMOL/L (ref 0.5–2)
LYMPHOCYTES # BLD AUTO: 0.55 X10(3) UL (ref 1–4)
LYMPHOCYTES NFR BLD AUTO: 5.1 %
MCH RBC QN AUTO: 25.8 PG (ref 26–34)
MCHC RBC AUTO-ENTMCNC: 31 G/DL (ref 31–37)
MCV RBC AUTO: 83.2 FL (ref 80–100)
MONOCYTES # BLD AUTO: 0.66 X10(3) UL (ref 0.1–1)
MONOCYTES NFR BLD AUTO: 6.2 %
NEUTROPHILS # BLD AUTO: 9.22 X10 (3) UL (ref 1.5–7.7)
NEUTROPHILS # BLD AUTO: 9.22 X10(3) UL (ref 1.5–7.7)
NEUTROPHILS NFR BLD AUTO: 86.3 %
OSMOLALITY SERPL CALC.SUM OF ELEC: 304 MOSM/KG (ref 275–295)
PLATELET # BLD AUTO: 285 10(3)UL (ref 150–450)
POTASSIUM SERPL-SCNC: 4.3 MMOL/L (ref 3.5–5.1)
PROT SERPL-MCNC: 6.9 G/DL (ref 5.7–8.2)
RBC # BLD AUTO: 4.46 X10(6)UL (ref 3.8–5.8)
SODIUM SERPL-SCNC: 143 MMOL/L (ref 136–145)
WBC # BLD AUTO: 10.7 X10(3) UL (ref 4–11)

## 2025-08-27 PROCEDURE — 99223 1ST HOSP IP/OBS HIGH 75: CPT | Performed by: HOSPITALIST

## 2025-08-27 RX ORDER — POLYETHYLENE GLYCOL 3350 17 G/17G
17 POWDER, FOR SOLUTION ORAL DAILY PRN
Status: DISCONTINUED | OUTPATIENT
Start: 2025-08-27 | End: 2025-08-29

## 2025-08-27 RX ORDER — GABAPENTIN 300 MG/1
300 CAPSULE ORAL EVERY MORNING
Status: DISCONTINUED | OUTPATIENT
Start: 2025-08-28 | End: 2025-08-29

## 2025-08-27 RX ORDER — PANTOPRAZOLE SODIUM 40 MG/1
40 TABLET, DELAYED RELEASE ORAL
Status: DISCONTINUED | OUTPATIENT
Start: 2025-08-28 | End: 2025-08-29

## 2025-08-27 RX ORDER — CLOPIDOGREL BISULFATE 75 MG/1
75 TABLET ORAL DAILY
Status: DISCONTINUED | OUTPATIENT
Start: 2025-08-27 | End: 2025-08-29

## 2025-08-27 RX ORDER — ATORVASTATIN CALCIUM 40 MG/1
40 TABLET, FILM COATED ORAL DAILY
Status: CANCELLED | OUTPATIENT
Start: 2025-08-27

## 2025-08-27 RX ORDER — ONDANSETRON 2 MG/ML
4 INJECTION INTRAMUSCULAR; INTRAVENOUS EVERY 6 HOURS PRN
Status: DISCONTINUED | OUTPATIENT
Start: 2025-08-27 | End: 2025-08-27

## 2025-08-27 RX ORDER — PANTOPRAZOLE SODIUM 40 MG/1
40 TABLET, DELAYED RELEASE ORAL
Status: CANCELLED | OUTPATIENT
Start: 2025-08-28

## 2025-08-27 RX ORDER — CLOPIDOGREL BISULFATE 75 MG/1
75 TABLET ORAL DAILY
Status: CANCELLED | OUTPATIENT
Start: 2025-08-27

## 2025-08-27 RX ORDER — BISACODYL 10 MG
10 SUPPOSITORY, RECTAL RECTAL
Status: DISCONTINUED | OUTPATIENT
Start: 2025-08-27 | End: 2025-08-29

## 2025-08-27 RX ORDER — QUETIAPINE FUMARATE 25 MG/1
25 TABLET, FILM COATED ORAL NIGHTLY
Status: CANCELLED | OUTPATIENT
Start: 2025-08-27

## 2025-08-27 RX ORDER — FUROSEMIDE 40 MG/1
40 TABLET ORAL 2 TIMES DAILY
Status: DISCONTINUED | OUTPATIENT
Start: 2025-08-27 | End: 2025-08-29

## 2025-08-27 RX ORDER — SODIUM PHOSPHATE, DIBASIC AND SODIUM PHOSPHATE, MONOBASIC 7; 19 G/230ML; G/230ML
1 ENEMA RECTAL ONCE AS NEEDED
Status: DISCONTINUED | OUTPATIENT
Start: 2025-08-27 | End: 2025-08-29

## 2025-08-27 RX ORDER — PREDNISONE 10 MG/1
15 TABLET ORAL DAILY
Status: CANCELLED | OUTPATIENT
Start: 2025-08-27

## 2025-08-27 RX ORDER — FOLIC ACID 1 MG/1
1 TABLET ORAL DAILY
Status: DISCONTINUED | OUTPATIENT
Start: 2025-08-27 | End: 2025-08-29

## 2025-08-27 RX ORDER — GABAPENTIN 300 MG/1
600 CAPSULE ORAL NIGHTLY
COMMUNITY

## 2025-08-27 RX ORDER — FUROSEMIDE 40 MG/1
40 TABLET ORAL 2 TIMES DAILY
Status: CANCELLED | OUTPATIENT
Start: 2025-08-27

## 2025-08-27 RX ORDER — METOPROLOL SUCCINATE 25 MG/1
50 TABLET, EXTENDED RELEASE ORAL DAILY
Status: CANCELLED | OUTPATIENT
Start: 2025-08-27

## 2025-08-27 RX ORDER — TAMSULOSIN HYDROCHLORIDE 0.4 MG/1
0.4 CAPSULE ORAL DAILY
Status: CANCELLED | OUTPATIENT
Start: 2025-08-27

## 2025-08-27 RX ORDER — 0.9 % SODIUM CHLORIDE 0.9 %
INTRAVENOUS SOLUTION INTRAVENOUS
Status: DISCONTINUED
Start: 2025-08-27 | End: 2025-08-27 | Stop reason: WASHOUT

## 2025-08-27 RX ORDER — ASCORBIC ACID 500 MG
1000 TABLET ORAL DAILY
Status: DISCONTINUED | OUTPATIENT
Start: 2025-08-27 | End: 2025-08-29

## 2025-08-27 RX ORDER — GABAPENTIN 300 MG/1
300 CAPSULE ORAL EVERY MORNING
Status: CANCELLED | OUTPATIENT
Start: 2025-08-28

## 2025-08-27 RX ORDER — FINASTERIDE 5 MG/1
5 TABLET, FILM COATED ORAL DAILY
Status: CANCELLED | OUTPATIENT
Start: 2025-08-27

## 2025-08-27 RX ORDER — FINASTERIDE 5 MG/1
5 TABLET, FILM COATED ORAL DAILY
Status: DISCONTINUED | OUTPATIENT
Start: 2025-08-27 | End: 2025-08-29

## 2025-08-27 RX ORDER — SENNOSIDES 8.6 MG
17.2 TABLET ORAL NIGHTLY PRN
Status: DISCONTINUED | OUTPATIENT
Start: 2025-08-27 | End: 2025-08-29

## 2025-08-27 RX ORDER — QUETIAPINE FUMARATE 25 MG/1
25 TABLET, FILM COATED ORAL NIGHTLY
Status: DISCONTINUED | OUTPATIENT
Start: 2025-08-27 | End: 2025-08-29

## 2025-08-27 RX ORDER — METOPROLOL SUCCINATE 50 MG/1
50 TABLET, EXTENDED RELEASE ORAL
Status: DISCONTINUED | OUTPATIENT
Start: 2025-08-28 | End: 2025-08-29

## 2025-08-27 RX ORDER — 0.9 % SODIUM CHLORIDE 0.9 %
INTRAVENOUS SOLUTION INTRAVENOUS
Status: DISPENSED
Start: 2025-08-27 | End: 2025-08-28

## 2025-08-27 RX ORDER — METOCLOPRAMIDE HYDROCHLORIDE 5 MG/ML
5 INJECTION INTRAMUSCULAR; INTRAVENOUS EVERY 8 HOURS PRN
Status: DISCONTINUED | OUTPATIENT
Start: 2025-08-27 | End: 2025-08-27

## 2025-08-27 RX ORDER — GABAPENTIN 300 MG/1
300 CAPSULE ORAL EVERY MORNING
COMMUNITY

## 2025-08-27 RX ORDER — SERTRALINE HYDROCHLORIDE 25 MG/1
25 TABLET, FILM COATED ORAL DAILY
COMMUNITY

## 2025-08-27 RX ORDER — ATORVASTATIN CALCIUM 40 MG/1
40 TABLET, FILM COATED ORAL DAILY
Status: DISCONTINUED | OUTPATIENT
Start: 2025-08-27 | End: 2025-08-29

## 2025-08-27 RX ORDER — SERTRALINE HYDROCHLORIDE 25 MG/1
25 TABLET, FILM COATED ORAL DAILY
Status: DISCONTINUED | OUTPATIENT
Start: 2025-08-27 | End: 2025-08-29

## 2025-08-27 RX ORDER — GABAPENTIN 300 MG/1
600 CAPSULE ORAL EVERY EVENING
Status: CANCELLED | OUTPATIENT
Start: 2025-08-27

## 2025-08-27 RX ORDER — TAMSULOSIN HYDROCHLORIDE 0.4 MG/1
0.4 CAPSULE ORAL DAILY
Status: DISCONTINUED | OUTPATIENT
Start: 2025-08-27 | End: 2025-08-29

## 2025-08-27 RX ORDER — GABAPENTIN 300 MG/1
600 CAPSULE ORAL NIGHTLY
Status: DISCONTINUED | OUTPATIENT
Start: 2025-08-27 | End: 2025-08-29

## 2025-08-28 ENCOUNTER — APPOINTMENT (OUTPATIENT)
Dept: ULTRASOUND IMAGING | Facility: HOSPITAL | Age: OVER 89
End: 2025-08-28
Attending: HOSPITALIST

## 2025-08-28 LAB
ANION GAP SERPL CALC-SCNC: 8 MMOL/L (ref 0–18)
BASOPHILS # BLD AUTO: 0.04 X10(3) UL (ref 0–0.2)
BASOPHILS NFR BLD AUTO: 0.5 %
BUN BLD-MCNC: 26 MG/DL (ref 9–23)
CALCIUM BLD-MCNC: 9 MG/DL (ref 8.7–10.6)
CHLORIDE SERPL-SCNC: 106 MMOL/L (ref 98–112)
CO2 SERPL-SCNC: 30 MMOL/L (ref 21–32)
CREAT BLD-MCNC: 1.2 MG/DL (ref 0.7–1.3)
EGFRCR SERPLBLD CKD-EPI 2021: 57 ML/MIN/1.73M2 (ref 60–?)
EOSINOPHIL # BLD AUTO: 0.09 X10(3) UL (ref 0–0.7)
EOSINOPHIL NFR BLD AUTO: 1.1 %
ERYTHROCYTE [DISTWIDTH] IN BLOOD BY AUTOMATED COUNT: 16.7 %
GLUCOSE BLD-MCNC: 108 MG/DL (ref 70–99)
HCT VFR BLD AUTO: 33.1 % (ref 39–53)
HGB BLD-MCNC: 10.5 G/DL (ref 13–17.5)
IMM GRANULOCYTES # BLD AUTO: 0.11 X10(3) UL (ref 0–1)
IMM GRANULOCYTES NFR BLD: 1.3 %
LYMPHOCYTES # BLD AUTO: 0.84 X10(3) UL (ref 1–4)
LYMPHOCYTES NFR BLD AUTO: 10.1 %
MCH RBC QN AUTO: 25.6 PG (ref 26–34)
MCHC RBC AUTO-ENTMCNC: 31.7 G/DL (ref 31–37)
MCV RBC AUTO: 80.7 FL (ref 80–100)
MONOCYTES # BLD AUTO: 0.81 X10(3) UL (ref 0.1–1)
MONOCYTES NFR BLD AUTO: 9.8 %
NEUTROPHILS # BLD AUTO: 6.39 X10 (3) UL (ref 1.5–7.7)
NEUTROPHILS # BLD AUTO: 6.39 X10(3) UL (ref 1.5–7.7)
NEUTROPHILS NFR BLD AUTO: 77.2 %
OSMOLALITY SERPL CALC.SUM OF ELEC: 303 MOSM/KG (ref 275–295)
PLATELET # BLD AUTO: 263 10(3)UL (ref 150–450)
POTASSIUM SERPL-SCNC: 4 MMOL/L (ref 3.5–5.1)
RBC # BLD AUTO: 4.1 X10(6)UL (ref 3.8–5.8)
SODIUM SERPL-SCNC: 144 MMOL/L (ref 136–145)
WBC # BLD AUTO: 8.3 X10(3) UL (ref 4–11)

## 2025-08-28 PROCEDURE — 93925 LOWER EXTREMITY STUDY: CPT | Performed by: HOSPITALIST

## 2025-08-28 PROCEDURE — 99232 SBSQ HOSP IP/OBS MODERATE 35: CPT | Performed by: HOSPITALIST

## 2025-08-29 VITALS
HEART RATE: 77 BPM | DIASTOLIC BLOOD PRESSURE: 62 MMHG | SYSTOLIC BLOOD PRESSURE: 102 MMHG | TEMPERATURE: 98 F | OXYGEN SATURATION: 96 % | BODY MASS INDEX: 32 KG/M2 | RESPIRATION RATE: 19 BRPM | WEIGHT: 207 LBS

## 2025-08-29 PROCEDURE — 99239 HOSP IP/OBS DSCHRG MGMT >30: CPT | Performed by: HOSPITALIST

## 2025-08-29 RX ORDER — CEFADROXIL 500 MG/1
500 CAPSULE ORAL 2 TIMES DAILY
Qty: 10 CAPSULE | Refills: 0 | Status: SHIPPED | OUTPATIENT
Start: 2025-08-29 | End: 2025-09-03

## 2025-08-29 RX ORDER — FUROSEMIDE 10 MG/ML
40 INJECTION INTRAMUSCULAR; INTRAVENOUS
Status: DISCONTINUED | OUTPATIENT
Start: 2025-08-29 | End: 2025-08-29

## 2025-11-12 ENCOUNTER — APPOINTMENT (OUTPATIENT)
Dept: CARDIOLOGY | Age: OVER 89
End: 2025-11-12

## (undated) DEVICE — BLADE SAW KM33-11

## (undated) DEVICE — NON-ADHERENT STRIPS,OIL EMULSION: Brand: CURITY

## (undated) DEVICE — BITEBLOCK ENDOSCP 60FR MAXI STRP

## (undated) DEVICE — ORTHO CDS-LF: Brand: MEDLINE INDUSTRIES, INC.

## (undated) DEVICE — 10FT COMBINED O2 DELIVERY/CO2 MONITORING. FILTER WITH MICROSTREAM TYPE LUER: Brand: DUAL ADULT NASAL CANNULA

## (undated) DEVICE — STERILE POLYISOPRENE POWDER-FREE SURGICAL GLOVES: Brand: PROTEXIS

## (undated) DEVICE — GAUZE,SPONGE,FLUFF,6"X6.75",STRL,5/TRAY: Brand: MEDLINE

## (undated) DEVICE — KIT CUSTOM ENDOPROCEDURE STERIS

## (undated) DEVICE — SLEEVE KENDALL SCD EXPRESS MED

## (undated) DEVICE — DISPOSABLE TOURNIQUET CUFF SINGLE BLADDER, DUAL PORT AND QUICK CONNECT CONNECTOR: Brand: COLOR CUFF

## (undated) DEVICE — 3M™ RED DOT™ MONITORING ELECTRODE WITH FOAM TAPE AND STICKY GEL, 50/BAG, 20/CASE, 72/PLT 2570: Brand: RED DOT™

## (undated) DEVICE — 3M™ MICROFOAM™ TAPE 1528-4: Brand: 3M™ MICROFOAM™

## (undated) DEVICE — SUTURE ETHIBOND 1 OS-6

## (undated) DEVICE — SUT ETHILON 3-0 PS-1 1663H

## (undated) DEVICE — NEEDLE INJ 25GA CATH 230CM CHN 2.8MM ACUJECT

## (undated) DEVICE — 3M™ IOBAN™ 2 ANTIMICROBIAL INCISE DRAPE 6648EZ: Brand: IOBAN™ 2

## (undated) DEVICE — KIT VLV 5 PC AIR H2O SUCT BX ENDOGATOR CONN

## (undated) DEVICE — SOL PREP 4OZ 10% POVIDONE IOD

## (undated) DEVICE — 1200CC GUARDIAN II: Brand: GUARDIAN

## (undated) DEVICE — MEGADYNE ELECTRODE ADULT PT RT

## (undated) DEVICE — HANDPIECE SET WITH HIGH FLOW TIP AND SUCTION TUBE: Brand: INTERPULSE

## (undated) DEVICE — SUT ETHILON 2-0 FS 664H

## (undated) DEVICE — 3M™ RED DOT™ MONITORING ELECTRODE WITH FOAM TAPE AND STICKY GEL 2570-3, 3/BAG, 200/CASE, 54/PLT: Brand: RED DOT™

## (undated) DEVICE — SOL NACL IRRIG 0.9% 1000ML BTL

## (undated) DEVICE — SOL  .9 1000ML BTL

## (undated) DEVICE — BANDAGE,GAUZE,BULKEE II,4.5"X4.1YD,STRL: Brand: MEDLINE

## (undated) DEVICE — SOL  0.9% 1000ML

## (undated) DEVICE — SUTURE VICRYL 2-0 FSL

## (undated) DEVICE — STANDARD HYPODERMIC NEEDLE,POLYPROPYLENE HUB: Brand: MONOJECT

## (undated) DEVICE — 450 ML BOTTLE OF 0.05% CHLORHEXIDINE GLUCONATE IN 99.95% STERILE WATER FOR IRRIGATION, USP AND APPLICATOR.: Brand: IRRISEPT ANTIMICROBIAL WOUND LAVAGE

## (undated) DEVICE — LOWER EXTREMITY CDS-LF: Brand: MEDLINE INDUSTRIES, INC.

## (undated) DEVICE — CONVERTORS STOCKINETTE: Brand: CONVERTORS

## (undated) DEVICE — C-ARM: Brand: UNBRANDED

## (undated) DEVICE — BNDG COHESIVE W4INXL5YD TAN E

## (undated) DEVICE — KENDALL SCD EXPRESS SLEEVES, KNEE LENGTH, MEDIUM: Brand: KENDALL SCD

## (undated) NOTE — LETTER
Date: 10/16/2023  Patient name: Radha Llanes  YOB: 1935  Medical Record Number: [id number]  Primary Coverage: Payor: MEDICARE / Plan: MEDICARE PART A&B / Product Type: *No Product type* /   Secondary Coverage: Minnie Macdonald ID: [id number]  Patient Address: [address] [place] [address],[place] [id number]  Telephone Information:   Home Phone [phone]   Mobile [phone]       Encounter Date: 10/16/2023  Provider: Cecil Alvarez DPM  Diagnosis: (C89.569) Foot ulcer, right, with fat layer exposed ([facility] [address]  (primary encounter diagnosis)  (I73.9) PAD (peripheral artery disease) ([facility] [address]  (K48.632) History of transmetatarsal amputation of foot ([facility] [address]      Wound 08/14/23 #1- right foot Old surgical Foot Right (Active)   Date First Assessed/Time First Assessed: 08/14/23 1309    Wound Number (Wound Clinic Only): #1- right foot  Primary Wound Type: Old surgical  Location: Foot  Wound Location Orientation: Right  Wound Description (Comments): Amputation site      Assessments 8/14/2023  1:15 PM 10/16/2023  8:54 AM   Wound Image       Drainage Description Serous; Yellow --   Wound Length (cm) 2.6 cm 1.6 cm   Wound Width (cm) 7 cm 5.4 cm   Wound Surface Area (cm^2) 18.2 cm^2 8. 64 cm^2   Wound Depth (cm) 1.3 cm 1 cm   Wound Volume (cm^3) 23.66 cm^3 8. 64 cm^3   Wound Healing % -- 63   Margins Well-defined edges --   Non-staged Wound Description Full thickness --   Peggy-wound Assessment Edema --   Wound Granulation Tissue Pink;Firm --   Wound Bed Granulation (%) 10 % --   Wound Bed Epithelium (%) 10 % --   Wound Bed Slough (%) 40 % --   Wound Bed Eschar (%) 40 % --   Wound Odor None --   Shape clustered --       Inactive Orders   Date Order Priority Status Authorizing Provider   10/16/23 0909 Debridement Old surgical Right Foot Routine Completed Jerry Currie   10/09/23 1318 Debridement Old surgical Right Foot Routine Completed Barrington Hancock [place]   09/25/23 4392 Debridement Old surgical Right Foot Routine Completed Elaine Guzman, JAN   09/11/23 1348 Debridement Routine Completed Kenna Moreno [facility]   08/28/23 1317 Debridement Routine Completed Kenna Moreno, JAN   08/14/23 1327 Debridement Routine Completed Kenna Moreno DPM       Wound 08/14/23 #2- right medial foot Dorsal;Right (Active)   Date First Assessed/Time First Assessed: 08/14/23 1319    Wound Number (Wound Clinic Only): #2- right medial foot  Wound Location Orientation: Dorsal;Right      Assessments 8/14/2023  1:20 PM 10/16/2023  8:56 AM   Wound Image       Drainage Amount None --   Wound Length (cm) 0.9 cm 1.2 cm   Wound Width (cm) 1.4 cm 0.5 cm   Wound Surface Area (cm^2) 1.26 cm^2 0. 6 cm^2   Wound Depth (cm) 0 cm 0.8 cm   Wound Volume (cm^3) 0 cm^3 0.48 cm^3   Margins Well-defined edges --   Non-staged Wound Description Full thickness --   Peggy-wound Assessment Edema --   Wound Bed Eschar (%) 100 % --   Wound Odor None --       Inactive Orders   Date Order Priority Status Authorizing Provider   10/16/23 0911 Debridement Dorsal;Right Routine Completed Elaine Guzman DPM   10/09/23 1322 Debridement Dorsal;Right Routine Completed Elaine Guzman, JAN   09/25/23 1330 Debridement Dorsal;Right Routine Completed Elaine Guzman, JAN   08/28/23 1321 Debridement Routine Completed Kenna Moreno DPM   08/14/23 1329 Debridement Routine Completed Kenna Moreno DPM       Negative Pressure Wound Therapy Foot Distal (Active)   Placement Date/Time: 10/16/23 0914   Location: Foot  Wound Location Orientation: Distal      Assessments 10/16/2023  9:15 AM   Wound photographed/measured Yes   Machine Status (On) Yes   Site Assessment Clean   Peggy-wound Assessment Intact   Unit Type KCI 3M   Dressing Type Black foam   Number of Foam Pieces Used 1   Cycle Continuous   Target Pressure (mmHg) 125   Drainage Description Serosanguineous   Dressing Status Clean   Canister Changed Yes       No associated orders.            Wound Number: All wounds    Wound Cleaning and Dressings:  Showering directions: May shower with protection  Wound cleansing:  Cleanse with normal saline or wound cleanser  Wound cleaning frequency:  three times weekly  Wound product: Endoform collagen  Dressing change frequency:  Change dressing 3x per week    Negative Pressure Wound Therapy:  NPWT: KCI vac therapy, black form at 125 mmHg continuous. RN to change dressing 3x/week unless indicated below      Miscellaneous/Additional Orders:  Offloading: see physician orders  Miscellaneous orders: Home health care nurse for wound care    Care Summary:  Care Summary: Discussed Plan of Care at beside with patient. Patient verbally acknowledges understanding of all instructions and all questions were answered. Follow Up:  Return in about 1 week (around 10/23/2023). Additional Notes:       Additional home health DME: No

## (undated) NOTE — LETTER
Date: 11/6/2023  Patient name: Karina Schroeder  YOB: 1935  Medical Record Number: [id number]  Primary Coverage: Payor: MEDICARE / Plan: MEDICARE PART A&B / Product Type: *No Product type* /   Secondary Coverage: Minnie Macdonald ID: [id number]  Patient Address: [address] [place] [address],[place] [id number]  Telephone Information:   Home Phone [phone]   Mobile [phone]       Encounter Date: 11/6/2023  Provider: Annalise Gonzalez DPM  Diagnosis: No diagnosis found. Wound 08/14/23 #1- right foot Old surgical Foot Right (Active)   Date First Assessed/Time First Assessed: 08/14/23 1309    Wound Number (Wound Clinic Only): #1- right foot  Primary Wound Type: Old surgical  Location: Foot  Wound Location Orientation: Right  Wound Description (Comments): Amputation site      Assessments 8/14/2023  1:15 PM 11/6/2023  3:44 PM   Wound Image        Drainage Description Serous; Yellow --   Wound Length (cm) 2.6 cm --   Wound Width (cm) 7 cm --   Wound Surface Area (cm^2) 18.2 cm^2 --   Wound Depth (cm) 1.3 cm --   Wound Volume (cm^3) 23.66 cm^3 --   Margins Well-defined edges --   Non-staged Wound Description Full thickness --   Peggy-wound Assessment Edema --   Wound Granulation Tissue Pink;Firm --   Wound Bed Granulation (%) 10 % --   Wound Bed Epithelium (%) 10 % --   Wound Bed Slough (%) 40 % --   Wound Bed Eschar (%) 40 % --   Wound Odor None --   Shape clustered --       Active Orders   Date Order Priority Status Authorizing Provider   11/06/23 1538 Debridement Old surgical Right Foot Routine Active Leeann Pro DPM       Inactive Orders   Date Order Priority Status Authorizing Provider   10/30/23 0915 Debridement Old surgical Right Foot Routine Completed Leeann Pro [facility]   10/23/23 1795 [facility]ement [facility]   10/16/23 1247 Debridement Old surgical Right Foot Routine Completed Leeann Pro DPM   10/09/23 1318 Debridement Old surgical Right Foot Routine Completed Larwance Challenger, [facility]   09/25/23 1336 Debridement Old surgical Right Foot Routine Completed Larwance Challenger, DP   09/11/23 1348 Debridement Routine Completed Block, Nakul Ast, DPM   08/28/23 1317 Debridement Routine Completed Block, Nakul Ast, DPM   08/14/23 1327 Debridement Routine Completed Block, Nakul Ast, DPM       Wound 08/14/23 #2- right medial foot Dorsal;Right (Active)   Date First Assessed/Time First Assessed: 08/14/23 1319    Wound Number (Wound Clinic Only): #2- right medial foot  Wound Location Orientation: Dorsal;Right      Assessments 8/14/2023  1:20 PM 11/6/2023  3:45 PM   Wound Image        Drainage Amount None --   Wound Length (cm) 0.9 cm --   Wound Width (cm) 1.4 cm --   Wound Surface Area (cm^2) 1.26 cm^2 --   Wound Depth (cm) 0 cm --   Wound Volume (cm^3) 0 cm^3 --   Margins Well-defined edges --   Non-staged Wound Description Full thickness --   Peggy-wound Assessment Edema --   Wound Bed Eschar (%) 100 % --   Wound Odor None --       Active Orders   Date Order Priority Status Authorizing Provider   11/06/23 1540 Debridement Dorsal;Right Routine Active Larwance Challenger, DPM       Inactive Orders   Date Order Priority Status Authorizing Provider   10/30/23 5282 Debridement Dorsal;Right Routine Completed Larwance Challenger, DPM   10/23/23 0855 Debridement Dorsal;Right Routine Completed Larwance Challenger, DPM   10/16/23 0911 Debridement Dorsal;Right Routine Completed Larwance Challenger, DPM   10/09/23 1322 Debridement Dorsal;Right Routine Completed Larwance Challenger, DPM   09/25/23 1330 Debridement Dorsal;Right Routine Completed Larwance Challenger, DPM   08/28/23 1321 Debridement Routine Completed Block, Nakul Ast, DPM   08/14/23 1329 Debridement Routine Completed Block, Nakul Ast, DPM       Negative Pressure Wound Therapy Foot Distal (Active)   Placement Date/Time: 10/16/23 0914   Location: Foot  Wound Location Orientation: Distal      Assessments 10/16/2023 9:15 AM 10/30/2023  9:05 AM   Wound photographed/measured Yes Yes   Machine Status (On) Yes Yes   Site Assessment Clean Clean   Peggy-wound Assessment Intact --   Unit Type KCI 3M KCI 3M   Dressing Type Black foam Black foam   Number of Foam Pieces Used 1 2   Cycle Continuous Continuous   Target Pressure (mmHg) 125 125   Drainage Description Serosanguineous Serous; Yellow   Dressing Status Clean --   Canister Changed Yes --       No associated orders. Wound Number: All wounds    Wound Cleaning and Dressings:  Showering directions: do not get wound wet  Wound cleansing:  Cleanse with normal saline or wound cleanser    Wound product: Josi collagen, Hydrofera transfer, ABD pad, and Kerlix  Dressing change frequency:  Change dressing every other day and/or as needed      Compression Therapy:   Tubigrip    Negative Pressure Wound Therapy:  NPWT: NPWT on hold this week      Miscellaneous/Additional Orders:  Offloading: see physician orders   Miscellaneous orders: Home health care nurse for wound care    Care Summary:  Care Summary: Discussed Plan of Care at beside with patient. Patient verbally acknowledges understanding of all instructions and all questions were answered. Follow Up:  No follow-ups on file. Additional Notes:      Additional home health DME: No

## (undated) NOTE — LETTER
Date: 10/30/2023  Patient name: Danette Marroquin  YOB: 1935  Medical Record Number: [id number]  Primary Coverage: Payor: MEDICARE / Plan: MEDICARE PART A&B / Product Type: *No Product type* /   Secondary Coverage: Minnie Macdonald ID: [id number]  Patient Address: [address] [place] [address],[place] [id number]  Telephone Information:   Home Phone [phone]   Mobile [phone]       Encounter Date: 10/30/2023  Provider: Ellen Lambert DPM  Diagnosis: (V01.320) Foot ulcer, right, with fat layer exposed ([facility]  (primary encounter diagnosis)  (I73.9) PAD (peripheral artery disease) ([facility]  (I74.279) History of transmetatarsal amputation of foot ([facility]      Wound 08/14/23 #1- right foot Old surgical Foot Right (Active)   Date First Assessed/Time First Assessed: 08/14/23 1309    Wound Number (Wound Clinic Only): #1- right foot  Primary Wound Type: Old surgical  Location: Foot  Wound Location Orientation: Right  Wound Description (Comments): Amputation site      Assessments 8/14/2023  1:15 PM 10/30/2023  9:08 AM   Wound Image        Drainage Description Serous; Yellow --   Wound Length (cm) 2.6 cm 1.6 cm   Wound Width (cm) 7 cm 4.9 cm   Wound Surface Area (cm^2) 18.2 cm^2 7. 84 cm^2   Wound Depth (cm) 1.3 cm 1 cm   Wound Volume (cm^3) 23.66 cm^3 7. 84 cm^3   Wound Healing % -- 67   Margins Well-defined edges --   Non-staged Wound Description Full thickness --   Peggy-wound Assessment Edema --   Wound Granulation Tissue Pink;Firm --   Wound Bed Granulation (%) 10 % --   Wound Bed Epithelium (%) 10 % --   Wound Bed Slough (%) 40 % --   Wound Bed Eschar (%) 40 % --   Wound Odor None --   Shape clustered --       Inactive Orders   Date Order Priority Status Authorizing Provider   10/30/23 0915 Debridement Old surgical Right Foot Routine Com[facility]   10/23/23 2470 Debridement Old surgical Right Foot Routine Completed ERIKA Cooley [facility]   10/16/23 8871 Debridement Old surgical Right Foot Routine Completed Gareld Ogren, DPM   10/09/23 1318 Debridement Old surgical Right Foot Routine Completed Gareld Ogren, [facility]   09/25/23 1336 Debridement Old surgical Right Foot Routine Completed Gareld Ogren, DPM   09/11/23 1348 Debridement Routine Completed Block, Henrine Blade, [facility]   08/28/23 1317 Debridement Routine Completed Block, Henrine Blade, DPM   08/14/23 1327 Debridement Routine Completed Block, Henrine Blade, DPM       Wound 08/14/23 #2- right medial foot Dorsal;Right (Active)   Date First Assessed/Time First Assessed: 08/14/23 1319    Wound Number (Wound Clinic Only): #2- right medial foot  Wound Location Orientation: Dorsal;Right      Assessments 8/14/2023  1:20 PM 10/30/2023  9:05 AM   Wound Image        Drainage Amount None --   Wound Length (cm) 0.9 cm 0.5 cm   Wound Width (cm) 1.4 cm 0.8 cm   Wound Surface Area (cm^2) 1.26 cm^2 0. 4 cm^2   Wound Depth (cm) 0 cm 1 cm   Wound Volume (cm^3) 0 cm^3 0. 4 cm^3   Margins Well-defined edges --   Non-staged Wound Description Full thickness --   Peggy-wound Assessment Edema --   Wound Bed Eschar (%) 100 % --   Wound Odor None --       Inactive Orders   Date Order Priority Status Authorizing Provider   10/30/23 3355 Debridement Dorsal;Right Routine Completed Gareld Ogren, DPM   10/23/23 0855 Debridement Dorsal;Right Routine Completed Gareld Ogren, DPM   10/16/23 0911 Debridement Dorsal;Right Routine Completed Gareld Ogren, DPM   10/09/23 1322 Debridement Dorsal;Right Routine Completed Gareld Ogren, DPM   09/25/23 1330 Debridement Dorsal;Right Routine Completed Gareld Ogren, DPM   08/28/23 1321 Debridement Routine Completed Block, Henrine Blade, DPM   08/14/23 1329 Debridement Routine Completed Block, Henrine Blade, DPM       Negative Pressure Wound Therapy Foot Distal (Active)   Placement Date/Time: 10/16/23 0914   Location: Foot  Wound Location Orientation: Distal      Assessments 10/16/2023  9:15 AM 10/30/2023  9:05 AM   Wound photographed/measured Yes Yes   Machine Status (On) Yes Yes   Site Assessment Clean Clean   Peggy-wound Assessment Intact --   Unit Type KCI 3M KCI 3M   Dressing Type Black foam Black foam   Number of Foam Pieces Used 1 2   Cycle Continuous Continuous   Target Pressure (mmHg) 125 125   Drainage Description Serosanguineous Serous; Yellow   Dressing Status Clean --   Canister Changed Yes --       No associated orders. Wound Number: 1     Wound Cleaning and Dressings:    Wound cleansing:  Cleanse with normal saline or wound cleanser  Wound cleaning frequency:  three times weekly  Wound product: Endoform collagen  Dressing change frequency:  Change dressing 3x per week    Compression Therapy:   Spandagrip F    Negative Pressure Wound Therapy:  NPWT: KCI vac therapy, black form at 125 mmHg continuous. RN to change dressing 3x/week unless indicated below      Miscellaneous/Additional Orders:  Offloading: see physician orders  Miscellaneous orders: Home health care nurse for wound care    Care Summary:  Care Summary: Discussed Plan of Care at beside with patient. Patient verbally acknowledges understanding of all instructions and all questions were answered. Follow Up:  No follow-ups on file. Additional Notes:       Additional home health DME: No

## (undated) NOTE — LETTER
Patient Name: Alejandro Guardado  -Age / Sex: 1935-A: 89 y  male   Medical Records: [id number] CSN: [id number]    DNAR NOTIFICATION    Your patient listed above has a procedure scheduled at [facility] and has indicated that he/she currently has a DNAR (Do Not Attempt Resuscitattion) with their Advanced Directives.    Please note that you will be asked to address this matter with your patient pre-operatively.  Thank you.      Procedure Date 2024  Procedure ESOPHAGOGASTRODUODENOSCOPY (EGD) with Botox, N/A    Surgeon(s):  Jeremy Koch MD

## (undated) NOTE — LETTER
Date: 6/4/2024  Patient name: Alejandro Guardado  YOB: 1935  Medical Record Number: [id number]  Primary Coverage: Payor: BLUE CROSS MCR / Plan: [facility] MEDICARE ADV PPO / Product Type: Medicare /   Secondary Coverage:   Insurance ID: [id number]  Patient Address: [address] [place] [id number]  Telephone Information:   Home Phone [phone]   Mobile [phone]         Encounter Date: 6/3/2024  Provider: Pavan Poon DPM  Diagnosis:     ICD-10-CM   1. Nonhealing surgical wound, subsequent encounter  T81.89XD   2. Non-healing open wound of right heel, subsequent encounter  S91.301D   3. Non-pressure chronic ulcer of right calf with fat layer exposed ([facility])  L97.212   4. PAD (peripheral artery disease) ([facility])  I73.9   5. Edema of right lower extremity  R60.0   6. History of transmetatarsal amputation of foot ([facility])  Z89.439       Progress Note:  Patient ID: Alejandro Guardado is a 89 year old male.    Debridement Old surgical Right Foot   Wound 08/14/23 #1- right foot Foot Right    Performed by: Quincy Poon DPM  Authorized by: Quincy Poon DPM      Consent   Consent obtained? verbal  Consent given by: patient  Risks discussed? procedural risks discussed  Time out called at 6/3/2024 1:32 PM  Immediately prior to the procedure a time out was called and the performing provider verified the correct patient, procedure, equipment, support staff, and site/side marked as required.    Debridement Details  Performed by: physician  Debridement type: surgical  Level of debridement: subcutaneous tissue  Pain control: none    Pre-debridement measurements  Length (cm): 1.5  Width (cm): 6.1  Depth (cm): 0.4  Surface Area (cm^2): 9.15    Post-debridement measurements  Length (cm): 1.6  Width (cm): 6.2  Depth (cm): 0.4  Percent debrided: 100%  Surface Area (cm^2): 9.92  Area Debrided (cm^2): 9.92  Volume (cm^3): 3.97    Tissue and other material debrided: subcutaneous tissue  Devitalized tissue debrided: biofilm,  callus and fibrin  Instrument(s) utilized: curette  Bleeding: small  Hemostasis obtained with: not applicable  Procedural pain (0-10): 2  Post-procedural pain: 0   Response to treatment: procedure was tolerated well                  Patient ID: Alejandro Guardado is a 89 year old male.    Cellular tissue product application Old surgical Right Foot    Date/Time: 6/3/2024 1:44 PM    Performed by: Quincy Poon DPM  Authorized by: Quincy Poon DPM  Associated wounds:   Wound 08/14/23 #1- right foot Foot Right  Consent:     Consent obtained:  Verbal    Consent given by:  Patient    Risks discussed:  Infection    Alternatives discussed:  No treatment  Pre-procedure details:     Preparation: Patient was prepped and draped in usual sterile fashion    Anesthesia (see MAR for exact dosages):     Anesthesia method:  None  Procedure details:     Location:  head/hands/feet/digits/genitalia    Product applied:  Kerecis omega3    Product lot #:  [id number]    Product expiration:  4/10/2026    Amount used (cm^2):  2    Amount wasted (cm^2):  0    Secured/Fixated: Yes      Secured/Fixated with:  Silicone  Comments:      Kerecis micro sample                Weekly Wound Education Note    Teaching Provided To: Patient;Family  Training Topics: Dressing;Edema control;Cleasing and general instructions;Discharge instructions  Training Method: Demonstration;Explain/Verbal;Written  Training Response: Patient responds and understands        Notes: Kerecis sample applied to right foot wound, fixated with silicone, covered with hydrofera transfer, abd, kerlix do not remove silicone and skin sun, may change hydrofera and gauze as needed, Continue to cleanse right heel and right leg wounds with saline or wound cleanser applym santyl ointment with gauze, Change daily       Wound Treatment Orders:  No orders of the defined types were placed in this encounter.        Wound Information/Order:  Wound Number: All wounds  Product: Size 4 Surgilast  roll, hydrofera transfer, conforming gauze rolls, saline   Dispense: 30 days  Dressing Frequency:Change dressing daily and/or PRN    Was a Debridement performed: Yes, Debridement type: subcutaneous    Compression Stockings ordered: No    Notes:    Additional wound: No

## (undated) NOTE — LETTER
Date: 12/19/2023  Patient name: Tracey Saravia  YOB: 1935  Medical Record Number: [id number]  Primary Coverage: Payor: MEDICARE / Plan: MEDICARE PART A&B / Product Type: *No Product type* /   Secondary Coverage: MEDICARE - MEDICARE PART A&B  Insurance ID: [id number]  Patient Address: [address] [place] [id number]  Telephone Information:   Home Phone [phone]   Mobile [phone]       Encounter Date: 12/18/2023  Provider: Corey Washington DPM  Diagnosis: No diagnosis found. Wound 08/14/23 #1- right foot Old surgical Foot Right (Active)   Date First Assessed/Time First Assessed: 08/14/23 1309    Wound Number (Wound Clinic Only): #1- right foot  Primary Wound Type: Old surgical  Location: Foot  Wound Location Orientation: Right  Wound Description (Comments): Amputation site      Assessments 8/14/2023  1:15 PM 12/18/2023  2:57 PM   Wound Image        Drainage Description Serous; Yellow --   Wound Length (cm) 2.6 cm --   Wound Width (cm) 7 cm --   Wound Surface Area (cm^2) 18.2 cm^2 --   Wound Depth (cm) 1.3 cm --   Wound Volume (cm^3) 23.66 cm^3 --   Margins Well-defined edges --   Non-staged Wound Description Full thickness --   Peggy-wound Assessment Edema --   Wound Granulation Tissue Pink;Firm --   Wound Bed Granulation (%) 10 % --   Wound Bed Epithelium (%) 10 % --   Wound Bed Slough (%) 40 % --   Wound Bed Eschar (%) 40 % --   Wound Odor None --   Shape clustered --       Active Orders   Date Order Priority Status Authorizing Provider   12/18/23 1449 Debridement Old surgical Right Foot Routine Active Madeline Guardado DPM       Inactive Orders   Date Order Priority Status Authorizing Provider   12/11/23 1618 Debridement Old surgical Right Foot Routine Completed Madeline Guardado DPM   12/01/23 1228 Consult to Wound Ostomy Routine Completed MATTIE Steen     - Reason for Consult:    Wound Care     - Wound Care Reason for Consult:    wound care   11/27/23 1018 Debridement Old surgical Right Foot Routine Completed Albert Speculator, DPM   11/20/23 1600 Debridement Old surgical Right Foot [facility]   11/13/23 1636 Debridement Old surgical Right Foot [facility]   11/06/23 1538 Debridement Old surgical Right Foot [facility]   10/30/23 0915 Debridement Old surgical Right Foot [facility]   10/23/23 5012 Debridement Old surgical Right Foot [facility]   10/16/23 5372 Debridement Old surgical Right Foot Routine Completed Albert Speculator, DPM   10/09/23 1318 Debridement Old surgical Right Foot Routine Completed Albert Speculator, DPM   09/25/23 1336 Debridement Old surgical Right Foot Routine Completed Albert Speculator, DPM   09/11/23 1348 Debridement Routine Completed BlockLesia Nims, DPM   08/28/23 1317 Debridement Routine Completed BlockLesia Nims, DPM   08/14/23 1327 Debridement Routine Completed Jeet Moreno, [facility]       Wound 08/14/23 #2- right medial foot Dorsal;Right (Active)   Date First Assessed/Time First Assessed: 08/14/23 1319    Wound Number (Wound Clinic Only): #2- right medial foot  Wound Location Orientation: Dorsal;Right      Assessments 8/14/2023  1:20 PM 12/18/2023  2:53 PM   Wound Image        Drainage Amount None --   Wound Length (cm) 0.9 cm --   Wound Width (cm) 1.4 cm --   Wound Surface Area (cm^2) 1.26 cm^2 --   Wound Depth (cm) 0 cm --   Wound Volume (cm^3) 0 cm^3 --   Margins Well-defined edges --   Non-staged Wound Description Full thickness --   Peggy-wound Assessment Edema --   Wound Bed Eschar (%) 100 % --   Wound Odor None --       Active Orders   Date Order Priority Status Authorizing Provider   12/18/23 1452 Debridement Dorsal;Right Routine Active Albert Speculator, DPM       Inactive Orders   Date Order Priority Status Authorizing Provider   12/11/23 1625 Debridement Dorsal;Right Routine Completed Albert Houston DPM   12/01/23 4351 Consult to Wound Ostomy Routine Completed JuanMATTIE Galvan     - Reason for Consult:    Wound Care     - Wound Care Reason for Consult:    wound care   11/27/23 1019 Debridement Dorsal;Right Routine Completed Hortencia Laundry, DPM   11/20/23 1601 Debridement Dorsal;Right Routine Completed Hortencia Laundry, DPM   11/06/23 1540 Debridement Dorsal;Right Routine Completed Hortencia Laundry, DPM   10/30/23 5194 Debridement Dorsal;Right Routine Completed Hortencia Laundry, DPM   10/23/23 0855 Debridement Dorsal;Right Routine Completed Hortencia Laundry, DPM   10/16/23 0911 Debridement Dorsal;Right Routine Completed Hortencia Laundry, DPM   10/09/23 1322 Debridement Dorsal;Right Routine Completed Hortencia Laundry, DPM   09/25/23 1330 Debridement Dorsal;Right Routine Completed Hortencia Laundry, [facility]   08/28/23 1321 Debridement Routine Completed Lizbeth Moreno, DPM   08/14/23 1329 Debridement Routine Completed Lizbeth Moreno, DPM       Wound 12/04/23 Groin Left (Active)   Date First Assessed/Time First Assessed: 12/04/23 1815   Present on Original Admission: No  Primary Wound Type: Incision  Location: Groin  Wound Location Orientation: Left      Assessments 12/4/2023  6:15 PM 12/6/2023  9:00 AM   Closure Other (Comment) Other (Comment)   Drainage Amount None None   Dressing Status Clean;Dry; Intact Clean;Dry; Intact       No associated orders. Wound Number: All wounds    Wound Cleaning and Dressings:  Showering directions: do not get wound wet    Miscellaneous/Additional Orders:  Offloading: keep off your foot as much as possible  Miscellaneous orders: Home health care nurse for wound care    Care Summary:  Care Summary: Discussed Plan of Care at beside with patient. Patient verbally acknowledges understanding of all instructions and all questions were answered. Ostomy Care: Follow Up:  No follow-ups on file.       Additional Notes:  see physician orders     Additional home health DME: No

## (undated) NOTE — LETTER
Date: 10/23/2023  Patient name: Tracey Saravia  YOB: 1935  Medical Record Number: [id number]  Primary Coverage: Payor: MEDICARE / Plan: MEDICARE PART A&B / Product Type: *No Product type* /   Secondary Coverage: Jose Lsaira Renae ID: [id number]  Patient Address: [address] [place] [address],[place] [id number]  Telephone Information:   Home Phone [phone]   Mobile [phone]       Encounter Date: 10/23/2023  Provider: Corey Washington DPM  Diagnosis: (S27.635) Foot ulcer, right, with fat layer exposed ([facility]  (primary encounter diagnosis)  (I73.9) PAD (peripheral artery disease) ([facility]  (J36.262) History of transmetatarsal amputation of foot ([facility]      Wound 08/14/23 #1- right foot Old surgical Foot Right (Active)   Date First Assessed/Time First Assessed: 08/14/23 1309    Wound Number (Wound Clinic Only): #1- right foot  Primary Wound Type: Old surgical  Location: Foot  Wound Location Orientation: Right  Wound Description (Comments): Amputation site      Assessments 8/14/2023  1:15 PM 10/23/2023  8:32 AM   Wound Image       Drainage Description Serous; Yellow --   Wound Length (cm) 2.6 cm 1.5 cm   Wound Width (cm) 7 cm 4.9 cm   Wound Surface Area (cm^2) 18.2 cm^2 7. 35 cm^2   Wound Depth (cm) 1.3 cm 1 cm   Wound Volume (cm^3) 23.66 cm^3 7. 35 cm^3   Wound Healing % -- 69   Margins Well-defined edges --   Non-staged Wound Description Full thickness --   Peggy-wound Assessment Edema --   Wound Granulation Tissue Pink;Firm --   Wound Bed Granulation (%) 10 % --   Wound Bed Epithelium (%) 10 % --   Wound Bed Slough (%) 40 % --   Wound Bed Eschar (%) 40 % --   Wound Odor None --   Shape clustered --       Inactive Orders   Date Order Priority Status Authorizing Provider   10/23/23 3137 Debridement Old surgical Right Foot Routine Completed Correa ERIKA Guardado [facility]   10/16/23 1843 Debridement Old surgical Right Foot Routine Completed Correa ERIKA Guardado [facility]   10/09/23 1318 Debridement Old surgical Right Foot Routine Completed Anna Sanchez, DPDAPHNE   09/25/23 1336 Debridement Old surgical Right Foot Routine Completed Anna Sanchez, DPM   09/11/23 1348 [facility]   08/28/23 1317 Debridement Routine Completed Tiffanie, Thejesusitae Meka, DPM   08/14/23 1327 Debridement Routine Completed Tiffanie Theadore Meka, DPM       Wound 08/14/23 #2- right medial foot Dorsal;Right (Active)   Date First Assessed/Time First Assessed: 08/14/23 1319    Wound Number (Wound Clinic Only): #2- right medial foot  Wound Location Orientation: Dorsal;Right      Assessments 8/14/2023  1:20 PM 10/23/2023  8:33 AM   Wound Image       Drainage Amount None --   Wound Length (cm) 0.9 cm 0.7 cm   Wound Width (cm) 1.4 cm 0.6 cm   Wound Surface Area (cm^2) 1.26 cm^2 0.42 cm^2   Wound Depth (cm) 0 cm 1 cm   Wound Volume (cm^3) 0 cm^3 0.42 cm^3   Margins Well-defined edges --   Non-staged Wound Description Full thickness --   Peggy-wound Assessment Edema --   Wound Bed Eschar (%) 100 % --   Wound Odor None --       Inactive Orders   Date Order Priority Status Authorizing Provider   10/23/23 0855 Debridement Dorsal;Right Routine Completed Anna Sanchez, DPDAPHNE   10/16/23 0911 Debridement Dorsal;Right Routine Completed Anna Sanchez, DPDAPHNE   10/09/23 1322 Debridement Dorsal;Right Routine Completed Anna Sanchez, NENOM   09/25/23 1330 Debridement Dorsal;Right Routine Completed Anna Sanchez, DPM   08/28/23 1321 Debridement Routine Completed Tiffanie Thejo Ackerman, DPM   08/14/23 1329 Debridement Routine Completed Tiffanie Theadore Meka, DPM       Negative Pressure Wound Therapy Foot Distal (Active)   Placement Date/Time: 10/16/23 0914   Location: Foot  Wound Location Orientation: Distal      Assessments 10/16/2023  9:15 AM   Wound photographed/measured Yes   Machine Status (On) Yes   Site Assessment Clean   Peggy-wound Assessment Intact   Unit Type KCI 3M   Dressing Type Black foam   Number of Foam Pieces Used 1   Cycle Continuous Target Pressure (mmHg) 125   Drainage Description Serosanguineous   Dressing Status Clean   Canister Changed Yes       No associated orders. Wound Number: 1     Wound Cleaning and Dressings:  Showering directions: do not get wound wet  Wound product: Cleanse wound with saline or wound cleanser, apply betadine to periwound, apply honey gel to wound bed with gauze, abd, kerlix. Change as directed      CNegative Pressure Wound Therapy:  NPWT: on hold one week      Miscellaneous/Additional Orders:  Offloading: see  physician orders  Miscellaneous orders: Home health care nurse for wound care    Care Summary:  Care Summary: Discussed Plan of Care at beside with patient. Patient verbally acknowledges understanding of all instructions and all questions were answered. Follow Up:  Return in about 1 week (around 10/30/2023).       Additional Notes:  OTIS gandhi    Additional home health DME: No

## (undated) NOTE — LETTER
[address]  [address]  Authorization for Surgical Operation and Procedure     Date:___________                                                                                                         Time:__________  I hereby authorize  DR TIPTON , my physician and his/her assistants (if applicable), which may include medical students, residents, and/or fellows, to perform the following surgical operation/ procedure and administer such anesthesia as may be determined necessary by my physician:  Operation/Procedure name (s) Esophagogastroduodenoscopy with possible biopsy, possible control of bleeding under monitored anesthesia care  on Alejandro Guardado   2.   I recognize that during the surgical operation/procedure, unforeseen conditions may necessitate additional or different procedures than those listed above.  I, therefore, further authorize and request that the above-named surgeon, assistants, or designees perform such procedures as are, in their judgment, necessary and desirable.    3.   My surgeon/physician has discussed prior to my surgery the potential benefits, risks and side effects of this procedure; the likelihood of achieving goals; and potential problems that might occur during recuperation.  They also discussed reasonable alternatives to the procedure, including risks, benefits, and side effects related to the alternatives and risks related to not receiving this procedure.  I have had all my questions answered and I acknowledge that no guarantee has been made as to the result that may be obtained.    4.   Should the need arise during my operation/procedure, which includes change of level of care prior to discharge, I also consent to the administration of blood and/or blood products.  Further, I understand that despite careful testing and screening of blood or blood products by collecting agencies, I may still be subject to ill effects as a result of receiving a blood  transfusion and/or blood products.  The following are some, but not all, of the potential risks that can occur: fever and allergic reactions, hemolytic reactions, transmission of diseases such as Hepatitis, AIDS and Cytomegalovirus (CMV) and fluid overload.  In the event that I wish to have an autologous transfusion of my own blood, or a directed donor transfusion, I will discuss this with my physician.  Check only if Refusing Blood or Blood Products  I understand refusal of blood or blood products as deemed necessary by my physician may have serious consequences to my condition to include possible death. I hereby assume responsibility for my refusal and release the hospital, its personnel, and my physicians from any responsibility for the consequences of my refusal.          o  Refuse      5.   I authorize the use of any specimen, organs, tissues, body parts or foreign objects that may be removed from my body during the operation/procedure for diagnosis, research or teaching purposes and their subsequent disposal by hospital authorities.  I also authorize the release of specimen test results and/or written reports to my treating physician on the hospital medical staff or other referring or consulting physicians involved in my care, at the discretion of the Pathologist or my treating physician.    6.   I consent to the photographing or videotaping of the operations or procedures to be performed, including appropriate portions of my body for medical, scientific, or educational purposes, provided my identity is not revealed by the pictures or by descriptive texts accompanying them.  If the procedure has been photographed/videotaped, the surgeon will obtain the original picture, image, videotape or CD.  The hospital will not be responsible for storage, release or maintenance of the picture, image, tape or CD.    7.   I consent to the presence of a  or observers in the operating room as deemed  necessary by my physician or their designees.    8.   I recognize that in the event my procedure results in extended X-Ray/fluoroscopy time, I may develop a skin reaction.    9. If I have a Do Not Attempt Resuscitation (DNAR) order in place, that status will be suspended while in the operating room, procedural suite, and during the recovery period unless otherwise explicitly stated by me (or a person authorized to consent on my behalf). The surgeon or my attending physician will determine when the applicable recovery period ends for purposes of reinstating the DNAR order.  10. Patients having a sterilization procedure: I understand that if the procedure is successful the results will be permanent and it will therefore be impossible for me to inseminate, conceive, or bear children.  I also understand that the procedure is intended to result in sterility, although the result has not been guaranteed.   11. I acknowledge that my physician has explained sedation/analgesia administration to me including the risk and benefits I consent to the administration of sedation/analgesia as may be necessary or desirable in the judgment of my physician.    I CERTIFY THAT I HAVE READ AND FULLY UNDERSTAND THE ABOVE CONSENT TO OPERATION and/or OTHER PROCEDURE.    _________________________________________  __________________________________  Signature of Patient     Signature of Responsible Person         ___________________________________         Printed Name of Responsible Person           _________________________________                 Relationship to Patient  _________________________________________  ______________________________  Signature of Witness          Date  Time      Patient Name: Alejandro Guardado     : 1935                 Printed: 2024     Medical Record #: [id number]                     Page 1 of 2                                    [address]  [address]    Consent  for Anesthesia    I, Alejandro TONY Debby agree to be cared for by an anesthesiologist, who is specially trained to monitor me and give me medicine to put me to sleep or keep me comfortable during my procedure    I understand that my anesthesiologist is not an employee or agent of [facility] or [employer]. He or she works for [employer].    As the patient asking for anesthesia services, I agree to:  Allow the anesthesiologist (anesthesia doctor) to give me medicine and do additional procedures as necessary. Some examples are: Starting or using an “IV” to give me medicine, fluids or blood during my procedure, and having a breathing tube placed to help me breathe when I’m asleep (intubation). In the event that my heart stops working properly, I understand that my anesthesiologist will make every effort to sustain my life, unless otherwise directed by [facility] Do Not Resuscitate documents.  Tell my anesthesia doctor before my procedure:  If I am pregnant.  The last time that I ate or drank.  All of the medicines I take (including prescriptions, herbal supplements, and pills I can buy without a prescription (including street drugs/illegal medications). Failure to inform my anesthesiologist about these medicines may increase my risk of anesthetic complications.  If I am allergic to anything or have had a reaction to anesthesia before.  I understand how the anesthesia medicine will help me (benefits).  I understand that with any type of anesthesia medicine there are risks:  The most common risks are: nausea, vomiting, sore throat, muscle soreness, damage to my eyes, mouth, or teeth (from breathing tube placement).  Rare risks include: remembering what happened during my procedure, allergic reactions to medications, injury to my airway, heart, lungs, vision, nerves, or muscles and in extremely rare instances death.  My doctor has explained to me other choices available to me for my care  (alternatives).  Pregnant Patients (“epidural”):  I understand that the risks of having an epidural (medicine given into my back to help control pain during labor), include itching, low blood pressure, difficulty urinating, headache or slowing of the baby’s heart. Very rare risks include infection, bleeding, seizure, irregular heart rhythms and nerve injury.  Regional Anesthesia (“spinal”, “epidural”, & “nerve blocks”):  I understand that rare but potential complications include headache, bleeding, infection, seizure, irregular heart rhythms, and nerve injury.    I can change my mind about having anesthesia services at any time before I get the medicine.    _____________________________________________________________________________  Patient (or Representative) Signature/Relationship to Patient  Date   Time    _____________________________________________________________________________   Name (if used)    Language/Organization   Time    _____________________________________________________________________________  Anesthesiologist Signature     Date   Time  I have discussed the procedure and information above with the patient (or patient’s representative) and answered their questions. The patient or their representative has agreed to have anesthesia services.    _____________________________________________________________________________  Witness        Date   Time  I have verified that the signature is that of the patient or patient’s representative, and that it was signed before the procedure  Patient Name: Alejandro Guaraddo     : 1935                 Printed: 2024     Medical Record #: [id number]                     Page 2 of 2

## (undated) NOTE — LETTER
[address]  [address]  Authorization for Surgical Operation and Procedure     Date:___________                                                                                                         Time:__________  I hereby authorize Devan Mohr MD, my physician and his/her assistants (if applicable), which may include medical students, residents, and/or fellows, to perform the following surgical operation/ procedure and administer such anesthesia as may be determined necessary by my physician:  Operation/Procedure name (s): Central Venous Catheter Insertion on Alejandro Guardado   2.   I recognize that during the surgical operation/procedure, unforeseen conditions may necessitate additional or different procedures than those listed above.  I, therefore, further authorize and request that the above-named surgeon, assistants, or designees perform such procedures as are, in their judgment, necessary and desirable.    3.   My surgeon/physician has discussed prior to my surgery the potential benefits, risks and side effects of this procedure; the likelihood of achieving goals; and potential problems that might occur during recuperation.  They also discussed reasonable alternatives to the procedure, including risks, benefits, and side effects related to the alternatives and risks related to not receiving this procedure.  I have had all my questions answered and I acknowledge that no guarantee has been made as to the result that may be obtained.    4.   Should the need arise during my operation/procedure, which includes change of level of care prior to discharge, I also consent to the administration of blood and/or blood products.  Further, I understand that despite careful testing and screening of blood or blood products by collecting agencies, I may still be subject to ill effects as a result of receiving a blood transfusion and/or blood products.  The following are some, but  not all, of the potential risks that can occur: fever and allergic reactions, hemolytic reactions, transmission of diseases such as Hepatitis, AIDS and Cytomegalovirus (CMV) and fluid overload.  In the event that I wish to have an autologous transfusion of my own blood, or a directed donor transfusion, I will discuss this with my physician.  Check only if Refusing Blood or Blood Products  I understand refusal of blood or blood products as deemed necessary by my physician may have serious consequences to my condition to include possible death. I hereby assume responsibility for my refusal and release the hospital, its personnel, and my physicians from any responsibility for the consequences of my refusal.          o  Refuse      5.   I authorize the use of any specimen, organs, tissues, body parts or foreign objects that may be removed from my body during the operation/procedure for diagnosis, research or teaching purposes and their subsequent disposal by hospital authorities.  I also authorize the release of specimen test results and/or written reports to my treating physician on the hospital medical staff or other referring or consulting physicians involved in my care, at the discretion of the Pathologist or my treating physician.    6.   I consent to the photographing or videotaping of the operations or procedures to be performed, including appropriate portions of my body for medical, scientific, or educational purposes, provided my identity is not revealed by the pictures or by descriptive texts accompanying them.  If the procedure has been photographed/videotaped, the surgeon will obtain the original picture, image, videotape or CD.  The hospital will not be responsible for storage, release or maintenance of the picture, image, tape or CD.    7.   I consent to the presence of a  or observers in the operating room as deemed necessary by my physician or their designees.    8.   I recognize that  in the event my procedure results in extended X-Ray/fluoroscopy time, I may develop a skin reaction.    9. If I have a Do Not Attempt Resuscitation (DNAR) order in place, that status will be suspended while in the operating room, procedural suite, and during the recovery period unless otherwise explicitly stated by me (or a person authorized to consent on my behalf). The surgeon or my attending physician will determine when the applicable recovery period ends for purposes of reinstating the DNAR order.  10. Patients having a sterilization procedure: I understand that if the procedure is successful the results will be permanent and it will therefore be impossible for me to inseminate, conceive, or bear children.  I also understand that the procedure is intended to result in sterility, although the result has not been guaranteed.   11. I acknowledge that my physician has explained sedation/analgesia administration to me including the risk and benefits I consent to the administration of sedation/analgesia as may be necessary or desirable in the judgment of my physician.    I CERTIFY THAT I HAVE READ AND FULLY UNDERSTAND THE ABOVE CONSENT TO OPERATION and/or OTHER PROCEDURE.    _________________________________________  __________________________________  Signature of Patient     Signature of Responsible Person         ___________________________________         Printed Name of Responsible Person           _________________________________                 Relationship to Patient  _________________________________________  ______________________________  Signature of Witness          Date  Time      Patient Name: Alejandro Guardado     : 1935                 Printed: 2024     Medical Record #: [id number]                     Page 1 [address]  [address]    Consent for Anesthesia    I, Alejandro Guardado agree to be cared for by an  anesthesiologist, who is specially trained to monitor me and give me medicine to put me to sleep or keep me comfortable during my procedure    I understand that my anesthesiologist is not an employee or agent of [facility] or [employer]. He or she works for [email or web address].    As the patient asking for anesthesia services, I agree to:  Allow the anesthesiologist (anesthesia doctor) to give me medicine and do additional procedures as necessary. Some examples are: Starting or using an “IV” to give me medicine, fluids or blood during my procedure, and having a breathing tube placed to help me breathe when I’m asleep (intubation). In the event that my heart stops working properly, I understand that my anesthesiologist will make every effort to sustain my life, unless otherwise directed by [facility] Do Not Resuscitate documents.  Tell my anesthesia doctor before my procedure:  If I am pregnant.  The last time that I ate or drank.  All of the medicines I take (including prescriptions, herbal supplements, and pills I can buy without a prescription (including street drugs/illegal medications). Failure to inform my anesthesiologist about these medicines may increase my risk of anesthetic complications.  If I am allergic to anything or have had a reaction to anesthesia before.  I understand how the anesthesia medicine will help me (benefits).  I understand that with any type of anesthesia medicine there are risks:  The most common risks are: nausea, vomiting, sore throat, muscle soreness, damage to my eyes, mouth, or teeth (from breathing tube placement).  Rare risks include: remembering what happened during my procedure, allergic reactions to medications, injury to my airway, heart, lungs, vision, nerves, or muscles and in extremely rare instances death.  My doctor has explained to me other choices available to me for my care (alternatives).  Pregnant Patients (“epidural”):  I understand  that the risks of having an epidural (medicine given into my back to help control pain during labor), include itching, low blood pressure, difficulty urinating, headache or slowing of the baby’s heart. Very rare risks include infection, bleeding, seizure, irregular heart rhythms and nerve injury.  Regional Anesthesia (“spinal”, “epidural”, & “nerve blocks”):  I understand that rare but potential complications include headache, bleeding, infection, seizure, irregular heart rhythms, and nerve injury.    I can change my mind about having anesthesia services at any time before I get the medicine.    _____________________________________________________________________________  Patient (or Representative) Signature/Relationship to Patient  Date   Time    _____________________________________________________________________________   Name (if used)    Language/Organization   Time    _____________________________________________________________________________  Anesthesiologist Signature     Date   Time  I have discussed the procedure and information above with the patient (or patient’s representative) and answered their questions. The patient or their representative has agreed to have anesthesia services.    _____________________________________________________________________________  Witness        Date   Time  I have verified that the signature is that of the patient or patient’s representative, and that it was signed before the procedure  Patient Name: Alejandro Guardado     : 1935                 Printed: 2024     Medical Record #: [id number]                     Page 2 of 2

## (undated) NOTE — LETTER
Date: 11/14/2023  Patient name: Jg Carbajal  YOB: 1935  Medical Record Number: [id number]  Primary Coverage: Payor: MEDICARE / Plan: MEDICARE PART A&B / Product Type: *No Product type* /   Secondary Coverage: MEDICARE - MEDICARE PART A&B  Insurance ID: [id number]  Patient Address: [address] [place] [id number]  Telephone Information:   Home Phone [phone]   Mobile [phone]       Encounter Date: 11/13/2023  Provider: Munir Kam DPM  Diagnosis:     ICD-10-CM   1. Foot ulcer, right, with fat layer exposed ([facility] [address]  L97.512   2. PAD (peripheral artery disease) ([facility])  I73.9   3. History of transmetatarsal amputation of foot [facility])  Z89.439         Wound 08/14/23 #1- right foot Old surgical Foot Right (Active)   Date First Assessed/Time First Assessed: 08/14/23 1309    Wound Number (Wound Clinic Only): #1- right foot  Primary Wound Type: Old surgical  Location: Foot  Wound Location Orientation: Right  Wound Description (Comments): Amputation site      Assessments 8/14/2023  1:15 PM 11/13/2023  4:03 PM   Wound Image       Drainage Description Serous; Yellow --   Wound Length (cm) 2.6 cm 0.9 cm   Wound Width (cm) 7 cm 5.4 cm   Wound Surface Area (cm^2) 18.2 cm^2 4.86 cm^2   Wound Depth (cm) 1.3 cm 0.9 cm   Wound Volume (cm^3) 23.66 cm^3 4. 374 cm^3   Wound Healing % -- 82   Margins Well-defined edges --   Non-staged Wound Description Full thickness --   Peggy-wound Assessment Edema --   Wound Granulation Tissue Pink;Firm --   Wound Bed Granulation (%) 10 % --   Wound Bed Epithelium (%) 10 % --   Wound Bed Slough (%) 40 % --   Wound Bed Eschar (%) 40 % --   Wound Odor None --   Shape clustered --       Active Orders   Date Order Priority Status Authorizing Provider   11/13/23 1636 Debridement Old surgical Right Foot Routine Active Anna Sanchez DPM       Inactive Orders   Date Order Priority Status Authorizing Provider   11/06/23 1538 Debridement Old surgical Right Foot Routine Completed NENO Bettencourt   10/30/23 0915 Debridement Old surgical Right Foot Routine Completed Tana Ervin, [place]   10/23/23 7130 Debridement Old surgical Right Foot Routine Completed Tana Ervin, [place]   10/16/23 1234 Debridement Old surgical Right Foot Routine Completed Tana Ervin, [place]   10/09/23 1318 Debridement Old surgical Right Foot Routine Completed Tana Ervin, [place]   09/25/23 1336 Debridement Old surgical Right Foot Routine Completed NENO Bettencourt   09/11/23 1348 Debridement Routine Completed Huan Moreno, [place]   08/28/23 1317 Debridement Routine Completed Huan Moreno, NENO   08/14/23 1327 Debridement Routine Completed Huan Moreno, NENO       Wound 08/14/23 #2- right medial foot Dorsal;Right (Active)   Date First Assessed/Time First Assessed: 08/14/23 1319    Wound Number (Wound Clinic Only): #2- right medial foot  Wound Location Orientation: Dorsal;Right      Assessments 8/14/2023  1:20 PM 11/13/2023  4:04 PM   Wound Image        Drainage Amount None Small   Drainage Description -- Serous; Yellow   Wound Length (cm) 0.9 cm 0.9 cm   Wound Width (cm) 1.4 cm 0.3 cm   Wound Surface Area (cm^2) 1.26 cm^2 0.27 cm^2   Wound Depth (cm) 0 cm 0.6 cm   Wound Volume (cm^3) 0 cm^3 0. 162 cm^3   Margins Well-defined edges Well-defined edges   Non-staged Wound Description Full thickness Full thickness   Peggy-wound Assessment Edema Edema; Moist   Wound Bed Slough (%) -- 100 %   Wound Bed Eschar (%) 100 % --   Wound Odor None None   Tunneling? -- No   Undermining? -- No   Sinus Tracts? -- No       Inactive Orders   Date Order Priority Status Authorizing Provider   11/06/23 1540 Debridement Dorsal;Right Routine Completed NENO Bettencourt   10/30/23 2178 Debridement Dorsal;Right Routine Completed NENO Bettencourt   10/23/23 0855 Debridement Dorsal;Right Routine Completed Tana Ervin DPM   10/16/23 0911 Debridement Dorsal;Right Routine Completed NENO Bettencourt   10/09/23 1322 Debridement Dorsal;Right Routine Completed Carlos Jes, DPM   09/25/23 1330 Debridement Dorsal;Right Routine Completed Carlos Jes, [facility]   08/28/23 1321 Debridement Routine Completed BlockSabina, DPM   08/14/23 1329 Debridement Routine Completed Block, Sabina Elizondo, DPM       Negative Pressure Wound Therapy Foot Distal (Active)   Placement Date/Time: 10/16/23 0914   Location: Foot  Wound Location Orientation: Distal      Assessments 10/16/2023  9:15 AM 10/30/2023  9:05 AM   Wound photographed/measured Yes Yes   Machine Status (On) Yes Yes   Site Assessment Clean Clean   Peggy-wound Assessment Intact --   Unit Type KCI 3M KCI 3M   Dressing Type Black foam Black foam   Number of Foam Pieces Used 1 2   Cycle Continuous Continuous   Target Pressure (mmHg) 125 125   Drainage Description Serosanguineous Serous; Yellow   Dressing Status Clean --   Canister Changed Yes --       No associated orders. Wound Number: 1     Wound Cleaning and Dressings:  Showering directions: do not get wounds wet  Wound cleansing:  Cleanse with normal saline or wound cleanser  Wound product: Josi collagen and Hydrofera transfer abd, kerlix   Dressing change frequency:  Change dressing 3x per week      Miscellaneous/Additional Orders:  Offloading: see physician orders   Miscellaneous orders: Home health care nurse for wound care    Care Summary:  Care Summary: Discussed Plan of Care at beside with patient. Patient verbally acknowledges understanding of all instructions and all questions were answered. Follow Up:  Return in about 1 week (around 11/20/2023).           Additional home health DME: No

## (undated) NOTE — LETTER
OUTSIDE TESTING RESULT REQUEST     IMPORTANT: FOR YOUR IMMEDIATE ATTENTION  Please FAX all test results listed below to: [phone]        * * * * If testing is NOT complete, arrange with patient A.S.A.P. * * * *      Patient Name: Marek Caro  Surgery Date: 2023  Medical Record: [id number]  CSN: [id number]  : 1935 - A: 80 y     Sex: male  Surgeon(s):  Yelena Blount DPM  Procedure: Right foot transmetatarsal amputation  Anesthesia Type: General     Surgeon: Yelena Blount DPM     The following Testing and Time Line are REQUIRED PER ANESTHESIA     EKG READ AND SIGNED WITHIN   90 days      Thank You,   Sent by:ANNALISE Rodrate - Pre-Admission Testing

## (undated) NOTE — LETTER
Date: 11/20/2023  Patient name: Elena Mtz  YOB: 1935  Medical Record Number: [id number]  Primary Coverage: Payor: MEDICARE / Plan: MEDICARE PART A&B / Product Type: *No Product type* /   Secondary Coverage: Minnie Macdonald ID: [id number]  Patient Address: [address] [place] [address],[address] [facility] [address]  Telephone Information:   Home Phone [phone]   Mobile [phone]       Encounter Date: 11/20/2023  Provider: Ana Larose DPM  Diagnosis:     ICD-10-CM   1. Foot ulcer, right, with fat layer exposed ([facility] [address]  L97.512   2. PAD (peripheral artery disease) ([facility])  I73.9   3. History of transmetatarsal amputation of foot [facility])  Z89.439         Wound 08/14/23 #1- right foot Old surgical Foot Right (Active)   Date First Assessed/Time First Assessed: 08/14/23 1309    Wound Number (Wound Clinic Only): #1- right foot  Primary Wound Type: Old surgical  Location: Foot  Wound Location Orientation: Right  Wound Description (Comments): Amputation site      Assessments 8/14/2023  1:15 PM 11/20/2023  3:18 PM   Wound Image         Drainage Amount -- Moderate   Drainage Description Serous; Yellow Serous; Yellow   Wound Length (cm) 2.6 cm 0.8 cm   Wound Width (cm) 7 cm 5 cm   Wound Surface Area (cm^2) 18.2 cm^2 4 cm^2   Wound Depth (cm) 1.3 cm 1 cm   Wound Volume (cm^3) 23.66 cm^3 4 cm^3   Wound Healing % -- 83   Margins Well-defined edges Well-defined edges;Epibole (Rolled edges)   Non-staged Wound Description Full thickness Full thickness   Peggy-wound Assessment Edema Edema; Moist;Maceration   Wound Granulation Tissue Pink;Firm Firm;Pink   Wound Bed Granulation (%) 10 % 15 %   Wound Bed Epithelium (%) 10 % --   Wound Bed Slough (%) 40 % 85 %   Wound Bed Eschar (%) 40 % --   Wound Odor None None   Shape clustered --   Sinus Tracts? -- Yes       Active Orders   Date Order Priority Status Authorizing Provider   11/20/23 1600 Debridement Old surgical Right Foot Routine Active Reida Public, DPM       Inactive Orders   Date Order Priority Status Authorizing Provider   11/13/23 1636 Debridement Old surgical Right Foot Routine Completed Carlos Jes, [facility]   11/06/23 1538 Debridement Old surgical Right Foot Routine Completed Carlos Jes, [facility]   10/30/23 5602 Debridement Old surgical Right Foot Routine Completed Carlos Jes, [facility]   10/23/23 2801 Debridement Old surgical Right Foot Routine Completed Carlos Jes, [facility]   10/16/23 1603 Debridement Old surgical Right Foot [facility]ine [facility]   10/09/23 1318 Debridement Old surgical Right Foot Routine Com[facility]   09/25/23 1336 Debridement Old surgical Right Foot Routine Completed Carlos Jes, DPM   09/11/23 1348 Debridement Routine Completed [facility]   08/28/23 1317 Debridement Routine Completed BlockConnieSabina Plenty, DPM   08/14/23 1327 Debridement Routine Completed Block Sabina Plenty, DPM       Wound 08/14/23 #2- right medial foot Dorsal;Right (Active)   Date First Assessed/Time First Assessed: 08/14/23 1319    Wound Number (Wound Clinic Only): #2- right medial foot  Wound Location Orientation: Dorsal;Right      Assessments 8/14/2023  1:20 PM 11/20/2023  3:21 PM   Wound Image         Drainage Amount None Moderate   Drainage Description -- Serous; Yellow   Wound Length (cm) 0.9 cm 0.5 cm   Wound Width (cm) 1.4 cm 0.5 cm   Wound Surface Area (cm^2) 1.26 cm^2 0. 25 cm^2   Wound Depth (cm) 0 cm 0.5 cm   Wound Volume (cm^3) 0 cm^3 0. 125 cm^3   Margins Well-defined edges Well-defined edges   Non-staged Wound Description Full thickness Full thickness   Peggy-wound Assessment Edema Edema; Moist;Maceration   Wound Bed Slough (%) -- 100 %   Wound Bed Eschar (%) 100 % --   Wound Odor None None   Undermining? -- Yes   Sinus Tracts? -- Yes       Active Orders   Date Order Priority Status Authorizing Provider   11/20/23 1601 Debridement Dorsal;Right Routine Active Carlos Jes, DPM       Inactive Orders   Date Order Priority Status Authorizing Provider   11/06/23 1540 Debridement Dorsal;Right Routine Completed Audrey Curls, [address]   10/30/23 7699 Debridement Dorsal;Right Routine Completed Audrey Curls, DPM   10/23/23 3583 Debridement Dorsal;Right Routine Completed Audrey Curls, DPM   10/16/23 0911 Debridement Dorsal;Right Routine Completed Audrey Curls, DPM   10/09/23 1322 Debridement Dorsal;Right Routine Completed Audrey Curls, DPM   09/25/23 1330 Debridement Dorsal;Right Routine Completed Audrey Curls, DPM   08/28/23 1321 Debridement Routine Completed Block, Cleophus Knife, DPM   08/14/23 1329 Debridement Routine Completed Block, Cleophus Knife, DPM       Wound Cleaning and Dressings:  Showering directions: May shower with protection  Wound cleansing:  Cleanse with normal saline or wound cleanser  Wound cleaning frequency: Daily  Wound product: Other silver packing strip, hydrofera transfer, ABD pad, kerlix, tape  Dressing change frequency:  Change dressing daily and/or PRN  Enzymatic agent:  Not applicable    Miscellaneous/Additional Orders:Miscellaneous orders: Home health care nurse for wound care    Care Summary:  Care Summary: Discussed Plan of Care at beside with patient. Patient verbally acknowledges understanding of all instructions and all questions were answered. Additional Notes:  Silver packing strip lightly packed to medial wound, hydrofera transfer over both wounds, ABD pad, kerlix, tape. Pt/family educated on dressing and would like to cancel wound care [facility] at this time.

## (undated) NOTE — MR AVS SNAPSHOT
After Visit Summary   6/23/2023    Elsa Portillo   MRN: [id number]           Visit Information     Date & Time  6/23/2023 12:00 PM Provider  Linnea Davies DPM Department  [address], [address], [place] Dept. Phone  [phone]      Allergies as of 6/23/2023  Review status set to Review Complete on 6/23/2023       Noted Reaction Type Reactions    Heparin 03/24/2016    ANAPHYLAXIS    Hydromorphone 03/24/2016   Systemic HALLUCINATION      Your Current Medications        Dosage    amoxicillin clavulanate 875-125 MG Oral Tab Take 1 tablet by mouth 2 (two) times daily. amoxicillin clavulanate 875-125 MG Oral Tab Take 1 tablet by mouth 2 (two) times daily. HYDROcodone-acetaminophen (NORCO) 5-325 MG Oral Tab Take 1 tablet by mouth every 4 (four) hours as needed for Pain.    tamsulosin 0.4 MG Oral Cap Take 1 capsule (0.4 mg total) by mouth daily. atorvastatin 40 MG Oral Tab Take 1 tablet (40 mg total) by mouth daily. finasteride 5 MG Oral Tab Take 1 tablet (5 mg total) by mouth daily. clopidogrel 75 MG Oral Tab Take 1 tablet (75 mg total) by mouth daily. rivaroxaban 2.5 MG Oral Tab Take 1 tablet (2.5 mg total) by mouth 2 (two) times daily with meals. furosemide 40 MG Oral Tab Take 1 tablet (40 mg total) by mouth daily. predniSONE 5 MG Oral Tab Take 3 tablets (15 mg total) by mouth daily. folic acid 1 MG Oral Tab Take 1 tablet (1 mg total) by mouth daily. acetaminophen 500 MG Oral Tab Take 2 tablets (1,000 mg total) by mouth every 8 (eight) hours. metoprolol succinate ER 25 MG Oral Tablet 24 Hr Take 1 tablet (25 mg total) by mouth daily. aspirin EC 81 MG Oral Tab EC Take 1 tablet (81 mg total) by mouth daily. Omeprazole 40 MG Oral Capsule Delayed Release Take 1 capsule (40 mg total) by mouth daily. gabapentin 300 MG Oral Cap Take 1 capsule (300 mg total) by mouth 3 (three) times daily.     lisinopril 2.5 MG Oral Tab Take 1 tablet (2.5 mg total) by mouth daily. Diagnoses for This Visit    Orthopedic aftercare   [263173]  -  Primary  Ulcer of right foot with fat layer exposed   [6882411]    Gangrene   [144. 4. ICD-9-CM]    PAD (peripheral artery disease)   [813474]    History of transmetatarsal amputation of foot   [7249194]             Follow-up    Return in about 1 week (around 6/30/2023) for Postop. We Ordered the Following     Future Labs/Procedures Expected by Expires    ANAEROBIC CULTURE [[id number] CUSTOM]  6/23/2023 (Approximate) 6/23/2024    AEROBIC BACTERIAL CULTURE [[id number] CUSTOM]  6/26/2023 (Approximate) 6/26/2024      Future Appointments        Provider Department    6/28/2023 1:00 PM Block, [address] [facility], [address]      Follow-up Instructions    Return in about 1 week (around 6/30/2023) for Postop. Instructions    Remain nonweightbearing to right lower extremity. Elevate foot regularly. Take Augmentin as prescribed. Follow-up in 1 week or sooner if other concerns arise. Did you know that [facility] primary care physicians now offer Video Visits through [address] for adult patients for a variety of conditions such as allergies, back pain and cold symptoms? Skip the drive and waiting room and online chat with a doctor face-to-face using your web-cam enabled computer or mobile device wherever you are. Video Visits cost $50 and can be paid hassle-free using a credit, debit, or health savings card. Not active on [employer]? Ask us how to get signed up today! If you receive a survey from [email or web address], please take a few minutes to complete it and provide feedback. We strive to deliver the best patient experience and are looking for ways to make improvements. Your feedback will help us do so. For more information on [facility], please visit [email or web address]           No text in SmartText           No text in SmartText

## (undated) NOTE — LETTER
Date: 11/27/2023  Patient name: Isai Chu  YOB: 1935  Medical Record Number: [id number]  Primary Coverage: Payor: MEDICARE / Plan: MEDICARE PART A&B / Product Type: *No Product type* /   Secondary Coverage: Minnie Macdonald ID: [id number]  Patient Address: [address] [place] [address],[address] [facility] [address]  Telephone Information:   Home Phone [phone]   Mobile [phone]       Encounter Date: 11/27/2023  Provider: Catina Wilcox DPM  Diagnosis:     ICD-10-CM   1. Foot ulcer, right, with fat layer exposed ([facility] [address]  L97.512   2. PAD (peripheral artery disease) ([facility])  I73.9   3. History of transmetatarsal amputation of foot [facility])  Z89.439         Wound 08/14/23 #1- right foot Old surgical Foot Right (Active)   Date First Assessed/Time First Assessed: 08/14/23 1309    Wound Number (Wound Clinic Only): #1- right foot  Primary Wound Type: Old surgical  Location: Foot  Wound Location Orientation: Right  Wound Description (Comments): Amputation site      Assessments 8/14/2023  1:15 PM 11/27/2023  9:37 AM   Wound Image       Drainage Description Serous; Yellow --   Wound Length (cm) 2.6 cm 0.8 cm   Wound Width (cm) 7 cm 4.4 cm   Wound Surface Area (cm^2) 18.2 cm^2 3.52 cm^2   Wound Depth (cm) 1.3 cm 0.8 cm   Wound Volume (cm^3) 23.66 cm^3 2. 816 cm^3   Wound Healing % -- 88   Margins Well-defined edges --   Non-staged Wound Description Full thickness --   Peggy-wound Assessment Edema --   Wound Granulation Tissue Pink;Firm --   Wound Bed Granulation (%) 10 % --   Wound Bed Epithelium (%) 10 % --   Wound Bed Slough (%) 40 % --   Wound Bed Eschar (%) 40 % --   Wound Odor None --   Shape clustered --       Active Orders   Date Order Priority Status Authorizing Provider   11/27/23 1018 Debridement Old surgical Right Foot Routine Active Zulma Sparks DPM       Inactive Orders   Date Order Priority Status Authorizing Provider   11/20/23 1600 Debridement Old surgical Right Foot Routine Completed Leeann Pro, DPM   11/13/23 1636 Debridement Old surgical Right Foot [facility]   11/06/23 1538 Debridement Old surgical Right Foot Routine Com[facility]   10/30/23 0915 Debridement Old surgical Right Foot Routine Com[facility]   10/23/23 5970 Debridement Old surgical Right Foot Routine Com[facility]   10/16/23 3226 Debridement Old surgical Right Foot Routine Completed Leeann Pro, DPM   10/09/23 1318 Debridement Old surgical Right Foot Routine Completed Leeann Pro DPM   09/25/23 1336 Debridement Old surgical Right Foot Routine Completed Leeann Pro, JAN   09/11/23 1348 Debridement Routine Completed Yony Aburto DPM   08/28/23 1317 Debridement Routine Completed Wendy Moreno, NENOM   08/14/23 1327 Debridement Routine Completed Wendy Moreno, NENO       Wound 08/14/23 #2- right medial foot Dorsal;Right (Active)   Date First Assessed/Time First Assessed: 08/14/23 1319    Wound Number (Wound Clinic Only): #2- right medial foot  Wound Location Orientation: Dorsal;Right      Assessments 8/14/2023  1:20 PM 11/27/2023  9:40 AM   Wound Image       Drainage Amount None --   Wound Length (cm) 0.9 cm 0.6 cm   Wound Width (cm) 1.4 cm 0.6 cm   Wound Surface Area (cm^2) 1.26 cm^2 0.36 cm^2   Wound Depth (cm) 0 cm 0.7 cm   Wound Volume (cm^3) 0 cm^3 0. 252 cm^3   Margins Well-defined edges --   Non-staged Wound Description Full thickness --   Peggy-wound Assessment Edema --   Wound Bed Eschar (%) 100 % --   Wound Odor None --       Active Orders   Date Order Priority Status Authorizing Provider   11/27/23 1019 Debridement Dorsal;Right Routine Active Leeann Pro DPM       Inactive Orders   Date Order Priority Status Authorizing Provider   11/20/23 1601 Debridement Dorsal;Right Routine Completed Leeann Pro DPM   11/06/23 1540 Debridement Dorsal;Right Routine Completed Leeann Pro, JAN   10/30/23 5989 Debridement Dorsal;Right Routine Completed Hannah Balloon, [place]   10/23/23 6782 Debridement Dorsal;Right Routine Completed Hannah Balloon, [place]   10/16/23 0911 Debridement Dorsal;Right Routine Completed Hannah Balloon, DPM   10/09/23 1322 Debridement Dorsal;Right Routine Completed Hannah Balloon, DPM   09/25/23 1330 Debridement Dorsal;Right Routine Completed Hannah Balloon, [place]   08/28/23 1321 Debridement Routine Completed Block, Linzie Kirby, [place]   08/14/23 1329 Debridement Routine Completed Block, Linzie Kirby, DPM           Wound Number: All wounds    Wound Cleaning and Dressings:  Showering directions: do not get wet  Wound cleaning frequency: Daily  Wound product: Betadine  Dressing change frequency:  Change dressing daily and/or PRN      Miscellaneous/Additional Orders:  Offloading: No weight bearing  Miscellaneous orders: Home health care nurse for wound care    Care Summary:  Care Summary: Discussed Plan of Care at beside with patient. Patient verbally acknowledges understanding of all instructions and all questions were answered. Follow Up:  Return in about 1 week (around 12/4/2023). Additional Notes:       Additional home health DME: No

## (undated) NOTE — LETTER
Date: 5/29/2024  Patient name: Alejandro Guardado  YOB: 1935  Medical Record Number: [id number]  Primary Coverage: Payor: BLUE CROSS MCR / Plan: [facility] MEDICARE ADV PPO / Product Type: Medicare /   Secondary Coverage:   Insurance ID: [id number]  Patient Address: [address] [place] [id number]  Telephone Information:   Home Phone [phone]   Mobile [phone]         Encounter Date: 5/29/2024  Provider: Farzad Willingham MD  Diagnosis:     ICD-10-CM   1. Nonhealing surgical wound, subsequent encounter  T81.89XD   2. Non-pressure chronic ulcer of right calf with fat layer exposed ([facility])  L97.212   3. Non-healing open wound of right heel, subsequent encounter  S91.301D   4. Foot ulcer, right, with fat layer exposed ([facility])  L97.512   5. PAD (peripheral artery disease) ([facility])  I73.9   6. Edema of right lower extremity  R60.0   7. History of transmetatarsal amputation of foot ([facility])  Z89.439   8. Gait difficulty  R26.9       Progress Note:  [facility] PROGRESS NOTE  FARZAD WILLINGHAM MD  5/29/2024    Chief Complaint:   Chief Complaint   Patient presents with    Wound Care     Patient is here for a wound care follow up. He denies any pain or new wound concerns.       HPI:   Subjective  Alejandro Guardado is a 89 year old male coming in for a follow-up visit.    HPI    Wound on right leg and right heel stable   Nonviable tissue right leg wound 100%  Heel with minimal nonviable tissue.   Edema down - trying to elevate lower extremities.     Right foot wound stable and improved.   Covered with large amount of nonviable tissue and skin sub.   However after debridement - good granulation on most of wound base.   However, some bone still exposed    Xray leg - no bone infection - reviewed.     Family accompanying - all questions answered - plan of care reviewed.     Review of Systems  Negative except HPI   Denies chest pain / SOB / palpitations  Denies fever.     Allergies  Allergies   Allergen  Reactions    Heparin ANAPHYLAXIS    Hydromorphone HALLUCINATION       Current Meds:  Current Outpatient Medications   Medication Sig Dispense Refill    gentamicin 0.1 % External Cream Apply topically to the ulcer site once daily 30 g 1    furosemide 40 MG Oral Tab Take 2 tablets (80 mg total) by mouth 2 (two) times daily.      lisinopril 2.5 MG Oral Tab Take 1 tablet (2.5 mg total) by mouth daily.      Ascorbic Acid (VITAMIN C) 1000 MG Oral Tab Take 1.5 tablets (1,500 mg total) by mouth daily.      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      metoprolol succinate ER 25 MG Oral Tablet 24 Hr Take 1 tablet (25 mg total) by mouth Daily Beta Blocker. (Patient taking differently: Take 2 tablets (50 mg total) by mouth Daily Beta Blocker.) 90 tablet 1    tamsulosin 0.4 MG Oral Cap Take 1 capsule (0.4 mg total) by mouth daily.      apixaban 5 MG Oral Tab Take 1 tablet (5 mg total) by mouth 2 (two) times daily. 60 tablet 3    atorvastatin 40 MG Oral Tab Take 1 tablet (40 mg total) by mouth daily. (Patient taking differently: Take 2 tablets (80 mg total) by mouth daily.) 30 tablet 0    finasteride 5 MG Oral Tab Take 1 tablet (5 mg total) by mouth daily. 30 tablet 0    clopidogrel 75 MG Oral Tab Take 1 tablet (75 mg total) by mouth daily. 30 tablet 0    gabapentin 300 MG Oral Cap Take 1 capsule (300 mg total) by mouth 3 (three) times daily. (Patient taking differently: Take 1 capsule (300 mg total) by mouth. Takes 300 mg in am and 600 mg at bedtime) 90 capsule 0    predniSONE 5 MG Oral Tab Take 3 tablets (15 mg total) by mouth daily.      folic acid 1 MG Oral Tab Take 1 tablet (1 mg total) by mouth daily.      acetaminophen 500 MG Oral Tab Take 2 tablets (1,000 mg total) by mouth every 8 (eight) hours. 21 tablet 0    Omeprazole 40 MG Oral Capsule Delayed Release  Take 1 capsule (40 mg total) by mouth daily.           EXAM:   Objective  Objective    Physical Exam    Vital Signs  Vitals:    05/29/24 1128   BP: 110/68   Pulse: 90   Resp: 16   Temp: 98.6 °F (37 °C)       Wound Assessment  Wound 08/14/23 #1- right foot Foot Right (Active)   Wound Image   05/29/24 1124   Drainage Amount Small 05/29/24 1124   Drainage Description Serous;Yellow 05/29/24 1124   Treatments Compression 04/15/24 1612   Wound Vac Brand KCI 10/30/23 0907   Wound Length (cm) 1.9 cm 05/29/24 1124   Wound Width (cm) 3.8 cm 05/29/24 1124   Wound Surface Area (cm^2) 7.22 cm^2 05/29/24 1124   Wound Depth (cm) 0.7 cm 05/29/24 1124   Wound Volume (cm^3) 5.054 cm^3 05/29/24 1124   Wound Healing % 79 05/29/24 1124   Margins Well-defined edges;Epibole (Rolled edges) 05/29/24 1124   Non-staged Wound Description Full thickness 05/29/24 1124   Peggy-wound Assessment Edema;Moist;Maceration 05/29/24 1124   Wound Granulation Tissue Firm;Pink 05/13/24 1602   Wound Bed Granulation (%) 10 % 05/20/24 1003   Wound Bed Epithelium (%) 30 % 05/20/24 1003   Wound Bed Slough (%) 60 % 05/20/24 1003   Wound Bed Eschar (%) 40 % 08/14/23 1315   Wound Odor None 05/20/24 1003   Exposed Structure Bone Necrosis 05/13/24 1602   Shape 100% epifix 05/29/24 1124   Tunneling? No 05/29/24 1124   Undermining? No 05/29/24 1124   Sinus Tracts? No 05/29/24 1124       Wound 04/15/24 #2 Right anterior lower leg Leg Right;Anterior;Lower (Active)   Wound Image   05/29/24 1125   Drainage Amount Small 05/29/24 1125   Drainage Description Serous;Yellow 05/29/24 1125   Wound Length (cm) 2.4 cm 05/29/24 1125   Wound Width (cm) 1.7 cm 05/29/24 1125   Wound Surface Area (cm^2) 4.08 cm^2 05/29/24 1125   Wound Depth (cm) 0.1 cm 05/29/24 1125   Wound Volume (cm^3) 0.408 cm^3 05/29/24 1125   Wound Healing % -249 05/29/24 1125   Margins Well-defined edges 05/29/24 1125   Non-staged Wound Description Full thickness 05/29/24 1125   Peggy-wound Assessment Edema 05/29/24  1125   Wound Bed Slough (%) 100 % 05/29/24 1125   Wound Odor None 05/29/24 1125   Tunneling? No 05/29/24 1125   Undermining? No 05/29/24 1125   Sinus Tracts? No 05/29/24 1125       Wound 05/13/24 #3 Right posterior heel Heel Right;Posterior (Active)   Wound Image   05/29/24 1126   Drainage Amount Small 05/29/24 1126   Drainage Description Yellow;Serous 05/29/24 1126   Wound Length (cm) 1.5 cm 05/29/24 1126   Wound Width (cm) 2.2 cm 05/29/24 1126   Wound Surface Area (cm^2) 3.3 cm^2 05/29/24 1126   Wound Depth (cm) 0.1 cm 05/29/24 1126   Wound Volume (cm^3) 0.33 cm^3 05/29/24 1126   Wound Healing % 39 05/29/24 1126   Margins Well-defined edges 05/29/24 1126   Non-staged Wound Description Full thickness 05/29/24 1126   Peggy-wound Assessment Dry;Edema 05/29/24 1126   Wound Granulation Tissue Firm;[place];Pink 05/29/24 1126   Wound Bed Granulation (%) 100 % 05/29/24 1126   Wound Bed Slough (%) 10 % 05/13/24 1605   Wound Odor Mild 05/29/24 1126   Tunneling? No 05/29/24 1126   Undermining? No 05/29/24 1126   Sinus Tracts? No 05/29/24 1126   Pressure Injury Stage 3 05/13/24 1605           ASSESSMENT AND PLAN:     Assessment  Assessment    Encounter Diagnosis  1. Nonhealing surgical wound, subsequent encounter    2. Non-pressure chronic ulcer of right calf with fat layer exposed (formerly [facility])    3. Non-healing open wound of right heel, subsequent encounter    4. Foot ulcer, right, with fat layer exposed (formerly [facility])    5. PAD (peripheral artery disease) (formerly [facility])    6. Edema of right lower extremity    7. History of transmetatarsal amputation of foot (formerly [facility])    8. Gait difficulty        Problem List  Patient Active Problem List   Diagnosis    Closed minimally displaced zone I fracture of sacrum (formerly [facility])    At risk for falling    CAD (coronary artery disease)    Ischemic cardiomyopathy    Azotemia    Arrhythmia    Esophageal reflux    History of MI (myocardial infarction)    Mixed hyperlipidemia    Primary hypertension    Dizziness    Medication  side effect    Closed left hip fracture (HCC)  Global 6/22/2016    Status post hip surgery    Left shoulder distal clavical resection and excision of ganglion cyst of soft tissue mass   Global  09/17/2020    Orthopedic aftercare for healing traumatic hip fracture, left, closed    Closed left hip fracture, with routine healing, subsequent encounter    Osteoarthrosis, localized, primary, knee, left    Osteoarthrosis, localized, primary, knee, right    Gangrene ([facility])    PAD (peripheral artery disease) ([facility])    Benign prostatic hyperplasia with incomplete bladder emptying    Gangrene of foot ([facility])    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) ([facility])    Leukocytosis    Atrial fibrillation with RVR ([facility])    Hypokalemia    Acute kidney injury ([facility])    Hyperglycemia    Community acquired pneumonia of right lung, unspecified part of lung    Acute respiratory failure with hypoxia ([facility])    Hypotension, unspecified hypotension type    Sepsis due to pneumonia ([facility])    Foot ulcer with fat layer exposed, right ([facility])    Syncope, unspecified syncope type    Sepsis, due to unspecified organism, unspecified whether acute organ dysfunction present ([facility])    Shock ([facility])    Acute hypoxic respiratory failure ([facility])    Pneumonia, bacterial    Hyponatremia    Metabolic alkalosis    Recurrent pneumonia    Sepsis due to undetermined organism ([facility])    Acute on chronic congestive heart failure, unspecified heart failure type ([facility])    Acute hypoxemic respiratory failure ([facility])    ERON (acute kidney injury) ([facility])    Lactic acidosis    Leukocytosis, unspecified type    Palliative care encounter    Counseling regarding advance care planning and goals of care    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) ([facility])         MANAGEMENT    Continue skin sub - Epifix on foot wound.   Continue santyl on other 2 wounds for now.   Low salt diet  Leg elevation  Followup with Dr. Poon on Monday  See me once every 4 weeks to followup on leg  wound.     PROCEDURES:    Cellular tissue product application Old surgical Right Foot     Date/Time: 5/29/2024 11:52 AM     Performed by: Fortino Mo MD  Authorized by: Fortino Mo MD  Associated wounds:   Wound 08/14/23 #1- right foot Foot Right  Consent:     Consent obtained:  Verbal    Consent given by:  Patient    Risks discussed:  Bleeding, infection and pain  Procedure details:     Location:  head/hands/feet/digits/genitalia    Product applied:  Epifix    Product lot #:  [id number]    Product expiration:  12/1/2028    Amount used (cm^2):  16    Amount wasted (cm^2):  0    Reason for waste:  Size of wound    Secured/Fixated: Yes      Secured/Fixated with:  Silicone contact layer  Post-procedure details:     Patient tolerance of procedure:  Tolerated well, no immediate complications  Comments:      Dressed with enluxtra and ABD pad.    Patient Instructions     Continue current plan of care.   Continue  santyl on right leg wound for enzyatic debridement  Continue santyl on heel wound for one more week.   Continue epifix on right foot wound  Leg elevation and low salt diet  See Dr. Poon next Monday.     Wound Cleaning and Dressings:    Wash your hands with soap and water. Always wear gloves while changing dressings. Donot touch wound / mio-wound skin with un-gloved hands. Remove old dressing, discard and place into trash.    DRESSINGS:   Right leg and right heel : santyl / HF transfer - Change dressing daily.  Right foot: EPIFIX SKIN SUBSTITUTE / HF transfer- change dressing QOD  Offload wounded area.     Off-Loading: modified foot wear for maximal offloading.     Miscellaneous Instructions:  Supplement with a daily multivitamin   Low salt diet  Intense blood sugar control - Goal Blood sugar below 180 at all times recommended.  Increase protein intake / consider protein supplements - see below  Elevate extremities at all times when sitting / laying down.    DIETARY MODIFICATIONS TO HELP  WITH WOUND HEALING:    Protein: Meats, beans, eggs, milk and yogurt particularly Greek yogurt), tofu, soy nuts, soy protein products    Vitamin C: Citrus fruits and juices, strawberries, tomatoes, tomato juice, peppers, baked potatoes, spinach, broccoli, cauliflower, [place] sprouts, cabbage    Vitamin A: Dark green, leafy vegetables, orange or yellow vegetables, cantaloupe, fortified dairy products, liver, fortified cereals    Zinc: Fortified cereals, red meats, seafood    Consider Kwan by [employer] (These are essential branch chain amino acids that help with tissue building and wound healing) and take 2 packets/day. you can order online at [employer] or [employer]    ADDITIONAL REMINDERS:    The treatment plan has been discussed at length with you and your provider. Follow all instructions carefully, it is very important. If you do not follow all instructions, you are at  risk of your wound not healing, infection, possible loss of limb and even end of life.  Please call the clinic during regular business hours ( 7:30 AM - 5:30 PM) if you notice increased bleeding, redness, warmth, pain or pus like drainage or start running a fever greater than 100.3.    For after hour emergencies, please call your primary physician or go to the nearest emergency room.    Orders  Orders Placed This Encounter   Procedures    Cellular tissue product application Old surgical Right Foot       Patient/Caregiver Education: There are no barriers to learning. Medical education for above diagnosis given.   Answered all questions.    Outcome: Patient verbalizes understanding. Patient is notified to call with any questions, complications, allergies, or worsening or changing symptoms.  Patient is to call with any side effects or complications as a result of the treatments today.      DOCUMENTATION OF TIME SPENT: Code selection for this visit was based on time spent : 40 min on date of service in preparing to see the patient, obtaining and/or  reviewing separately obtained history, performing a medically appropriate examination, counseling and educating the patient/family/caregiver, ordering medications or testing, referring and communicating with other healthcare providers, documenting clinical information in the E HR, independently interpreting results and communicating results to the patient/family/caregiver and care coordination with the patient's other providers.    Followup: Return in about 5 days (around 6/3/2024) for Wound followup Dr. Araujo .      Note to Patient:  The 21st Century Cures Act makes medical notes like these available to patients in the interest of transparency. However, be advised this is a medical document and is intended as gjav-gi-ruyj communication; it is written in medical language and may appear blunt, direct, or contain abbreviations or verbiage that are unfamiliar. Medical documents are intended to carry relevant information, facts as evident, and the clinical opinion of the practitioner.    Also, please note that this report has been produced using speech recognition software and may contain errors related to that system including, but not limited to, errors in grammar, punctuation, and spelling, as well as words and phrases that possibly may have been recognized inappropriately.  If there are any questions or concerns, contact the dictating provider for clarification.      Fortino Butler MD  5/29/2024  11:48 AM                   Patient ID: Alejandro Guardado is a 89 year old male.    Cellular tissue product application Old surgical Right Foot    Date/Time: 5/29/2024 11:52 AM    Performed by: Fortino Mo MD  Authorized by: Fortino Mo MD  Associated wounds:   Wound 08/14/23 #1- right foot Foot Right  Consent:     Consent obtained:  Verbal    Consent given by:  Patient    Risks discussed:  Bleeding, infection and pain  Procedure details:     Location:  head/hands/feet/digits/genitalia    Product  applied:  Epifix    Product lot #:  [id number]    Product expiration:  12/1/2028    Amount used (cm^2):  16    Amount wasted (cm^2):  0    Reason for waste:  Size of wound    Secured/Fixated: Yes      Secured/Fixated with:  Silicone contact layer  Post-procedure details:     Patient tolerance of procedure:  Tolerated well, no immediate complications  Comments:      Dressed with enluxtra and ABD pad.                Weekly Wound Education Note    Teaching Provided To: Patient;Family  Training Topics: Cleasing and general instructions;Discharge instructions;Dressing;Skin substitute  Training Method: Explain/Verbal  Training Response: Patient responds and understands;Reinforcement needed        Notes: Stable. Right foot: Epifix #2 applied, silicone contact layer, enluxtra (remove backing), ABD pad, conforming gauze, tape. Right leg/heel: santyl, moistened gauze, ABD pad, conforming gauze, tape. Supplies ordered from "[employer]        Wound Treatment Orders:  No orders of the defined types were placed in this encounter.    Wound Information/Order:  Wound Number: All wounds  Product: 2x2 gauze, 4x4 gauze, ABD pad, hydrofera transfer  Dispense: 30 days  Dressing Frequency:Change dressing daily and/or PRN    Was a Debridement performed: Yes, Debridement type: selective    Compression Stockings ordered: No    Notes: Dispense as written.  2x2 gauze, 4x4 gauze, ABD pad, hydrofera transfer

## (undated) NOTE — LETTER
[address]  [address]  Authorization for Surgical Operation and Procedure     Date:___________                                                                                                         Time:__________  I hereby authorize Surgeon(s):  Jeremy Koch MD, my physician and his/her assistants (if applicable), which may include medical students, residents, and/or fellows, to perform the following surgical operation/ procedure and administer such anesthesia as may be determined necessary by my physician:  Operation/Procedure name (s) Procedure(s):  ESOPHAGEAL MANOMETRY on Alejandro Guardado   2.   I recognize that during the surgical operation/procedure, unforeseen conditions may necessitate additional or different procedures than those listed above.  I, therefore, further authorize and request that the above-named surgeon, assistants, or designees perform such procedures as are, in their judgment, necessary and desirable.    3.   My surgeon/physician has discussed prior to my surgery the potential benefits, risks and side effects of this procedure; the likelihood of achieving goals; and potential problems that might occur during recuperation.  They also discussed reasonable alternatives to the procedure, including risks, benefits, and side effects related to the alternatives and risks related to not receiving this procedure.  I have had all my questions answered and I acknowledge that no guarantee has been made as to the result that may be obtained.    4.   Should the need arise during my operation/procedure, which includes change of level of care prior to discharge, I also consent to the administration of blood and/or blood products.  Further, I understand that despite careful testing and screening of blood or blood products by collecting agencies, I may still be subject to ill effects as a result of receiving a blood transfusion and/or blood products.  The following  are some, but not all, of the potential risks that can occur: fever and allergic reactions, hemolytic reactions, transmission of diseases such as Hepatitis, AIDS and Cytomegalovirus (CMV) and fluid overload.  In the event that I wish to have an autologous transfusion of my own blood, or a directed donor transfusion, I will discuss this with my physician.  Check only if Refusing Blood or Blood Products  I understand refusal of blood or blood products as deemed necessary by my physician may have serious consequences to my condition to include possible death. I hereby assume responsibility for my refusal and release the hospital, its personnel, and my physicians from any responsibility for the consequences of my refusal.          o  Refuse      5.   I authorize the use of any specimen, organs, tissues, body parts or foreign objects that may be removed from my body during the operation/procedure for diagnosis, research or teaching purposes and their subsequent disposal by hospital authorities.  I also authorize the release of specimen test results and/or written reports to my treating physician on the hospital medical staff or other referring or consulting physicians involved in my care, at the discretion of the Pathologist or my treating physician.    6.   I consent to the photographing or videotaping of the operations or procedures to be performed, including appropriate portions of my body for medical, scientific, or educational purposes, provided my identity is not revealed by the pictures or by descriptive texts accompanying them.  If the procedure has been photographed/videotaped, the surgeon will obtain the original picture, image, videotape or CD.  The hospital will not be responsible for storage, release or maintenance of the picture, image, tape or CD.    7.   I consent to the presence of a  or observers in the operating room as deemed necessary by my physician or their designees.    8.   I  recognize that in the event my procedure results in extended X-Ray/fluoroscopy time, I may develop a skin reaction.    9. If I have a Do Not Attempt Resuscitation (DNAR) order in place, that status will be suspended while in the operating room, procedural suite, and during the recovery period unless otherwise explicitly stated by me (or a person authorized to consent on my behalf). The surgeon or my attending physician will determine when the applicable recovery period ends for purposes of reinstating the DNAR order.  10. Patients having a sterilization procedure: I understand that if the procedure is successful the results will be permanent and it will therefore be impossible for me to inseminate, conceive, or bear children.  I also understand that the procedure is intended to result in sterility, although the result has not been guaranteed.   11. I acknowledge that my physician has explained sedation/analgesia administration to me including the risk and benefits I consent to the administration of sedation/analgesia as may be necessary or desirable in the judgment of my physician.    I CERTIFY THAT I HAVE READ AND FULLY UNDERSTAND THE ABOVE CONSENT TO OPERATION and/or OTHER PROCEDURE.    _________________________________________  __________________________________  Signature of Patient     Signature of Responsible Person         ___________________________________         Printed Name of Responsible Person           _________________________________                 Relationship to Patient  _________________________________________  ______________________________  Signature of Witness          Date  Time      Patient Name: Alejandro Guardado     : 1935                 Printed: 2024     Medical Record #: [id number]                     Page 1 [address] [place]  [address]    Consent for Anesthesia    I, Alejandro Guardado agree to be cared  for by an anesthesiologist, who is specially trained to monitor me and give me medicine to put me to sleep or keep me comfortable during my procedure    I understand that my anesthesiologist is not an employee or agent of [facility] or [employer]. He or she works for [employer].    As the patient asking for anesthesia services, I agree to:  Allow the anesthesiologist (anesthesia doctor) to give me medicine and do additional procedures as necessary. Some examples are: Starting or using an “IV” to give me medicine, fluids or blood during my procedure, and having a breathing tube placed to help me breathe when I’m asleep (intubation). In the event that my heart stops working properly, I understand that my anesthesiologist will make every effort to sustain my life, unless otherwise directed by [facility] Do Not Resuscitate documents.  Tell my anesthesia doctor before my procedure:  If I am pregnant.  The last time that I ate or drank.  All of the medicines I take (including prescriptions, herbal supplements, and pills I can buy without a prescription (including street drugs/illegal medications). Failure to inform my anesthesiologist about these medicines may increase my risk of anesthetic complications.  If I am allergic to anything or have had a reaction to anesthesia before.  I understand how the anesthesia medicine will help me (benefits).  I understand that with any type of anesthesia medicine there are risks:  The most common risks are: nausea, vomiting, sore throat, muscle soreness, damage to my eyes, mouth, or teeth (from breathing tube placement).  Rare risks include: remembering what happened during my procedure, allergic reactions to medications, injury to my airway, heart, lungs, vision, nerves, or muscles and in extremely rare instances death.  My doctor has explained to me other choices available to me for my care (alternatives).  Pregnant Patients (“epidural”):  I  understand that the risks of having an epidural (medicine given into my back to help control pain during labor), include itching, low blood pressure, difficulty urinating, headache or slowing of the baby’s heart. Very rare risks include infection, bleeding, seizure, irregular heart rhythms and nerve injury.  Regional Anesthesia (“spinal”, “epidural”, & “nerve blocks”):  I understand that rare but potential complications include headache, bleeding, infection, seizure, irregular heart rhythms, and nerve injury.    I can change my mind about having anesthesia services at any time before I get the medicine.    _____________________________________________________________________________  Patient (or Representative) Signature/Relationship to Patient  Date   Time    _____________________________________________________________________________   Name (if used)    Language/Organization   Time    _____________________________________________________________________________  Anesthesiologist Signature     Date   Time  I have discussed the procedure and information above with the patient (or patient’s representative) and answered their questions. The patient or their representative has agreed to have anesthesia services.    _____________________________________________________________________________  Witness        Date   Time  I have verified that the signature is that of the patient or patient’s representative, and that it was signed before the procedure  Patient Name: Alejandro Guardado     : 1935                 Printed: 2024     Medical Record #: [id number]                     Page 2 of 2